# Patient Record
Sex: MALE | Race: WHITE | Employment: OTHER | URBAN - METROPOLITAN AREA
[De-identification: names, ages, dates, MRNs, and addresses within clinical notes are randomized per-mention and may not be internally consistent; named-entity substitution may affect disease eponyms.]

---

## 2018-02-04 PROBLEM — D35.00 ADRENAL ADENOMA: Status: ACTIVE | Noted: 2018-02-04

## 2018-02-04 PROBLEM — E66.9 OBESITY: Status: ACTIVE | Noted: 2018-02-04

## 2018-02-04 PROBLEM — R00.0 SINUS TACHYCARDIA: Status: ACTIVE | Noted: 2018-02-04

## 2018-02-04 PROBLEM — J96.22 ACUTE ON CHRONIC RESPIRATORY FAILURE WITH HYPERCAPNIA (HCC): Status: ACTIVE | Noted: 2018-02-04

## 2018-02-04 PROBLEM — R06.02 SOB (SHORTNESS OF BREATH): Status: ACTIVE | Noted: 2018-02-04

## 2018-02-04 PROBLEM — J44.1 COPD EXACERBATION (HCC): Status: ACTIVE | Noted: 2018-02-04

## 2018-02-04 PROBLEM — R07.9 CHEST PAIN: Status: ACTIVE | Noted: 2018-02-04

## 2018-02-04 PROBLEM — N17.9 AKI (ACUTE KIDNEY INJURY) (HCC): Status: ACTIVE | Noted: 2018-02-04

## 2018-02-04 PROBLEM — I10 ESSENTIAL HYPERTENSION: Status: ACTIVE | Noted: 2018-02-04

## 2018-02-06 PROBLEM — I48.91 ATRIAL FIBRILLATION WITH RVR (HCC): Status: ACTIVE | Noted: 2018-02-06

## 2018-02-09 PROBLEM — I48.91 ATRIAL FIBRILLATION WITH RVR (HCC): Status: RESOLVED | Noted: 2018-02-06 | Resolved: 2018-02-09

## 2018-02-09 PROBLEM — R00.0 SINUS TACHYCARDIA: Status: RESOLVED | Noted: 2018-02-04 | Resolved: 2018-02-09

## 2018-02-09 PROBLEM — N17.9 AKI (ACUTE KIDNEY INJURY) (HCC): Status: RESOLVED | Noted: 2018-02-04 | Resolved: 2018-02-09

## 2018-02-14 PROBLEM — R41.82 ALTERED MENTAL STATUS: Status: ACTIVE | Noted: 2018-02-14

## 2018-02-14 PROBLEM — I48.92 ATRIAL FLUTTER WITH RAPID VENTRICULAR RESPONSE (HCC): Status: ACTIVE | Noted: 2018-02-14

## 2018-02-14 PROBLEM — J96.22 ACUTE ON CHRONIC RESPIRATORY FAILURE WITH HYPERCAPNIA (HCC): Status: RESOLVED | Noted: 2018-02-04 | Resolved: 2018-02-14

## 2018-02-14 PROBLEM — I48.91 ATRIAL FIBRILLATION WITH RVR (HCC): Status: ACTIVE | Noted: 2018-02-14

## 2018-02-14 PROBLEM — N17.9 AKI (ACUTE KIDNEY INJURY) (HCC): Status: ACTIVE | Noted: 2018-02-14

## 2018-02-14 PROBLEM — D72.829 LEUKOCYTOSIS: Status: ACTIVE | Noted: 2018-02-14

## 2018-02-14 PROBLEM — F41.9 ANXIETY: Status: ACTIVE | Noted: 2018-02-14

## 2018-02-14 PROBLEM — I95.9 HYPOTENSION: Status: ACTIVE | Noted: 2018-02-14

## 2018-03-19 ENCOUNTER — OFFICE VISIT (OUTPATIENT)
Dept: CARDIOLOGY CLINIC | Age: 58
End: 2018-03-19

## 2018-03-19 VITALS
RESPIRATION RATE: 16 BRPM | HEART RATE: 124 BPM | HEIGHT: 71 IN | SYSTOLIC BLOOD PRESSURE: 146 MMHG | DIASTOLIC BLOOD PRESSURE: 90 MMHG | WEIGHT: 251 LBS | BODY MASS INDEX: 35.14 KG/M2

## 2018-03-19 DIAGNOSIS — I10 ESSENTIAL HYPERTENSION: ICD-10-CM

## 2018-03-19 DIAGNOSIS — G47.33 OSA (OBSTRUCTIVE SLEEP APNEA): ICD-10-CM

## 2018-03-19 DIAGNOSIS — I48.92 ATRIAL FLUTTER WITH RAPID VENTRICULAR RESPONSE (HCC): Primary | ICD-10-CM

## 2018-03-19 DIAGNOSIS — I95.0 IDIOPATHIC HYPOTENSION: ICD-10-CM

## 2018-03-19 DIAGNOSIS — J44.9 COPD, VERY SEVERE (HCC): ICD-10-CM

## 2018-03-19 PROCEDURE — 99214 OFFICE O/P EST MOD 30 MIN: CPT | Performed by: INTERNAL MEDICINE

## 2018-03-19 PROCEDURE — 93000 ELECTROCARDIOGRAM COMPLETE: CPT | Performed by: INTERNAL MEDICINE

## 2018-03-20 ENCOUNTER — OFFICE VISIT (OUTPATIENT)
Dept: PULMONOLOGY | Age: 58
End: 2018-03-20

## 2018-03-20 VITALS
SYSTOLIC BLOOD PRESSURE: 128 MMHG | WEIGHT: 251 LBS | OXYGEN SATURATION: 91 % | TEMPERATURE: 97.9 F | BODY MASS INDEX: 35.14 KG/M2 | HEIGHT: 71 IN | RESPIRATION RATE: 17 BRPM | HEART RATE: 133 BPM | DIASTOLIC BLOOD PRESSURE: 81 MMHG

## 2018-03-20 DIAGNOSIS — J44.1 COPD EXACERBATION (HCC): ICD-10-CM

## 2018-03-20 PROCEDURE — 99213 OFFICE O/P EST LOW 20 MIN: CPT | Performed by: INTERNAL MEDICINE

## 2018-03-20 PROCEDURE — 99214 OFFICE O/P EST MOD 30 MIN: CPT | Performed by: INTERNAL MEDICINE

## 2018-03-20 NOTE — PROGRESS NOTES
Follow up visit today. Orders for meds continue; stiloto and eliquis samples given per doctor's orders. To have sleep study with titration done as soon as can be scheduled. Follow up appt after sleep study.
that his Stilito toe is helping him a lot and he is using nebs q.4 hours      PHYSICAL EXAMINATION:     VITAL SIGNS:  /81 (Site: Right Arm, Cuff Size: Large Adult)   Pulse 133   Temp 97.9 °F (36.6 °C) (Oral)   Resp 17   Ht 5' 11\" (1.803 m)   Wt 251 lb (113.9 kg)   SpO2 91%   BMI 35.01 kg/m²   Wt Readings from Last 3 Encounters:   03/20/18 251 lb (113.9 kg)   03/19/18 251 lb (113.9 kg)   02/17/18 245 lb 9.6 oz (111.4 kg)     Temp Readings from Last 3 Encounters:   03/20/18 97.9 °F (36.6 °C) (Oral)   02/18/18 97.3 °F (36.3 °C) (Oral)   02/09/18 97.6 °F (36.4 °C) (Oral)     TMAX:  BP Readings from Last 3 Encounters:   03/20/18 128/81   03/19/18 (!) 146/90   02/18/18 134/73     Pulse Readings from Last 3 Encounters:   03/20/18 133   03/19/18 124   02/18/18 59           INTAKE/OUTPUTS:  No intake/output data recorded.   No intake or output data in the 24 hours ending 03/20/18 1448    General Appearance: alert and oriented to person, place and time, well-developed and   well-nourished, in no acute distress   Eyes: pupils equal, round, and reactive to light, extraocular eye movements intact, conjunctivae normal and sclera anicteric   Neck: neck supple and non tender without mass, no thyromegaly, no thyroid nodules and no cervical adenopathy   Pulmonary/Chest:Rhonchi ,decrease breath sounds   Cardiovascular: irregular rhythm, normal S1 and S2, no murmurs, rubs, clicks or gallops, distal pulses intact, no carotid bruits,   Abdomen: obese, soft, non-tender, non-distended, normal bowel sounds, no masses or organomegaly   Extremities:no edema or cyanosis  Musculoskeletal: normal range of motion, no joint swelling, deformity or tenderness   Neurologic: reflexes normal and symmetric, no cranial nerve deficit noted    LABS/IMAGING:    CBC:  Lab Results   Component Value Date    WBC 11.1 02/18/2018    HGB 12.3 (L) 02/18/2018    HCT 37.4 02/18/2018    MCV 96.2 02/18/2018     02/18/2018    LYMPHOPCT 30.9 02/18/2018

## 2018-03-22 NOTE — PROGRESS NOTES
OUTPATIENT CARDIOLOGY FOLLOW-UP    Chief Complaint: Follow-up for atrial flutter with RVR    Date of Service: 3/19/18    Interim History:  +SOB, productive cough, and chills prior to 2018 hospitalization --> +influenza A. Atrial flutter with RVR on EKG and telemetry during 2018 admission (with difficulty rate controlling) --> successful WYATT/CVN on 18 (and 2/15/18). +episodes of atypical chest pain (improved). +improvement in respiratory status s/p CVN --> now with recurrent atrial arrhythmia and \"back to being out of breath\". Currently with no acute distress at rest.    Review of Systems:  Cardiac: As per HPI  General: No fever, +chills (improved)  Pulmonary: As per HPI  GI: No nausea, vomiting  Musculoskeletal: ROUSE x 4, no focal motor deficits  Skin: Intact, no rashes  Neuro/Psych: No headache or seizures    Past Medical History:   1. Cardiac catheterization 2010 ([facility]): LMT: normal. LAD: normal, gives off one large bifurcating diagonal as it courses to but ends at the apex. LCx: nondominant, gives off one large OM1, only trivial distal disease. RCA: Dominant, large, minimal disease distally. 2. Echocardiogram 2010 ([facility]): EF 55% with questionable RV dilation. Trivial to small pericardial effusion. Small echodense space near apex (? Localized effusion). Trivial MR and TR.   3. Pericarditis 2010 (treated at [facility])  4. Hypertension  5. History of syncope with negative tilt table test 2012 ([facility])  6. COPD  7. History of back surgery (detals unknown)  8. Former tobacco abuse     Social History:  He stopped smoking in 2017, prior to that smoked 2 PPD for \" a lot of years\". He denies significant alcohol intake or illicit drug use. Family History:  One sister with a pacemaker  Mother  of ALS, he is unsure at what age  Father  of lung cancer, he is unsure at what age    Medications Prior to Admit:  Prior to Admission medications    Medication Sig Start Date End Date Taking? hyperinflation of lungs, consistent with COPD     CTA of chest 2/04/2018:  1. There is no evidence of a pulmonary embolus.           2. COPD. 3. Bilateral adrenal adenomas. The largest is on the left and measures   1.5 cm. .     Repeat CTA chest: 2/5/18  1. No acute pulmonary embolus.       2. No aneurysm formation or dissection of the thoracic aorta.       3. No acute cardiopulmonary process. ECG: atrial flutter with RVR    Telemetry findings reviewed: SR, rate 70's    Cardiac catheterization 11/2010 ([facility]): LMT: normal. LAD: normal, gives off one large bifurcating diagonal as it courses to but ends at the apex. LCx: nondominant, gives off one large OM1, only trivial distal disease. RCA: Dominant, large, minimal disease distally. Echocardiogram 11/2010 ([facility]): EF 55% with questionable RV dilation. Trivial to small pericardial effusion. Small echodense space near apex (? Localized effusion). Trivial MR and TR.     WYATT: 2/7/18 ([facility])   Cardiac rhythm: atrial flutter   Low normal left ventricular ejection fraction.   Moderately dilated left atrium.   No evidence of a left atrial appendage thrombus.   No evidence of interatrial shunting on bubble study.   Borderline dilated right ventricle with normal right ventricular function.   Mild-moderate mitral regurgitation. WYATT: 2/15/18 [facility])   Low normal left ventricular systolic function.   Mild to moderate mitral regurgitation. Impression:  1. Hospitalization in 2/2018 for chills, productive cough, and SOB --> Influenza A (2/5/18)  2. Very severe COPD with recent exacerbation  3. Atrial flutter with RVR -- duration unknown at the time of 2/2018 hospitalization, [id number] score at least 1 --> successful WYATT/CVN on 2/7/18 (and 2/15/18). +improvement in respiratory status s/p CVN --> now with recurrent atrial arrhythmia and \"back to being out of breath\". 4. HTN -- controlled during recent hospitalization, BP today 146/90, on cardizem and ARB  5.  Prior

## 2018-03-22 NOTE — PROGRESS NOTES
tablet by mouth 2 times daily 60 tablet 0    escitalopram (LEXAPRO) 10 MG tablet Take 1 tablet by mouth daily 30 tablet 0    diltiazem (CARDIZEM CD) 240 MG extended release capsule Take 1 capsule by mouth daily 30 capsule 0    predniSONE (DELTASONE) 10 MG tablet 4 pills for 3 days, then 3 pills for 3 days, 2 pills for 3 days, then 1 pills for 3 days 30 tablet 0    diclofenac (SOLARAZE) 3 % gel Apply topically 2 times daily Apply topically 2 times daily.  Tiotropium Bromide-Olodaterol (STIOLTO RESPIMAT) 2.5-2.5 MCG/ACT AERS Inhale 2.5 mcg into the lungs daily 2 puffs daily      losartan (COZAAR) 50 MG tablet Take 50 mg by mouth daily      albuterol sulfate  (90 Base) MCG/ACT inhaler Inhale 2 puffs into the lungs every 6 hours as needed for Wheezing      ipratropium-albuterol (DUONEB) 0.5-2.5 (3) MG/3ML SOLN nebulizer solution Inhale 1 vial into the lungs every 4 hours       No current facility-administered medications for this visit. No Known Allergies    ROS:   Constitutional: Negative for fever, activity change and appetite change. HENT: Negative for epistaxis. Eyes: Negative for diploplia, blurred vision. Respiratory: Negative for cough, chest tightness, shortness of breath and wheezing. Cardiovascular: pertinent positives in HPI  Gastrointestinal: Negative for abdominal pain and blood in stool. All other review of systems are negative     PHYSICAL EXAM:  Vitals:    03/23/18 0743   BP: 134/82   Pulse: 138   Resp: 20   Constitutional: Well-developed, no acute distress, well groomed  Eyes: conjunctivae normal, no xanthelasma   Ears, Nose, Throat: oral mucosa moist, no cyanosis   CV: tachycardic rate, regular rhythm, no JVD no murmurs, rubs, or gallops.  PMI is nondisplaced, Peripheral pulses normal including carotid auscultation, no noted aortic bruit, bilateral femoral and pedal pulses are normal in quality  Lungs: clear to auscultation bilaterally, mild end expiratory wheezes  Abdomen: soft, non-tender, bowel sounds present, no masses or hepatomegaly   Musculoskeletal: no digital clubbing, trace edema   Skin: warm, no rashes      I have personally reviewed the laboratory, cardiac diagnostic and radiographic testing as outlined below:    Data:    No results for input(s): WBC, HGB, HCT, PLT in the last 72 hours. No results for input(s): NA, K, CL, CO2, BUN, CREATININE, CALCIUM in the last 72 hours. Invalid input(s): GLU  No results for input(s): INR in the last 72 hours. No results for input(s): TSH in the last 72 hours. Lab Results   Component Value Date    MG 2.2 2018     Last CXR: 18:  Narrative   Patient MRN:  [id number]   : 1960   Age: 66 years   Gender: Male       Order Date:  2018 5:33 PM       EXAM: XR CHEST PORTABLE       NUMBER OF IMAGES:  1 view       INDICATION:  Patient is a 61-year-old gentleman with history of COPD,   tachycardia, chest pain         COMPARISON: Chest x-ray 2018       FINDINGS:  Cardiac silhouette appears within normal limits. Mediastinal contour is unremarkable.       No pleural effusion or pneumothorax. Lungs are clear. Mild pulmonary   hyperinflation could indicate underlying mild emphysema. Upper abdomen   is unremarkable.           Impression       1. No acute pleuroparenchymal disease. EKG: 3/23/18: AFL with likely 2:1 conduction  - likely typical   -  (see scan in Cardiology)    Last Echo:    WYATT: 18 ([place])   Cardiac rhythm: atrial flutter   Low normal left ventricular ejection fraction.   Moderately dilated left atrium.   No evidence of a left atrial appendage thrombus.   No evidence of interatrial shunting on bubble study.   Borderline dilated right ventricle with normal right ventricular function.   Mild-moderate mitral regurgitation.     WYATT: 2/15/18 [facility])   Low normal left ventricular systolic function.   Mild to moderate mitral regurgitation.     Last cardioversion: successful 18 are not limited to: bleeding, infection, blood clot, vascular injury requiring surgical repair, valvular injury, damage to the cardiac conduction system requiring pacemaker implantation, cardiac perforation and tamponade, heart attack, stroke, and death. The patient understands these risks and wishes to think about whether or not to undergo the procedure. We have discussed brandie-procedural anticoagulation management and the potential need for a trans-esophageal echocardiogram on the day of the procedure. We also discussed the possibility of trans-septal for LA flutter ablation. - plan for WYATT + AFL ablation- possible trans-septal  - given poorly controlled rate would recommend adding digoxin 0.125mg daily, however he asks that his PCP order this based on costs, and we will contact his office regarding this    2. Obesity  - encouraged weight management    3. HTN  - follows with Dr. Jadon Coats and Dr. Nicole Alcala    4. COPD  - per pulmonary    5. Sleep disordered breathing  - sleep study pending    6. Ex-smoker  - continue abstinence    Plan:  1. Atrial flutter ablation  2. Continue cardizem 240mg BID  3. Start digoxin 125mcg daily  4. Dig level in 1 week    Thank you for allowing me to participate in your patient's care. Please call me if there are any questions.       Carter Ewing MD, [facility]  Cardiac Electrophysiology  [facility]) Physicians  [facility]: [facility] [place]  7:51 AM  3/23/2018

## 2018-03-23 ENCOUNTER — OFFICE VISIT (OUTPATIENT)
Dept: NON INVASIVE DIAGNOSTICS | Age: 58
End: 2018-03-23

## 2018-03-23 ENCOUNTER — TELEPHONE (OUTPATIENT)
Dept: NON INVASIVE DIAGNOSTICS | Age: 58
End: 2018-03-23

## 2018-03-23 VITALS
HEIGHT: 71 IN | RESPIRATION RATE: 20 BRPM | HEART RATE: 138 BPM | WEIGHT: 254.2 LBS | DIASTOLIC BLOOD PRESSURE: 82 MMHG | SYSTOLIC BLOOD PRESSURE: 134 MMHG | BODY MASS INDEX: 35.59 KG/M2

## 2018-03-23 DIAGNOSIS — Z79.899 HIGH RISK MEDICATION USE: Primary | ICD-10-CM

## 2018-03-23 DIAGNOSIS — I48.92 ATRIAL FLUTTER WITH RAPID VENTRICULAR RESPONSE (HCC): Primary | ICD-10-CM

## 2018-03-23 DIAGNOSIS — I48.92 ATRIAL FLUTTER WITH RAPID VENTRICULAR RESPONSE (HCC): ICD-10-CM

## 2018-03-23 DIAGNOSIS — I10 ESSENTIAL HYPERTENSION: ICD-10-CM

## 2018-03-23 PROCEDURE — 99205 OFFICE O/P NEW HI 60 MIN: CPT | Performed by: INTERNAL MEDICINE

## 2018-03-23 PROCEDURE — 93000 ELECTROCARDIOGRAM COMPLETE: CPT | Performed by: INTERNAL MEDICINE

## 2018-03-23 NOTE — Clinical Note
FYI- he needs digoxin 0.125mg daily, he asked if you could prescribe it for costs reasons and then get a digoxin level in 1 week.  Thanks  Vickey Gavin

## 2018-03-23 NOTE — TELEPHONE ENCOUNTER
I called and spoke to Dr. Bianca Mendez nurse Raúl Garza and gave her the order for Digoxin 125mcg daily and she will call into [address] [place] [address]. I faxed over an order for a Dig level to be done next week and a copy of Dr. Shelia Garcia from today for Dr. Yudith Marrero to review.

## 2018-03-29 ENCOUNTER — TELEPHONE (OUTPATIENT)
Dept: NON INVASIVE DIAGNOSTICS | Age: 58
End: 2018-03-29

## 2018-03-29 RX ORDER — DILTIAZEM HYDROCHLORIDE 240 MG/1
240 CAPSULE, COATED, EXTENDED RELEASE ORAL 2 TIMES DAILY
Qty: 60 CAPSULE | Refills: 5
Start: 2018-03-29 | End: 2018-09-07 | Stop reason: SDUPTHER

## 2018-03-31 ENCOUNTER — HOSPITAL ENCOUNTER (OUTPATIENT)
Age: 58
Setting detail: SPECIMEN
Discharge: HOME | End: 2018-03-31

## 2018-03-31 LAB
ALBUMIN SERPL-MCNC: 4.1 G/DL (ref 3.5–5.2)
ALP BLD-CCNC: 61 U/L (ref 40–129)
ALT SERPL-CCNC: 17 U/L (ref 0–40)
ANION GAP SERPL CALCULATED.3IONS-SCNC: 10 MMOL/L (ref 7–16)
AST SERPL-CCNC: 15 U/L (ref 0–39)
BASOPHILS ABSOLUTE: 0.06 E9/L (ref 0–0.2)
BASOPHILS RELATIVE PERCENT: 0.8 % (ref 0–2)
BILIRUB SERPL-MCNC: 0.2 MG/DL (ref 0–1.2)
BUN BLDV-MCNC: 22 MG/DL (ref 6–20)
CALCIUM SERPL-MCNC: 9.8 MG/DL (ref 8.6–10.2)
CHLORIDE BLD-SCNC: 101 MMOL/L (ref 98–107)
CO2: 32 MMOL/L (ref 22–29)
CREAT SERPL-MCNC: 1 MG/DL (ref 0.7–1.2)
EOSINOPHILS ABSOLUTE: 0.19 E9/L (ref 0.05–0.5)
EOSINOPHILS RELATIVE PERCENT: 2.4 % (ref 0–6)
GFR AFRICAN AMERICAN: >60
GFR NON-AFRICAN AMERICAN: >60 ML/MIN/1.73
GLUCOSE BLD-MCNC: 95 MG/DL (ref 74–109)
HCT VFR BLD CALC: 38.4 % (ref 37–54)
HEMOGLOBIN: 12.7 G/DL (ref 12.5–16.5)
IMMATURE GRANULOCYTES #: 0.08 E9/L
IMMATURE GRANULOCYTES %: 1 % (ref 0–5)
LYMPHOCYTES ABSOLUTE: 2.63 E9/L (ref 1.5–4)
LYMPHOCYTES RELATIVE PERCENT: 33 % (ref 20–42)
MCH RBC QN AUTO: 31.9 PG (ref 26–35)
MCHC RBC AUTO-ENTMCNC: 33.1 % (ref 32–34.5)
MCV RBC AUTO: 96.5 FL (ref 80–99.9)
MONOCYTES ABSOLUTE: 0.93 E9/L (ref 0.1–0.95)
MONOCYTES RELATIVE PERCENT: 11.7 % (ref 2–12)
NEUTROPHILS ABSOLUTE: 4.09 E9/L (ref 1.8–7.3)
NEUTROPHILS RELATIVE PERCENT: 51.1 % (ref 43–80)
PDW BLD-RTO: 13.8 FL (ref 11.5–15)
PLATELET # BLD: 223 E9/L (ref 130–450)
PMV BLD AUTO: 11.4 FL (ref 7–12)
POTASSIUM SERPL-SCNC: 4.6 MMOL/L (ref 3.5–5)
RBC # BLD: 3.98 E12/L (ref 3.8–5.8)
SODIUM BLD-SCNC: 143 MMOL/L (ref 132–146)
TOTAL PROTEIN: 6.7 G/DL (ref 6.4–8.3)
WBC # BLD: 8 E9/L (ref 4.5–11.5)

## 2018-03-31 PROCEDURE — 80162 ASSAY OF DIGOXIN TOTAL: CPT

## 2018-03-31 PROCEDURE — 36415 COLL VENOUS BLD VENIPUNCTURE: CPT

## 2018-03-31 PROCEDURE — 80053 COMPREHEN METABOLIC PANEL: CPT

## 2018-03-31 PROCEDURE — 85025 COMPLETE CBC W/AUTO DIFF WBC: CPT

## 2018-04-01 LAB — DIGOXIN LEVEL: 0.4 NG/ML (ref 0.8–2)

## 2018-04-03 ENCOUNTER — ANESTHESIA EVENT (OUTPATIENT)
Dept: NON INVASIVE DIAGNOSTICS | Age: 58
End: 2018-04-03

## 2018-04-03 ENCOUNTER — HOSPITAL ENCOUNTER (OUTPATIENT)
Dept: NON INVASIVE DIAGNOSTICS | Age: 58
Discharge: HOME | End: 2018-04-03

## 2018-04-03 ENCOUNTER — ANESTHESIA (OUTPATIENT)
Dept: NON INVASIVE DIAGNOSTICS | Age: 58
End: 2018-04-03

## 2018-04-03 VITALS — SYSTOLIC BLOOD PRESSURE: 95 MMHG | DIASTOLIC BLOOD PRESSURE: 85 MMHG | OXYGEN SATURATION: 98 %

## 2018-04-03 VITALS
DIASTOLIC BLOOD PRESSURE: 114 MMHG | BODY MASS INDEX: 35 KG/M2 | OXYGEN SATURATION: 96 % | HEIGHT: 71 IN | WEIGHT: 250 LBS | SYSTOLIC BLOOD PRESSURE: 167 MMHG | HEART RATE: 86 BPM | TEMPERATURE: 97.6 F | RESPIRATION RATE: 16 BRPM

## 2018-04-03 DIAGNOSIS — Z98.890 STATUS POST CATHETER ABLATION OF ATRIAL FLUTTER: ICD-10-CM

## 2018-04-03 LAB
ABO/RH: NORMAL
ANTIBODY SCREEN: NORMAL

## 2018-04-03 PROCEDURE — 6360000002 HC RX W HCPCS

## 2018-04-03 PROCEDURE — 93623 PRGRMD STIMJ&PACG IV RX NFS: CPT | Performed by: INTERNAL MEDICINE

## 2018-04-03 PROCEDURE — 76937 US GUIDE VASCULAR ACCESS: CPT | Performed by: INTERNAL MEDICINE

## 2018-04-03 PROCEDURE — 93325 DOPPLER ECHO COLOR FLOW MAPG: CPT

## 2018-04-03 PROCEDURE — 3700000001 HC ADD 15 MINUTES (ANESTHESIA)

## 2018-04-03 PROCEDURE — 93653 COMPRE EP EVAL TX SVT: CPT | Performed by: INTERNAL MEDICINE

## 2018-04-03 PROCEDURE — 93312 ECHO TRANSESOPHAGEAL: CPT

## 2018-04-03 PROCEDURE — 2720000010 HC SURG SUPPLY STERILE

## 2018-04-03 PROCEDURE — 3700000000 HC ANESTHESIA ATTENDED CARE

## 2018-04-03 PROCEDURE — 93613 INTRACARDIAC EPHYS 3D MAPG: CPT | Performed by: INTERNAL MEDICINE

## 2018-04-03 PROCEDURE — 86901 BLOOD TYPING SEROLOGIC RH(D): CPT

## 2018-04-03 PROCEDURE — C1730 CATH, EP, 19 OR FEW ELECT: HCPCS

## 2018-04-03 PROCEDURE — C1732 CATH, EP, DIAG/ABL, 3D/VECT: HCPCS

## 2018-04-03 PROCEDURE — 94664 DEMO&/EVAL PT USE INHALER: CPT

## 2018-04-03 PROCEDURE — C1894 INTRO/SHEATH, NON-LASER: HCPCS

## 2018-04-03 PROCEDURE — 93621 COMP EP EVL L PAC&REC C SINS: CPT | Performed by: INTERNAL MEDICINE

## 2018-04-03 PROCEDURE — C1713 ANCHOR/SCREW BN/BN,TIS/BN: HCPCS

## 2018-04-03 PROCEDURE — 6370000000 HC RX 637 (ALT 250 FOR IP): Performed by: INTERNAL MEDICINE

## 2018-04-03 PROCEDURE — 2500000003 HC RX 250 WO HCPCS

## 2018-04-03 PROCEDURE — 2580000003 HC RX 258: Performed by: NURSE ANESTHETIST, CERTIFIED REGISTERED

## 2018-04-03 PROCEDURE — 2709999900 HC NON-CHARGEABLE SUPPLY

## 2018-04-03 PROCEDURE — 6360000002 HC RX W HCPCS: Performed by: NURSE ANESTHETIST, CERTIFIED REGISTERED

## 2018-04-03 PROCEDURE — 36415 COLL VENOUS BLD VENIPUNCTURE: CPT

## 2018-04-03 PROCEDURE — 94640 AIRWAY INHALATION TREATMENT: CPT

## 2018-04-03 PROCEDURE — 86900 BLOOD TYPING SEROLOGIC ABO: CPT

## 2018-04-03 PROCEDURE — 86850 RBC ANTIBODY SCREEN: CPT

## 2018-04-03 PROCEDURE — 93320 DOPPLER ECHO COMPLETE: CPT

## 2018-04-03 RX ORDER — SODIUM CHLORIDE 0.9 % (FLUSH) 0.9 %
10 SYRINGE (ML) INJECTION EVERY 12 HOURS SCHEDULED
Status: CANCELLED | OUTPATIENT
Start: 2018-04-03

## 2018-04-03 RX ORDER — PROPOFOL 10 MG/ML
INJECTION, EMULSION INTRAVENOUS PRN
Status: DISCONTINUED | OUTPATIENT
Start: 2018-04-03 | End: 2018-04-03 | Stop reason: SDUPTHER

## 2018-04-03 RX ORDER — ACETAMINOPHEN 325 MG/1
650 TABLET ORAL EVERY 4 HOURS PRN
Status: DISCONTINUED | OUTPATIENT
Start: 2018-04-03 | End: 2018-04-04 | Stop reason: HOSPADM

## 2018-04-03 RX ORDER — DIGOXIN 125 MCG
125 TABLET ORAL DAILY
COMMUNITY
End: 2018-04-03 | Stop reason: HOSPADM

## 2018-04-03 RX ORDER — SODIUM CHLORIDE 9 MG/ML
INJECTION, SOLUTION INTRAVENOUS CONTINUOUS PRN
Status: DISCONTINUED | OUTPATIENT
Start: 2018-04-03 | End: 2018-04-03 | Stop reason: SDUPTHER

## 2018-04-03 RX ORDER — HYDROCODONE BITARTRATE AND ACETAMINOPHEN 5; 325 MG/1; MG/1
2 TABLET ORAL EVERY 4 HOURS PRN
Status: DISCONTINUED | OUTPATIENT
Start: 2018-04-03 | End: 2018-04-04 | Stop reason: HOSPADM

## 2018-04-03 RX ORDER — SODIUM CHLORIDE 0.9 % (FLUSH) 0.9 %
10 SYRINGE (ML) INJECTION PRN
Status: CANCELLED | OUTPATIENT
Start: 2018-04-03

## 2018-04-03 RX ORDER — PROPOFOL 10 MG/ML
INJECTION, EMULSION INTRAVENOUS CONTINUOUS PRN
Status: DISCONTINUED | OUTPATIENT
Start: 2018-04-03 | End: 2018-04-03 | Stop reason: SDUPTHER

## 2018-04-03 RX ORDER — FENTANYL CITRATE 50 UG/ML
INJECTION, SOLUTION INTRAMUSCULAR; INTRAVENOUS PRN
Status: DISCONTINUED | OUTPATIENT
Start: 2018-04-03 | End: 2018-04-03 | Stop reason: SDUPTHER

## 2018-04-03 RX ORDER — IPRATROPIUM BROMIDE AND ALBUTEROL SULFATE 2.5; .5 MG/3ML; MG/3ML
1 SOLUTION RESPIRATORY (INHALATION) ONCE
Status: COMPLETED | OUTPATIENT
Start: 2018-04-03 | End: 2018-04-03

## 2018-04-03 RX ORDER — MIDAZOLAM HYDROCHLORIDE 1 MG/ML
INJECTION INTRAMUSCULAR; INTRAVENOUS PRN
Status: DISCONTINUED | OUTPATIENT
Start: 2018-04-03 | End: 2018-04-03 | Stop reason: SDUPTHER

## 2018-04-03 RX ORDER — HYDROCODONE BITARTRATE AND ACETAMINOPHEN 5; 325 MG/1; MG/1
1 TABLET ORAL EVERY 4 HOURS PRN
Status: DISCONTINUED | OUTPATIENT
Start: 2018-04-03 | End: 2018-04-04 | Stop reason: HOSPADM

## 2018-04-03 RX ADMIN — SODIUM CHLORIDE: 9 INJECTION, SOLUTION INTRAVENOUS at 07:35

## 2018-04-03 RX ADMIN — FENTANYL CITRATE 50 MCG: 50 INJECTION, SOLUTION INTRAMUSCULAR; INTRAVENOUS at 08:55

## 2018-04-03 RX ADMIN — FENTANYL CITRATE 50 MCG: 50 INJECTION, SOLUTION INTRAMUSCULAR; INTRAVENOUS at 08:10

## 2018-04-03 RX ADMIN — IPRATROPIUM BROMIDE AND ALBUTEROL SULFATE 1 AMPULE: 2.5; .5 SOLUTION RESPIRATORY (INHALATION) at 16:45

## 2018-04-03 RX ADMIN — FENTANYL CITRATE 50 MCG: 50 INJECTION, SOLUTION INTRAMUSCULAR; INTRAVENOUS at 07:35

## 2018-04-03 RX ADMIN — MIDAZOLAM 1 MG: 1 INJECTION INTRAMUSCULAR; INTRAVENOUS at 08:55

## 2018-04-03 RX ADMIN — FENTANYL CITRATE 50 MCG: 50 INJECTION, SOLUTION INTRAMUSCULAR; INTRAVENOUS at 08:15

## 2018-04-03 RX ADMIN — ACETAMINOPHEN 650 MG: 325 TABLET, FILM COATED ORAL at 15:41

## 2018-04-03 RX ADMIN — HYDROCODONE BITARTRATE AND ACETAMINOPHEN 2 TABLET: 5; 325 TABLET ORAL at 12:26

## 2018-04-03 RX ADMIN — MIDAZOLAM 1 MG: 1 INJECTION INTRAMUSCULAR; INTRAVENOUS at 07:35

## 2018-04-03 RX ADMIN — PROPOFOL 150 MG: 10 INJECTION, EMULSION INTRAVENOUS at 07:45

## 2018-04-03 RX ADMIN — PROPOFOL 50 MCG/KG/MIN: 10 INJECTION, EMULSION INTRAVENOUS at 07:51

## 2018-04-03 RX ADMIN — MIDAZOLAM 1 MG: 1 INJECTION INTRAMUSCULAR; INTRAVENOUS at 07:45

## 2018-04-03 ASSESSMENT — PULMONARY FUNCTION TESTS
PIF_VALUE: 13
PIF_VALUE: 0
PIF_VALUE: 13
PIF_VALUE: 0
PIF_VALUE: 13
PIF_VALUE: 0
PIF_VALUE: 0
PIF_VALUE: 13
PIF_VALUE: 0
PIF_VALUE: 13
PIF_VALUE: 13
PIF_VALUE: 14
PIF_VALUE: 0
PIF_VALUE: 13
PIF_VALUE: 0
PIF_VALUE: 13
PIF_VALUE: 0
PIF_VALUE: 13
PIF_VALUE: 0
PIF_VALUE: 13
PIF_VALUE: 1
PIF_VALUE: 13
PIF_VALUE: 0
PIF_VALUE: 13
PIF_VALUE: 0
PIF_VALUE: 13
PIF_VALUE: 0
PIF_VALUE: 13
PIF_VALUE: 21
PIF_VALUE: 0
PIF_VALUE: 13
PIF_VALUE: 14
PIF_VALUE: 13
PIF_VALUE: 3
PIF_VALUE: 13
PIF_VALUE: 0
PIF_VALUE: 13
PIF_VALUE: 0
PIF_VALUE: 13
PIF_VALUE: 0
PIF_VALUE: 13

## 2018-04-03 ASSESSMENT — ENCOUNTER SYMPTOMS: SHORTNESS OF BREATH: 1

## 2018-04-03 ASSESSMENT — PAIN SCALES - GENERAL
PAINLEVEL_OUTOF10: 7
PAINLEVEL_OUTOF10: 7

## 2018-04-08 ENCOUNTER — HOSPITAL ENCOUNTER (OUTPATIENT)
Age: 58
Setting detail: SPECIMEN
Discharge: HOME | End: 2018-04-08

## 2018-04-08 LAB
BILIRUBIN URINE: NEGATIVE
BLOOD, URINE: NEGATIVE
CLARITY: CLEAR
COLOR: YELLOW
GLUCOSE URINE: NEGATIVE MG/DL
KETONES, URINE: NEGATIVE MG/DL
LEUKOCYTE ESTERASE, URINE: NEGATIVE
NITRITE, URINE: NEGATIVE
PH UA: 8.5 (ref 5–9)
PROTEIN UA: NORMAL MG/DL
SPECIFIC GRAVITY UA: 1.02 (ref 1–1.03)
UROBILINOGEN, URINE: 0.2 E.U./DL

## 2018-04-08 PROCEDURE — 82570 ASSAY OF URINE CREATININE: CPT

## 2018-04-08 PROCEDURE — 36415 COLL VENOUS BLD VENIPUNCTURE: CPT

## 2018-04-08 PROCEDURE — 80061 LIPID PANEL: CPT

## 2018-04-08 PROCEDURE — 82044 UR ALBUMIN SEMIQUANTITATIVE: CPT

## 2018-04-08 PROCEDURE — 81003 URINALYSIS AUTO W/O SCOPE: CPT

## 2018-04-08 PROCEDURE — G0103 PSA SCREENING: HCPCS

## 2018-04-09 LAB
CHOLESTEROL, TOTAL: 205 MG/DL (ref 0–199)
CREATININE URINE: 189 MG/DL (ref 40–278)
HDLC SERPL-MCNC: 80 MG/DL
LDL CHOLESTEROL CALCULATED: 109 MG/DL (ref 0–99)
MICROALBUMIN UR-MCNC: <12 MG/L
MICROALBUMIN/CREAT UR-RTO: ABNORMAL (ref 0–30)
PROSTATE SPECIFIC ANTIGEN: 0.26 NG/ML (ref 0–4)
TRIGL SERPL-MCNC: 80 MG/DL (ref 0–149)
VLDLC SERPL CALC-MCNC: 16 MG/DL

## 2018-04-17 ENCOUNTER — OFFICE VISIT (OUTPATIENT)
Dept: NON INVASIVE DIAGNOSTICS | Age: 58
End: 2018-04-17

## 2018-04-17 VITALS
RESPIRATION RATE: 16 BRPM | DIASTOLIC BLOOD PRESSURE: 78 MMHG | SYSTOLIC BLOOD PRESSURE: 130 MMHG | HEART RATE: 73 BPM | HEIGHT: 71 IN | WEIGHT: 254.4 LBS | BODY MASS INDEX: 35.61 KG/M2

## 2018-04-17 DIAGNOSIS — I48.92 ATRIAL FLUTTER WITH RAPID VENTRICULAR RESPONSE (HCC): Primary | ICD-10-CM

## 2018-04-17 PROCEDURE — 99213 OFFICE O/P EST LOW 20 MIN: CPT | Performed by: NURSE PRACTITIONER

## 2018-04-17 PROCEDURE — 93000 ELECTROCARDIOGRAM COMPLETE: CPT | Performed by: INTERNAL MEDICINE

## 2018-04-19 PROBLEM — N17.9 AKI (ACUTE KIDNEY INJURY) (HCC): Status: RESOLVED | Noted: 2018-02-14 | Resolved: 2018-04-19

## 2018-04-19 PROBLEM — R41.82 ALTERED MENTAL STATUS: Status: RESOLVED | Noted: 2018-02-14 | Resolved: 2018-04-19

## 2018-04-19 PROBLEM — R06.02 SOB (SHORTNESS OF BREATH): Status: RESOLVED | Noted: 2018-02-04 | Resolved: 2018-04-19

## 2018-04-19 PROBLEM — D72.829 LEUKOCYTOSIS: Status: RESOLVED | Noted: 2018-02-14 | Resolved: 2018-04-19

## 2018-04-19 PROBLEM — J44.1 COPD EXACERBATION (HCC): Status: RESOLVED | Noted: 2018-02-04 | Resolved: 2018-04-19

## 2018-04-19 PROBLEM — J44.9 COPD (CHRONIC OBSTRUCTIVE PULMONARY DISEASE) (HCC): Status: ACTIVE | Noted: 2018-02-04

## 2018-04-19 PROBLEM — I48.92 ATRIAL FLUTTER WITH RAPID VENTRICULAR RESPONSE (HCC): Status: RESOLVED | Noted: 2018-02-14 | Resolved: 2018-04-19

## 2018-04-19 PROBLEM — I95.9 HYPOTENSION: Status: RESOLVED | Noted: 2018-02-14 | Resolved: 2018-04-19

## 2018-04-20 ENCOUNTER — HOSPITAL ENCOUNTER (OUTPATIENT)
Dept: SLEEP CENTER | Age: 58
Discharge: HOME | End: 2018-04-20

## 2018-04-20 VITALS
SYSTOLIC BLOOD PRESSURE: 163 MMHG | HEIGHT: 71 IN | BODY MASS INDEX: 35.14 KG/M2 | WEIGHT: 251 LBS | HEART RATE: 82 BPM | DIASTOLIC BLOOD PRESSURE: 94 MMHG | OXYGEN SATURATION: 87 %

## 2018-04-20 PROCEDURE — 95811 POLYSOM 6/>YRS CPAP 4/> PARM: CPT

## 2018-04-20 PROCEDURE — 95811 POLYSOM 6/>YRS CPAP 4/> PARM: CPT | Performed by: INTERNAL MEDICINE

## 2018-04-20 ASSESSMENT — SLEEP AND FATIGUE QUESTIONNAIRES
HOW LIKELY ARE YOU TO NOD OFF OR FALL ASLEEP WHILE SITTING AND READING: 0
HOW LIKELY ARE YOU TO NOD OFF OR FALL ASLEEP WHILE SITTING AND TALKING TO SOMEONE: 0
HOW LIKELY ARE YOU TO NOD OFF OR FALL ASLEEP WHILE SITTING INACTIVE IN A PUBLIC PLACE: 0
HOW LIKELY ARE YOU TO NOD OFF OR FALL ASLEEP WHILE SITTING QUIETLY AFTER LUNCH WITHOUT ALCOHOL: 0
HOW LIKELY ARE YOU TO NOD OFF OR FALL ASLEEP WHEN YOU ARE A PASSENGER IN A CAR FOR AN HOUR WITHOUT A BREAK: 3
ESS TOTAL SCORE: 5
HOW LIKELY ARE YOU TO NOD OFF OR FALL ASLEEP WHILE LYING DOWN TO REST IN THE AFTERNOON WHEN CIRCUMSTANCES PERMIT: 0
HOW LIKELY ARE YOU TO NOD OFF OR FALL ASLEEP WHILE WATCHING TV: 2
HOW LIKELY ARE YOU TO NOD OFF OR FALL ASLEEP IN A CAR, WHILE STOPPED FOR A FEW MINUTES IN TRAFFIC: 0

## 2018-05-17 ENCOUNTER — TELEPHONE (OUTPATIENT)
Dept: PULMONOLOGY | Age: 58
End: 2018-05-17

## 2018-05-17 DIAGNOSIS — G47.33 OSA (OBSTRUCTIVE SLEEP APNEA): Primary | ICD-10-CM

## 2018-06-22 ENCOUNTER — TELEPHONE (OUTPATIENT)
Dept: PULMONOLOGY | Age: 58
End: 2018-06-22

## 2018-07-27 ENCOUNTER — TELEPHONE (OUTPATIENT)
Dept: NON INVASIVE DIAGNOSTICS | Age: 58
End: 2018-07-27

## 2018-07-30 ENCOUNTER — OFFICE VISIT (OUTPATIENT)
Dept: PULMONOLOGY | Age: 58
End: 2018-07-30

## 2018-07-30 VITALS
SYSTOLIC BLOOD PRESSURE: 137 MMHG | RESPIRATION RATE: 18 BRPM | TEMPERATURE: 98.1 F | HEIGHT: 71 IN | HEART RATE: 71 BPM | DIASTOLIC BLOOD PRESSURE: 81 MMHG | OXYGEN SATURATION: 91 % | WEIGHT: 271 LBS | BODY MASS INDEX: 37.94 KG/M2

## 2018-07-30 DIAGNOSIS — J44.9 CHRONIC OBSTRUCTIVE PULMONARY DISEASE, UNSPECIFIED COPD TYPE (HCC): ICD-10-CM

## 2018-07-30 PROCEDURE — 99214 OFFICE O/P EST MOD 30 MIN: CPT | Performed by: INTERNAL MEDICINE

## 2018-07-30 PROCEDURE — 99213 OFFICE O/P EST LOW 20 MIN: CPT | Performed by: INTERNAL MEDICINE

## 2018-07-30 RX ORDER — HYDROCHLOROTHIAZIDE 25 MG/1
TABLET ORAL
Refills: 5 | COMMUNITY
Start: 2018-05-14 | End: 2018-07-30

## 2018-07-30 RX ORDER — FUROSEMIDE 40 MG/1
40 TABLET ORAL DAILY
Qty: 30 TABLET | Refills: 3 | Status: SHIPPED | OUTPATIENT
Start: 2018-07-30 | End: 2018-12-31 | Stop reason: SDUPTHER

## 2018-07-30 NOTE — PROGRESS NOTES
[facility]  Division of Pulmonary, [facility]           Pulmonary Clinic consult       Patient: Oli Fleming  MRN: [id number]  : 1960        CC :SOB ,follow up after hospital admission   H/o A flutter     HPI :  Oli Fleming is a 66y.o. year old smoking at age 15 and increased gradually to 2 pack daily, he quit a few months ago came into the hospital with shortness of breath, associated with cough, productive of yellow sputum along with chest tightness with no fever or chills or numbness or chest pain     The patient is known to have COPD and he is O2 dependent throat he used to liters at home     The patient also developed A. fib and he was started on Cardizem drip, he was advised to have cardioversion, and pulmonary was asked to see the patient     And also was tested positive for influenza A      Patient had CTA which shows no lung lesion questionable hilar adenopathy  Patient  already in Eliquis 5 mg twice daily. He was given TamiFlu Tamiflu 75 b.i.d.     CAT scan did not show any evidence of lung nodule or masses      Patient came for follow-up visit today he has been feeling well, he feel that his breathing has become much easier, he also has less cough and shortness of breath, he can walk more and he is not requiring any oxygen    During his last admission he had cardioversion by cardiology for a flutter, and he was discharged home in Cardizem 240 mg daily in addition to his anticoagulation    He is also done with tapering dose of steroids  Today visit: 2018  Patient has been doing fair since I saw him last time, unfortunately is not compliant with oral intake of fluid and he is drinking at least 4 beers daily    He did not have his BiPAP machine as he could not afford it, so he is using one friend CPAP machine which seems to be auto CPAP, and he seems comfortable with it but sometimes he does not feel that it's giving him enough pressure  Also take his Eliquis once daily due to insurance issue  Is that tyler is helping his breathing  Denies any fever or chills but increased swelling in both legs    PHYSICAL EXAMINATION:     VITAL SIGNS:  /81   Pulse 71   Temp 98.1 °F (36.7 °C) (Oral)   Resp 18   Ht 5' 11\" (1.803 m)   Wt 271 lb (122.9 kg)   SpO2 91%   BMI 37.80 kg/m²   Wt Readings from Last 3 Encounters:   07/30/18 271 lb (122.9 kg)   04/20/18 251 lb (113.9 kg)   04/17/18 254 lb 6.4 oz (115.4 kg)     Temp Readings from Last 3 Encounters:   07/30/18 98.1 °F (36.7 °C) (Oral)   04/03/18 97.6 °F (36.4 °C)   03/20/18 97.9 °F (36.6 °C) (Oral)     TMAX:  BP Readings from Last 3 Encounters:   07/30/18 137/81   04/20/18 (!) 163/94   04/17/18 130/78     Pulse Readings from Last 3 Encounters:   07/30/18 71   04/20/18 82   04/17/18 73           INTAKE/OUTPUTS:  No intake/output data recorded.   No intake or output data in the 24 hours ending 07/30/18 1010    General Appearance: alert and oriented to person, place and time, well-developed and   well-nourished, in no acute distress   Eyes: pupils equal, round, and reactive to light, extraocular eye movements intact, conjunctivae normal and sclera anicteric   Neck: neck supple and non tender without mass, no thyromegaly, no thyroid nodules and no cervical adenopathy   Pulmonary/Chest:Rhonchi ,decrease breath sounds   Cardiovascular: irregular rhythm, normal S1 and S2, no murmurs, rubs, clicks or gallops, distal pulses intact, no carotid bruits,   Abdomen: obese, soft, non-tender, non-distended, normal bowel sounds, no masses or organomegaly   Extremities:no edema or cyanosis  Musculoskeletal: normal range of motion, no joint swelling, deformity or tenderness   Neurologic: reflexes normal and symmetric, no cranial nerve deficit noted    LABS/IMAGING:    CBC:  Lab Results   Component Value Date    WBC 8.0 03/31/2018    HGB 12.7 03/31/2018    HCT 38.4 03/31/2018    MCV 96.5 03/31/2018     03/31/2018    LYMPHOPCT 33.0 03/31/2018    RBC 3.98 03/31/2018    MCH 31.9 03/31/2018    MCHC 33.1 03/31/2018    RDW 13.8 03/31/2018    NEUTOPHILPCT 51.1 03/31/2018    MONOPCT 11.7 03/31/2018    BASOPCT 0.8 03/31/2018    NEUTROABS 4.09 03/31/2018    LYMPHSABS 2.63 03/31/2018    MONOSABS 0.93 03/31/2018    EOSABS 0.19 03/31/2018    BASOSABS 0.06 03/31/2018       No results for input(s): WBC, HGB, HCT, MCV, PLT in the last 720 hours. BMP:   No results for input(s): NA, K, CL, CO2, PHOS, BUN, CREATININE in the last 72 hours. Invalid input(s): CA    MG:   Lab Results   Component Value Date    MG 2.2 02/14/2018     Ca/Phos:   Lab Results   Component Value Date    CALCIUM 9.8 03/31/2018     Amylase: No results found for: AMYLASE  Lipase:   Lab Results   Component Value Date    LIPASE 28 05/19/2011     LIVER PROFILE: No results for input(s): AST, ALT, LIPASE, BILIDIR, BILITOT, ALKPHOS in the last 72 hours. Invalid input(s): AMYLASE,  ALB    PT/INR:   No results for input(s): PROTIME, INR in the last 72 hours. APTT: No results for input(s): APTT in the last 72 hours.     Cardiac Enzymes:  Lab Results   Component Value Date    CKTOTAL 51 11/13/2010    CKMB 0.5 11/13/2010    TROPONINI <0.01 02/15/2018       Hgb A1C: No results found for: LABA1C  No results found for: EAG  FAMILIA: No results found for: FAMILIA  ESR: No results found for: SEDRATE  CRP: No results found for: CRP  D Dimer: No results found for: DDIMER    Thyroid Studies:  Lab Results   Component Value Date    TSH 0.942 02/14/2018    [id number] 4.8 02/14/2018           MICROBIOLOGY:  Pending       CXR:  Reviewed     CT Chest:reviewed     PROBLEM LIST:  Patient Active Problem List   Diagnosis    COPD (chronic obstructive pulmonary disease) ([address]    Essential hypertension    Adrenal adenoma    Class 2 obesity due to excess calories with serious comorbidity and body mass index (BMI) of 35.0 to 35.9 in adult    days  [facility]

## 2018-07-30 NOTE — PROGRESS NOTES
Follow up visit today to see Dr. Itzel Valenzuela. Problems with legs swelling bilaterally below knees. Lab work ordered to be done after taking new orders for Lasix x 3 days to help with leg swelling. Pt given samples of Eliquis and Stiolto per Dr. Ksenia Allen's orders. RN to follow up with Oxygen  provider Dario about portable concentrator. Pt has financial/no insurance issues that are causing him stress and limits his compliance with plan of care. Using CPAP (auto titrate settings) and will look into NIV for this patient as well.

## 2018-08-01 ENCOUNTER — TELEPHONE (OUTPATIENT)
Dept: PULMONOLOGY | Age: 58
End: 2018-08-01

## 2018-08-01 NOTE — TELEPHONE ENCOUNTER
Email sent to Saintclair Glance requesting SW contact patient to offer assistance. Medication cost, DME issues and no insurance concerns voiced at last office visit.

## 2018-08-03 ENCOUNTER — TELEPHONE (OUTPATIENT)
Dept: PULMONOLOGY | Age: 58
End: 2018-08-03

## 2018-08-03 ENCOUNTER — OFFICE VISIT (OUTPATIENT)
Dept: NON INVASIVE DIAGNOSTICS | Age: 58
End: 2018-08-03

## 2018-08-03 VITALS
HEART RATE: 79 BPM | WEIGHT: 268 LBS | HEIGHT: 71 IN | DIASTOLIC BLOOD PRESSURE: 82 MMHG | SYSTOLIC BLOOD PRESSURE: 124 MMHG | BODY MASS INDEX: 37.52 KG/M2 | RESPIRATION RATE: 20 BRPM

## 2018-08-03 DIAGNOSIS — I48.19 PERSISTENT ATRIAL FIBRILLATION (HCC): ICD-10-CM

## 2018-08-03 DIAGNOSIS — I48.3 TYPICAL ATRIAL FLUTTER (HCC): Primary | ICD-10-CM

## 2018-08-03 DIAGNOSIS — Z98.890 STATUS POST CATHETER ABLATION OF ATRIAL FLUTTER: ICD-10-CM

## 2018-08-03 DIAGNOSIS — I10 ESSENTIAL HYPERTENSION: ICD-10-CM

## 2018-08-03 DIAGNOSIS — F10.10 ETOH ABUSE: ICD-10-CM

## 2018-08-03 PROCEDURE — 99213 OFFICE O/P EST LOW 20 MIN: CPT | Performed by: INTERNAL MEDICINE

## 2018-08-03 PROCEDURE — 93000 ELECTROCARDIOGRAM COMPLETE: CPT | Performed by: INTERNAL MEDICINE

## 2018-08-03 RX ORDER — ATORVASTATIN CALCIUM 20 MG/1
20 TABLET, FILM COATED ORAL EVERY EVENING
COMMUNITY

## 2018-08-03 NOTE — PROGRESS NOTES
include but are NOT limited to: bleeding, infection, vascular injury requiring emergent surgical repair, AV block requiring emergent pacemaker implantation, pulmonary embolism, stroke, valve damage, pericardial effusion, tamponade, need for emergent cardiac surgery and even death. Alternatives discussed included ongoing medical therapy. They have elected to proceed with catheter ablation. Medications:  1. Isoproterenol 4 mcg IV. 2. Sedation- per anesthesia protocol.     Estimated Blood Loss: Approximately <34 mL.     Complications: None immediately apparent      Flouroscopy Time: 4.5 minutes.      Specimens: N/A     Description of the Procedure: The risks, benefits, and alternatives to the procedure were discussed with the patient, and informed consent was obtained. The patient was brought to the electrophysiology lab and was prepped and draped in the usual fashion. The patient was under the care of an anesthesiologist/CRNA team for sedation and hemodynamic monitoring, please see the anesthesia records for details. A WYATT was performed prior to the procedure to evaluate for left atrial appendage thrombus and none was noted. Please see the WYATT report for details. The right groin was anesthetized using 1% lidocaine, and the right femoral vein was accessed using an 18-gauge Cook needle. We utilized vascular ultrasound to document venous patency and to allow for direct visualization of vascular needle entry with permanent recording using the [employer] system. Using the modified Seldinger technique, One 8-Beninese, two 7-Beninese and one 6-Fr sheath were introduced over a J-tip guidewire. Under fluoroscopic guidance, a 7-Beninese deflectable duo-decapolar Live Wire St. Chris catheter was draped along the lateral RA, a 6-Beninese Bard dynamic decapolar deflectable catheter was advanced to the coronary sinus, and a 6-Beninese [facility] CRD-2 catheter was placed in the His bundle position.  Baseline EP testing was performed (see conduction and differential pacing as well as lack of inducibility persisted for >30min post ablation. Repeated burst pacing to 220ms on isuprel did not induce AF/AFL. Post ablation AH was 131ms and HV 47ms. Sinus cycle length was 750ms. Once again reconfirmed bidirectional block after >30min.       Catheters were removed under flouroscopic guidance. Sheath were flushed and removed and manual compression was held for hemostasis. She was hemodynamically stable and under the care of anesthesia and was brought back to the recovery area for post-procedural monitoring.     Assessment:   1. Typical, counter-clockwise atrial flutter. 2. Successful RF ablation of above. 3. Normal AV node-His-Purkinje system function. 4. No inducible AF at this time     Assessment: This is a 66 y.o. male with a history of COPD, HTN, obesity, ORESTES, viral pericarditis and anxiety. The patient was recently hospitalized 2/2018 for influenza A. He was in atrial flutter with RVR on the EKG and telemetry during 2/2018 admission with reported difficulty controlling the HR. The patient had a succesful WYATT/CV on 2/7/18 and again 2/15/18. He underwent typical CTI flutter ablation on 4/2018 wihtout issue or induction of AF. The patient presents now in 8/2018 with AF/atypical AFL - prescribed Eliquis 5mg bid but due to the high cost and not having insurance he was only taking his Eliquis daily until recently. Offered the patient to change his [address] [place] [address] Road to coumadin - he refused. Discussed rhythm control options of cardioversion with AAD therapy. Also strongly urged aggressive lifestyle modifications. His family notes that he abuses ETOH regularly. 1. Typical atrial flutter  - CHADSVASC= 1 (HTN); receiving Eliquis 5 mg BID; denies any bleeding issues   - receiving Cardizem   - thus far has had 2 cardioversions and the last one was 2/15/18  - s/p A. Flutter 4/3/2018; see above  -  8/3/2018- now in AF/atypical AFL (NOT clearly recurrent typical AFL)     2. Atrial fibrillation  - found to be in AF/atypical today (8/3/2018)- 1st known episode (no inducible AF at time of last ablation)  - known marked LAE on prior TEEs  - prescribed Eliquis 5mg bid but due to the high cost and not having insurance he was only taking his Eliquis daily - which he started doing for the past week. Offered the patient to change his [address] to coumadin - he refused. - I strongly emphasized the need for consistent [address] for adequate CVA prevention  - we had an extensive discussion regarding all 2ndary causes of AFib which include but are not limited to the following and then need for lifestyle modifications and stringent control of contributing medical conditions which include  - We strongly recommended risk factor modification with goals that include:   - Weight Loss: aggressive weight loss and regular moderate cardiopulmonary exercise to maintain BMI <27 with a loss of at least 10% of their current weight which is safely and adequately maintained  - Exercise: 30min 3-4x/week of at least moderate cardiopulmonary exercise up to 250 min/wk  - Blood pressure: target of <130/80mmHg  - Dyslipidemia: add a statin if LDL >100mg/dL  - Obstructive sleep apnea: evaluate for and or treat with CPAP if AHI >30/hour  - Abstinence from alcohol and tobacco products  - we discussed the potential improvement in ALL atrial fibrillation therapies if diet/exercise and weight loss are achieved per above  - discussed with the patient about cardioversion with the addition of AAD therapy  - will check limited echo for LVH (all recent echo are WYATT), plan for flecainide or propafenone if LVEF normal , LVH <1.5cm given cost issues and inability to be hospitalized based on costs, wishes to avoid amio due to lung disease    3. Obesity  Body mass index is 37.38 kg/m². - encouraged weight management     4. HTN  - elevated   - recommend BP log   - continue current meds for now  - recommended lifestyle changes     5.

## 2018-08-10 ENCOUNTER — TELEPHONE (OUTPATIENT)
Dept: PULMONOLOGY | Age: 58
End: 2018-08-10

## 2018-08-10 DIAGNOSIS — Z98.890 STATUS POST CATHETER ABLATION OF ATRIAL FLUTTER: ICD-10-CM

## 2018-08-10 DIAGNOSIS — I48.19 PERSISTENT ATRIAL FIBRILLATION (HCC): Primary | ICD-10-CM

## 2018-08-10 DIAGNOSIS — Z51.81 ANTICOAGULATION MANAGEMENT ENCOUNTER: ICD-10-CM

## 2018-08-10 DIAGNOSIS — I48.3 TYPICAL ATRIAL FLUTTER (HCC): ICD-10-CM

## 2018-08-10 DIAGNOSIS — Z79.01 ANTICOAGULATION MANAGEMENT ENCOUNTER: ICD-10-CM

## 2018-08-10 DIAGNOSIS — J44.9 CHRONIC OBSTRUCTIVE PULMONARY DISEASE, UNSPECIFIED COPD TYPE (HCC): ICD-10-CM

## 2018-08-10 NOTE — TELEPHONE ENCOUNTER
Received call from pt, he spoke with [facility], his payment through [employer] will be $33 per month and they will assist with portable O2 as well, sent email to [facility] to fax Rx.

## 2018-08-10 NOTE — TELEPHONE ENCOUNTER
Pt returned call. Currently resides at home with his wife. Pt is unemployed, does not receive SSD. Pt's wife is employed full time, making $11 an hour. Pt reported he was on Medicaid and due to wife's income was removed for income guidelines. Pt unsure if his wife has looked into [employer] or not. Pt receiving O2 through [email or web address], applied for assistance but stated he has not heard from them, however they acknowledged on the bill he qualifies for hardship, pt unsure of what this means and has not reached out to the company. LSW instructed him to reach out. Pt's Eliquis and Stiolto qualify for PAP, LSW will give PAP information and explained process to pt. Pt to contact this worker after speaking with [employer].

## 2018-08-13 ENCOUNTER — HOSPITAL ENCOUNTER (OUTPATIENT)
Dept: CARDIOLOGY | Age: 58
Discharge: HOME | End: 2018-08-13

## 2018-08-13 DIAGNOSIS — I48.19 PERSISTENT ATRIAL FIBRILLATION (HCC): ICD-10-CM

## 2018-08-13 PROCEDURE — 93308 TTE F-UP OR LMTD: CPT

## 2018-08-13 NOTE — PROGRESS NOTES
Recommend initaiting flecainide 100mg PO twice daily. Return for EKG in 1-2 weeks, if still in AF then will plan for cardioversion.   Thanks    Kerry Henriquez MD, [facility]  Cardiac Electrophysiology  [facility]) Physicians  The Heart and Vascular [place]: [place] Electrophysiology  5:57 PM  8/13/2018

## 2018-08-14 ENCOUNTER — TELEPHONE (OUTPATIENT)
Dept: NON INVASIVE DIAGNOSTICS | Age: 58
End: 2018-08-14

## 2018-08-14 RX ORDER — FLECAINIDE ACETATE 100 MG/1
100 TABLET ORAL 2 TIMES DAILY
COMMUNITY
End: 2018-08-14 | Stop reason: SDUPTHER

## 2018-08-14 RX ORDER — FLECAINIDE ACETATE 100 MG/1
100 TABLET ORAL 2 TIMES DAILY
Qty: 60 TABLET | OUTPATIENT
Start: 2018-08-14 | End: 2019-01-15 | Stop reason: SDUPTHER

## 2018-08-14 NOTE — TELEPHONE ENCOUNTER
----- Message from Ramone Hickman MD sent at 8/13/2018  5:57 PM EDT -----  Recommend initaiting flecainide 100mg PO twice daily. Return for EKG in 1-2 weeks, if still in AF then will plan for cardioversion.   Thanks    Corey Donahue MD, [facility]  Cardiac Electrophysiology  [facility]) Physicians  The Heart and Vascular [place]: [place] Electrophysiology  5:57 PM  8/13/2018

## 2018-08-14 NOTE — TELEPHONE ENCOUNTER
Gee Marie called me back and I informed him of echo results as stated by Dr. Mert Zavala. He will start the Flecainide tomorrow and I scheduled an EKG for 8/23/18. Lewis verbalized understanding. I phoned in Flecainide to his pharmacy.

## 2018-08-21 ENCOUNTER — TELEPHONE (OUTPATIENT)
Dept: PULMONOLOGY | Age: 58
End: 2018-08-21

## 2018-08-23 ENCOUNTER — NURSE ONLY (OUTPATIENT)
Dept: NON INVASIVE DIAGNOSTICS | Age: 58
End: 2018-08-23

## 2018-08-23 ENCOUNTER — TELEPHONE (OUTPATIENT)
Dept: NON INVASIVE DIAGNOSTICS | Age: 58
End: 2018-08-23

## 2018-08-23 DIAGNOSIS — I48.3 TYPICAL ATRIAL FLUTTER (HCC): Primary | ICD-10-CM

## 2018-08-23 DIAGNOSIS — I48.19 PERSISTENT ATRIAL FIBRILLATION (HCC): ICD-10-CM

## 2018-08-23 DIAGNOSIS — R06.02 SOB (SHORTNESS OF BREATH): Primary | ICD-10-CM

## 2018-08-23 PROCEDURE — 93000 ELECTROCARDIOGRAM COMPLETE: CPT | Performed by: INTERNAL MEDICINE

## 2018-08-23 NOTE — TELEPHONE ENCOUNTER
I spoke to [place] and gave him Dr. Rama Young. He wishes to have labs done at [facility] in [facility]. Orders faxed to [phone]. I phoned in a new script for lasix dose to his pharmacy.

## 2018-08-23 NOTE — TELEPHONE ENCOUNTER
----- Message from Leta Ernst MD sent at 8/23/2018  1:16 PM EDT -----  Please ask him to increase lasix to twice a day. I would also want him to get a BMP and BNP in the next 24hrs, and repeat BMP in 2 weeks. I am happy to see him in next 2-4 weeks. He is back in sinus rhythm.     thanks  ----- Message -----  From: Maricruz Marshall MA  Sent: 8/23/2018   9:29 AM  To: Leta Ernst MD    Please advise

## 2018-08-27 DIAGNOSIS — I48.19 PERSISTENT ATRIAL FIBRILLATION (HCC): ICD-10-CM

## 2018-08-27 DIAGNOSIS — R06.02 SOB (SHORTNESS OF BREATH): ICD-10-CM

## 2018-08-29 ENCOUNTER — TELEPHONE (OUTPATIENT)
Dept: NON INVASIVE DIAGNOSTICS | Age: 58
End: 2018-08-29

## 2018-08-29 NOTE — PROGRESS NOTES
Can we please call him and see how his SOB is doing. If improved, ok to cut his lasix back to once daily. If ongoing SOB, would ask for CXR to evaluate pulmonary edema.   Thanks    Cecily Goldstein MD, [facility]  Cardiac Electrophysiology  [facility]) Physicians  The Heart and Vascular [place]: [place] Electrophysiology  10:27 PM  8/28/2018

## 2018-08-30 NOTE — TELEPHONE ENCOUNTER
Spoke with patient let him know to continue the Lasix BID and made him a 1 month f/u to see AA October 10, 2018. Patient verbalized understanding.

## 2018-09-07 RX ORDER — DILTIAZEM HYDROCHLORIDE 240 MG/1
240 CAPSULE, COATED, EXTENDED RELEASE ORAL 2 TIMES DAILY
Qty: 60 CAPSULE | Refills: 11 | Status: SHIPPED | OUTPATIENT
Start: 2018-09-07

## 2018-09-11 ENCOUNTER — TELEPHONE (OUTPATIENT)
Dept: NON INVASIVE DIAGNOSTICS | Age: 58
End: 2018-09-11

## 2018-09-11 NOTE — TELEPHONE ENCOUNTER
----- Message from Rosa Harris MD sent at 9/11/2018  2:40 PM EDT -----  Please let him know that his labs look good. We will proceed with our plan and follow up as previously discussed.   thanks  ----- Message -----  From: Brad Sandra  Sent: 9/10/2018  11:01 AM  To: Rosa Harris MD     labs for you to review

## 2018-09-13 ENCOUNTER — TELEPHONE (OUTPATIENT)
Dept: PHARMACY | Age: 58
End: 2018-09-13

## 2018-09-13 NOTE — TELEPHONE ENCOUNTER
Called Geo Vora and left a message with my name and that I work with the PAP program at [address] [place] [address]. Explained that I was trying to get him some assistance with a couple of medications. Left contact information and asked him to call me back.

## 2018-09-26 ENCOUNTER — TELEPHONE (OUTPATIENT)
Dept: PULMONOLOGY | Age: 58
End: 2018-09-26

## 2018-09-26 NOTE — TELEPHONE ENCOUNTER
Ovidio for Pt's dtr Claudio Greenwood requesting refill of samples for pt. Sahara Patiño that Dr. Mila Mera has authorized samples of Stiloto inhaler today; she will  in office for pt. No Eliquis samples available for pt today. Dtr to check back with office later on Eliquis samples.

## 2018-10-01 ENCOUNTER — TELEPHONE (OUTPATIENT)
Dept: PULMONOLOGY | Age: 58
End: 2018-10-01

## 2018-10-01 NOTE — TELEPHONE ENCOUNTER
Pt calling into office with request for more samples of Eliquis that doctor has pt on. Pt unable to afford med and is starting to come to an end of the samples given last visit. Advised pt to contact PCP and cardiology offices to see if either have additional samples they can give pt since our supply is depleted. Pt also waiting for Prescription assistance program to assist him; he states I have left messages for Amanuel Hall at [facility] PAP re: assistance program stating \"I've filled out all the paperwork\". Janeth Blew to check with other physicians as discussed and that I would reach out to PAP Program for follow up.

## 2018-10-02 ENCOUNTER — TELEPHONE (OUTPATIENT)
Dept: PULMONOLOGY | Age: 58
End: 2018-10-02

## 2018-11-27 ENCOUNTER — OFFICE VISIT (OUTPATIENT)
Dept: NON INVASIVE DIAGNOSTICS | Age: 58
End: 2018-11-27

## 2018-11-27 VITALS
WEIGHT: 272 LBS | DIASTOLIC BLOOD PRESSURE: 70 MMHG | SYSTOLIC BLOOD PRESSURE: 130 MMHG | HEART RATE: 79 BPM | HEIGHT: 71 IN | RESPIRATION RATE: 18 BRPM | BODY MASS INDEX: 38.08 KG/M2

## 2018-11-27 DIAGNOSIS — I48.19 PERSISTENT ATRIAL FIBRILLATION (HCC): ICD-10-CM

## 2018-11-27 DIAGNOSIS — R06.02 SOB (SHORTNESS OF BREATH): ICD-10-CM

## 2018-11-27 DIAGNOSIS — Z98.890 STATUS POST CATHETER ABLATION OF ATRIAL FLUTTER: ICD-10-CM

## 2018-11-27 DIAGNOSIS — I10 ESSENTIAL HYPERTENSION: ICD-10-CM

## 2018-11-27 DIAGNOSIS — I48.3 TYPICAL ATRIAL FLUTTER (HCC): Primary | ICD-10-CM

## 2018-11-27 PROCEDURE — 93000 ELECTROCARDIOGRAM COMPLETE: CPT | Performed by: INTERNAL MEDICINE

## 2018-11-27 PROCEDURE — 99212 OFFICE O/P EST SF 10 MIN: CPT | Performed by: INTERNAL MEDICINE

## 2018-11-27 NOTE — PROGRESS NOTES
Flouroscopy Time: 4.5 minutes.      Specimens: N/A     Description of the Procedure: The risks, benefits, and alternatives to the procedure were discussed with the patient, and informed consent was obtained. The patient was brought to the electrophysiology lab and was prepped and draped in the usual fashion. The patient was under the care of an anesthesiologist/CRNA team for sedation and hemodynamic monitoring, please see the anesthesia records for details. A WYATT was performed prior to the procedure to evaluate for left atrial appendage thrombus and none was noted. Please see the WYATT report for details. The right groin was anesthetized using 1% lidocaine, and the right femoral vein was accessed using an 18-gauge Cook needle. We utilized vascular ultrasound to document venous patency and to allow for direct visualization of vascular needle entry with permanent recording using the [employer] system. Using the modified Seldinger technique, One 8-Cypriot, two 7-Cypriot and one 6-Fr sheath were introduced over a J-tip guidewire. Under fluoroscopic guidance, a 7-Cypriot deflectable duo-decapolar Live Wire St. Chris catheter was draped along the lateral RA, a 6-Cypriot [employer] dynamic decapolar deflectable catheter was advanced to the coronary sinus, and a 6-Cypriot XL Hybrids CRD-2 catheter was placed in the His bundle position. Baseline EP testing was performed (see below) which identified a typcial CCW CTI dependent RA flutter. Then a 8mm Blazer Mi-Fi (large-sweep) ablation catheter was advanced to the cavotricuspid isthmus. A 3D electro-anatomic map of the right atrium and cavo-tricuspid isthmus was performed using the [employer] mapping system. Using entrainment and activation mapping, the rhythm was confirmed to be typical, counter-clockwise atrial flutter. Ablation was undertaken in this region, which resulted in termination of tachycardia.  Following completion of the line, various pacing and recording maneuvers were performed in order to confirm bidirectional block. The patient was on therapeutic apixiban at the time of the procedure.     Baseline Electrophysiology Data:       Baseline ECG demonstrated atrial flutter of cycle length of 220 msec with a pattern of activation which would be consistent with typical CCW CTI dependent RA flutter and HV 40ms. Pacing at 210ms from CS 9/10 showed PPI-TCL of 17ms, pacing from RA 1/2 at 210ms showed a PPI-TCL of 0ms, thus this was c/w CTI dependent RA flutter.     Radiofrequency Ablation Data: Using the [employer] 3-D electroanatomic mapping system, a total of 9 RF lesions were created throughout the study. Settings were a temperature of 60 degrees, power output of 60-70 ochoa, and duration of  msec. The patient was observed for 30 minutes post ablation and bidirectional CTI block was re-confirmed.      Final Electrophysiology Data:  Bidirectional trans-isthmus conduction time (TICT) of 140-150ms with pattern of activation of block including differential site pacing from RA 5/6 noted 120ms. AVWBCL from RA 5/6 near SA node was 380ms. AVNERP was 600/300 with no jump and no echo were noted. VAWBCL was 500ms from His bundle catheter which was advanced to the basal RV. We could not induce any AF or AFL pacing multisite at 220ms. Isuprel was started and AVWBCL was 340ms on isu 4. AERP was 500/210ms which was >AVNERP. Trans-isthmus conduction and differential pacing as well as lack of inducibility persisted for >30min post ablation. Repeated burst pacing to 220ms on isuprel did not induce AF/AFL. Post ablation AH was 131ms and HV 47ms. Sinus cycle length was 750ms. Once again reconfirmed bidirectional block after >30min.       Catheters were removed under flouroscopic guidance. Sheath were flushed and removed and manual compression was held for hemostasis.  She was hemodynamically stable and under the care of anesthesia and was brought back to the recovery area for post-procedural

## 2018-12-07 DIAGNOSIS — I48.19 PERSISTENT ATRIAL FIBRILLATION (HCC): ICD-10-CM

## 2018-12-07 DIAGNOSIS — R06.02 SOB (SHORTNESS OF BREATH): ICD-10-CM

## 2018-12-10 ENCOUNTER — TELEPHONE (OUTPATIENT)
Dept: NON INVASIVE DIAGNOSTICS | Age: 58
End: 2018-12-10

## 2019-01-02 RX ORDER — FUROSEMIDE 40 MG/1
TABLET ORAL
Qty: 60 TABLET | Refills: 3 | Status: ON HOLD
Start: 2019-01-02 | End: 2021-08-30 | Stop reason: HOSPADM

## 2019-01-15 RX ORDER — FLECAINIDE ACETATE 100 MG/1
TABLET ORAL
Qty: 60 TABLET | Refills: 5 | Status: SHIPPED | OUTPATIENT
Start: 2019-01-15

## 2019-03-12 ENCOUNTER — TELEPHONE (OUTPATIENT)
Dept: PULMONOLOGY | Age: 59
End: 2019-03-12

## 2019-03-12 DIAGNOSIS — J44.9 CHRONIC OBSTRUCTIVE PULMONARY DISEASE, UNSPECIFIED COPD TYPE (HCC): Primary | ICD-10-CM

## 2019-03-28 ENCOUNTER — TELEPHONE (OUTPATIENT)
Dept: NON INVASIVE DIAGNOSTICS | Age: 59
End: 2019-03-28

## 2019-03-29 DIAGNOSIS — Z79.899 HIGH RISK MEDICATION USE: Primary | ICD-10-CM

## 2019-04-09 ENCOUNTER — HOSPITAL ENCOUNTER (OUTPATIENT)
Age: 59
Discharge: HOME | End: 2019-04-11

## 2019-04-09 ENCOUNTER — OFFICE VISIT (OUTPATIENT)
Dept: PULMONOLOGY | Age: 59
End: 2019-04-09

## 2019-04-09 ENCOUNTER — NURSE ONLY (OUTPATIENT)
Dept: NON INVASIVE DIAGNOSTICS | Age: 59
End: 2019-04-09

## 2019-04-09 VITALS
SYSTOLIC BLOOD PRESSURE: 157 MMHG | RESPIRATION RATE: 18 BRPM | HEIGHT: 71 IN | DIASTOLIC BLOOD PRESSURE: 85 MMHG | HEART RATE: 87 BPM | WEIGHT: 271 LBS | BODY MASS INDEX: 37.94 KG/M2 | OXYGEN SATURATION: 92 % | TEMPERATURE: 98.5 F

## 2019-04-09 DIAGNOSIS — Z79.899 ENCOUNTER FOR MONITORING FLECAINIDE THERAPY: Primary | ICD-10-CM

## 2019-04-09 DIAGNOSIS — Z79.899 HIGH RISK MEDICATION USE: ICD-10-CM

## 2019-04-09 DIAGNOSIS — Z79.899 VISIT FOR MONITORING TIKOSYN THERAPY: ICD-10-CM

## 2019-04-09 DIAGNOSIS — I48.19 PERSISTENT ATRIAL FIBRILLATION (HCC): ICD-10-CM

## 2019-04-09 DIAGNOSIS — Z51.81 VISIT FOR MONITORING TIKOSYN THERAPY: ICD-10-CM

## 2019-04-09 DIAGNOSIS — J44.9 CHRONIC OBSTRUCTIVE PULMONARY DISEASE, UNSPECIFIED COPD TYPE (HCC): ICD-10-CM

## 2019-04-09 DIAGNOSIS — I48.3 TYPICAL ATRIAL FLUTTER (HCC): ICD-10-CM

## 2019-04-09 DIAGNOSIS — Z51.81 ENCOUNTER FOR MONITORING FLECAINIDE THERAPY: Primary | ICD-10-CM

## 2019-04-09 LAB
ANION GAP SERPL CALCULATED.3IONS-SCNC: 15 MMOL/L (ref 7–16)
BUN BLDV-MCNC: 11 MG/DL (ref 6–20)
CALCIUM SERPL-MCNC: 9.5 MG/DL (ref 8.6–10.2)
CHLORIDE BLD-SCNC: 100 MMOL/L (ref 98–107)
CO2: 28 MMOL/L (ref 22–29)
CREAT SERPL-MCNC: 1.2 MG/DL (ref 0.7–1.2)
GFR AFRICAN AMERICAN: >60
GFR NON-AFRICAN AMERICAN: >60 ML/MIN/1.73
GLUCOSE BLD-MCNC: 79 MG/DL (ref 74–99)
POTASSIUM SERPL-SCNC: 4.2 MMOL/L (ref 3.5–5)
SODIUM BLD-SCNC: 143 MMOL/L (ref 132–146)

## 2019-04-09 PROCEDURE — 36415 COLL VENOUS BLD VENIPUNCTURE: CPT | Performed by: INTERNAL MEDICINE

## 2019-04-09 PROCEDURE — 99214 OFFICE O/P EST MOD 30 MIN: CPT | Performed by: INTERNAL MEDICINE

## 2019-04-09 PROCEDURE — 80048 BASIC METABOLIC PNL TOTAL CA: CPT

## 2019-04-09 PROCEDURE — 93000 ELECTROCARDIOGRAM COMPLETE: CPT | Performed by: INTERNAL MEDICINE

## 2019-04-09 PROCEDURE — 99213 OFFICE O/P EST LOW 20 MIN: CPT | Performed by: INTERNAL MEDICINE

## 2019-04-09 NOTE — PROGRESS NOTES
Ekg performed and labs drawn today for flecainide monitoring. Medications reviewed. Patient offers no complaints. Dr. Connie Mackenzie to review.

## 2019-04-09 NOTE — PROGRESS NOTES
Follow up visit in office today; shortness of breath. Fatigue and activity intolerance continues to be an issue for him. Pt reports he has applied for disability and is working on getting assistance. Plan to continue inhalers; Stiloto samples given per Dr. Geoff Allen's orders. Plan for follow up in office in 4 mos; appt given.

## 2019-04-09 NOTE — PROGRESS NOTES
[facility]  Division of Pulmonary, [facility]           Pulmonary Clinic consult       Patient: La Wood  MRN: [id number]  : 1960        CC :SOB ,follow up after hospital admission   H/o A flutter     HPI :  La Wood is a 66y.o. year old smoking at age 15 and increased gradually to 2 pack daily, he quit a few months ago came into the hospital with shortness of breath, associated with cough, productive of yellow sputum along with chest tightness with no fever or chills or numbness or chest pain     The patient is known to have COPD and he is O2 dependent throat he used to liters at home     The patient also developed A. fib and he was started on Cardizem drip, he was advised to have cardioversion, and pulmonary was asked to see the patient     And also was tested positive for influenza A      Patient had CTA which shows no lung lesion questionable hilar adenopathy  Patient  already in Eliquis 5 mg twice daily. He was given TamiFlu Tamiflu 75 b.i.d.     CAT scan did not show any evidence of lung nodule or masses      Patient came for follow-up visit today he has been feeling well, he feel that his breathing has become much easier, he also has less cough and shortness of breath, he can walk more and he is not requiring any oxygen    During his last admission he had cardioversion by cardiology for a flutter, and he was discharged home in Cardizem 240 mg daily in addition to his anticoagulation    He is also done with tapering dose of steroids  Today visit: 2018  Still with SOB and not able to do nuch work   No Hemoptysis   No weight loss  Worsening SOB   Still on Lasix bid     He did not have his BiPAP machine as he could not afford it, so he is using one friend CPAP machine which seems to be auto CPAP, and he seems comfortable with it but sometimes he does not feel that it's giving him 03/31/2018    LYMPHOPCT 33.0 03/31/2018    RBC 3.98 03/31/2018    MCH 31.9 03/31/2018    MCHC 33.1 03/31/2018    RDW 13.8 03/31/2018    NEUTOPHILPCT 51.1 03/31/2018    MONOPCT 11.7 03/31/2018    BASOPCT 0.8 03/31/2018    NEUTROABS 4.09 03/31/2018    LYMPHSABS 2.63 03/31/2018    MONOSABS 0.93 03/31/2018    EOSABS 0.19 03/31/2018    BASOSABS 0.06 03/31/2018       No results for input(s): WBC, HGB, HCT, MCV, PLT in the last 720 hours. BMP:   No results for input(s): NA, K, CL, CO2, PHOS, BUN, CREATININE in the last 72 hours. Invalid input(s): CA    MG:   Lab Results   Component Value Date    MG 2.2 02/14/2018     Ca/Phos:   Lab Results   Component Value Date    CALCIUM 9.8 03/31/2018     Amylase: No results found for: AMYLASE  Lipase:   Lab Results   Component Value Date    LIPASE 28 05/19/2011     LIVER PROFILE: No results for input(s): AST, ALT, LIPASE, BILIDIR, BILITOT, ALKPHOS in the last 72 hours. Invalid input(s): AMYLASE,  ALB    PT/INR:   No results for input(s): PROTIME, INR in the last 72 hours. APTT: No results for input(s): APTT in the last 72 hours.     Cardiac Enzymes:  Lab Results   Component Value Date    CKTOTAL 51 11/13/2010    CKMB 0.5 11/13/2010    TROPONINI <0.01 02/15/2018       Hgb A1C: No results found for: LABA1C  No results found for: EAG  FAMILIA: No results found for: FAMILIA  ESR: No results found for: SEDRATE  CRP: No results found for: CRP  D Dimer: No results found for: DDIMER    Thyroid Studies:  Lab Results   Component Value Date    TSH 0.942 02/14/2018    T2ZOEGL 4.8 02/14/2018           MICROBIOLOGY:  Pending       CXR:  Reviewed     CT Chest:reviewed     PROBLEM LIST:  Patient Active Problem List   Diagnosis    COPD [facility]chronic obstructive pulmonary disease) [facility] [address]    Essential hypertension    Adrenal adenoma    Class 2 obesity due to excess calories with serious comorbidity and body mass index (BMI) of 35.0 to 35.9 in adult    Anticoagulation management encounter    Typical [facility]

## 2019-04-29 ENCOUNTER — TELEPHONE (OUTPATIENT)
Dept: NON INVASIVE DIAGNOSTICS | Age: 59
End: 2019-04-29

## 2019-06-14 ENCOUNTER — TELEPHONE (OUTPATIENT)
Dept: PULMONOLOGY | Age: 59
End: 2019-06-14

## 2019-06-14 NOTE — TELEPHONE ENCOUNTER
Dtr requesting sample of Romina for her dad until script assistance can get new auth for assistance. Samples given per Dr. Beatriz Allen's orders.

## 2019-08-09 ENCOUNTER — OFFICE VISIT (OUTPATIENT)
Dept: PULMONOLOGY | Age: 59
End: 2019-08-09
Payer: COMMERCIAL

## 2019-08-09 VITALS
HEART RATE: 67 BPM | TEMPERATURE: 97.7 F | RESPIRATION RATE: 16 BRPM | BODY MASS INDEX: 35.98 KG/M2 | SYSTOLIC BLOOD PRESSURE: 138 MMHG | HEIGHT: 71 IN | WEIGHT: 257 LBS | OXYGEN SATURATION: 94 % | DIASTOLIC BLOOD PRESSURE: 75 MMHG

## 2019-08-09 DIAGNOSIS — R09.89 NODULE BEHIND THE HEART: Primary | ICD-10-CM

## 2019-08-09 DIAGNOSIS — R91.1 LUNG NODULE: ICD-10-CM

## 2019-08-09 PROCEDURE — 99214 OFFICE O/P EST MOD 30 MIN: CPT | Performed by: INTERNAL MEDICINE

## 2019-08-09 PROCEDURE — 99213 OFFICE O/P EST LOW 20 MIN: CPT | Performed by: INTERNAL MEDICINE

## 2019-08-09 NOTE — PROGRESS NOTES
[facility]  Division of Pulmonary, [facility]           Pulmonary Clinic consult       Patient: Clarita Darnell  MRN: [id number]  : 1960        CC :SOB ,follow up after hospital admission   H/o A flutter     HPI :  Clarita Darnell is a 66y.o. year old smoking at age 15 and increased gradually to 2 pack daily, he quit a few months ago came into the hospital with shortness of breath, associated with cough, productive of yellow sputum along with chest tightness with no fever or chills or numbness or chest pain     The patient is known to have COPD and he is O2 dependent throat he used to liters at home     The patient also developed A. fib and he was started on Cardizem drip, he was advised to have cardioversion, and pulmonary was asked to see the patient     And also was tested positive for influenza A      Patient had CTA which shows no lung lesion questionable hilar adenopathy  Patient  already in Eliquis 5 mg twice daily. He was given TamiFlu Tamiflu 75 b.i.d.     CAT scan did not show any evidence of lung nodule or masses      Patient came for follow-up visit today he has been feeling well, he feel that his breathing has become much easier, he also has less cough and shortness of breath, he can walk more and he is not requiring any oxygen    During his last admission he had cardioversion by cardiology for a flutter, and he was discharged home in Cardizem 240 mg daily in addition to his anticoagulation    He is also done with tapering dose of steroids  Today visit: 2018  Still with SOB and not able to do nuch work   No Hemoptysis   No weight loss  Worsening SOB   Still on Lasix bid     He did not have his BiPAP machine as he could not afford it, so he is using one friend CPAP machine which seems to be auto CPAP, and he seems comfortable with it but sometimes he does not feel that it's giving him 03/31/2018    LYMPHOPCT 33.0 03/31/2018    RBC 3.98 03/31/2018    MCH 31.9 03/31/2018    MCHC 33.1 03/31/2018    RDW 13.8 03/31/2018    NEUTOPHILPCT 51.1 03/31/2018    MONOPCT 11.7 03/31/2018    BASOPCT 0.8 03/31/2018    NEUTROABS 4.09 03/31/2018    LYMPHSABS 2.63 03/31/2018    MONOSABS 0.93 03/31/2018    EOSABS 0.19 03/31/2018    BASOSABS 0.06 03/31/2018       No results for input(s): WBC, HGB, HCT, MCV, PLT in the last 720 hours. BMP:   No results for input(s): NA, K, CL, CO2, PHOS, BUN, CREATININE in the last 72 hours. Invalid input(s): CA    MG:   Lab Results   Component Value Date    MG 2.2 02/14/2018     Ca/Phos:   Lab Results   Component Value Date    CALCIUM 9.5 04/09/2019     Amylase: No results found for: AMYLASE  Lipase:   Lab Results   Component Value Date    LIPASE 28 05/19/2011     LIVER PROFILE: No results for input(s): AST, ALT, LIPASE, BILIDIR, BILITOT, ALKPHOS in the last 72 hours. Invalid input(s): AMYLASE,  ALB    PT/INR:   No results for input(s): PROTIME, INR in the last 72 hours. APTT: No results for input(s): APTT in the last 72 hours.     Cardiac Enzymes:  Lab Results   Component Value Date    CKTOTAL 51 11/13/2010    CKMB 0.5 11/13/2010    TROPONINI <0.01 02/15/2018       Hgb A1C: No results found for: LABA1C  No results found for: EAG  FAMILIA: No results found for: FAMILIA  ESR: No results found for: SEDRATE  CRP: No results found for: CRP  D Dimer: No results found for: DDIMER    Thyroid Studies:  Lab Results   Component Value Date    TSH 0.942 02/14/2018    I8HSRDD 4.8 02/14/2018           MICROBIOLOGY:  Pending       CXR:  Reviewed     CT Chest:reviewed     PROBLEM LIST:  Patient Active Problem List   Diagnosis    COPD (chronic obstructive pulmonary disease) ([facility] [address]    Essential hypertension    Adrenal adenoma    Class 2 obesity due to excess calories with serious comorbidity and body mass index (BMI) of 35.0 to 35.9 in adult    Anticoagulation management encounter    Typical

## 2019-08-19 DIAGNOSIS — J44.9 CHRONIC OBSTRUCTIVE PULMONARY DISEASE, UNSPECIFIED COPD TYPE (HCC): Primary | ICD-10-CM

## 2019-08-29 DIAGNOSIS — I48.19 PERSISTENT ATRIAL FIBRILLATION (HCC): Primary | ICD-10-CM

## 2019-10-10 ENCOUNTER — TELEPHONE (OUTPATIENT)
Dept: PULMONOLOGY | Age: 59
End: 2019-10-10

## 2019-10-15 ENCOUNTER — TELEPHONE (OUTPATIENT)
Dept: PULMONOLOGY | Age: 59
End: 2019-10-15

## 2019-10-18 ENCOUNTER — TELEPHONE (OUTPATIENT)
Dept: PULMONOLOGY | Age: 59
End: 2019-10-18

## 2019-10-18 DIAGNOSIS — G47.30 SEVERE SLEEP APNEA: Primary | ICD-10-CM

## 2019-12-11 ENCOUNTER — TELEPHONE (OUTPATIENT)
Dept: PULMONOLOGY | Age: 59
End: 2019-12-11

## 2019-12-30 NOTE — PROGRESS NOTES
DAILY (Patient taking differently: TAKE 1 TABLET TWICE A DAY) 60 tablet 3    aspirin 81 MG tablet Take 81 mg by mouth daily      diltiazem (CARDIZEM CD) 240 MG extended release capsule Take 1 capsule by mouth 2 times daily 60 capsule 11    atorvastatin (LIPITOR) 20 MG tablet Take 20 mg by mouth daily      losartan (COZAAR) 50 MG tablet Take 50 mg by mouth daily      albuterol sulfate  (90 Base) MCG/ACT inhaler Inhale 2 puffs into the lungs every 6 hours as needed for Wheezing      ipratropium-albuterol (DUONEB) 0.5-2.5 (3) MG/3ML SOLN nebulizer solution Inhale 1 vial into the lungs every 4 hours       No current facility-administered medications for this visit. No Known Allergies    PHYSICAL EXAM:  Vitals:    20 0944   BP: 124/82   Pulse: 83   Resp: 18   Weight: 271 lb (122.9 kg)   Height: 5' 11\" (1.803 m)   Constitutional: Oriented to person, place, and time. Obese, slightly confrontational  Head: Normocephalic and atraumatic. Cardiovascular:  Normal S1/ S2, Feet appear to be well perfused. Irregular/irregular rhythm present. PMI is not displaced. Pulmonary/Chest: Effort normal and breath sounds normal. No respiratory distress. Musculoskeletal: Normal range of motion of all extremities, no muscle weakness. Neurological: Alert and oriented to person, place, and time. Gait normal.   Skin: Skin is warm and dry. No bruising, no ecchymosis and no rash noted. Extremity: No clubbing or cyanosis. trace edema to bilateral lower extremities. Psychiatric: Normal mood and affect. Thought content normal.     EK2020: Sinus rhythm, rate: 83 bpm, LAE,  msec.  -  See scanned cardiology     [address] [facility]  Procedure Report  Patient: [address] Record Number: [id number]  Date of Procedure:  4/3/2018  Operating Electrophysiologist: Nay Valadez MD, [facility]  Referring Physician: Roxana Jimenez DO and  [facility]     Procedure(s) Performed:   1. Atrial flutter (SVT) ablation (01590)   2. 3-dimensional intracardiac mapping (24674)   3. Left atrial pacing and recording (97993-16)   4. Intravenous drug infusion/programmed stimulation and pacing (87565-55-22)   5. Vascular ultrasound for venous access (89546-81)  6. Transesophageal echocardiogram (41364)    WYATT 2/15/2018: Findings      Left Ventricle   Not well visualized      Low normal left ventricular systolic function.      Left Atrium   No evidence of thrombus within left atrium or appendage.      Mitral Valve   Mild to moderate mitral regurgitation.      Aortic Valve   Trileaflet aortic valve.   No hemodynamically significant aortic stenosis is present.   Trace aortic regurgitation.      Conclusions      Summary   Low normal left ventricular systolic function.   Mild to moderate mitral regurgitation.      Signature      ----------------------------------------------------------------   Electronically signed by Carolina Gorman DO (Interpreting   physician) on 02/16/2018 06:30 PM   ----------------------------------------------------------------     Indication for Procedure: 66 y.o. male with a history of recurrent symptomatic atrial flutter. There is not an known history of concomitant atrial fibrillation. We have discussed the treatment options, risks, benefits and alternatives. They have selected to proceed with EP study with possible catheter ablation and possible trans-septal catheterization. The risks discussed include but are NOT limited to: bleeding, infection, vascular injury requiring emergent surgical repair, AV block requiring emergent pacemaker implantation, pulmonary embolism, stroke, valve damage, pericardial effusion, tamponade, need for emergent cardiac surgery and even death. Alternatives discussed included ongoing medical therapy. They have elected to proceed with catheter ablation. Medications:  1. Isoproterenol 4 mcg IV.    2. Sedation- termination of tachycardia. Following completion of the line, various pacing and recording maneuvers were performed in order to confirm bidirectional block. The patient was on therapeutic apixiban at the time of the procedure.     Baseline Electrophysiology Data:       Baseline ECG demonstrated atrial flutter of cycle length of 220 msec with a pattern of activation which would be consistent with typical CCW CTI dependent RA flutter and HV 40ms. Pacing at 210ms from CS 9/10 showed PPI-TCL of 17ms, pacing from RA 1/2 at 210ms showed a PPI-TCL of 0ms, thus this was c/w CTI dependent RA flutter.     Radiofrequency Ablation Data: Using the [employer] 3-D electroanatomic mapping system, a total of 9 RF lesions were created throughout the study. Settings were a temperature of 60 degrees, power output of 60-70 ochoa, and duration of  msec. The patient was observed for 30 minutes post ablation and bidirectional CTI block was re-confirmed.      Final Electrophysiology Data:  Bidirectional trans-isthmus conduction time (TICT) of 140-150ms with pattern of activation of block including differential site pacing from RA 5/6 noted 120ms. AVWBCL from RA 5/6 near SA node was 380ms. AVNERP was 600/300 with no jump and no echo were noted. VAWBCL was 500ms from His bundle catheter which was advanced to the basal RV. We could not induce any AF or AFL pacing multisite at 220ms. Isuprel was started and AVWBCL was 340ms on isu 4. AERP was 500/210ms which was >AVNERP. Trans-isthmus conduction and differential pacing as well as lack of inducibility persisted for >30min post ablation. Repeated burst pacing to 220ms on isuprel did not induce AF/AFL. Post ablation AH was 131ms and HV 47ms. Sinus cycle length was 750ms. Once again reconfirmed bidirectional block after >30min.       Catheters were removed under flouroscopic guidance. Sheath were flushed and removed and manual compression was held for hemostasis.  She was hemodynamically

## 2020-01-02 ENCOUNTER — OFFICE VISIT (OUTPATIENT)
Dept: NON INVASIVE DIAGNOSTICS | Age: 60
End: 2020-01-02
Payer: COMMERCIAL

## 2020-01-02 VITALS
DIASTOLIC BLOOD PRESSURE: 82 MMHG | SYSTOLIC BLOOD PRESSURE: 124 MMHG | BODY MASS INDEX: 37.94 KG/M2 | HEART RATE: 83 BPM | RESPIRATION RATE: 18 BRPM | HEIGHT: 71 IN | WEIGHT: 271 LBS

## 2020-01-02 PROCEDURE — 93000 ELECTROCARDIOGRAM COMPLETE: CPT | Performed by: INTERNAL MEDICINE

## 2020-01-02 PROCEDURE — 99214 OFFICE O/P EST MOD 30 MIN: CPT | Performed by: INTERNAL MEDICINE

## 2020-01-02 PROCEDURE — 3017F COLORECTAL CA SCREEN DOC REV: CPT | Performed by: INTERNAL MEDICINE

## 2020-01-02 PROCEDURE — G8417 CALC BMI ABV UP PARAM F/U: HCPCS | Performed by: INTERNAL MEDICINE

## 2020-01-02 PROCEDURE — 1036F TOBACCO NON-USER: CPT | Performed by: INTERNAL MEDICINE

## 2020-01-02 PROCEDURE — G8427 DOCREV CUR MEDS BY ELIG CLIN: HCPCS | Performed by: INTERNAL MEDICINE

## 2020-01-02 PROCEDURE — G8484 FLU IMMUNIZE NO ADMIN: HCPCS | Performed by: INTERNAL MEDICINE

## 2020-01-14 ENCOUNTER — HOSPITAL ENCOUNTER (OUTPATIENT)
Dept: CT IMAGING | Age: 60
Discharge: HOME | End: 2020-01-16
Payer: COMMERCIAL

## 2020-01-14 PROCEDURE — 71250 CT THORAX DX C-: CPT

## 2020-01-22 ENCOUNTER — OFFICE VISIT (OUTPATIENT)
Dept: PULMONOLOGY | Age: 60
End: 2020-01-22
Payer: COMMERCIAL

## 2020-01-22 ENCOUNTER — HOSPITAL ENCOUNTER (OUTPATIENT)
Age: 60
Discharge: HOME | End: 2020-01-22
Payer: COMMERCIAL

## 2020-01-22 VITALS
OXYGEN SATURATION: 93 % | WEIGHT: 277 LBS | BODY MASS INDEX: 38.78 KG/M2 | HEART RATE: 109 BPM | HEIGHT: 71 IN | RESPIRATION RATE: 18 BRPM | SYSTOLIC BLOOD PRESSURE: 132 MMHG | DIASTOLIC BLOOD PRESSURE: 77 MMHG

## 2020-01-22 LAB
ANION GAP SERPL CALCULATED.3IONS-SCNC: 13 MMOL/L (ref 7–16)
BUN BLDV-MCNC: 21 MG/DL (ref 6–20)
CALCIUM SERPL-MCNC: 9.8 MG/DL (ref 8.6–10.2)
CHLORIDE BLD-SCNC: 101 MMOL/L (ref 98–107)
CO2: 27 MMOL/L (ref 22–29)
CREAT SERPL-MCNC: 1.3 MG/DL (ref 0.7–1.2)
EOSINOPHILS ABSOLUTE COUNT: 250 /UL (ref 50–250)
GFR AFRICAN AMERICAN: >60
GFR NON-AFRICAN AMERICAN: 57 ML/MIN/1.73
GLUCOSE BLD-MCNC: 85 MG/DL (ref 74–99)
POTASSIUM SERPL-SCNC: 4.4 MMOL/L (ref 3.5–5)
SODIUM BLD-SCNC: 141 MMOL/L (ref 132–146)

## 2020-01-22 PROCEDURE — 1036F TOBACCO NON-USER: CPT | Performed by: INTERNAL MEDICINE

## 2020-01-22 PROCEDURE — 36415 COLL VENOUS BLD VENIPUNCTURE: CPT

## 2020-01-22 PROCEDURE — G8417 CALC BMI ABV UP PARAM F/U: HCPCS | Performed by: INTERNAL MEDICINE

## 2020-01-22 PROCEDURE — 99213 OFFICE O/P EST LOW 20 MIN: CPT | Performed by: INTERNAL MEDICINE

## 2020-01-22 PROCEDURE — 3023F SPIROM DOC REV: CPT | Performed by: INTERNAL MEDICINE

## 2020-01-22 PROCEDURE — 85048 AUTOMATED LEUKOCYTE COUNT: CPT

## 2020-01-22 PROCEDURE — G8926 SPIRO NO PERF OR DOC: HCPCS | Performed by: INTERNAL MEDICINE

## 2020-01-22 PROCEDURE — 3017F COLORECTAL CA SCREEN DOC REV: CPT | Performed by: INTERNAL MEDICINE

## 2020-01-22 PROCEDURE — 86003 ALLG SPEC IGE CRUDE XTRC EA: CPT

## 2020-01-22 PROCEDURE — 99214 OFFICE O/P EST MOD 30 MIN: CPT | Performed by: INTERNAL MEDICINE

## 2020-01-22 PROCEDURE — 82785 ASSAY OF IGE: CPT

## 2020-01-22 PROCEDURE — G8484 FLU IMMUNIZE NO ADMIN: HCPCS | Performed by: INTERNAL MEDICINE

## 2020-01-22 PROCEDURE — 80048 BASIC METABOLIC PNL TOTAL CA: CPT

## 2020-01-22 PROCEDURE — G8427 DOCREV CUR MEDS BY ELIG CLIN: HCPCS | Performed by: INTERNAL MEDICINE

## 2020-01-22 NOTE — PROGRESS NOTES
is helping his breathing  Denies any fever or chills but increased swelling in both legs    PHYSICAL EXAMINATION:     VITAL SIGNS:  /77   Pulse 109   Resp 18   Ht 5' 11\" (1.803 m)   Wt 277 lb (125.6 kg)   SpO2 93%   BMI 38.63 kg/m²   Wt Readings from Last 3 Encounters:   01/22/20 277 lb (125.6 kg)   01/02/20 271 lb (122.9 kg)   08/09/19 257 lb (116.6 kg)     Temp Readings from Last 3 Encounters:   08/09/19 97.7 °F (36.5 °C)   04/09/19 98.5 °F (36.9 °C) (Oral)   07/30/18 98.1 °F (36.7 °C) (Oral)     TMAX:  BP Readings from Last 3 Encounters:   01/22/20 132/77   01/02/20 124/82   08/09/19 138/75     Pulse Readings from Last 3 Encounters:   01/22/20 109   01/02/20 83   08/09/19 67           INTAKE/OUTPUTS:  No intake/output data recorded.   No intake or output data in the 24 hours ending 01/22/20 0928    General Appearance: alert and oriented to person, place and time, well-developed and   well-nourished, in no acute distress   Eyes: pupils equal, round, and reactive to light, extraocular eye movements intact, conjunctivae normal and sclera anicteric   Neck: neck supple and non tender without mass, no thyromegaly, no thyroid nodules and no cervical adenopathy   Pulmonary/Chest:Rhonchi ,decrease breath sounds   Cardiovascular: irregular rhythm, normal S1 and S2, no murmurs, rubs, clicks or gallops, distal pulses intact, no carotid bruits,   Abdomen: obese, soft, non-tender, non-distended, normal bowel sounds, no masses or organomegaly   Extremities:no edema or cyanosis  Musculoskeletal: normal range of motion, no joint swelling, deformity or tenderness   Neurologic: reflexes normal and symmetric, no cranial nerve deficit noted    LABS/IMAGING:    CBC:  Lab Results   Component Value Date    WBC 8.0 03/31/2018    HGB 12.7 03/31/2018    HCT 38.4 03/31/2018    MCV 96.5 03/31/2018     03/31/2018    LYMPHOPCT 33.0 03/31/2018    RBC 3.98 03/31/2018    MCH 31.9 03/31/2018    MCHC 33.1 03/31/2018    RDW 13.8 ETOH abuse               ASSESSMENT:  1.) Acute   exacerbation of COPD  2) very severe COPD  3.)obstructive sleep apnea  4. )Afib /Flutter   5.)tobacco independent(quit 5 months ago)    Data:  FVC 3.63 which is 73 of predicted    FEV1 1.52 which is 39 of predicted    FEV1/FVC is 42  No Bronchodilator response    PLAN:  Recent is very pleasant 53-year-old male who came in for follow-up and he still have SOB and BARDALES and feel weak     His PFT shows very severe COPD with FEV1 39% of predicted  Continue LABA/LAMA ,Sttilito   On Duoneb   Albuterol as needed   Lasix 80 mg daily  Continue stilito,Samples was given   On  Eliquis to take it twice daily  Continue Nebs     The Patient has very severe COPD and has minimal activity secoondary to that and despite maximize has  inhalers and his CPAP ,he still ahs SOB and he has A fib/flutter and possible HFpEF (heart failure with persevered ejection fraction )he also tried to attend pulmonary rehab ,but has social issues  He is not able to do much work secondary to his severe COPD and other medical issues        Check IgE   Check eosinophilic         MUNEER [address] [place] and [address] [facility] [address]

## 2020-01-27 LAB
Lab: NORMAL
REPORT: NORMAL
THIS TEST SENT TO: NORMAL

## 2020-05-29 ENCOUNTER — VIRTUAL VISIT (OUTPATIENT)
Dept: PULMONOLOGY | Age: 60
End: 2020-05-29
Payer: COMMERCIAL

## 2020-05-29 PROCEDURE — 99214 OFFICE O/P EST MOD 30 MIN: CPT | Performed by: INTERNAL MEDICINE

## 2020-05-29 RX ORDER — FLUTICASONE PROPIONATE 50 MCG
1 SPRAY, SUSPENSION (ML) NASAL DAILY
Qty: 1 BOTTLE | Refills: 2 | Status: ON HOLD
Start: 2020-05-29 | End: 2021-10-26

## 2020-05-29 NOTE — PROGRESS NOTES
TeleMedicine Video Visit    This visit was performed as a virtual video visit using a synchronous, two-way, audio-video telehealth technology platform. Patient identification was verified at the start of the visit, including the patient's telephone number and physical location. I discussed with the patient the nature of our telehealth visits, that:     1. Due to the nature of an audio- video modality, the only components of a physical exam that could be done are the elements supported by direct observation. 2. I would evaluate the patient and recommend diagnostics and treatments based on my assessment. 3. If it was felt that the patient should be evaluated in clinic or an emergency room setting, then they would be directed there. 4. Our sessions are not being recorded and that personal health information is protected. 5. Our team would provide follow up care in person if/when the patient needs it. Patient does agree to proceed with telemedicine consultation. Patient's location:[place] [address]   Physician  location [address] [place] [address]     other people involved in call  Wife    Time spent: Greater than 20    This visit was completed virtually using Doxy. me     Follow up visit today; breathing is improving. Using OTC melatonin to help with sleep/anxiety. Pt also has started using Flonase nasal spray and having good results. O2 @ 4 Liters NC. Continuous. Script for Flonase to be sent to pharmacy for pt. Continue O2 and Stiloto inhaler; samples to be mailed. Plan for follow up in 6 mos and appt card to be mailed.

## 2020-05-29 NOTE — PROGRESS NOTES
[place]  Division of Pulmonary, [facility] [address]           Pulmonary Clinic consult       Patient: Carol Daniel  MRN: [id number]  : 1960    This is Virtual visit Video     CC :SOB ,follow up after hospital admission   H/o A flutter     HPI :  Carol Daniel is a 66y.o. year old smoking at age 15 and increased gradually to 2 pack daily, he quit a few months ago came into the hospital with shortness of breath, associated with cough, productive of yellow sputum along with chest tightness with no fever or chills or numbness or chest pain     The patient is known to have COPD and he is O2 dependent throat he used to liters at home     The patient also developed A. fib and he was started on Cardizem drip, he was advised to have cardioversion, and pulmonary was asked to see the patient     And also was tested positive for influenza A      Patient had CTA which shows no lung lesion questionable hilar adenopathy  Patient  already in Eliquis 5 mg twice daily. He was given TamiFlu Tamiflu 75 b.i.d.     CAT scan did not show any evidence of lung nodule or masses      Patient came for follow-up visit today he has been feeling well, he feel that his breathing has become much easier, he also has less cough and shortness of breath, he can walk more and he is not requiring any oxygen    During his last admission he had cardioversion by cardiology for a flutter, and he was discharged home in Cardizem 240 mg daily in addition to his anticoagulation    He is also done with tapering dose of steroids    Today visit: 2020   Still with SOB ,but he developed panic attacks  and start Melatonin   On fluticasone nasal spray 50 mcg    On 4 L   Lost 5 pounds   Retain fluid and on lasix 80 mg  Daily at morning   No Hemoptysis   No weight loss  Worsening SOB     He used BiPAP  seems comfortable with it but sometimes he does not feel that it's giving him   Also take his Eliquis once daily due to insurance issue  Still using stiltoto and it helping his breathing  Denies any fever or chills but increased swelling in both legs    PHYSICAL EXAMINATION:     VITAL SIGNS:  There were no vitals taken for this visit. Wt Readings from Last 3 Encounters:   01/22/20 277 lb (125.6 kg)   01/02/20 271 lb (122.9 kg)   08/09/19 257 lb (116.6 kg)     Temp Readings from Last 3 Encounters:   08/09/19 97.7 °F (36.5 °C)   04/09/19 98.5 °F (36.9 °C) (Oral)   07/30/18 98.1 °F (36.7 °C) (Oral)     TMAX:  BP Readings from Last 3 Encounters:   01/22/20 132/77   01/02/20 124/82   08/09/19 138/75     Pulse Readings from Last 3 Encounters:   01/22/20 109   01/02/20 83   08/09/19 67           INTAKE/OUTPUTS:  No intake/output data recorded. No intake or output data in the 24 hours ending 05/29/20 1210    This is virtual visit      LABS/IMAGING:    CBC:  Lab Results   Component Value Date    WBC 8.0 03/31/2018    HGB 12.7 03/31/2018    HCT 38.4 03/31/2018    MCV 96.5 03/31/2018     03/31/2018    LYMPHOPCT 33.0 03/31/2018    RBC 3.98 03/31/2018    MCH 31.9 03/31/2018    MCHC 33.1 03/31/2018    RDW 13.8 03/31/2018    NEUTOPHILPCT 51.1 03/31/2018    MONOPCT 11.7 03/31/2018    BASOPCT 0.8 03/31/2018    NEUTROABS 4.09 03/31/2018    LYMPHSABS 2.63 03/31/2018    MONOSABS 0.93 03/31/2018    EOSABS 250 01/22/2020    BASOSABS 0.06 03/31/2018       No results for input(s): WBC, HGB, HCT, MCV, PLT in the last 720 hours. BMP:   No results for input(s): NA, K, CL, CO2, PHOS, BUN, CREATININE in the last 72 hours.     Invalid input(s): CA    MG:   Lab Results   Component Value Date    MG 2.2 02/14/2018     Ca/Phos:   Lab Results   Component Value Date    CALCIUM 9.8 01/22/2020     Amylase: No results found for: AMYLASE  Lipase:   Lab Results   Component Value Date    LIPASE 28 05/19/2011     LIVER PROFILE: No results for input(s): AST, ALT, LIPASE, BILIDIR, BILITOT, ALKPHOS in the last 72 hours. Invalid input(s): AMYLASE,  ALB    PT/INR:   No results for input(s): PROTIME, INR in the last 72 hours. APTT: No results for input(s): APTT in the last 72 hours. Cardiac Enzymes:  Lab Results   Component Value Date    CKTOTAL 51 11/13/2010    CKMB 0.5 11/13/2010    TROPONINI <0.01 02/15/2018       Hgb A1C: No results found for: LABA1C  No results found for: EAG  FAMILIA: No results found for: FAMILIA  ESR: No results found for: SEDRATE  CRP: No results found for: CRP  D Dimer: No results found for: DDIMER    Thyroid Studies:  Lab Results   Component Value Date    TSH 0.942 02/14/2018    C2MOKZH 4.8 02/14/2018           MICROBIOLOGY:  Pending       CXR:  Reviewed     CT Chest:reviewed     PROBLEM LIST:  Patient Active Problem List   Diagnosis    [facility]    Essential hypertension    Adrenal adenoma    Class 2 obesity due to excess calories with serious comorbidity and body mass index (BMI) of 35.0 to 35.9 in adult    Anticoagulation management encounter    Typical atrial flutter ([facility]    Anxiety    Status post catheter ablation of atrial flutter    Persistent atrial fibrillation    ETOH abuse               ASSESSMENT:  1.) Acute   exacerbation of COPD  2) very severe COPD  3.)obstructive sleep apnea  4. )Afib /Flutter   5.)tobacco independent(quit 5 months ago)    Data:  FVC 3.63 which is 73 of predicted    FEV1 1.52 which is 39 of predicted    FEV1/FVC is 42  No Bronchodilator response    PLAN:  patient  is very pleasant 53-year-old male who came in for follow-up and he still have SOB and BARDALES and feel weak     His PFT shows very severe COPD with FEV1 39% of predicted  Continue LABA/LAMA ,Sttilito   On Duoneb   Albuterol as needed   Lasix 80 mg daily  Continue stilito,  On  Eliquis to take it twice daily  Continue Nebs     The Patient has very severe COPD and has minimal activity secoondary to that and despite maximize has  inhalers and his CPAP

## 2020-06-04 RX ORDER — TIOTROPIUM BROMIDE AND OLODATEROL 3.124; 2.736 UG/1; UG/1
2 SPRAY, METERED RESPIRATORY (INHALATION) DAILY
Qty: 3 INHALER | Refills: 3 | Status: SHIPPED | OUTPATIENT
Start: 2020-06-04 | End: 2020-11-30 | Stop reason: SDUPTHER

## 2020-06-24 RX ORDER — FLUTICASONE FUROATE 100 UG/1
100 POWDER RESPIRATORY (INHALATION) DAILY
Qty: 3 EACH | Refills: 3 | Status: SHIPPED | OUTPATIENT
Start: 2020-06-24 | End: 2021-08-08

## 2020-09-03 ENCOUNTER — TELEPHONE (OUTPATIENT)
Dept: PULMONOLOGY | Age: 60
End: 2020-09-03

## 2020-09-03 NOTE — TELEPHONE ENCOUNTER
Wife reports pt had an episode that lasted a few days with shortness of breath. Used Nebulizer meds and put BiPap on more often. Pt having anxiety and wife reports she feels pt gets himself all worked up and makes it even harder to breathe. Pt O2 sat dropped into the low 80's during these flare ups. RN will discuss with Dr. Cody Fulton and will follow up with a call back this afternoon with any new orders/ suggestion.

## 2020-09-03 NOTE — TELEPHONE ENCOUNTER
Follow up call to pt/wife after discussing with Dr. Cortez Boyd recent flare up of shortness of breath and reviewed medications. Dr. Cortez Boyd feels pt is on maximum respiratory medications to manage COPD and encouraged use of the Oxygen and BiPAP as ordered. Encouraged pt to discuss anxiety issues with PCP and maybe trial some antianxiety medications to better manage the symptoms that seem to exacerbate the breathing issues. Wife to have pt follow up with PCP. To call prn.

## 2020-11-30 RX ORDER — TIOTROPIUM BROMIDE AND OLODATEROL 3.124; 2.736 UG/1; UG/1
2 SPRAY, METERED RESPIRATORY (INHALATION) DAILY
Qty: 1 INHALER | Refills: 5 | Status: SHIPPED
Start: 2020-11-30 | End: 2021-08-08

## 2020-11-30 NOTE — PROGRESS NOTES
Pt requesting refill on Stiloto inhaler and Eliquis script to be sent to [facility];refills sent.

## 2020-12-10 ENCOUNTER — TELEPHONE (OUTPATIENT)
Dept: PULMONOLOGY | Age: 60
End: 2020-12-10

## 2020-12-10 NOTE — TELEPHONE ENCOUNTER
Mailed a letter to patient informing him that his CT Chest is scheduled for 1-13-21 at 11:00 am at [address] [place] [address].  He must arrive by 10:30 am. There is no prep for this test

## 2021-01-23 ENCOUNTER — HOSPITAL ENCOUNTER (OUTPATIENT)
Dept: CT IMAGING | Age: 61
Discharge: HOME | End: 2021-01-25
Payer: COMMERCIAL

## 2021-01-23 DIAGNOSIS — R91.1 LUNG NODULE: ICD-10-CM

## 2021-01-23 PROCEDURE — 71250 CT THORAX DX C-: CPT

## 2021-02-26 ENCOUNTER — TELEPHONE (OUTPATIENT)
Dept: PULMONOLOGY | Age: 61
End: 2021-02-26

## 2021-02-26 NOTE — TELEPHONE ENCOUNTER
SHAHIDOM informing patient that we are switching his OV to a Phone appointment and to please call back when he receives this  message

## 2021-03-05 ENCOUNTER — VIRTUAL VISIT (OUTPATIENT)
Dept: PULMONOLOGY | Age: 61
End: 2021-03-05
Payer: COMMERCIAL

## 2021-03-05 DIAGNOSIS — J44.9 STAGE 3 SEVERE COPD BY GOLD CLASSIFICATION (HCC): Primary | ICD-10-CM

## 2021-03-05 PROCEDURE — 99214 OFFICE O/P EST MOD 30 MIN: CPT | Performed by: INTERNAL MEDICINE

## 2021-03-05 RX ORDER — SPIRONOLACTONE 25 MG/1
25 TABLET ORAL DAILY
Status: ON HOLD | COMMUNITY
Start: 2021-03-02 | End: 2021-08-30 | Stop reason: HOSPADM

## 2021-03-05 RX ORDER — BUDESONIDE, GLYCOPYRROLATE, AND FORMOTEROL FUMARATE 160; 9; 4.8 UG/1; UG/1; UG/1
2 AEROSOL, METERED RESPIRATORY (INHALATION) 2 TIMES DAILY
Qty: 1 INHALER | Refills: 0 | COMMUNITY
Start: 2021-03-05 | End: 2021-05-17 | Stop reason: SDUPTHER

## 2021-03-05 NOTE — PROGRESS NOTES
[place]  Division of Pulmonary, [facility]           Pulmonary Clinic consult       Patient: Kristina Fuentes  MRN: [id number]  : 1960    This is Virtual visit Video     CC :SOB ,follow up after hospital admission   H/o A flutter     HPI :  Kristina Fuentes is a 66y.o. year old smoking at age 15 and increased gradually to 2 pack daily, he quit a few months ago came into the hospital with shortness of breath, associated with cough, productive of yellow sputum along with chest tightness with no fever or chills or numbness or chest pain     The patient is known to have COPD and he is O2 dependent throat he used to liters at home     The patient also developed A. fib and he was started on Cardizem drip, he was advised to have cardioversion, and pulmonary was asked to see the patient     And also was tested positive for influenza A      Patient had CTA which shows no lung lesion questionable hilar adenopathy  Patient  already in Eliquis 5 mg twice daily. He was given TamiFlu Tamiflu 75 b.i.d.     CAT scan did not show any evidence of lung nodule or masses      Patient came for follow-up visit today he has been feeling well, he feel that his breathing has become much easier, he also has less cough and shortness of breath, he can walk more and he is not requiring any oxygen    During his last admission he had cardioversion by cardiology for a flutter, and he was discharged home in Cardizem 240 mg daily in addition to his anticoagulation    He is also done with tapering dose of steroids    Today visit: 2020   Still with SOB ,but he developed panic attacks  and start Melatonin   On fluticasone nasal spray 50 mcg    On 4 L   Gain weight   Had COVID lynn    Retain fluid and on lasix 80 mg  Daily at morning and Aldactone was needed   No Hemoptysis   No weight loss  Worsening SOB   He used BiPAP  seems comfortable with it but sometimes he does not feel that it's giving him   Also take his Eliquis once daily due to insurance issue  Still using stiltoto but we are going to change it   Denies any fever or chills but increased swelling in both legs    PHYSICAL EXAMINATION:     VITAL SIGNS:  There were no vitals taken for this visit. Wt Readings from Last 3 Encounters:   01/22/20 277 lb (125.6 kg)   01/02/20 271 lb (122.9 kg)   08/09/19 257 lb (116.6 kg)     Temp Readings from Last 3 Encounters:   08/09/19 97.7 °F (36.5 °C)   04/09/19 98.5 °F (36.9 °C) (Oral)   07/30/18 98.1 °F (36.7 °C) (Oral)     TMAX:  BP Readings from Last 3 Encounters:   01/22/20 132/77   01/02/20 124/82   08/09/19 138/75     Pulse Readings from Last 3 Encounters:   01/22/20 109   01/02/20 83   08/09/19 67           INTAKE/OUTPUTS:  No intake/output data recorded. No intake or output data in the 24 hours ending 03/05/21 1142    This is virtual visit      LABS/IMAGING:    CBC:  Lab Results   Component Value Date    WBC 8.0 03/31/2018    HGB 12.7 03/31/2018    HCT 38.4 03/31/2018    MCV 96.5 03/31/2018     03/31/2018    LYMPHOPCT 33.0 03/31/2018    RBC 3.98 03/31/2018    MCH 31.9 03/31/2018    MCHC 33.1 03/31/2018    RDW 13.8 03/31/2018    NEUTOPHILPCT 51.1 03/31/2018    MONOPCT 11.7 03/31/2018    BASOPCT 0.8 03/31/2018    NEUTROABS 4.09 03/31/2018    LYMPHSABS 2.63 03/31/2018    MONOSABS 0.93 03/31/2018    EOSABS 250 01/22/2020    BASOSABS 0.06 03/31/2018       No results for input(s): WBC, HGB, HCT, MCV, PLT in the last 720 hours. BMP:   No results for input(s): NA, K, CL, CO2, PHOS, BUN, CREATININE in the last 72 hours.     Invalid input(s): CA    MG:   Lab Results   Component Value Date    MG 2.2 02/14/2018     Ca/Phos:   Lab Results   Component Value Date    CALCIUM 9.8 01/22/2020     Amylase: No results found for: AMYLASE  Lipase:   Lab Results   Component Value Date    LIPASE 28 05/19/2011     LIVER PROFILE: No results for input(s): AST, ALT, LIPASE, BILIDIR, BILITOT, ALKPHOS in the last 72 hours. Invalid input(s): AMYLASE,  ALB    PT/INR:   No results for input(s): PROTIME, INR in the last 72 hours. APTT: No results for input(s): APTT in the last 72 hours. Cardiac Enzymes:  Lab Results   Component Value Date    CKTOTAL 51 11/13/2010    CKMB 0.5 11/13/2010    TROPONINI <0.01 02/15/2018       Hgb A1C: No results found for: LABA1C  No results found for: EAG  FAMILIA: No results found for: FAMILIA  ESR: No results found for: SEDRATE  CRP: No results found for: CRP  D Dimer: No results found for: DDIMER    Thyroid Studies:  Lab Results   Component Value Date    TSH 0.942 02/14/2018    H2DHRUP 4.8 02/14/2018           MICROBIOLOGY:  Pending       CXR:  Reviewed     CT Chest:reviewed     PROBLEM LIST:  Patient Active Problem List   Diagnosis    COPD (chronic obstructive pulmonary disease) ([address]    Essential hypertension    Adrenal adenoma    Class 2 obesity due to excess calories with serious comorbidity and body mass index (BMI) of 35.0 to 35.9 in adult    Anticoagulation management encounter    Typical atrial flutter ([address]    Anxiety    Status post catheter ablation of atrial flutter    Persistent atrial fibrillation ([address]    ETOH abuse               ASSESSMENT:  1.) Acute   exacerbation of COPD  2) very severe COPD  3.)obstructive sleep apnea  4. )Afib /Flutter   5.)tobacco independent(quit 5 months ago)    Data:  FVC 3.63 which is 73 of predicted    FEV1 1.52 which is 39 of predicted    FEV1/FVC is 42  No Bronchodilator response    PLAN:  patient  is very pleasant 61-year-old male who came in for follow-up and he still have SOB and BARDALES and feel weak   His PFT shows very severe COPD with FEV1 39% of predicted  Will stop   Sttilito ,will change to Preztri  On Duoneb   Albuterol as needed   Lasix 80 mg daily,aldactone 25 mg po daily   Continue stilito,  On  Eliquis to take it twice daily  Continue Nebs   The Patient has very severe COPD and has minimal activity secoondary to that and despite maximize has  inhalers and his CPAP ,he still ahs SOB and he has A fib/flutter and possible HFpEF (heart failure with persevered ejection fraction )he also tried to attend pulmonary rehab ,but has social issues  He is not able to do much work secondary to his severe COPD and other medical issues    IgE 52  eosinophilic 088 ,if continue to have SOB will consider IL5         [facility] [address] [facility] [address]

## 2021-03-05 NOTE — PROGRESS NOTES
Janan Dandy is a 63 y.o. male evaluated via telephone on 3/5/2021. Consent:  He and/or health care decision maker is aware that that he may receive a bill for this telephone service, depending on his insurance coverage, and has provided verbal consent to proceed: Yes      Documentation:  I communicated with the patient and/or health care decision maker about COPD. Details of this discussion including any medical advice provided: 9 month follow up via phone. Pt reports good days and bad days. Using Stiloto inhaler with good results. Will try hold Stiloto and try Breztri inhaler for one month. Samples sent home with Demond Garcia at today's visit. Appt to follow up in office in 6 mos. Pt to call after       I affirm this is a Patient Initiated Episode with a Patient who has not had a related appointment within my department in the past 7 days or scheduled within the next 24 hours. Patient identification was verified at the start of the visit: Yes    Total Time: minutes: 11-20 minutes    The visit was conducted pursuant to the emergency declaration under the [facility], [address] [place] [address] authority and the [employer] General Act. Patient identification was verified, and a caregiver was present when appropriate. The patient was located in a state where the provider was credentialed to provide care.     Note: not billable if this call serves to triage the patient into an appointment for the relevant concern      Regina Flores

## 2021-03-19 ENCOUNTER — TELEPHONE (OUTPATIENT)
Dept: PULMONOLOGY | Age: 61
End: 2021-03-19

## 2021-03-19 NOTE — TELEPHONE ENCOUNTER
Call returned to pt after VM left re: new inhaler Breztri. Pt reports he began using new inhaler in place of Stiloto and it seems to be working well. Additional Samples sent to pt per Dr. Amol Allen's orders.

## 2021-05-17 DIAGNOSIS — J44.9 STAGE 3 SEVERE COPD BY GOLD CLASSIFICATION (HCC): Primary | ICD-10-CM

## 2021-05-17 RX ORDER — BUDESONIDE, GLYCOPYRROLATE, AND FORMOTEROL FUMARATE 160; 9; 4.8 UG/1; UG/1; UG/1
2 AEROSOL, METERED RESPIRATORY (INHALATION) 2 TIMES DAILY
Qty: 3 INHALER | Refills: 0 | COMMUNITY
Start: 2021-05-17

## 2021-05-17 NOTE — PROGRESS NOTES
Pt requesting additional sample of Breztri inhaler. Working well. Samples given per Dr. Aubrie Allen's orders.

## 2021-07-06 ENCOUNTER — TELEPHONE (OUTPATIENT)
Dept: PULMONOLOGY | Age: 61
End: 2021-07-06

## 2021-07-06 DIAGNOSIS — J44.9 STAGE 3 SEVERE COPD BY GOLD CLASSIFICATION (HCC): Primary | ICD-10-CM

## 2021-07-06 NOTE — TELEPHONE ENCOUNTER
Pt calling into office requesting samples fo Breztri inhaler if available. Office advised pt that when samples are available we will have for Dtr to  be froe end of week. Pt also requesting assistance in obtaining portable Oxygen Concentrator from [facility]. RN spoke with Rain Mancuso at the [place] office of [facility] that services patient and he will send RRT to home to help pt with Oxygen bleed into the BiPAP device(pt has home oxygen but not sure it is connected to BiPAP. Orders to be faxed and Rain Mancuso will have respiratory staff reach out for home visit.

## 2021-07-15 ENCOUNTER — TELEPHONE (OUTPATIENT)
Dept: PULMONOLOGY | Age: 61
End: 2021-07-15

## 2021-07-15 NOTE — TELEPHONE ENCOUNTER
Call to Dtr Daniel Gilliland to advise Dr. Roney Arroyo has samples of Breztri inhaler for pt in office; dtr to  later this am.

## 2021-08-07 ENCOUNTER — HOSPITAL ENCOUNTER (INPATIENT)
Age: 61
LOS: 22 days | Discharge: HOME | DRG: 194 | End: 2021-08-30
Attending: EMERGENCY MEDICINE | Admitting: INTERNAL MEDICINE
Payer: COMMERCIAL

## 2021-08-07 ENCOUNTER — APPOINTMENT (OUTPATIENT)
Dept: GENERAL RADIOLOGY | Age: 61
DRG: 194 | End: 2021-08-07
Payer: COMMERCIAL

## 2021-08-07 DIAGNOSIS — E08.9 DIABETES MELLITUS DUE TO UNDERLYING CONDITION WITHOUT COMPLICATION, WITHOUT LONG-TERM CURRENT USE OF INSULIN (HCC): ICD-10-CM

## 2021-08-07 DIAGNOSIS — E87.79 VOLUME OVERLOAD STATE OF HEART: Primary | ICD-10-CM

## 2021-08-07 DIAGNOSIS — J44.1 COPD WITH ACUTE EXACERBATION (HCC): ICD-10-CM

## 2021-08-07 LAB
ALBUMIN SERPL-MCNC: 3.9 G/DL (ref 3.5–5.2)
ALP BLD-CCNC: 67 U/L (ref 40–129)
ALT SERPL-CCNC: 15 U/L (ref 0–40)
ANION GAP SERPL CALCULATED.3IONS-SCNC: 9 MMOL/L (ref 7–16)
AST SERPL-CCNC: 16 U/L (ref 0–39)
BASOPHILS ABSOLUTE: 0.05 E9/L (ref 0–0.2)
BASOPHILS RELATIVE PERCENT: 0.5 % (ref 0–2)
BILIRUB SERPL-MCNC: 0.2 MG/DL (ref 0–1.2)
BUN BLDV-MCNC: 19 MG/DL (ref 6–20)
CALCIUM SERPL-MCNC: 9.6 MG/DL (ref 8.6–10.2)
CHLORIDE BLD-SCNC: 98 MMOL/L (ref 98–107)
CO2: 33 MMOL/L (ref 22–29)
CREAT SERPL-MCNC: 1.4 MG/DL (ref 0.7–1.2)
EOSINOPHILS ABSOLUTE: 0.41 E9/L (ref 0.05–0.5)
EOSINOPHILS RELATIVE PERCENT: 4.3 % (ref 0–6)
GFR AFRICAN AMERICAN: >60
GFR NON-AFRICAN AMERICAN: 52 ML/MIN/1.73
GLUCOSE BLD-MCNC: 123 MG/DL (ref 74–99)
HCT VFR BLD CALC: 38.7 % (ref 37–54)
HEMOGLOBIN: 12.8 G/DL (ref 12.5–16.5)
IMMATURE GRANULOCYTES #: 0.08 E9/L
IMMATURE GRANULOCYTES %: 0.8 % (ref 0–5)
LYMPHOCYTES ABSOLUTE: 2.27 E9/L (ref 1.5–4)
LYMPHOCYTES RELATIVE PERCENT: 23.7 % (ref 20–42)
MCH RBC QN AUTO: 31.6 PG (ref 26–35)
MCHC RBC AUTO-ENTMCNC: 33.1 % (ref 32–34.5)
MCV RBC AUTO: 95.6 FL (ref 80–99.9)
MONOCYTES ABSOLUTE: 0.67 E9/L (ref 0.1–0.95)
MONOCYTES RELATIVE PERCENT: 7 % (ref 2–12)
NEUTROPHILS ABSOLUTE: 6.09 E9/L (ref 1.8–7.3)
NEUTROPHILS RELATIVE PERCENT: 63.7 % (ref 43–80)
PDW BLD-RTO: 12.3 FL (ref 11.5–15)
PLATELET # BLD: 229 E9/L (ref 130–450)
PMV BLD AUTO: 11.5 FL (ref 7–12)
POTASSIUM SERPL-SCNC: 4.2 MMOL/L (ref 3.5–5)
PRO-BNP: 647 PG/ML (ref 0–125)
RBC # BLD: 4.05 E12/L (ref 3.8–5.8)
SODIUM BLD-SCNC: 140 MMOL/L (ref 132–146)
TOTAL PROTEIN: 6.6 G/DL (ref 6.4–8.3)
TROPONIN, HIGH SENSITIVITY: 6 NG/L (ref 0–11)
WBC # BLD: 9.6 E9/L (ref 4.5–11.5)

## 2021-08-07 PROCEDURE — 71045 X-RAY EXAM CHEST 1 VIEW: CPT

## 2021-08-07 PROCEDURE — 83880 ASSAY OF NATRIURETIC PEPTIDE: CPT

## 2021-08-07 PROCEDURE — 6360000002 HC RX W HCPCS: Performed by: EMERGENCY MEDICINE

## 2021-08-07 PROCEDURE — 94664 DEMO&/EVAL PT USE INHALER: CPT

## 2021-08-07 PROCEDURE — 94640 AIRWAY INHALATION TREATMENT: CPT

## 2021-08-07 PROCEDURE — 85025 COMPLETE CBC W/AUTO DIFF WBC: CPT

## 2021-08-07 PROCEDURE — 93005 ELECTROCARDIOGRAM TRACING: CPT | Performed by: NURSE PRACTITIONER

## 2021-08-07 PROCEDURE — 84484 ASSAY OF TROPONIN QUANT: CPT

## 2021-08-07 PROCEDURE — 80053 COMPREHEN METABOLIC PANEL: CPT

## 2021-08-07 PROCEDURE — 6370000000 HC RX 637 (ALT 250 FOR IP): Performed by: EMERGENCY MEDICINE

## 2021-08-07 PROCEDURE — 96374 THER/PROPH/DIAG INJ IV PUSH: CPT

## 2021-08-07 PROCEDURE — 99284 EMERGENCY DEPT VISIT MOD MDM: CPT

## 2021-08-07 RX ORDER — METHYLPREDNISOLONE SODIUM SUCCINATE 125 MG/2ML
125 INJECTION, POWDER, LYOPHILIZED, FOR SOLUTION INTRAMUSCULAR; INTRAVENOUS ONCE
Status: COMPLETED | OUTPATIENT
Start: 2021-08-07 | End: 2021-08-07

## 2021-08-07 RX ORDER — IPRATROPIUM BROMIDE AND ALBUTEROL SULFATE 2.5; .5 MG/3ML; MG/3ML
1 SOLUTION RESPIRATORY (INHALATION)
Status: COMPLETED | OUTPATIENT
Start: 2021-08-07 | End: 2021-08-07

## 2021-08-07 RX ADMIN — METHYLPREDNISOLONE SODIUM SUCCINATE 125 MG: 125 INJECTION, POWDER, FOR SOLUTION INTRAMUSCULAR; INTRAVENOUS at 22:50

## 2021-08-07 RX ADMIN — IPRATROPIUM BROMIDE AND ALBUTEROL SULFATE 1 AMPULE: .5; 3 SOLUTION RESPIRATORY (INHALATION) at 22:10

## 2021-08-07 RX ADMIN — IPRATROPIUM BROMIDE AND ALBUTEROL SULFATE 1 AMPULE: .5; 3 SOLUTION RESPIRATORY (INHALATION) at 22:16

## 2021-08-07 RX ADMIN — IPRATROPIUM BROMIDE AND ALBUTEROL SULFATE 1 AMPULE: .5; 3 SOLUTION RESPIRATORY (INHALATION) at 22:20

## 2021-08-08 ENCOUNTER — APPOINTMENT (OUTPATIENT)
Dept: CT IMAGING | Age: 61
DRG: 194 | End: 2021-08-08
Payer: COMMERCIAL

## 2021-08-08 PROBLEM — J44.1 COPD WITH ACUTE EXACERBATION (HCC): Status: ACTIVE | Noted: 2021-08-08

## 2021-08-08 LAB
EKG ATRIAL RATE: 64 BPM
EKG P AXIS: 108 DEGREES
EKG P-R INTERVAL: 226 MS
EKG Q-T INTERVAL: 408 MS
EKG QRS DURATION: 110 MS
EKG QTC CALCULATION (BAZETT): 420 MS
EKG R AXIS: 21 DEGREES
EKG T AXIS: 57 DEGREES
EKG VENTRICULAR RATE: 64 BPM
SARS-COV-2, NAAT: NOT DETECTED

## 2021-08-08 PROCEDURE — 94660 CPAP INITIATION&MGMT: CPT

## 2021-08-08 PROCEDURE — 6360000002 HC RX W HCPCS: Performed by: PHYSICIAN ASSISTANT

## 2021-08-08 PROCEDURE — 94640 AIRWAY INHALATION TREATMENT: CPT

## 2021-08-08 PROCEDURE — 71275 CT ANGIOGRAPHY CHEST: CPT

## 2021-08-08 PROCEDURE — 6370000000 HC RX 637 (ALT 250 FOR IP): Performed by: PHYSICIAN ASSISTANT

## 2021-08-08 PROCEDURE — 2060000000 HC ICU INTERMEDIATE R&B

## 2021-08-08 PROCEDURE — 2580000003 HC RX 258: Performed by: PHYSICIAN ASSISTANT

## 2021-08-08 PROCEDURE — 87205 SMEAR GRAM STAIN: CPT

## 2021-08-08 PROCEDURE — 87449 NOS EACH ORGANISM AG IA: CPT

## 2021-08-08 PROCEDURE — 2580000003 HC RX 258: Performed by: RADIOLOGY

## 2021-08-08 PROCEDURE — 6370000000 HC RX 637 (ALT 250 FOR IP): Performed by: EMERGENCY MEDICINE

## 2021-08-08 PROCEDURE — 2580000003 HC RX 258: Performed by: EMERGENCY MEDICINE

## 2021-08-08 PROCEDURE — 87635 SARS-COV-2 COVID-19 AMP PRB: CPT

## 2021-08-08 PROCEDURE — 2500000003 HC RX 250 WO HCPCS: Performed by: EMERGENCY MEDICINE

## 2021-08-08 PROCEDURE — 6360000004 HC RX CONTRAST MEDICATION: Performed by: RADIOLOGY

## 2021-08-08 RX ORDER — DILTIAZEM HYDROCHLORIDE 240 MG/1
240 CAPSULE, COATED, EXTENDED RELEASE ORAL 2 TIMES DAILY
Status: DISCONTINUED | OUTPATIENT
Start: 2021-08-08 | End: 2021-08-30 | Stop reason: HOSPADM

## 2021-08-08 RX ORDER — FLECAINIDE ACETATE 100 MG/1
100 TABLET ORAL ONCE
Status: COMPLETED | OUTPATIENT
Start: 2021-08-08 | End: 2021-08-08

## 2021-08-08 RX ORDER — SODIUM CHLORIDE 0.9 % (FLUSH) 0.9 %
5-40 SYRINGE (ML) INJECTION EVERY 12 HOURS SCHEDULED
Status: DISCONTINUED | OUTPATIENT
Start: 2021-08-08 | End: 2021-08-30 | Stop reason: HOSPADM

## 2021-08-08 RX ORDER — ACETAMINOPHEN 650 MG/1
650 SUPPOSITORY RECTAL EVERY 6 HOURS PRN
Status: DISCONTINUED | OUTPATIENT
Start: 2021-08-08 | End: 2021-08-30 | Stop reason: HOSPADM

## 2021-08-08 RX ORDER — ACETAMINOPHEN 325 MG/1
650 TABLET ORAL EVERY 6 HOURS PRN
Status: DISCONTINUED | OUTPATIENT
Start: 2021-08-08 | End: 2021-08-30 | Stop reason: HOSPADM

## 2021-08-08 RX ORDER — SODIUM CHLORIDE 0.9 % (FLUSH) 0.9 %
10 SYRINGE (ML) INJECTION PRN
Status: COMPLETED | OUTPATIENT
Start: 2021-08-08 | End: 2021-08-08

## 2021-08-08 RX ORDER — ATORVASTATIN CALCIUM 20 MG/1
20 TABLET, FILM COATED ORAL DAILY
Status: DISCONTINUED | OUTPATIENT
Start: 2021-08-08 | End: 2021-08-30 | Stop reason: HOSPADM

## 2021-08-08 RX ORDER — FLECAINIDE ACETATE 100 MG/1
100 TABLET ORAL 2 TIMES DAILY
Status: DISCONTINUED | OUTPATIENT
Start: 2021-08-08 | End: 2021-08-30 | Stop reason: HOSPADM

## 2021-08-08 RX ORDER — METHYLPREDNISOLONE SODIUM SUCCINATE 40 MG/ML
40 INJECTION, POWDER, LYOPHILIZED, FOR SOLUTION INTRAMUSCULAR; INTRAVENOUS EVERY 8 HOURS
Status: DISCONTINUED | OUTPATIENT
Start: 2021-08-08 | End: 2021-08-09

## 2021-08-08 RX ORDER — FLUTICASONE PROPIONATE 50 MCG
1 SPRAY, SUSPENSION (ML) NASAL DAILY
Status: DISCONTINUED | OUTPATIENT
Start: 2021-08-08 | End: 2021-08-30 | Stop reason: HOSPADM

## 2021-08-08 RX ORDER — ONDANSETRON 4 MG/1
4 TABLET, ORALLY DISINTEGRATING ORAL EVERY 8 HOURS PRN
Status: DISCONTINUED | OUTPATIENT
Start: 2021-08-08 | End: 2021-08-30 | Stop reason: HOSPADM

## 2021-08-08 RX ORDER — FUROSEMIDE 20 MG/1
40 TABLET ORAL DAILY
Status: DISCONTINUED | OUTPATIENT
Start: 2021-08-08 | End: 2021-08-14

## 2021-08-08 RX ORDER — ARFORMOTEROL TARTRATE 15 UG/2ML
15 SOLUTION RESPIRATORY (INHALATION) 2 TIMES DAILY
Status: DISCONTINUED | OUTPATIENT
Start: 2021-08-08 | End: 2021-08-11

## 2021-08-08 RX ORDER — IPRATROPIUM BROMIDE AND ALBUTEROL SULFATE 2.5; .5 MG/3ML; MG/3ML
1 SOLUTION RESPIRATORY (INHALATION) ONCE
Status: COMPLETED | OUTPATIENT
Start: 2021-08-08 | End: 2021-08-08

## 2021-08-08 RX ORDER — BUDESONIDE 0.5 MG/2ML
0.5 INHALANT ORAL 2 TIMES DAILY
Status: DISCONTINUED | OUTPATIENT
Start: 2021-08-08 | End: 2021-08-11

## 2021-08-08 RX ORDER — LOSARTAN POTASSIUM 50 MG/1
50 TABLET ORAL DAILY
Status: DISCONTINUED | OUTPATIENT
Start: 2021-08-08 | End: 2021-08-30 | Stop reason: HOSPADM

## 2021-08-08 RX ORDER — ASPIRIN 81 MG/1
81 TABLET, CHEWABLE ORAL DAILY
Status: DISCONTINUED | OUTPATIENT
Start: 2021-08-09 | End: 2021-08-30 | Stop reason: HOSPADM

## 2021-08-08 RX ORDER — ALBUTEROL SULFATE 2.5 MG/3ML
2.5 SOLUTION RESPIRATORY (INHALATION) EVERY 6 HOURS PRN
Status: DISCONTINUED | OUTPATIENT
Start: 2021-08-08 | End: 2021-08-30 | Stop reason: HOSPADM

## 2021-08-08 RX ORDER — PREDNISONE 20 MG/1
40 TABLET ORAL DAILY
Status: DISCONTINUED | OUTPATIENT
Start: 2021-08-10 | End: 2021-08-09

## 2021-08-08 RX ORDER — POLYETHYLENE GLYCOL 3350 17 G/17G
17 POWDER, FOR SOLUTION ORAL DAILY PRN
Status: DISCONTINUED | OUTPATIENT
Start: 2021-08-08 | End: 2021-08-30 | Stop reason: HOSPADM

## 2021-08-08 RX ORDER — SODIUM CHLORIDE 9 MG/ML
25 INJECTION, SOLUTION INTRAVENOUS PRN
Status: DISCONTINUED | OUTPATIENT
Start: 2021-08-08 | End: 2021-08-30 | Stop reason: HOSPADM

## 2021-08-08 RX ORDER — ONDANSETRON 2 MG/ML
4 INJECTION INTRAMUSCULAR; INTRAVENOUS EVERY 6 HOURS PRN
Status: DISCONTINUED | OUTPATIENT
Start: 2021-08-08 | End: 2021-08-30 | Stop reason: HOSPADM

## 2021-08-08 RX ORDER — IPRATROPIUM BROMIDE AND ALBUTEROL SULFATE 2.5; .5 MG/3ML; MG/3ML
1 SOLUTION RESPIRATORY (INHALATION) EVERY 4 HOURS
Status: DISCONTINUED | OUTPATIENT
Start: 2021-08-08 | End: 2021-08-30 | Stop reason: HOSPADM

## 2021-08-08 RX ORDER — SODIUM CHLORIDE 0.9 % (FLUSH) 0.9 %
5-40 SYRINGE (ML) INJECTION PRN
Status: DISCONTINUED | OUTPATIENT
Start: 2021-08-08 | End: 2021-08-30 | Stop reason: HOSPADM

## 2021-08-08 RX ADMIN — METHYLPREDNISOLONE SODIUM SUCCINATE 40 MG: 40 INJECTION, POWDER, FOR SOLUTION INTRAMUSCULAR; INTRAVENOUS at 11:04

## 2021-08-08 RX ADMIN — ATORVASTATIN CALCIUM 20 MG: 20 TABLET, FILM COATED ORAL at 16:33

## 2021-08-08 RX ADMIN — ARFORMOTEROL TARTRATE 15 MCG: 15 SOLUTION RESPIRATORY (INHALATION) at 20:41

## 2021-08-08 RX ADMIN — IPRATROPIUM BROMIDE AND ALBUTEROL SULFATE 1 AMPULE: .5; 3 SOLUTION RESPIRATORY (INHALATION) at 04:54

## 2021-08-08 RX ADMIN — IPRATROPIUM BROMIDE AND ALBUTEROL SULFATE 3 ML: .5; 2.5 SOLUTION RESPIRATORY (INHALATION) at 17:34

## 2021-08-08 RX ADMIN — DILTIAZEM HYDROCHLORIDE 240 MG: 240 CAPSULE, EXTENDED RELEASE ORAL at 12:34

## 2021-08-08 RX ADMIN — IPRATROPIUM BROMIDE AND ALBUTEROL SULFATE 3 ML: .5; 2.5 SOLUTION RESPIRATORY (INHALATION) at 12:39

## 2021-08-08 RX ADMIN — Medication 10 ML: at 00:49

## 2021-08-08 RX ADMIN — APIXABAN 5 MG: 5 TABLET, FILM COATED ORAL at 03:00

## 2021-08-08 RX ADMIN — Medication 10 ML: at 20:05

## 2021-08-08 RX ADMIN — ACETAMINOPHEN 650 MG: 325 TABLET ORAL at 12:14

## 2021-08-08 RX ADMIN — APIXABAN 5 MG: 5 TABLET, FILM COATED ORAL at 20:05

## 2021-08-08 RX ADMIN — FLECAINIDE ACETATE 100 MG: 100 TABLET ORAL at 03:00

## 2021-08-08 RX ADMIN — IPRATROPIUM BROMIDE AND ALBUTEROL SULFATE 3 ML: .5; 2.5 SOLUTION RESPIRATORY (INHALATION) at 10:00

## 2021-08-08 RX ADMIN — IPRATROPIUM BROMIDE AND ALBUTEROL SULFATE 3 ML: .5; 2.5 SOLUTION RESPIRATORY (INHALATION) at 20:41

## 2021-08-08 RX ADMIN — FUROSEMIDE 40 MG: 20 TABLET ORAL at 12:34

## 2021-08-08 RX ADMIN — IPRATROPIUM BROMIDE AND ALBUTEROL SULFATE 3 ML: .5; 2.5 SOLUTION RESPIRATORY (INHALATION) at 23:56

## 2021-08-08 RX ADMIN — IOPAMIDOL 75 ML: 755 INJECTION, SOLUTION INTRAVENOUS at 00:48

## 2021-08-08 RX ADMIN — DOXYCYCLINE 100 MG: 100 INJECTION, POWDER, LYOPHILIZED, FOR SOLUTION INTRAVENOUS at 04:16

## 2021-08-08 RX ADMIN — DILTIAZEM HYDROCHLORIDE 240 MG: 240 CAPSULE, EXTENDED RELEASE ORAL at 21:17

## 2021-08-08 RX ADMIN — METHYLPREDNISOLONE SODIUM SUCCINATE 40 MG: 40 INJECTION, POWDER, FOR SOLUTION INTRAMUSCULAR; INTRAVENOUS at 18:56

## 2021-08-08 RX ADMIN — APIXABAN 5 MG: 5 TABLET, FILM COATED ORAL at 12:34

## 2021-08-08 RX ADMIN — FLECAINIDE ACETATE 100 MG: 100 TABLET ORAL at 13:18

## 2021-08-08 RX ADMIN — LOSARTAN POTASSIUM 50 MG: 50 TABLET, FILM COATED ORAL at 12:34

## 2021-08-08 RX ADMIN — FLECAINIDE ACETATE 100 MG: 100 TABLET ORAL at 21:18

## 2021-08-08 RX ADMIN — BUDESONIDE 500 MCG: 0.5 SUSPENSION RESPIRATORY (INHALATION) at 20:41

## 2021-08-08 RX ADMIN — FLUTICASONE PROPIONATE 1 SPRAY: 50 SPRAY, METERED NASAL at 19:04

## 2021-08-08 ASSESSMENT — PAIN SCALES - GENERAL
PAINLEVEL_OUTOF10: 7
PAINLEVEL_OUTOF10: 5
PAINLEVEL_OUTOF10: 5
PAINLEVEL_OUTOF10: 0

## 2021-08-08 ASSESSMENT — PAIN DESCRIPTION - LOCATION
LOCATION: HEAD
LOCATION: LEG

## 2021-08-08 ASSESSMENT — PAIN DESCRIPTION - DESCRIPTORS: DESCRIPTORS: ACHING

## 2021-08-08 ASSESSMENT — PAIN DESCRIPTION - PAIN TYPE: TYPE: CHRONIC PAIN

## 2021-08-08 NOTE — ED PROVIDER NOTES
HPI:  8/7/21, Time: 9:37 PM EDT         Elis Frazier is a 63 y.o. male presenting to the ED for shortness of breath, beginning days ago. The complaint has been persistent, moderate in severity, and worsened by nothing. Patient presenting here because of shortness of breath. Patient reports is been going on for last 11 days he is coughing up yellow sputum he reports no fever wife reports that he is been more short of breath though more so with exertion. Patient reporting no abdominal pain or vomiting he did have some lower back pain that has since resolved. He reports no incontinence. Reports no headache he is on oxygen at home. Patient reporting no diarrhea. Patient reporting no syncope. Patient reporting no calf pain. ROS:   Pertinent positives and negatives are stated within HPI, all other systems reviewed and are negative.  --------------------------------------------- PAST HISTORY ---------------------------------------------  Past Medical History:  has a past medical history of Atrial flutter ([facility] COPD (chronic obstructive pulmonary disease) ([facility] and Hypertension. Past Surgical History:  has a past surgical history that includes back surgery; Cardioversion (02/07/2018); transesophageal echocardiogram (02/07/2018); and Atrial ablation surgery (04/03/2018). Social History:  reports that he quit smoking about 4 years ago. His smoking use included cigarettes. He has a 60.00 pack-year smoking history. He has never used smokeless tobacco. He reports current alcohol use of about 3.0 - 4.0 standard drinks of alcohol per week. He reports that he does not use drugs. Family History: family history includes Lung Cancer in his father; Other in his mother; Pacemaker in his sister. The patients home medications have been reviewed. Allergies: Patient has no known allergies.     ---------------------------------------------------PHYSICAL EXAM--------------------------------------    Constitutional/General: Alert and oriented x3, mild distress  Head: Normocephalic and atraumatic  Eyes: PERRL, EOMI  Mouth: Oropharynx clear, handling secretions, no trismus  Neck: Supple, full ROM, non tender to palpation in the midline, no stridor, no crepitus, no meningeal signs  Pulmonary: Lungs wheezes bilaterally  Cardiovascular:  Regular rate. Regular rhythm. No murmurs, gallops, or rubs. 2+ distal pulses  Chest: no chest wall tenderness  Abdomen: Soft. Non tender. Non distended. +BS. No rebound, guarding, or rigidity. No pulsatile masses appreciated. Musculoskeletal: Moves all extremities x 4. Warm and well perfused, no clubbing, cyanosis, or edema. Capillary refill <3 seconds  Skin: warm and dry. No rashes. Neurologic: GCS 15, CN 2-12 grossly intact, no focal deficits, symmetric strength 5/5 in the upper and lower extremities bilaterally  Psych: Normal Affect    -------------------------------------------------- RESULTS -------------------------------------------------  I have personally reviewed all laboratory and imaging results for this patient. Results are listed below.      LABS:  Results for orders placed or performed during the hospital encounter of 08/07/21   Troponin   Result Value Ref Range    Troponin, High Sensitivity 6 0 - 11 ng/L   CBC Auto Differential   Result Value Ref Range    WBC 9.6 4.5 - 11.5 E9/L    RBC 4.05 3.80 - 5.80 E12/L    Hemoglobin 12.8 12.5 - 16.5 g/dL    Hematocrit 38.7 37.0 - 54.0 %    MCV 95.6 80.0 - 99.9 fL    MCH 31.6 26.0 - 35.0 pg    MCHC 33.1 32.0 - 34.5 %    RDW 12.3 11.5 - 15.0 fL    Platelets 760 330 - 823 E9/L    MPV 11.5 7.0 - 12.0 fL    Neutrophils % 63.7 43.0 - 80.0 %    Immature Granulocytes % 0.8 0.0 - 5.0 %    Lymphocytes % 23.7 20.0 - 42.0 %    Monocytes % 7.0 2.0 - 12.0 %    Eosinophils % 4.3 0.0 - 6.0 %    Basophils % 0.5 0.0 - 2.0 %    Neutrophils Absolute 6.09 1.80 - 7.30 E9/L    Immature Granulocytes # 0.08 E9/L    Lymphocytes Absolute 2.27 1.50 - 4.00 E9/L    Monocytes Absolute 0.67 0.10 - 0.95 E9/L    Eosinophils Absolute 0.41 0.05 - 0.50 E9/L    Basophils Absolute 0.05 0.00 - 0.20 E9/L   Comprehensive Metabolic Panel   Result Value Ref Range    Sodium 140 132 - 146 mmol/L    Potassium 4.2 3.5 - 5.0 mmol/L    Chloride 98 98 - 107 mmol/L    CO2 33 (H) 22 - 29 mmol/L    Anion Gap 9 7 - 16 mmol/L    Glucose 123 (H) 74 - 99 mg/dL    BUN 19 6 - 20 mg/dL    CREATININE 1.4 (H) 0.7 - 1.2 mg/dL    GFR Non-African American 52 >=60 mL/min/1.73    GFR African American >60     Calcium 9.6 8.6 - 10.2 mg/dL    Total Protein 6.6 6.4 - 8.3 g/dL    Albumin 3.9 3.5 - 5.2 g/dL    Total Bilirubin 0.2 0.0 - 1.2 mg/dL    Alkaline Phosphatase 67 40 - 129 U/L    ALT 15 0 - 40 U/L    AST 16 0 - 39 U/L   Brain Natriuretic Peptide   Result Value Ref Range    Pro- (H) 0 - 125 pg/mL   EKG 12 Lead   Result Value Ref Range    Ventricular Rate 64 BPM    Atrial Rate 64 BPM    P-R Interval 226 ms    QRS Duration 110 ms    Q-T Interval 408 ms    QTc Calculation (Bazett) 420 ms    P Axis 108 degrees    R Axis 21 degrees    T Axis 57 degrees       RADIOLOGY:  Interpreted by Radiologist.  CTA CHEST W CONTRAST   Final Result   No evidence of pulmonary embolism or acute pulmonary abnormality. Prominent pericardial fat accounts for the abnormality seen on radiograph. XR CHEST PORTABLE   Final Result   Cardiomegaly. Large opacity in the right cardiophrenic angle. Although this may represent   large epicardial fat pad, given the interval change since the prior   examination further evaluation and characterization with CT of the chest is   recommended. Mild hyperinflation, suggest COPD. EKG:  This EKG is signed and interpreted by me.     Rate: 64  Rhythm: Sinus  Interpretation: non-specific EKG  Comparison: stable as compared to patient's most recent EKG        ------------------------- NURSING NOTES AND VITALS REVIEWED ---------------------------   The nursing notes within the ED encounter and vital signs as below have been reviewed by myself. /81   Pulse 78   Temp 97.5 °F (36.4 °C)   Resp 18   SpO2 98%   Oxygen Saturation Interpretation: Normal    The patients available past medical records and past encounters were reviewed. ------------------------------ ED COURSE/MEDICAL DECISION MAKING----------------------  Medications   apixaban (ELIQUIS) tablet 5 mg (has no administration in time range)   flecainide (TAMBOCOR) tablet 100 mg (has no administration in time range)   doxycycline (VIBRAMYCIN) 100 mg in dextrose 5 % 100 mL IVPB (has no administration in time range)   ipratropium-albuterol (DUONEB) nebulizer solution 1 ampule (has no administration in time range)   ipratropium-albuterol (DUONEB) nebulizer solution 1 ampule (1 ampule Inhalation Given 8/7/21 2220)   methylPREDNISolone sodium (SOLU-MEDROL) injection 125 mg (125 mg Intravenous Given 8/7/21 2250)   iopamidol (ISOVUE-370) 76 % injection 75 mL (75 mLs Intravenous Given 8/8/21 0048)   sodium chloride flush 0.9 % injection 10 mL (10 mLs Intravenous Given 8/8/21 0049)             Medical Decision Making:   Patient presenting regular shortness of breath for last 11 days patient reports productive cough. Patient reporting no fever he reports he was initially was having some lower back pain currently has none. Patient reporting no syncopal episode. Patient does have history of atrial flutter history of COPD he is on Eliquis. Patient EKG is in sinus rhythm labs noted reviewed chest x-ray showed suspected mass there is infiltrate CT of the chest shows no infiltrate or mass. He does have a fat pad. Patient medicated here still has expiratory wheezes. Still dyspneic. Patient will be admitted for further evaluation treatment     Re-Evaluations:             Re-evaluation.   Patients symptoms show no change  Reevaluated still has some expiratory wheezes but improved but still appears to be dyspneic. Patient made aware lab results and CT findings. Patient will be admitted    Consultations:               Internal medicine  Critical Care: This patient's ED course included: a personal history and physicial eaxmination    This patient has been closely monitored during their ED course. Counseling: The emergency provider has spoken with the patient and discussed todays results, in addition to providing specific details for the plan of care and counseling regarding the diagnosis and prognosis. Questions are answered at this time and they are agreeable with the plan.       --------------------------------- IMPRESSION AND DISPOSITION ---------------------------------    IMPRESSION  1. COPD with [facility] exacerbation ([facility]        DISPOSITION  Disposition: Admit to monitored bed  Patient condition is stable        NOTE: This report was transcribed using voice recognition software.  Every effort was made to ensure accuracy; however, inadvertent computerized transcription errors may be present          Bria Acosta MD  08/07/21 [id number]       Bria Acosta MD  08/08/21 3120

## 2021-08-08 NOTE — PROGRESS NOTES
Assumed to the floor at 1700, he is alert and out of breath. Pulse ox 93% on 4L NC. He is independent and able to ambulate on his own. Wife is at the bedside. Sent sputum and urine off. He is resting comfortable and all vital signs are stable. He denies any pain.

## 2021-08-08 NOTE — ED NOTES
FIRST PROVIDER CONTACT ASSESSMENT NOTE                                                                                                Department of Emergency Medicine                                                      First Provider Note  21  8:29 PM EDT  NAME: Josselyn Long  : 1960  MRN: [id number]    Chief Complaint: Shortness of Breath (states has been sick for the last 11 days. Wears 02 at 4 liters at baseline and has not had to increase it. 98% now on 4 L. States productive cough)      History of Present Illness:   Josselyn Long is a 63 y.o. male who presents to the ED for increasing shortness of breath over the last 11 days. Patient with end-stage COPD, being followed by pulmonologist Dr. Ashia Rosario, patient at baseline wears 4 L nasal cannula. Does report cough bringing up thick colored sputum. Denies chest pain with this. No associated fevers. Focused Physical Exam:  VS:    ED Triage Vitals [21]   BP Temp Temp src Pulse Resp SpO2 Height Weight   -- 97.5 °F (36.4 °C) -- 74 18 94 % -- --        General: Alert and in no apparent distress. Medical History:  has a past medical history of Atrial flutter ([facility] COPD (chronic obstructive pulmonary disease) ([facility] and Hypertension. Surgical History:  has a past surgical history that includes back surgery; Cardioversion (2018); transesophageal echocardiogram (2018); and Atrial ablation surgery (2018). Social History:  reports that he quit smoking about 4 years ago. His smoking use included cigarettes. He has a 60.00 pack-year smoking history. He has never used smokeless tobacco. He reports current alcohol use of about 3.0 - 4.0 standard drinks of alcohol per week. He reports that he does not use drugs. Family History: family history includes Lung Cancer in his father; Other in his mother; Pacemaker in his sister. Allergies: Patient has no known allergies.      Initial Plan of Care:  Initiate Treatment-Testing, Proceed toTreatment Area When Bed Available for ED Attending/MLP to Continue Care    -------------------------------------------------END OF FIRST PROVIDER CONTACT ASSESSMENT NOTE--------------------------------------------------------  Electronically signed by SERA Turner CNP   DD: 8/7/21         SERA Turner CNP  08/07/21 2029

## 2021-08-08 NOTE — ED NOTES
Assumed care of patient from [facility]. Patient in stable condition and reports breathing is improved at this time. Pharmacy completing med rec to get patient's AM meds.       Pavan Rehman RN  08/08/21 [id number]

## 2021-08-08 NOTE — H&P
Pillo Matthew M.D. History and Physical      CHIEF COMPLAINT: Shortness of breath    Reason for Admission: Acute exacerbation of COPD    History Obtained From: Patient/EMR    HISTORY OF PRESENT ILLNESS:      The patient is a 63 y.o. male of [address] [place] [address] with significant past medical history of smoking history, COPD, atrial flutter, hypertension, obesity who presents with complaints of acute onset shortness of breath. CTA performed in the emergency department without PE.,  Patient is anxious for discharge. He indicates he feels better and does not want to be admitted. After talking to him for a while, he is agreeable to be monitored overnight but would like to be discharged first thing tomorrow. He does wear oxygen at home and wears a BiPAP machine as well. All labs personally reviewed   All imaging personally reviewed     Past Medical History:        Diagnosis Date    [facility]rial flutter [facility]     COPD (chronic obstructive pulmonary disease) ([facility]     Hypertension      Past Surgical History:        Procedure Laterality Date    ATRIAL ABLATION SURGERY  04/03/2018    BACK SURGERY      CARDIOVERSION  02/07/2018    Dr. Vera Torres- 80 J converted to SB    TRANSESOPHAGEAL ECHOCARDIOGRAM  02/07/2018    Dr. Vera Torres- No thrombus         Medications Prior to Admission:    Not in a hospital admission. Allergies:  Patient has no known allergies. Social History:   TOBACCO:   reports that he quit smoking about 4 years ago. His smoking use included cigarettes. He has a 60.00 pack-year smoking history. He has never used smokeless tobacco.  ETOH:   reports current alcohol use of about 3.0 - 4.0 standard drinks of alcohol per week.   MARITAL STATUS:    OCCUPATION:      Family History:       Problem Relation Age of Onset    Other Mother         ALS    Lung Cancer Father     Pacemaker Sister        REVIEW OF SYSTEMS:    General ROS: positive for  - fatigue and nebs, ICS, LABA/Sasha  Resume home medications  Continue rate and rhythm control, oral anticoagulation for a flutter history    DVT prophylaxis  PT OT  Discharge plan-once breathing better on p.o. steroid taper hopefully next 24 to 48 hours    Rima Wolfe MD  8/8/2021  11:44 AM

## 2021-08-08 NOTE — ED NOTES
Called and spoke with Lionel from nutrition to request lunch tray for patient.       Nick Scott RN  08/08/21 3201

## 2021-08-09 LAB
ANION GAP SERPL CALCULATED.3IONS-SCNC: 10 MMOL/L (ref 7–16)
BASOPHILS ABSOLUTE: 0.02 E9/L (ref 0–0.2)
BASOPHILS RELATIVE PERCENT: 0.1 % (ref 0–2)
BUN BLDV-MCNC: 26 MG/DL (ref 6–20)
CALCIUM SERPL-MCNC: 9.9 MG/DL (ref 8.6–10.2)
CHLORIDE BLD-SCNC: 102 MMOL/L (ref 98–107)
CO2: 32 MMOL/L (ref 22–29)
CREAT SERPL-MCNC: 1.1 MG/DL (ref 0.7–1.2)
EOSINOPHILS ABSOLUTE: 0 E9/L (ref 0.05–0.5)
EOSINOPHILS RELATIVE PERCENT: 0 % (ref 0–6)
GFR AFRICAN AMERICAN: >60
GFR NON-AFRICAN AMERICAN: >60 ML/MIN/1.73
GLUCOSE BLD-MCNC: 135 MG/DL (ref 74–99)
GRAM STAIN ORDERABLE: NORMAL
HCT VFR BLD CALC: 38.2 % (ref 37–54)
HEMOGLOBIN: 12.1 G/DL (ref 12.5–16.5)
IMMATURE GRANULOCYTES #: 0.23 E9/L
IMMATURE GRANULOCYTES %: 1.7 % (ref 0–5)
L. PNEUMOPHILA SEROGP 1 UR AG: NORMAL
LYMPHOCYTES ABSOLUTE: 1.33 E9/L (ref 1.5–4)
LYMPHOCYTES RELATIVE PERCENT: 9.6 % (ref 20–42)
MCH RBC QN AUTO: 31.6 PG (ref 26–35)
MCHC RBC AUTO-ENTMCNC: 31.7 % (ref 32–34.5)
MCV RBC AUTO: 99.7 FL (ref 80–99.9)
MONOCYTES ABSOLUTE: 0.78 E9/L (ref 0.1–0.95)
MONOCYTES RELATIVE PERCENT: 5.6 % (ref 2–12)
NEUTROPHILS ABSOLUTE: 11.54 E9/L (ref 1.8–7.3)
NEUTROPHILS RELATIVE PERCENT: 83 % (ref 43–80)
PDW BLD-RTO: 12.4 FL (ref 11.5–15)
PLATELET # BLD: 247 E9/L (ref 130–450)
PMV BLD AUTO: 11.7 FL (ref 7–12)
POTASSIUM REFLEX MAGNESIUM: 4.3 MMOL/L (ref 3.5–5)
RBC # BLD: 3.83 E12/L (ref 3.8–5.8)
SODIUM BLD-SCNC: 144 MMOL/L (ref 132–146)
STREP PNEUMONIAE ANTIGEN, URINE: NORMAL
WBC # BLD: 13.9 E9/L (ref 4.5–11.5)

## 2021-08-09 PROCEDURE — 94640 AIRWAY INHALATION TREATMENT: CPT

## 2021-08-09 PROCEDURE — 2580000003 HC RX 258: Performed by: PHYSICIAN ASSISTANT

## 2021-08-09 PROCEDURE — 6370000000 HC RX 637 (ALT 250 FOR IP): Performed by: PHYSICIAN ASSISTANT

## 2021-08-09 PROCEDURE — 85025 COMPLETE CBC W/AUTO DIFF WBC: CPT

## 2021-08-09 PROCEDURE — 6370000000 HC RX 637 (ALT 250 FOR IP): Performed by: NURSE PRACTITIONER

## 2021-08-09 PROCEDURE — 36415 COLL VENOUS BLD VENIPUNCTURE: CPT

## 2021-08-09 PROCEDURE — 80048 BASIC METABOLIC PNL TOTAL CA: CPT

## 2021-08-09 PROCEDURE — 94660 CPAP INITIATION&MGMT: CPT

## 2021-08-09 PROCEDURE — 6360000002 HC RX W HCPCS: Performed by: INTERNAL MEDICINE

## 2021-08-09 PROCEDURE — 2700000000 HC OXYGEN THERAPY PER DAY

## 2021-08-09 PROCEDURE — 6360000002 HC RX W HCPCS: Performed by: PHYSICIAN ASSISTANT

## 2021-08-09 PROCEDURE — 6370000000 HC RX 637 (ALT 250 FOR IP): Performed by: INTERNAL MEDICINE

## 2021-08-09 PROCEDURE — 2060000000 HC ICU INTERMEDIATE R&B

## 2021-08-09 PROCEDURE — 97161 PT EVAL LOW COMPLEX 20 MIN: CPT

## 2021-08-09 RX ORDER — METHYLPREDNISOLONE SODIUM SUCCINATE 125 MG/2ML
60 INJECTION, POWDER, LYOPHILIZED, FOR SOLUTION INTRAMUSCULAR; INTRAVENOUS EVERY 6 HOURS
Status: COMPLETED | OUTPATIENT
Start: 2021-08-09 | End: 2021-08-11

## 2021-08-09 RX ORDER — BENZONATATE 100 MG/1
100 CAPSULE ORAL 3 TIMES DAILY PRN
Status: DISCONTINUED | OUTPATIENT
Start: 2021-08-09 | End: 2021-08-10

## 2021-08-09 RX ORDER — PREDNISONE 20 MG/1
40 TABLET ORAL DAILY
Status: DISCONTINUED | OUTPATIENT
Start: 2021-08-12 | End: 2021-08-10

## 2021-08-09 RX ADMIN — BUDESONIDE 500 MCG: 0.5 SUSPENSION RESPIRATORY (INHALATION) at 07:54

## 2021-08-09 RX ADMIN — DILTIAZEM HYDROCHLORIDE 240 MG: 240 CAPSULE, EXTENDED RELEASE ORAL at 09:33

## 2021-08-09 RX ADMIN — METHYLPREDNISOLONE SODIUM SUCCINATE 60 MG: 125 INJECTION, POWDER, FOR SOLUTION INTRAMUSCULAR; INTRAVENOUS at 21:27

## 2021-08-09 RX ADMIN — ARFORMOTEROL TARTRATE 15 MCG: 15 SOLUTION RESPIRATORY (INHALATION) at 07:54

## 2021-08-09 RX ADMIN — FLUTICASONE PROPIONATE 1 SPRAY: 50 SPRAY, METERED NASAL at 09:30

## 2021-08-09 RX ADMIN — APIXABAN 5 MG: 5 TABLET, FILM COATED ORAL at 09:33

## 2021-08-09 RX ADMIN — METHYLPREDNISOLONE SODIUM SUCCINATE 60 MG: 125 INJECTION, POWDER, FOR SOLUTION INTRAMUSCULAR; INTRAVENOUS at 16:03

## 2021-08-09 RX ADMIN — ATORVASTATIN CALCIUM 20 MG: 20 TABLET, FILM COATED ORAL at 09:34

## 2021-08-09 RX ADMIN — FLECAINIDE ACETATE 100 MG: 100 TABLET ORAL at 09:34

## 2021-08-09 RX ADMIN — Medication 10 ML: at 21:12

## 2021-08-09 RX ADMIN — LOSARTAN POTASSIUM 50 MG: 50 TABLET, FILM COATED ORAL at 09:33

## 2021-08-09 RX ADMIN — IPRATROPIUM BROMIDE AND ALBUTEROL SULFATE 3 ML: .5; 2.5 SOLUTION RESPIRATORY (INHALATION) at 03:05

## 2021-08-09 RX ADMIN — IPRATROPIUM BROMIDE AND ALBUTEROL SULFATE 3 ML: .5; 2.5 SOLUTION RESPIRATORY (INHALATION) at 17:33

## 2021-08-09 RX ADMIN — BENZONATATE 100 MG: 100 CAPSULE ORAL at 16:11

## 2021-08-09 RX ADMIN — ASPIRIN 81 MG CHEWABLE TABLET 81 MG: 81 TABLET CHEWABLE at 09:34

## 2021-08-09 RX ADMIN — BUDESONIDE 500 MCG: 0.5 SUSPENSION RESPIRATORY (INHALATION) at 20:07

## 2021-08-09 RX ADMIN — ARFORMOTEROL TARTRATE 15 MCG: 15 SOLUTION RESPIRATORY (INHALATION) at 20:08

## 2021-08-09 RX ADMIN — IPRATROPIUM BROMIDE AND ALBUTEROL SULFATE 3 ML: .5; 2.5 SOLUTION RESPIRATORY (INHALATION) at 12:15

## 2021-08-09 RX ADMIN — BENZONATATE 100 MG: 100 CAPSULE ORAL at 23:33

## 2021-08-09 RX ADMIN — METHYLPREDNISOLONE SODIUM SUCCINATE 40 MG: 40 INJECTION, POWDER, FOR SOLUTION INTRAMUSCULAR; INTRAVENOUS at 09:31

## 2021-08-09 RX ADMIN — FLECAINIDE ACETATE 100 MG: 100 TABLET ORAL at 21:12

## 2021-08-09 RX ADMIN — IPRATROPIUM BROMIDE AND ALBUTEROL SULFATE 3 ML: .5; 2.5 SOLUTION RESPIRATORY (INHALATION) at 20:08

## 2021-08-09 RX ADMIN — DILTIAZEM HYDROCHLORIDE 240 MG: 240 CAPSULE, EXTENDED RELEASE ORAL at 21:12

## 2021-08-09 RX ADMIN — APIXABAN 5 MG: 5 TABLET, FILM COATED ORAL at 21:15

## 2021-08-09 RX ADMIN — Medication 10 ML: at 09:38

## 2021-08-09 RX ADMIN — FUROSEMIDE 40 MG: 20 TABLET ORAL at 09:34

## 2021-08-09 RX ADMIN — METHYLPREDNISOLONE SODIUM SUCCINATE 40 MG: 40 INJECTION, POWDER, FOR SOLUTION INTRAMUSCULAR; INTRAVENOUS at 01:56

## 2021-08-09 RX ADMIN — IPRATROPIUM BROMIDE AND ALBUTEROL SULFATE 3 ML: .5; 2.5 SOLUTION RESPIRATORY (INHALATION) at 07:54

## 2021-08-09 ASSESSMENT — PAIN SCALES - GENERAL
PAINLEVEL_OUTOF10: 0

## 2021-08-09 NOTE — PROGRESS NOTES
Date: 8/9/2021    Time: 12:26 AM    Patient Placed On BIPAP/CPAP/ Non-Invasive Ventilation? No    If no must comment. Remains on  Facial area red/color change? No           If YES are Blister/Lesion present? No   If yes must notify nursing staff  BIPAP/CPAP skin barrier?   Yes    Skin barrier type:mepilexlite       Comments:        Willow Hebert RCP

## 2021-08-09 NOTE — PROGRESS NOTES
Physical Therapy Initial Assessment     Name: Ashlie Henley  : 1960  MRN: [id number]      Date of Service: 2021    Evaluating PT:  Almita Waldron PT, DPT [id number]      Room #:  8503/8960-Z  Diagnosis:  COPD with acute exacerbation ([facility] [J44.1]  PMHx/PSHx:  COPD, A-flutter, HTN, Back surgery, Cardioversion , Atrial ablation surgery 2018  Procedure/Surgery:  NA  Precautions:  Falls, O2  Equipment Needs:  None    SUBJECTIVE:    Pt lives with his wife in a 1 story home with 4 stairs to enter and 1 rail. Pt ambulated with no AD PTA. Home O2 at 4L/min always at night, during the day PRN per patient. OBJECTIVE:   Initial Evaluation  Date:    AM-PAC 6 Clicks 52/44   Was pt agreeable to Eval/treatment? Yes    Does pt have pain? No c/o pain   Bed Mobility  Rolling: Independent  Supine to sit: Independent  Sit to supine: Independent  Scooting: Independent   Transfers Sit to stand: Independent  Stand to sit:  Independent  Stand pivot: Independent   Ambulation    40 feet with no AD Independent   Stair negotiation: ascended and descended  NT   ROM BUE:  WFL  BLE:  WFL   Strength BUE:  4+/5  BLE:  4+/5   Balance Sitting EOB:  Independent  Dynamic Standing:  Independent     Pt is A & O x 4  Sensation:  Pt denies numbness and tingling to extremities  Edema:  None noted    Vitals:  Blood Pressure at rest - Blood Pressure post session -   Heart Rate at rest 62 Heart Rate post session 75   SPO2 at rest 97% SPO2 post session 94%     Therapeutic Exercises:  NT    Patient education  Pt educated on purpose of PT assessment, importance of mobility, safety with mobility, transfers, gait, use of AD for safety if needed    Patient response to education:   Pt verbalized understanding Pt demonstrated skill Pt requires further education in this area   Yes  Yes No      ASSESSMENT:    Conditions Requiring Skilled Therapeutic Intervention: NA    []Decreased strength     []Decreased ROM  []Decreased functional mobility  []Decreased balance   []Decreased endurance   []Decreased posture  []Decreased sensation  []Decreased coordination   []Decreased vision  []Decreased safety awareness   []Increased pain       Comments:  Patient cleared by RN and agreeable to treatment. Patient found in mid Montoya's and able to get self to seated EOB independently. Patient denied dizziness with positional change. Patient independent with transfers and ambulation in the room and reported being up ad merary in the room since admitted. Patient assisted self back to seated EOB with call light and tray table in reach. Patient asked for and given a ginger ale. Patient with no acute PT needs. Pt's/ family goals   1. To go home    PHYSICAL THERAPY PLAN OF CARE:    PT POC is established based on physician order and patient diagnosis     Referring provider/PT Order:    08/08/21 0945  PT evaluation and treat Start: 08/08/21 0945, End: 08/08/21 0945, ONE TIME, Standing Count: 1 Occurrences, R      STEPH Doe       Diagnosis:  COPD with acute exacerbation ([facility] [J44.1]  Specific instructions for next treatment:  NA    Current Treatment Recommendations:     [] Strengthening to improve independence with functional mobility   [] ROM to improve independence with functional mobility   [] Balance Training to improve static/dynamic balance and to reduce fall risk  [] Endurance Training to improve activity tolerance during functional mobility   [] Transfer Training to improve safety and independence with all functional transfers   [] Gait Training to improve gait mechanics, endurance and asses need for appropriate assistive device  [] Stair Training in preparation for safe discharge home and/or into the community   [] Positioning to prevent skin breakdown and contractures  [] Safety and Education Training   [] Patient/Caregiver Education   [] HEP  [x] Other  No acute PT needs    PT orders will be discontinued as patient with no acute PT needs.  Patient able to perform functional tasks assessed with good safety and with supervision or independently. Thank you for the opportunity to assist in the care of this patient. Time in  1115  Time out  1125    Total Treatment Time  0 minutes     Evaluation Time includes thorough review of current medical information, gathering information on past medical history/social history and prior level of function, completion of standardized testing/informal observation of tasks, assessment of data and education on plan of care and goals.     CPT codes:  [x] Low Complexity PT evaluation 35939  [] Moderate Complexity PT evaluation 33556  [] High Complexity PT evaluation 15800  [] PT Re-evaluation 89409  [] Gait training 55943 - minutes  [] Manual therapy 34201 - minutes  [] Therapeutic activities 81217 - minutes  [] Therapeutic exercises 71318 - minutes  [] Neuromuscular reeducation 18529 - minutes     Bret Cueto PT, DPT  License [id number]

## 2021-08-09 NOTE — CARE COORDINATION
Met with pt at bedside to discuss discharge / transition of care plan. Pt reports from home with spouse Kasi Boyle; independent of all ADL; active ; verified PCP and pharmacy; has home C-PAP, nebulizer, and oxygen (4 liters at baseline continuous, verified by this CM) through [facility]; discharge plan is to return home with Kasi Boyle to provide transportation and she will bring pt's oxygen. Pt currently on 4 liters oxygen at 97%; IV Solu-Medrol 40 mg Q 8 hr; will check with attending for general floor transfer.

## 2021-08-09 NOTE — PROGRESS NOTES
Date: 8/8/2021    Time: 10:09 PM    Patient Placed On BIPAP/CPAP/ Non-Invasive Ventilation? Yes    If no must comment. Facial area red/color change? No           If YES are Blister/Lesion present? No   If yes must notify nursing staff  BIPAP/CPAP skin barrier?   Yes    Skin barrier type:mepilexlite       Comments:        Jazzmine Howard RCP

## 2021-08-09 NOTE — PROGRESS NOTES
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 Chart reviewed. Education provided regarding the purpose and benefits of skilled OT. Patient states he is at functional baseline with ADLs and functional mobility reporting no concerns regarding return to prior living situation. Therefore, no skilled OT indicated. OT screen only. Will discontinue OT orders. Please re-consult if indicated.  LANEY Armstrong; Supervising: LAZARA Banegas, OTR/L #[id number]

## 2021-08-09 NOTE — PROGRESS NOTES
Subjective: The patient is awake and alert. No acute events overnight. Denies chest pain, angina, SOB     Objective:    BP (!) 124/59   Pulse 67   Temp 97.2 °F (36.2 °C) (Temporal)   Resp 18   Ht 5' 11\" (1.803 m)   SpO2 95%   BMI 38.63 kg/m²     In: -   Out: 1200   In: -   Out: 1200 [Urine:1200]    General appearance: NAD, conversant  HEENT: AT/NC, MMM  Neck: FROM, supple  Lungs: Clear to auscultation  CV: RRR, no MRGs  Vasc: Radial pulses 2+  Abdomen: Soft, non-tender; no masses or HSM  Extremities: No peripheral edema or digital cyanosis  Skin: no rash, lesions or ulcers  Psych: Alert and oriented to person, place and time  Neuro: Alert and interactive     Recent Labs     08/07/21 2117 08/09/21  0817   WBC 9.6 13.9*   HGB 12.8 12.1*   HCT 38.7 38.2    247       Recent Labs     08/07/21 2117 08/09/21  0817    144   K 4.2 4.3   CL 98 102   CO2 33* 32*   BUN 19 26*   CREATININE 1.4* 1.1   CALCIUM 9.6 9.9       Assessment:    Principal Problem:    COPD with acute exacerbation (HCC)  Resolved Problems:    * No resolved hospital problems.  *      Plan:  Admit to telemetry for evaluation of acute exacerbation of COPD  IV steroids-increase to 40 every 8, pulmonology evaluation is significant wheeze still  Continue BiPAP as at home at nighttime  Duo nebs, ICS, LABA/Sasha  Resume home medications  Continue rate and rhythm control, oral anticoagulation for a flutter history     DVT prophylaxis  PT OT  Discharge plan-once breathing better on p.o. steroid taper hopefully next 24 to 48 hours    DVT Prophylaxis   PT/OT  Discharge planning           Milly Trujillo MD  12:22 PM  8/9/2021

## 2021-08-10 PROCEDURE — 6370000000 HC RX 637 (ALT 250 FOR IP): Performed by: PHYSICIAN ASSISTANT

## 2021-08-10 PROCEDURE — 94640 AIRWAY INHALATION TREATMENT: CPT

## 2021-08-10 PROCEDURE — 2700000000 HC OXYGEN THERAPY PER DAY

## 2021-08-10 PROCEDURE — 2580000003 HC RX 258: Performed by: PHYSICIAN ASSISTANT

## 2021-08-10 PROCEDURE — 94660 CPAP INITIATION&MGMT: CPT

## 2021-08-10 PROCEDURE — 2580000003 HC RX 258: Performed by: INTERNAL MEDICINE

## 2021-08-10 PROCEDURE — 2060000000 HC ICU INTERMEDIATE R&B

## 2021-08-10 PROCEDURE — 6370000000 HC RX 637 (ALT 250 FOR IP): Performed by: NURSE PRACTITIONER

## 2021-08-10 PROCEDURE — 6360000002 HC RX W HCPCS: Performed by: PHYSICIAN ASSISTANT

## 2021-08-10 PROCEDURE — 6360000002 HC RX W HCPCS: Performed by: INTERNAL MEDICINE

## 2021-08-10 RX ORDER — ACETYLCYSTEINE 200 MG/ML
600 SOLUTION ORAL; RESPIRATORY (INHALATION) 2 TIMES DAILY
Status: DISCONTINUED | OUTPATIENT
Start: 2021-08-10 | End: 2021-08-10

## 2021-08-10 RX ORDER — LEVOFLOXACIN 5 MG/ML
750 INJECTION, SOLUTION INTRAVENOUS EVERY 24 HOURS
Status: DISCONTINUED | OUTPATIENT
Start: 2021-08-10 | End: 2021-08-19

## 2021-08-10 RX ORDER — SODIUM CHLORIDE FOR INHALATION 3 %
4 VIAL, NEBULIZER (ML) INHALATION 2 TIMES DAILY
Status: DISCONTINUED | OUTPATIENT
Start: 2021-08-10 | End: 2021-08-30 | Stop reason: HOSPADM

## 2021-08-10 RX ORDER — METHYLPREDNISOLONE SODIUM SUCCINATE 40 MG/ML
40 INJECTION, POWDER, LYOPHILIZED, FOR SOLUTION INTRAMUSCULAR; INTRAVENOUS DAILY
Status: DISCONTINUED | OUTPATIENT
Start: 2021-08-12 | End: 2021-08-11

## 2021-08-10 RX ORDER — GUAIFENESIN/DEXTROMETHORPHAN 100-10MG/5
5 SYRUP ORAL EVERY 4 HOURS PRN
Status: DISCONTINUED | OUTPATIENT
Start: 2021-08-10 | End: 2021-08-30 | Stop reason: HOSPADM

## 2021-08-10 RX ADMIN — BUDESONIDE 500 MCG: 0.5 SUSPENSION RESPIRATORY (INHALATION) at 21:02

## 2021-08-10 RX ADMIN — LOSARTAN POTASSIUM 50 MG: 50 TABLET, FILM COATED ORAL at 09:35

## 2021-08-10 RX ADMIN — FLECAINIDE ACETATE 100 MG: 100 TABLET ORAL at 21:25

## 2021-08-10 RX ADMIN — SODIUM CHLORIDE SOLN NEBU 3% 4 ML: 3 NEBU SOLN at 21:02

## 2021-08-10 RX ADMIN — ARFORMOTEROL TARTRATE 15 MCG: 15 SOLUTION RESPIRATORY (INHALATION) at 21:02

## 2021-08-10 RX ADMIN — GUAIFENESIN SYRUP AND DEXTROMETHORPHAN 5 ML: 100; 10 SYRUP ORAL at 17:03

## 2021-08-10 RX ADMIN — METHYLPREDNISOLONE SODIUM SUCCINATE 60 MG: 125 INJECTION, POWDER, FOR SOLUTION INTRAMUSCULAR; INTRAVENOUS at 21:25

## 2021-08-10 RX ADMIN — METHYLPREDNISOLONE SODIUM SUCCINATE 60 MG: 125 INJECTION, POWDER, FOR SOLUTION INTRAMUSCULAR; INTRAVENOUS at 17:00

## 2021-08-10 RX ADMIN — IPRATROPIUM BROMIDE AND ALBUTEROL SULFATE 3 ML: .5; 2.5 SOLUTION RESPIRATORY (INHALATION) at 08:30

## 2021-08-10 RX ADMIN — IPRATROPIUM BROMIDE AND ALBUTEROL SULFATE 3 ML: .5; 2.5 SOLUTION RESPIRATORY (INHALATION) at 21:03

## 2021-08-10 RX ADMIN — ATORVASTATIN CALCIUM 20 MG: 20 TABLET, FILM COATED ORAL at 09:35

## 2021-08-10 RX ADMIN — IPRATROPIUM BROMIDE AND ALBUTEROL SULFATE 3 ML: .5; 2.5 SOLUTION RESPIRATORY (INHALATION) at 05:27

## 2021-08-10 RX ADMIN — ARFORMOTEROL TARTRATE 15 MCG: 15 SOLUTION RESPIRATORY (INHALATION) at 08:31

## 2021-08-10 RX ADMIN — BUDESONIDE 500 MCG: 0.5 SUSPENSION RESPIRATORY (INHALATION) at 08:31

## 2021-08-10 RX ADMIN — APIXABAN 5 MG: 5 TABLET, FILM COATED ORAL at 09:35

## 2021-08-10 RX ADMIN — Medication 10 ML: at 09:00

## 2021-08-10 RX ADMIN — METHYLPREDNISOLONE SODIUM SUCCINATE 60 MG: 125 INJECTION, POWDER, FOR SOLUTION INTRAMUSCULAR; INTRAVENOUS at 03:48

## 2021-08-10 RX ADMIN — APIXABAN 5 MG: 5 TABLET, FILM COATED ORAL at 21:26

## 2021-08-10 RX ADMIN — FLECAINIDE ACETATE 100 MG: 100 TABLET ORAL at 09:36

## 2021-08-10 RX ADMIN — GUAIFENESIN SYRUP AND DEXTROMETHORPHAN 5 ML: 100; 10 SYRUP ORAL at 21:24

## 2021-08-10 RX ADMIN — METHYLPREDNISOLONE SODIUM SUCCINATE 60 MG: 125 INJECTION, POWDER, FOR SOLUTION INTRAMUSCULAR; INTRAVENOUS at 09:34

## 2021-08-10 RX ADMIN — DILTIAZEM HYDROCHLORIDE 240 MG: 240 CAPSULE, EXTENDED RELEASE ORAL at 21:26

## 2021-08-10 RX ADMIN — LEVOFLOXACIN 750 MG: 5 INJECTION, SOLUTION INTRAVENOUS at 14:22

## 2021-08-10 RX ADMIN — FUROSEMIDE 40 MG: 20 TABLET ORAL at 09:35

## 2021-08-10 RX ADMIN — ASPIRIN 81 MG CHEWABLE TABLET 81 MG: 81 TABLET CHEWABLE at 09:35

## 2021-08-10 RX ADMIN — DILTIAZEM HYDROCHLORIDE 240 MG: 240 CAPSULE, EXTENDED RELEASE ORAL at 09:36

## 2021-08-10 RX ADMIN — IPRATROPIUM BROMIDE AND ALBUTEROL SULFATE 3 ML: .5; 2.5 SOLUTION RESPIRATORY (INHALATION) at 17:10

## 2021-08-10 RX ADMIN — ACETAMINOPHEN 650 MG: 325 TABLET ORAL at 17:04

## 2021-08-10 RX ADMIN — GUAIFENESIN SYRUP AND DEXTROMETHORPHAN 5 ML: 100; 10 SYRUP ORAL at 00:51

## 2021-08-10 RX ADMIN — Medication 10 ML: at 22:50

## 2021-08-10 RX ADMIN — GUAIFENESIN SYRUP AND DEXTROMETHORPHAN 5 ML: 100; 10 SYRUP ORAL at 09:36

## 2021-08-10 RX ADMIN — IPRATROPIUM BROMIDE AND ALBUTEROL SULFATE 3 ML: .5; 2.5 SOLUTION RESPIRATORY (INHALATION) at 12:49

## 2021-08-10 RX ADMIN — FLUTICASONE PROPIONATE 1 SPRAY: 50 SPRAY, METERED NASAL at 09:37

## 2021-08-10 ASSESSMENT — PAIN SCALES - GENERAL
PAINLEVEL_OUTOF10: 0
PAINLEVEL_OUTOF10: 5
PAINLEVEL_OUTOF10: 0

## 2021-08-10 NOTE — PROGRESS NOTES
Subjective: The patient is awake and alert. Feels awful today  Indicates his starting to bring things up and just does not feel well  Objective:    BP (!) 119/56   Pulse 96   Temp 97.5 °F (36.4 °C) (Temporal)   Resp 24   Ht 5' 11\" (1.803 m)   Wt 271 lb 2.7 oz (123 kg)   SpO2 100%   BMI 37.82 kg/m²     In: 850 [P.O.:840; I.V.:10]  Out: 500   In: 850   Out: 500 [Urine:500]    General appearance: NAD, conversant  HEENT: AT/NC, MMM  Neck: FROM, supple  Lungs: Coarse breath sounds bilaterally, no wheeze  CV: RRR, no MRGs  Vasc: Radial pulses 2+  Abdomen: Soft, non-tender; no masses or HSM  Extremities: No peripheral edema or digital cyanosis  Skin: no rash, lesions or ulcers  Psych: Alert and oriented to person, place and time  Neuro: Alert and interactive     Recent Labs     08/07/21 2117 08/09/21  0817   WBC 9.6 13.9*   HGB 12.8 12.1*   HCT 38.7 38.2    247       Recent Labs     08/07/21 2117 08/09/21  0817    144   K 4.2 4.3   CL 98 102   CO2 33* 32*   BUN 19 26*   CREATININE 1.4* 1.1   CALCIUM 9.6 9.9       Assessment:    Principal Problem:    COPD with acute exacerbation (HCC)  Resolved Problems:    * No resolved hospital problems.  *      Plan:  Admit to telemetry for evaluation of acute exacerbation of COPD  IV steroids-increase to 60 every 6,   Continue BiPAP as at home at nighttime  Duo nebs, ICS, LABA/Sasha  Add Levaquin 750 IV daily  And Mucomyst inhaled solution twice daily  Resume home medications  Continue rate and rhythm control, oral anticoagulation for a flutter history     DVT prophylaxis  PT OT  Discharge plan-once breathing better on p.o. steroid taper hopefully next 24 to 48 hours    DVT Prophylaxis   PT/OT  Discharge Claribel Daniel MD  11:41 AM  8/10/2021

## 2021-08-10 NOTE — PROGRESS NOTES
Lm for the attending PCP for Dr Davey Baires, regarding pt cough that is not relieved by Tessalon. Pt is unable to rest and wants to know if he can get something for the cough.     Holly Arredondo RN  12:17 AM

## 2021-08-11 PROCEDURE — 6360000002 HC RX W HCPCS: Performed by: INTERNAL MEDICINE

## 2021-08-11 PROCEDURE — 2700000000 HC OXYGEN THERAPY PER DAY

## 2021-08-11 PROCEDURE — 6370000000 HC RX 637 (ALT 250 FOR IP): Performed by: PHYSICIAN ASSISTANT

## 2021-08-11 PROCEDURE — 94640 AIRWAY INHALATION TREATMENT: CPT

## 2021-08-11 PROCEDURE — 2580000003 HC RX 258: Performed by: PHYSICIAN ASSISTANT

## 2021-08-11 PROCEDURE — 6360000002 HC RX W HCPCS: Performed by: PHYSICIAN ASSISTANT

## 2021-08-11 PROCEDURE — 99223 1ST HOSP IP/OBS HIGH 75: CPT | Performed by: INTERNAL MEDICINE

## 2021-08-11 PROCEDURE — 2580000003 HC RX 258: Performed by: INTERNAL MEDICINE

## 2021-08-11 PROCEDURE — 94660 CPAP INITIATION&MGMT: CPT

## 2021-08-11 PROCEDURE — 6370000000 HC RX 637 (ALT 250 FOR IP): Performed by: NURSE PRACTITIONER

## 2021-08-11 PROCEDURE — 2060000000 HC ICU INTERMEDIATE R&B

## 2021-08-11 RX ORDER — METHYLPREDNISOLONE SODIUM SUCCINATE 125 MG/2ML
60 INJECTION, POWDER, LYOPHILIZED, FOR SOLUTION INTRAMUSCULAR; INTRAVENOUS EVERY 8 HOURS
Status: DISCONTINUED | OUTPATIENT
Start: 2021-08-11 | End: 2021-08-20

## 2021-08-11 RX ORDER — ARFORMOTEROL TARTRATE 15 UG/2ML
15 SOLUTION RESPIRATORY (INHALATION) 2 TIMES DAILY
Status: DISCONTINUED | OUTPATIENT
Start: 2021-08-11 | End: 2021-08-30 | Stop reason: HOSPADM

## 2021-08-11 RX ORDER — METHYLPREDNISOLONE SODIUM SUCCINATE 40 MG/ML
40 INJECTION, POWDER, LYOPHILIZED, FOR SOLUTION INTRAMUSCULAR; INTRAVENOUS EVERY 8 HOURS
Status: DISCONTINUED | OUTPATIENT
Start: 2021-08-11 | End: 2021-08-11

## 2021-08-11 RX ORDER — BUDESONIDE 0.5 MG/2ML
1 INHALANT ORAL 2 TIMES DAILY
Status: DISCONTINUED | OUTPATIENT
Start: 2021-08-11 | End: 2021-08-30 | Stop reason: HOSPADM

## 2021-08-11 RX ORDER — ACETYLCYSTEINE 200 MG/ML
600 SOLUTION ORAL; RESPIRATORY (INHALATION) 2 TIMES DAILY
Status: DISCONTINUED | OUTPATIENT
Start: 2021-08-11 | End: 2021-08-14

## 2021-08-11 RX ADMIN — GUAIFENESIN SYRUP AND DEXTROMETHORPHAN 5 ML: 100; 10 SYRUP ORAL at 04:40

## 2021-08-11 RX ADMIN — GUAIFENESIN SYRUP AND DEXTROMETHORPHAN 5 ML: 100; 10 SYRUP ORAL at 12:14

## 2021-08-11 RX ADMIN — APIXABAN 5 MG: 5 TABLET, FILM COATED ORAL at 21:29

## 2021-08-11 RX ADMIN — ATORVASTATIN CALCIUM 20 MG: 20 TABLET, FILM COATED ORAL at 08:51

## 2021-08-11 RX ADMIN — FLUTICASONE PROPIONATE 1 SPRAY: 50 SPRAY, METERED NASAL at 08:50

## 2021-08-11 RX ADMIN — FLECAINIDE ACETATE 100 MG: 100 TABLET ORAL at 21:29

## 2021-08-11 RX ADMIN — METHYLPREDNISOLONE SODIUM SUCCINATE 60 MG: 125 INJECTION, POWDER, FOR SOLUTION INTRAMUSCULAR; INTRAVENOUS at 10:25

## 2021-08-11 RX ADMIN — GUAIFENESIN SYRUP AND DEXTROMETHORPHAN 5 ML: 100; 10 SYRUP ORAL at 23:28

## 2021-08-11 RX ADMIN — APIXABAN 5 MG: 5 TABLET, FILM COATED ORAL at 08:50

## 2021-08-11 RX ADMIN — ARFORMOTEROL TARTRATE 15 MCG: 15 SOLUTION RESPIRATORY (INHALATION) at 08:19

## 2021-08-11 RX ADMIN — LOSARTAN POTASSIUM 50 MG: 50 TABLET, FILM COATED ORAL at 08:51

## 2021-08-11 RX ADMIN — METHYLPREDNISOLONE SODIUM SUCCINATE 60 MG: 125 INJECTION, POWDER, FOR SOLUTION INTRAMUSCULAR; INTRAVENOUS at 21:29

## 2021-08-11 RX ADMIN — ACETYLCYSTEINE 600 MG: 200 SOLUTION ORAL; RESPIRATORY (INHALATION) at 19:32

## 2021-08-11 RX ADMIN — BUDESONIDE 500 MCG: 0.5 SUSPENSION RESPIRATORY (INHALATION) at 08:18

## 2021-08-11 RX ADMIN — ARFORMOTEROL TARTRATE 15 MCG: 15 SOLUTION RESPIRATORY (INHALATION) at 19:32

## 2021-08-11 RX ADMIN — ASPIRIN 81 MG CHEWABLE TABLET 81 MG: 81 TABLET CHEWABLE at 08:50

## 2021-08-11 RX ADMIN — IPRATROPIUM BROMIDE AND ALBUTEROL SULFATE 3 ML: .5; 2.5 SOLUTION RESPIRATORY (INHALATION) at 08:18

## 2021-08-11 RX ADMIN — BUDESONIDE 1000 MCG: 0.5 SUSPENSION RESPIRATORY (INHALATION) at 19:32

## 2021-08-11 RX ADMIN — FUROSEMIDE 40 MG: 20 TABLET ORAL at 08:51

## 2021-08-11 RX ADMIN — Medication 10 ML: at 21:29

## 2021-08-11 RX ADMIN — DILTIAZEM HYDROCHLORIDE 240 MG: 240 CAPSULE, EXTENDED RELEASE ORAL at 21:29

## 2021-08-11 RX ADMIN — ACETYLCYSTEINE 600 MG: 200 SOLUTION ORAL; RESPIRATORY (INHALATION) at 13:44

## 2021-08-11 RX ADMIN — FLECAINIDE ACETATE 100 MG: 100 TABLET ORAL at 08:51

## 2021-08-11 RX ADMIN — Medication 10 ML: at 08:53

## 2021-08-11 RX ADMIN — LEVOFLOXACIN 750 MG: 5 INJECTION, SOLUTION INTRAVENOUS at 12:18

## 2021-08-11 RX ADMIN — METHYLPREDNISOLONE SODIUM SUCCINATE 60 MG: 125 INJECTION, POWDER, FOR SOLUTION INTRAMUSCULAR; INTRAVENOUS at 04:35

## 2021-08-11 RX ADMIN — IPRATROPIUM BROMIDE AND ALBUTEROL SULFATE 3 ML: .5; 2.5 SOLUTION RESPIRATORY (INHALATION) at 12:43

## 2021-08-11 RX ADMIN — SODIUM CHLORIDE SOLN NEBU 3% 4 ML: 3 NEBU SOLN at 08:18

## 2021-08-11 RX ADMIN — IPRATROPIUM BROMIDE AND ALBUTEROL SULFATE 3 ML: .5; 2.5 SOLUTION RESPIRATORY (INHALATION) at 19:31

## 2021-08-11 RX ADMIN — DILTIAZEM HYDROCHLORIDE 240 MG: 240 CAPSULE, EXTENDED RELEASE ORAL at 08:51

## 2021-08-11 RX ADMIN — IPRATROPIUM BROMIDE AND ALBUTEROL SULFATE 3 ML: .5; 2.5 SOLUTION RESPIRATORY (INHALATION) at 15:51

## 2021-08-11 RX ADMIN — SODIUM CHLORIDE SOLN NEBU 3% 4 ML: 3 NEBU SOLN at 19:32

## 2021-08-11 ASSESSMENT — PAIN SCALES - GENERAL
PAINLEVEL_OUTOF10: 0

## 2021-08-11 NOTE — CARE COORDINATION
Reviewed chart, pt currently on 4LO2 at 97%, has bipap at home through [facility], which is baseline. Pt on solumedrol q8. ECHO ordered on 8/11. Pulmonary consulted. Plan is home at discharge. Gurjit Tellez, MSW, LSW

## 2021-08-11 NOTE — PROGRESS NOTES
Subjective: The patient is awake and alert. Indicates he still feels horrible  Although he looks much better  Acquiring his baseline oxygen of 4 L that he wears at home  Objective:    /63   Pulse 77   Temp 97.1 °F (36.2 °C) (Temporal)   Resp 20   Ht 5' 11\" (1.803 m)   Wt 273 lb 5.9 oz (124 kg)   SpO2 98%   BMI 38.13 kg/m²     In: 240 [P.O.:240]  Out: 2075   In: 240   Out: 2075 [Urine:2075]    General appearance: NAD, conversant  HEENT: AT/NC, MMM  Neck: FROM, supple  Lungs: Coarse breath sounds bilaterally, still  CV: RRR, no MRGs  Vasc: Radial pulses 2+  Abdomen: Soft, non-tender; no masses or HSM  Extremities: No peripheral edema or digital cyanosis  Skin: no rash, lesions or ulcers  Psych: Alert and oriented to person, place and time  Neuro: Alert and interactive     Recent Labs     08/09/21  0817   WBC 13.9*   HGB 12.1*   HCT 38.2          Recent Labs     08/09/21  0817      K 4.3      CO2 32*   BUN 26*   CREATININE 1.1   CALCIUM 9.9       Assessment:    Principal Problem:    COPD with acute exacerbation (HCC)  Resolved Problems:    * No resolved hospital problems.  *      Plan:  Admit to telemetry for evaluation of acute exacerbation of COPD  IV steroids-increase to 60 every 6-x8 doses-, start Solu-Medrol 40 IV every 8 hours  Continue BiPAP as at home at nighttime  Duo nebs, ICS, LABA/Sasha  Add Levaquin 750 IV daily   And Mucomyst inhaled solution twice daily  Resume home medications  Continue rate and rhythm control, oral anticoagulation for a flutter history  Pulmonology evaluation and as patient indicates he still feels horrible-known to Dr. Ken mejia   Will consider stress test as he indicates he still feels very dyspneic on ambulation-although I think this is primarily his lungs he has got horrible COPD/emphysema    DVT prophylaxis  PT OT  Discharge plan-once breathing better on p.o. steroid taper hopefully next 24 to 48 hours            Katerine Soares MD  10:44 AM  8/11/2021

## 2021-08-11 NOTE — PROGRESS NOTES
Pt asking that he not be disturbed overnight for breathing treatments if he is sleeping. bipap set up on standby in pt room. States he needs no assistant with application of mask . Will call if needs assistance.

## 2021-08-12 LAB
LV EF: 70 %
LVEF MODALITY: NORMAL

## 2021-08-12 PROCEDURE — 6370000000 HC RX 637 (ALT 250 FOR IP): Performed by: PHYSICIAN ASSISTANT

## 2021-08-12 PROCEDURE — 93306 TTE W/DOPPLER COMPLETE: CPT

## 2021-08-12 PROCEDURE — 99233 SBSQ HOSP IP/OBS HIGH 50: CPT | Performed by: INTERNAL MEDICINE

## 2021-08-12 PROCEDURE — 2700000000 HC OXYGEN THERAPY PER DAY

## 2021-08-12 PROCEDURE — 6360000002 HC RX W HCPCS: Performed by: INTERNAL MEDICINE

## 2021-08-12 PROCEDURE — 6370000000 HC RX 637 (ALT 250 FOR IP): Performed by: NURSE PRACTITIONER

## 2021-08-12 PROCEDURE — 2580000003 HC RX 258: Performed by: INTERNAL MEDICINE

## 2021-08-12 PROCEDURE — 94660 CPAP INITIATION&MGMT: CPT

## 2021-08-12 PROCEDURE — 2580000003 HC RX 258: Performed by: PHYSICIAN ASSISTANT

## 2021-08-12 PROCEDURE — 2060000000 HC ICU INTERMEDIATE R&B

## 2021-08-12 PROCEDURE — 94640 AIRWAY INHALATION TREATMENT: CPT

## 2021-08-12 RX ADMIN — SODIUM CHLORIDE SOLN NEBU 3% 4 ML: 3 NEBU SOLN at 20:13

## 2021-08-12 RX ADMIN — METHYLPREDNISOLONE SODIUM SUCCINATE 60 MG: 125 INJECTION, POWDER, FOR SOLUTION INTRAMUSCULAR; INTRAVENOUS at 22:04

## 2021-08-12 RX ADMIN — ASPIRIN 81 MG CHEWABLE TABLET 81 MG: 81 TABLET CHEWABLE at 07:46

## 2021-08-12 RX ADMIN — ARFORMOTEROL TARTRATE 15 MCG: 15 SOLUTION RESPIRATORY (INHALATION) at 07:49

## 2021-08-12 RX ADMIN — IPRATROPIUM BROMIDE AND ALBUTEROL SULFATE 3 ML: .5; 2.5 SOLUTION RESPIRATORY (INHALATION) at 16:36

## 2021-08-12 RX ADMIN — METHYLPREDNISOLONE SODIUM SUCCINATE 60 MG: 125 INJECTION, POWDER, FOR SOLUTION INTRAMUSCULAR; INTRAVENOUS at 13:12

## 2021-08-12 RX ADMIN — Medication 10 ML: at 07:46

## 2021-08-12 RX ADMIN — ACETYLCYSTEINE 600 MG: 200 SOLUTION ORAL; RESPIRATORY (INHALATION) at 20:13

## 2021-08-12 RX ADMIN — ACETYLCYSTEINE 600 MG: 200 SOLUTION ORAL; RESPIRATORY (INHALATION) at 07:50

## 2021-08-12 RX ADMIN — GUAIFENESIN SYRUP AND DEXTROMETHORPHAN 5 ML: 100; 10 SYRUP ORAL at 22:56

## 2021-08-12 RX ADMIN — FUROSEMIDE 40 MG: 20 TABLET ORAL at 07:46

## 2021-08-12 RX ADMIN — SODIUM CHLORIDE SOLN NEBU 3% 4 ML: 3 NEBU SOLN at 08:15

## 2021-08-12 RX ADMIN — DILTIAZEM HYDROCHLORIDE 240 MG: 240 CAPSULE, EXTENDED RELEASE ORAL at 22:04

## 2021-08-12 RX ADMIN — IPRATROPIUM BROMIDE AND ALBUTEROL SULFATE 3 ML: .5; 2.5 SOLUTION RESPIRATORY (INHALATION) at 20:13

## 2021-08-12 RX ADMIN — FLECAINIDE ACETATE 100 MG: 100 TABLET ORAL at 07:46

## 2021-08-12 RX ADMIN — IPRATROPIUM BROMIDE AND ALBUTEROL SULFATE 3 ML: .5; 2.5 SOLUTION RESPIRATORY (INHALATION) at 11:01

## 2021-08-12 RX ADMIN — BUDESONIDE 1000 MCG: 0.5 SUSPENSION RESPIRATORY (INHALATION) at 07:49

## 2021-08-12 RX ADMIN — FLECAINIDE ACETATE 100 MG: 100 TABLET ORAL at 22:04

## 2021-08-12 RX ADMIN — Medication 10 ML: at 22:05

## 2021-08-12 RX ADMIN — APIXABAN 5 MG: 5 TABLET, FILM COATED ORAL at 22:04

## 2021-08-12 RX ADMIN — ATORVASTATIN CALCIUM 20 MG: 20 TABLET, FILM COATED ORAL at 07:46

## 2021-08-12 RX ADMIN — LOSARTAN POTASSIUM 50 MG: 50 TABLET, FILM COATED ORAL at 07:46

## 2021-08-12 RX ADMIN — LEVOFLOXACIN 750 MG: 5 INJECTION, SOLUTION INTRAVENOUS at 13:13

## 2021-08-12 RX ADMIN — DILTIAZEM HYDROCHLORIDE 240 MG: 240 CAPSULE, EXTENDED RELEASE ORAL at 07:46

## 2021-08-12 RX ADMIN — APIXABAN 5 MG: 5 TABLET, FILM COATED ORAL at 07:46

## 2021-08-12 RX ADMIN — FLUTICASONE PROPIONATE 1 SPRAY: 50 SPRAY, METERED NASAL at 07:49

## 2021-08-12 RX ADMIN — BUDESONIDE 1000 MCG: 0.5 SUSPENSION RESPIRATORY (INHALATION) at 20:14

## 2021-08-12 RX ADMIN — METHYLPREDNISOLONE SODIUM SUCCINATE 60 MG: 125 INJECTION, POWDER, FOR SOLUTION INTRAMUSCULAR; INTRAVENOUS at 03:35

## 2021-08-12 RX ADMIN — IPRATROPIUM BROMIDE AND ALBUTEROL SULFATE 3 ML: .5; 2.5 SOLUTION RESPIRATORY (INHALATION) at 07:50

## 2021-08-12 RX ADMIN — ARFORMOTEROL TARTRATE 15 MCG: 15 SOLUTION RESPIRATORY (INHALATION) at 20:13

## 2021-08-12 ASSESSMENT — PAIN SCALES - GENERAL
PAINLEVEL_OUTOF10: 0

## 2021-08-12 NOTE — CONSULTS
[facility]  Division of Pulmonary, [facility] [address]           Pulmonary consult       Patient: Sonya Gee  MRN: [id number]  : 1960      CC :SOB ,wheeizng   H/o A flutter     HPI :  Sonya Gee is a 66y.o. year old smoking at age 15 and increased gradually to 2 pack daily, he quit a few months ago came into the hospital with shortness of breath, associated with cough, productive of yellow sputum along with chest tightness with no fever or chills or numbness or chest pain     The patient is known to have COPD and he is O2 dependent throat he used to liters at home     The patient also developed A. fib and he was started on Cardizem drip, he was advised to have cardioversion, and pulmonary was asked to see the patient       Patient had CTA which shows no lung lesion questionable hilar adenopathy  Patient  already in Eliquis 5 mg twice daily         Patient presented to the ER with worsening SOB for the last 5 days along with BARDALES for even going bath room with no fever or chills but has wheezing and SOB and BARDALES with some orthopnea and not able to bring any sputum       PHYSICAL EXAMINATION:     VITAL SIGNS:  BP (!) 140/73   Pulse 71   Temp 97.1 °F (36.2 °C) (Temporal)   Resp 20   Ht 5' 11\" (1.803 m)   Wt 273 lb 5.9 oz (124 kg)   SpO2 94%   BMI 38.13 kg/m²   Wt Readings from Last 3 Encounters:   21 273 lb 5.9 oz (124 kg)   20 277 lb (125.6 kg)   20 271 lb (122.9 kg)     Temp Readings from Last 3 Encounters:   21 97.1 °F (36.2 °C) (Temporal)   19 97.7 °F (36.5 °C)   19 98.5 °F (36.9 °C) (Oral)     TMAX:  BP Readings from Last 3 Encounters:   21 (!) 140/73   20 132/77   20 124/82     Pulse Readings from Last 3 Encounters:   21 71   20 109   20 83           INTAKE/OUTPUTS:  I/O last 3 completed shifts:   In: 5 [P.O.:420]  Out: 2525 [Urine:2525]    Intake/Output Summary (Last 24 hours) at 8/11/2021 2305  Last data filed at 8/11/2021 2118  Gross per 24 hour   Intake 420 ml   Output 2525 ml   Net -2105 ml       This is virtual visit      LABS/IMAGING:    CBC:  Lab Results   Component Value Date    WBC 13.9 (H) 08/09/2021    HGB 12.1 (L) 08/09/2021    HCT 38.2 08/09/2021    MCV 99.7 08/09/2021     08/09/2021    LYMPHOPCT 9.6 (L) 08/09/2021    RBC 3.83 08/09/2021    MCH 31.6 08/09/2021    MCHC 31.7 (L) 08/09/2021    RDW 12.4 08/09/2021    NEUTOPHILPCT 83.0 (H) 08/09/2021    MONOPCT 5.6 08/09/2021    BASOPCT 0.1 08/09/2021    NEUTROABS 11.54 (H) 08/09/2021    LYMPHSABS 1.33 (L) 08/09/2021    MONOSABS 0.78 08/09/2021    EOSABS 0.00 (L) 08/09/2021    BASOSABS 0.02 08/09/2021       Recent Labs     08/09/21  0817 08/07/21 2117   WBC 13.9* 9.6   HGB 12.1* 12.8   HCT 38.2 38.7   MCV 99.7 95.6    229       BMP:   Recent Labs     08/09/21  0817      K 4.3      CO2 32*   BUN 26*   CREATININE 1.1       MG:   Lab Results   Component Value Date    MG 2.2 02/14/2018     Ca/Phos:   Lab Results   Component Value Date    CALCIUM 9.9 08/09/2021     Amylase: No results found for: AMYLASE  Lipase:   Lab Results   Component Value Date    LIPASE 28 05/19/2011     LIVER PROFILE: No results for input(s): AST, ALT, LIPASE, BILIDIR, BILITOT, ALKPHOS in the last 72 hours. Invalid input(s): AMYLASE,  ALB    PT/INR:   No results for input(s): PROTIME, INR in the last 72 hours. APTT: No results for input(s): APTT in the last 72 hours.     Cardiac Enzymes:  Lab Results   Component Value Date    CKTOTAL 51 11/13/2010    CKMB 0.5 11/13/2010    TROPONINI <0.01 02/15/2018       Hgb A1C: No results found for: LABA1C  No results found for: EAG  FAMILIA: No results found for: FAMILIA  ESR: No results found for: SEDRATE  CRP: No results found for: CRP  D Dimer: No results found for: DDIMER    Thyroid Studies:  Lab Results   Component Value Date    TSH 0.942 02/14/2018    M0LBHTO 4.8 02/14/2018           MICROBIOLOGY:  Pending       CXR:  Reviewed     CT Chest:reviewed     PE  Vitals:    08/11/21 2115   BP: (!) 140/73   Pulse: 71   Resp: 20   Temp: 97.1 °F (36.2 °C)   SpO2: 94%     General:  Awake, alert, oriented X 3. Well developed, well nourished, well groomed. No apparent distress. HEENT:  Normocephalic, atraumatic. Pupils equal, round, reactive to light. No scleral icterus. No conjunctival injection. Normal lips, teeth, and gums. No nasal discharge. Neck:  Supple, FROM  Heart:  RRR, no murmurs, gallops, rubs, carotid upstroke normal, no carotid bruits  Lungs:wheeizng bilateral ,rhonchi   Abdomen: Bowel sounds present, soft, nontender, no masses, no organomegaly, no peritoneal signs  Extremities:  No clubbing, cyanosis, or edema  Skin:  Warm and dry, no open lesions or rash  Neuro:  Cranial nerves 2-12 intact, no focal deficits  Vascular: Radial and pedal Pulses 2+  Breast: deferred  Rectal: deferred  Genitalia:  deferred    PROBLEM LIST:  Patient Active Problem List   Diagnosis    COPD (chronic obstructive pulmonary disease) ([address]    Essential hypertension    Adrenal adenoma    Class 2 obesity due to excess calories with serious comorbidity and body mass index (BMI) of 35.0 to 35.9 in adult    Anticoagulation management encounter    Typical atrial flutter ([address]    Anxiety    Status post catheter ablation of atrial flutter    Persistent atrial fibrillation ([address]    ETOH abuse    COPD with acute exacerbation (HCC)               ASSESSMENT:  1.) Acute   exacerbation of COPD  2) very severe COPD  3.)obstructive sleep apnea  4. )Afib /Flutter   5.)tobacco independent(quit 5 months ago)    Data:  FVC 3.63 which is 73 of predicted    FEV1 1.52 which is 39 of predicted    FEV1/FVC is 42  No Bronchodilator response    PLAN:  patient  is very pleasant 72-year-old male who came in for follow-up and he still have SOB and BARDALES and feel weak   His PFT shows very severe COPD with FEV1 39% of predicted  On Preztri  Increase IV steroids   On Lasix 40 mg ,PO may change IV   On Duoneb   Albuterol as needed   On  Eliquis to take it twice daily  Continue Nebs   BiPAP     IgE 52  eosinophilic 412 ,if continue to have SOB will consider IL5 as OP  Need 2 d echo   Agree with cardiac work up and stress test  Will reach step daughter tomorrow        Kami Arguello MD  Pulmonary/Critical care Medicine   [address] [facility] and [address] [place] [address]

## 2021-08-12 NOTE — PROGRESS NOTES
Subjective: The patient is awake and alert. Indicates he still feels horrible  Although he looks much better  Acquiring his baseline oxygen of 4 L that he wears at home  Objective:    /74   Pulse 73   Temp 97.4 °F (36.3 °C) (Temporal)   Resp 22   Ht 5' 11\" (1.803 m)   Wt 275 lb 3.2 oz (124.8 kg)   SpO2 98%   BMI 38.38 kg/m²     In: 600 [P.O.:600]  Out: 2825   In: 600   Out: 2825 [Urine:2825]    General appearance: NAD, conversant  HEENT: AT/NC, MMM  Neck: FROM, supple  Lungs: Coarse breath sounds bilaterally, still  CV: RRR, no MRGs  Vasc: Radial pulses 2+  Abdomen: Soft, non-tender; no masses or HSM  Extremities: No peripheral edema or digital cyanosis  Skin: no rash, lesions or ulcers  Psych: Alert and oriented to person, place and time  Neuro: Alert and interactive     No results for input(s): WBC, HGB, HCT, PLT in the last 72 hours. No results for input(s): NA, K, CL, CO2, BUN, CREATININE, CALCIUM in the last 72 hours. Invalid input(s): GLU    Assessment:    Principal Problem:    COPD with acute exacerbation ([facility]  Resolved Problems:    * No resolved hospital problems.  *      Plan:  Admit to telemetry for evaluation of acute exacerbation of COPD  IV steroids-increase to 60 every 6-x8 doses-,  Solu-Medrol 60 IV every 8 hours by pulmonology  Continue BiPAP as at home at nighttime  Duo nebs, ICS, LABA/Sasha  Add Levaquin 750 IV daily   And Mucomyst inhaled solution twice daily  Resume home medications  Continue rate and rhythm control, oral anticoagulation for a flutter history  Pulmonology evaluation and as patient indicates he still feels horrible-known to Dr. Pravin mejia-Case discussed  Will consider stress test as he indicates he still feels very dyspneic on ambulation--tomorrow  He has horrible lungs and does not seem to understand this is the cause of his shortness of breath    DVT prophylaxis  PT OT  Discharge plan-once breathing better on p.o. steroid taper hopefully next 24 to 48 hours            Roger Mata MD  1:22 PM  8/12/2021

## 2021-08-12 NOTE — CARE COORDINATION
Chart reviewed, pulmonology consulted, saw yesterday;  IV Solu-Medrol increased yesterday to 60 mg  Q 8 hr; continued on IV Levaquin Q 24 hr, 4/NC with sat of 98% (which is his baseline); per attendings note 8/11 hoping to taper in the next 24-48 hrs. Discharge plan is to return home, spouse Balbir Delacruz to provide transportation and bring pt's oxygen for home going transport.

## 2021-08-13 ENCOUNTER — APPOINTMENT (OUTPATIENT)
Dept: NON INVASIVE DIAGNOSTICS | Age: 61
DRG: 194 | End: 2021-08-13
Payer: COMMERCIAL

## 2021-08-13 ENCOUNTER — APPOINTMENT (OUTPATIENT)
Dept: NUCLEAR MEDICINE | Age: 61
DRG: 194 | End: 2021-08-13
Payer: COMMERCIAL

## 2021-08-13 LAB
LV EF: 66 %
LVEF MODALITY: NORMAL

## 2021-08-13 PROCEDURE — 6360000002 HC RX W HCPCS: Performed by: INTERNAL MEDICINE

## 2021-08-13 PROCEDURE — 2060000000 HC ICU INTERMEDIATE R&B

## 2021-08-13 PROCEDURE — 6370000000 HC RX 637 (ALT 250 FOR IP): Performed by: PHYSICIAN ASSISTANT

## 2021-08-13 PROCEDURE — 93018 CV STRESS TEST I&R ONLY: CPT | Performed by: INTERNAL MEDICINE

## 2021-08-13 PROCEDURE — 93017 CV STRESS TEST TRACING ONLY: CPT

## 2021-08-13 PROCEDURE — 94669 MECHANICAL CHEST WALL OSCILL: CPT

## 2021-08-13 PROCEDURE — 6370000000 HC RX 637 (ALT 250 FOR IP): Performed by: NURSE PRACTITIONER

## 2021-08-13 PROCEDURE — A9500 TC99M SESTAMIBI: HCPCS | Performed by: RADIOLOGY

## 2021-08-13 PROCEDURE — 3430000000 HC RX DIAGNOSTIC RADIOPHARMACEUTICAL: Performed by: RADIOLOGY

## 2021-08-13 PROCEDURE — 78452 HT MUSCLE IMAGE SPECT MULT: CPT | Performed by: INTERNAL MEDICINE

## 2021-08-13 PROCEDURE — 2700000000 HC OXYGEN THERAPY PER DAY

## 2021-08-13 PROCEDURE — 99233 SBSQ HOSP IP/OBS HIGH 50: CPT | Performed by: INTERNAL MEDICINE

## 2021-08-13 PROCEDURE — 2580000003 HC RX 258: Performed by: PHYSICIAN ASSISTANT

## 2021-08-13 PROCEDURE — 78452 HT MUSCLE IMAGE SPECT MULT: CPT

## 2021-08-13 PROCEDURE — 94660 CPAP INITIATION&MGMT: CPT

## 2021-08-13 PROCEDURE — 93016 CV STRESS TEST SUPVJ ONLY: CPT | Performed by: INTERNAL MEDICINE

## 2021-08-13 PROCEDURE — 94640 AIRWAY INHALATION TREATMENT: CPT

## 2021-08-13 RX ADMIN — Medication 35 MILLICURIE: at 12:24

## 2021-08-13 RX ADMIN — DILTIAZEM HYDROCHLORIDE 240 MG: 240 CAPSULE, EXTENDED RELEASE ORAL at 08:04

## 2021-08-13 RX ADMIN — IPRATROPIUM BROMIDE AND ALBUTEROL SULFATE 3 ML: .5; 2.5 SOLUTION RESPIRATORY (INHALATION) at 18:40

## 2021-08-13 RX ADMIN — IPRATROPIUM BROMIDE AND ALBUTEROL SULFATE 3 ML: .5; 2.5 SOLUTION RESPIRATORY (INHALATION) at 12:14

## 2021-08-13 RX ADMIN — GUAIFENESIN SYRUP AND DEXTROMETHORPHAN 5 ML: 100; 10 SYRUP ORAL at 22:59

## 2021-08-13 RX ADMIN — APIXABAN 5 MG: 5 TABLET, FILM COATED ORAL at 08:15

## 2021-08-13 RX ADMIN — ARFORMOTEROL TARTRATE 15 MCG: 15 SOLUTION RESPIRATORY (INHALATION) at 18:39

## 2021-08-13 RX ADMIN — FLECAINIDE ACETATE 100 MG: 100 TABLET ORAL at 08:04

## 2021-08-13 RX ADMIN — ACETYLCYSTEINE 600 MG: 200 SOLUTION ORAL; RESPIRATORY (INHALATION) at 18:42

## 2021-08-13 RX ADMIN — BUDESONIDE 1000 MCG: 0.5 SUSPENSION RESPIRATORY (INHALATION) at 18:39

## 2021-08-13 RX ADMIN — ASPIRIN 81 MG CHEWABLE TABLET 81 MG: 81 TABLET CHEWABLE at 08:04

## 2021-08-13 RX ADMIN — ATORVASTATIN CALCIUM 20 MG: 20 TABLET, FILM COATED ORAL at 08:04

## 2021-08-13 RX ADMIN — APIXABAN 5 MG: 5 TABLET, FILM COATED ORAL at 20:28

## 2021-08-13 RX ADMIN — Medication 11.3 MILLICURIE: at 08:47

## 2021-08-13 RX ADMIN — METHYLPREDNISOLONE SODIUM SUCCINATE 60 MG: 125 INJECTION, POWDER, FOR SOLUTION INTRAMUSCULAR; INTRAVENOUS at 20:28

## 2021-08-13 RX ADMIN — METHYLPREDNISOLONE SODIUM SUCCINATE 60 MG: 125 INJECTION, POWDER, FOR SOLUTION INTRAMUSCULAR; INTRAVENOUS at 13:52

## 2021-08-13 RX ADMIN — REGADENOSON 0.4 MG: 0.08 INJECTION, SOLUTION INTRAVENOUS at 10:24

## 2021-08-13 RX ADMIN — Medication 10 ML: at 20:29

## 2021-08-13 RX ADMIN — FUROSEMIDE 40 MG: 20 TABLET ORAL at 08:04

## 2021-08-13 RX ADMIN — FLECAINIDE ACETATE 100 MG: 100 TABLET ORAL at 20:28

## 2021-08-13 RX ADMIN — IPRATROPIUM BROMIDE AND ALBUTEROL SULFATE 3 ML: .5; 2.5 SOLUTION RESPIRATORY (INHALATION) at 08:25

## 2021-08-13 RX ADMIN — IPRATROPIUM BROMIDE AND ALBUTEROL SULFATE 3 ML: .5; 2.5 SOLUTION RESPIRATORY (INHALATION) at 15:10

## 2021-08-13 RX ADMIN — LEVOFLOXACIN 750 MG: 5 INJECTION, SOLUTION INTRAVENOUS at 08:05

## 2021-08-13 RX ADMIN — DILTIAZEM HYDROCHLORIDE 240 MG: 240 CAPSULE, EXTENDED RELEASE ORAL at 20:28

## 2021-08-13 RX ADMIN — GUAIFENESIN SYRUP AND DEXTROMETHORPHAN 5 ML: 100; 10 SYRUP ORAL at 08:05

## 2021-08-13 RX ADMIN — Medication 10 ML: at 08:05

## 2021-08-13 RX ADMIN — METHYLPREDNISOLONE SODIUM SUCCINATE 60 MG: 125 INJECTION, POWDER, FOR SOLUTION INTRAMUSCULAR; INTRAVENOUS at 06:23

## 2021-08-13 RX ADMIN — FLUTICASONE PROPIONATE 1 SPRAY: 50 SPRAY, METERED NASAL at 08:06

## 2021-08-13 ASSESSMENT — PAIN SCALES - GENERAL
PAINLEVEL_OUTOF10: 0
PAINLEVEL_OUTOF10: 0

## 2021-08-13 NOTE — PLAN OF CARE
Problem: Pain:  Goal: Pain level will decrease  Description: Pain level will decrease  8/13/2021 0102 by Kendall Morley RN  Outcome: Met This Shift  8/12/2021 2231 by Kendall Morley RN  Outcome: Met This Shift  8/12/2021 1731 by Jo-Ann Shine RN  Outcome: Met This Shift  Goal: Control of acute pain  Description: Control of acute pain  8/13/2021 0102 by Kendall Morley RN  Outcome: Met This Shift  8/12/2021 2231 by Kendall Morley RN  Outcome: Met This Shift  8/12/2021 1731 by Jo-Ann Shine RN  Outcome: Met This Shift  Goal: Control of chronic pain  Description: Control of chronic pain  8/13/2021 0102 by Kendall Morley RN  Outcome: Met This Shift  8/12/2021 2231 by Kendall Morley RN  Outcome: Met This Shift  8/12/2021 1731 by Jo-Ann Shine RN  Outcome: Met This Shift

## 2021-08-13 NOTE — PROCEDURES
Lexiscan MPS  No CP  No ischemic ECG changes  Nuclear images reported separately    Electronically signed by Tamara No MD on 8/13/2021 at 10:27 AM

## 2021-08-13 NOTE — PROGRESS NOTES
Subjective: The patient is awake and alert. Apparently upset with me per nursing staff  I spoke wit him - he is frustrated but ok     Objective:    /64   Pulse 62   Temp 97.2 °F (36.2 °C) (Temporal)   Resp 20   Ht 5' 11\" (1.803 m)   Wt 276 lb 3.8 oz (125.3 kg)   SpO2 99%   BMI 38.53 kg/m²     In: 690 [P.O.:240]  Out: 2025   In: 690   Out: 2025 [Urine:2025]    General appearance: NAD, conversant  HEENT: AT/NC, MMM  Neck: FROM, supple  Lungs: Coarse breath sounds bilaterally, still  CV: RRR, no MRGs  Vasc: Radial pulses 2+  Abdomen: Soft, non-tender; no masses or HSM  Extremities: No peripheral edema or digital cyanosis  Skin: no rash, lesions or ulcers  Psych: Alert and oriented to person, place and time  Neuro: Alert and interactive     No results for input(s): WBC, HGB, HCT, PLT in the last 72 hours. No results for input(s): NA, K, CL, CO2, BUN, CREATININE, CALCIUM in the last 72 hours. Invalid input(s): GLU    Assessment:    Principal Problem:    COPD with acute exacerbation ([facility] [address]  Resolved Problems:    * No resolved hospital problems.  *      Plan:  Admit to telemetry for evaluation of acute exacerbation of COPD  IV steroids-increase to 60 every 6-x8 doses-,  Solu-Medrol 60 IV every 8 hours by pulmonology  Continue BiPAP as at home at nighttime  Duo nebs, ICS, LABA/Sasha  Add Levaquin 750 IV daily   And Mucomyst inhaled solution twice daily  Resume home medications  Continue rate and rhythm control, oral anticoagulation for a flutter history  Pulmonology evaluation and as patient indicates he still feels horrible-known to Dr. Cris mejia-Case discussed  Echo normal  Await results of stress test today   He has horrible lungs and does not seem to understand this is the cause of his shortness of breath  Add chest pt     DVT prophylaxis  PT OT  Discharge plan-based on pulmonology            Brittani Lyon MD  1:23 PM  8/13/2021

## 2021-08-13 NOTE — PLAN OF CARE
Problem: Pain:  Description: Pain management should include both nonpharmacologic and pharmacologic interventions.   Goal: Pain level will decrease  Description: Pain level will decrease  Outcome: Met This Shift     Problem: Skin Integrity:  Goal: Will show no infection signs and symptoms  Description: Will show no infection signs and symptoms  Outcome: Met This Shift

## 2021-08-13 NOTE — PLAN OF CARE
Problem: Pain:  Goal: Pain level will decrease  Description: Pain level will decrease  8/12/2021 2231 by Nathen Thompson RN  Outcome: Met This Shift  8/12/2021 1731 by Franki Dorsey RN  Outcome: Met This Shift  Goal: Control of acute pain  Description: Control of acute pain  8/12/2021 2231 by Nathen Thompson RN  Outcome: Met This Shift  8/12/2021 1731 by Franki Dorsey RN  Outcome: Met This Shift  Goal: Control of chronic pain  Description: Control of chronic pain  8/12/2021 2231 by Nathen Thompson RN  Outcome: Met This Shift  8/12/2021 1731 by Franki Dorsey RN  Outcome: Met This Shift     Problem: Skin Integrity:  Goal: Will show no infection signs and symptoms  Description: Will show no infection signs and symptoms  8/12/2021 2231 by Nathen Thompson RN  Outcome: Met This Shift  8/12/2021 1731 by Franki Dorsey RN  Outcome: Met This Shift  Goal: Absence of new skin breakdown  Description: Absence of new skin breakdown  8/12/2021 2231 by Nathen Thompson RN  Outcome: Met This Shift  8/12/2021 1731 by Franki Dorsey RN  Outcome: Met This Shift

## 2021-08-14 PROCEDURE — 2580000003 HC RX 258: Performed by: INTERNAL MEDICINE

## 2021-08-14 PROCEDURE — 94644 CONT INHLJ TX 1ST HOUR: CPT

## 2021-08-14 PROCEDURE — 2060000000 HC ICU INTERMEDIATE R&B

## 2021-08-14 PROCEDURE — 6360000002 HC RX W HCPCS: Performed by: INTERNAL MEDICINE

## 2021-08-14 PROCEDURE — 6370000000 HC RX 637 (ALT 250 FOR IP): Performed by: PHYSICIAN ASSISTANT

## 2021-08-14 PROCEDURE — 94640 AIRWAY INHALATION TREATMENT: CPT

## 2021-08-14 PROCEDURE — 2580000003 HC RX 258: Performed by: PHYSICIAN ASSISTANT

## 2021-08-14 PROCEDURE — 94667 MNPJ CHEST WALL 1ST: CPT

## 2021-08-14 PROCEDURE — 2700000000 HC OXYGEN THERAPY PER DAY

## 2021-08-14 PROCEDURE — 6370000000 HC RX 637 (ALT 250 FOR IP): Performed by: NURSE PRACTITIONER

## 2021-08-14 PROCEDURE — 94660 CPAP INITIATION&MGMT: CPT

## 2021-08-14 PROCEDURE — 99233 SBSQ HOSP IP/OBS HIGH 50: CPT | Performed by: INTERNAL MEDICINE

## 2021-08-14 RX ORDER — SODIUM CHLORIDE FOR INHALATION 3 %
4 VIAL, NEBULIZER (ML) INHALATION PRN
Status: DISCONTINUED | OUTPATIENT
Start: 2021-08-14 | End: 2021-08-30 | Stop reason: HOSPADM

## 2021-08-14 RX ORDER — FUROSEMIDE 10 MG/ML
20 INJECTION INTRAMUSCULAR; INTRAVENOUS 3 TIMES DAILY
Status: DISCONTINUED | OUTPATIENT
Start: 2021-08-15 | End: 2021-08-24

## 2021-08-14 RX ORDER — GUAIFENESIN 400 MG/1
400 TABLET ORAL EVERY 6 HOURS
Status: DISCONTINUED | OUTPATIENT
Start: 2021-08-15 | End: 2021-08-30 | Stop reason: HOSPADM

## 2021-08-14 RX ADMIN — LOSARTAN POTASSIUM 50 MG: 50 TABLET, FILM COATED ORAL at 08:51

## 2021-08-14 RX ADMIN — ARFORMOTEROL TARTRATE 15 MCG: 15 SOLUTION RESPIRATORY (INHALATION) at 08:33

## 2021-08-14 RX ADMIN — FLECAINIDE ACETATE 100 MG: 100 TABLET ORAL at 21:11

## 2021-08-14 RX ADMIN — IPRATROPIUM BROMIDE AND ALBUTEROL SULFATE 3 ML: .5; 2.5 SOLUTION RESPIRATORY (INHALATION) at 15:52

## 2021-08-14 RX ADMIN — Medication 10 ML: at 21:11

## 2021-08-14 RX ADMIN — ACETAMINOPHEN 650 MG: 325 TABLET ORAL at 10:17

## 2021-08-14 RX ADMIN — METHYLPREDNISOLONE SODIUM SUCCINATE 60 MG: 125 INJECTION, POWDER, FOR SOLUTION INTRAMUSCULAR; INTRAVENOUS at 14:57

## 2021-08-14 RX ADMIN — IPRATROPIUM BROMIDE AND ALBUTEROL SULFATE 3 ML: .5; 2.5 SOLUTION RESPIRATORY (INHALATION) at 08:34

## 2021-08-14 RX ADMIN — LEVOFLOXACIN 750 MG: 5 INJECTION, SOLUTION INTRAVENOUS at 08:50

## 2021-08-14 RX ADMIN — IPRATROPIUM BROMIDE AND ALBUTEROL SULFATE 3 ML: .5; 2.5 SOLUTION RESPIRATORY (INHALATION) at 20:07

## 2021-08-14 RX ADMIN — IPRATROPIUM BROMIDE AND ALBUTEROL SULFATE 3 ML: .5; 2.5 SOLUTION RESPIRATORY (INHALATION) at 21:32

## 2021-08-14 RX ADMIN — ACETYLCYSTEINE 600 MG: 200 SOLUTION ORAL; RESPIRATORY (INHALATION) at 08:33

## 2021-08-14 RX ADMIN — IPRATROPIUM BROMIDE AND ALBUTEROL SULFATE 3 ML: .5; 2.5 SOLUTION RESPIRATORY (INHALATION) at 11:51

## 2021-08-14 RX ADMIN — SODIUM CHLORIDE SOLN NEBU 3% 4 ML: 3 NEBU SOLN at 08:34

## 2021-08-14 RX ADMIN — BUDESONIDE 1000 MCG: 0.5 SUSPENSION RESPIRATORY (INHALATION) at 08:33

## 2021-08-14 RX ADMIN — METHYLPREDNISOLONE SODIUM SUCCINATE 60 MG: 125 INJECTION, POWDER, FOR SOLUTION INTRAMUSCULAR; INTRAVENOUS at 06:02

## 2021-08-14 RX ADMIN — ACETYLCYSTEINE 600 MG: 200 SOLUTION ORAL; RESPIRATORY (INHALATION) at 21:31

## 2021-08-14 RX ADMIN — APIXABAN 5 MG: 5 TABLET, FILM COATED ORAL at 21:11

## 2021-08-14 RX ADMIN — Medication 10 ML: at 08:51

## 2021-08-14 RX ADMIN — DILTIAZEM HYDROCHLORIDE 240 MG: 240 CAPSULE, EXTENDED RELEASE ORAL at 21:11

## 2021-08-14 RX ADMIN — DILTIAZEM HYDROCHLORIDE 240 MG: 240 CAPSULE, EXTENDED RELEASE ORAL at 08:51

## 2021-08-14 RX ADMIN — Medication 10 ML: at 21:16

## 2021-08-14 RX ADMIN — METHYLPREDNISOLONE SODIUM SUCCINATE 60 MG: 125 INJECTION, POWDER, FOR SOLUTION INTRAMUSCULAR; INTRAVENOUS at 21:16

## 2021-08-14 RX ADMIN — FLECAINIDE ACETATE 100 MG: 100 TABLET ORAL at 08:50

## 2021-08-14 RX ADMIN — SODIUM CHLORIDE SOLN NEBU 3% 4 ML: 3 NEBU SOLN at 20:07

## 2021-08-14 RX ADMIN — APIXABAN 5 MG: 5 TABLET, FILM COATED ORAL at 08:51

## 2021-08-14 RX ADMIN — ATORVASTATIN CALCIUM 20 MG: 20 TABLET, FILM COATED ORAL at 08:51

## 2021-08-14 RX ADMIN — BUDESONIDE 1000 MCG: 0.5 SUSPENSION RESPIRATORY (INHALATION) at 20:07

## 2021-08-14 RX ADMIN — FUROSEMIDE 40 MG: 20 TABLET ORAL at 08:51

## 2021-08-14 RX ADMIN — FLUTICASONE PROPIONATE 1 SPRAY: 50 SPRAY, METERED NASAL at 08:59

## 2021-08-14 RX ADMIN — ASPIRIN 81 MG CHEWABLE TABLET 81 MG: 81 TABLET CHEWABLE at 08:51

## 2021-08-14 RX ADMIN — ARFORMOTEROL TARTRATE 15 MCG: 15 SOLUTION RESPIRATORY (INHALATION) at 20:07

## 2021-08-14 ASSESSMENT — PAIN DESCRIPTION - DESCRIPTORS
DESCRIPTORS: DISCOMFORT;HEADACHE
DESCRIPTORS: HEADACHE;DISCOMFORT

## 2021-08-14 ASSESSMENT — PAIN - FUNCTIONAL ASSESSMENT
PAIN_FUNCTIONAL_ASSESSMENT: ACTIVITIES ARE NOT PREVENTED
PAIN_FUNCTIONAL_ASSESSMENT: ACTIVITIES ARE NOT PREVENTED

## 2021-08-14 ASSESSMENT — PAIN SCALES - GENERAL
PAINLEVEL_OUTOF10: 0
PAINLEVEL_OUTOF10: 6
PAINLEVEL_OUTOF10: 3

## 2021-08-14 ASSESSMENT — PAIN DESCRIPTION - ONSET
ONSET: ON-GOING
ONSET: ON-GOING

## 2021-08-14 ASSESSMENT — PAIN DESCRIPTION - FREQUENCY
FREQUENCY: CONTINUOUS
FREQUENCY: CONTINUOUS

## 2021-08-14 ASSESSMENT — PAIN DESCRIPTION - ORIENTATION
ORIENTATION: MID
ORIENTATION: MID

## 2021-08-14 ASSESSMENT — PAIN DESCRIPTION - PROGRESSION
CLINICAL_PROGRESSION: GRADUALLY WORSENING
CLINICAL_PROGRESSION: GRADUALLY IMPROVING

## 2021-08-14 ASSESSMENT — PAIN DESCRIPTION - LOCATION
LOCATION: HEAD
LOCATION: HEAD

## 2021-08-14 ASSESSMENT — PAIN DESCRIPTION - PAIN TYPE
TYPE: ACUTE PAIN
TYPE: ACUTE PAIN

## 2021-08-14 NOTE — PROGRESS NOTES
Patient states his leg swelling continues to increase and he does not feel his diuretic is working. He is currently on Lasix 40mg PO daily. Message left for Dr. Suellen Miller.

## 2021-08-14 NOTE — PLAN OF CARE
Problem: Pain:  Goal: Pain level will decrease  Description: Pain level will decrease  8/13/2021 1751 by Kevin Merchant RN  Outcome: Met This Shift     Problem: Skin Integrity:  Goal: Will show no infection signs and symptoms  Description: Will show no infection signs and symptoms  8/13/2021 2115 by Niurka De La Torre  Outcome: Met This Shift  8/13/2021 1751 by Kevin Merchant RN  Outcome: Met This Shift  Goal: Absence of new skin breakdown  Description: Absence of new skin breakdown  Outcome: Met This Shift

## 2021-08-14 NOTE — PLAN OF CARE
Problem: Pain:  Goal: Pain level will decrease  Description: Pain level will decrease  Outcome: Met This Shift  Goal: Control of acute pain  Description: Control of acute pain  Outcome: Met This Shift     Problem: Skin Integrity:  Goal: Will show no infection signs and symptoms  Description: Will show no infection signs and symptoms  Outcome: Met This Shift     Problem: Airway Clearance - Ineffective:  Goal: Ability to maintain a clear airway will improve  Description: Ability to maintain a clear airway will improve  Outcome: Ongoing

## 2021-08-14 NOTE — PROGRESS NOTES
Internal Medicine Progress Note        Synopsis: Patient admitted on 8/7/2021 Reji Donato is a 66y.o. year old smoking at age 15 and increased gradually to 2 pack daily, he quit a few months ago came into the hospital with shortness of breath, associated with cough, productive of yellow sputum along with chest tightness with no fever or chills or numbness or chest pain    He had a stress test  He continues to not feel well  Subjective   complains of a lot of cough unable to expectorate and feels for breath    Exam:  BP (!) 151/71   Pulse 81   Temp 97.3 °F (36.3 °C) (Temporal)   Resp 20   Ht 5' 11\" (1.803 m)   Wt 288 lb 5.8 oz (130.8 kg)   SpO2 97%   BMI 40.22 kg/m²   General appearance: No apparent distress, appears stated age and cooperative. HEENT: Pupils equal, round, and reactive to light. Conjunctivae/corneas clear. Neck: . Trachea midline. Respiratory: Decreased bibasilar bases very distant   cardiovascular: Regular rate and rhythm with normal S1/S2 without murmurs, rubs or gallops. Abdomen: Soft, non-tender, non-distended with normal bowel sounds. Musculoskeletal: No clubbing, cyanosis 1+ edema  bilaterally. Brisk capillary refill. 2+ lower extremity pulses (dorsalis pedis).    Skin:  No rashes    Neurologic: awake, alert and following commands     Medications:  Reviewed    Infusion Medications    sodium chloride       Scheduled Medications    acetylcysteine  600 mg Inhalation BID    methylPREDNISolone  60 mg Intravenous Q8H    budesonide  1 mg Nebulization BID    And    Arformoterol Tartrate  15 mcg Nebulization BID    levofloxacin  750 mg Intravenous Q24H    sodium chloride (Inhalant)  4 mL Nebulization BID    apixaban  5 mg Oral BID    atorvastatin  20 mg Oral Daily    dilTIAZem  240 mg Oral BID    flecainide  100 mg Oral BID    fluticasone  1 spray Each Nostril Daily    furosemide  40 mg Oral Daily    ipratropium-albuterol  1 vial Inhalation Q4H    losartan  50 mg Oral Daily    sodium chloride flush  5-40 mL Intravenous 2 times per day    aspirin  81 mg Oral Daily     PRN Meds: perflutren lipid microspheres, guaiFENesin-dextromethorphan, albuterol, sodium chloride flush, sodium chloride, ondansetron **OR** ondansetron, polyethylene glycol, acetaminophen **OR** acetaminophen    I/O    Intake/Output Summary (Last 24 hours) at 8/14/2021 1201  Last data filed at 8/14/2021 1033  Gross per 24 hour   Intake 480 ml   Output 1300 ml   Net -820 ml       Labs:   No results for input(s): WBC, HGB, HCT, PLT in the last 72 hours. No results for input(s): NA, K, CL, CO2, BUN, CREATININE, CALCIUM, PHOS in the last 72 hours. Invalid input(s): MAGNES    No results for input(s): PROT, ALB, ALKPHOS, ALT, AST, BILITOT, AMYLASE, LIPASE in the last 72 hours. No results for input(s): INR in the last 72 hours. No results for input(s): Cassius Robert in the last 72 hours. Chronic labs:  Lab Results   Component Value Date    CHOL 205 (H) 04/08/2018    TRIG 80 04/08/2018    HDL 80 04/08/2018    LDLCALC 109 (H) 04/08/2018    TSH 0.942 02/14/2018    PSA 0.26 04/08/2018    INR 1.2 02/04/2018       Radiology:  XR CHEST PORTABLE    Result Date: 8/8/2021  Cardiomegaly. Large opacity in the right cardiophrenic angle. Although this may represent large epicardial fat pad, given the interval change since the prior examination further evaluation and characterization with CT of the chest is recommended. Mild hyperinflation, suggest COPD. CTA CHEST W CONTRAST    Result Date: 8/8/2021  No evidence of pulmonary embolism or acute pulmonary abnormality. Prominent pericardial fat accounts for the abnormality seen on radiograph.      NM MYOCARDIAL SPECT REST EXERCISE OR RX    Result Date: 8/13/2021  The myocardial perfusion imaging was probably normal with the large fixed inferior/lateral apical wall defect being more consistent with attenuation artifact and less likely the result of a myocardial infarction especially with the normal contractility of the inferior wall and the lateral apical wall. More importantly, there did not appear to be any evidence of stress-induced ischemia on this study Overall left ventricular systolic function was normal with no regional wall motion abnormality Low risk general pharmacologic stress test.     ASSESSMENT:    Principal Problem:    COPD with acute exacerbation (HCC)  Resolved Problems:    * No resolved hospital problems. *  Chest pains  Obesity  Hypertension  Hyperlipidemia     PLAN:   1.  Maintaining respiratory hygiene and BiPAP usage and nasal O2 along with IV antibiotics and IV steroids  2. Maintain antihyperlipidemia and anticoagulation  3. Atrial flutter under control  4. History of adrenal adenoma and previous history of EtOH abuse      Diet: ADULT DIET; Regular  Code Status: Full Code  PT/OT Eval Status:   Order  DVT Prophylaxis:   Eliquis  Recommended disposition at discharge:   Not sure yet    +++++++++++++++++++++++++++++++++++++++++++++++++  Yeni Nicole MD   [facility].  +++++++++++++++++++++++++++++++++++++++++++++++++  NOTE: This report was transcribed using voice recognition software. Every effort was made to ensure accuracy; however, inadvertent computerized transcription errors may be present.

## 2021-08-14 NOTE — PROGRESS NOTES
[facility]  Division of Pulmonary, Critical Care Medicine  Pulmonary [address] [facility]           Pulmonary Clinic consult       CC :SOB ,wheeizng   H/o A flutter     HPI :  Patient has been doing  better   Still with difficulty breathing   Tightness as well   Still with chest congestion    PHYSICAL EXAMINATION:     VITAL SIGNS:  BP (!) 144/81   Pulse 64   Temp 97.4 °F (36.3 °C) (Temporal)   Resp 18   Ht 5' 11\" (1.803 m)   Wt 276 lb 3.8 oz (125.3 kg)   SpO2 97%   BMI 38.53 kg/m²   Wt Readings from Last 3 Encounters:   08/13/21 276 lb 3.8 oz (125.3 kg)   01/22/20 277 lb (125.6 kg)   01/02/20 271 lb (122.9 kg)     Temp Readings from Last 3 Encounters:   08/13/21 97.4 °F (36.3 °C) (Temporal)   08/09/19 97.7 °F (36.5 °C)   04/09/19 98.5 °F (36.9 °C) (Oral)     TMAX:  BP Readings from Last 3 Encounters:   08/13/21 (!) 144/81   01/22/20 132/77   01/02/20 124/82     Pulse Readings from Last 3 Encounters:   08/13/21 64   01/22/20 109   01/02/20 83           INTAKE/OUTPUTS:  I/O last 3 completed shifts:  In: -   Out: 900 [Urine:900]    Intake/Output Summary (Last 24 hours) at 8/13/2021 2358  Last data filed at 8/13/2021 2301  Gross per 24 hour   Intake 120 ml   Output 1800 ml   Net -1680 ml       This is virtual visit      LABS/IMAGING:    CBC:  Lab Results   Component Value Date    WBC 13.9 (H) 08/09/2021    HGB 12.1 (L) 08/09/2021    HCT 38.2 08/09/2021    MCV 99.7 08/09/2021     08/09/2021    LYMPHOPCT 9.6 (L) 08/09/2021    RBC 3.83 08/09/2021    MCH 31.6 08/09/2021    MCHC 31.7 (L) 08/09/2021    RDW 12.4 08/09/2021    NEUTOPHILPCT 83.0 (H) 08/09/2021    MONOPCT 5.6 08/09/2021    BASOPCT 0.1 08/09/2021    NEUTROABS 11.54 (H) 08/09/2021    LYMPHSABS 1.33 (L) 08/09/2021    MONOSABS 0.78 08/09/2021    EOSABS 0.00 (L) 08/09/2021    BASOSABS 0.02 08/09/2021       Recent Labs     08/09/21  0817 08/07/21  2117   WBC 13.9* 9.6   HGB 12.1* 12.8   HCT 38.2 38.7   MCV 99.7 95.6    229       BMP:   No results for input(s): NA, K, CL, CO2, PHOS, BUN, CREATININE in the last 72 hours. Invalid input(s): CA    MG:   Lab Results   Component Value Date    MG 2.2 02/14/2018     Ca/Phos:   Lab Results   Component Value Date    CALCIUM 9.9 08/09/2021     Amylase: No results found for: AMYLASE  Lipase:   Lab Results   Component Value Date    LIPASE 28 05/19/2011     LIVER PROFILE: No results for input(s): AST, ALT, LIPASE, BILIDIR, BILITOT, ALKPHOS in the last 72 hours. Invalid input(s): AMYLASE,  ALB    PT/INR:   No results for input(s): PROTIME, INR in the last 72 hours. APTT: No results for input(s): APTT in the last 72 hours. Cardiac Enzymes:  Lab Results   Component Value Date    CKTOTAL 51 11/13/2010    CKMB 0.5 11/13/2010    TROPONINI <0.01 02/15/2018       Hgb A1C: No results found for: LABA1C  No results found for: EAG  FAMILIA: No results found for: FAMILIA  ESR: No results found for: SEDRATE  CRP: No results found for: CRP  D Dimer: No results found for: DDIMER    Thyroid Studies:  Lab Results   Component Value Date    TSH 0.942 02/14/2018    D8XQNMS 4.8 02/14/2018           MICROBIOLOGY:  Pending       CXR:  Reviewed     CT Chest:reviewed     PE  Vitals:    08/13/21 2145   BP: (!) 144/81   Pulse: 64   Resp: 18   Temp: 97.4 °F (36.3 °C)   SpO2: 97%     General:  Awake, alert, oriented X 3. Well developed, well nourished, well groomed. No apparent distress. HEENT:  Normocephalic, atraumatic. Pupils equal, round, reactive to light. No scleral icterus. No conjunctival injection. Normal lips, teeth, and gums. No nasal discharge. Neck:  Supple, FROM  Heart:  RRR, no murmurs, gallops, rubs, carotid upstroke normal, no carotid bruits  Lungs:wheeizng bilateral ,rhonchi   Abdomen:   Bowel sounds present, soft, nontender, no masses, no organomegaly, no peritoneal signs  Extremities:  No clubbing, cyanosis, or edema  Skin:  Warm and dry, no open lesions or rash  Neuro:  Cranial nerves 2-12 intact, no focal deficits  Vascular: Radial and pedal Pulses 2+  Breast: deferred  Rectal: deferred  Genitalia:  deferred    PROBLEM LIST:  Patient Active Problem List   Diagnosis    COPD [facility]chronic obstructive pulmonary disease) ([facility] [address]    Essential hypertension    Adrenal adenoma    Class 2 obesity due to excess calories with serious comorbidity and body mass index (BMI) of 35.0 to 35.9 in adult    Anticoagulation management encounter    Typical atrial flutter ([facility] [address]    Anxiety    Status post catheter ablation of atrial flutter    Persistent atrial fibrillation ([facility] [address]    ETOH abuse    COPD with acute exacerbation (HCC)               ASSESSMENT:  1.) Acute   exacerbation of COPD  2) very severe COPD  3.)obstructive sleep apnea  4. )Afib /Flutter   5.)tobacco independent(quit 5 months ago)    Data:  FVC 3.63 which is 73 of predicted    FEV1 1.52 which is 39 of predicted    FEV1/FVC is 42  No Bronchodilator response    PLAN:  He is on 4 L which base line and RR 18   Continue  IV steroids   On Lasix 40 mg ,PO may change IV   On Duoneb   Albuterol as needed   On  Eliquis to take it twice daily  Continue Nebs   BiPAP     IgE 52  eosinophilic 439 ,if continue to have SOB will consider IL5 as OP  Need 2 d echo   Agree with cardiac work up and stress test  May consider bronch if continue not able to mirna secretions out       [place]πόλλωνος MD Ryan  Pulmonary/Critical care Medicine   [facility]

## 2021-08-15 LAB
ANION GAP SERPL CALCULATED.3IONS-SCNC: 10 MMOL/L (ref 7–16)
ANION GAP SERPL CALCULATED.3IONS-SCNC: 7 MMOL/L (ref 7–16)
BUN BLDV-MCNC: 46 MG/DL (ref 6–20)
BUN BLDV-MCNC: 49 MG/DL (ref 6–20)
CALCIUM SERPL-MCNC: 9.2 MG/DL (ref 8.6–10.2)
CALCIUM SERPL-MCNC: 9.2 MG/DL (ref 8.6–10.2)
CHLORIDE BLD-SCNC: 98 MMOL/L (ref 98–107)
CHLORIDE BLD-SCNC: 99 MMOL/L (ref 98–107)
CO2: 34 MMOL/L (ref 22–29)
CO2: 34 MMOL/L (ref 22–29)
CREAT SERPL-MCNC: 1.1 MG/DL (ref 0.7–1.2)
CREAT SERPL-MCNC: 1.4 MG/DL (ref 0.7–1.2)
GFR AFRICAN AMERICAN: >60
GFR AFRICAN AMERICAN: >60
GFR NON-AFRICAN AMERICAN: 52 ML/MIN/1.73
GFR NON-AFRICAN AMERICAN: >60 ML/MIN/1.73
GLUCOSE BLD-MCNC: 199 MG/DL (ref 74–99)
GLUCOSE BLD-MCNC: 237 MG/DL (ref 74–99)
HCT VFR BLD CALC: 34.6 % (ref 37–54)
HEMOGLOBIN: 11.3 G/DL (ref 12.5–16.5)
MCH RBC QN AUTO: 31.6 PG (ref 26–35)
MCHC RBC AUTO-ENTMCNC: 32.7 % (ref 32–34.5)
MCV RBC AUTO: 96.6 FL (ref 80–99.9)
METER GLUCOSE: 189 MG/DL (ref 74–99)
PDW BLD-RTO: 12.6 FL (ref 11.5–15)
PLATELET # BLD: 230 E9/L (ref 130–450)
PMV BLD AUTO: 12.2 FL (ref 7–12)
POTASSIUM SERPL-SCNC: 4.9 MMOL/L (ref 3.5–5)
POTASSIUM SERPL-SCNC: 5.2 MMOL/L (ref 3.5–5)
RBC # BLD: 3.58 E12/L (ref 3.8–5.8)
SODIUM BLD-SCNC: 140 MMOL/L (ref 132–146)
SODIUM BLD-SCNC: 142 MMOL/L (ref 132–146)
WBC # BLD: 18.5 E9/L (ref 4.5–11.5)

## 2021-08-15 PROCEDURE — 6360000002 HC RX W HCPCS: Performed by: INTERNAL MEDICINE

## 2021-08-15 PROCEDURE — 2580000003 HC RX 258: Performed by: PHYSICIAN ASSISTANT

## 2021-08-15 PROCEDURE — 94668 MNPJ CHEST WALL SBSQ: CPT

## 2021-08-15 PROCEDURE — 6370000000 HC RX 637 (ALT 250 FOR IP): Performed by: INTERNAL MEDICINE

## 2021-08-15 PROCEDURE — 80048 BASIC METABOLIC PNL TOTAL CA: CPT

## 2021-08-15 PROCEDURE — 94660 CPAP INITIATION&MGMT: CPT

## 2021-08-15 PROCEDURE — 94640 AIRWAY INHALATION TREATMENT: CPT

## 2021-08-15 PROCEDURE — 2060000000 HC ICU INTERMEDIATE R&B

## 2021-08-15 PROCEDURE — 36415 COLL VENOUS BLD VENIPUNCTURE: CPT

## 2021-08-15 PROCEDURE — 85027 COMPLETE CBC AUTOMATED: CPT

## 2021-08-15 PROCEDURE — 6370000000 HC RX 637 (ALT 250 FOR IP): Performed by: NURSE PRACTITIONER

## 2021-08-15 PROCEDURE — 2580000003 HC RX 258: Performed by: INTERNAL MEDICINE

## 2021-08-15 PROCEDURE — 82962 GLUCOSE BLOOD TEST: CPT

## 2021-08-15 PROCEDURE — 6370000000 HC RX 637 (ALT 250 FOR IP): Performed by: PHYSICIAN ASSISTANT

## 2021-08-15 PROCEDURE — 2700000000 HC OXYGEN THERAPY PER DAY

## 2021-08-15 RX ORDER — BUSPIRONE HYDROCHLORIDE 5 MG/1
5 TABLET ORAL 3 TIMES DAILY PRN
Status: DISCONTINUED | OUTPATIENT
Start: 2021-08-15 | End: 2021-08-30 | Stop reason: HOSPADM

## 2021-08-15 RX ORDER — FUROSEMIDE 10 MG/ML
20 INJECTION INTRAMUSCULAR; INTRAVENOUS 2 TIMES DAILY
Status: DISCONTINUED | OUTPATIENT
Start: 2021-08-15 | End: 2021-08-23

## 2021-08-15 RX ORDER — DEXTROSE MONOHYDRATE 25 G/50ML
12.5 INJECTION, SOLUTION INTRAVENOUS PRN
Status: DISCONTINUED | OUTPATIENT
Start: 2021-08-15 | End: 2021-08-30 | Stop reason: HOSPADM

## 2021-08-15 RX ORDER — NICOTINE POLACRILEX 4 MG
15 LOZENGE BUCCAL PRN
Status: DISCONTINUED | OUTPATIENT
Start: 2021-08-15 | End: 2021-08-30 | Stop reason: HOSPADM

## 2021-08-15 RX ORDER — DEXTROSE MONOHYDRATE 50 MG/ML
100 INJECTION, SOLUTION INTRAVENOUS PRN
Status: DISCONTINUED | OUTPATIENT
Start: 2021-08-15 | End: 2021-08-30 | Stop reason: HOSPADM

## 2021-08-15 RX ADMIN — METHYLPREDNISOLONE SODIUM SUCCINATE 60 MG: 125 INJECTION, POWDER, FOR SOLUTION INTRAMUSCULAR; INTRAVENOUS at 15:05

## 2021-08-15 RX ADMIN — FUROSEMIDE 20 MG: 10 INJECTION, SOLUTION INTRAMUSCULAR; INTRAVENOUS at 17:45

## 2021-08-15 RX ADMIN — BUDESONIDE 1000 MCG: 0.5 SUSPENSION RESPIRATORY (INHALATION) at 07:50

## 2021-08-15 RX ADMIN — IPRATROPIUM BROMIDE AND ALBUTEROL SULFATE 3 ML: .5; 2.5 SOLUTION RESPIRATORY (INHALATION) at 12:17

## 2021-08-15 RX ADMIN — BUDESONIDE 1000 MCG: 0.5 SUSPENSION RESPIRATORY (INHALATION) at 19:48

## 2021-08-15 RX ADMIN — SODIUM CHLORIDE SOLN NEBU 3% 4 ML: 3 NEBU SOLN at 19:51

## 2021-08-15 RX ADMIN — GUAIFENESIN 400 MG: 400 TABLET ORAL at 12:28

## 2021-08-15 RX ADMIN — DILTIAZEM HYDROCHLORIDE 240 MG: 240 CAPSULE, EXTENDED RELEASE ORAL at 21:08

## 2021-08-15 RX ADMIN — Medication 10 ML: at 21:07

## 2021-08-15 RX ADMIN — ARFORMOTEROL TARTRATE 15 MCG: 15 SOLUTION RESPIRATORY (INHALATION) at 19:50

## 2021-08-15 RX ADMIN — FLUTICASONE PROPIONATE 1 SPRAY: 50 SPRAY, METERED NASAL at 09:03

## 2021-08-15 RX ADMIN — FLECAINIDE ACETATE 100 MG: 100 TABLET ORAL at 08:49

## 2021-08-15 RX ADMIN — LOSARTAN POTASSIUM 50 MG: 50 TABLET, FILM COATED ORAL at 08:48

## 2021-08-15 RX ADMIN — SODIUM CHLORIDE SOLN NEBU 3% 4 ML: 3 NEBU SOLN at 07:51

## 2021-08-15 RX ADMIN — METHYLPREDNISOLONE SODIUM SUCCINATE 60 MG: 125 INJECTION, POWDER, FOR SOLUTION INTRAMUSCULAR; INTRAVENOUS at 04:07

## 2021-08-15 RX ADMIN — IPRATROPIUM BROMIDE AND ALBUTEROL SULFATE 3 ML: .5; 2.5 SOLUTION RESPIRATORY (INHALATION) at 19:47

## 2021-08-15 RX ADMIN — DILTIAZEM HYDROCHLORIDE 240 MG: 240 CAPSULE, EXTENDED RELEASE ORAL at 08:49

## 2021-08-15 RX ADMIN — APIXABAN 5 MG: 5 TABLET, FILM COATED ORAL at 08:49

## 2021-08-15 RX ADMIN — APIXABAN 5 MG: 5 TABLET, FILM COATED ORAL at 21:08

## 2021-08-15 RX ADMIN — BUSPIRONE HYDROCHLORIDE 5 MG: 5 TABLET ORAL at 21:08

## 2021-08-15 RX ADMIN — FUROSEMIDE 20 MG: 10 INJECTION, SOLUTION INTRAMUSCULAR; INTRAVENOUS at 04:07

## 2021-08-15 RX ADMIN — FLECAINIDE ACETATE 100 MG: 100 TABLET ORAL at 21:08

## 2021-08-15 RX ADMIN — LEVOFLOXACIN 750 MG: 5 INJECTION, SOLUTION INTRAVENOUS at 08:49

## 2021-08-15 RX ADMIN — IPRATROPIUM BROMIDE AND ALBUTEROL SULFATE 3 ML: .5; 2.5 SOLUTION RESPIRATORY (INHALATION) at 07:50

## 2021-08-15 RX ADMIN — METHYLPREDNISOLONE SODIUM SUCCINATE 60 MG: 125 INJECTION, POWDER, FOR SOLUTION INTRAMUSCULAR; INTRAVENOUS at 21:07

## 2021-08-15 RX ADMIN — ASPIRIN 81 MG CHEWABLE TABLET 81 MG: 81 TABLET CHEWABLE at 08:49

## 2021-08-15 RX ADMIN — GUAIFENESIN 400 MG: 400 TABLET ORAL at 22:35

## 2021-08-15 RX ADMIN — GUAIFENESIN 400 MG: 400 TABLET ORAL at 04:07

## 2021-08-15 RX ADMIN — INSULIN LISPRO 2 UNITS: 100 INJECTION, SOLUTION INTRAVENOUS; SUBCUTANEOUS at 19:13

## 2021-08-15 RX ADMIN — ATORVASTATIN CALCIUM 20 MG: 20 TABLET, FILM COATED ORAL at 08:49

## 2021-08-15 RX ADMIN — SODIUM CHLORIDE, PRESERVATIVE FREE 10 ML: 5 INJECTION INTRAVENOUS at 15:05

## 2021-08-15 RX ADMIN — IPRATROPIUM BROMIDE AND ALBUTEROL SULFATE 3 ML: .5; 2.5 SOLUTION RESPIRATORY (INHALATION) at 16:17

## 2021-08-15 RX ADMIN — GUAIFENESIN 400 MG: 400 TABLET ORAL at 17:45

## 2021-08-15 RX ADMIN — Medication 10 ML: at 08:49

## 2021-08-15 RX ADMIN — ARFORMOTEROL TARTRATE 15 MCG: 15 SOLUTION RESPIRATORY (INHALATION) at 07:50

## 2021-08-15 RX ADMIN — FUROSEMIDE 20 MG: 10 INJECTION, SOLUTION INTRAMUSCULAR; INTRAVENOUS at 08:48

## 2021-08-15 ASSESSMENT — PAIN DESCRIPTION - PROGRESSION
CLINICAL_PROGRESSION: GRADUALLY IMPROVING
CLINICAL_PROGRESSION: GRADUALLY IMPROVING

## 2021-08-15 ASSESSMENT — PAIN SCALES - GENERAL
PAINLEVEL_OUTOF10: 0
PAINLEVEL_OUTOF10: 0

## 2021-08-15 NOTE — PROGRESS NOTES
Patient reports feeling restless and \"antsy\" and requested medicine to help him relax and sleep tonight. Message placed to Dr. Ja Damon via perfect serve.

## 2021-08-15 NOTE — PROGRESS NOTES
Internal Medicine Progress Note        Synopsis: Patient admitted on 8/7/2021 Kulwinder Merino is a 66y.o. year old smoking at age 15 and increased gradually to 2 pack daily, he quit a few months ago came into the hospital with shortness of breath, associated with cough, productive of yellow sputum along with chest tightness with no fever or chills or numbness or chest pain    He had a stress test  He continues to not feel well  Subjective   complains of a lot of cough unable to expectorate and feels for breath  August 15, 2021  Slightly better but still mostly about the same    Exam:  /63   Pulse 72   Temp 97.6 °F (36.4 °C) (Temporal)   Resp 20   Ht 5' 11\" (1.803 m)   Wt 292 lb 1.8 oz (132.5 kg)   SpO2 94%   BMI 40.74 kg/m²   General appearance: No apparent distress, appears stated age and cooperative. HEENT: Pupils equal, round, and reactive to light. Conjunctivae/corneas clear. Neck: . Trachea midline. Respiratory: Decreased bibasilar bases very distant   cardiovascular: Regular rate and rhythm with normal S1/S2 without murmurs, rubs or gallops. Abdomen: Soft, non-tender, non-distended with normal bowel sounds. Musculoskeletal: No clubbing, cyanosis 1+ edema  bilaterally. Brisk capillary refill. 2+ lower extremity pulses (dorsalis pedis).    Skin:  No rashes    Neurologic: awake, alert and following commands     Medications:  Reviewed    Infusion Medications    sodium chloride       Scheduled Medications    furosemide  20 mg Intravenous BID    [Held by provider] furosemide  20 mg Intravenous TID    guaiFENesin  400 mg Oral Q6H    methylPREDNISolone  60 mg Intravenous Q8H    budesonide  1 mg Nebulization BID    And    Arformoterol Tartrate  15 mcg Nebulization BID    levofloxacin  750 mg Intravenous Q24H    sodium chloride (Inhalant)  4 mL Nebulization BID    apixaban  5 mg Oral BID    atorvastatin  20 mg Oral Daily    dilTIAZem  240 mg Oral BID    flecainide  100 mg Oral BID  fluticasone  1 spray Each Nostril Daily    ipratropium-albuterol  1 vial Inhalation Q4H    losartan  50 mg Oral Daily    sodium chloride flush  5-40 mL Intravenous 2 times per day    aspirin  81 mg Oral Daily     PRN Meds: sodium chloride (Inhalant), perflutren lipid microspheres, guaiFENesin-dextromethorphan, albuterol, sodium chloride flush, sodium chloride, ondansetron **OR** ondansetron, polyethylene glycol, acetaminophen **OR** acetaminophen    I/O    Intake/Output Summary (Last 24 hours) at 8/15/2021 1220  Last data filed at 8/15/2021 0905  Gross per 24 hour   Intake 1080 ml   Output 4450 ml   Net -3370 ml       Labs:   No results for input(s): WBC, HGB, HCT, PLT in the last 72 hours. Recent Labs     08/15/21  0528      K 5.2*   CL 98   CO2 34*   BUN 46*   CREATININE 1.1   CALCIUM 9.2       No results for input(s): PROT, ALB, ALKPHOS, ALT, AST, BILITOT, AMYLASE, LIPASE in the last 72 hours. No results for input(s): INR in the last 72 hours. No results for input(s): [place] in the last 72 hours. Chronic labs:  Lab Results   Component Value Date    CHOL 205 (H) 04/08/2018    TRIG 80 04/08/2018    HDL 80 04/08/2018    LDLCALC 109 (H) 04/08/2018    TSH 0.942 02/14/2018    PSA 0.26 04/08/2018    INR 1.2 02/04/2018       Radiology:  XR CHEST PORTABLE    Result Date: 8/8/2021  Cardiomegaly. Large opacity in the right cardiophrenic angle. Although this may represent large epicardial fat pad, given the interval change since the prior examination further evaluation and characterization with CT of the chest is recommended. Mild hyperinflation, suggest COPD. CTA CHEST W CONTRAST    Result Date: 8/8/2021  No evidence of pulmonary embolism or acute pulmonary abnormality. Prominent pericardial fat accounts for the abnormality seen on radiograph.      NM MYOCARDIAL SPECT REST EXERCISE OR RX    Result Date: 8/13/2021  The myocardial perfusion imaging was probably normal with the large fixed inferior/lateral apical wall defect being more consistent with attenuation artifact and less likely the result of a myocardial infarction especially with the normal contractility of the inferior wall and the lateral apical wall. More importantly, there did not appear to be any evidence of stress-induced ischemia on this study Overall left ventricular systolic function was normal with no regional wall motion abnormality Low risk general pharmacologic stress test.     ASSESSMENT:    Principal Problem:    COPD with acute exacerbation (HCC)  Resolved Problems:    * No resolved hospital problems. *  Chest pains  Obesity  Hypertension  Hyperlipidemia     PLAN:   1.  Maintaining respiratory hygiene and BiPAP usage and nasal O2 along with IV antibiotics and IV steroids  2. Maintain antihyperlipidemia and anticoagulation  3. Atrial flutter under control  4. History of adrenal adenoma and previous history of EtOH abuse  August 15, 2021  Still on diuresis. Still on O2. Being considered for a bronc. Still with trouble with clearing secretions  Echo and stress are both still okay   diet: ADULT DIET; Regular  Code Status: Full Code  PT/OT Eval Status:   Order  DVT Prophylaxis:   Eliquis  Recommended disposition at discharge:   Not sure yet    +++++++++++++++++++++++++++++++++++++++++++++++++  Adin Nava MD   [facility].  +++++++++++++++++++++++++++++++++++++++++++++++++  NOTE: This report was transcribed using voice recognition software. Every effort was made to ensure accuracy; however, inadvertent computerized transcription errors may be present.

## 2021-08-16 LAB
ADENOVIRUS BY PCR: NOT DETECTED
BORDETELLA PARAPERTUSSIS BY PCR: NOT DETECTED
BORDETELLA PERTUSSIS BY PCR: NOT DETECTED
CHLAMYDOPHILIA PNEUMONIAE BY PCR: NOT DETECTED
CORONAVIRUS 229E BY PCR: NOT DETECTED
CORONAVIRUS HKU1 BY PCR: NOT DETECTED
CORONAVIRUS NL63 BY PCR: NOT DETECTED
CORONAVIRUS OC43 BY PCR: NOT DETECTED
HCT VFR BLD CALC: 34.9 % (ref 37–54)
HEMOGLOBIN: 11.2 G/DL (ref 12.5–16.5)
HUMAN METAPNEUMOVIRUS BY PCR: NOT DETECTED
HUMAN RHINOVIRUS/ENTEROVIRUS BY PCR: NOT DETECTED
INFLUENZA A BY PCR: NOT DETECTED
INFLUENZA B BY PCR: NOT DETECTED
MCH RBC QN AUTO: 31.8 PG (ref 26–35)
MCHC RBC AUTO-ENTMCNC: 32.1 % (ref 32–34.5)
MCV RBC AUTO: 99.1 FL (ref 80–99.9)
METER GLUCOSE: 161 MG/DL (ref 74–99)
METER GLUCOSE: 182 MG/DL (ref 74–99)
METER GLUCOSE: 200 MG/DL (ref 74–99)
METER GLUCOSE: 202 MG/DL (ref 74–99)
MYCOPLASMA PNEUMONIAE BY PCR: NOT DETECTED
PARAINFLUENZA VIRUS 1 BY PCR: NOT DETECTED
PARAINFLUENZA VIRUS 2 BY PCR: NOT DETECTED
PARAINFLUENZA VIRUS 3 BY PCR: NOT DETECTED
PARAINFLUENZA VIRUS 4 BY PCR: NOT DETECTED
PDW BLD-RTO: 12.7 FL (ref 11.5–15)
PLATELET # BLD: 212 E9/L (ref 130–450)
PMV BLD AUTO: 11.8 FL (ref 7–12)
RBC # BLD: 3.52 E12/L (ref 3.8–5.8)
RESPIRATORY SYNCYTIAL VIRUS BY PCR: NOT DETECTED
SARS-COV-2, PCR: NOT DETECTED
WBC # BLD: 17.6 E9/L (ref 4.5–11.5)

## 2021-08-16 PROCEDURE — 2580000003 HC RX 258: Performed by: PHYSICIAN ASSISTANT

## 2021-08-16 PROCEDURE — 6360000002 HC RX W HCPCS: Performed by: INTERNAL MEDICINE

## 2021-08-16 PROCEDURE — 2060000000 HC ICU INTERMEDIATE R&B

## 2021-08-16 PROCEDURE — 6370000000 HC RX 637 (ALT 250 FOR IP): Performed by: PHYSICIAN ASSISTANT

## 2021-08-16 PROCEDURE — 82962 GLUCOSE BLOOD TEST: CPT

## 2021-08-16 PROCEDURE — 99232 SBSQ HOSP IP/OBS MODERATE 35: CPT | Performed by: INTERNAL MEDICINE

## 2021-08-16 PROCEDURE — 6370000000 HC RX 637 (ALT 250 FOR IP): Performed by: INTERNAL MEDICINE

## 2021-08-16 PROCEDURE — 85027 COMPLETE CBC AUTOMATED: CPT

## 2021-08-16 PROCEDURE — 94640 AIRWAY INHALATION TREATMENT: CPT

## 2021-08-16 PROCEDURE — 94669 MECHANICAL CHEST WALL OSCILL: CPT

## 2021-08-16 PROCEDURE — 94660 CPAP INITIATION&MGMT: CPT

## 2021-08-16 PROCEDURE — 6370000000 HC RX 637 (ALT 250 FOR IP): Performed by: NURSE PRACTITIONER

## 2021-08-16 PROCEDURE — 0202U NFCT DS 22 TRGT SARS-COV-2: CPT

## 2021-08-16 PROCEDURE — 2580000003 HC RX 258: Performed by: INTERNAL MEDICINE

## 2021-08-16 PROCEDURE — 36415 COLL VENOUS BLD VENIPUNCTURE: CPT

## 2021-08-16 PROCEDURE — 2700000000 HC OXYGEN THERAPY PER DAY

## 2021-08-16 RX ADMIN — ARFORMOTEROL TARTRATE 15 MCG: 15 SOLUTION RESPIRATORY (INHALATION) at 19:50

## 2021-08-16 RX ADMIN — SODIUM CHLORIDE SOLN NEBU 3% 4 ML: 3 NEBU SOLN at 19:50

## 2021-08-16 RX ADMIN — METHYLPREDNISOLONE SODIUM SUCCINATE 60 MG: 125 INJECTION, POWDER, FOR SOLUTION INTRAMUSCULAR; INTRAVENOUS at 20:39

## 2021-08-16 RX ADMIN — GUAIFENESIN 400 MG: 400 TABLET ORAL at 23:05

## 2021-08-16 RX ADMIN — FUROSEMIDE 20 MG: 10 INJECTION, SOLUTION INTRAMUSCULAR; INTRAVENOUS at 08:47

## 2021-08-16 RX ADMIN — ATORVASTATIN CALCIUM 20 MG: 20 TABLET, FILM COATED ORAL at 08:47

## 2021-08-16 RX ADMIN — FUROSEMIDE 20 MG: 10 INJECTION, SOLUTION INTRAMUSCULAR; INTRAVENOUS at 16:42

## 2021-08-16 RX ADMIN — Medication 10 ML: at 20:39

## 2021-08-16 RX ADMIN — IPRATROPIUM BROMIDE AND ALBUTEROL SULFATE 3 ML: .5; 2.5 SOLUTION RESPIRATORY (INHALATION) at 15:59

## 2021-08-16 RX ADMIN — SODIUM CHLORIDE SOLN NEBU 3% 4 ML: 3 NEBU SOLN at 08:01

## 2021-08-16 RX ADMIN — LOSARTAN POTASSIUM 50 MG: 50 TABLET, FILM COATED ORAL at 08:47

## 2021-08-16 RX ADMIN — INSULIN LISPRO 4 UNITS: 100 INJECTION, SOLUTION INTRAVENOUS; SUBCUTANEOUS at 06:36

## 2021-08-16 RX ADMIN — BUSPIRONE HYDROCHLORIDE 5 MG: 5 TABLET ORAL at 23:05

## 2021-08-16 RX ADMIN — GUAIFENESIN 400 MG: 400 TABLET ORAL at 16:42

## 2021-08-16 RX ADMIN — DILTIAZEM HYDROCHLORIDE 240 MG: 240 CAPSULE, EXTENDED RELEASE ORAL at 08:47

## 2021-08-16 RX ADMIN — FLUTICASONE PROPIONATE 1 SPRAY: 50 SPRAY, METERED NASAL at 08:47

## 2021-08-16 RX ADMIN — FLECAINIDE ACETATE 100 MG: 100 TABLET ORAL at 20:39

## 2021-08-16 RX ADMIN — LEVOFLOXACIN 750 MG: 5 INJECTION, SOLUTION INTRAVENOUS at 08:46

## 2021-08-16 RX ADMIN — IPRATROPIUM BROMIDE AND ALBUTEROL SULFATE 3 ML: .5; 2.5 SOLUTION RESPIRATORY (INHALATION) at 12:10

## 2021-08-16 RX ADMIN — ARFORMOTEROL TARTRATE 15 MCG: 15 SOLUTION RESPIRATORY (INHALATION) at 07:59

## 2021-08-16 RX ADMIN — ASPIRIN 81 MG CHEWABLE TABLET 81 MG: 81 TABLET CHEWABLE at 08:47

## 2021-08-16 RX ADMIN — Medication 10 ML: at 08:47

## 2021-08-16 RX ADMIN — INSULIN LISPRO 2 UNITS: 100 INJECTION, SOLUTION INTRAVENOUS; SUBCUTANEOUS at 21:47

## 2021-08-16 RX ADMIN — GUAIFENESIN 400 MG: 400 TABLET ORAL at 05:32

## 2021-08-16 RX ADMIN — GUAIFENESIN 400 MG: 400 TABLET ORAL at 12:00

## 2021-08-16 RX ADMIN — METHYLPREDNISOLONE SODIUM SUCCINATE 60 MG: 125 INJECTION, POWDER, FOR SOLUTION INTRAMUSCULAR; INTRAVENOUS at 14:10

## 2021-08-16 RX ADMIN — METHYLPREDNISOLONE SODIUM SUCCINATE 60 MG: 125 INJECTION, POWDER, FOR SOLUTION INTRAMUSCULAR; INTRAVENOUS at 05:32

## 2021-08-16 RX ADMIN — APIXABAN 5 MG: 5 TABLET, FILM COATED ORAL at 20:39

## 2021-08-16 RX ADMIN — BUSPIRONE HYDROCHLORIDE 5 MG: 5 TABLET ORAL at 14:10

## 2021-08-16 RX ADMIN — IPRATROPIUM BROMIDE AND ALBUTEROL SULFATE 3 ML: .5; 2.5 SOLUTION RESPIRATORY (INHALATION) at 19:50

## 2021-08-16 RX ADMIN — BUDESONIDE 1000 MCG: 0.5 SUSPENSION RESPIRATORY (INHALATION) at 07:59

## 2021-08-16 RX ADMIN — FLECAINIDE ACETATE 100 MG: 100 TABLET ORAL at 08:47

## 2021-08-16 RX ADMIN — IPRATROPIUM BROMIDE AND ALBUTEROL SULFATE 3 ML: .5; 2.5 SOLUTION RESPIRATORY (INHALATION) at 07:59

## 2021-08-16 RX ADMIN — DILTIAZEM HYDROCHLORIDE 240 MG: 240 CAPSULE, EXTENDED RELEASE ORAL at 20:39

## 2021-08-16 RX ADMIN — APIXABAN 5 MG: 5 TABLET, FILM COATED ORAL at 08:47

## 2021-08-16 RX ADMIN — BUDESONIDE 1000 MCG: 0.5 SUSPENSION RESPIRATORY (INHALATION) at 19:50

## 2021-08-16 RX ADMIN — INSULIN LISPRO 2 UNITS: 100 INJECTION, SOLUTION INTRAVENOUS; SUBCUTANEOUS at 16:59

## 2021-08-16 RX ADMIN — INSULIN LISPRO 2 UNITS: 100 INJECTION, SOLUTION INTRAVENOUS; SUBCUTANEOUS at 11:24

## 2021-08-16 ASSESSMENT — PAIN SCALES - GENERAL
PAINLEVEL_OUTOF10: 0

## 2021-08-16 ASSESSMENT — PAIN DESCRIPTION - PROGRESSION: CLINICAL_PROGRESSION: GRADUALLY IMPROVING

## 2021-08-16 NOTE — PLAN OF CARE
Problem: Pain:  Goal: Pain level will decrease  Description: Pain level will decrease  Outcome: Met This Shift  Goal: Control of acute pain  Description: Control of acute pain  8/16/2021 0055 by Marnie Schulz  Outcome: Met This Shift  8/15/2021 2131 by Austin Langford RN  Outcome: Met This Shift  8/15/2021 1625 by Giancarlo Monreal RN  Outcome: Met This Shift  Goal: Control of chronic pain  Description: Control of chronic pain  Outcome: Met This Shift     Problem: Skin Integrity:  Goal: Will show no infection signs and symptoms  Description: Will show no infection signs and symptoms  8/16/2021 0055 by Marnie Schulz  Outcome: Met This Shift  8/15/2021 2131 by Austin Langford RN  Outcome: Met This Shift  8/15/2021 1625 by Giancarlo Monreal RN  Outcome: Met This Shift  Goal: Absence of new skin breakdown  Description: Absence of new skin breakdown  8/16/2021 0055 by Marnie Schulz  Outcome: Met This Shift  8/15/2021 1625 by Giancarlo Monreal RN  Outcome: Met This Shift

## 2021-08-16 NOTE — PROGRESS NOTES
Comprehensive Nutrition Assessment    Type and Reason for Visit:  Initial    Nutrition Recommendations/Plan: Continue current diet. Nutrition Assessment:  Pt adm w/ COPD w/ acute exacerbation. Hx ETOH abuse. Good P.O. intake %. Continue current diet. Malnutrition Assessment:  Malnutrition Status:  No malnutrition      Estimated Daily Nutrient Needs:  Energy (kcal):   (REE 2066 X 1.1 SF); Weight Used for Energy Requirements:  Current     Protein (g):  110-125 (1.4-1.6); Weight Used for Protein Requirements:  Ideal        Fluid (ml/day):  ; Method Used for Fluid Requirements:  1 ml/kcal      Nutrition Related Findings:  A&O X4, +11.8L, BLE +2 pitting edema, abd rounded, BS active, elevated Glu levels (noted IV steroids)      Wounds:  None       Current Nutrition Therapies:    ADULT DIET; Regular    Anthropometric Measures:  · Height: 5' 11\" (180.3 cm)  · Current Body Weight: 271 lb (122.9 kg) (8/10 admit wt d/t CBW 8/16 291# elevated likely r/t fluid shifts (+I&Os, edema))   · Admission Body Weight: 271 lb (122.9 kg) (8/10 actual)    · Usual Body Weight:  (no wt hx per EMR)     · Ideal Body Weight: 172 lbs; % Ideal Body Weight 157.6 %   · BMI: 37.8  BMI Categories: Obese Class 2 (BMI 35.0 -39.9)       Nutrition Diagnosis:   No nutrition diagnosis at this time     Nutrition Interventions:   Food and/or Nutrient Delivery:  Continue Current Diet  Nutrition Education/Counseling:  No recommendation at this time   Coordination of Nutrition Care:  Continue to monitor while inpatient    Goals:  Pt will continue to consume >75% of meals       Nutrition Monitoring and Evaluation:   Food/Nutrient Intake Outcomes:  Food and Nutrient Intake  Physical Signs/Symptoms Outcomes:  Biochemical Data, GI Status, Fluid Status or Edema, Nutrition Focused Physical Findings, Skin, Weight     Discharge Planning:     Too soon to determine     Electronically signed by Celina Owens RD, LD on 8/16/21 at 10:31 AM EDT    Contact: [phone]

## 2021-08-16 NOTE — PLAN OF CARE
Problem: Pain:  Description: Pain management should include both nonpharmacologic and pharmacologic interventions.   Goal: Control of acute pain  Description: Control of acute pain  8/15/2021 2131 by Karrie Cortez, RN  Outcome: Met This Shift     Problem: Skin Integrity:  Goal: Will show no infection signs and symptoms  Description: Will show no infection signs and symptoms  8/15/2021 2131 by Karrie Cortez, RN  Outcome: Met This Shift

## 2021-08-16 NOTE — PROGRESS NOTES
Subjective: The patient is awake and alert. Very short with me   Denies any chest pain, fever, and chills    Objective:    BP (!) 143/70   Pulse 78   Temp 97.2 °F (36.2 °C) (Temporal)   Resp 18   Ht 5' 11\" (1.803 m)   Wt 291 lb 10.7 oz (132.3 kg)   SpO2 96%   BMI 40.68 kg/m²     In: 1190 [P.O.:1190]  Out: 3630   In: 1190   Out: 3630 [Urine:3630]    General appearance: NAD, conversant  HEENT: AT/NC, MMM  Neck: FROM, supple  Lungs: Tachypneic, diminished throughout  CV: RRR, no MRGs  Vasc: Radial pulses 2+  Abdomen: Soft, non-tender; no masses or HSM  Extremities: No peripheral edema or digital cyanosis  Skin: no rash, lesions or ulcers  Psych: Alert and oriented to person, place and time  Neuro: Alert and interactive     Recent Labs     08/15/21  1414 08/16/21  0701   WBC 18.5* 17.6*   HGB 11.3* 11.2*   HCT 34.6* 34.9*    212       Recent Labs     08/15/21  0528 08/15/21  1848    140   K 5.2* 4.9   CL 98 99   CO2 34* 34*   BUN 46* 49*   CREATININE 1.1 1.4*   CALCIUM 9.2 9.2       Assessment:    Principal Problem:    COPD with acute exacerbation (HCC)  Resolved Problems:    * No resolved hospital problems.  *      Plan:  Admit to telemetry for evaluation of acute exacerbation of COPD    IV steroids-increase to 60 every 6-x8 doses-,  Solu-Medrol 60 IV every 8 hours by pulmonology  Continue BiPAP as at home at nighttime  Duo nebs, ICS, LABA/Sasha  Add Levaquin 750 IV daily   Mucomyst inhaled solution twice daily  Resume home medications  Continue rate and rhythm control, oral anticoagulation for a flutter history  Pulmonology evaluation and as patient indicates he still feels horrible-known to Dr. Flaquita mejia-Case discussed  Echo normal  Stress test - negative   He has horrible lungs and does not seem to understand this is the cause of his shortness of breath  Add chest pt     DVT prophylaxis  PT OT  Discharge plan-based on pulmonology    SERA Lopez - CNP  10:21 AM  8/16/2021 Continues to be highly frustrated  Discussed with Dr. Surekha mejia   Not sure about bronchoscopy-this may exacerbate his current condition on my discussion with pulmonology  Echocardiogram unremarkable, stress test unremarkable  Check film array/respiratory panel  Continue to follow    I personally saw, examined and provided care for the patient. Radiographs, labs and medication list were reviewed by me independently. The case was discussed in detail and plans for care were established. Review of [address] [place] [address], documentation was conducted and revisions were made as appropriate directly by me. I agree with the above documented exam, problem list, and plan of care.      Neel Graham MD  1:01 PM  8/16/2021

## 2021-08-16 NOTE — PLAN OF CARE
Problem: Pain:  Goal: Control of acute pain  Description: Control of acute pain  Outcome: Met This Shift     Problem: Skin Integrity:  Goal: Will show no infection signs and symptoms  Description: Will show no infection signs and symptoms  Outcome: Met This Shift  Goal: Absence of new skin breakdown  Description: Absence of new skin breakdown  Outcome: Met This Shift

## 2021-08-16 NOTE — PROGRESS NOTES
[facility]  Division of Pulmonary, Critical Care Medicine  Pulmonary [address] [facility]           Pulmonary Clinic consult       CC :SOB ,wheeizng   H/o A flutter     HPI :  Still with chest congestion but slightly better   Patient has been doing  better   Still with difficulty breathing   Tightness as well   Still with chest congestion    PHYSICAL EXAMINATION:     VITAL SIGNS:  BP (!) 146/64   Pulse 72   Temp 97.5 °F (36.4 °C) (Temporal)   Resp 19   Ht 5' 11\" (1.803 m)   Wt 292 lb 1.8 oz (132.5 kg)   SpO2 98%   BMI 40.74 kg/m²   Wt Readings from Last 3 Encounters:   08/15/21 292 lb 1.8 oz (132.5 kg)   01/22/20 277 lb (125.6 kg)   01/02/20 271 lb (122.9 kg)     Temp Readings from Last 3 Encounters:   08/15/21 97.5 °F (36.4 °C) (Temporal)   08/09/19 97.7 °F (36.5 °C)   04/09/19 98.5 °F (36.9 °C) (Oral)     TMAX:  BP Readings from Last 3 Encounters:   08/15/21 (!) 146/64   01/22/20 132/77   01/02/20 124/82     Pulse Readings from Last 3 Encounters:   08/15/21 72   01/22/20 109   01/02/20 83           INTAKE/OUTPUTS:  I/O last 3 completed shifts:   In: 8208 [P.O.:1730]  Out: 5580 [Urine:5580]    Intake/Output Summary (Last 24 hours) at 8/16/2021 0048  Last data filed at 8/15/2021 2215  Gross per 24 hour   Intake 1610 ml   Output 5580 ml   Net -3970 ml       This is virtual visit      LABS/IMAGING:    CBC:  Lab Results   Component Value Date    WBC 18.5 (H) 08/15/2021    HGB 11.3 (L) 08/15/2021    HCT 34.6 (L) 08/15/2021    MCV 96.6 08/15/2021     08/15/2021    LYMPHOPCT 9.6 (L) 08/09/2021    RBC 3.58 (L) 08/15/2021    MCH 31.6 08/15/2021    MCHC 32.7 08/15/2021    RDW 12.6 08/15/2021    NEUTOPHILPCT 83.0 (H) 08/09/2021    MONOPCT 5.6 08/09/2021    BASOPCT 0.1 08/09/2021    NEUTROABS 11.54 (H) 08/09/2021    LYMPHSABS 1.33 (L) 08/09/2021    MONOSABS 0.78 08/09/2021    EOSABS 0.00 (L) 08/09/2021    BASOSABS 0.02 08/09/2021       Recent Labs 08/15/21  1414 08/09/21  0817 08/07/21  2117   WBC 18.5* 13.9* 9.6   HGB 11.3* 12.1* 12.8   HCT 34.6* 38.2 38.7   MCV 96.6 99.7 95.6    247 229       BMP:   Recent Labs     08/15/21  0528 08/15/21  1848    140   K 5.2* 4.9   CL 98 99   CO2 34* 34*   BUN 46* 49*   CREATININE 1.1 1.4*       MG:   Lab Results   Component Value Date    MG 2.2 02/14/2018     Ca/Phos:   Lab Results   Component Value Date    CALCIUM 9.2 08/15/2021     Amylase: No results found for: AMYLASE  Lipase:   Lab Results   Component Value Date    LIPASE 28 05/19/2011     LIVER PROFILE: No results for input(s): AST, ALT, LIPASE, BILIDIR, BILITOT, ALKPHOS in the last 72 hours. Invalid input(s): AMYLASE,  ALB    PT/INR:   No results for input(s): PROTIME, INR in the last 72 hours. APTT: No results for input(s): APTT in the last 72 hours. Cardiac Enzymes:  Lab Results   Component Value Date    CKTOTAL 51 11/13/2010    CKMB 0.5 11/13/2010    TROPONINI <0.01 02/15/2018       Hgb A1C: No results found for: LABA1C  No results found for: EAG  FAMILIA: No results found for: FAMILIA  ESR: No results found for: SEDRATE  CRP: No results found for: CRP  D Dimer: No results found for: DDIMER    Thyroid Studies:  Lab Results   Component Value Date    TSH 0.942 02/14/2018    P3IEVRY 4.8 02/14/2018           MICROBIOLOGY:  Pending       CXR:  Reviewed     CT Chest:reviewed     PE  Vitals:    08/15/21 2100   BP: (!) 146/64   Pulse: 72   Resp: 19   Temp: 97.5 °F (36.4 °C)   SpO2: 98%     General:  Awake, alert, oriented X 3. Well developed, well nourished, well groomed. No apparent distress. HEENT:  Normocephalic, atraumatic. Pupils equal, round, reactive to light. No scleral icterus. No conjunctival injection. Normal lips, teeth, and gums. No nasal discharge. Neck:  Supple, FROM  Heart:  RRR, no murmurs, gallops, rubs, carotid upstroke normal, no carotid bruits  Lungs:wheeizng bilateral ,rhonchi   Abdomen:   Bowel sounds present, soft, nontender, no masses, no organomegaly, no peritoneal signs  Extremities:  No clubbing, cyanosis, or edema  Skin:  Warm and dry, no open lesions or rash  Neuro:  Cranial nerves 2-12 intact, no focal deficits  Vascular: Radial and pedal Pulses 2+  Breast: deferred  Rectal: deferred  Genitalia:  deferred    PROBLEM LIST:  Patient Active Problem List   Diagnosis    [facility] [address] [facility]    Essential hypertension    Adrenal adenoma    Class 2 obesity due to excess calories with serious comorbidity and body mass index (BMI) of 35.0 to 35.9 in adult    Anticoagulation management encounter    Typical atrial flutter ([address]    Anxiety    Status post catheter ablation of atrial flutter    Persistent atrial fibrillation ([facility] [address]    ETOH abuse    COPD with acute exacerbation (HCC)               ASSESSMENT:  1.) Acute   exacerbation of COPD  2) very severe COPD  3.)obstructive sleep apnea  4. )Afib /Flutter   5.)tobacco independent(quit 5 months ago)    Data:  FVC 3.63 which is 73 of predicted    FEV1 1.52 which is 39 of predicted    FEV1/FVC is 42  No Bronchodilator response    PLAN:  Patient want bronchoscopy to clear secretion   I explained to him that he is high risk and better to be done GA ,but it is better to continue aggressive pulmonary toilet  CXR   He is on 4 L which base line and RR 18   Continue  IV steroids   On Lasix 20 mg IV q 12 as seems dry    Hypertonic saline   Micinex 400 mg q 6    On Duoneb   Albuterol as needed   On  Eliquis to take it twice daily  Continue Nebs   BiPAP     IgE 52  eosinophilic 421 ,if continue to have SOB will consider IL5 as OP  2 d echo /stress as per Primary ,if needed cardiology consult    May consider bronch if continue not able to mirna secretions out       Απόλλωνος MD Ryan  Pulmonary/Critical care Medicine   [facility] and [facility]

## 2021-08-17 ENCOUNTER — APPOINTMENT (OUTPATIENT)
Dept: GENERAL RADIOLOGY | Age: 61
DRG: 194 | End: 2021-08-17
Payer: COMMERCIAL

## 2021-08-17 LAB
HCT VFR BLD CALC: 34.5 % (ref 37–54)
HEMOGLOBIN: 11.4 G/DL (ref 12.5–16.5)
MCH RBC QN AUTO: 31.7 PG (ref 26–35)
MCHC RBC AUTO-ENTMCNC: 33 % (ref 32–34.5)
MCV RBC AUTO: 95.8 FL (ref 80–99.9)
METER GLUCOSE: 142 MG/DL (ref 74–99)
METER GLUCOSE: 146 MG/DL (ref 74–99)
METER GLUCOSE: 152 MG/DL (ref 74–99)
METER GLUCOSE: 152 MG/DL (ref 74–99)
PDW BLD-RTO: 12.7 FL (ref 11.5–15)
PLATELET # BLD: 226 E9/L (ref 130–450)
PMV BLD AUTO: 12 FL (ref 7–12)
RBC # BLD: 3.6 E12/L (ref 3.8–5.8)
WBC # BLD: 19.2 E9/L (ref 4.5–11.5)

## 2021-08-17 PROCEDURE — 85027 COMPLETE CBC AUTOMATED: CPT

## 2021-08-17 PROCEDURE — 2580000003 HC RX 258: Performed by: PHYSICIAN ASSISTANT

## 2021-08-17 PROCEDURE — APPSS60 APP SPLIT SHARED TIME 46-60 MINUTES: Performed by: PHYSICIAN ASSISTANT

## 2021-08-17 PROCEDURE — 2580000003 HC RX 258: Performed by: INTERNAL MEDICINE

## 2021-08-17 PROCEDURE — 94640 AIRWAY INHALATION TREATMENT: CPT

## 2021-08-17 PROCEDURE — 99254 IP/OBS CNSLTJ NEW/EST MOD 60: CPT | Performed by: INTERNAL MEDICINE

## 2021-08-17 PROCEDURE — 6370000000 HC RX 637 (ALT 250 FOR IP): Performed by: PHYSICIAN ASSISTANT

## 2021-08-17 PROCEDURE — 6360000002 HC RX W HCPCS: Performed by: INTERNAL MEDICINE

## 2021-08-17 PROCEDURE — 6370000000 HC RX 637 (ALT 250 FOR IP): Performed by: INTERNAL MEDICINE

## 2021-08-17 PROCEDURE — 99233 SBSQ HOSP IP/OBS HIGH 50: CPT | Performed by: INTERNAL MEDICINE

## 2021-08-17 PROCEDURE — 36415 COLL VENOUS BLD VENIPUNCTURE: CPT

## 2021-08-17 PROCEDURE — 94669 MECHANICAL CHEST WALL OSCILL: CPT

## 2021-08-17 PROCEDURE — 82962 GLUCOSE BLOOD TEST: CPT

## 2021-08-17 PROCEDURE — 2700000000 HC OXYGEN THERAPY PER DAY

## 2021-08-17 PROCEDURE — 71046 X-RAY EXAM CHEST 2 VIEWS: CPT

## 2021-08-17 PROCEDURE — 2060000000 HC ICU INTERMEDIATE R&B

## 2021-08-17 PROCEDURE — 94660 CPAP INITIATION&MGMT: CPT

## 2021-08-17 RX ADMIN — ARFORMOTEROL TARTRATE 15 MCG: 15 SOLUTION RESPIRATORY (INHALATION) at 20:12

## 2021-08-17 RX ADMIN — INSULIN LISPRO 2 UNITS: 100 INJECTION, SOLUTION INTRAVENOUS; SUBCUTANEOUS at 16:47

## 2021-08-17 RX ADMIN — IPRATROPIUM BROMIDE AND ALBUTEROL SULFATE 3 ML: .5; 2.5 SOLUTION RESPIRATORY (INHALATION) at 12:00

## 2021-08-17 RX ADMIN — SODIUM CHLORIDE SOLN NEBU 3% 4 ML: 3 NEBU SOLN at 08:49

## 2021-08-17 RX ADMIN — SODIUM CHLORIDE SOLN NEBU 3% 4 ML: 3 NEBU SOLN at 16:15

## 2021-08-17 RX ADMIN — DILTIAZEM HYDROCHLORIDE 240 MG: 240 CAPSULE, EXTENDED RELEASE ORAL at 19:46

## 2021-08-17 RX ADMIN — INSULIN LISPRO 2 UNITS: 100 INJECTION, SOLUTION INTRAVENOUS; SUBCUTANEOUS at 11:50

## 2021-08-17 RX ADMIN — DILTIAZEM HYDROCHLORIDE 240 MG: 240 CAPSULE, EXTENDED RELEASE ORAL at 07:51

## 2021-08-17 RX ADMIN — ARFORMOTEROL TARTRATE 15 MCG: 15 SOLUTION RESPIRATORY (INHALATION) at 08:45

## 2021-08-17 RX ADMIN — IPRATROPIUM BROMIDE AND ALBUTEROL SULFATE 3 ML: .5; 2.5 SOLUTION RESPIRATORY (INHALATION) at 16:08

## 2021-08-17 RX ADMIN — FLECAINIDE ACETATE 100 MG: 100 TABLET ORAL at 19:47

## 2021-08-17 RX ADMIN — INSULIN LISPRO 1 UNITS: 100 INJECTION, SOLUTION INTRAVENOUS; SUBCUTANEOUS at 19:48

## 2021-08-17 RX ADMIN — FLUTICASONE PROPIONATE 1 SPRAY: 50 SPRAY, METERED NASAL at 07:58

## 2021-08-17 RX ADMIN — ATORVASTATIN CALCIUM 20 MG: 20 TABLET, FILM COATED ORAL at 07:50

## 2021-08-17 RX ADMIN — BUSPIRONE HYDROCHLORIDE 5 MG: 5 TABLET ORAL at 22:40

## 2021-08-17 RX ADMIN — GUAIFENESIN 400 MG: 400 TABLET ORAL at 04:36

## 2021-08-17 RX ADMIN — FUROSEMIDE 20 MG: 10 INJECTION, SOLUTION INTRAMUSCULAR; INTRAVENOUS at 18:17

## 2021-08-17 RX ADMIN — IPRATROPIUM BROMIDE AND ALBUTEROL SULFATE 3 ML: .5; 2.5 SOLUTION RESPIRATORY (INHALATION) at 08:48

## 2021-08-17 RX ADMIN — FLECAINIDE ACETATE 100 MG: 100 TABLET ORAL at 07:51

## 2021-08-17 RX ADMIN — IPRATROPIUM BROMIDE AND ALBUTEROL SULFATE 3 ML: .5; 2.5 SOLUTION RESPIRATORY (INHALATION) at 20:12

## 2021-08-17 RX ADMIN — ACETAMINOPHEN 650 MG: 325 TABLET ORAL at 22:40

## 2021-08-17 RX ADMIN — METHYLPREDNISOLONE SODIUM SUCCINATE 60 MG: 125 INJECTION, POWDER, FOR SOLUTION INTRAMUSCULAR; INTRAVENOUS at 04:37

## 2021-08-17 RX ADMIN — LEVOFLOXACIN 750 MG: 5 INJECTION, SOLUTION INTRAVENOUS at 07:50

## 2021-08-17 RX ADMIN — GUAIFENESIN 400 MG: 400 TABLET ORAL at 18:17

## 2021-08-17 RX ADMIN — METHYLPREDNISOLONE SODIUM SUCCINATE 60 MG: 125 INJECTION, POWDER, FOR SOLUTION INTRAMUSCULAR; INTRAVENOUS at 13:57

## 2021-08-17 RX ADMIN — GUAIFENESIN 400 MG: 400 TABLET ORAL at 12:43

## 2021-08-17 RX ADMIN — METHYLPREDNISOLONE SODIUM SUCCINATE 60 MG: 125 INJECTION, POWDER, FOR SOLUTION INTRAMUSCULAR; INTRAVENOUS at 22:40

## 2021-08-17 RX ADMIN — BUDESONIDE 1000 MCG: 0.5 SUSPENSION RESPIRATORY (INHALATION) at 08:46

## 2021-08-17 RX ADMIN — LOSARTAN POTASSIUM 50 MG: 50 TABLET, FILM COATED ORAL at 07:50

## 2021-08-17 RX ADMIN — Medication 10 ML: at 07:51

## 2021-08-17 RX ADMIN — Medication 10 ML: at 19:47

## 2021-08-17 RX ADMIN — GUAIFENESIN 400 MG: 400 TABLET ORAL at 22:40

## 2021-08-17 RX ADMIN — BUDESONIDE 1000 MCG: 0.5 SUSPENSION RESPIRATORY (INHALATION) at 20:05

## 2021-08-17 RX ADMIN — FUROSEMIDE 20 MG: 10 INJECTION, SOLUTION INTRAMUSCULAR; INTRAVENOUS at 11:53

## 2021-08-17 ASSESSMENT — PAIN SCALES - GENERAL
PAINLEVEL_OUTOF10: 0
PAINLEVEL_OUTOF10: 0
PAINLEVEL_OUTOF10: 6
PAINLEVEL_OUTOF10: 0
PAINLEVEL_OUTOF10: 0

## 2021-08-17 ASSESSMENT — PAIN DESCRIPTION - PROGRESSION
CLINICAL_PROGRESSION: GRADUALLY IMPROVING
CLINICAL_PROGRESSION: GRADUALLY IMPROVING

## 2021-08-17 NOTE — PROGRESS NOTES
Subjective: The patient is awake and alert. Still very anxious and frustrated  Continues to ask why he cannot breathe  Requesting/demanding bronchoscopy    Objective:    BP (!) 120/57   Pulse 66   Temp 97.2 °F (36.2 °C) (Temporal)   Resp 20   Ht 5' 11\" (1.803 m)   Wt 292 lb 9.6 oz (132.7 kg)   SpO2 98%   BMI 40.81 kg/m²     In: 0   Out: 3775   In: 0   Out: 3775 [Urine:3775]    General appearance: NAD, conversant  HEENT: AT/NC, MMM  Neck: FROM, supple  Lungs: Tachypneic, diminished throughout, no audible wheeze  CV: RRR, no MRGs  Vasc: Radial pulses 2+  Abdomen: Soft, non-tender; no masses or HSM  Extremities: No peripheral edema or digital cyanosis  Skin: no rash, lesions or ulcers  Psych: Alert and oriented to person, place and time  Neuro: Alert and interactive     Recent Labs     08/15/21  1414 08/16/21  0701 08/17/21  0505   WBC 18.5* 17.6* 19.2*   HGB 11.3* 11.2* 11.4*   HCT 34.6* 34.9* 34.5*    212 226       Recent Labs     08/15/21  0528 08/15/21  1848    140   K 5.2* 4.9   CL 98 99   CO2 34* 34*   BUN 46* 49*   CREATININE 1.1 1.4*   CALCIUM 9.2 9.2       Assessment:    Principal Problem:    COPD with acute exacerbation (HCC)  Resolved Problems:    * No resolved hospital problems.  *      Plan:  Admit to telemetry for evaluation of acute exacerbation of COPD  Solu-Medrol 60 IV every 8 hours by pulmonology  Tracheobronchitis?-Coarseness appears to be in the upper airways  Continue BiPAP as at home at nighttime  Duo nebs, ICS, LABA/Sasha  Levaquin 750 IV daily   Mucomyst inhaled solution twice daily  Continue rate and rhythm control, oral anticoagulation for a flutter history-we will consult cardiology secondary to ongoing shortness of breath, concern for heart failure with preserved EF  Case discussed with Dr. Anahi mejia  daily  Echo normal-EF 60 to 65%  Stress test - negative   He has horrible lungs and does not seem to understand this is the cause of his shortness of breath  Add chest pt For possible bronchoscopy under general anesthesia, may need ongoing intubation thereafter given his lung     Melvin MD Anne  11:33 AM  8/17/2021

## 2021-08-17 NOTE — PROGRESS NOTES
Messaged Dr. Millie Cordoba via [employer] to reconsult. Vinita Fong NP covering.

## 2021-08-17 NOTE — PLAN OF CARE
Problem: Skin Integrity:  Goal: Will show no infection signs and symptoms  Description: Will show no infection signs and symptoms  8/16/2021 2338 by Buel Peabody  Outcome: Met This Shift  8/16/2021 1721 by Gilbert Colorado RN  Outcome: Met This Shift  Goal: Absence of new skin breakdown  Description: Absence of new skin breakdown  8/16/2021 2338 by Buel Peabody  Outcome: Met This Shift  8/16/2021 1721 by Gilbert Colorado RN  Outcome: Met This Shift     Problem: Airway Clearance - Ineffective:  Goal: Ability to maintain a clear airway will improve  Description: Ability to maintain a clear airway will improve  Outcome: Ongoing

## 2021-08-17 NOTE — CARE COORDINATION
Chart reviewed, CXR pending, per pulmonology note, bronch risks outweigh benefits however, would consider if pending CXR revealed collapsed lobe, second opinion or transfer to tertiary center. Pt continues at baseline oxygen 4/NC with O2 sat of 96%; remains on IV lasix 20 mg BID, IV Solu-Medrol 60 mg Q 8 hr, and IV Levaquin Q 24 hr.  Discharge plan at this time remains home with spouse Nabila Tejada whom will provide home going oxygen and transportation once medically stable.

## 2021-08-17 NOTE — PROGRESS NOTES
12.1*   HCT 34.9* 34.6* 38.2   MCV 99.1 96.6 99.7    230 247       BMP:   Recent Labs     08/15/21  0528 08/15/21  1848    140   K 5.2* 4.9   CL 98 99   CO2 34* 34*   BUN 46* 49*   CREATININE 1.1 1.4*       MG:   Lab Results   Component Value Date    MG 2.2 02/14/2018     Ca/Phos:   Lab Results   Component Value Date    CALCIUM 9.2 08/15/2021     Amylase: No results found for: AMYLASE  Lipase:   Lab Results   Component Value Date    LIPASE 28 05/19/2011     LIVER PROFILE: No results for input(s): AST, ALT, LIPASE, BILIDIR, BILITOT, ALKPHOS in the last 72 hours. Invalid input(s): AMYLASE,  ALB    PT/INR:   No results for input(s): PROTIME, INR in the last 72 hours. APTT: No results for input(s): APTT in the last 72 hours. Cardiac Enzymes:  Lab Results   Component Value Date    CKTOTAL 51 11/13/2010    CKMB 0.5 11/13/2010    TROPONINI <0.01 02/15/2018       Hgb A1C: No results found for: LABA1C  No results found for: EAG  FAMILIA: No results found for: FAMILIA  ESR: No results found for: SEDRATE  CRP: No results found for: CRP  D Dimer: No results found for: DDIMER    Thyroid Studies:  Lab Results   Component Value Date    TSH 0.942 02/14/2018    L2RDVAD 4.8 02/14/2018           MICROBIOLOGY:  Pending       CXR:  Reviewed     CT Chest:reviewed     PE  Vitals:    08/16/21 1952   BP:    Pulse:    Resp: 20   Temp:    SpO2: 96%     General:  Awake, alert, oriented X 3. Well developed, well nourished, well groomed. No apparent distress. HEENT:  Normocephalic, atraumatic. Pupils equal, round, reactive to light. No scleral icterus. No conjunctival injection. Normal lips, teeth, and gums. No nasal discharge. Neck:  Supple, FROM  Heart:  RRR, no murmurs, gallops, rubs, carotid upstroke normal, no carotid bruits  Lungs:wheeizng bilateral ,rhonchi   Abdomen:   Bowel sounds present, soft, nontender, no masses, no organomegaly, no peritoneal signs  Extremities:  No clubbing, cyanosis, or edema  Skin:  Warm and dry, no open lesions or rash  Neuro:  Cranial nerves 2-12 intact, no focal deficits  Vascular: Radial and pedal Pulses 2+  Breast: deferred  Rectal: deferred  Genitalia:  deferred    PROBLEM LIST:  Patient Active Problem List   Diagnosis    [facility] obstructive [facility] disease) [facility] [address]    Essential hypertension    Adrenal adenoma    Class 2 obesity due to excess calories with serious comorbidity and body mass index (BMI) of 35.0 to 35.9 in adult    Anticoagulation management encounter    Typical atrial flutter ([facility] [address]    Anxiety    Status post catheter ablation of atrial flutter    Persistent atrial fibrillation ([facility] [address]    ETOH abuse    COPD with acute exacerbation (HCC)               ASSESSMENT:  1.) Acute   exacerbation of COPD  2) very severe COPD  3.)obstructive sleep apnea  4. )Afib /Flutter   5.)tobacco independent(quit 5 months ago)    Data:  FVC 3.63 which is 73 of predicted    FEV1 1.52 which is 39 of predicted    FEV1/FVC is 42  No Bronchodilator response    PLAN:  Discuss with patient that with his CT showing patent airway and lobes Bronch will not add anything add how risky in his case   I will get CXR   I Hold elquis at least 24 h  In case will do bronch for other reason next 1-2 days   I will need to talk to wife and daughter to explain that bronchoscopy risks out weight the benefit so far in his case unless CXR shows collapsed lobe   I will offer second opinion or transfer to tertiary center   He is on 4 L which base line and RR 18   Continue  IV steroids   On Lasix 20 mg IV q 12 as seems dry    Hypertonic saline   Micinex 400 mg q 6    On Duoneb   Albuterol as needed   On  Eliquis to take it twice daily  Continue Nebs   BiPAP     IgE 52  eosinophilic 405 ,if continue to have SOB will consider IL5 as OP  2 d echo /stress as per Primary ,if needed cardiology consult          Dorie Carrillo MD  Pulmonary/Critical care Medicine   [facility] and [facility]

## 2021-08-17 NOTE — CONSULTS
Inpatient Cardiology Consultation      Reason for Consult: CHF    Consulting Physician: Dr. Laurie Alas    Requesting Physician: Dr. Abel Berry    Date of Consultation: 8/17/2021    HISTORY OF PRESENT ILLNESS:   Patient is a 72-year-old WM who is known to Dr. Laurie Alas.  He also follows with Haseeb Erickson, Dr. Anita Rodas. Patient has a known past medical history of severe COPD + oxygen dependent, chronic hypoxic respiratory failure, paroxysmal atrial fibrillation/typical atrial flutter s/p AF ablation in April 2018 chronic Eliquis and flecainide therapy, morbid obesity, hypertension, obstructive sleep apnea compliant with CPAP, depression, history of alcohol abuse, former tobacco abuse, chronic heart failure with preserved ejection fraction, and hyperlipidemia. Patient originally presented to [facility] August 7, 2021 due to complaints of worsening shortness of breath. He notes of both shortness of breath at rest and with exertion. He additionally complained of bilateral lower extremity edema, as well as productive cough. He denies any complaints of chest discomfort at rest or on exertion, nausea, emesis, diaphoresis, dizziness, palpitations, near-syncope or syncope. He denies paroxysmal nocturnal dyspnea, orthopnea. He denies subjective fever, chills or any known recent sick contacts. He has received the COVID-19 vaccination. He is currently being followed closely by his pulmonologist, and was being considered for bronchoscopy during this admission. He has been undergoing IV diuresis since his admission thus far, and is now -16L net fluid balance since initial admit date. Family and social history as noted below. Labs and diagnostic testing as noted below. WEIGHT TREND:  08/11/21 273 lb 5.9 oz (124 kg)   01/22/20 277 lb (125.6 kg)   01/02/20 271 lb (122.9 kg)        Please note: past medical records were reviewed per electronic medical record (EMR) - see detailed reports under Past Medical/ Surgical History.    PAST MEDICAL HISTORY:    1. Severe chronic obstructive pulmonary disease. 2. Paroxysmal atrial fibrillation status-post AF ablation April 2018. On chronic Eliquis therapy, as well as flecainide. Follows with EP. 3. Typical atrial flutter with history of prior cardioversion status-post AF ablation April 2018. Follows with EP. 4. Morbid obesity. 5. Hypertension. 6. Obstructive sleep apnea, compliant with CPAP. 7. Depression. 8. History of alcohol abuse. 9. Chronic heart failure with preserved ejection fraction. 10. Hyperlipidemia, on statin therapy. 11. Chronic hypoxic respiratory failure, 2L NC. PAST SURGICAL HISTORY:    Past Surgical History:   Procedure Laterality Date    ATRIAL ABLATION SURGERY  04/03/2018    BACK SURGERY      CARDIOVERSION  02/07/2018    Dr. Charline Bailon- 200 J converted to SB    TRANSESOPHAGEAL ECHOCARDIOGRAM  02/07/2018    Dr. Charline Bailon- No thrombus       HOME MEDICATIONS:  Prior to Admission medications    Medication Sig Start Date End Date Taking?  Authorizing Provider   aspirin 81 MG chewable tablet Take 81 mg by mouth daily   Yes Historical Provider, MD   Budghanshyamon-Glycopyrrol-Formoterol (BREZTRI AEROSPHERE) 160-9-4.8 MCG/ACT AERO Inhale 2 puffs into the lungs 2 times daily 5/17/21  Yes Violeta Mckeon MD   spironolactone (ALDACTONE) 25 MG tablet Take 25 mg by mouth daily  3/2/21  Yes Historical Provider, MD   apixaban (ELIQUIS) 5 MG TABS tablet Take 1 tablet by mouth 2 times daily 11/30/20  Yes Violeta Mckeon MD   fluticasone (FLONASE) 50 MCG/ACT nasal spray 1 spray by Each Nostril route daily 5/29/20  Yes Violeta Mckeon MD   flecainide (TAMBOCOR) 100 MG tablet TAKE 1 TABLET BY MOUTH TWICE DAILY 1/15/19  Yes Almita Nicholas DO   furosemide (LASIX) 40 MG tablet TAKE 2 TABLETS BY MOUTH ONCE DAILY  Patient taking differently: Take 40 mg by mouth daily  1/2/19  Yes Violeta Mckeon MD   diltiazem (CARDIZEM CD) 240 MG extended release capsule Take 1 capsule by mouth 2 times daily 9/7/18  Yes Cuco Marc MD   atorvastatin (LIPITOR) 20 MG tablet Take 20 mg by mouth every evening    Yes Historical Provider, MD   losartan (COZAAR) 50 MG tablet Take 50 mg by mouth daily   Yes Historical Provider, MD   albuterol sulfate  (90 Base) MCG/ACT inhaler Inhale 2 puffs into the lungs every 6 hours as needed for Wheezing   Yes Historical Provider, MD   ipratropium-albuterol (DUONEB) 0.5-2.5 (3) MG/3ML SOLN nebulizer solution Inhale 1 vial into the lungs every 4 hours   Yes Historical Provider, MD       CURRENT MEDICATIONS:      Current Facility-Administered Medications:     furosemide (LASIX) injection 20 mg, 20 mg, Intravenous, BID, Ryan Danielle MD, 20 mg at 08/17/21 1153    busPIRone (BUSPAR) tablet 5 mg, 5 mg, Oral, TID PRN, Cecelia Juarez MD, 5 mg at 08/16/21 2305    glucose (GLUTOSE) 40 % oral gel 15 g, 15 g, Oral, PRN, Cecelia Juarez MD    dextrose 50 % IV solution, 12.5 g, Intravenous, PRN, Cecelia Juarez MD    glucagon (rDNA) injection 1 mg, 1 mg, Intramuscular, PRN, Cecelia Juarez MD    dextrose 5 % solution, 100 mL/hr, Intravenous, PRN, Cecelia Juarez MD    insulin lispro (HUMALOG) injection vial 0-12 Units, 0-12 Units, Subcutaneous, TID WC, Cecelia Juarez MD, 2 Units at 08/17/21 1150    insulin lispro (HUMALOG) injection vial 0-6 Units, 0-6 Units, Subcutaneous, Nightly, Cecelia Juarez MD, 2 Units at 08/16/21 2147    [Held by provider] furosemide (LASIX) injection 20 mg, 20 mg, Intravenous, TID, Ryan Danielle MD, 20 mg at 08/15/21 0848    sodium chloride (Inhalant) 3 % nebulizer solution 4 mL, 4 mL, Nebulization, PRN, Ryan Danielle MD    guaiFENesin tablet 400 mg, 400 mg, Oral, Q6H, Ryan Danielle MD, 400 mg at 08/17/21 1243    perflutren lipid microspheres (DEFINITY) injection 1.65 mg, 1.5 mL, Intravenous, ONCE PRN, Ryan Danielle MD    methylPREDNISolone sodium (SOLU-MEDROL) injection 60 mg, 60 mg, Intravenous, Q8H, Ryan Danielle MD, 60 mg at 08/17/21 8796   budesonide (PULMICORT) nebulizer suspension 1,000 mcg, 1 mg, Nebulization, BID, 1,000 mcg at 08/17/21 0846 **AND** Arformoterol Tartrate (BROVANA) nebulizer solution 15 mcg, 15 mcg, Nebulization, BID, Ryan Danielle MD, 15 mcg at 08/17/21 0845    guaiFENesin-dextromethorphan (ROBITUSSIN DM) 100-10 MG/5ML syrup 5 mL, 5 mL, Oral, Q4H PRN, STEPH Gill, 5 mL at 08/13/21 2259    levoFLOXacin (LEVAQUIN) 750 MG/150ML infusion 750 mg, 750 mg, Intravenous, Q24H, Chadd Lloyd MD, Stopped at 08/17/21 0935    sodium chloride (Inhalant) 3 % nebulizer solution 4 mL, 4 mL, Nebulization, BID, Chadd Lloyd MD, 4 mL at 08/17/21 0849    albuterol (PROVENTIL) nebulizer solution 2.5 mg, 2.5 mg, Nebulization, Q6H PRN, STEPH Ramirez    [Held by provider] apixaban (ELIQUIS) tablet 5 mg, 5 mg, Oral, BID, STEPH Doe, 5 mg at 08/16/21 2039    atorvastatin (LIPITOR) tablet 20 mg, 20 mg, Oral, Daily, STEPH Ramirez, 20 mg at 08/17/21 0750    dilTIAZem (CARDIZEM CD) extended release capsule 240 mg, 240 mg, Oral, BID, STEPH Ramirez, 240 mg at 08/17/21 0751    flecainide (TAMBOCOR) tablet 100 mg, 100 mg, Oral, BID, STEPH Doe, 100 mg at 08/17/21 0751    fluticasone (FLONASE) 50 MCG/ACT nasal spray 1 spray, 1 spray, Each Nostril, Daily, STEPH Ramirez, 1 spray at 08/17/21 0758    ipratropium-albuterol (DUONEB) nebulizer solution 3 mL, 1 vial, Inhalation, Q4H, STEPH Doe, 3 mL at 08/17/21 1200    losartan (COZAAR) tablet 50 mg, 50 mg, Oral, Daily, STEPH Doe, 50 mg at 08/17/21 0750    sodium chloride flush 0.9 % injection 5-40 mL, 5-40 mL, Intravenous, 2 times per day, STEPH Ramirez, 10 mL at 08/17/21 0751    sodium chloride flush 0.9 % injection 5-40 mL, 5-40 mL, Intravenous, PRN, STEPH Doe, 10 mL at 08/15/21 1505    0.9 % sodium chloride infusion, 25 mL, Intravenous, PRN, STEPH Ramirez    ondansetron (ZOFRAN-ODT) disintegrating tablet 4 mg, 4 mg, Oral, Q8H PRN **OR** ondansetron (ZOFRAN) injection 4 mg, 4 mg, Intravenous, Q6H PRN, STEPH White    polyethylene glycol (GLYCOLAX) packet 17 g, 17 g, Oral, Daily PRN, STEPH White    acetaminophen (TYLENOL) tablet 650 mg, 650 mg, Oral, Q6H PRN, 650 mg at 21 1017 **OR** acetaminophen (TYLENOL) suppository 650 mg, 650 mg, Rectal, Q6H PRN, STEPH White    aspirin chewable tablet 81 mg, 81 mg, Oral, Daily, [place], APRN - CNP, 81 mg at 21 0847      ALLERGIES:  Patient has no known allergies. SOCIAL HISTORY:    Social History     Socioeconomic History    Marital status:      Spouse name: Not on file    Number of children: Not on file    Years of education: Not on file    Highest education level: Not on file   Occupational History    Not on file   Tobacco Use    Smoking status: Former Smoker     Packs/day: 2.00     Years: 30.00     Pack years: 60.00     Types: Cigarettes     Quit date: 2017     Years since quittin.0    Smokeless tobacco: Never Used   Vaping Use    Vaping Use: Never used   Substance and Sexual Activity    Alcohol use: Yes     Alcohol/week: 3.0 - 4.0 standard drinks     Types: 3 - 4 Cans of beer per week    Drug use: No    Sexual activity: Not on file   Other Topics Concern    Not on file   Social History Narrative    Not on file     Social Determinants of Health     Financial Resource Strain:     Difficulty of Paying Living Expenses:    Food Insecurity:     Worried About Running Out of Food in the Last Year:     [address] in the Last Year:    Transportation Needs:     Lack of Transportation (Medical):      Lack of Transportation (Non-Medical):    Physical Activity:     Days of Exercise per Week:     Minutes of Exercise per Session:    Stress:     Feeling of Stress :    Social Connections:     Frequency of Communication with Friends and Family:     Frequency of Social Gatherings with Friends and Family:     Attends Yarsanism Services:     Active Member of Clubs or Organizations:     Attends Club or Organization Meetings:     Marital Status:    Intimate Partner Violence:     Fear of Current or Ex-Partner:     Emotionally Abused:     Physically Abused:     Sexually Abused:        FAMILY HISTORY:   Family History   Problem Relation Age of Onset    Other Mother         ALS    Lung Cancer Father     Pacemaker Sister          REVIEW OF SYSTEMS:     · Constitutional: +weight gain. Denies fevers, chills, night sweats, and generalized fatigue. · HEENT: Denies headaches, nose bleeds, rhinorrhea, sore throat. Denies blurred vision. Denies dysphagia, odynophagia. · Musculoskeletal: Denies falls, pain to BLE with ambulation. Denies muscle weakness. · Neurological: Denies dizziness/lightheadedness, numbness and tingling. Denies focal neurological deficits. · Cardiovascular: +peripheral edema. Denies chest pain, palpitations, diaphoresis. Denies near syncope, syncope, PND, orthopnea. · Respiratory: +shortness of breath at rest and on exertion. Denies hemoptysis. · Gastrointestinal: Denies abdominal pain, nausea/vomiting, diarrhea and constipation, black/bloody, and tarry stools. · Genitourinary: Denies dysuria and hematuria. · Hematologic: Denies excessive bruising or bleeding. · Endocrine: Denies excessive thirst. Denies intolerance to hot and cold. PHYSICAL EXAM:   BP (!) 120/57   Pulse 66   Temp 97.2 °F (36.2 °C) (Temporal)   Resp 20   Ht 5' 11\" (1.803 m)   Wt 292 lb 9.6 oz (132.7 kg)   SpO2 97%   BMI 40.81 kg/m²   CONST:  Well developed, morbidly obese WM who appears stated age. Awake, alert, cooperative, no apparent distress. HEENT:   Head- Normocephalic, atraumatic. Eyes- Conjunctivae pink, anicteric. Neck-  No stridor, trachea midline, no apparent jugular venous distention. CHEST: Chest symmetrical and non-tender to palpation.  No accessory muscle use or intercostal retractions. RESPIRATORY: Lung sounds - severely diminished bilaterally. CARDIOVASCULAR:     No noted carotid bruit. Heart Ausculation- Regular rate and rhythm, no apparent murmur. PV: 1-2+ bilateral lower extremity edema. Pedal pulses palpable, no clubbing or cyanosis. ABDOMEN: Soft, non-tender to light palpation. Bowel sounds present. MS: Good muscle strength and tone. No atrophy or abnormal movements. SKIN: Warm and dry. NEURO / PSYCH: Oriented to person, place and time. Speech clear and appropriate. Follows all commands. Pleasant affect. DATA:    Telemetry: NSR with HR in the 60s    Diagnostic:  All diagnostic testing and lab work thus far this admission reviewed in detail. CXR 8/7/2021:  Impression  Cardiomegaly. Large opacity in the right cardiophrenic angle.  Although this may represent  large epicardial fat pad, given the interval change since the prior  examination further evaluation and characterization with CT of the chest is  recommended. Mild hyperinflation, suggest COPD. Chest CTA 8/8/2021:  Impression  No evidence of pulmonary embolism or acute pulmonary abnormality. Prominent pericardial fat accounts for the abnormality seen on radiograph. Cardiac catheterization 11/2010 ([facility]): LMT: normal. LAD: normal, gives off one large bifurcating diagonal as it courses to but ends at the apex. LCx: nondominant, gives off one large OM1, only trivial distal disease. RCA: Dominant, large, minimal disease distally.     Echocardiogram 11/2010 ([facility]): EF 55% with questionable RV dilation. Trivial to small pericardial effusion. Small echodense space near apex (? Localized effusion).  Trivial MR and TR.      W[facility]ATT: 2/7/18 ([place])   Cardiac rhythm: atrial flutter   Low normal left ventricular ejection fraction.   Moderately dilated left atrium.   No evidence of a left atrial appendage thrombus.   No evidence of interatrial shunting on bubble study.   Borderline dilated right ventricle with normal right ventricular function.   Mild-moderate mitral regurgitation.     WYATT: 2/15/18 Chidi Alter)   Low normal left ventricular systolic function.   Mild to moderate mitral regurgitation. TTE (Dr. Dilcia Schulz, 8/12/2021)  Summary   Technically limited study. Normal left ventricular size, wall thickness, wall motion, and systolic   function. Estimated left ventricle ejection fraction 70+/-5 %. There is doppler evidence of stage II diastolic dysfunction. Mildly dilated right ventricle. Right ventricle global systolic function is normal .   Normal sized left atrium. No significant valvular abnormalities noted. Lexiscan Stress Test 8/13/2021:  FINDINGS: The overall quality of the study was fair. Left ventricular cavity size was noted to be dilated on both the  stress and the resting images but there was no transient ischemic  dilatation after stress  Rotational analog analysis demonstrated abnormal patient motion and  there was marked increase in tracer uptake in the abdominal organs  with the liver overshadowing the bottom of the heart  A severe defect was present in the inferior wall extending into the  lateral apical wall(s) that was moderate to largesized by  quantification. The resting images showed no change   Gated SPECT left ventricular ejection fraction was calculated to be  66%, with normal myocardial contractility and wall motion. Impression  The myocardial perfusion imaging was probably normal with the large  fixed inferior/lateral apical wall defect being more consistent with  attenuation artifact and less likely the result of a myocardial  infarction especially with the normal contractility of the inferior  wall and the lateral apical wall.  More importantly, there did not  appear to be any evidence of stress-induced ischemia on this study  Overall left ventricular systolic function was normal with no regional  wall motion abnormality  Low risk general pharmacologic stress test.    CXR 8/17/2021:  Impression  No acute process.         Intake/Output Summary (Last 24 hours) at 8/17/2021 1424  Last data filed at 8/17/2021 1055  Gross per 24 hour   Intake 0 ml   Output 3775 ml   Net -3775 ml       Labs:   CBC:   Recent Labs     08/16/21  0701 08/17/21  0505   WBC 17.6* 19.2*   HGB 11.2* 11.4*   HCT 34.9* 34.5*    226     BMP:   Recent Labs     08/15/21  0528 08/15/21  1848    140   K 5.2* 4.9   CO2 34* 34*   BUN 46* 49*   CREATININE 1.1 1.4*   LABGLOM >60 52   CALCIUM 9.2 9.2     FASTING LIPID PANEL:  Lab Results   Component Value Date    CHOL 205 04/08/2018    HDL 80 04/08/2018    LDLCALC 109 04/08/2018    TRIG 80 04/08/2018 08/16/21 1416     Adenovirus by PCR Not Detected Not Detected    Bordetella parapertussis by PCR Not Detected Not Detected    Bordetella pertussis by PCR Not Detected Not Detected    Chlamydophilia pneumoniae by PCR Not Detected Not Detected    Coronavirus 229E by PCR Not Detected Not Detected    Coronavirus HKU1 by PCR Not Detected Not Detected    Coronavirus NL63 by PCR Not Detected Not Detected    Coronavirus OC43 by PCR Not Detected Not Detected    SARS-CoV-2, PCR Not Detected Not Detected    Human Metapneumovirus by PCR Not Detected Not Detected    Human Rhinovirus/Enterovirus by PCR Not Detected Not Detected    Influenza A by PCR Not Detected Not Detected    Influenza B by PCR Not Detected Not Detected    Mycoplasma pneumoniae by PCR Not Detected Not Detected    Parainfluenza Virus 1 by PCR Not Detected Not Detected    Parainfluenza Virus 2 by PCR Not Detected Not Detected    Parainfluenza Virus 3 by PCR Not Detected Not Detected    Parainfluenza Virus 4 by PCR Not Detected Not Detected    Respiratory Syncytial Virus by PCR Not Detected Not Detected        ASSESSMENT:  1. Acute on chronic heart failure with preserved ejection fraction. BNP around 600.  -16L net fluid balance thus far this admission with IV diuresis.   Mildly dilated RV with normal RV systolic function + normal LV systolic function (EF 70 +/- 5%), stage II diastolic dysfunction on TTE this admission. 2. Acute COPD exacerbation. Known history of very severe COPD. Viral panel negative. Pulmonology following. 3. Acute on chronic respiratory failure, currently on 4 L nasal cannula. 4. Acute kidney injury. 5. Leukocytosis. 6. Paroxysmal atrial fibrillation/typical flutter status-post AF ablation in April 2018. Currently on Eliquis, cardizem, and flecainide therapy. Follows with [facility] EP. Maintaining normal sinus rhythm this admission. 7. Hypertension, uncontrolled at times. 8. Hyperlipidemia, on statin therapy. 9. Obstructive sleep apnea, compliant with CPAP. 10. Morbid obesity / BMI 40.8  11. History of alcohol abuse. 15. Former tobacco abuse (2 PPD for many years, quit in 8/2017)  13. History of pericarditis in 11/2010    RECOMMENDATIONS:  1. Continue diuresis  2. Continue to monitor strict intake and output, daily weights, kidney function/electrolytes. 3. Echocardiogram and stress testing reviewed. 4. Continue other cardiac medications the same at this time. 5. Rest as per primary service and other consultants. Above case and recommendations have been discussed and made in collaboration with Dr. Anaid Dc.  Further recommendations to follow as per him. Samir Hernández, [address] [place] [address], [place] [address] Cardiology    Electronically signed by Choco Martin PA-C on 8/17/2021 at 2:24 PM     _________________________________________________________________________  I independently interviewed and examined the patient. I have reviewed the above documentation completed by the ALAN. Please see my additional contributions to the HPI, physical exam, and assessment / medical decision making. HPI, ROS, PMH, PSH, [address], [place], and medications independently reviewed (agree; see above documentation)    History of Present Illness:  Currently with no chest pain or palpitations. +ongoing SOB. LE edema.  SR on monitoring. Review of Systems:   Cardiac: As per HPI  General: No fever, chills  Pulmonary: As per HPI  HEENT: No visual disturbances, difficult swallowing  GI: No nausea, vomiting  : No dysuria, hematuria  Endocrine: No thyroid disease or DM  Musculoskeletal: ROUSE x 4, no focal motor deficits  Skin: Intact, no rashes  Neuro: No headache, seizures  Psych: Currently with no depression, anxiety    Physical Exam:  /72   Pulse 68   Temp 97.4 °F (36.3 °C) (Temporal)   Resp 20   Ht 5' 11\" (1.803 m)   Wt 292 lb 9.6 oz (132.7 kg)   SpO2 97%   BMI 40.81 kg/m²   Wt Readings from Last 3 Encounters:   08/17/21 292 lb 9.6 oz (132.7 kg)   01/22/20 277 lb (125.6 kg)   01/02/20 271 lb (122.9 kg)     Appearance: Awake, alert, no acute respiratory distress  Skin: Intact, no rash  Head: Normocephalic, atraumatic  Eyes: EOMI, no conjunctival erythema  ENMT: No pharyngeal erythema, MMM, no rhinorrhea  Neck: Supple, no carotid bruits  Lungs: B/L wheezing, no rales  Cardiac: Regular rate and rhythm, no murmurs apparent  Abdomen: Soft, nontender, +bowel sounds  Extremities: Moves all extremities x 4, ++lower extremity edema  Neurologic: No focal motor deficits apparent, normal mood and affect    Laboratory Tests:  Recent Labs     08/15/21  0528 08/15/21  1848    140   K 5.2* 4.9   CL 98 99   CO2 34* 34*   BUN 46* 49*   CREATININE 1.1 1.4*   GLUCOSE 237* 199*   CALCIUM 9.2 9.2     Lab Results   Component Value Date    MG 2.2 02/14/2018     No results for input(s): ALKPHOS, ALT, AST, PROT, BILITOT, BILIDIR, LABALBU in the last 72 hours.   Recent Labs     08/15/21  1414 08/16/21  0701 08/17/21  0505   WBC 18.5* 17.6* 19.2*   RBC 3.58* 3.52* 3.60*   HGB 11.3* 11.2* 11.4*   HCT 34.6* 34.9* 34.5*   MCV 96.6 99.1 95.8   MCH 31.6 31.8 31.7   MCHC 32.7 32.1 33.0   RDW 12.6 12.7 12.7    212 226   MPV 12.2* 11.8 12.0     Lab Results   Component Value Date    CKTOTAL 51 11/13/2010    CKMB 0.5 11/13/2010    TROPONINI <0.01 02/15/2018    TROPONINI <0.01 02/14/2018    TROPONINI <0.01 02/14/2018     No results for input(s): CKTOTAL, CKMB, CKMBINDEX, TROPHS in the last 72 hours. Lab Results   Component Value Date    TSH 0.942 02/14/2018     No results found for: LABA1C  No results found for: EAG  Lab Results   Component Value Date    CHOL 205 (H) 04/08/2018    CHOL 162 01/28/2017     Lab Results   Component Value Date    TRIG 80 04/08/2018    TRIG 61 01/28/2017     Lab Results   Component Value Date    HDL 80 04/08/2018    HDL 50 01/28/2017     Lab Results   Component Value Date    LDLCALC 109 (H) 04/08/2018    LDLCALC 100 (H) 01/28/2017     Lab Results   Component Value Date    LABVLDL 16 04/08/2018    LABVLDL 12 01/28/2017     Telemetry reviewed (date: 8/17/2021): SR, rate 80's    - Recent cardiac testing reviewed  - Monitor I/O's, renal function, and electrolytes on diuretic (reproted I/O's net negative 15.8 L)  - Continue current medications otherwise  - Treatment of PARKER  - Aggressive risk factor modifications  - Telemetry reviewed (maintainig sinus rhythm)  - Care per pulmonary    Thank you for allowing me to participate in your patient's care. Please feel free to contact me if you have any questions or concerns.     Maria R Zimmerman MD  [facility]) Cardiology

## 2021-08-18 ENCOUNTER — ANESTHESIA EVENT (OUTPATIENT)
Dept: ENDOSCOPY | Age: 61
DRG: 194 | End: 2021-08-18
Payer: COMMERCIAL

## 2021-08-18 LAB
ANION GAP SERPL CALCULATED.3IONS-SCNC: 11 MMOL/L (ref 7–16)
ANISOCYTOSIS: ABNORMAL
BASOPHILS ABSOLUTE: 0 E9/L (ref 0–0.2)
BASOPHILS RELATIVE PERCENT: 0.8 % (ref 0–2)
BUN BLDV-MCNC: 47 MG/DL (ref 6–20)
CALCIUM SERPL-MCNC: 8.7 MG/DL (ref 8.6–10.2)
CHLORIDE BLD-SCNC: 95 MMOL/L (ref 98–107)
CO2: 35 MMOL/L (ref 22–29)
CREAT SERPL-MCNC: 1.1 MG/DL (ref 0.7–1.2)
EOSINOPHILS ABSOLUTE: 0 E9/L (ref 0.05–0.5)
EOSINOPHILS RELATIVE PERCENT: 0 % (ref 0–6)
GFR AFRICAN AMERICAN: >60
GFR NON-AFRICAN AMERICAN: >60 ML/MIN/1.73
GLUCOSE BLD-MCNC: 208 MG/DL (ref 74–99)
HCT VFR BLD CALC: 36.5 % (ref 37–54)
HEMOGLOBIN: 11.9 G/DL (ref 12.5–16.5)
HYPOCHROMIA: ABNORMAL
LYMPHOCYTES ABSOLUTE: 0.98 E9/L (ref 1.5–4)
LYMPHOCYTES RELATIVE PERCENT: 5.2 % (ref 20–42)
MCH RBC QN AUTO: 32 PG (ref 26–35)
MCHC RBC AUTO-ENTMCNC: 32.6 % (ref 32–34.5)
MCV RBC AUTO: 98.1 FL (ref 80–99.9)
METER GLUCOSE: 159 MG/DL (ref 74–99)
METER GLUCOSE: 209 MG/DL (ref 74–99)
METER GLUCOSE: 214 MG/DL (ref 74–99)
METER GLUCOSE: 240 MG/DL (ref 74–99)
MONOCYTES ABSOLUTE: 1.56 E9/L (ref 0.1–0.95)
MONOCYTES RELATIVE PERCENT: 7.8 % (ref 2–12)
MYELOCYTE PERCENT: 3.5 % (ref 0–0)
NEUTROPHILS ABSOLUTE: 16.97 E9/L (ref 1.8–7.3)
NEUTROPHILS RELATIVE PERCENT: 83.5 % (ref 43–80)
OVALOCYTES: ABNORMAL
PDW BLD-RTO: 12.6 FL (ref 11.5–15)
PLATELET # BLD: 206 E9/L (ref 130–450)
PMV BLD AUTO: 11.8 FL (ref 7–12)
POIKILOCYTES: ABNORMAL
POLYCHROMASIA: ABNORMAL
POTASSIUM SERPL-SCNC: 4.6 MMOL/L (ref 3.5–5)
RBC # BLD: 3.72 E12/L (ref 3.8–5.8)
SODIUM BLD-SCNC: 141 MMOL/L (ref 132–146)
STOMATOCYTES: ABNORMAL
WBC # BLD: 19.5 E9/L (ref 4.5–11.5)

## 2021-08-18 PROCEDURE — 94640 AIRWAY INHALATION TREATMENT: CPT

## 2021-08-18 PROCEDURE — 2060000000 HC ICU INTERMEDIATE R&B

## 2021-08-18 PROCEDURE — 6370000000 HC RX 637 (ALT 250 FOR IP): Performed by: PHYSICIAN ASSISTANT

## 2021-08-18 PROCEDURE — 6360000002 HC RX W HCPCS: Performed by: INTERNAL MEDICINE

## 2021-08-18 PROCEDURE — 94660 CPAP INITIATION&MGMT: CPT

## 2021-08-18 PROCEDURE — 99233 SBSQ HOSP IP/OBS HIGH 50: CPT | Performed by: INTERNAL MEDICINE

## 2021-08-18 PROCEDURE — 6370000000 HC RX 637 (ALT 250 FOR IP): Performed by: INTERNAL MEDICINE

## 2021-08-18 PROCEDURE — 6370000000 HC RX 637 (ALT 250 FOR IP): Performed by: NURSE PRACTITIONER

## 2021-08-18 PROCEDURE — 82962 GLUCOSE BLOOD TEST: CPT

## 2021-08-18 PROCEDURE — 2700000000 HC OXYGEN THERAPY PER DAY

## 2021-08-18 PROCEDURE — 2580000003 HC RX 258: Performed by: INTERNAL MEDICINE

## 2021-08-18 PROCEDURE — 94669 MECHANICAL CHEST WALL OSCILL: CPT

## 2021-08-18 PROCEDURE — 2580000003 HC RX 258: Performed by: PHYSICIAN ASSISTANT

## 2021-08-18 PROCEDURE — 36415 COLL VENOUS BLD VENIPUNCTURE: CPT

## 2021-08-18 PROCEDURE — 80048 BASIC METABOLIC PNL TOTAL CA: CPT

## 2021-08-18 PROCEDURE — 99232 SBSQ HOSP IP/OBS MODERATE 35: CPT | Performed by: INTERNAL MEDICINE

## 2021-08-18 PROCEDURE — 85025 COMPLETE CBC W/AUTO DIFF WBC: CPT

## 2021-08-18 RX ADMIN — BUDESONIDE 1000 MCG: 0.5 SUSPENSION RESPIRATORY (INHALATION) at 08:33

## 2021-08-18 RX ADMIN — METHYLPREDNISOLONE SODIUM SUCCINATE 60 MG: 125 INJECTION, POWDER, FOR SOLUTION INTRAMUSCULAR; INTRAVENOUS at 04:17

## 2021-08-18 RX ADMIN — LOSARTAN POTASSIUM 50 MG: 50 TABLET, FILM COATED ORAL at 09:40

## 2021-08-18 RX ADMIN — Medication 10 ML: at 20:08

## 2021-08-18 RX ADMIN — FLUTICASONE PROPIONATE 1 SPRAY: 50 SPRAY, METERED NASAL at 11:35

## 2021-08-18 RX ADMIN — LEVOFLOXACIN 750 MG: 5 INJECTION, SOLUTION INTRAVENOUS at 09:39

## 2021-08-18 RX ADMIN — GUAIFENESIN 400 MG: 400 TABLET ORAL at 18:02

## 2021-08-18 RX ADMIN — ASPIRIN 81 MG CHEWABLE TABLET 81 MG: 81 TABLET CHEWABLE at 09:40

## 2021-08-18 RX ADMIN — METHYLPREDNISOLONE SODIUM SUCCINATE 60 MG: 125 INJECTION, POWDER, FOR SOLUTION INTRAMUSCULAR; INTRAVENOUS at 20:07

## 2021-08-18 RX ADMIN — GUAIFENESIN SYRUP AND DEXTROMETHORPHAN 5 ML: 100; 10 SYRUP ORAL at 22:50

## 2021-08-18 RX ADMIN — SODIUM CHLORIDE SOLN NEBU 3% 4 ML: 3 NEBU SOLN at 08:35

## 2021-08-18 RX ADMIN — IPRATROPIUM BROMIDE AND ALBUTEROL SULFATE 3 ML: .5; 2.5 SOLUTION RESPIRATORY (INHALATION) at 20:20

## 2021-08-18 RX ADMIN — INSULIN LISPRO 2 UNITS: 100 INJECTION, SOLUTION INTRAVENOUS; SUBCUTANEOUS at 20:11

## 2021-08-18 RX ADMIN — FUROSEMIDE 20 MG: 10 INJECTION, SOLUTION INTRAMUSCULAR; INTRAVENOUS at 09:40

## 2021-08-18 RX ADMIN — IPRATROPIUM BROMIDE AND ALBUTEROL SULFATE 3 ML: .5; 2.5 SOLUTION RESPIRATORY (INHALATION) at 08:34

## 2021-08-18 RX ADMIN — Medication 10 ML: at 10:03

## 2021-08-18 RX ADMIN — ARFORMOTEROL TARTRATE 15 MCG: 15 SOLUTION RESPIRATORY (INHALATION) at 08:32

## 2021-08-18 RX ADMIN — ARFORMOTEROL TARTRATE 15 MCG: 15 SOLUTION RESPIRATORY (INHALATION) at 20:19

## 2021-08-18 RX ADMIN — METHYLPREDNISOLONE SODIUM SUCCINATE 60 MG: 125 INJECTION, POWDER, FOR SOLUTION INTRAMUSCULAR; INTRAVENOUS at 14:39

## 2021-08-18 RX ADMIN — ATORVASTATIN CALCIUM 20 MG: 20 TABLET, FILM COATED ORAL at 09:40

## 2021-08-18 RX ADMIN — INSULIN LISPRO 2 UNITS: 100 INJECTION, SOLUTION INTRAVENOUS; SUBCUTANEOUS at 17:06

## 2021-08-18 RX ADMIN — GUAIFENESIN 400 MG: 400 TABLET ORAL at 04:16

## 2021-08-18 RX ADMIN — SODIUM CHLORIDE SOLN NEBU 3% 4 ML: 3 NEBU SOLN at 20:20

## 2021-08-18 RX ADMIN — FLECAINIDE ACETATE 100 MG: 100 TABLET ORAL at 20:08

## 2021-08-18 RX ADMIN — GUAIFENESIN 400 MG: 400 TABLET ORAL at 22:50

## 2021-08-18 RX ADMIN — INSULIN LISPRO 4 UNITS: 100 INJECTION, SOLUTION INTRAVENOUS; SUBCUTANEOUS at 06:18

## 2021-08-18 RX ADMIN — FLECAINIDE ACETATE 100 MG: 100 TABLET ORAL at 09:40

## 2021-08-18 RX ADMIN — INSULIN LISPRO 4 UNITS: 100 INJECTION, SOLUTION INTRAVENOUS; SUBCUTANEOUS at 11:33

## 2021-08-18 RX ADMIN — BUSPIRONE HYDROCHLORIDE 5 MG: 5 TABLET ORAL at 22:50

## 2021-08-18 RX ADMIN — FUROSEMIDE 20 MG: 10 INJECTION, SOLUTION INTRAMUSCULAR; INTRAVENOUS at 18:02

## 2021-08-18 RX ADMIN — DILTIAZEM HYDROCHLORIDE 240 MG: 240 CAPSULE, EXTENDED RELEASE ORAL at 20:08

## 2021-08-18 RX ADMIN — GUAIFENESIN 400 MG: 400 TABLET ORAL at 12:00

## 2021-08-18 RX ADMIN — BUDESONIDE 1000 MCG: 0.5 SUSPENSION RESPIRATORY (INHALATION) at 20:19

## 2021-08-18 RX ADMIN — IPRATROPIUM BROMIDE AND ALBUTEROL SULFATE 3 ML: .5; 2.5 SOLUTION RESPIRATORY (INHALATION) at 13:25

## 2021-08-18 RX ADMIN — DILTIAZEM HYDROCHLORIDE 240 MG: 240 CAPSULE, EXTENDED RELEASE ORAL at 09:40

## 2021-08-18 RX ADMIN — IPRATROPIUM BROMIDE AND ALBUTEROL SULFATE 3 ML: .5; 2.5 SOLUTION RESPIRATORY (INHALATION) at 16:11

## 2021-08-18 ASSESSMENT — PAIN SCALES - GENERAL
PAINLEVEL_OUTOF10: 0

## 2021-08-18 ASSESSMENT — PAIN DESCRIPTION - PROGRESSION
CLINICAL_PROGRESSION: GRADUALLY IMPROVING

## 2021-08-18 NOTE — PROGRESS NOTES
[facility]  Division of Pulmonary, Critical Care Medicine  Pulmonary [address] [facility]           Pulmonary Clinic consult       CC :SOB ,wheeizng   H/o A flutter     HPI :  Still with SOB  Still with leg selling   Family ,wife at bed side   No chest pain   Still congested  Still very anxious and insist that he has bronch     PHYSICAL EXAMINATION:     VITAL SIGNS:  /63   Pulse 66   Temp 96.4 °F (35.8 °C) (Temporal)   Resp 20   Ht 5' 11\" (1.803 m)   Wt 292 lb 9.6 oz (132.7 kg)   SpO2 98%   BMI 40.81 kg/m²   Wt Readings from Last 3 Encounters:   08/17/21 292 lb 9.6 oz (132.7 kg)   01/22/20 277 lb (125.6 kg)   01/02/20 271 lb (122.9 kg)     Temp Readings from Last 3 Encounters:   08/17/21 96.4 °F (35.8 °C) (Temporal)   08/09/19 97.7 °F (36.5 °C)   04/09/19 98.5 °F (36.9 °C) (Oral)     TMAX:  BP Readings from Last 3 Encounters:   08/17/21 136/63   01/22/20 132/77   01/02/20 124/82     Pulse Readings from Last 3 Encounters:   08/17/21 66   01/22/20 109   01/02/20 83           INTAKE/OUTPUTS:  I/O last 3 completed shifts:   In: 780 [P.O.:780]  Out: 3325 [Urine:3325]    Intake/Output Summary (Last 24 hours) at 8/17/2021 2343  Last data filed at 8/17/2021 2244  Gross per 24 hour   Intake 780 ml   Output 3325 ml   Net -2545 ml       This is virtual visit      LABS/IMAGING:    CBC:  Lab Results   Component Value Date    WBC 19.2 (H) 08/17/2021    HGB 11.4 (L) 08/17/2021    HCT 34.5 (L) 08/17/2021    MCV 95.8 08/17/2021     08/17/2021    LYMPHOPCT 9.6 (L) 08/09/2021    RBC 3.60 (L) 08/17/2021    MCH 31.7 08/17/2021    MCHC 33.0 08/17/2021    RDW 12.7 08/17/2021    NEUTOPHILPCT 83.0 (H) 08/09/2021    MONOPCT 5.6 08/09/2021    BASOPCT 0.1 08/09/2021    NEUTROABS 11.54 (H) 08/09/2021    LYMPHSABS 1.33 (L) 08/09/2021    MONOSABS 0.78 08/09/2021    EOSABS 0.00 (L) 08/09/2021    BASOSABS 0.02 08/09/2021       Recent Labs     08/17/21  0505 08/16/21  0701 08/15/21  1414   WBC 19.2* 17.6* 18.5*   HGB 11.4* 11.2* 11.3*   HCT 34.5* 34.9* 34.6*   MCV 95.8 99.1 96.6    212 230       BMP:   Recent Labs     08/15/21  0528 08/15/21  1848    140   K 5.2* 4.9   CL 98 99   CO2 34* 34*   BUN 46* 49*   CREATININE 1.1 1.4*       MG:   Lab Results   Component Value Date    MG 2.2 02/14/2018     Ca/Phos:   Lab Results   Component Value Date    CALCIUM 9.2 08/15/2021     Amylase: No results found for: AMYLASE  Lipase:   Lab Results   Component Value Date    LIPASE 28 05/19/2011     LIVER PROFILE: No results for input(s): AST, ALT, LIPASE, BILIDIR, BILITOT, ALKPHOS in the last 72 hours. Invalid input(s): AMYLASE,  ALB    PT/INR:   No results for input(s): PROTIME, INR in the last 72 hours. APTT: No results for input(s): APTT in the last 72 hours. Cardiac Enzymes:  Lab Results   Component Value Date    CKTOTAL 51 11/13/2010    CKMB 0.5 11/13/2010    TROPONINI <0.01 02/15/2018       Hgb A1C: No results found for: LABA1C  No results found for: EAG  FAMILIA: No results found for: FAMILIA  ESR: No results found for: SEDRATE  CRP: No results found for: CRP  D Dimer: No results found for: DDIMER    Thyroid Studies:  Lab Results   Component Value Date    TSH 0.942 02/14/2018    I1NONBM 4.8 02/14/2018           MICROBIOLOGY:  Pending       CXR:  Reviewed     CT Chest:reviewed     PE  Vitals:    08/17/21 2230   BP: 136/63   Pulse: 66   Resp: 20   Temp: 96.4 °F (35.8 °C)   SpO2: 98%     General:  Awake, alert, oriented X 3. Well developed, well nourished, well groomed. No apparent distress. HEENT:  Normocephalic, atraumatic. Pupils equal, round, reactive to light. No scleral icterus. No conjunctival injection. Normal lips, teeth, and gums. No nasal discharge. Neck:  Supple, FROM  Heart:  RRR, no murmurs, gallops, rubs, carotid upstroke normal, no carotid bruits  Lungs:wheeizng bilateral ,rhonchi   Abdomen:   Bowel sounds present, soft, nontender, no masses, no organomegaly, no peritoneal signs  Extremities:  No clubbing, cyanosis, or edema  Skin:  Warm and dry, no open lesions or rash  Neuro:  Cranial nerves 2-12 intact, no focal deficits  Vascular: Radial and pedal Pulses 2+  Breast: deferred  Rectal: deferred  Genitalia:  deferred    PROBLEM LIST:  Patient Active Problem List   Diagnosis    [facility] disease) [facility]    Essential hypertension    Adrenal adenoma    Class 2 obesity due to excess calories with serious comorbidity and body mass index (BMI) of 35.0 to 35.9 in adult    Anticoagulation management encounter    Typical atrial flutter ([facility]    Anxiety    Status post catheter ablation of atrial flutter    Persistent atrial fibrillation ([facility]    ETOH abuse    COPD with acute exacerbation (HCC)               ASSESSMENT:  1.) Acute   exacerbation of COPD  2) very severe COPD  3.)obstructive sleep apnea  4. )Afib /Flutter   5.)tobacco independent(quit 5 months ago)    Data:  FVC 3.63 which is 73 of predicted    FEV1 1.52 which is 39 of predicted    FEV1/FVC is 42  No Bronchodilator response    PLAN:  Discuss with patient that with his CT showing patent airway and lobes Bronch will not add anything add how risky in his case ,there is small area which shows slight narrowing RMB ,this need to be investigated with bronch down the road    stable  CXR   Discuss with family that Bronch with his respiratory status may be high risk ,he is to think about risks and benefits    I will offer second opinion or transfer to tertiary center ,thet want to stay   He is on 4 L which base line and RR 18   Continue  IV steroids ,will weaning   On Lasix 20 mg IV q 12 as seems with sever leg edema   Hypertonic saline   Micinex 400 mg q 6    On Duoneb   Albuterol as needed   On  Eliquis to take it twice daily  Continue Nebs   BiPAP     IgE 52  eosinophilic 072 ,if continue to have SOB will consider IL5 as OP  2 d echo /stress as per Primary ,if needed cardiology consult,and will need clearance for bronch and diuresis per Chnatelle Jorge MD  Pulmonary/Critical care Medicine   [address] [facility]

## 2021-08-18 NOTE — PLAN OF CARE
Problem: Skin Integrity:  Goal: Will show no infection signs and symptoms  Description: Will show no infection signs and symptoms  8/17/2021 2321 by Drew Domingo  Outcome: Met This Shift  8/17/2021 1525 by Sruthi Armendariz RN  Outcome: Met This Shift  Goal: Absence of new skin breakdown  Description: Absence of new skin breakdown  8/17/2021 2321 by Drew Domingo  Outcome: Met This Shift  8/17/2021 1525 by Sruthi Armendariz RN  Outcome: Met This Shift     Problem: Airway Clearance - Ineffective:  Goal: Ability to maintain a clear airway will improve  Description: Ability to maintain a clear airway will improve  Outcome: Met This Shift

## 2021-08-18 NOTE — PROGRESS NOTES
Chart reviewed peripherally  Blood work ordered for today  Discussed with Dr. Luis Alberto Hillman  Await bronchoscopy

## 2021-08-18 NOTE — PROGRESS NOTES
INPATIENT CARDIOLOGY FOLLOW-UP    Name: Jamel Faulkner    Age: 63 y.o. Date of Admission: 8/7/2021  9:53 PM    Date of Service: 8/18/2021    Chief Complaint: Follow-up for acute on chronic HFpEF    Interim History:  Currently with no chest pain or palpitations. +ongoing SOB. LE edema. SR on monitoring. No new overnight cardiac complaints. Reported I/O's net negative 18.3 L thus far.     Review of Systems:   Cardiac: As per HPI  General: No fever, chills  Pulmonary: As per HPI  HEENT: No visual disturbances, difficult swallowing  GI: No nausea, vomiting  : No dysuria, hematuria  Endocrine: No thyroid disease or DM  Musculoskeletal: ROUSE x 4, no focal motor deficits  Skin: Intact, no rashes  Neuro: No headache, seizures  Psych: Currently with no depression, anxiety    Problem List:  Patient Active Problem List   Diagnosis    COPD (chronic obstructive pulmonary disease) ([address]    Essential hypertension    Adrenal adenoma    Class 2 obesity due to excess calories with serious comorbidity and body mass index (BMI) of 35.0 to 35.9 in adult    Anticoagulation management encounter    Typical atrial flutter ([address]    Anxiety    Status post catheter ablation of atrial flutter    Persistent atrial fibrillation ([address]    ETOH abuse    COPD with acute exacerbation (HCC)       Allergies:  No Known Allergies    Current Medications:  Current Facility-Administered Medications   Medication Dose Route Frequency Provider Last Rate Last Admin    furosemide (LASIX) injection 20 mg  20 mg Intravenous BID Ryan Danielle MD   20 mg at 08/17/21 1817    busPIRone (BUSPAR) tablet 5 mg  5 mg Oral TID PRN Hoda Jimenez MD   5 mg at 08/17/21 2240    glucose (GLUTOSE) 40 % oral gel 15 g  15 g Oral PRN Hoda Jimenez MD        dextrose 50 % IV solution  12.5 g Intravenous PRN Hoda Jimenez MD        glucagon (rDNA) injection 1 mg  1 mg Intramuscular PRN Hoda Jimenez MD        dextrose 5 % solution  100 mL/hr Intravenous PRN Karen Olsen MD        insulin lispro (HUMALOG) injection vial 0-12 Units  0-12 Units Subcutaneous TID WC Karen Olsen MD   4 Units at 08/18/21 0618    insulin lispro (HUMALOG) injection vial 0-6 Units  0-6 Units Subcutaneous Nightly Karen Olsen MD   1 Units at 08/17/21 1948    [Held by provider] furosemide (LASIX) injection 20 mg  20 mg Intravenous TID Ryan Danielle MD   20 mg at 08/15/21 0848    sodium chloride (Inhalant) 3 % nebulizer solution 4 mL  4 mL Nebulization PRN Ryan Orellana MD        guaiFENesin tablet 400 mg  400 mg Oral Q6H Ryan Danielle MD   400 mg at 08/18/21 0416    perflutren lipid microspheres (DEFINITY) injection 1.65 mg  1.5 mL Intravenous ONCE PRN Ryan Orellana MD        methylPREDNISolone sodium (SOLU-MEDROL) injection 60 mg  60 mg Intravenous Q8H Ryan Danielle MD   60 mg at 08/18/21 0417    budesonide (PULMICORT) nebulizer suspension 1,000 mcg  1 mg Nebulization BID Ryan Danielle MD   1,000 mcg at 08/17/21 2005    And    Arformoterol Tartrate (BROVANA) nebulizer solution 15 mcg  15 mcg Nebulization BID Ryan Danielle MD   15 mcg at 08/17/21 2012    guaiFENesin-dextromethorphan (ROBITUSSIN DM) 100-10 MG/5ML syrup 5 mL  5 mL Oral Q4H PRN STEPH Pineda   5 mL at 08/13/21 2259    levoFLOXacin (LEVAQUIN) 750 MG/150ML infusion 750 mg  750 mg Intravenous Q24H Corey Campbell MD   Stopped at 08/17/21 0935    sodium chloride (Inhalant) 3 % nebulizer solution 4 mL  4 mL Nebulization BID Corey Campbell MD   4 mL at 08/17/21 1615    albuterol (PROVENTIL) nebulizer solution 2.5 mg  2.5 mg Nebulization Q6H PRN STEPH Garcia        [Held by provider] apixaban (ELIQUIS) tablet 5 mg  5 mg Oral BID STEPH Garcia   5 mg at 08/16/21 2039    atorvastatin (LIPITOR) tablet 20 mg  20 mg Oral Daily STEPH Garcia   20 mg at 08/17/21 0750    dilTIAZem (CARDIZEM CD) extended release capsule 240 mg  240 mg Oral BID STEPH Garcia   240 mg at 08/17/21 1946    flecainide (TAMBOCOR) tablet 100 mg  100 mg Oral BID STEPH Mondragon   100 mg at 08/17/21 1947    fluticasone (FLONASE) 50 MCG/ACT nasal spray 1 spray  1 spray Each Nostril Daily STEPH Mondragon   1 spray at 08/17/21 0758    ipratropium-albuterol (DUONEB) nebulizer solution 3 mL  1 vial Inhalation Q4H STEPH Mondragon   3 mL at 08/17/21 2012    losartan (COZAAR) tablet 50 mg  50 mg Oral Daily STEPH Mondragon   50 mg at 08/17/21 0750    sodium chloride flush 0.9 % injection 5-40 mL  5-40 mL Intravenous 2 times per day STEPH Mondragon   10 mL at 08/17/21 1947    sodium chloride flush 0.9 % injection 5-40 mL  5-40 mL Intravenous PRN STEPH Mondragon   10 mL at 08/15/21 1505    0.9 % sodium chloride infusion  25 mL Intravenous PRN STEPH Mondragon        ondansetron (ZOFRAN-ODT) disintegrating tablet 4 mg  4 mg Oral Q8H PRN STEPH Mondragon        Or    ondansetron (ZOFRAN) injection 4 mg  4 mg Intravenous Q6H PRN STEPH Mondragon        polyethylene glycol (GLYCOLAX) packet 17 g  17 g Oral Daily PRN STEPH Mondragon        acetaminophen (TYLENOL) tablet 650 mg  650 mg Oral Q6H PRN STEPH Mondragon   650 mg at 08/17/21 2240    Or    acetaminophen (TYLENOL) suppository 650 mg  650 mg Rectal Q6H PRN STEPH Mondragon        aspirin chewable tablet 81 mg  81 mg Oral Daily April Leslie, APRN - CNP   81 mg at 08/16/21 0847      dextrose      sodium chloride         Physical Exam:  /63   Pulse 66   Temp 96.4 °F (35.8 °C) (Temporal)   Resp 20   Ht 5' 11\" (1.803 m)   Wt 292 lb 5.3 oz (132.6 kg)   SpO2 98%   BMI 40.77 kg/m²   Wt Readings from Last 3 Encounters:   08/18/21 292 lb 5.3 oz (132.6 kg)   01/22/20 277 lb (125.6 kg)   01/02/20 271 lb (122.9 kg)     Appearance: Awake, alert, no acute respiratory distress  Skin: Intact, no rash  Head: Normocephalic, atraumatic  Eyes: EOMI, no conjunctival erythema  ENMT: No pharyngeal erythema, MMM, no rhinorrhea  Neck: Supple, no carotid bruits  Lungs: B/L wheezing, no rales  Cardiac: Regular rate and rhythm, no murmurs apparent  Abdomen: Soft, nontender, +bowel sounds  Extremities: Moves all extremities x 4, ++lower extremity edema  Neurologic: No focal motor deficits apparent, normal mood and affect    Intake/Output:    Intake/Output Summary (Last 24 hours) at 8/18/2021 0738  Last data filed at 8/18/2021 2251  Gross per 24 hour   Intake 1260 ml   Output 3725 ml   Net -2465 ml     No intake/output data recorded. Laboratory Tests:  Recent Labs     08/15/21  1848      K 4.9   CL 99   CO2 34*   BUN 49*   CREATININE 1.4*   GLUCOSE 199*   CALCIUM 9.2     Lab Results   Component Value Date    MG 2.2 02/14/2018     No results for input(s): ALKPHOS, ALT, AST, PROT, BILITOT, BILIDIR, LABALBU in the last 72 hours. Recent Labs     08/15/21  1414 08/16/21  0701 08/17/21  0505   WBC 18.5* 17.6* 19.2*   RBC 3.58* 3.52* 3.60*   HGB 11.3* 11.2* 11.4*   HCT 34.6* 34.9* 34.5*   MCV 96.6 99.1 95.8   MCH 31.6 31.8 31.7   MCHC 32.7 32.1 33.0   RDW 12.6 12.7 12.7    212 226   MPV 12.2* 11.8 12.0     Lab Results   Component Value Date    CKTOTAL 51 11/13/2010    CKMB 0.5 11/13/2010    TROPONINI <0.01 02/15/2018    TROPONINI <0.01 02/14/2018    TROPONINI <0.01 02/14/2018     No results for input(s): CKTOTAL, CKMB, CKMBINDEX, TROPHS in the last 72 hours.   Lab Results   Component Value Date    INR 1.2 02/04/2018    INR 0.9 11/13/2010    PROTIME 13.8 (H) 02/04/2018    PROTIME 11.6 11/13/2010     Lab Results   Component Value Date    TSH 0.942 02/14/2018     No results found for: LABA1C  No results found for: EAG  Lab Results   Component Value Date    CHOL 205 (H) 04/08/2018    CHOL 162 01/28/2017     Lab Results   Component Value Date    TRIG 80 04/08/2018    TRIG 61 01/28/2017     Lab Results   Component Value Date    HDL 80 04/08/2018    HDL 50 01/28/2017     Lab Results   Component Value Date    LDLCALC 109 (H) 04/08/2018    LDLCALC 100 (H) 01/28/2017     Lab Results   Component Value Date    LABVLDL 16 04/08/2018    LABVLDL 12 01/28/2017     No results found for: CHOLHDLRATIO  No results for input(s): PROBNP in the last 72 hours. Cardiac Tests:  Telemetry reviewed (date: 8/18/2021): SR, rate 80's    CXR 8/7/2021:  Impression  Cardiomegaly. Large opacity in the right cardiophrenic angle.  Although this may represent  large epicardial fat pad, given the interval change since the prior  examination further evaluation and characterization with CT of the chest is  recommended. Mild hyperinflation, suggest COPD.     Chest CTA 8/8/2021:  Impression  No evidence of pulmonary embolism or acute pulmonary abnormality. Prominent pericardial fat accounts for the abnormality seen on radiograph.     Cardiac catheterization 11/2010 ([facility]): LMT: normal. LAD: normal, gives off one large bifurcating diagonal as it courses to but ends at the apex. LCx: nondominant, gives off one large OM1, only trivial distal disease. RCA: Dominant, large, minimal disease distally.     Echocardiogram 11/2010 ([facility]): EF 55% with questionable RV dilation. Trivial to small pericardial effusion. Small echodense space near apex (? Localized effusion).  Trivial MR and TR.      WYATT: 2/7/18 ([place])   Cardiac rhythm: atrial flutter   Low normal left ventricular ejection fraction.   Moderately dilated left atrium.   No evidence of a left atrial appendage thrombus.   No evidence of interatrial shunting on bubble study.   Borderline dilated right ventricle with normal right ventricular function.   Mild-moderate mitral regurgitation.     WYATT: 2/15/18 [employer] [employer])   Low normal left ventricular systolic function.   Mild to moderate mitral regurgitation.     TTE (Dr. Ben Magallon, 8/12/2021)  Summary   Technically limited study.   Normal left ventricular size, wall thickness, wall motion, and systolic   function.   Estimated left ventricle ejection fraction 70+/-5 %.   There is doppler evidence of stage II diastolic dysfunction.   Mildly dilated right ventricle.   Right ventricle global systolic function is normal .   Normal sized left atrium.   No significant valvular abnormalities noted.     Lexiscan Stress Test 8/13/2021:  FINDINGS: The overall quality of the study was fair. Left ventricular cavity size was noted to be dilated on both the  stress and the resting images but there was no transient ischemic  dilatation after stress  Rotational analog analysis demonstrated abnormal patient motion and  there was marked increase in tracer uptake in the abdominal organs  with the liver overshadowing the bottom of the heart  A severe defect was present in the inferior wall extending into the  lateral apical wall(s) that was moderate to largesized by  quantification. The resting images showed no change   Gated SPECT left ventricular ejection fraction was calculated to be  66%, with normal myocardial contractility and wall motion. Impression  The myocardial perfusion imaging was probably normal with the large  fixed inferior/lateral apical wall defect being more consistent with  attenuation artifact and less likely the result of a myocardial  infarction especially with the normal contractility of the inferior  wall and the lateral apical wall. More importantly, there did not  appear to be any evidence of stress-induced ischemia on this study  Overall left ventricular systolic function was normal with no regional  wall motion abnormality  Low risk general pharmacologic stress test.    ASSESSMENT / PLAN:  1. Acute on chronic heart failure with preserved ejection fraction  2. Acute COPD exacerbation. Known history of very severe COPD. Viral panel negative. Pulmonology following. 3. Acute on chronic respiratory failure, currently on 4 L nasal cannula. 4. Acute kidney injury -- most recent Cr 1.4  5. Leukocytosis.   6. Paroxysmal atrial fibrillation/typical flutter status-post AF ablation in April 2018. Currently on Eliquis, cardizem, and flecainide therapy. Follows with [facility] EP. Maintaining normal sinus rhythm this admission. 7. Hypertension, uncontrolled at times. 8. Hyperlipidemia, on statin therapy. 9. Obstructive sleep apnea, compliant with CPAP. 10. Morbid obesity / BMI 40.8  11. History of alcohol abuse. 15. Former tobacco abuse (2 PPD for many years, quit in 8/2017)  13.   History of pericarditis in 11/2010    - Recent cardiac testing reviewed  - Monitor I/O's, renal function, and electrolytes on diuretic (reproted I/O's net negative 18.3 L) --> order BMP today (recheck renal function)  - Continue current medications otherwise  - Treatment of PARKER  - Aggressive risk factor modifications  - Telemetry reviewed (maintainig sinus rhythm)  - Care per pulmonary  - Case discussed with the patient's family and Dr. Josh Brito today    Chacha Cedeno, [address] [facility]

## 2021-08-19 ENCOUNTER — ANESTHESIA (OUTPATIENT)
Dept: ENDOSCOPY | Age: 61
DRG: 194 | End: 2021-08-19
Payer: COMMERCIAL

## 2021-08-19 LAB
METER GLUCOSE: 162 MG/DL (ref 74–99)
METER GLUCOSE: 181 MG/DL (ref 74–99)
METER GLUCOSE: 208 MG/DL (ref 74–99)
METER GLUCOSE: 325 MG/DL (ref 74–99)

## 2021-08-19 PROCEDURE — 94669 MECHANICAL CHEST WALL OSCILL: CPT

## 2021-08-19 PROCEDURE — 6360000002 HC RX W HCPCS: Performed by: INTERNAL MEDICINE

## 2021-08-19 PROCEDURE — 2580000003 HC RX 258: Performed by: INTERNAL MEDICINE

## 2021-08-19 PROCEDURE — 82962 GLUCOSE BLOOD TEST: CPT

## 2021-08-19 PROCEDURE — 6370000000 HC RX 637 (ALT 250 FOR IP): Performed by: PHYSICIAN ASSISTANT

## 2021-08-19 PROCEDURE — 2700000000 HC OXYGEN THERAPY PER DAY

## 2021-08-19 PROCEDURE — 6370000000 HC RX 637 (ALT 250 FOR IP): Performed by: INTERNAL MEDICINE

## 2021-08-19 PROCEDURE — 99233 SBSQ HOSP IP/OBS HIGH 50: CPT | Performed by: INTERNAL MEDICINE

## 2021-08-19 PROCEDURE — 94660 CPAP INITIATION&MGMT: CPT

## 2021-08-19 PROCEDURE — 2580000003 HC RX 258: Performed by: PHYSICIAN ASSISTANT

## 2021-08-19 PROCEDURE — 2060000000 HC ICU INTERMEDIATE R&B

## 2021-08-19 PROCEDURE — 94640 AIRWAY INHALATION TREATMENT: CPT

## 2021-08-19 PROCEDURE — 6370000000 HC RX 637 (ALT 250 FOR IP): Performed by: NURSE PRACTITIONER

## 2021-08-19 RX ADMIN — SODIUM CHLORIDE, PRESERVATIVE FREE 10 ML: 5 INJECTION INTRAVENOUS at 13:37

## 2021-08-19 RX ADMIN — BUDESONIDE 1000 MCG: 0.5 SUSPENSION RESPIRATORY (INHALATION) at 08:06

## 2021-08-19 RX ADMIN — BUDESONIDE 1000 MCG: 0.5 SUSPENSION RESPIRATORY (INHALATION) at 19:59

## 2021-08-19 RX ADMIN — IPRATROPIUM BROMIDE AND ALBUTEROL SULFATE 3 ML: .5; 2.5 SOLUTION RESPIRATORY (INHALATION) at 19:59

## 2021-08-19 RX ADMIN — FLECAINIDE ACETATE 100 MG: 100 TABLET ORAL at 09:01

## 2021-08-19 RX ADMIN — SODIUM CHLORIDE, PRESERVATIVE FREE 10 ML: 5 INJECTION INTRAVENOUS at 06:25

## 2021-08-19 RX ADMIN — SODIUM CHLORIDE SOLN NEBU 3% 4 ML: 3 NEBU SOLN at 20:00

## 2021-08-19 RX ADMIN — ASPIRIN 81 MG CHEWABLE TABLET 81 MG: 81 TABLET CHEWABLE at 13:38

## 2021-08-19 RX ADMIN — Medication 10 ML: at 21:51

## 2021-08-19 RX ADMIN — IPRATROPIUM BROMIDE AND ALBUTEROL SULFATE 3 ML: .5; 2.5 SOLUTION RESPIRATORY (INHALATION) at 08:07

## 2021-08-19 RX ADMIN — ARFORMOTEROL TARTRATE 15 MCG: 15 SOLUTION RESPIRATORY (INHALATION) at 08:05

## 2021-08-19 RX ADMIN — METHYLPREDNISOLONE SODIUM SUCCINATE 60 MG: 125 INJECTION, POWDER, FOR SOLUTION INTRAMUSCULAR; INTRAVENOUS at 06:25

## 2021-08-19 RX ADMIN — DILTIAZEM HYDROCHLORIDE 240 MG: 240 CAPSULE, EXTENDED RELEASE ORAL at 09:01

## 2021-08-19 RX ADMIN — METHYLPREDNISOLONE SODIUM SUCCINATE 60 MG: 125 INJECTION, POWDER, FOR SOLUTION INTRAMUSCULAR; INTRAVENOUS at 21:51

## 2021-08-19 RX ADMIN — LEVOFLOXACIN 750 MG: 5 INJECTION, SOLUTION INTRAVENOUS at 09:00

## 2021-08-19 RX ADMIN — IPRATROPIUM BROMIDE AND ALBUTEROL SULFATE 3 ML: .5; 2.5 SOLUTION RESPIRATORY (INHALATION) at 16:08

## 2021-08-19 RX ADMIN — FLUTICASONE PROPIONATE 1 SPRAY: 50 SPRAY, METERED NASAL at 09:02

## 2021-08-19 RX ADMIN — INSULIN LISPRO 2 UNITS: 100 INJECTION, SOLUTION INTRAVENOUS; SUBCUTANEOUS at 21:58

## 2021-08-19 RX ADMIN — ARFORMOTEROL TARTRATE 15 MCG: 15 SOLUTION RESPIRATORY (INHALATION) at 19:59

## 2021-08-19 RX ADMIN — SODIUM CHLORIDE SOLN NEBU 3% 4 ML: 3 NEBU SOLN at 08:08

## 2021-08-19 RX ADMIN — Medication 10 ML: at 09:00

## 2021-08-19 RX ADMIN — ATORVASTATIN CALCIUM 20 MG: 20 TABLET, FILM COATED ORAL at 09:01

## 2021-08-19 RX ADMIN — METHYLPREDNISOLONE SODIUM SUCCINATE 60 MG: 125 INJECTION, POWDER, FOR SOLUTION INTRAMUSCULAR; INTRAVENOUS at 13:37

## 2021-08-19 RX ADMIN — SODIUM CHLORIDE, PRESERVATIVE FREE 10 ML: 5 INJECTION INTRAVENOUS at 16:43

## 2021-08-19 RX ADMIN — GUAIFENESIN 400 MG: 400 TABLET ORAL at 17:56

## 2021-08-19 RX ADMIN — FUROSEMIDE 20 MG: 10 INJECTION, SOLUTION INTRAMUSCULAR; INTRAVENOUS at 16:43

## 2021-08-19 RX ADMIN — LOSARTAN POTASSIUM 50 MG: 50 TABLET, FILM COATED ORAL at 09:01

## 2021-08-19 RX ADMIN — INSULIN LISPRO 8 UNITS: 100 INJECTION, SOLUTION INTRAVENOUS; SUBCUTANEOUS at 16:41

## 2021-08-19 RX ADMIN — FLECAINIDE ACETATE 100 MG: 100 TABLET ORAL at 21:51

## 2021-08-19 RX ADMIN — SODIUM CHLORIDE, PRESERVATIVE FREE 10 ML: 5 INJECTION INTRAVENOUS at 09:02

## 2021-08-19 RX ADMIN — DILTIAZEM HYDROCHLORIDE 240 MG: 240 CAPSULE, EXTENDED RELEASE ORAL at 21:51

## 2021-08-19 RX ADMIN — APIXABAN 5 MG: 5 TABLET, FILM COATED ORAL at 21:51

## 2021-08-19 ASSESSMENT — PAIN DESCRIPTION - PROGRESSION: CLINICAL_PROGRESSION: GRADUALLY IMPROVING

## 2021-08-19 ASSESSMENT — PAIN SCALES - GENERAL
PAINLEVEL_OUTOF10: 0

## 2021-08-19 NOTE — PROGRESS NOTES
INPATIENT CARDIOLOGY FOLLOW-UP    Name: Karen Sweet    Age: 63 y.o. Date of Admission: 8/7/2021  9:53 PM    Date of Service: 8/19/2021    Chief Complaint: Follow-up for acute on chronic HFpEF    Interim History:  Currently with no chest pain or palpitations. +ongoing SOB. +LE edema. SR with episodes of AF on monitoring. Reported I/O's net negative 18.3 L thus far. He is anxious re: bronchoscopy today.     Review of Systems:   Cardiac: As per HPI  General: No fever, chills  Pulmonary: As per HPI  HEENT: No visual disturbances, difficult swallowing  GI: No nausea, vomiting  : No dysuria, hematuria  Endocrine: No thyroid disease or DM  Musculoskeletal: ROUSE x 4, no focal motor deficits  Skin: Intact, no rashes  Neuro: No headache, seizures  Psych: Currently with no depression, anxiety    Problem List:  Patient Active Problem List   Diagnosis    COPD (chronic obstructive pulmonary disease) ([address]    Essential hypertension    Adrenal adenoma    Class 2 obesity due to excess calories with serious comorbidity and body mass index (BMI) of 35.0 to 35.9 in adult    Anticoagulation management encounter    Typical atrial flutter ([address]    Anxiety    Status post catheter ablation of atrial flutter    Persistent atrial fibrillation ([address]    ETOH abuse    COPD with acute exacerbation (HCC)       Allergies:  No Known Allergies    Current Medications:  Current Facility-Administered Medications   Medication Dose Route Frequency Provider Last Rate Last Admin    furosemide (LASIX) injection 20 mg  20 mg Intravenous BID Ryan Danielle MD   20 mg at 08/18/21 1802    busPIRone (BUSPAR) tablet 5 mg  5 mg Oral TID PRN Saravanan Alvarado MD   5 mg at 08/18/21 2250    glucose (GLUTOSE) 40 % oral gel 15 g  15 g Oral PRN Saravanan Alvarado MD        dextrose 50 % IV solution  12.5 g Intravenous PRN Saravanan Alvarado MD        glucagon (rDNA) injection 1 mg  1 mg Intramuscular PRN Saravanan Alvarado MD        dextrose 5 % solution  100 mL/hr Intravenous PRN Cecelia Juarez MD        insulin lispro (HUMALOG) injection vial 0-12 Units  0-12 Units Subcutaneous TID WC Cecelia Juarez MD   2 Units at 08/18/21 1706    insulin lispro (HUMALOG) injection vial 0-6 Units  0-6 Units Subcutaneous Nightly Cecelia Juarez MD   2 Units at 08/18/21 2011    [Held by provider] furosemide (LASIX) injection 20 mg  20 mg Intravenous TID Napoleon Ghotra MD   20 mg at 08/15/21 0848    sodium chloride (Inhalant) 3 % nebulizer solution 4 mL  4 mL Nebulization PRN Ryan Aponte MD        guaiFENesin tablet 400 mg  400 mg Oral Q6H Ryan Danielle MD   400 mg at 08/18/21 2250    perflutren lipid microspheres (DEFINITY) injection 1.65 mg  1.5 mL Intravenous ONCE PRN Ryan Aponte MD        methylPREDNISolone sodium (SOLU-MEDROL) injection 60 mg  60 mg Intravenous Q8H Ryan Danielle MD   60 mg at 08/19/21 0625    budesonide (PULMICORT) nebulizer suspension 1,000 mcg  1 mg Nebulization BID Napoleon Ghotra MD   1,000 mcg at 08/19/21 0806    And    Arformoterol Tartrate (BROVANA) nebulizer solution 15 mcg  15 mcg Nebulization BID Ryan Danielle MD   15 mcg at 08/19/21 0805    guaiFENesin-dextromethorphan (ROBITUSSIN DM) 100-10 MG/5ML syrup 5 mL  5 mL Oral Q4H PRN STEPH Jacob   5 mL at 08/18/21 2250    sodium chloride (Inhalant) 3 % nebulizer solution 4 mL  4 mL Nebulization BID Brittani Lyon MD   4 mL at 08/19/21 0808    albuterol (PROVENTIL) nebulizer solution 2.5 mg  2.5 mg Nebulization Q6H PRN STEPH Tierney        [Held by provider] apixaban (ELIQUIS) tablet 5 mg  5 mg Oral BID STEPH Tierney   5 mg at 08/16/21 2039    atorvastatin (LIPITOR) tablet 20 mg  20 mg Oral Daily STEPH Tierney   20 mg at 08/19/21 0901    dilTIAZem (CARDIZEM CD) extended release capsule 240 mg  240 mg Oral BID STEPH Tierney   240 mg at 08/19/21 0901    flecainide (TAMBOCOR) tablet 100 mg  100 mg Oral BID STEPH Tierney   100 mg at 08/19/21 0901    fluticasone (FLONASE) 50 MCG/ACT nasal spray 1 spray  1 spray Each Nostril Daily STEPH Childs   1 spray at 08/19/21 0902    ipratropium-albuterol (DUONEB) nebulizer solution 3 mL  1 vial Inhalation Q4H STEPH Childs   3 mL at 08/19/21 0807    losartan (COZAAR) tablet 50 mg  50 mg Oral Daily STEPH Childs   50 mg at 08/19/21 0901    sodium chloride flush 0.9 % injection 5-40 mL  5-40 mL Intravenous 2 times per day STEPH Childs   10 mL at 08/19/21 0900    sodium chloride flush 0.9 % injection 5-40 mL  5-40 mL Intravenous PRN STEPH Childs   10 mL at 08/19/21 0902    0.9 % sodium chloride infusion  25 mL Intravenous PRN STEPH Childs        ondansetron (ZOFRAN-ODT) disintegrating tablet 4 mg  4 mg Oral Q8H PRN STEPH Childs        Or    ondansetron (ZOFRAN) injection 4 mg  4 mg Intravenous Q6H PRN STEPH Childs        polyethylene glycol (GLYCOLAX) packet 17 g  17 g Oral Daily PRN STEPH Childs        acetaminophen (TYLENOL) tablet 650 mg  650 mg Oral Q6H PRN STEPH Childs   650 mg at 08/17/21 2240    Or    acetaminophen (TYLENOL) suppository 650 mg  650 mg Rectal Q6H PRN STEPH Childs        aspirin chewable tablet 81 mg  81 mg Oral Daily April Major-Roxbury, APRN - CNP   81 mg at 08/18/21 0940      dextrose      sodium chloride         Physical Exam:  /75   Pulse 91   Temp 97 °F (36.1 °C) (Temporal)   Resp 20   Ht 5' 11\" (1.803 m)   Wt 292 lb 5.3 oz (132.6 kg)   SpO2 98%   BMI 40.77 kg/m²   Wt Readings from Last 3 Encounters:   08/18/21 292 lb 5.3 oz (132.6 kg)   01/22/20 277 lb (125.6 kg)   01/02/20 271 lb (122.9 kg)     Appearance: Awake, alert, no acute respiratory distress  Skin: Intact, no rash  Head: Normocephalic, atraumatic  Eyes: EOMI, no conjunctival erythema  ENMT: No pharyngeal erythema, MMM, no rhinorrhea  Neck: Supple, no carotid bruits  Lungs: B/L wheezing, no rales  Cardiac: Regular rate and rhythm, no murmurs apparent  Abdomen: Soft, nontender, +bowel sounds  Extremities: Moves all extremities x 4, ++lower extremity edema  Neurologic: No focal motor deficits apparent, normal mood and affect    Intake/Output:    Intake/Output Summary (Last 24 hours) at 8/19/2021 1201  Last data filed at 8/19/2021 0808  Gross per 24 hour   Intake 240 ml   Output 650 ml   Net -410 ml     No intake/output data recorded. Laboratory Tests:  Recent Labs     08/18/21  1101      K 4.6   CL 95*   CO2 35*   BUN 47*   CREATININE 1.1   GLUCOSE 208*   CALCIUM 8.7     Lab Results   Component Value Date    MG 2.2 02/14/2018     No results for input(s): ALKPHOS, ALT, AST, PROT, BILITOT, BILIDIR, LABALBU in the last 72 hours. Recent Labs     08/17/21  0505 08/18/21  1101   WBC 19.2* 19.5*   RBC 3.60* 3.72*   HGB 11.4* 11.9*   HCT 34.5* 36.5*   MCV 95.8 98.1   MCH 31.7 32.0   MCHC 33.0 32.6   RDW 12.7 12.6    206   MPV 12.0 11.8     Lab Results   Component Value Date    CKTOTAL 51 11/13/2010    CKMB 0.5 11/13/2010    TROPONINI <0.01 02/15/2018    TROPONINI <0.01 02/14/2018    TROPONINI <0.01 02/14/2018     No results for input(s): CKTOTAL, CKMB, CKMBINDEX, TROPHS in the last 72 hours.   Lab Results   Component Value Date    INR 1.2 02/04/2018    INR 0.9 11/13/2010    PROTIME 13.8 (H) 02/04/2018    PROTIME 11.6 11/13/2010     Lab Results   Component Value Date    TSH 0.942 02/14/2018     No results found for: LABA1C  No results found for: EAG  Lab Results   Component Value Date    CHOL 205 (H) 04/08/2018    CHOL 162 01/28/2017     Lab Results   Component Value Date    TRIG 80 04/08/2018    TRIG 61 01/28/2017     Lab Results   Component Value Date    HDL 80 04/08/2018    HDL 50 01/28/2017     Lab Results   Component Value Date    LDLCALC 109 (H) 04/08/2018    LDLCALC 100 (H) 01/28/2017     Lab Results   Component Value Date    LABVLDL 16 04/08/2018    LABVLDL 12 01/28/2017     No results found for: CHOLHDLRATIO  No results for input(s): PROBNP in the last 72 hours. Cardiac Tests:  Telemetry reviewed (date: 8/19/2021): SR/AF rate 90's    CXR 8/7/2021:  Impression  Cardiomegaly. Large opacity in the right cardiophrenic angle.  Although this may represent  large epicardial fat pad, given the interval change since the prior  examination further evaluation and characterization with CT of the chest is  recommended. Mild hyperinflation, suggest COPD.     Chest CTA 8/8/2021:  Impression  No evidence of pulmonary embolism or acute pulmonary abnormality. Prominent pericardial fat accounts for the abnormality seen on radiograph.     Cardiac catheterization 11/2010 ([facility]): LMT: normal. LAD: normal, gives off one large bifurcating diagonal as it courses to but ends at the apex. LCx: nondominant, gives off one large OM1, only trivial distal disease. RCA: Dominant, large, minimal disease distally.     Echocardiogram 11/2010 ([facility]): EF 55% with questionable RV dilation. Trivial to small pericardial effusion. Small echodense space near apex (? Localized effusion).  Trivial MR and TR.      WYATT: 2/7/18 ([place])   Cardiac rhythm: atrial flutter   Low normal left ventricular ejection fraction.   Moderately dilated left atrium.   No evidence of a left atrial appendage thrombus.   No evidence of interatrial shunting on bubble study.   Borderline dilated right ventricle with normal right ventricular function.   Mild-moderate mitral regurgitation.     WYATT: 2/15/18 Peggi Yi)   Low normal left ventricular systolic function.   Mild to moderate mitral regurgitation.     TTE (Dr. Anahi Quintana, 8/12/2021)  Summary   Technically limited study.   Normal left ventricular size, wall thickness, wall motion, and systolic   function.   Estimated left ventricle ejection fraction 70+/-5 %.   There is doppler evidence of stage II diastolic dysfunction.   Mildly dilated right ventricle.   Right ventricle global systolic function is normal .   Normal sized left atrium.   No significant valvular abnormalities noted.     Lexiscan Stress Test 8/13/2021:  FINDINGS: The overall quality of the study was fair. Left ventricular cavity size was noted to be dilated on both the  stress and the resting images but there was no transient ischemic  dilatation after stress  Rotational analog analysis demonstrated abnormal patient motion and  there was marked increase in tracer uptake in the abdominal organs  with the liver overshadowing the bottom of the heart  A severe defect was present in the inferior wall extending into the  lateral apical wall(s) that was moderate to largesized by  quantification. The resting images showed no change   Gated SPECT left ventricular ejection fraction was calculated to be  66%, with normal myocardial contractility and wall motion. Impression  The myocardial perfusion imaging was probably normal with the large  fixed inferior/lateral apical wall defect being more consistent with  attenuation artifact and less likely the result of a myocardial  infarction especially with the normal contractility of the inferior  wall and the lateral apical wall. More importantly, there did not  appear to be any evidence of stress-induced ischemia on this study  Overall left ventricular systolic function was normal with no regional  wall motion abnormality  Low risk general pharmacologic stress test.    ASSESSMENT / PLAN:  1. Acute on chronic heart failure with preserved ejection fraction  2. Acute COPD exacerbation. Known history of very severe COPD. Viral panel negative. Pulmonology following. 3. Acute on chronic respiratory failure, currently on 4 L nasal cannula. 4. Acute kidney injury -- improved, most recent Cr 1.4 --> 1.1  5. Leukocytosis. 6. Paroxysmal atrial fibrillation/typical flutter status-post AF ablation in April 2018. Currently on Eliquis, cardizem, and flecainide therapy. Follows with [facility] RACHELLE BALLARD. 7. Hypertension, uncontrolled at times.   8. Hyperlipidemia, on statin therapy. 9. Obstructive sleep apnea, compliant with CPAP. 10. Morbid obesity / BMI 40.8  11. History of alcohol abuse. 15. Former tobacco abuse (2 PPD for many years, quit in 8/2017)  13.   History of pericarditis in 11/2010    - Recent cardiac testing reviewed  - Monitor I/O's, renal function, and electrolytes on diuretic (reproted I/O's net negative 18.3 L) --> Cr improved  - Continue current medications otherwise  - Treatment of PARKER  - Aggressive risk factor modifications  - Telemetry reviewed (sinus rhythm with episodes of AF)  - Care per pulmonary --> bronchoscopy scheduled for today  - Case discussed with the patient's family (multiple family members) today    Fabian Prado MD  [facility] ([facility]) Cardiology

## 2021-08-19 NOTE — PROGRESS NOTES
[facility]  Division of Pulmonary, Critical Care Medicine  Pulmonary [address] [facility]           Pulmonary Clinic consult       CC :SOB ,wheeizng   H/o A flutter     HPI :  Still feels congested and not able to get rid of secretions as he state  Still with SOB  Still with leg selling   Family ,wife at bed side   No chest pain   Still congested  Still very anxious and insist that he has bronch     PHYSICAL EXAMINATION:     VITAL SIGNS:  BP (!) 158/76   Pulse 106   Temp 96.4 °F (35.8 °C) (Temporal)   Resp 20   Ht 5' 11\" (1.803 m)   Wt 292 lb 5.3 oz (132.6 kg)   SpO2 98%   BMI 40.77 kg/m²   Wt Readings from Last 3 Encounters:   08/18/21 292 lb 5.3 oz (132.6 kg)   01/22/20 277 lb (125.6 kg)   01/02/20 271 lb (122.9 kg)     Temp Readings from Last 3 Encounters:   08/18/21 96.4 °F (35.8 °C) (Temporal)   08/09/19 97.7 °F (36.5 °C)   04/09/19 98.5 °F (36.9 °C) (Oral)     TMAX:  BP Readings from Last 3 Encounters:   08/18/21 (!) 158/76   01/22/20 132/77   01/02/20 124/82     Pulse Readings from Last 3 Encounters:   08/18/21 106   01/22/20 109   01/02/20 83           INTAKE/OUTPUTS:  I/O last 3 completed shifts:   In: 2160 [P.O.:2160]  Out: 1950 [Urine:1950]    Intake/Output Summary (Last 24 hours) at 8/19/2021 0000  Last data filed at 8/18/2021 1845  Gross per 24 hour   Intake 2160 ml   Output 1950 ml   Net 210 ml       This is virtual visit      LABS/IMAGING:    CBC:  Lab Results   Component Value Date    WBC 19.5 (H) 08/18/2021    HGB 11.9 (L) 08/18/2021    HCT 36.5 (L) 08/18/2021    MCV 98.1 08/18/2021     08/18/2021    LYMPHOPCT 5.2 (L) 08/18/2021    RBC 3.72 (L) 08/18/2021    MCH 32.0 08/18/2021    MCHC 32.6 08/18/2021    RDW 12.6 08/18/2021    NEUTOPHILPCT 83.5 (H) 08/18/2021    MONOPCT 7.8 08/18/2021    BASOPCT 0.8 08/18/2021    NEUTROABS 16.97 (H) 08/18/2021    LYMPHSABS 0.98 (L) 08/18/2021    MONOSABS 1.56 (H) 08/18/2021    EOSABS 0.00 (L) 08/18/2021    BASOSABS 0.00 08/18/2021       Recent Labs     08/18/21  1101 08/17/21  0505 08/16/21  0701   WBC 19.5* 19.2* 17.6*   HGB 11.9* 11.4* 11.2*   HCT 36.5* 34.5* 34.9*   MCV 98.1 95.8 99.1    226 212       BMP:   Recent Labs     08/18/21  1101      K 4.6   CL 95*   CO2 35*   BUN 47*   CREATININE 1.1       MG:   Lab Results   Component Value Date    MG 2.2 02/14/2018     Ca/Phos:   Lab Results   Component Value Date    CALCIUM 8.7 08/18/2021     Amylase: No results found for: AMYLASE  Lipase:   Lab Results   Component Value Date    LIPASE 28 05/19/2011     LIVER PROFILE: No results for input(s): AST, ALT, LIPASE, BILIDIR, BILITOT, ALKPHOS in the last 72 hours. Invalid input(s): AMYLASE,  ALB    PT/INR:   No results for input(s): PROTIME, INR in the last 72 hours. APTT: No results for input(s): APTT in the last 72 hours. Cardiac Enzymes:  Lab Results   Component Value Date    CKTOTAL 51 11/13/2010    CKMB 0.5 11/13/2010    TROPONINI <0.01 02/15/2018       Hgb A1C: No results found for: LABA1C  No results found for: EAG  FAMILIA: No results found for: FAMILIA  ESR: No results found for: SEDRATE  CRP: No results found for: CRP  D Dimer: No results found for: DDIMER    Thyroid Studies:  Lab Results   Component Value Date    TSH 0.942 02/14/2018    S4CMUSA 4.8 02/14/2018           MICROBIOLOGY:  Pending       CXR:  Reviewed     CT Chest:reviewed     PE  Vitals:    08/18/21 2250   BP: (!) 158/76   Pulse: 106   Resp: 20   Temp: 96.4 °F (35.8 °C)   SpO2: 98%     General:  Awake, alert, oriented X 3. Well developed, well nourished, well groomed. No apparent distress. HEENT:  Normocephalic, atraumatic. Pupils equal, round, reactive to light. No scleral icterus. No conjunctival injection. Normal lips, teeth, and gums. No nasal discharge. Neck:  Supple, FROM  Heart:  RRR, no murmurs, gallops, rubs, carotid upstroke normal, no carotid bruits  Lungs:wheeizng bilateral ,rhonchi   Abdomen:   Bowel sounds present, soft, nontender, no masses, no organomegaly, no peritoneal signs  Extremities:  No clubbing, cyanosis, or edema  Skin:  Warm and dry, no open lesions or rash  Neuro:  Cranial nerves 2-12 intact, no focal deficits  Vascular: Radial and pedal Pulses 2+  Breast: deferred  Rectal: deferred  Genitalia:  deferred    PROBLEM LIST:  Patient Active Problem List   Diagnosis    COPD (chronic obstructive pulmonary disease) ([address]    Essential hypertension    Adrenal adenoma    Class 2 obesity due to excess calories with serious comorbidity and body mass index (BMI) of 35.0 to 35.9 in adult    Anticoagulation management encounter    Typical atrial flutter ([address]    Anxiety    Status post catheter ablation of atrial flutter    Persistent atrial fibrillation ([address]    ETOH abuse    COPD with acute exacerbation (HCC)               ASSESSMENT:  1.) Acute   exacerbation of COPD  2) very severe COPD  3.)obstructive sleep apnea  4. )Afib /Flutter   5.)tobacco independent(quit 5 months ago)    Data:  FVC 3.63 which is 73 of predicted    FEV1 1.52 which is 39 of predicted    FEV1/FVC is 42  No Bronchodilator response    PLAN:    For bronchoscopy tomorrow Eliquis on hold as cardiology is following and they are okay with bronchoscopy  Discuss with patient that with his CT showing patent airway and lobes Bronch will not add anything add how risky in his case ,there is small area which shows slight narrowing RMB ,this need to be investigated with bronch down the road    stable  CXR   Discuss with family that Bronch with his respiratory status may be high risk ,he is to think about risks and benefits    Continue bronchodilators/ICS/aggressive pulmonary toilet/hypertonic saline    We will discuss with anesthesia as I feel [facility] and probably he will might need to be intubated for the procedure  Lor Saunders MD  Pulmonary/Critical care Medicine   [facility]

## 2021-08-19 NOTE — PROGRESS NOTES
Message sent to Dr. Elvie Collet via perfect serve regarding patient does not want to sign the bronch permit until he talks to the doctor, anesthesia and the surgical team.   Isrrael Stein RN

## 2021-08-19 NOTE — PROGRESS NOTES
Discontinue Levaquin  Probable bronchoscopy today, patient would like to proceed-made aware of risks given his current lung status  This will be done under general anesthesia rather than [facility]  Start taper of steroids if okay with dr. Itzel mejia   Leukocytosis from high-dose steroid therapy  Lasix currently on hold  Cardiology following-appreciate input, normal sinus rhythm, Eliquis on hold for bronchoscopy  Continue insulin sliding scale if needed and daily labs

## 2021-08-19 NOTE — PLAN OF CARE
Problem: Pain:  Goal: Pain level will decrease  Description: Pain level will decrease  8/19/2021 0240 by Regla Armendariz RN  Outcome: Met This Shift  8/18/2021 1440 by Erma Felder RN  Outcome: Met This Shift     Problem: Pain:  Goal: Control of acute pain  Description: Control of acute pain  8/19/2021 0240 by Regla Armendariz RN  Outcome: Met This Shift     Problem: Skin Integrity:  Goal: Will show no infection signs and symptoms  Description: Will show no infection signs and symptoms  8/19/2021 0240 by Regla Armendariz RN  Outcome: Met This Shift     Problem: Skin Integrity:  Goal: Absence of new skin breakdown  Description: Absence of new skin breakdown  8/19/2021 0240 by Regla Armendariz RN  Outcome: Met This Shift

## 2021-08-19 NOTE — ANESTHESIA PRE PROCEDURE
Department of Anesthesiology  Preprocedure Note       Name:  Shaka Victoria   Age:  63 y.o.  :  1960                                          MRN:  [id number]         Date:  2021      Surgeon: Wale Dunbar):  Sanchez Norton MD    Procedure: Procedure(s):  BRONCHOSCOPY WITH BAL    Medications prior to admission:   Prior to Admission medications    Medication Sig Start Date End Date Taking?  Authorizing Provider   aspirin 81 MG chewable tablet Take 81 mg by mouth daily   Yes Historical Provider, MD   Budeson-Glycopyrrol-Formoterol (BREZTRI AEROSPHERE) 160-9-4.8 MCG/ACT AERO Inhale 2 puffs into the lungs 2 times daily 21  Yes Sanchez Norton MD   spironolactone (ALDACTONE) 25 MG tablet Take 25 mg by mouth daily  3/2/21  Yes Historical Provider, MD   apixaban (ELIQUIS) 5 MG TABS tablet Take 1 tablet by mouth 2 times daily 20  Yes Sanchez Norton MD   fluticasone (FLONASE) 50 MCG/ACT nasal spray 1 spray by Each Nostril route daily 20  Yes Sanchez Norton MD   flecainide (TAMBOCOR) 100 MG tablet TAKE 1 TABLET BY MOUTH TWICE DAILY 1/15/19  Yes Izzy Stanford DO   furosemide (LASIX) 40 MG tablet TAKE 2 TABLETS BY MOUTH ONCE DAILY  Patient taking differently: Take 40 mg by mouth daily  19  Yes Sanchez Norton MD   diltiazem (CARDIZEM CD) 240 MG extended release capsule Take 1 capsule by mouth 2 times daily 18  Yes Jihan Mera MD   atorvastatin (LIPITOR) 20 MG tablet Take 20 mg by mouth every evening    Yes Historical Provider, MD   losartan (COZAAR) 50 MG tablet Take 50 mg by mouth daily   Yes Historical Provider, MD   albuterol sulfate  (90 Base) MCG/ACT inhaler Inhale 2 puffs into the lungs every 6 hours as needed for Wheezing   Yes Historical Provider, MD   ipratropium-albuterol (DUONEB) 0.5-2.5 (3) MG/3ML SOLN nebulizer solution Inhale 1 vial into the lungs every 4 hours   Yes Historical Provider, MD       Current medications:    Current Facility-Administered Medications Medication Dose Route Frequency Provider Last Rate Last Admin    furosemide (LASIX) injection 20 mg  20 mg Intravenous BID Ryan Cristina MD   20 mg at 08/18/21 1802    busPIRone (BUSPAR) tablet 5 mg  5 mg Oral TID PRN Wayne Delvalle MD   5 mg at 08/18/21 2250    glucose (GLUTOSE) 40 % oral gel 15 g  15 g Oral PRN Wayne Delvalle MD        dextrose 50 % IV solution  12.5 g Intravenous PRN Wayne Delvalle MD        glucagon (rDNA) injection 1 mg  1 mg Intramuscular PRN Wayne Delvalle MD        dextrose 5 % solution  100 mL/hr Intravenous PRN Wayne Delvalle MD        insulin lispro (HUMALOG) injection vial 0-12 Units  0-12 Units Subcutaneous TID WC Wayne Delvalle MD   2 Units at 08/18/21 1706    insulin lispro (HUMALOG) injection vial 0-6 Units  0-6 Units Subcutaneous Nightly Wayne Delvalle MD   2 Units at 08/18/21 2011    [Held by provider] furosemide (LASIX) injection 20 mg  20 mg Intravenous TID Ryan Danielle MD   20 mg at 08/15/21 0848    sodium chloride (Inhalant) 3 % nebulizer solution 4 mL  4 mL Nebulization PRN Ryan Cristina MD        guaiFENesin tablet 400 mg  400 mg Oral Q6H Ryan Danielle MD   400 mg at 08/18/21 2250    perflutren lipid microspheres (DEFINITY) injection 1.65 mg  1.5 mL Intravenous ONCE PRN Ryan Cristina MD        methylPREDNISolone sodium (SOLU-MEDROL) injection 60 mg  60 mg Intravenous Q8H Ryan Danielle MD   60 mg at 08/19/21 0625    budesonide (PULMICORT) nebulizer suspension 1,000 mcg  1 mg Nebulization BID Sylvain Coronado MD   1,000 mcg at 08/19/21 0806    And    Arformoterol Tartrate (BROVANA) nebulizer solution 15 mcg  15 mcg Nebulization BID Ryan Danielle MD   15 mcg at 08/19/21 0805    guaiFENesin-dextromethorphan (ROBITUSSIN DM) 100-10 MG/5ML syrup 5 mL  5 mL Oral Q4H PRN STEPH Danielle   5 mL at 08/18/21 2250    levoFLOXacin (LEVAQUIN) 750 MG/150ML infusion 750 mg  750 mg Intravenous Q24H Ofelia Golden  mL/hr at 08/19/21 0900 750 mg at 08/19/21 0900 at 21 0940       Allergies:  No Known Allergies    Problem List:    Patient Active Problem List   Diagnosis Code    COPD (chronic obstructive pulmonary disease) ([facility] J44.9    Essential hypertension I10    Adrenal adenoma D35.00    Class 2 obesity due to excess calories with serious comorbidity and body mass index (BMI) of 35.0 to 35.9 in adult [id number]    Anticoagulation management encounter Z51.81, Z79.01    Typical atrial flutter (HCC) I48.3    Anxiety F41.9    Status post catheter ablation of atrial flutter Z98.890    Persistent atrial fibrillation (HCC) I48.19    ETOH abuse F10.10    COPD with acute exacerbation ([facility] J44.1       Past Medical History:        Diagnosis Date    Atrial flutter ([facility]     COPD (chronic obstructive pulmonary disease) ([facility]     Hypertension        Past Surgical History:        Procedure Laterality Date    ATRIAL ABLATION SURGERY  2018    BACK SURGERY      CARDIOVERSION  2018    Dr. Neetu Hernandez- 80 J converted to SB    TRANSESOPHAGEAL ECHOCARDIOGRAM  2018    Dr. Neetu Hernandez- No thrombus       Social History:    Social History     Tobacco Use    Smoking status: Former Smoker     Packs/day: 2.00     Years: 30.00     Pack years: 60.00     Types: Cigarettes     Quit date: 2017     Years since quittin.0    Smokeless tobacco: Never Used   Substance Use Topics    Alcohol use:  Yes     Alcohol/week: 3.0 - 4.0 standard drinks     Types: 3 - 4 Cans of beer per week                                Counseling given: Not Answered      Vital Signs (Current):   Vitals:    21 2020 21 2250 21 0755 21 0805   BP:  (!) 158/76 126/75    Pulse:  106 91    Resp:     Temp:  96.4 °F (35.8 °C) 97 °F (36.1 °C)    TempSrc:  Temporal Temporal    SpO2: 98% 98% 97% 98%   Weight:       Height:                                                  BP Readings from Last 3 Encounters:   21 126/75   20 132/77   20 124/82       NPO Status:                                                                                 BMI:   Wt Readings from Last 3 Encounters:   08/18/21 292 lb 5.3 oz (132.6 kg)   01/22/20 277 lb (125.6 kg)   01/02/20 271 lb (122.9 kg)     Body mass index is 40.77 kg/m². CBC:   Lab Results   Component Value Date    WBC 19.5 08/18/2021    RBC 3.72 08/18/2021    HGB 11.9 08/18/2021    HCT 36.5 08/18/2021    MCV 98.1 08/18/2021    RDW 12.6 08/18/2021     08/18/2021       CMP:   Lab Results   Component Value Date     08/18/2021    K 4.6 08/18/2021    K 4.3 08/09/2021    CL 95 08/18/2021    CO2 35 08/18/2021    BUN 47 08/18/2021    CREATININE 1.1 08/18/2021    GFRAA >60 08/18/2021    LABGLOM >60 08/18/2021    GLUCOSE 208 08/18/2021    GLUCOSE 100 05/19/2011    PROT 6.6 08/07/2021    CALCIUM 8.7 08/18/2021    BILITOT 0.2 08/07/2021    ALKPHOS 67 08/07/2021    AST 16 08/07/2021    ALT 15 08/07/2021       POC Tests: No results for input(s): POCGLU, POCNA, POCK, POCCL, POCBUN, POCHEMO, POCHCT in the last 72 hours. Coags:   Lab Results   Component Value Date    PROTIME 13.8 02/04/2018    PROTIME 11.6 11/13/2010    INR 1.2 02/04/2018    APTT 29.3 11/13/2010       HCG (If Applicable): No results found for: PREGTESTUR, PREGSERUM, HCG, HCGQUANT     ABGs: No results found for: PHART, PO2ART, TBI9EPR, WOD2QWJ, BEART, B8HIWJKF     Type & Screen (If Applicable):  No results found for: LABABO, LABRH    Drug/Infectious Status (If Applicable):  No results found for: HIV, HEPCAB    COVID-19 Screening (If Applicable):   Lab Results   Component Value Date    COVID19 Not Detected 08/16/2021           Anesthesia Evaluation  Patient summary reviewed  Airway: Mallampati: IV  TM distance: >3 FB   Neck ROM: full  Mouth opening: > = 3 FB Dental:          Pulmonary:   (+) COPD:  decreased breath sounds,                            ROS comment: Former Smoker.      Cardiovascular:    (+) hypertension:, dysrhythmias (S/P Cardioversion and Ablation.): atrial flutter,       ECG reviewed  Rhythm: regular  Rate: normal  Echocardiogram reviewed         Beta Blocker:  Not on Beta Blocker      ROS comment: EKG: Sinus Rhythm 64, first degree heart block. Lorean Snare ECHO:   Technically limited study. Normal left ventricular size, wall thickness, wall motion, and systolic function. Estimated left ventricle ejection fraction 70+/-5 %. There is doppler evidence of stage II diastolic dysfunction. Mildly dilated right ventricle. Right ventricle global systolic function is normal .   Normal sized left atrium. No significant valvular abnormalities noted. Neuro/Psych:   (+) psychiatric history:            GI/Hepatic/Renal:   (+) morbid obesity (BMI: 40.77.)          Endo/Other: Negative Endo/Other ROS                    Abdominal:   (+) obese (BMI: 40.77),           Vascular: negative vascular ROS. Other Findings:           Anesthesia Plan      general     ASA 3       Induction: intravenous. BIS    Anesthetic plan and risks discussed with patient. Plan discussed with CRNA.     Attending anesthesiologist reviewed and agrees with Barbara Tijerina MD   8/19/2021

## 2021-08-19 NOTE — PROGRESS NOTES
Dr Beatris Villareal via perfect serve to notify him that the patient and family are agreeable to be transferred to [facility] and to check when patient can restart his Eliquis

## 2021-08-19 NOTE — PROGRESS NOTES
Talked to Dr Matt Bull about CT   He agreed there is some narrowing in Right main bronchus and DD ,mucus vs small tumor so will need to do bronchoscopy if patient agreed on risk

## 2021-08-20 LAB
METER GLUCOSE: 158 MG/DL (ref 74–99)
METER GLUCOSE: 191 MG/DL (ref 74–99)
METER GLUCOSE: 192 MG/DL (ref 74–99)
METER GLUCOSE: 232 MG/DL (ref 74–99)

## 2021-08-20 PROCEDURE — 6370000000 HC RX 637 (ALT 250 FOR IP): Performed by: PHYSICIAN ASSISTANT

## 2021-08-20 PROCEDURE — 6360000002 HC RX W HCPCS: Performed by: INTERNAL MEDICINE

## 2021-08-20 PROCEDURE — 6370000000 HC RX 637 (ALT 250 FOR IP): Performed by: INTERNAL MEDICINE

## 2021-08-20 PROCEDURE — 2700000000 HC OXYGEN THERAPY PER DAY

## 2021-08-20 PROCEDURE — 6370000000 HC RX 637 (ALT 250 FOR IP): Performed by: NURSE PRACTITIONER

## 2021-08-20 PROCEDURE — 99232 SBSQ HOSP IP/OBS MODERATE 35: CPT | Performed by: INTERNAL MEDICINE

## 2021-08-20 PROCEDURE — 2580000003 HC RX 258: Performed by: PHYSICIAN ASSISTANT

## 2021-08-20 PROCEDURE — 2580000003 HC RX 258: Performed by: INTERNAL MEDICINE

## 2021-08-20 PROCEDURE — 82962 GLUCOSE BLOOD TEST: CPT

## 2021-08-20 PROCEDURE — 94660 CPAP INITIATION&MGMT: CPT

## 2021-08-20 PROCEDURE — 2060000000 HC ICU INTERMEDIATE R&B

## 2021-08-20 PROCEDURE — 94669 MECHANICAL CHEST WALL OSCILL: CPT

## 2021-08-20 PROCEDURE — 94640 AIRWAY INHALATION TREATMENT: CPT

## 2021-08-20 RX ORDER — METHYLPREDNISOLONE SODIUM SUCCINATE 40 MG/ML
40 INJECTION, POWDER, LYOPHILIZED, FOR SOLUTION INTRAMUSCULAR; INTRAVENOUS EVERY 12 HOURS
Status: DISCONTINUED | OUTPATIENT
Start: 2021-08-21 | End: 2021-08-22

## 2021-08-20 RX ADMIN — METHYLPREDNISOLONE SODIUM SUCCINATE 60 MG: 125 INJECTION, POWDER, FOR SOLUTION INTRAMUSCULAR; INTRAVENOUS at 15:00

## 2021-08-20 RX ADMIN — DILTIAZEM HYDROCHLORIDE 240 MG: 240 CAPSULE, EXTENDED RELEASE ORAL at 09:40

## 2021-08-20 RX ADMIN — IPRATROPIUM BROMIDE AND ALBUTEROL SULFATE 3 ML: .5; 2.5 SOLUTION RESPIRATORY (INHALATION) at 20:22

## 2021-08-20 RX ADMIN — ATORVASTATIN CALCIUM 20 MG: 20 TABLET, FILM COATED ORAL at 09:40

## 2021-08-20 RX ADMIN — GUAIFENESIN 400 MG: 400 TABLET ORAL at 00:04

## 2021-08-20 RX ADMIN — IPRATROPIUM BROMIDE AND ALBUTEROL SULFATE 3 ML: .5; 2.5 SOLUTION RESPIRATORY (INHALATION) at 08:02

## 2021-08-20 RX ADMIN — APIXABAN 5 MG: 5 TABLET, FILM COATED ORAL at 09:40

## 2021-08-20 RX ADMIN — DILTIAZEM HYDROCHLORIDE 240 MG: 240 CAPSULE, EXTENDED RELEASE ORAL at 21:56

## 2021-08-20 RX ADMIN — ARFORMOTEROL TARTRATE 15 MCG: 15 SOLUTION RESPIRATORY (INHALATION) at 20:21

## 2021-08-20 RX ADMIN — ARFORMOTEROL TARTRATE 15 MCG: 15 SOLUTION RESPIRATORY (INHALATION) at 08:00

## 2021-08-20 RX ADMIN — GUAIFENESIN 400 MG: 400 TABLET ORAL at 23:28

## 2021-08-20 RX ADMIN — BUDESONIDE 1000 MCG: 0.5 SUSPENSION RESPIRATORY (INHALATION) at 20:21

## 2021-08-20 RX ADMIN — SODIUM CHLORIDE SOLN NEBU 3% 4 ML: 3 NEBU SOLN at 20:21

## 2021-08-20 RX ADMIN — BUSPIRONE HYDROCHLORIDE 5 MG: 5 TABLET ORAL at 00:04

## 2021-08-20 RX ADMIN — IPRATROPIUM BROMIDE AND ALBUTEROL SULFATE 3 ML: .5; 2.5 SOLUTION RESPIRATORY (INHALATION) at 11:27

## 2021-08-20 RX ADMIN — Medication 10 ML: at 21:55

## 2021-08-20 RX ADMIN — INSULIN LISPRO 4 UNITS: 100 INJECTION, SOLUTION INTRAVENOUS; SUBCUTANEOUS at 06:49

## 2021-08-20 RX ADMIN — GUAIFENESIN 400 MG: 400 TABLET ORAL at 06:46

## 2021-08-20 RX ADMIN — SODIUM CHLORIDE, PRESERVATIVE FREE 10 ML: 5 INJECTION INTRAVENOUS at 11:20

## 2021-08-20 RX ADMIN — APIXABAN 5 MG: 5 TABLET, FILM COATED ORAL at 21:56

## 2021-08-20 RX ADMIN — SODIUM CHLORIDE SOLN NEBU 3% 4 ML: 3 NEBU SOLN at 08:03

## 2021-08-20 RX ADMIN — INSULIN LISPRO 2 UNITS: 100 INJECTION, SOLUTION INTRAVENOUS; SUBCUTANEOUS at 15:53

## 2021-08-20 RX ADMIN — BUDESONIDE 1000 MCG: 0.5 SUSPENSION RESPIRATORY (INHALATION) at 08:01

## 2021-08-20 RX ADMIN — GUAIFENESIN SYRUP AND DEXTROMETHORPHAN 5 ML: 100; 10 SYRUP ORAL at 23:28

## 2021-08-20 RX ADMIN — INSULIN LISPRO 1 UNITS: 100 INJECTION, SOLUTION INTRAVENOUS; SUBCUTANEOUS at 21:55

## 2021-08-20 RX ADMIN — ASPIRIN 81 MG CHEWABLE TABLET 81 MG: 81 TABLET CHEWABLE at 09:41

## 2021-08-20 RX ADMIN — GUAIFENESIN 400 MG: 400 TABLET ORAL at 18:07

## 2021-08-20 RX ADMIN — FLUTICASONE PROPIONATE 1 SPRAY: 50 SPRAY, METERED NASAL at 09:48

## 2021-08-20 RX ADMIN — BUSPIRONE HYDROCHLORIDE 5 MG: 5 TABLET ORAL at 23:28

## 2021-08-20 RX ADMIN — INSULIN LISPRO 2 UNITS: 100 INJECTION, SOLUTION INTRAVENOUS; SUBCUTANEOUS at 11:28

## 2021-08-20 RX ADMIN — FUROSEMIDE 20 MG: 10 INJECTION, SOLUTION INTRAMUSCULAR; INTRAVENOUS at 18:08

## 2021-08-20 RX ADMIN — METHYLPREDNISOLONE SODIUM SUCCINATE 60 MG: 125 INJECTION, POWDER, FOR SOLUTION INTRAMUSCULAR; INTRAVENOUS at 06:46

## 2021-08-20 RX ADMIN — GUAIFENESIN 400 MG: 400 TABLET ORAL at 11:21

## 2021-08-20 RX ADMIN — SODIUM CHLORIDE, PRESERVATIVE FREE 10 ML: 5 INJECTION INTRAVENOUS at 18:07

## 2021-08-20 RX ADMIN — LOSARTAN POTASSIUM 50 MG: 50 TABLET, FILM COATED ORAL at 09:41

## 2021-08-20 RX ADMIN — METHYLPREDNISOLONE SODIUM SUCCINATE 60 MG: 125 INJECTION, POWDER, FOR SOLUTION INTRAMUSCULAR; INTRAVENOUS at 21:56

## 2021-08-20 RX ADMIN — FLECAINIDE ACETATE 100 MG: 100 TABLET ORAL at 09:41

## 2021-08-20 RX ADMIN — FUROSEMIDE 20 MG: 10 INJECTION, SOLUTION INTRAMUSCULAR; INTRAVENOUS at 09:40

## 2021-08-20 RX ADMIN — FLECAINIDE ACETATE 100 MG: 100 TABLET ORAL at 21:56

## 2021-08-20 RX ADMIN — Medication 10 ML: at 09:40

## 2021-08-20 RX ADMIN — SODIUM CHLORIDE, PRESERVATIVE FREE 10 ML: 5 INJECTION INTRAVENOUS at 15:00

## 2021-08-20 RX ADMIN — IPRATROPIUM BROMIDE AND ALBUTEROL SULFATE 3 ML: .5; 2.5 SOLUTION RESPIRATORY (INHALATION) at 16:12

## 2021-08-20 RX ADMIN — ACETAMINOPHEN 650 MG: 325 TABLET ORAL at 00:04

## 2021-08-20 ASSESSMENT — PAIN SCALES - GENERAL
PAINLEVEL_OUTOF10: 3
PAINLEVEL_OUTOF10: 0
PAINLEVEL_OUTOF10: 0

## 2021-08-20 NOTE — PLAN OF CARE
Problem: Pain:  Goal: Pain level will decrease  Description: Pain level will decrease  8/20/2021 0143 by Nancy Mars RN  Outcome: Met This Shift  8/19/2021 1328 by Isrrael Stein RN  Outcome: Met This Shift  Goal: Control of acute pain  Description: Control of acute pain  Outcome: Met This Shift  Goal: Control of chronic pain  Description: Control of chronic pain  Outcome: Met This Shift     Problem: Skin Integrity:  Goal: Will show no infection signs and symptoms  Description: Will show no infection signs and symptoms  8/20/2021 0143 by Nancy Mars RN  Outcome: Met This Shift  8/19/2021 1328 by Isrrael Stein RN  Outcome: Met This Shift  Goal: Absence of new skin breakdown  Description: Absence of new skin breakdown  8/20/2021 0143 by Nancy Mars RN  Outcome: Met This Shift  8/19/2021 1328 by Isrrael Stein RN  Outcome: Met This Shift     Problem: Airway Clearance - Ineffective:  Goal: Ability to maintain a clear airway will improve  Description: Ability to maintain a clear airway will improve  8/20/2021 0143 by Nancy Mars RN  Outcome: Met This Shift  8/19/2021 1328 by Isrrael Stein RN  Outcome: Met This Shift

## 2021-08-20 NOTE — PROGRESS NOTES
[facility]  Division of Pulmonary, Critical Care Medicine  Pulmonary [address] [facility]           Pulmonary Clinic consult       CC :SOB ,wheeizng   H/o A flutter     HPI :  Still feeling about the same  anaesthesia did not feel comfortable place on MV and risk of prolonged intubation   No chest pain   No fever or chills      PHYSICAL EXAMINATION:     VITAL SIGNS:  BP (!) 147/68   Pulse 98   Temp 97 °F (36.1 °C) (Temporal)   Resp 20   Ht 5' 11\" (1.803 m)   Wt 292 lb 5.3 oz (132.6 kg)   SpO2 97%   BMI 40.77 kg/m²   Wt Readings from Last 3 Encounters:   08/18/21 292 lb 5.3 oz (132.6 kg)   01/22/20 277 lb (125.6 kg)   01/02/20 271 lb (122.9 kg)     Temp Readings from Last 3 Encounters:   08/19/21 97 °F (36.1 °C) (Temporal)   08/09/19 97.7 °F (36.5 °C)   04/09/19 98.5 °F (36.9 °C) (Oral)     TMAX:  BP Readings from Last 3 Encounters:   08/19/21 (!) 147/68   01/22/20 132/77   01/02/20 124/82     Pulse Readings from Last 3 Encounters:   08/19/21 98   01/22/20 109   01/02/20 83           INTAKE/OUTPUTS:  I/O last 3 completed shifts:   In: 1140 [P.O.:1140]  Out: 2075 [Urine:2075]    Intake/Output Summary (Last 24 hours) at 8/19/2021 2350  Last data filed at 8/19/2021 1758  Gross per 24 hour   Intake 1140 ml   Output 2075 ml   Net -935 ml       This is virtual visit      LABS/IMAGING:    CBC:  Lab Results   Component Value Date    WBC 19.5 (H) 08/18/2021    HGB 11.9 (L) 08/18/2021    HCT 36.5 (L) 08/18/2021    MCV 98.1 08/18/2021     08/18/2021    LYMPHOPCT 5.2 (L) 08/18/2021    RBC 3.72 (L) 08/18/2021    MCH 32.0 08/18/2021    MCHC 32.6 08/18/2021    RDW 12.6 08/18/2021    NEUTOPHILPCT 83.5 (H) 08/18/2021    MONOPCT 7.8 08/18/2021    BASOPCT 0.8 08/18/2021    NEUTROABS 16.97 (H) 08/18/2021    LYMPHSABS 0.98 (L) 08/18/2021    MONOSABS 1.56 (H) 08/18/2021    EOSABS 0.00 (L) 08/18/2021    BASOSABS 0.00 08/18/2021       Recent Labs     08/18/21  1101 08/17/21  0505 08/16/21  0701   WBC 19.5* 19.2* 17.6*   HGB 11.9* 11.4* 11.2*   HCT 36.5* 34.5* 34.9*   MCV 98.1 95.8 99.1    226 212       BMP:   Recent Labs     08/18/21  1101      K 4.6   CL 95*   CO2 35*   BUN 47*   CREATININE 1.1       MG:   Lab Results   Component Value Date    MG 2.2 02/14/2018     Ca/Phos:   Lab Results   Component Value Date    CALCIUM 8.7 08/18/2021     Amylase: No results found for: AMYLASE  Lipase:   Lab Results   Component Value Date    LIPASE 28 05/19/2011     LIVER PROFILE: No results for input(s): AST, ALT, LIPASE, BILIDIR, BILITOT, ALKPHOS in the last 72 hours. Invalid input(s): AMYLASE,  ALB    PT/INR:   No results for input(s): PROTIME, INR in the last 72 hours. APTT: No results for input(s): APTT in the last 72 hours. Cardiac Enzymes:  Lab Results   Component Value Date    CKTOTAL 51 11/13/2010    CKMB 0.5 11/13/2010    TROPONINI <0.01 02/15/2018       Hgb A1C: No results found for: LABA1C  No results found for: EAG  FAMILIA: No results found for: FAMILIA  ESR: No results found for: SEDRATE  CRP: No results found for: CRP  D Dimer: No results found for: DDIMER    Thyroid Studies:  Lab Results   Component Value Date    TSH 0.942 02/14/2018    A2FZMUK 4.8 02/14/2018           MICROBIOLOGY:  Pending       CXR:  Reviewed     CT Chest:reviewed     PE  Vitals:    08/19/21 2145   BP: (!) 147/68   Pulse: 98   Resp: 20   Temp: 97 °F (36.1 °C)   SpO2: 97%     General:  Awake, alert, oriented X 3. Well developed, well nourished, well groomed. No apparent distress. HEENT:  Normocephalic, atraumatic. Pupils equal, round, reactive to light. No scleral icterus. No conjunctival injection. Normal lips, teeth, and gums. No nasal discharge. Neck:  Supple, FROM  Heart:  RRR, no murmurs, gallops, rubs, carotid upstroke normal, no carotid bruits  Lungs:wheeizng bilateral ,rhonchi   Abdomen:   Bowel sounds present, soft, nontender, no masses, no organomegaly, no peritoneal signs  Extremities:  No clubbing, cyanosis, or edema  Skin:  Warm and dry, no open lesions or rash  Neuro:  Cranial nerves 2-12 intact, no focal deficits  Vascular: Radial and pedal Pulses 2+  Breast: deferred  Rectal: deferred  Genitalia:  deferred    PROBLEM LIST:  Patient Active Problem List   Diagnosis    [facility]chronic obstructive [facility] disease) [facility]    Essential hypertension    Adrenal adenoma    Class 2 obesity due to excess calories with serious comorbidity and body mass index (BMI) of 35.0 to 35.9 in adult    Anticoagulation management encounter    Typical atrial flutter ([facility]    Anxiety    Status post catheter ablation of atrial flutter    Persistent atrial fibrillation ([facility]    ETOH abuse    COPD with acute exacerbation (HCC)               ASSESSMENT:  1.) Acute   exacerbation of COPD  2) very severe COPD  3.)obstructive sleep apnea  4. )Afib /Flutter   5.)tobacco independent(quit 5 months ago)    Data:  FVC 3.63 which is 73 of predicted    FEV1 1.52 which is 39 of predicted    FEV1/FVC is 42  No Bronchodilator response    PLAN:    Discuss with anaesthesia ,they believe he is very high risk for prolonged intubation     Family at this time agreed on transfer [facility] for second opinion and review CT and bronch consideration when his condtion allow    Wean steroids  discussed with dr Rakesh Mcdonald MD  Pulmonary/Critical care Medicine   [facility] and [address] [place] [address]

## 2021-08-20 NOTE — PROGRESS NOTES
Patient's heart rate elevated sustaining as high as 150's. Patient just received a duoneb treatment. Patient sitting on side of bed eating lunch. Will continue to monitor and let cardiology know if patient's heart rate does not come down.      Red Blanton RN

## 2021-08-20 NOTE — PROGRESS NOTES
[facility]  Division of Pulmonary, Critical Care Medicine  Pulmonary [address] [facility]           Pulmonary Clinic consult       CC :SOB ,wheeizng   H/o A flutter     HPI :  Still feeling about the same,slightly better   anaesthesia did not feel comfortable place on MV and risk of prolonged intubation ,will send [facility]   No chest pain   No fever or chills      PHYSICAL EXAMINATION:     VITAL SIGNS:  BP (!) 142/81   Pulse 105   Temp 96.7 °F (35.9 °C) (Temporal)   Resp 18   Ht 5' 11\" (1.803 m)   Wt (!) 300 lb 8 oz (136.3 kg)   SpO2 97%   BMI 41.91 kg/m²   Wt Readings from Last 3 Encounters:   08/20/21 (!) 300 lb 8 oz (136.3 kg)   01/22/20 277 lb (125.6 kg)   01/02/20 271 lb (122.9 kg)     Temp Readings from Last 3 Encounters:   08/20/21 96.7 °F (35.9 °C) (Temporal)   08/09/19 97.7 °F (36.5 °C)   04/09/19 98.5 °F (36.9 °C) (Oral)     TMAX:  BP Readings from Last 3 Encounters:   08/20/21 (!) 142/81   01/22/20 132/77   01/02/20 124/82     Pulse Readings from Last 3 Encounters:   08/20/21 105   01/22/20 109   01/02/20 83           INTAKE/OUTPUTS:  I/O last 3 completed shifts:   In: 4083 [P.O.:2340]  Out: 5749 [Urine:3825]    Intake/Output Summary (Last 24 hours) at 8/20/2021 1523  Last data filed at 8/20/2021 1450  Gross per 24 hour   Intake 2340 ml   Output 3825 ml   Net -1485 ml       This is virtual visit      LABS/IMAGING:    CBC:  Lab Results   Component Value Date    WBC 19.5 (H) 08/18/2021    HGB 11.9 (L) 08/18/2021    HCT 36.5 (L) 08/18/2021    MCV 98.1 08/18/2021     08/18/2021    LYMPHOPCT 5.2 (L) 08/18/2021    RBC 3.72 (L) 08/18/2021    MCH 32.0 08/18/2021    MCHC 32.6 08/18/2021    RDW 12.6 08/18/2021    NEUTOPHILPCT 83.5 (H) 08/18/2021    MONOPCT 7.8 08/18/2021    BASOPCT 0.8 08/18/2021    NEUTROABS 16.97 (H) 08/18/2021    LYMPHSABS 0.98 (L) 08/18/2021    MONOSABS 1.56 (H) 08/18/2021    EOSABS 0.00 (L) 08/18/2021    BASOSABS 0.00 08/18/2021 no organomegaly, no peritoneal signs  Extremities:  No clubbing, cyanosis, or edema  Skin:  Warm and dry, no open lesions or rash  Neuro:  Cranial nerves 2-12 intact, no focal deficits  Vascular: Radial and pedal Pulses 2+  Breast: deferred  Rectal: deferred  Genitalia:  deferred    PROBLEM LIST:  Patient Active Problem List   Diagnosis    [facility]chronic obstructive [facility] disease) [facility]    Essential hypertension    Adrenal adenoma    Class 2 obesity due to excess calories with serious comorbidity and body mass index (BMI) of 35.0 to 35.9 in adult    Anticoagulation management encounter    Typical atrial flutter ([address]    Anxiety    Status post catheter ablation of atrial flutter    Persistent atrial fibrillation ([facility]    ETOH abuse    COPD with acute exacerbation (HCC)               ASSESSMENT:  1.) Acute   exacerbation of COPD  2) very severe COPD  3.)obstructive sleep apnea  4. )Afib /Flutter   5.)tobacco independent(quit 5 months ago)    Data:  FVC 3.63 which is 73 of predicted    FEV1 1.52 which is 39 of predicted    FEV1/FVC is 42  No Bronchodilator response    PLAN:    Discuss with anaesthesia ,they believe he is very high risk for prolonged intubation     Family at this time agreed on transfer [facility] for second opinion and review CT and bronch consideration when his condtion allow,he was accepted after I talked to Pulmonary at [facility] abd IM   Wean steroids  discussed with dr Ayana Ruiz MD  Pulmonary/Critical care Medicine   [address] [facility] and [address] [place] [address]

## 2021-08-20 NOTE — CARE COORDINATION
Reviewed chart, pt unable to go for bronch, due to long term intubation, family agreeable for transfer to [facility]. Pt on IV lasix 20mg bid and IV solumdedrol 60mg q8. Will await bed at [facility]. Justin Young, MSW, LSW

## 2021-08-20 NOTE — PLAN OF CARE
Problem: Pain:  Goal: Pain level will decrease  Description: Pain level will decrease  8/20/2021 1103 by Hannah Govea RN  Outcome: Met This Shift     Problem: Airway Clearance - Ineffective:  Goal: Ability to maintain a clear airway will improve  Description: Ability to maintain a clear airway will improve  8/20/2021 1103 by Hannah Govea RN  Outcome: Met This Shift

## 2021-08-21 LAB
METER GLUCOSE: 144 MG/DL (ref 74–99)
METER GLUCOSE: 160 MG/DL (ref 74–99)
METER GLUCOSE: 252 MG/DL (ref 74–99)
METER GLUCOSE: 292 MG/DL (ref 74–99)

## 2021-08-21 PROCEDURE — 6370000000 HC RX 637 (ALT 250 FOR IP): Performed by: NURSE PRACTITIONER

## 2021-08-21 PROCEDURE — 94640 AIRWAY INHALATION TREATMENT: CPT

## 2021-08-21 PROCEDURE — 6370000000 HC RX 637 (ALT 250 FOR IP): Performed by: PHYSICIAN ASSISTANT

## 2021-08-21 PROCEDURE — 2580000003 HC RX 258: Performed by: PHYSICIAN ASSISTANT

## 2021-08-21 PROCEDURE — 6360000002 HC RX W HCPCS: Performed by: INTERNAL MEDICINE

## 2021-08-21 PROCEDURE — 2060000000 HC ICU INTERMEDIATE R&B

## 2021-08-21 PROCEDURE — 2700000000 HC OXYGEN THERAPY PER DAY

## 2021-08-21 PROCEDURE — 6370000000 HC RX 637 (ALT 250 FOR IP): Performed by: INTERNAL MEDICINE

## 2021-08-21 PROCEDURE — 82962 GLUCOSE BLOOD TEST: CPT

## 2021-08-21 PROCEDURE — 94660 CPAP INITIATION&MGMT: CPT

## 2021-08-21 PROCEDURE — 94668 MNPJ CHEST WALL SBSQ: CPT

## 2021-08-21 PROCEDURE — 99232 SBSQ HOSP IP/OBS MODERATE 35: CPT | Performed by: INTERNAL MEDICINE

## 2021-08-21 PROCEDURE — 2580000003 HC RX 258: Performed by: INTERNAL MEDICINE

## 2021-08-21 RX ADMIN — ARFORMOTEROL TARTRATE 15 MCG: 15 SOLUTION RESPIRATORY (INHALATION) at 20:18

## 2021-08-21 RX ADMIN — GUAIFENESIN 400 MG: 400 TABLET ORAL at 23:01

## 2021-08-21 RX ADMIN — IPRATROPIUM BROMIDE AND ALBUTEROL SULFATE 3 ML: .5; 2.5 SOLUTION RESPIRATORY (INHALATION) at 12:06

## 2021-08-21 RX ADMIN — ATORVASTATIN CALCIUM 20 MG: 20 TABLET, FILM COATED ORAL at 08:50

## 2021-08-21 RX ADMIN — ASPIRIN 81 MG CHEWABLE TABLET 81 MG: 81 TABLET CHEWABLE at 08:50

## 2021-08-21 RX ADMIN — Medication 10 ML: at 08:50

## 2021-08-21 RX ADMIN — FUROSEMIDE 20 MG: 10 INJECTION, SOLUTION INTRAMUSCULAR; INTRAVENOUS at 18:03

## 2021-08-21 RX ADMIN — Medication 10 ML: at 20:59

## 2021-08-21 RX ADMIN — GUAIFENESIN 400 MG: 400 TABLET ORAL at 06:10

## 2021-08-21 RX ADMIN — INSULIN LISPRO 2 UNITS: 100 INJECTION, SOLUTION INTRAVENOUS; SUBCUTANEOUS at 17:00

## 2021-08-21 RX ADMIN — APIXABAN 5 MG: 5 TABLET, FILM COATED ORAL at 08:50

## 2021-08-21 RX ADMIN — FUROSEMIDE 20 MG: 10 INJECTION, SOLUTION INTRAMUSCULAR; INTRAVENOUS at 08:50

## 2021-08-21 RX ADMIN — BUSPIRONE HYDROCHLORIDE 5 MG: 5 TABLET ORAL at 23:01

## 2021-08-21 RX ADMIN — BUDESONIDE 1000 MCG: 0.5 SUSPENSION RESPIRATORY (INHALATION) at 20:18

## 2021-08-21 RX ADMIN — FLUTICASONE PROPIONATE 1 SPRAY: 50 SPRAY, METERED NASAL at 08:52

## 2021-08-21 RX ADMIN — LOSARTAN POTASSIUM 50 MG: 50 TABLET, FILM COATED ORAL at 08:49

## 2021-08-21 RX ADMIN — METHYLPREDNISOLONE SODIUM SUCCINATE 40 MG: 40 INJECTION, POWDER, FOR SOLUTION INTRAMUSCULAR; INTRAVENOUS at 21:02

## 2021-08-21 RX ADMIN — GUAIFENESIN 400 MG: 400 TABLET ORAL at 18:03

## 2021-08-21 RX ADMIN — DILTIAZEM HYDROCHLORIDE 240 MG: 240 CAPSULE, EXTENDED RELEASE ORAL at 08:50

## 2021-08-21 RX ADMIN — GUAIFENESIN SYRUP AND DEXTROMETHORPHAN 5 ML: 100; 10 SYRUP ORAL at 23:01

## 2021-08-21 RX ADMIN — IPRATROPIUM BROMIDE AND ALBUTEROL SULFATE 3 ML: .5; 2.5 SOLUTION RESPIRATORY (INHALATION) at 20:19

## 2021-08-21 RX ADMIN — FLECAINIDE ACETATE 100 MG: 100 TABLET ORAL at 20:59

## 2021-08-21 RX ADMIN — BUDESONIDE 1000 MCG: 0.5 SUSPENSION RESPIRATORY (INHALATION) at 07:30

## 2021-08-21 RX ADMIN — FLECAINIDE ACETATE 100 MG: 100 TABLET ORAL at 08:49

## 2021-08-21 RX ADMIN — INSULIN LISPRO 6 UNITS: 100 INJECTION, SOLUTION INTRAVENOUS; SUBCUTANEOUS at 06:10

## 2021-08-21 RX ADMIN — METHYLPREDNISOLONE SODIUM SUCCINATE 40 MG: 40 INJECTION, POWDER, FOR SOLUTION INTRAMUSCULAR; INTRAVENOUS at 08:55

## 2021-08-21 RX ADMIN — DILTIAZEM HYDROCHLORIDE 240 MG: 240 CAPSULE, EXTENDED RELEASE ORAL at 20:59

## 2021-08-21 RX ADMIN — APIXABAN 5 MG: 5 TABLET, FILM COATED ORAL at 21:00

## 2021-08-21 RX ADMIN — GUAIFENESIN 400 MG: 400 TABLET ORAL at 13:03

## 2021-08-21 RX ADMIN — SODIUM CHLORIDE SOLN NEBU 3% 4 ML: 3 NEBU SOLN at 20:18

## 2021-08-21 RX ADMIN — INSULIN LISPRO 6 UNITS: 100 INJECTION, SOLUTION INTRAVENOUS; SUBCUTANEOUS at 12:11

## 2021-08-21 RX ADMIN — IPRATROPIUM BROMIDE AND ALBUTEROL SULFATE 3 ML: .5; 2.5 SOLUTION RESPIRATORY (INHALATION) at 16:29

## 2021-08-21 RX ADMIN — INSULIN LISPRO 1 UNITS: 100 INJECTION, SOLUTION INTRAVENOUS; SUBCUTANEOUS at 21:02

## 2021-08-21 RX ADMIN — ARFORMOTEROL TARTRATE 15 MCG: 15 SOLUTION RESPIRATORY (INHALATION) at 07:31

## 2021-08-21 ASSESSMENT — PAIN SCALES - GENERAL
PAINLEVEL_OUTOF10: 0

## 2021-08-21 NOTE — PROGRESS NOTES
[facility]  Division of Pulmonary, Critical Care Medicine  Pulmonary [address] [facility]           Pulmonary Clinic consult       CC :SOB ,wheeizng   H/o A flutter     HPI :  SOB slightly better   Less wheezing   No chest pain   No fever or chills      PHYSICAL EXAMINATION:     VITAL SIGNS:  /89   Pulse 113   Temp 97.2 °F (36.2 °C) (Temporal)   Resp 20   Ht 5' 11\" (1.803 m)   Wt (!) 302 lb 3.2 oz (137.1 kg)   SpO2 97%   BMI 42.15 kg/m²   Wt Readings from Last 3 Encounters:   08/21/21 (!) 302 lb 3.2 oz (137.1 kg)   01/22/20 277 lb (125.6 kg)   01/02/20 271 lb (122.9 kg)     Temp Readings from Last 3 Encounters:   08/21/21 97.2 °F (36.2 °C) (Temporal)   08/09/19 97.7 °F (36.5 °C)   04/09/19 98.5 °F (36.9 °C) (Oral)     TMAX:  BP Readings from Last 3 Encounters:   08/21/21 120/89   01/22/20 132/77   01/02/20 124/82     Pulse Readings from Last 3 Encounters:   08/21/21 113   01/22/20 109   01/02/20 83           INTAKE/OUTPUTS:  I/O last 3 completed shifts:   In: 1500 [P.O.:1500]  Out: 3200 [Urine:3200]    Intake/Output Summary (Last 24 hours) at 8/21/2021 1954  Last data filed at 8/21/2021 1702  Gross per 24 hour   Intake 780 ml   Output 5650 ml   Net -4870 ml       This is virtual visit      LABS/IMAGING:    CBC:  Lab Results   Component Value Date    WBC 19.5 (H) 08/18/2021    HGB 11.9 (L) 08/18/2021    HCT 36.5 (L) 08/18/2021    MCV 98.1 08/18/2021     08/18/2021    LYMPHOPCT 5.2 (L) 08/18/2021    RBC 3.72 (L) 08/18/2021    MCH 32.0 08/18/2021    MCHC 32.6 08/18/2021    RDW 12.6 08/18/2021    NEUTOPHILPCT 83.5 (H) 08/18/2021    MONOPCT 7.8 08/18/2021    BASOPCT 0.8 08/18/2021    NEUTROABS 16.97 (H) 08/18/2021    LYMPHSABS 0.98 (L) 08/18/2021    MONOSABS 1.56 (H) 08/18/2021    EOSABS 0.00 (L) 08/18/2021    BASOSABS 0.00 08/18/2021       Recent Labs     08/18/21  1101 08/17/21  0505 08/16/21  0701   WBC 19.5* 19.2* 17.6*   HGB 11.9* 11.4* 11.2*   HCT 36.5* 34.5* 34.9*   MCV 98.1 95.8 99.1    226 212       BMP:   No results for input(s): NA, K, CL, CO2, PHOS, BUN, CREATININE in the last 72 hours. Invalid input(s): CA    MG:   Lab Results   Component Value Date    MG 2.2 02/14/2018     Ca/Phos:   Lab Results   Component Value Date    CALCIUM 8.7 08/18/2021     Amylase: No results found for: AMYLASE  Lipase:   Lab Results   Component Value Date    LIPASE 28 05/19/2011     LIVER PROFILE: No results for input(s): AST, ALT, LIPASE, BILIDIR, BILITOT, ALKPHOS in the last 72 hours. Invalid input(s): AMYLASE,  ALB    PT/INR:   No results for input(s): PROTIME, INR in the last 72 hours. APTT: No results for input(s): APTT in the last 72 hours. Cardiac Enzymes:  Lab Results   Component Value Date    CKTOTAL 51 11/13/2010    CKMB 0.5 11/13/2010    TROPONINI <0.01 02/15/2018       Hgb A1C: No results found for: LABA1C  No results found for: EAG  FAMILIA: No results found for: FAMILIA  ESR: No results found for: SEDRATE  CRP: No results found for: CRP  D Dimer: No results found for: DDIMER    Thyroid Studies:  Lab Results   Component Value Date    TSH 0.942 02/14/2018    K1LEQTJ 4.8 02/14/2018           MICROBIOLOGY:  Pending       CXR:  Reviewed     CT Chest:reviewed     PE  Vitals:    08/21/21 1500   BP: 120/89   Pulse: 113   Resp: 20   Temp: 97.2 °F (36.2 °C)   SpO2: 97%     General:  Awake, alert, oriented X 3. Well developed, well nourished, well groomed. No apparent distress. HEENT:  Normocephalic, atraumatic. Pupils equal, round, reactive to light. No scleral icterus. No conjunctival injection. Normal lips, teeth, and gums. No nasal discharge. Neck:  Supple, FROM  Heart:  RRR, no murmurs, gallops, rubs, carotid upstroke normal, no carotid bruits  Lungs:wheeizng bilateral ,rhonchi   Abdomen:   Bowel sounds present, soft, nontender, no masses, no organomegaly, no peritoneal signs  Extremities:  No clubbing, cyanosis, or edema  Skin:  Warm and

## 2021-08-21 NOTE — PLAN OF CARE
Problem: Pain:  Goal: Pain level will decrease  Description: Pain level will decrease  8/21/2021 1254 by Franki Dorsey RN  Outcome: Met This Shift  8/20/2021 2332 by Nathen Thompson RN  Outcome: Met This Shift  Goal: Control of acute pain  Description: Control of acute pain  8/21/2021 1254 by Franki Dorsey RN  Outcome: Met This Shift  8/20/2021 2332 by Nathen Thompson RN  Outcome: Met This Shift  Goal: Control of chronic pain  Description: Control of chronic pain  8/21/2021 1254 by Franki Dorsey RN  Outcome: Met This Shift  8/20/2021 2332 by Nathen Thompson RN  Outcome: Met This Shift     Problem: Skin Integrity:  Goal: Will show no infection signs and symptoms  Description: Will show no infection signs and symptoms  8/21/2021 1254 by Franki Dorsey RN  Outcome: Met This Shift  8/20/2021 2332 by Nathen Thompson RN  Outcome: Met This Shift  Goal: Absence of new skin breakdown  Description: Absence of new skin breakdown  8/21/2021 1254 by Franki Dorsey RN  Outcome: Met This Shift  8/20/2021 2332 by Nathen Thompson RN  Outcome: Met This Shift     Problem: Airway Clearance - Ineffective:  Goal: Ability to maintain a clear airway will improve  Description: Ability to maintain a clear airway will improve  8/20/2021 2332 by Nathen Thompson RN  Outcome: Met This Shift

## 2021-08-21 NOTE — PLAN OF CARE
Problem: Pain:  Goal: Pain level will decrease  Description: Pain level will decrease  8/20/2021 2332 by Jamari Puente RN  Outcome: Met This Shift  8/20/2021 1103 by Gio Brooke RN  Outcome: Met This Shift  Goal: Control of acute pain  Description: Control of acute pain  Outcome: Met This Shift  Goal: Control of chronic pain  Description: Control of chronic pain  Outcome: Met This Shift

## 2021-08-21 NOTE — PROGRESS NOTES
Pt states he will place himself on the BiPAP when he is ready.  Pt also requesting a pulse ox to take home

## 2021-08-22 LAB
ALBUMIN SERPL-MCNC: 3.5 G/DL (ref 3.5–5.2)
ALP BLD-CCNC: 48 U/L (ref 40–129)
ALT SERPL-CCNC: 179 U/L (ref 0–40)
ANION GAP SERPL CALCULATED.3IONS-SCNC: 6 MMOL/L (ref 7–16)
ANISOCYTOSIS: ABNORMAL
AST SERPL-CCNC: 63 U/L (ref 0–39)
BASOPHILS ABSOLUTE: 0 E9/L (ref 0–0.2)
BASOPHILS RELATIVE PERCENT: 0.2 % (ref 0–2)
BILIRUB SERPL-MCNC: 0.5 MG/DL (ref 0–1.2)
BUN BLDV-MCNC: 43 MG/DL (ref 6–20)
CALCIUM SERPL-MCNC: 8.8 MG/DL (ref 8.6–10.2)
CHLORIDE BLD-SCNC: 95 MMOL/L (ref 98–107)
CO2: 37 MMOL/L (ref 22–29)
CREAT SERPL-MCNC: 0.9 MG/DL (ref 0.7–1.2)
EOSINOPHILS ABSOLUTE: 0.24 E9/L (ref 0.05–0.5)
EOSINOPHILS RELATIVE PERCENT: 0.9 % (ref 0–6)
GFR AFRICAN AMERICAN: >60
GFR NON-AFRICAN AMERICAN: >60 ML/MIN/1.73
GLUCOSE BLD-MCNC: 200 MG/DL (ref 74–99)
HCT VFR BLD CALC: 37.1 % (ref 37–54)
HEMOGLOBIN: 12.3 G/DL (ref 12.5–16.5)
LYMPHOCYTES ABSOLUTE: 1.33 E9/L (ref 1.5–4)
LYMPHOCYTES RELATIVE PERCENT: 5.2 % (ref 20–42)
MCH RBC QN AUTO: 32.1 PG (ref 26–35)
MCHC RBC AUTO-ENTMCNC: 33.2 % (ref 32–34.5)
MCV RBC AUTO: 96.9 FL (ref 80–99.9)
METAMYELOCYTES RELATIVE PERCENT: 2.6 % (ref 0–1)
METER GLUCOSE: 172 MG/DL (ref 74–99)
METER GLUCOSE: 176 MG/DL (ref 74–99)
METER GLUCOSE: 209 MG/DL (ref 74–99)
METER GLUCOSE: 233 MG/DL (ref 74–99)
MONOCYTES ABSOLUTE: 1.86 E9/L (ref 0.1–0.95)
MONOCYTES RELATIVE PERCENT: 7 % (ref 2–12)
MYELOCYTE PERCENT: 1.7 % (ref 0–0)
NEUTROPHILS ABSOLUTE: 23.06 E9/L (ref 1.8–7.3)
NEUTROPHILS RELATIVE PERCENT: 82.6 % (ref 43–80)
OVALOCYTES: ABNORMAL
PDW BLD-RTO: 12.7 FL (ref 11.5–15)
PLATELET # BLD: 188 E9/L (ref 130–450)
PMV BLD AUTO: 12 FL (ref 7–12)
POIKILOCYTES: ABNORMAL
POTASSIUM SERPL-SCNC: 5.1 MMOL/L (ref 3.5–5)
RBC # BLD: 3.83 E12/L (ref 3.8–5.8)
SODIUM BLD-SCNC: 138 MMOL/L (ref 132–146)
TOTAL PROTEIN: 5.6 G/DL (ref 6.4–8.3)
WBC # BLD: 26.5 E9/L (ref 4.5–11.5)

## 2021-08-22 PROCEDURE — 6370000000 HC RX 637 (ALT 250 FOR IP): Performed by: PHYSICIAN ASSISTANT

## 2021-08-22 PROCEDURE — 82962 GLUCOSE BLOOD TEST: CPT

## 2021-08-22 PROCEDURE — 2060000000 HC ICU INTERMEDIATE R&B

## 2021-08-22 PROCEDURE — 2580000003 HC RX 258: Performed by: INTERNAL MEDICINE

## 2021-08-22 PROCEDURE — 6360000002 HC RX W HCPCS: Performed by: INTERNAL MEDICINE

## 2021-08-22 PROCEDURE — 85025 COMPLETE CBC W/AUTO DIFF WBC: CPT

## 2021-08-22 PROCEDURE — 94640 AIRWAY INHALATION TREATMENT: CPT

## 2021-08-22 PROCEDURE — 2580000003 HC RX 258: Performed by: PHYSICIAN ASSISTANT

## 2021-08-22 PROCEDURE — 80053 COMPREHEN METABOLIC PANEL: CPT

## 2021-08-22 PROCEDURE — 6370000000 HC RX 637 (ALT 250 FOR IP): Performed by: INTERNAL MEDICINE

## 2021-08-22 PROCEDURE — 6370000000 HC RX 637 (ALT 250 FOR IP): Performed by: NURSE PRACTITIONER

## 2021-08-22 PROCEDURE — 36415 COLL VENOUS BLD VENIPUNCTURE: CPT

## 2021-08-22 PROCEDURE — 94660 CPAP INITIATION&MGMT: CPT

## 2021-08-22 PROCEDURE — 99232 SBSQ HOSP IP/OBS MODERATE 35: CPT | Performed by: INTERNAL MEDICINE

## 2021-08-22 PROCEDURE — 2700000000 HC OXYGEN THERAPY PER DAY

## 2021-08-22 RX ADMIN — INSULIN LISPRO 4 UNITS: 100 INJECTION, SOLUTION INTRAVENOUS; SUBCUTANEOUS at 06:34

## 2021-08-22 RX ADMIN — METHYLPREDNISOLONE SODIUM SUCCINATE 40 MG: 40 INJECTION, POWDER, FOR SOLUTION INTRAMUSCULAR; INTRAVENOUS at 21:17

## 2021-08-22 RX ADMIN — GUAIFENESIN SYRUP AND DEXTROMETHORPHAN 5 ML: 100; 10 SYRUP ORAL at 23:26

## 2021-08-22 RX ADMIN — DILTIAZEM HYDROCHLORIDE 240 MG: 240 CAPSULE, EXTENDED RELEASE ORAL at 09:10

## 2021-08-22 RX ADMIN — ACETAMINOPHEN 650 MG: 325 TABLET ORAL at 09:15

## 2021-08-22 RX ADMIN — METHYLPREDNISOLONE SODIUM SUCCINATE 40 MG: 40 INJECTION, POWDER, FOR SOLUTION INTRAMUSCULAR; INTRAVENOUS at 09:09

## 2021-08-22 RX ADMIN — LOSARTAN POTASSIUM 50 MG: 50 TABLET, FILM COATED ORAL at 09:09

## 2021-08-22 RX ADMIN — ASPIRIN 81 MG CHEWABLE TABLET 81 MG: 81 TABLET CHEWABLE at 09:10

## 2021-08-22 RX ADMIN — GUAIFENESIN 400 MG: 400 TABLET ORAL at 06:34

## 2021-08-22 RX ADMIN — INSULIN LISPRO 4 UNITS: 100 INJECTION, SOLUTION INTRAVENOUS; SUBCUTANEOUS at 17:01

## 2021-08-22 RX ADMIN — IPRATROPIUM BROMIDE AND ALBUTEROL SULFATE 3 ML: .5; 2.5 SOLUTION RESPIRATORY (INHALATION) at 16:31

## 2021-08-22 RX ADMIN — GUAIFENESIN 400 MG: 400 TABLET ORAL at 23:26

## 2021-08-22 RX ADMIN — IPRATROPIUM BROMIDE AND ALBUTEROL SULFATE 3 ML: .5; 2.5 SOLUTION RESPIRATORY (INHALATION) at 12:16

## 2021-08-22 RX ADMIN — FUROSEMIDE 20 MG: 10 INJECTION, SOLUTION INTRAMUSCULAR; INTRAVENOUS at 18:04

## 2021-08-22 RX ADMIN — APIXABAN 5 MG: 5 TABLET, FILM COATED ORAL at 21:17

## 2021-08-22 RX ADMIN — INSULIN LISPRO 2 UNITS: 100 INJECTION, SOLUTION INTRAVENOUS; SUBCUTANEOUS at 12:01

## 2021-08-22 RX ADMIN — DILTIAZEM HYDROCHLORIDE 240 MG: 240 CAPSULE, EXTENDED RELEASE ORAL at 21:17

## 2021-08-22 RX ADMIN — ATORVASTATIN CALCIUM 20 MG: 20 TABLET, FILM COATED ORAL at 09:09

## 2021-08-22 RX ADMIN — Medication 10 ML: at 21:18

## 2021-08-22 RX ADMIN — BUSPIRONE HYDROCHLORIDE 5 MG: 5 TABLET ORAL at 23:26

## 2021-08-22 RX ADMIN — SODIUM CHLORIDE SOLN NEBU 3% 4 ML: 3 NEBU SOLN at 20:57

## 2021-08-22 RX ADMIN — ARFORMOTEROL TARTRATE 15 MCG: 15 SOLUTION RESPIRATORY (INHALATION) at 20:57

## 2021-08-22 RX ADMIN — BUDESONIDE 1000 MCG: 0.5 SUSPENSION RESPIRATORY (INHALATION) at 09:03

## 2021-08-22 RX ADMIN — IPRATROPIUM BROMIDE AND ALBUTEROL SULFATE 3 ML: .5; 2.5 SOLUTION RESPIRATORY (INHALATION) at 20:57

## 2021-08-22 RX ADMIN — IPRATROPIUM BROMIDE AND ALBUTEROL SULFATE 3 ML: .5; 2.5 SOLUTION RESPIRATORY (INHALATION) at 09:03

## 2021-08-22 RX ADMIN — ARFORMOTEROL TARTRATE 15 MCG: 15 SOLUTION RESPIRATORY (INHALATION) at 09:04

## 2021-08-22 RX ADMIN — INSULIN LISPRO 1 UNITS: 100 INJECTION, SOLUTION INTRAVENOUS; SUBCUTANEOUS at 21:17

## 2021-08-22 RX ADMIN — SODIUM CHLORIDE SOLN NEBU 3% 4 ML: 3 NEBU SOLN at 09:03

## 2021-08-22 RX ADMIN — BUDESONIDE 1000 MCG: 0.5 SUSPENSION RESPIRATORY (INHALATION) at 20:57

## 2021-08-22 RX ADMIN — Medication 10 ML: at 09:09

## 2021-08-22 RX ADMIN — FLUTICASONE PROPIONATE 1 SPRAY: 50 SPRAY, METERED NASAL at 10:45

## 2021-08-22 RX ADMIN — GUAIFENESIN 400 MG: 400 TABLET ORAL at 12:00

## 2021-08-22 RX ADMIN — FLECAINIDE ACETATE 100 MG: 100 TABLET ORAL at 21:17

## 2021-08-22 RX ADMIN — GUAIFENESIN 400 MG: 400 TABLET ORAL at 18:04

## 2021-08-22 RX ADMIN — FLECAINIDE ACETATE 100 MG: 100 TABLET ORAL at 09:09

## 2021-08-22 RX ADMIN — APIXABAN 5 MG: 5 TABLET, FILM COATED ORAL at 09:09

## 2021-08-22 RX ADMIN — FUROSEMIDE 20 MG: 10 INJECTION, SOLUTION INTRAMUSCULAR; INTRAVENOUS at 09:09

## 2021-08-22 ASSESSMENT — PAIN DESCRIPTION - DESCRIPTORS: DESCRIPTORS: ACHING

## 2021-08-22 ASSESSMENT — PAIN DESCRIPTION - PROGRESSION: CLINICAL_PROGRESSION: GRADUALLY WORSENING

## 2021-08-22 ASSESSMENT — PAIN DESCRIPTION - ORIENTATION: ORIENTATION: RIGHT;LEFT

## 2021-08-22 ASSESSMENT — PAIN SCALES - GENERAL
PAINLEVEL_OUTOF10: 0
PAINLEVEL_OUTOF10: 4
PAINLEVEL_OUTOF10: 0
PAINLEVEL_OUTOF10: 6

## 2021-08-22 ASSESSMENT — PAIN - FUNCTIONAL ASSESSMENT: PAIN_FUNCTIONAL_ASSESSMENT: PREVENTS OR INTERFERES SOME ACTIVE ACTIVITIES AND ADLS

## 2021-08-22 ASSESSMENT — PAIN DESCRIPTION - ONSET: ONSET: GRADUAL

## 2021-08-22 ASSESSMENT — PAIN DESCRIPTION - LOCATION: LOCATION: LEG

## 2021-08-22 ASSESSMENT — PAIN DESCRIPTION - FREQUENCY: FREQUENCY: INTERMITTENT

## 2021-08-22 ASSESSMENT — PAIN DESCRIPTION - PAIN TYPE: TYPE: ACUTE PAIN

## 2021-08-22 NOTE — PROGRESS NOTES
Pt wishes to wait to do vest therapy treatment til next treatment rounds to see how he feels. Will pass along.

## 2021-08-23 ENCOUNTER — APPOINTMENT (OUTPATIENT)
Dept: GENERAL RADIOLOGY | Age: 61
DRG: 194 | End: 2021-08-23
Payer: COMMERCIAL

## 2021-08-23 LAB
METER GLUCOSE: 184 MG/DL (ref 74–99)
METER GLUCOSE: 192 MG/DL (ref 74–99)
METER GLUCOSE: 218 MG/DL (ref 74–99)
METER GLUCOSE: 223 MG/DL (ref 74–99)
PRO-BNP: 1336 PG/ML (ref 0–125)

## 2021-08-23 PROCEDURE — 94660 CPAP INITIATION&MGMT: CPT

## 2021-08-23 PROCEDURE — 6360000002 HC RX W HCPCS: Performed by: INTERNAL MEDICINE

## 2021-08-23 PROCEDURE — 99233 SBSQ HOSP IP/OBS HIGH 50: CPT | Performed by: INTERNAL MEDICINE

## 2021-08-23 PROCEDURE — 6370000000 HC RX 637 (ALT 250 FOR IP): Performed by: NURSE PRACTITIONER

## 2021-08-23 PROCEDURE — 6370000000 HC RX 637 (ALT 250 FOR IP): Performed by: INTERNAL MEDICINE

## 2021-08-23 PROCEDURE — 6370000000 HC RX 637 (ALT 250 FOR IP): Performed by: PHYSICIAN ASSISTANT

## 2021-08-23 PROCEDURE — 71046 X-RAY EXAM CHEST 2 VIEWS: CPT

## 2021-08-23 PROCEDURE — 2580000003 HC RX 258: Performed by: PHYSICIAN ASSISTANT

## 2021-08-23 PROCEDURE — 83880 ASSAY OF NATRIURETIC PEPTIDE: CPT

## 2021-08-23 PROCEDURE — 2700000000 HC OXYGEN THERAPY PER DAY

## 2021-08-23 PROCEDURE — 82962 GLUCOSE BLOOD TEST: CPT

## 2021-08-23 PROCEDURE — 2060000000 HC ICU INTERMEDIATE R&B

## 2021-08-23 PROCEDURE — 36415 COLL VENOUS BLD VENIPUNCTURE: CPT

## 2021-08-23 PROCEDURE — 2580000003 HC RX 258: Performed by: INTERNAL MEDICINE

## 2021-08-23 PROCEDURE — 94640 AIRWAY INHALATION TREATMENT: CPT

## 2021-08-23 RX ORDER — FUROSEMIDE 10 MG/ML
40 INJECTION INTRAMUSCULAR; INTRAVENOUS 2 TIMES DAILY
Status: DISCONTINUED | OUTPATIENT
Start: 2021-08-23 | End: 2021-08-24

## 2021-08-23 RX ORDER — DIGOXIN 0.25 MG/ML
250 INJECTION INTRAMUSCULAR; INTRAVENOUS ONCE
Status: COMPLETED | OUTPATIENT
Start: 2021-08-23 | End: 2021-08-23

## 2021-08-23 RX ADMIN — GUAIFENESIN 400 MG: 400 TABLET ORAL at 18:26

## 2021-08-23 RX ADMIN — FUROSEMIDE 40 MG: 10 INJECTION, SOLUTION INTRAMUSCULAR; INTRAVENOUS at 18:26

## 2021-08-23 RX ADMIN — DIGOXIN 250 MCG: 250 INJECTION, SOLUTION INTRAMUSCULAR; INTRAVENOUS; PARENTERAL at 15:36

## 2021-08-23 RX ADMIN — BUSPIRONE HYDROCHLORIDE 5 MG: 5 TABLET ORAL at 23:27

## 2021-08-23 RX ADMIN — FLECAINIDE ACETATE 100 MG: 100 TABLET ORAL at 21:05

## 2021-08-23 RX ADMIN — INSULIN LISPRO 2 UNITS: 100 INJECTION, SOLUTION INTRAVENOUS; SUBCUTANEOUS at 11:49

## 2021-08-23 RX ADMIN — APIXABAN 5 MG: 5 TABLET, FILM COATED ORAL at 09:04

## 2021-08-23 RX ADMIN — SODIUM CHLORIDE SOLN NEBU 3% 4 ML: 3 NEBU SOLN at 20:09

## 2021-08-23 RX ADMIN — DILTIAZEM HYDROCHLORIDE 240 MG: 240 CAPSULE, EXTENDED RELEASE ORAL at 09:00

## 2021-08-23 RX ADMIN — GUAIFENESIN SYRUP AND DEXTROMETHORPHAN 5 ML: 100; 10 SYRUP ORAL at 23:27

## 2021-08-23 RX ADMIN — ARFORMOTEROL TARTRATE 15 MCG: 15 SOLUTION RESPIRATORY (INHALATION) at 20:09

## 2021-08-23 RX ADMIN — LOSARTAN POTASSIUM 50 MG: 50 TABLET, FILM COATED ORAL at 10:04

## 2021-08-23 RX ADMIN — GUAIFENESIN 400 MG: 400 TABLET ORAL at 23:27

## 2021-08-23 RX ADMIN — IPRATROPIUM BROMIDE AND ALBUTEROL SULFATE 3 ML: .5; 2.5 SOLUTION RESPIRATORY (INHALATION) at 20:09

## 2021-08-23 RX ADMIN — IPRATROPIUM BROMIDE AND ALBUTEROL SULFATE 3 ML: .5; 2.5 SOLUTION RESPIRATORY (INHALATION) at 08:28

## 2021-08-23 RX ADMIN — Medication 10 ML: at 21:05

## 2021-08-23 RX ADMIN — BUDESONIDE 1000 MCG: 0.5 SUSPENSION RESPIRATORY (INHALATION) at 08:28

## 2021-08-23 RX ADMIN — INSULIN LISPRO 1 UNITS: 100 INJECTION, SOLUTION INTRAVENOUS; SUBCUTANEOUS at 21:07

## 2021-08-23 RX ADMIN — GUAIFENESIN 400 MG: 400 TABLET ORAL at 08:00

## 2021-08-23 RX ADMIN — FLECAINIDE ACETATE 100 MG: 100 TABLET ORAL at 09:05

## 2021-08-23 RX ADMIN — FUROSEMIDE 20 MG: 10 INJECTION, SOLUTION INTRAMUSCULAR; INTRAVENOUS at 09:03

## 2021-08-23 RX ADMIN — IPRATROPIUM BROMIDE AND ALBUTEROL SULFATE 3 ML: .5; 2.5 SOLUTION RESPIRATORY (INHALATION) at 12:56

## 2021-08-23 RX ADMIN — ATORVASTATIN CALCIUM 20 MG: 20 TABLET, FILM COATED ORAL at 10:04

## 2021-08-23 RX ADMIN — BUDESONIDE 1000 MCG: 0.5 SUSPENSION RESPIRATORY (INHALATION) at 20:09

## 2021-08-23 RX ADMIN — ARFORMOTEROL TARTRATE 15 MCG: 15 SOLUTION RESPIRATORY (INHALATION) at 08:28

## 2021-08-23 RX ADMIN — GUAIFENESIN 400 MG: 400 TABLET ORAL at 12:00

## 2021-08-23 RX ADMIN — APIXABAN 5 MG: 5 TABLET, FILM COATED ORAL at 21:05

## 2021-08-23 RX ADMIN — Medication 10 ML: at 09:00

## 2021-08-23 RX ADMIN — ASPIRIN 81 MG CHEWABLE TABLET 81 MG: 81 TABLET CHEWABLE at 10:04

## 2021-08-23 RX ADMIN — INSULIN LISPRO 4 UNITS: 100 INJECTION, SOLUTION INTRAVENOUS; SUBCUTANEOUS at 08:00

## 2021-08-23 RX ADMIN — DILTIAZEM HYDROCHLORIDE 240 MG: 240 CAPSULE, EXTENDED RELEASE ORAL at 21:05

## 2021-08-23 RX ADMIN — INSULIN LISPRO 4 UNITS: 100 INJECTION, SOLUTION INTRAVENOUS; SUBCUTANEOUS at 16:33

## 2021-08-23 RX ADMIN — SODIUM CHLORIDE SOLN NEBU 3% 4 ML: 3 NEBU SOLN at 08:28

## 2021-08-23 RX ADMIN — FLUTICASONE PROPIONATE 1 SPRAY: 50 SPRAY, METERED NASAL at 11:52

## 2021-08-23 RX ADMIN — PREDNISONE 50 MG: 20 TABLET ORAL at 10:05

## 2021-08-23 RX ADMIN — IPRATROPIUM BROMIDE AND ALBUTEROL SULFATE 3 ML: .5; 2.5 SOLUTION RESPIRATORY (INHALATION) at 16:09

## 2021-08-23 ASSESSMENT — PAIN SCALES - GENERAL
PAINLEVEL_OUTOF10: 0

## 2021-08-23 ASSESSMENT — PAIN DESCRIPTION - PROGRESSION: CLINICAL_PROGRESSION: GRADUALLY WORSENING

## 2021-08-23 NOTE — PROGRESS NOTES
[facility]  Division of Pulmonary, Critical Care Medicine  Pulmonary [address] [facility]           Pulmonary Clinic consult       CC :SOB ,wheeizng   H/o A flutter     HPI :  SOB slightly better ,less wheezing and congestion   Less wheezing   No chest pain   No fever or chills      PHYSICAL EXAMINATION:     VITAL SIGNS:  BP (!) 160/82   Pulse 108   Temp 96.9 °F (36.1 °C) (Temporal)   Resp 20   Ht 5' 11\" (1.803 m)   Wt 288 lb (130.6 kg)   SpO2 99%   BMI 40.17 kg/m²   Wt Readings from Last 3 Encounters:   08/22/21 288 lb (130.6 kg)   01/22/20 277 lb (125.6 kg)   01/02/20 271 lb (122.9 kg)     Temp Readings from Last 3 Encounters:   08/22/21 96.9 °F (36.1 °C) (Temporal)   08/09/19 97.7 °F (36.5 °C)   04/09/19 98.5 °F (36.9 °C) (Oral)     TMAX:  BP Readings from Last 3 Encounters:   08/22/21 (!) 160/82   01/22/20 132/77   01/02/20 124/82     Pulse Readings from Last 3 Encounters:   08/22/21 108   01/22/20 109   01/02/20 83           INTAKE/OUTPUTS:  I/O last 3 completed shifts:   In: 1320 [P.O.:1320]  Out: 5550 [Urine:5550]    Intake/Output Summary (Last 24 hours) at 8/22/2021 2247  Last data filed at 8/22/2021 2130  Gross per 24 hour   Intake 1560 ml   Output 5250 ml   Net -3690 ml       This is virtual visit      LABS/IMAGING:    CBC:  Lab Results   Component Value Date    WBC 26.5 (H) 08/22/2021    HGB 12.3 (L) 08/22/2021    HCT 37.1 08/22/2021    MCV 96.9 08/22/2021     08/22/2021    LYMPHOPCT 5.2 (L) 08/22/2021    RBC 3.83 08/22/2021    MCH 32.1 08/22/2021    MCHC 33.2 08/22/2021    RDW 12.7 08/22/2021    NEUTOPHILPCT 82.6 (H) 08/22/2021    MONOPCT 7.0 08/22/2021    BASOPCT 0.2 08/22/2021    NEUTROABS 23.06 (H) 08/22/2021    LYMPHSABS 1.33 (L) 08/22/2021    MONOSABS 1.86 (H) 08/22/2021    EOSABS 0.24 08/22/2021    BASOSABS 0.00 08/22/2021       Recent Labs     08/22/21  0949 08/18/21  1101 08/17/21  0505   WBC 26.5* 19.5* 19.2*   HGB 12.3* 11.9* 11.4*   HCT 37.1 36.5* 34.5*   MCV 96.9 98.1 95.8    206 226       BMP:   Recent Labs     08/22/21  0949      K 5.1*   CL 95*   CO2 37*   BUN 43*   CREATININE 0.9       MG:   Lab Results   Component Value Date    MG 2.2 02/14/2018     Ca/Phos:   Lab Results   Component Value Date    CALCIUM 8.8 08/22/2021     Amylase: No results found for: AMYLASE  Lipase:   Lab Results   Component Value Date    LIPASE 28 05/19/2011     LIVER PROFILE:   Recent Labs     08/22/21  0949   AST 63*   *   BILITOT 0.5   ALKPHOS 48       PT/INR:   No results for input(s): PROTIME, INR in the last 72 hours. APTT: No results for input(s): APTT in the last 72 hours. Cardiac Enzymes:  Lab Results   Component Value Date    CKTOTAL 51 11/13/2010    CKMB 0.5 11/13/2010    TROPONINI <0.01 02/15/2018       Hgb A1C: No results found for: LABA1C  No results found for: EAG  FAMILIA: No results found for: FAMILIA  ESR: No results found for: SEDRATE  CRP: No results found for: CRP  D Dimer: No results found for: DDIMER    Thyroid Studies:  Lab Results   Component Value Date    TSH 0.942 02/14/2018    K5XAXOO 4.8 02/14/2018           MICROBIOLOGY:  Pending       CXR:  Reviewed     CT Chest:reviewed     PE  Vitals:    08/22/21 2100   BP: (!) 160/82   Pulse: 108   Resp: 20   Temp: 96.9 °F (36.1 °C)   SpO2: 99%     General:  Awake, alert, oriented X 3. Well developed, well nourished, well groomed. No apparent distress. HEENT:  Normocephalic, atraumatic. Pupils equal, round, reactive to light. No scleral icterus. No conjunctival injection. Normal lips, teeth, and gums. No nasal discharge. Neck:  Supple, FROM  Heart:  RRR, no murmurs, gallops, rubs, carotid upstroke normal, no carotid bruits  Lungs:wheeizng bilateral ,rhonchi   Abdomen:   Bowel sounds present, soft, nontender, no masses, no organomegaly, no peritoneal signs  Extremities:  No clubbing, cyanosis, or edema  Skin:  Warm and dry, no open lesions or rash  Neuro:

## 2021-08-23 NOTE — PLAN OF CARE
Problem: Pain:  Goal: Pain level will decrease  Description: Pain level will decrease  8/22/2021 2136 by Awais Crawford RN  Outcome: Met This Shift  8/22/2021 1035 by José Miguel England RN  Outcome: Met This Shift  Goal: Control of acute pain  Description: Control of acute pain  8/22/2021 2136 by Awais Crawford RN  Outcome: Met This Shift  8/22/2021 1035 by José Miguel England RN  Outcome: Met This Shift  Goal: Control of chronic pain  Description: Control of chronic pain  8/22/2021 2136 by Awais Crawford RN  Outcome: Met This Shift  8/22/2021 1035 by José Miguel England RN  Outcome: Met This Shift

## 2021-08-23 NOTE — PLAN OF CARE
Problem: Pain:  Goal: Pain level will decrease  Description: Pain level will decrease  Outcome: Met This Shift  Goal: Control of acute pain  Description: Control of acute pain  Outcome: Met This Shift  Goal: Control of chronic pain  Description: Control of chronic pain  Outcome: Met This Shift     Problem: Skin Integrity:  Goal: Will show no infection signs and symptoms  Description: Will show no infection signs and symptoms  Outcome: Met This Shift  Goal: Absence of new skin breakdown  Description: Absence of new skin breakdown  Outcome: Met This Shift     Problem: Airway Clearance - Ineffective:  Goal: Ability to maintain a clear airway will improve  Description: Ability to maintain a clear airway will improve  Outcome: Met This Shift

## 2021-08-23 NOTE — PROGRESS NOTES
INPATIENT CARDIOLOGY FOLLOW-UP    Name: Lilian Schwartz    Age: 63 y.o. Date of Admission: 8/7/2021  9:53 PM    Date of Service: 8/23/2021    Chief Complaint: Follow-up for acute superimposed upon chronic diastolic heart failure, chronic obstructive lung disease with associated exacerbation, acute superimposed upon chronic hypoxic respiratory failure, paroxysmal atrial fibrillation/flutter, hypertension, morbid obesity, obstructive sleep apnea    Interim History: The patient presently is frustrated by his lack of progress and persistent dyspnea. Interim pulmonary assessment recommendations including that of his pending transfer noted. He has developed recurrent and persistent atrial fibrillation/flutter with associated suboptimal rate control and in spite of ongoing fluid mobilization reported weight gain of approximately 15 pounds during the course of hospitalization. Review of Systems: The remainder of a complete multisystem review including consitutional, central nervous, respiratory, circulatory, gastrointestinal, genitourinary, endocrinologic, hematologic, musculoskeletal and psychiatric are negative.     Problem List:  Patient Active Problem List   Diagnosis    COPD (chronic obstructive pulmonary disease) ([address]    Essential hypertension    Adrenal adenoma    Class 2 obesity due to excess calories with serious comorbidity and body mass index (BMI) of 35.0 to 35.9 in adult    Anticoagulation management encounter    Typical atrial flutter ([address]    Anxiety    Status post catheter ablation of atrial flutter    Persistent atrial fibrillation ([address]    ETOH abuse    COPD with acute exacerbation (HCC)       Allergies:  No Known Allergies    Current Medications:  Current Facility-Administered Medications   Medication Dose Route Frequency Provider Last Rate Last Admin    predniSONE (DELTASONE) tablet 50 mg  50 mg Oral Daily Ryan Danielle MD   50 mg at 08/23/21 1005    furosemide (LASIX) injection 20 mg  20 mg Intravenous BID Ryan Castro MD   20 mg at 08/23/21 0903    busPIRone (BUSPAR) tablet 5 mg  5 mg Oral TID PRN Lucero Rajan MD   5 mg at 08/22/21 2326    glucose (GLUTOSE) 40 % oral gel 15 g  15 g Oral PRN Lucero Rajan MD        dextrose 50 % IV solution  12.5 g Intravenous PRN Lucero Rajan MD        glucagon (rDNA) injection 1 mg  1 mg Intramuscular PRN Lucero Rajan MD        dextrose 5 % solution  100 mL/hr Intravenous PRN Lucero Rajan MD        insulin lispro (HUMALOG) injection vial 0-12 Units  0-12 Units Subcutaneous TID WC Lucero Rajan MD   4 Units at 08/23/21 0800    insulin lispro (HUMALOG) injection vial 0-6 Units  0-6 Units Subcutaneous Nightly Lucero Rajan MD   1 Units at 08/22/21 2117    [Held by provider] furosemide (LASIX) injection 20 mg  20 mg Intravenous TID Ryan Castro MD   20 mg at 08/15/21 0848    sodium chloride (Inhalant) 3 % nebulizer solution 4 mL  4 mL Nebulization PRN Ryan Castro MD        guaiFENesin tablet 400 mg  400 mg Oral Q6H Ryan Danielle MD   400 mg at 08/23/21 0800    perflutren lipid microspheres (DEFINITY) injection 1.65 mg  1.5 mL Intravenous ONCE PRN Ryan Castro MD        budesonide (PULMICORT) nebulizer suspension 1,000 mcg  1 mg Nebulization BID Ryan Castro MD   1,000 mcg at 08/23/21 7875    And    Arformoterol Tartrate (BROVANA) nebulizer solution 15 mcg  15 mcg Nebulization BID Nella Nielsen MD   15 mcg at 08/23/21 0828    guaiFENesin-dextromethorphan (ROBITUSSIN DM) 100-10 MG/5ML syrup 5 mL  5 mL Oral Q4H PRN STEPH Ruff   5 mL at 08/22/21 2326    sodium chloride (Inhalant) 3 % nebulizer solution 4 mL  4 mL Nebulization BID Haleigh Hutton MD   4 mL at 08/23/21 0828    albuterol (PROVENTIL) nebulizer solution 2.5 mg  2.5 mg Nebulization Q6H PRN STEPH Lin        apixaban (ELIQUIS) tablet 5 mg  5 mg Oral BID STEPH Lin   5 mg at 08/23/21 0904    atorvastatin (LIPITOR) tablet 20 mg  20 mg Oral Daily STEPH Lin   20 mg at 08/23/21 1004    dilTIAZem (CARDIZEM CD) extended release capsule 240 mg  240 mg Oral BID ClaudiaSTEPH Mccarthy   240 mg at 08/23/21 0900    flecainide (TAMBOCOR) tablet 100 mg  100 mg Oral BID ClaudiaSTEPH Mccarthy   100 mg at 08/23/21 0905    fluticasone (FLONASE) 50 MCG/ACT nasal spray 1 spray  1 spray Each Nostril Daily STEPH Lin   1 spray at 08/22/21 1045    ipratropium-albuterol (DUONEB) nebulizer solution 3 mL  1 vial Inhalation Q4H Claudia Adrianne PA   3 mL at 08/23/21 5300    losartan (COZAAR) tablet 50 mg  50 mg Oral Daily STEPH Lin   50 mg at 08/23/21 1004    sodium chloride flush 0.9 % injection 5-40 mL  5-40 mL Intravenous 2 times per day STEPH Lin   10 mL at 08/23/21 0900    sodium chloride flush 0.9 % injection 5-40 mL  5-40 mL Intravenous PRN Claudiarhonda Silver PA   10 mL at 08/20/21 1807    0.9 % sodium chloride infusion  25 mL Intravenous PRN Claudiarhonda Silver PA        ondansetron (ZOFRAN-ODT) disintegrating tablet 4 mg  4 mg Oral Q8H PRN ClaudiaSTEPH Mccarthy        Or    ondansetron (ZOFRAN) injection 4 mg  4 mg Intravenous Q6H PRN Claudiarhonda Silver PA        polyethylene glycol (GLYCOLAX) packet 17 g  17 g Oral Daily PRN STEPH Lin        acetaminophen (TYLENOL) tablet 650 mg  650 mg Oral Q6H PRN Claudiarhonda Silver PA   650 mg at 08/22/21 0915    Or    acetaminophen (TYLENOL) suppository 650 mg  650 mg Rectal Q6H PRN ClaudiaSTEPH Mccarthy        aspirin chewable tablet 81 mg  81 mg Oral Daily April [place], APRN - CNP   81 mg at 08/23/21 1004      dextrose      sodium chloride         Physical Exam:  /65   Pulse 97   Temp 96.8 °F (36 °C) (Temporal)   Resp 22   Ht 5' 11\" (1.803 m)   Wt 288 lb (130.6 kg)   SpO2 97%   BMI 40.17 kg/m²   Weight change: -14 lb 3.2 oz (-6.441 kg)  Wt Readings from Last 3 Encounters:   08/22/21 288 lb (130.6 kg)   01/22/20 277 lb (125.6 kg)   01/02/20 271 lb (122.9 kg)     The patient is awake, alert and in no discomfort or distress. No gross musculoskeletal deformity is present. No significant skin or nail changes are present. Gross examination of head, eyes, nose and throat are negative. Jugular venous pressure is normal and no carotid bruits are present. Normal respiratory effort is noted with no accessory muscle usage present. Lung fields demonstrating diminished and distant breath sounds throughout all lung fields with a significantly prolonged expiratory phase of respiration and associated bronchospasm to ascultation. Cardiac examination is notable for an irregular rate and rhythm with no palpable thrill. No gallop rhythm or cardiac murmur are identified. A benign abdominal examination is present with the exception of obesity no masses or organomegaly. Intact pulses are present throughout all extremities and moderate pretibial and pedal edema is present. No focal neurologic deficits are present. Intake/Output:    Intake/Output Summary (Last 24 hours) at 8/23/2021 1141  Last data filed at 8/23/2021 1022  Gross per 24 hour   Intake 1800 ml   Output 4850 ml   Net -3050 ml     I/O this shift: In: 540 [P.O.:540]  Out: 700 [Urine:700]    Laboratory Tests:  Lab Results   Component Value Date    CREATININE 0.9 08/22/2021    BUN 43 (H) 08/22/2021     08/22/2021    K 5.1 (H) 08/22/2021    CL 95 (L) 08/22/2021    CO2 37 (H) 08/22/2021     No results for input(s): CKTOTAL, CKMB in the last 72 hours.     Invalid input(s): TROPONONI  Lab Results   Component Value Date     (H) 02/04/2018     Lab Results   Component Value Date    WBC 26.5 08/22/2021    RBC 3.83 08/22/2021    HGB 12.3 08/22/2021    HCT 37.1 08/22/2021    MCV 96.9 08/22/2021    MCH 32.1 08/22/2021    MCHC 33.2 08/22/2021    RDW 12.7 08/22/2021     08/22/2021    MPV 12.0 08/22/2021     Recent Labs     08/22/21  0949   ALKPHOS 48   *   AST 63*   PROT 5.6*   BILITOT 0.5   LABALBU 3.5 Lab Results   Component Value Date    MG 2.2 02/14/2018     Lab Results   Component Value Date    PROTIME 13.8 02/04/2018    PROTIME 11.6 11/13/2010    INR 1.2 02/04/2018     Lab Results   Component Value Date    TSH 0.942 02/14/2018     No components found for: CHLPL  Lab Results   Component Value Date    TRIG 80 04/08/2018    TRIG 61 01/28/2017     Lab Results   Component Value Date    HDL 80 04/08/2018    HDL 50 01/28/2017     Lab Results   Component Value Date    LDLCALC 109 (H) 04/08/2018    LDLCALC 100 (H) 01/28/2017       Cardiac Tests:  Telemetry findings reviewed: atrial fibrillation/flutter with heart rates ranging 100-140 bpm, no new tachy/bradyarrhythmias overnight  Chest X-ray: pending      ASSESSMENT / PLAN: On a clinical basis, the patient remains decompensated both in the face of his chronic obstructive lung disease as well as that of potential exacerbation of diastolic heart failure in the face of his recurrent atrial arrhythmias in spite of existing fluid mobilization. Presently repeat chest x-ray and proBNP level will be obtained to further evaluate his volume status with the continuation of his existing diuretics and alteration of medical management and attempt to achieve further rate control of his atrial arrhythmias with a present rate control and anticoagulation strategy. Additional management of his obstructive lung disease will be deferred to the pulmonary service with pending transfer to the [facility] at the request of the pulmonary service. Ongoing aggressive risk factor modification of blood pressure and serum lipids remain essential to reducing risk of future atherosclerotic development.     At the time of evaluation, the patient's condition was extensively discussed with he and the family present time of his evaluation with the duration of discussion counseling exceeding 35 minutes      Note: This report was completed utilizing computer voice recognition software. Every effort has been made to ensure accuracy, however; inadvertent computerized transcription errors may be present. Ricky Barahona.  Magdy Cabrera, [address] [facility]

## 2021-08-24 LAB
ANION GAP SERPL CALCULATED.3IONS-SCNC: 3 MMOL/L (ref 7–16)
BUN BLDV-MCNC: 39 MG/DL (ref 6–20)
CALCIUM SERPL-MCNC: 8.6 MG/DL (ref 8.6–10.2)
CHLORIDE BLD-SCNC: 94 MMOL/L (ref 98–107)
CO2: 37 MMOL/L (ref 22–29)
CREAT SERPL-MCNC: 0.9 MG/DL (ref 0.7–1.2)
GFR AFRICAN AMERICAN: >60
GFR NON-AFRICAN AMERICAN: >60 ML/MIN/1.73
GLUCOSE BLD-MCNC: 203 MG/DL (ref 74–99)
METER GLUCOSE: 169 MG/DL (ref 74–99)
METER GLUCOSE: 174 MG/DL (ref 74–99)
METER GLUCOSE: 206 MG/DL (ref 74–99)
METER GLUCOSE: 271 MG/DL (ref 74–99)
POTASSIUM SERPL-SCNC: 4.9 MMOL/L (ref 3.5–5)
SODIUM BLD-SCNC: 134 MMOL/L (ref 132–146)

## 2021-08-24 PROCEDURE — 94640 AIRWAY INHALATION TREATMENT: CPT

## 2021-08-24 PROCEDURE — 82962 GLUCOSE BLOOD TEST: CPT

## 2021-08-24 PROCEDURE — 99233 SBSQ HOSP IP/OBS HIGH 50: CPT | Performed by: INTERNAL MEDICINE

## 2021-08-24 PROCEDURE — 6360000002 HC RX W HCPCS: Performed by: INTERNAL MEDICINE

## 2021-08-24 PROCEDURE — 36415 COLL VENOUS BLD VENIPUNCTURE: CPT

## 2021-08-24 PROCEDURE — 2580000003 HC RX 258: Performed by: INTERNAL MEDICINE

## 2021-08-24 PROCEDURE — 6370000000 HC RX 637 (ALT 250 FOR IP): Performed by: NURSE PRACTITIONER

## 2021-08-24 PROCEDURE — 6370000000 HC RX 637 (ALT 250 FOR IP): Performed by: INTERNAL MEDICINE

## 2021-08-24 PROCEDURE — 2700000000 HC OXYGEN THERAPY PER DAY

## 2021-08-24 PROCEDURE — 6370000000 HC RX 637 (ALT 250 FOR IP): Performed by: PHYSICIAN ASSISTANT

## 2021-08-24 PROCEDURE — 94660 CPAP INITIATION&MGMT: CPT

## 2021-08-24 PROCEDURE — 2580000003 HC RX 258: Performed by: PHYSICIAN ASSISTANT

## 2021-08-24 PROCEDURE — 2060000000 HC ICU INTERMEDIATE R&B

## 2021-08-24 PROCEDURE — 80048 BASIC METABOLIC PNL TOTAL CA: CPT

## 2021-08-24 RX ORDER — BUMETANIDE 1 MG/1
2 TABLET ORAL 2 TIMES DAILY
Status: DISCONTINUED | OUTPATIENT
Start: 2021-08-24 | End: 2021-08-25

## 2021-08-24 RX ORDER — DIAPER,BRIEF,INFANT-TODD,DISP
EACH MISCELLANEOUS 2 TIMES DAILY
Status: DISCONTINUED | OUTPATIENT
Start: 2021-08-24 | End: 2021-08-30 | Stop reason: HOSPADM

## 2021-08-24 RX ADMIN — BUMETANIDE 2 MG: 1 TABLET ORAL at 10:02

## 2021-08-24 RX ADMIN — IPRATROPIUM BROMIDE AND ALBUTEROL SULFATE 3 ML: .5; 2.5 SOLUTION RESPIRATORY (INHALATION) at 11:36

## 2021-08-24 RX ADMIN — GUAIFENESIN 400 MG: 400 TABLET ORAL at 22:24

## 2021-08-24 RX ADMIN — BUMETANIDE 2 MG: 1 TABLET ORAL at 19:59

## 2021-08-24 RX ADMIN — DILTIAZEM HYDROCHLORIDE 240 MG: 240 CAPSULE, EXTENDED RELEASE ORAL at 19:58

## 2021-08-24 RX ADMIN — SODIUM CHLORIDE SOLN NEBU 3% 4 ML: 3 NEBU SOLN at 19:35

## 2021-08-24 RX ADMIN — Medication 10 ML: at 10:07

## 2021-08-24 RX ADMIN — ATORVASTATIN CALCIUM 20 MG: 20 TABLET, FILM COATED ORAL at 10:02

## 2021-08-24 RX ADMIN — LOSARTAN POTASSIUM 50 MG: 50 TABLET, FILM COATED ORAL at 10:06

## 2021-08-24 RX ADMIN — INSULIN LISPRO 4 UNITS: 100 INJECTION, SOLUTION INTRAVENOUS; SUBCUTANEOUS at 06:38

## 2021-08-24 RX ADMIN — FLECAINIDE ACETATE 100 MG: 100 TABLET ORAL at 19:58

## 2021-08-24 RX ADMIN — DILTIAZEM HYDROCHLORIDE 240 MG: 240 CAPSULE, EXTENDED RELEASE ORAL at 10:02

## 2021-08-24 RX ADMIN — FLUTICASONE PROPIONATE 1 SPRAY: 50 SPRAY, METERED NASAL at 10:04

## 2021-08-24 RX ADMIN — APIXABAN 5 MG: 5 TABLET, FILM COATED ORAL at 10:06

## 2021-08-24 RX ADMIN — ACETAMINOPHEN 650 MG: 325 TABLET ORAL at 12:55

## 2021-08-24 RX ADMIN — GUAIFENESIN 400 MG: 400 TABLET ORAL at 06:38

## 2021-08-24 RX ADMIN — GUAIFENESIN 400 MG: 400 TABLET ORAL at 17:52

## 2021-08-24 RX ADMIN — GUAIFENESIN 400 MG: 400 TABLET ORAL at 12:55

## 2021-08-24 RX ADMIN — IPRATROPIUM BROMIDE AND ALBUTEROL SULFATE 3 ML: .5; 2.5 SOLUTION RESPIRATORY (INHALATION) at 16:24

## 2021-08-24 RX ADMIN — FLECAINIDE ACETATE 100 MG: 100 TABLET ORAL at 10:01

## 2021-08-24 RX ADMIN — GUAIFENESIN SYRUP AND DEXTROMETHORPHAN 5 ML: 100; 10 SYRUP ORAL at 22:24

## 2021-08-24 RX ADMIN — BUDESONIDE 1000 MCG: 0.5 SUSPENSION RESPIRATORY (INHALATION) at 19:25

## 2021-08-24 RX ADMIN — SODIUM CHLORIDE SOLN NEBU 3% 4 ML: 3 NEBU SOLN at 08:35

## 2021-08-24 RX ADMIN — INSULIN LISPRO 6 UNITS: 100 INJECTION, SOLUTION INTRAVENOUS; SUBCUTANEOUS at 17:51

## 2021-08-24 RX ADMIN — ASPIRIN 81 MG CHEWABLE TABLET 81 MG: 81 TABLET CHEWABLE at 10:06

## 2021-08-24 RX ADMIN — APIXABAN 5 MG: 5 TABLET, FILM COATED ORAL at 19:58

## 2021-08-24 RX ADMIN — INSULIN LISPRO 1 UNITS: 100 INJECTION, SOLUTION INTRAVENOUS; SUBCUTANEOUS at 19:58

## 2021-08-24 RX ADMIN — BUDESONIDE 1000 MCG: 0.5 SUSPENSION RESPIRATORY (INHALATION) at 08:34

## 2021-08-24 RX ADMIN — IPRATROPIUM BROMIDE AND ALBUTEROL SULFATE 3 ML: .5; 2.5 SOLUTION RESPIRATORY (INHALATION) at 19:36

## 2021-08-24 RX ADMIN — ARFORMOTEROL TARTRATE 15 MCG: 15 SOLUTION RESPIRATORY (INHALATION) at 19:36

## 2021-08-24 RX ADMIN — BUSPIRONE HYDROCHLORIDE 5 MG: 5 TABLET ORAL at 22:24

## 2021-08-24 RX ADMIN — Medication 10 ML: at 19:59

## 2021-08-24 RX ADMIN — PREDNISONE 50 MG: 20 TABLET ORAL at 10:02

## 2021-08-24 RX ADMIN — IPRATROPIUM BROMIDE AND ALBUTEROL SULFATE 3 ML: .5; 2.5 SOLUTION RESPIRATORY (INHALATION) at 08:35

## 2021-08-24 RX ADMIN — ARFORMOTEROL TARTRATE 15 MCG: 15 SOLUTION RESPIRATORY (INHALATION) at 08:35

## 2021-08-24 RX ADMIN — INSULIN LISPRO 2 UNITS: 100 INJECTION, SOLUTION INTRAVENOUS; SUBCUTANEOUS at 12:56

## 2021-08-24 ASSESSMENT — PAIN SCALES - GENERAL
PAINLEVEL_OUTOF10: 6
PAINLEVEL_OUTOF10: 0
PAINLEVEL_OUTOF10: 0

## 2021-08-24 NOTE — CARE COORDINATION
Per QFR pt now wishes to discharge home and follow up as an outpatient with his pulmonologist at [facility]. Pt currently on PO Bumex and Prednisone and at his baseline O2 of 4L. Plan at this time is home with OP follow up vs tx to [facility].

## 2021-08-24 NOTE — PROGRESS NOTES
Patient does not want to perform the Vest tonight, says he feels good and doesn't want to ruin that.

## 2021-08-24 NOTE — PROGRESS NOTES
Call received from [facility] requesting updates on patient status. Updates given and questions answered. Still awaiting bed availability; state they will keep in touch and update us when bed becomes available.

## 2021-08-24 NOTE — PROGRESS NOTES
INPATIENT CARDIOLOGY FOLLOW-UP    Name: Jia Bryant    Age: 63 y.o. Date of Admission: 8/7/2021  9:53 PM    Date of Service: 8/24/2021    Chief Complaint: Follow-up for acute superimposed upon chronic diastolic heart failure, chronic obstructive lung disease with associated exacerbation, acute superimposed upon chronic hypoxic respiratory failure, paroxysmal atrial fibrillation/flutter, hypertension, morbid obesity, obstructive sleep apnea    Interim History: The patient he is subjectively improved with significantly less dyspnea and anxiety. Objectively, radiographic evidence of hyperinflation without interstitial infiltrates is noted and relative elevation of his proBNP levels are present suggesting a persistent component of fluid retention in spite of his present diuresis (in excess of 31 L) potentially in part related to steroid administration secondary to the exacerbation of his chronic obstructive lung disease. Transient spontaneous conversion to sinus rhythm was noted with subsequent recurrence of atrial fibrillation present with acceptable rate control and no further episodes of paroxysmal atrial flutter. Review of Systems: The remainder of a complete multisystem review including consitutional, central nervous, respiratory, circulatory, gastrointestinal, genitourinary, endocrinologic, hematologic, musculoskeletal and psychiatric are negative.     Problem List:  Patient Active Problem List   Diagnosis    COPD (chronic obstructive pulmonary disease) ([address]    Essential hypertension    Adrenal adenoma    Class 2 obesity due to excess calories with serious comorbidity and body mass index (BMI) of 35.0 to 35.9 in adult    Anticoagulation management encounter    Typical atrial flutter ([address]    Anxiety    Status post catheter ablation of atrial flutter    Persistent atrial fibrillation ([address]    ETOH abuse    COPD with acute exacerbation ([address]       Allergies:  No Known Allergies    Current Medications:  Current Facility-Administered Medications   Medication Dose Route Frequency Provider Last Rate Last Admin    furosemide (LASIX) injection 40 mg  40 mg Intravenous BID Rene Dolan MD   40 mg at 08/23/21 1826    predniSONE (DELTASONE) tablet 50 mg  50 mg Oral Daily Ryan Jennings MD   50 mg at 08/23/21 1005    busPIRone (BUSPAR) tablet 5 mg  5 mg Oral TID PRN Jessica Goddard MD   5 mg at 08/23/21 2327    glucose (GLUTOSE) 40 % oral gel 15 g  15 g Oral PRN Jessica Goddard MD        dextrose 50 % IV solution  12.5 g Intravenous PRN Jessica Goddard MD        glucagon (rDNA) injection 1 mg  1 mg Intramuscular PRN Jessica Goddard MD        dextrose 5 % solution  100 mL/hr Intravenous PRN Jessica Goddard MD        insulin lispro (HUMALOG) injection vial 0-12 Units  0-12 Units Subcutaneous TID WC Jessica Goddard MD   4 Units at 08/24/21 3519    insulin lispro (HUMALOG) injection vial 0-6 Units  0-6 Units Subcutaneous Nightly Jessica Goddard MD   1 Units at 08/23/21 2107    [Held by provider] furosemide (LASIX) injection 20 mg  20 mg Intravenous TID Ryan Danielle MD   20 mg at 08/15/21 0848    sodium chloride (Inhalant) 3 % nebulizer solution 4 mL  4 mL Nebulization PRN Ryan Jennings MD        guaiFENesin tablet 400 mg  400 mg Oral Q6H Ryan Danielle MD   400 mg at 08/24/21 1242    perflutren lipid microspheres (DEFINITY) injection 1.65 mg  1.5 mL Intravenous ONCE PRN Ryan Jennings MD        budesonide (PULMICORT) nebulizer suspension 1,000 mcg  1 mg Nebulization BID Ryan Jennings MD   1,000 mcg at 08/23/21 2009    And    Arformoterol Tartrate (BROVANA) nebulizer solution 15 mcg  15 mcg Nebulization BID Ryan Jennings MD   15 mcg at 08/23/21 2009    guaiFENesin-dextromethorphan (ROBITUSSIN DM) 100-10 MG/5ML syrup 5 mL  5 mL Oral Q4H PRN Eulene Poag, PA   5 mL at 08/23/21 0848    sodium chloride (Inhalant) 3 % nebulizer solution 4 mL  4 mL Nebulization BID Patience MD Wiley 4 mL at 08/23/21 2009    albuterol (PROVENTIL) nebulizer solution 2.5 mg  2.5 mg Nebulization Q6H PRN STEPH Hernandez        apixaban (ELIQUIS) tablet 5 mg  5 mg Oral BID STEPH Hernandez   5 mg at 08/23/21 2105    atorvastatin (LIPITOR) tablet 20 mg  20 mg Oral Daily STEPH Hernandez   20 mg at 08/23/21 1004    dilTIAZem (CARDIZEM CD) extended release capsule 240 mg  240 mg Oral BID STEPH Hernandez   240 mg at 08/23/21 2105    flecainide (TAMBOCOR) tablet 100 mg  100 mg Oral BID STEPH Hernandez   100 mg at 08/23/21 2105    fluticasone (FLONASE) 50 MCG/ACT nasal spray 1 spray  1 spray Each Nostril Daily STEPH Hernandez   1 spray at 08/23/21 1152    ipratropium-albuterol (DUONEB) nebulizer solution 3 mL  1 vial Inhalation Q4H STEPH Hernandez   3 mL at 08/23/21 2009    losartan (COZAAR) tablet 50 mg  50 mg Oral Daily STEPH Hernandez   50 mg at 08/23/21 1004    sodium chloride flush 0.9 % injection 5-40 mL  5-40 mL Intravenous 2 times per day STEPH Hernandez   10 mL at 08/23/21 2105    sodium chloride flush 0.9 % injection 5-40 mL  5-40 mL Intravenous PRN STEPH Hernandez   10 mL at 08/20/21 1807    0.9 % sodium chloride infusion  25 mL Intravenous PRN STEPH Hernandez        ondansetron (ZOFRAN-ODT) disintegrating tablet 4 mg  4 mg Oral Q8H PRN STEPH Hernandez        Or    ondansetron (ZOFRAN) injection 4 mg  4 mg Intravenous Q6H PRN STEPH Hernandez        polyethylene glycol (GLYCOLAX) packet 17 g  17 g Oral Daily PRN STEPH Hernandez        acetaminophen (TYLENOL) tablet 650 mg  650 mg Oral Q6H PRN STEPH Hernandez   650 mg at 08/22/21 0915    Or    acetaminophen (TYLENOL) suppository 650 mg  650 mg Rectal Q6H PRN STEPH Hernandez        aspirin chewable tablet 81 mg  81 mg Oral Daily April Ekta-Toni, APRN - CNP   81 mg at 08/23/21 1004      dextrose      sodium chloride         Physical Exam:  /84   Pulse 87   Temp 96.7 °F (35.9 °C) (Temporal)   Resp 20   Ht 5' 11\" (1.803 m)   Wt 288 lb (130.6 kg)   SpO2 98%   BMI 40.17 kg/m²   Weight change: Wt Readings from Last 3 Encounters:   08/22/21 288 lb (130.6 kg)   01/22/20 277 lb (125.6 kg)   01/02/20 271 lb (122.9 kg)     The patient is awake, alert and in no discomfort or distress. No gross musculoskeletal deformity is present. No significant skin or nail changes are present. Gross examination of head, eyes, nose and throat are negative. Jugular venous pressure is normal and no carotid bruits are present. Normal respiratory effort is noted with no accessory muscle usage present. Lung fields demonstrate poor air wave movement with evidence of diffuse bronchospasm to ascultation. Cardiac examination is notable for an irregular rhythm with no palpable thrill. No gallop rhythm or cardiac murmur are identified. A benign abdominal examination is present with the exception of obesity and no masses or organomegaly. Intact pulses are present throughout all extremities and moderate pretibial and pedal edema is present. No focal neurologic deficits are present. Intake/Output:    Intake/Output Summary (Last 24 hours) at 8/24/2021 0725  Last data filed at 8/24/2021 0358  Gross per 24 hour   Intake 1380 ml   Output 3675 ml   Net -2295 ml     No intake/output data recorded. Laboratory Tests:  Lab Results   Component Value Date    CREATININE 0.9 08/22/2021    BUN 43 (H) 08/22/2021     08/22/2021    K 5.1 (H) 08/22/2021    CL 95 (L) 08/22/2021    CO2 37 (H) 08/22/2021     No results for input(s): CKTOTAL, CKMB in the last 72 hours.     Invalid input(s): TROPONONI  Lab Results   Component Value Date     (H) 02/04/2018     Lab Results   Component Value Date    WBC 26.5 08/22/2021    RBC 3.83 08/22/2021    HGB 12.3 08/22/2021    HCT 37.1 08/22/2021    MCV 96.9 08/22/2021    MCH 32.1 08/22/2021    MCHC 33.2 08/22/2021    RDW 12.7 08/22/2021     08/22/2021    MPV 12.0 08/22/2021 Recent Labs     08/22/21  0949   ALKPHOS 48   *   AST 63*   PROT 5.6*   BILITOT 0.5   LABALBU 3.5     Lab Results   Component Value Date    MG 2.2 02/14/2018     Lab Results   Component Value Date    PROTIME 13.8 02/04/2018    PROTIME 11.6 11/13/2010    INR 1.2 02/04/2018     Lab Results   Component Value Date    TSH 0.942 02/14/2018     No components found for: CHLPL  Lab Results   Component Value Date    TRIG 80 04/08/2018    TRIG 61 01/28/2017     Lab Results   Component Value Date    HDL 80 04/08/2018    HDL 50 01/28/2017     Lab Results   Component Value Date    LDLCALC 109 (H) 04/08/2018    LDLCALC 100 (H) 01/28/2017       Cardiac Tests:  Telemetry findings reviewed: atrial fibrillation with a mean ventricular response of approximately 80 bpm with transient spontaneous conversion to sinus rhythm additionally present, no additional tachy/bradyarrhythmias overnight  Chest X-ray: A repeat chest x-ray reviewed at the time of evaluation demonstrates no evidence of cardiomegaly or interstitial infiltrates with evidence of hyperinflation      ASSESSMENT / PLAN: On a clinical basis, the patient is subjectively improved with radiographic evidence not indicative of interstitial infiltrates or edema and spite of elevation of his proBNP level likely related to mild exacerbation of his chronic diastolic heart failure in the face of atrial arrhythmias. Further fluid mobilization is occurred with persistent lower extremity edema and no additional evidence of volume overload with plans of conversion of his diuretics to oral administration and substitution of bumetanide for that of previously administered furosemide with need of careful monitoring of his volume status as well as that of renal function and electrolytes.   In the face of exacerbation of his chronic obstructive lung disease and transient spontaneous conversion sinus rhythm, his flecainide will be maintained and at present a rate control and anticoagulation strategy maintained with needs of careful monitoring of heart rates to avoid exacerbation of systolic performance on the basis of tachycardia. Additional management of his chronic obstructive lung disease will be deferred to the pulmonary service with the patient presently expressing disinterest in transfer to the [facility] as previously recommended with interest in outpatient assessment if further indicated. Presently additional hospital assessment and management is indicated to reduce risk of readmission. On long-term basis, appropriate lifestyle modification to achieve weight reduction will benefit diastolic cardiac performance and management of his obstructive sleep apnea in addition to that of aggressive risk factor modification of blood pressure and serum lipids and attempt to reduce risk of future atherosclerotic development. Note: This report was completed utilizing computer voice recognition software. Every effort has been made to ensure accuracy, however; inadvertent computerized transcription errors may be present. Curt Cantu.  Sukhdeep Reynolds, [facility] Cardiology

## 2021-08-24 NOTE — PROGRESS NOTES
Notified Dr Jey Chowdhury of the generalized rash that the patient has. It is not itchy and the patient did not even notice the rash. Per Adam mejia, hes not attending. According to the computer he is but Abel Berry is also on care team. RN called  to notify.  did not call back. Spoke with Jey Chowdhury who said he will be attending.

## 2021-08-24 NOTE — PLAN OF CARE
Problem: Pain:  Goal: Control of acute pain  Description: Control of acute pain  8/24/2021 0407 by Sean Rosado RN  Outcome: Met This Shift     Problem: Skin Integrity:  Goal: Will show no infection signs and symptoms  Description: Will show no infection signs and symptoms  8/24/2021 0407 by Sean Rosado RN  Outcome: Met This Shift     Problem: Airway Clearance - Ineffective:  Goal: Ability to maintain a clear airway will improve  Description: Ability to maintain a clear airway will improve  8/24/2021 0407 by Sean Rosado RN  Outcome: Ongoing

## 2021-08-25 LAB
ANION GAP SERPL CALCULATED.3IONS-SCNC: 9 MMOL/L (ref 7–16)
ANISOCYTOSIS: ABNORMAL
ATYPICAL LYMPHOCYTE RELATIVE PERCENT: 2.6 % (ref 0–4)
B.E.: 14.7 MMOL/L (ref -3–3)
BASOPHILS ABSOLUTE: 0 E9/L (ref 0–0.2)
BASOPHILS RELATIVE PERCENT: 0.3 % (ref 0–2)
BUN BLDV-MCNC: 40 MG/DL (ref 6–20)
CALCIUM SERPL-MCNC: 8.6 MG/DL (ref 8.6–10.2)
CHLORIDE BLD-SCNC: 93 MMOL/L (ref 98–107)
CO2: 37 MMOL/L (ref 22–29)
COHB: 0.4 % (ref 0–1.5)
CREAT SERPL-MCNC: 1.1 MG/DL (ref 0.7–1.2)
CRITICAL: ABNORMAL
DATE ANALYZED: ABNORMAL
DATE OF COLLECTION: ABNORMAL
EOSINOPHILS ABSOLUTE: 0.17 E9/L (ref 0.05–0.5)
EOSINOPHILS RELATIVE PERCENT: 0.9 % (ref 0–6)
GFR AFRICAN AMERICAN: >60
GFR NON-AFRICAN AMERICAN: >60 ML/MIN/1.73
GLUCOSE BLD-MCNC: 239 MG/DL (ref 74–99)
HCO3: 39.9 MMOL/L (ref 22–26)
HCT VFR BLD CALC: 33.2 % (ref 37–54)
HEMOGLOBIN: 10.8 G/DL (ref 12.5–16.5)
HHB: 2.1 % (ref 0–5)
LAB: ABNORMAL
LYMPHOCYTES ABSOLUTE: 1.48 E9/L (ref 1.5–4)
LYMPHOCYTES RELATIVE PERCENT: 5.3 % (ref 20–42)
Lab: ABNORMAL
MCH RBC QN AUTO: 31.3 PG (ref 26–35)
MCHC RBC AUTO-ENTMCNC: 32.5 % (ref 32–34.5)
MCV RBC AUTO: 96.2 FL (ref 80–99.9)
METER GLUCOSE: 171 MG/DL (ref 74–99)
METER GLUCOSE: 179 MG/DL (ref 74–99)
METER GLUCOSE: 220 MG/DL (ref 74–99)
METER GLUCOSE: 237 MG/DL (ref 74–99)
METHB: 0.4 % (ref 0–1.5)
MODE: ABNORMAL
MONOCYTES ABSOLUTE: 0.37 E9/L (ref 0.1–0.95)
MONOCYTES RELATIVE PERCENT: 1.8 % (ref 2–12)
NEUTROPHILS ABSOLUTE: 16.65 E9/L (ref 1.8–7.3)
NEUTROPHILS RELATIVE PERCENT: 89.5 % (ref 43–80)
O2 CONTENT: 16.3 ML/DL
O2 SATURATION: 97.9 % (ref 92–98.5)
O2HB: 97.1 % (ref 94–97)
OPERATOR ID: 8217
OVALOCYTES: ABNORMAL
PATIENT TEMP: 37 C
PCO2: 52.2 MMHG (ref 35–45)
PDW BLD-RTO: 12.8 FL (ref 11.5–15)
PH BLOOD GAS: 7.5 (ref 7.35–7.45)
PH VENOUS: 7.39 (ref 7.35–7.45)
PLATELET # BLD: 127 E9/L (ref 130–450)
PMV BLD AUTO: 12.1 FL (ref 7–12)
PO2: 107.1 MMHG (ref 75–100)
POIKILOCYTES: ABNORMAL
POTASSIUM SERPL-SCNC: 4.3 MMOL/L (ref 3.5–5)
RBC # BLD: 3.45 E12/L (ref 3.8–5.8)
SODIUM BLD-SCNC: 139 MMOL/L (ref 132–146)
SOURCE, BLOOD GAS: ABNORMAL
THB: 11.8 G/DL (ref 11.5–16.5)
TIME ANALYZED: 1837
WBC # BLD: 18.5 E9/L (ref 4.5–11.5)

## 2021-08-25 PROCEDURE — 85025 COMPLETE CBC W/AUTO DIFF WBC: CPT

## 2021-08-25 PROCEDURE — 94640 AIRWAY INHALATION TREATMENT: CPT

## 2021-08-25 PROCEDURE — 6370000000 HC RX 637 (ALT 250 FOR IP): Performed by: INTERNAL MEDICINE

## 2021-08-25 PROCEDURE — 2500000003 HC RX 250 WO HCPCS: Performed by: NURSE PRACTITIONER

## 2021-08-25 PROCEDURE — 36415 COLL VENOUS BLD VENIPUNCTURE: CPT

## 2021-08-25 PROCEDURE — 82805 BLOOD GASES W/O2 SATURATION: CPT

## 2021-08-25 PROCEDURE — 6360000002 HC RX W HCPCS: Performed by: INTERNAL MEDICINE

## 2021-08-25 PROCEDURE — 6370000000 HC RX 637 (ALT 250 FOR IP): Performed by: PHYSICIAN ASSISTANT

## 2021-08-25 PROCEDURE — 2580000003 HC RX 258: Performed by: NURSE PRACTITIONER

## 2021-08-25 PROCEDURE — 82800 BLOOD PH: CPT

## 2021-08-25 PROCEDURE — 2060000000 HC ICU INTERMEDIATE R&B

## 2021-08-25 PROCEDURE — 2700000000 HC OXYGEN THERAPY PER DAY

## 2021-08-25 PROCEDURE — 80048 BASIC METABOLIC PNL TOTAL CA: CPT

## 2021-08-25 PROCEDURE — 82962 GLUCOSE BLOOD TEST: CPT

## 2021-08-25 PROCEDURE — 99233 SBSQ HOSP IP/OBS HIGH 50: CPT | Performed by: INTERNAL MEDICINE

## 2021-08-25 PROCEDURE — 2580000003 HC RX 258: Performed by: INTERNAL MEDICINE

## 2021-08-25 PROCEDURE — 2580000003 HC RX 258: Performed by: PHYSICIAN ASSISTANT

## 2021-08-25 PROCEDURE — 94660 CPAP INITIATION&MGMT: CPT

## 2021-08-25 PROCEDURE — 6370000000 HC RX 637 (ALT 250 FOR IP): Performed by: NURSE PRACTITIONER

## 2021-08-25 RX ADMIN — APIXABAN 5 MG: 5 TABLET, FILM COATED ORAL at 07:42

## 2021-08-25 RX ADMIN — FLUTICASONE PROPIONATE 1 SPRAY: 50 SPRAY, METERED NASAL at 07:46

## 2021-08-25 RX ADMIN — IPRATROPIUM BROMIDE AND ALBUTEROL SULFATE 3 ML: .5; 2.5 SOLUTION RESPIRATORY (INHALATION) at 08:21

## 2021-08-25 RX ADMIN — ARFORMOTEROL TARTRATE 15 MCG: 15 SOLUTION RESPIRATORY (INHALATION) at 08:03

## 2021-08-25 RX ADMIN — FLECAINIDE ACETATE 100 MG: 100 TABLET ORAL at 23:06

## 2021-08-25 RX ADMIN — INSULIN LISPRO 2 UNITS: 100 INJECTION, SOLUTION INTRAVENOUS; SUBCUTANEOUS at 06:22

## 2021-08-25 RX ADMIN — GUAIFENESIN 400 MG: 400 TABLET ORAL at 06:21

## 2021-08-25 RX ADMIN — BUMETANIDE 2 MG: 1 TABLET ORAL at 07:42

## 2021-08-25 RX ADMIN — ARFORMOTEROL TARTRATE 15 MCG: 15 SOLUTION RESPIRATORY (INHALATION) at 20:11

## 2021-08-25 RX ADMIN — INSULIN LISPRO 2 UNITS: 100 INJECTION, SOLUTION INTRAVENOUS; SUBCUTANEOUS at 16:21

## 2021-08-25 RX ADMIN — BUDESONIDE 1000 MCG: 0.5 SUSPENSION RESPIRATORY (INHALATION) at 08:04

## 2021-08-25 RX ADMIN — Medication 10 ML: at 21:31

## 2021-08-25 RX ADMIN — ASPIRIN 81 MG CHEWABLE TABLET 81 MG: 81 TABLET CHEWABLE at 07:42

## 2021-08-25 RX ADMIN — GUAIFENESIN 400 MG: 400 TABLET ORAL at 10:55

## 2021-08-25 RX ADMIN — GUAIFENESIN 400 MG: 400 TABLET ORAL at 23:13

## 2021-08-25 RX ADMIN — SODIUM CHLORIDE SOLN NEBU 3% 4 ML: 3 NEBU SOLN at 19:55

## 2021-08-25 RX ADMIN — ATORVASTATIN CALCIUM 20 MG: 20 TABLET, FILM COATED ORAL at 07:42

## 2021-08-25 RX ADMIN — PREDNISONE 50 MG: 20 TABLET ORAL at 07:41

## 2021-08-25 RX ADMIN — HYDROCORTISONE: 1 CREAM TOPICAL at 23:10

## 2021-08-25 RX ADMIN — INSULIN LISPRO 4 UNITS: 100 INJECTION, SOLUTION INTRAVENOUS; SUBCUTANEOUS at 10:54

## 2021-08-25 RX ADMIN — BUSPIRONE HYDROCHLORIDE 5 MG: 5 TABLET ORAL at 23:06

## 2021-08-25 RX ADMIN — BUDESONIDE 1000 MCG: 0.5 SUSPENSION RESPIRATORY (INHALATION) at 20:11

## 2021-08-25 RX ADMIN — Medication 10 ML: at 07:42

## 2021-08-25 RX ADMIN — IPRATROPIUM BROMIDE AND ALBUTEROL SULFATE 3 ML: .5; 2.5 SOLUTION RESPIRATORY (INHALATION) at 19:55

## 2021-08-25 RX ADMIN — SODIUM CHLORIDE SOLN NEBU 3% 4 ML: 3 NEBU SOLN at 08:22

## 2021-08-25 RX ADMIN — DILTIAZEM HYDROCHLORIDE 240 MG: 240 CAPSULE, EXTENDED RELEASE ORAL at 21:31

## 2021-08-25 RX ADMIN — INSULIN LISPRO 2 UNITS: 100 INJECTION, SOLUTION INTRAVENOUS; SUBCUTANEOUS at 21:32

## 2021-08-25 RX ADMIN — GUAIFENESIN 400 MG: 400 TABLET ORAL at 16:22

## 2021-08-25 RX ADMIN — FLECAINIDE ACETATE 100 MG: 100 TABLET ORAL at 07:42

## 2021-08-25 RX ADMIN — DILTIAZEM HYDROCHLORIDE 240 MG: 240 CAPSULE, EXTENDED RELEASE ORAL at 07:41

## 2021-08-25 RX ADMIN — APIXABAN 5 MG: 5 TABLET, FILM COATED ORAL at 21:31

## 2021-08-25 RX ADMIN — HYDROCORTISONE: 1 CREAM TOPICAL at 07:42

## 2021-08-25 RX ADMIN — LOSARTAN POTASSIUM 50 MG: 50 TABLET, FILM COATED ORAL at 07:42

## 2021-08-25 RX ADMIN — BUMETANIDE 0.5 MG/HR: 0.25 INJECTION INTRAMUSCULAR; INTRAVENOUS at 15:30

## 2021-08-25 RX ADMIN — IPRATROPIUM BROMIDE AND ALBUTEROL SULFATE 3 ML: .5; 2.5 SOLUTION RESPIRATORY (INHALATION) at 11:14

## 2021-08-25 RX ADMIN — GUAIFENESIN SYRUP AND DEXTROMETHORPHAN 5 ML: 100; 10 SYRUP ORAL at 23:08

## 2021-08-25 RX ADMIN — IPRATROPIUM BROMIDE AND ALBUTEROL SULFATE 3 ML: .5; 2.5 SOLUTION RESPIRATORY (INHALATION) at 15:54

## 2021-08-25 ASSESSMENT — PAIN SCALES - GENERAL
PAINLEVEL_OUTOF10: 0

## 2021-08-25 NOTE — PROGRESS NOTES
08/24/21 1002    dilTIAZem (CARDIZEM CD) extended release capsule 240 mg  240 mg Oral BID STEPH Thomas   240 mg at 08/24/21 1958    flecainide (TAMBOCOR) tablet 100 mg  100 mg Oral BID STEPH Thomas   100 mg at 08/24/21 1958    fluticasone (FLONASE) 50 MCG/ACT nasal spray 1 spray  1 spray Each Nostril Daily STEPH Thomas   1 spray at 08/24/21 1004    ipratropium-albuterol (DUONEB) nebulizer solution 3 mL  1 vial Inhalation Q4H STEPH Thomas   3 mL at 08/24/21 1936    losartan (COZAAR) tablet 50 mg  50 mg Oral Daily STEPH Thomas   50 mg at 08/24/21 1006    sodium chloride flush 0.9 % injection 5-40 mL  5-40 mL IntraVENous 2 times per day STEPH Thomas   10 mL at 08/24/21 1959    sodium chloride flush 0.9 % injection 5-40 mL  5-40 mL IntraVENous PRN STEPH Thomas   10 mL at 08/20/21 1807    0.9 % sodium chloride infusion  25 mL IntraVENous PRN STEPH Thomas        ondansetron (ZOFRAN-ODT) disintegrating tablet 4 mg  4 mg Oral Q8H PRN STEPH Thomas        Or    ondansetron [facility]) injection 4 mg  4 mg IntraVENous Q6H PRN STEPH Thomas        polyethylene glycol (GLYCOLAX) packet 17 g  17 g Oral Daily PRN STEPH Thomas        acetaminophen (TYLENOL) tablet 650 mg  650 mg Oral Q6H PRN STEPH Thomas   650 mg at 08/24/21 1255    Or    acetaminophen (TYLENOL) suppository 650 mg  650 mg Rectal Q6H PRN STEPH Thomas        aspirin chewable tablet 81 mg  81 mg Oral Daily April SERA Nelson - CNP   81 mg at 08/24/21 1006      dextrose      sodium chloride         Physical Exam:  BP (!) 150/85   Pulse 91   Temp 97.3 °F (36.3 °C) (Temporal)   Resp 19   Ht 5' 11\" (1.803 m)   Wt 288 lb (130.6 kg)   SpO2 99%   BMI 40.17 kg/m²   Weight change:    Wt Readings from Last 3 Encounters:   08/22/21 288 lb (130.6 kg)   01/22/20 277 lb (125.6 kg)   01/02/20 271 lb (122.9 kg)     The patient is awake, alert and in no discomfort or distress. No gross musculoskeletal deformity is present. No significant skin or nail changes are present. Gross examination of head, eyes, nose and throat are negative. Jugular venous pressure is normal and no carotid bruits are present. Normal respiratory effort is noted with no accessory muscle usage present. Lung fields demonstrate distant breath sounds throughout all lung fields with a slight improvement in bronchospasm to ascultation. Cardiac examination is notable for a regular rate and rhythm with no palpable thrill. No gallop rhythm or cardiac murmur are identified. A benign abdominal examination is present with the exception of obesity and no masses or organomegaly. Intact pulses are present throughout all extremities and mild to moderate pretibial and pedal edema is present. No focal neurologic deficits are present. Intake/Output:    Intake/Output Summary (Last 24 hours) at 8/25/2021 0738  Last data filed at 8/25/2021 6169  Gross per 24 hour   Intake 1080 ml   Output 5275 ml   Net -4195 ml     No intake/output data recorded. Laboratory Tests:  Lab Results   Component Value Date    CREATININE 1.1 08/25/2021    BUN 40 (H) 08/25/2021     08/25/2021    K 4.3 08/25/2021    CL 93 (L) 08/25/2021    CO2 37 (H) 08/25/2021     No results for input(s): CKTOTAL, CKMB in the last 72 hours.     Invalid input(s): TROPONONI  Lab Results   Component Value Date     (H) 02/04/2018     Lab Results   Component Value Date    WBC 26.5 08/22/2021    RBC 3.83 08/22/2021    HGB 12.3 08/22/2021    HCT 37.1 08/22/2021    MCV 96.9 08/22/2021    MCH 32.1 08/22/2021    MCHC 33.2 08/22/2021    RDW 12.7 08/22/2021     08/22/2021    MPV 12.0 08/22/2021     Recent Labs     08/22/21  0949   ALKPHOS 48   *   AST 63*   PROT 5.6*   BILITOT 0.5   LABALBU 3.5     Lab Results   Component Value Date    MG 2.2 02/14/2018     Lab Results   Component Value Date    PROTIME 13.8 02/04/2018    PROTIME 11.6 11/13/2010 INR 1.2 02/04/2018     Lab Results   Component Value Date    TSH 0.942 02/14/2018     No components found for: CHLPL  Lab Results   Component Value Date    TRIG 80 04/08/2018    TRIG 61 01/28/2017     Lab Results   Component Value Date    HDL 80 04/08/2018    HDL 50 01/28/2017     Lab Results   Component Value Date    LDLCALC 109 (H) 04/08/2018    LDLCALC 100 (H) 01/28/2017       Cardiac Tests:  Telemetry findings reviewed: paroxysmal atrial fibrillation/flutter with maximum rates of approximately 120 bpm alternating with sinus rhythm, no new tachy/bradyarrhythmias overnight      ASSESSMENT / PLAN: On a clinical basis, the patient continues gradual subjective improvement with persistent paroxysmal episodes of atrial arrhythmias with acceptable rate control alternating with sinus rhythm and ongoing peripheral edema consistent with that of volume overload in spite of additional fluid mobilization and tolerance of conversion to enteral diuretics with a noted slight increase of prerenal azotemia and serum creatinine levels, likely in part related to ongoing steroid administration with needs of ongoing continued monitoring of his volume status as well as that of renal function and electrolytes. With ongoing discussions, he remains frustrated with his situation and some limited insight related to the combined effects of his cardiac and pulmonary disease in regards to his decompensation. Presently, his existing medical regimen will be maintained with needs of ongoing monitoring of his volume status as well as ongoing management of his pulmonary issues deferred to the pulmonary service. As previously outlined appropriate lifestyle modification to achieve weight reduction will be beneficial in management of diastolic heart failure and his obstructive sleep apnea to further assist in management of his atrial arrhythmias.   Ongoing aggressive risk factor modification of blood pressure and serum lipids will remain essential to reducing risk of future atherosclerotic development. The duration of discussion counseling in conjunction with the present counter exceeded 35 minutes      Note: This report was completed utilizing computer voice recognition software. Every effort has been made to ensure accuracy, however; inadvertent computerized transcription errors may be present. Judith Kearney.  Mahsa Reaves, [address] [facility] Cardiology

## 2021-08-25 NOTE — PLAN OF CARE
Problem: Skin Integrity:  Goal: Will show no infection signs and symptoms  Description: Will show no infection signs and symptoms  8/25/2021 0842 by Radha Brown  Outcome: Met This Shift  8/25/2021 0449 by Bright Bowers RN  Outcome: Met This Shift  Goal: Absence of new skin breakdown  Description: Absence of new skin breakdown  8/25/2021 0842 by Radha Brown  Outcome: Met This Shift  8/25/2021 0449 by Bright Bowers RN  Outcome: Met This Shift     Problem: Airway Clearance - Ineffective:  Goal: Ability to maintain a clear airway will improve  Description: Ability to maintain a clear airway will improve  Outcome: Met This Shift

## 2021-08-25 NOTE — PROGRESS NOTES
[facility]  Division of Pulmonary, Critical Care Medicine  Pulmonary [address] [facility]           Pulmonary Clinic consult       CC :SOB ,wheeizng   H/o A flutter     HPI :  SOB slightly better ,less wheezing and congestion   Less wheezing   No chest pain   No fever or chills  Had some rash that was resolved     PHYSICAL EXAMINATION:     VITAL SIGNS:  BP (!) 150/85   Pulse 91   Temp 97.3 °F (36.3 °C) (Temporal)   Resp 19   Ht 5' 11\" (1.803 m)   Wt 288 lb (130.6 kg)   SpO2 99%   BMI 40.17 kg/m²   Wt Readings from Last 3 Encounters:   08/22/21 288 lb (130.6 kg)   01/22/20 277 lb (125.6 kg)   01/02/20 271 lb (122.9 kg)     Temp Readings from Last 3 Encounters:   08/24/21 97.3 °F (36.3 °C) (Temporal)   08/09/19 97.7 °F (36.5 °C)   04/09/19 98.5 °F (36.9 °C) (Oral)     TMAX:  BP Readings from Last 3 Encounters:   08/24/21 (!) 150/85   01/22/20 132/77   01/02/20 124/82     Pulse Readings from Last 3 Encounters:   08/24/21 91   01/22/20 109   01/02/20 83           INTAKE/OUTPUTS:  I/O last 3 completed shifts:   In: 1140 [P.O.:1140]  Out: 4175 [Urine:4175]    Intake/Output Summary (Last 24 hours) at 8/24/2021 2250  Last data filed at 8/24/2021 2225  Gross per 24 hour   Intake 1440 ml   Output 4075 ml   Net -2635 ml       This is virtual visit      LABS/IMAGING:    CBC:  Lab Results   Component Value Date    WBC 26.5 (H) 08/22/2021    HGB 12.3 (L) 08/22/2021    HCT 37.1 08/22/2021    MCV 96.9 08/22/2021     08/22/2021    LYMPHOPCT 5.2 (L) 08/22/2021    RBC 3.83 08/22/2021    MCH 32.1 08/22/2021    MCHC 33.2 08/22/2021    RDW 12.7 08/22/2021    NEUTOPHILPCT 82.6 (H) 08/22/2021    MONOPCT 7.0 08/22/2021    BASOPCT 0.2 08/22/2021    NEUTROABS 23.06 (H) 08/22/2021    LYMPHSABS 1.33 (L) 08/22/2021    MONOSABS 1.86 (H) 08/22/2021    EOSABS 0.24 08/22/2021    BASOSABS 0.00 08/22/2021       Recent Labs     08/22/21  0949 08/18/21  1101 08/17/21  0505   WBC 26.5* 19.5* 19.2*   HGB 12.3* 11.9* 11.4*   HCT 37.1 36.5* 34.5*   MCV 96.9 98.1 95.8    206 226       BMP:   Recent Labs     08/22/21  0949 08/24/21  0634    134   K 5.1* 4.9   CL 95* 94*   CO2 37* 37*   BUN 43* 39*   CREATININE 0.9 0.9       MG:   Lab Results   Component Value Date    MG 2.2 02/14/2018     Ca/Phos:   Lab Results   Component Value Date    CALCIUM 8.6 08/24/2021     Amylase: No results found for: AMYLASE  Lipase:   Lab Results   Component Value Date    LIPASE 28 05/19/2011     LIVER PROFILE:   Recent Labs     08/22/21  0949   AST 63*   *   BILITOT 0.5   ALKPHOS 48       PT/INR:   No results for input(s): PROTIME, INR in the last 72 hours. APTT: No results for input(s): APTT in the last 72 hours. Cardiac Enzymes:  Lab Results   Component Value Date    CKTOTAL 51 11/13/2010    CKMB 0.5 11/13/2010    TROPONINI <0.01 02/15/2018       Hgb A1C: No results found for: LABA1C  No results found for: EAG  FAMILIA: No results found for: FAMILIA  ESR: No results found for: SEDRATE  CRP: No results found for: CRP  D Dimer: No results found for: DDIMER    Thyroid Studies:  Lab Results   Component Value Date    TSH 0.942 02/14/2018    G3WCPGO 4.8 02/14/2018           MICROBIOLOGY:  Pending       CXR:  Reviewed     CT Chest:reviewed     PE  Vitals:    08/24/21 1937   BP:    Pulse:    Resp:    Temp:    SpO2: 99%     General:  Awake, alert, oriented X 3. Well developed, well nourished, well groomed. No apparent distress. HEENT:  Normocephalic, atraumatic. Pupils equal, round, reactive to light. No scleral icterus. No conjunctival injection. Normal lips, teeth, and gums. No nasal discharge. Neck:  Supple, FROM  Heart:  RRR, no murmurs, gallops, rubs, carotid upstroke normal, no carotid bruits  Lungs:wheeizng bilateral ,rhonchi   Abdomen:   Bowel sounds present, soft, nontender, no masses, no organomegaly, no peritoneal signs  Extremities:  No clubbing, cyanosis, or edema  Skin:  Warm and dry, no open lesions or rash  Neuro:  Cranial nerves 2-12 intact, no focal deficits  Vascular: Radial and pedal Pulses 2+  Breast: deferred  Rectal: deferred  Genitalia:  deferred    PROBLEM LIST:  Patient Active Problem List   Diagnosis    [facility] ([facility] disease) [facility]    Essential hypertension    Adrenal adenoma    Class 2 obesity due to excess calories with serious comorbidity and body mass index (BMI) of 35.0 to 35.9 in adult    Anticoagulation management encounter    Typical atrial flutter ([facility]    Anxiety    Status post catheter ablation of atrial flutter    Persistent atrial fibrillation ([facility]    ETOH abuse    COPD with acute exacerbation (HCC)               ASSESSMENT:  1.) Acute   exacerbation of COPD  2) very severe COPD  3.)obstructive sleep apnea  4. )Afib /Flutter   5.)tobacco independent(quit 5 months ago)    Data:  FVC 3.63 which is 73 of predicted    FEV1 1.52 which is 39 of predicted    FEV1/FVC is 42  No Bronchodilator response    PLAN:    Wean steroids to PO ,50 mg daily ,wean over 10 days and keep on 10 mg daily   Add darlisep on discharge   Breztri   Should be done with abx,will stop  Diuresis as per cardiology /primary  A fib/diuresis per cardiology   Wean steroids  Pending bed in [address] P[address]ito Harrison MD  [facility]

## 2021-08-25 NOTE — CONSULTS
Department of Internal Medicine  Nephrology Nurse Practitioner Consult Note      Reason for Consult:  Volume management   Requesting Physician:  Dr. Caryn Gilman:  Shortness of breath    History Obtained From:  patient, electronic medical record    HISTORY OF PRESENT ILLNESS:  Briefly, Mr. Ana Chaparro is a 63year old male with a PMH of HTN, HFpEF 70-75% with stage II DD, COPD, PARKER, atrial flutter s/p cardioversion and ablation on chronic anticoagulation on apixaban, obesity, who was admitted on August 7, 2021 after presenting to the ER with shortness of breath. He was admitted for treatment of acute exacerbation of COPD and was started on methylprednisolone and inhalers. He continued with shortness of breath despite steroids and was then started on Lasix. He states that his swelling has worsened since he has been in the hospital despite excellent urine output with Lasix, his fluid balance is -36 L. He has also gained 30 pounds since admission. We are consulted for volume management.      Past Medical History:        Diagnosis Date    Atrial flutter ([address]     COPD (chronic obstructive pulmonary disease) (HCC)     Hypertension      Past Surgical History:        Procedure Laterality Date    ATRIAL ABLATION SURGERY  04/03/2018    BACK SURGERY      CARDIOVERSION  02/07/2018    Dr. Markie Jolly- 80 J converted to SB    TRANSESOPHAGEAL ECHOCARDIOGRAM  02/07/2018    Dr. Markie Jolly- No thrombus     Current Medications:    Current Facility-Administered Medications: bumetanide (BUMEX) tablet 2 mg, 2 mg, Oral, BID  hydrocortisone 1 % cream, , Topical, BID  predniSONE (DELTASONE) tablet 50 mg, 50 mg, Oral, Daily  busPIRone (BUSPAR) tablet 5 mg, 5 mg, Oral, TID PRN  glucose (GLUTOSE) 40 % oral gel 15 g, 15 g, Oral, PRN  dextrose 50 % IV solution, 12.5 g, IntraVENous, PRN  glucagon (rDNA) injection 1 mg, 1 mg, Intramuscular, PRN  dextrose 5 % solution, 100 mL/hr, IntraVENous, PRN  insulin lispro (HUMALOG) injection vial 0-12 Units, 0-12 Units, Subcutaneous, TID WC  insulin lispro (HUMALOG) injection vial 0-6 Units, 0-6 Units, Subcutaneous, Nightly  sodium chloride (Inhalant) 3 % nebulizer solution 4 mL, 4 mL, Nebulization, PRN  guaiFENesin tablet 400 mg, 400 mg, Oral, Q6H  perflutren lipid microspheres (DEFINITY) injection 1.65 mg, 1.5 mL, IntraVENous, ONCE PRN  budesonide (PULMICORT) nebulizer suspension 1,000 mcg, 1 mg, Nebulization, BID **AND** Arformoterol Tartrate (BROVANA) nebulizer solution 15 mcg, 15 mcg, Nebulization, BID  guaiFENesin-dextromethorphan (ROBITUSSIN DM) 100-10 MG/5ML syrup 5 mL, 5 mL, Oral, Q4H PRN  sodium chloride (Inhalant) 3 % nebulizer solution 4 mL, 4 mL, Nebulization, BID  albuterol (PROVENTIL) nebulizer solution 2.5 mg, 2.5 mg, Nebulization, Q6H PRN  apixaban (ELIQUIS) tablet 5 mg, 5 mg, Oral, BID  atorvastatin (LIPITOR) tablet 20 mg, 20 mg, Oral, Daily  dilTIAZem (CARDIZEM CD) extended release capsule 240 mg, 240 mg, Oral, BID  flecainide (TAMBOCOR) tablet 100 mg, 100 mg, Oral, BID  fluticasone (FLONASE) 50 MCG/ACT nasal spray 1 spray, 1 spray, Each Nostril, Daily  ipratropium-albuterol (DUONEB) nebulizer solution 3 mL, 1 vial, Inhalation, Q4H  losartan (COZAAR) tablet 50 mg, 50 mg, Oral, Daily  sodium chloride flush 0.9 % injection 5-40 mL, 5-40 mL, IntraVENous, 2 times per day  sodium chloride flush 0.9 % injection 5-40 mL, 5-40 mL, IntraVENous, PRN  0.9 % sodium chloride infusion, 25 mL, IntraVENous, PRN  ondansetron (ZOFRAN-ODT) disintegrating tablet 4 mg, 4 mg, Oral, Q8H PRN **OR** ondansetron (ZOFRAN) injection 4 mg, 4 mg, IntraVENous, Q6H PRN  polyethylene glycol (GLYCOLAX) packet 17 g, 17 g, Oral, Daily PRN  acetaminophen (TYLENOL) tablet 650 mg, 650 mg, Oral, Q6H PRN **OR** acetaminophen (TYLENOL) suppository 650 mg, 650 mg, Rectal, Q6H PRN  aspirin chewable tablet 81 mg, 81 mg, Oral, Daily  Allergies:  Patient has no known allergies.     Social History:    TOBACCO:   reports that he quit smoking about 4 years ago. His smoking use included cigarettes. He has a 60.00 pack-year smoking history. He has never used smokeless tobacco.  ETOH:   reports current alcohol use of about 3.0 - 4.0 standard drinks of alcohol per week.     Family History:       Problem Relation Age of Onset    Other Mother         ALS    Lung Cancer Father     Pacemaker Sister      REVIEW OF SYSTEMS:    CONSTITUTIONAL:  negative for  fevers and chills  EYES:  negative for  double vision and blurred vision  HEENT:  negative for  epistaxis  RESPIRATORY:  positive for  dyspnea and wheezing  CARDIOVASCULAR:  positive for  dyspnea, edema  GASTROINTESTINAL:  negative for nausea, vomiting, diarrhea and abdominal pain  GENITOURINARY:  positive for frequency  INTEGUMENT/BREAST:  negative  HEMATOLOGIC/LYMPHATIC:  negative  ALLERGIC/IMMUNOLOGIC:  negative  ENDOCRINE:  negative  MUSCULOSKELETAL:  negative for  decreased range of motion and muscle weakness  NEUROLOGICAL:  negative for headaches and dizziness  BEHAVIOR/PSYCH:  negative for poor appetite    PHYSICAL EXAM:      Vitals:    VITALS:  BP (!) 151/70   Pulse 101   Temp 97.1 °F (36.2 °C) (Temporal)   Resp 20   Ht 5' 11\" (1.803 m)   Wt 297 lb 14.4 oz (135.1 kg)   SpO2 96%   BMI 41.55 kg/m²   24HR INTAKE/OUTPUT:    Intake/Output Summary (Last 24 hours) at 8/25/2021 1349  Last data filed at 8/25/2021 1335  Gross per 24 hour   Intake 660 ml   Output 5800 ml   Net -5140 ml       Constitutional:  Alert and oriented, NAD  HEENT:  Normocephalic, PERRL  Respiratory:  Expiratory wheezing throughout, on 4L NC  Cardiovascular/Edema:  IRRR, S1, S2  Gastrointestinal:  Soft, obese, nontender, nondistended  Neurologic:  Nonfocal, ROUSE  Skin:  Warm, dry, no lesions  Other:  ++ edema BLE up into thights, + sacral edema and abdominal wall edema     DATA:    CBC:   Lab Results   Component Value Date    WBC 18.5 08/25/2021    RBC 3.45 08/25/2021    HGB 10.8 08/25/2021    HCT 33.2 losartan  3. Anasarca, secondary to #1. We will start on Bumex drip  4. Elevated bicarbonate level, likely chronic respiratory acidosis. To obtain venous pH.   5. Atrial flutter, s/ cardioversion and ablation in the past, on diltiazem and apixaban   6. COPD exacerbation, on prednisone  7. Anemia, normocytic    Plan:    · Increase Bumex to Bumex drip at 0.5 mg/hr  · Strict I&Os  · Obtain venous pH  · Obtain magnesium and phosphorus levels   · Obtain iron studies     Thank you so much Dr. Chalino Medina for allowing us to participate in the care of Mr. Bianca Apodaca.      Electronically signed by SERA Braden CNP on 8/25/2021 at 2:32 PM

## 2021-08-26 LAB
ANION GAP SERPL CALCULATED.3IONS-SCNC: 6 MMOL/L (ref 7–16)
ANION GAP SERPL CALCULATED.3IONS-SCNC: 9 MMOL/L (ref 7–16)
BUN BLDV-MCNC: 37 MG/DL (ref 6–20)
BUN BLDV-MCNC: 41 MG/DL (ref 6–20)
CALCIUM SERPL-MCNC: 8.6 MG/DL (ref 8.6–10.2)
CALCIUM SERPL-MCNC: 9 MG/DL (ref 8.6–10.2)
CHLORIDE BLD-SCNC: 89 MMOL/L (ref 98–107)
CHLORIDE BLD-SCNC: 90 MMOL/L (ref 98–107)
CO2: 39 MMOL/L (ref 22–29)
CO2: 41 MMOL/L (ref 22–29)
CREAT SERPL-MCNC: 0.9 MG/DL (ref 0.7–1.2)
CREAT SERPL-MCNC: 1.2 MG/DL (ref 0.7–1.2)
FERRITIN: 313 NG/ML
GFR AFRICAN AMERICAN: >60
GFR AFRICAN AMERICAN: >60
GFR NON-AFRICAN AMERICAN: >60 ML/MIN/1.73
GFR NON-AFRICAN AMERICAN: >60 ML/MIN/1.73
GLUCOSE BLD-MCNC: 202 MG/DL (ref 74–99)
GLUCOSE BLD-MCNC: 281 MG/DL (ref 74–99)
IRON SATURATION: 54 % (ref 20–55)
IRON: 113 MCG/DL (ref 59–158)
MAGNESIUM: 2 MG/DL (ref 1.6–2.6)
METER GLUCOSE: 155 MG/DL (ref 74–99)
METER GLUCOSE: 161 MG/DL (ref 74–99)
METER GLUCOSE: 225 MG/DL (ref 74–99)
METER GLUCOSE: 281 MG/DL (ref 74–99)
PHOSPHORUS: 3.9 MG/DL (ref 2.5–4.5)
POTASSIUM SERPL-SCNC: 3.5 MMOL/L (ref 3.5–5)
POTASSIUM SERPL-SCNC: 3.8 MMOL/L (ref 3.5–5)
PRO-BNP: 469 PG/ML (ref 0–125)
SODIUM BLD-SCNC: 137 MMOL/L (ref 132–146)
SODIUM BLD-SCNC: 137 MMOL/L (ref 132–146)
TOTAL IRON BINDING CAPACITY: 209 MCG/DL (ref 250–450)

## 2021-08-26 PROCEDURE — 94640 AIRWAY INHALATION TREATMENT: CPT

## 2021-08-26 PROCEDURE — 6370000000 HC RX 637 (ALT 250 FOR IP): Performed by: PHYSICIAN ASSISTANT

## 2021-08-26 PROCEDURE — 6370000000 HC RX 637 (ALT 250 FOR IP): Performed by: INTERNAL MEDICINE

## 2021-08-26 PROCEDURE — 2580000003 HC RX 258: Performed by: NURSE PRACTITIONER

## 2021-08-26 PROCEDURE — 82728 ASSAY OF FERRITIN: CPT

## 2021-08-26 PROCEDURE — 6360000002 HC RX W HCPCS: Performed by: INTERNAL MEDICINE

## 2021-08-26 PROCEDURE — 2060000000 HC ICU INTERMEDIATE R&B

## 2021-08-26 PROCEDURE — 83735 ASSAY OF MAGNESIUM: CPT

## 2021-08-26 PROCEDURE — 94660 CPAP INITIATION&MGMT: CPT

## 2021-08-26 PROCEDURE — 6370000000 HC RX 637 (ALT 250 FOR IP): Performed by: NURSE PRACTITIONER

## 2021-08-26 PROCEDURE — 83880 ASSAY OF NATRIURETIC PEPTIDE: CPT

## 2021-08-26 PROCEDURE — 36415 COLL VENOUS BLD VENIPUNCTURE: CPT

## 2021-08-26 PROCEDURE — 84100 ASSAY OF PHOSPHORUS: CPT

## 2021-08-26 PROCEDURE — 99233 SBSQ HOSP IP/OBS HIGH 50: CPT | Performed by: INTERNAL MEDICINE

## 2021-08-26 PROCEDURE — 80048 BASIC METABOLIC PNL TOTAL CA: CPT

## 2021-08-26 PROCEDURE — 2500000003 HC RX 250 WO HCPCS: Performed by: NURSE PRACTITIONER

## 2021-08-26 PROCEDURE — 82962 GLUCOSE BLOOD TEST: CPT

## 2021-08-26 PROCEDURE — 2580000003 HC RX 258: Performed by: PHYSICIAN ASSISTANT

## 2021-08-26 PROCEDURE — 83550 IRON BINDING TEST: CPT

## 2021-08-26 PROCEDURE — 2700000000 HC OXYGEN THERAPY PER DAY

## 2021-08-26 PROCEDURE — 2580000003 HC RX 258: Performed by: INTERNAL MEDICINE

## 2021-08-26 PROCEDURE — 83540 ASSAY OF IRON: CPT

## 2021-08-26 RX ORDER — ACETAZOLAMIDE 250 MG/1
250 TABLET ORAL DAILY
Status: DISCONTINUED | OUTPATIENT
Start: 2021-08-26 | End: 2021-08-28

## 2021-08-26 RX ORDER — PREDNISONE 20 MG/1
40 TABLET ORAL DAILY
Status: DISCONTINUED | OUTPATIENT
Start: 2021-08-27 | End: 2021-08-30 | Stop reason: HOSPADM

## 2021-08-26 RX ORDER — POTASSIUM CHLORIDE 20 MEQ/1
40 TABLET, EXTENDED RELEASE ORAL ONCE
Status: COMPLETED | OUTPATIENT
Start: 2021-08-26 | End: 2021-08-26

## 2021-08-26 RX ADMIN — PREDNISONE 50 MG: 20 TABLET ORAL at 10:22

## 2021-08-26 RX ADMIN — ASPIRIN 81 MG CHEWABLE TABLET 81 MG: 81 TABLET CHEWABLE at 10:23

## 2021-08-26 RX ADMIN — ARFORMOTEROL TARTRATE 15 MCG: 15 SOLUTION RESPIRATORY (INHALATION) at 08:31

## 2021-08-26 RX ADMIN — ACETAZOLAMIDE 250 MG: 250 TABLET ORAL at 12:36

## 2021-08-26 RX ADMIN — BUSPIRONE HYDROCHLORIDE 5 MG: 5 TABLET ORAL at 23:15

## 2021-08-26 RX ADMIN — ACETAMINOPHEN 650 MG: 325 TABLET ORAL at 10:29

## 2021-08-26 RX ADMIN — ARFORMOTEROL TARTRATE 15 MCG: 15 SOLUTION RESPIRATORY (INHALATION) at 20:20

## 2021-08-26 RX ADMIN — INSULIN LISPRO 6 UNITS: 100 INJECTION, SOLUTION INTRAVENOUS; SUBCUTANEOUS at 17:03

## 2021-08-26 RX ADMIN — INSULIN LISPRO 1 UNITS: 100 INJECTION, SOLUTION INTRAVENOUS; SUBCUTANEOUS at 20:59

## 2021-08-26 RX ADMIN — FLECAINIDE ACETATE 100 MG: 100 TABLET ORAL at 20:58

## 2021-08-26 RX ADMIN — IPRATROPIUM BROMIDE AND ALBUTEROL SULFATE 3 ML: .5; 2.5 SOLUTION RESPIRATORY (INHALATION) at 15:46

## 2021-08-26 RX ADMIN — BUMETANIDE 0.5 MG/HR: 0.25 INJECTION INTRAMUSCULAR; INTRAVENOUS at 12:37

## 2021-08-26 RX ADMIN — HYDROCORTISONE: 1 CREAM TOPICAL at 21:03

## 2021-08-26 RX ADMIN — GUAIFENESIN 400 MG: 400 TABLET ORAL at 06:16

## 2021-08-26 RX ADMIN — INSULIN LISPRO 4 UNITS: 100 INJECTION, SOLUTION INTRAVENOUS; SUBCUTANEOUS at 12:39

## 2021-08-26 RX ADMIN — Medication 10 ML: at 20:52

## 2021-08-26 RX ADMIN — SODIUM CHLORIDE SOLN NEBU 3% 4 ML: 3 NEBU SOLN at 20:20

## 2021-08-26 RX ADMIN — IPRATROPIUM BROMIDE AND ALBUTEROL SULFATE 3 ML: .5; 2.5 SOLUTION RESPIRATORY (INHALATION) at 11:44

## 2021-08-26 RX ADMIN — IPRATROPIUM BROMIDE AND ALBUTEROL SULFATE 3 ML: .5; 2.5 SOLUTION RESPIRATORY (INHALATION) at 20:20

## 2021-08-26 RX ADMIN — GUAIFENESIN 400 MG: 400 TABLET ORAL at 17:03

## 2021-08-26 RX ADMIN — APIXABAN 5 MG: 5 TABLET, FILM COATED ORAL at 21:05

## 2021-08-26 RX ADMIN — GUAIFENESIN SYRUP AND DEXTROMETHORPHAN 5 ML: 100; 10 SYRUP ORAL at 23:15

## 2021-08-26 RX ADMIN — POTASSIUM CHLORIDE 40 MEQ: 20 TABLET, EXTENDED RELEASE ORAL at 10:21

## 2021-08-26 RX ADMIN — DILTIAZEM HYDROCHLORIDE 240 MG: 240 CAPSULE, EXTENDED RELEASE ORAL at 20:58

## 2021-08-26 RX ADMIN — LOSARTAN POTASSIUM 50 MG: 50 TABLET, FILM COATED ORAL at 10:23

## 2021-08-26 RX ADMIN — FLECAINIDE ACETATE 100 MG: 100 TABLET ORAL at 10:22

## 2021-08-26 RX ADMIN — SODIUM CHLORIDE SOLN NEBU 3% 4 ML: 3 NEBU SOLN at 08:34

## 2021-08-26 RX ADMIN — APIXABAN 5 MG: 5 TABLET, FILM COATED ORAL at 10:23

## 2021-08-26 RX ADMIN — BUDESONIDE 1000 MCG: 0.5 SUSPENSION RESPIRATORY (INHALATION) at 20:20

## 2021-08-26 RX ADMIN — INSULIN LISPRO 2 UNITS: 100 INJECTION, SOLUTION INTRAVENOUS; SUBCUTANEOUS at 06:24

## 2021-08-26 RX ADMIN — IPRATROPIUM BROMIDE AND ALBUTEROL SULFATE 3 ML: .5; 2.5 SOLUTION RESPIRATORY (INHALATION) at 08:33

## 2021-08-26 RX ADMIN — ATORVASTATIN CALCIUM 20 MG: 20 TABLET, FILM COATED ORAL at 10:22

## 2021-08-26 RX ADMIN — GUAIFENESIN 400 MG: 400 TABLET ORAL at 12:36

## 2021-08-26 RX ADMIN — GUAIFENESIN 400 MG: 400 TABLET ORAL at 23:18

## 2021-08-26 RX ADMIN — BUDESONIDE 1000 MCG: 0.5 SUSPENSION RESPIRATORY (INHALATION) at 08:32

## 2021-08-26 RX ADMIN — DILTIAZEM HYDROCHLORIDE 240 MG: 240 CAPSULE, EXTENDED RELEASE ORAL at 10:22

## 2021-08-26 ASSESSMENT — PAIN SCALES - GENERAL
PAINLEVEL_OUTOF10: 0
PAINLEVEL_OUTOF10: 6
PAINLEVEL_OUTOF10: 0

## 2021-08-26 NOTE — PROGRESS NOTES
INPATIENT CARDIOLOGY FOLLOW-UP    Name: Jamel Faulkner    Age: 63 y.o. Date of Admission: 8/7/2021  9:53 PM    Date of Service: 8/26/2021    Chief Complaint: Follow-up for acute superimposed upon chronic diastolic heart failure, chronic obstructive lung disease with associated exacerbation, acute superimposed upon chronic hypoxic respiratory failure, paroxysmal atrial fibrillation/flutter, hypertension, morbid obesity, obstructive sleep apnea    Interim History: The patient continues to undergo aggressive fluid mobilization with nephrology in follow-up and over the past 24 hours and conversion to continuous infusion loop diuretic administration with total fluid mobilization presently in excess of 41 L. He presently remains in atrial fibrillation with transient episodes of atrial flutter and acceptable rate control. Repeat assessment of renal function and electrolytes and a follow-up proBNP level are pending. Review of Systems: The remainder of a complete multisystem review including consitutional, central nervous, respiratory, circulatory, gastrointestinal, genitourinary, endocrinologic, hematologic, musculoskeletal and psychiatric are negative.     Problem List:  Patient Active Problem List   Diagnosis    [facility]) [facility]    Essential hypertension    Adrenal adenoma    Class 2 obesity due to excess calories with serious comorbidity and body mass index (BMI) of 35.0 to 35.9 in adult    Anticoagulation management encounter    Typical atrial flutter ([address]    Anxiety    Status post catheter ablation of atrial flutter    Persistent atrial fibrillation ([facility]    ETOH abuse    COPD with acute exacerbation (HCC)       Allergies:  No Known Allergies    Current Medications:  Current Facility-Administered Medications   Medication Dose Route Frequency Provider Last Rate Last Admin    bumetanide (BUMEX) 12.5 mg in sodium chloride 0.9 % 125 mL infusion  0.5 mg/hr IntraVENous Continuous SERA Khoury - CNP 5 mL/hr at 08/25/21 1530 0.5 mg/hr at 08/25/21 1530    hydrocortisone 1 % cream   Topical BID Haleigh Hutton MD   Given at 08/25/21 2310    predniSONE (DELTASONE) tablet 50 mg  50 mg Oral Daily Ryan Castro MD   50 mg at 08/25/21 0741    busPIRone (BUSPAR) tablet 5 mg  5 mg Oral TID PRN Lucero Rajan MD   5 mg at 08/25/21 2306    glucose (GLUTOSE) 40 % oral gel 15 g  15 g Oral PRN Lucero Rajan MD        dextrose 50 % IV solution  12.5 g IntraVENous PRN Lucero Rajan MD        glucagon (rDNA) injection 1 mg  1 mg IntraMUSCular PRN Lucero Rajan MD        dextrose 5 % solution  100 mL/hr IntraVENous PRN Lucero Rajan MD        insulin lispro (HUMALOG) injection vial 0-12 Units  0-12 Units Subcutaneous TID WC Lucero Rajan MD   2 Units at 08/26/21 0624    insulin lispro (HUMALOG) injection vial 0-6 Units  0-6 Units Subcutaneous Nightly Lucero Rajan MD   2 Units at 08/25/21 2132    sodium chloride (Inhalant) 3 % nebulizer solution 4 mL  4 mL Nebulization PRN Ryan Castro MD        guaiFENesin tablet 400 mg  400 mg Oral Q6H Ryan Danielle MD   400 mg at 08/26/21 7732    perflutren lipid microspheres (DEFINITY) injection 1.65 mg  1.5 mL IntraVENous ONCE PRN Ryan Castro MD        budesonide (PULMICORT) nebulizer suspension 1,000 mcg  1 mg Nebulization BID Ryan Castro MD   1,000 mcg at 08/25/21 2011    And    Arformoterol Tartrate (BROVANA) nebulizer solution 15 mcg  15 mcg Nebulization BID Nella Nielsen MD   15 mcg at 08/25/21 2011    guaiFENesin-dextromethorphan (ROBITUSSIN DM) 100-10 MG/5ML syrup 5 mL  5 mL Oral Q4H PRN STEPH Ruff   5 mL at 08/25/21 2308    sodium chloride (Inhalant) 3 % nebulizer solution 4 mL  4 mL Nebulization BID Haleigh Hutton MD   4 mL at 08/25/21 1955    albuterol (PROVENTIL) nebulizer solution 2.5 mg  2.5 mg Nebulization Q6H PRN STEPH Lin        apixaban (ELIQUIS) tablet 5 mg  5 mg Oral BID Kristofer RAMEY STEPH Herrera   5 mg at 08/25/21 2131    atorvastatin (LIPITOR) tablet 20 mg  20 mg Oral Daily STEPH Mathew   20 mg at 08/25/21 4793    dilTIAZem (CARDIZEM CD) extended release capsule 240 mg  240 mg Oral BID STEPH Mathew   240 mg at 08/25/21 2131    flecainide (TAMBOCOR) tablet 100 mg  100 mg Oral BID STEPH Mathew   100 mg at 08/25/21 2306    fluticasone (FLONASE) 50 MCG/ACT nasal spray 1 spray  1 spray Each Nostril Daily STEPH Mathew   1 spray at 08/25/21 0746    ipratropium-albuterol (DUONEB) nebulizer solution 3 mL  1 vial Inhalation Q4H STEPH Mathew   3 mL at 08/25/21 1955    losartan (COZAAR) tablet 50 mg  50 mg Oral Daily STEPH Mathew   50 mg at 08/25/21 3794    sodium chloride flush 0.9 % injection 5-40 mL  5-40 mL IntraVENous 2 times per day STEPH Mathew   10 mL at 08/25/21 2131    sodium chloride flush 0.9 % injection 5-40 mL  5-40 mL IntraVENous PRN STEPH Mathew   10 mL at 08/20/21 1807    0.9 % sodium chloride infusion  25 mL IntraVENous PRN STEPH Mathew        ondansetron (ZOFRAN-ODT) disintegrating tablet 4 mg  4 mg Oral Q8H PRN STEPH Mathew        Or    ondansetron (ZOFRAN) injection 4 mg  4 mg IntraVENous Q6H PRN STEPH Mathew        polyethylene glycol (GLYCOLAX) packet 17 g  17 g Oral Daily PRN STEPH Mathew        acetaminophen (TYLENOL) tablet 650 mg  650 mg Oral Q6H PRN STEPH Mathew   650 mg at 08/24/21 1255    Or    acetaminophen (TYLENOL) suppository 650 mg  650 mg Rectal Q6H PRN STEPH Mathew        aspirin chewable tablet 81 mg  81 mg Oral Daily April Ekta-Toni, APRN - CNP   81 mg at 08/25/21 3156      bumetanide 0.1 mg/mL infusion 0.5 mg/hr (08/25/21 1530)    dextrose      sodium chloride         Physical Exam:  BP (!) 144/70   Pulse 97   Temp 97.3 °F (36.3 °C) (Temporal)   Resp 18   Ht 5' 11\" (1.803 m)   Wt 290 lb (131.5 kg)   SpO2 98%   BMI 40.45 kg/m²   Weight change: Wt Readings from Last 3 Encounters:   08/26/21 290 lb (131.5 kg)   01/22/20 277 lb (125.6 kg)   01/02/20 271 lb (122.9 kg)     The patient is awake, alert and in no discomfort or distress. No gross musculoskeletal deformity is present. No significant skin or nail changes are present. Gross examination of head, eyes, nose and throat are negative. Jugular venous pressure is normal and no carotid bruits are present. Normal respiratory effort is noted with no accessory muscle usage present. Lung fields are clear to ascultation. Cardiac examination is notable for an irregular rhythm with no palpable thrill. No gallop rhythm or cardiac murmur are identified. A benign abdominal examination is present with the exception of obesity and no masses or organomegaly. Intact pulses are present throughout all extremities and moderate pretibial and pedal edema is present. No focal neurologic deficits are present. Intake/Output:    Intake/Output Summary (Last 24 hours) at 8/26/2021 0806  Last data filed at 8/26/2021 0726  Gross per 24 hour   Intake 840 ml   Output 53368 ml   Net -9635 ml     I/O this shift:  In: -   Out: 600 [Urine:600]    Laboratory Tests:  Lab Results   Component Value Date    CREATININE 1.1 08/25/2021    BUN 40 (H) 08/25/2021     08/25/2021    K 4.3 08/25/2021    CL 93 (L) 08/25/2021    CO2 37 (H) 08/25/2021     No results for input(s): CKTOTAL, CKMB in the last 72 hours. Invalid input(s): TROPONONI  Lab Results   Component Value Date     (H) 02/04/2018     Lab Results   Component Value Date    WBC 18.5 08/25/2021    RBC 3.45 08/25/2021    HGB 10.8 08/25/2021    HCT 33.2 08/25/2021    MCV 96.2 08/25/2021    MCH 31.3 08/25/2021    MCHC 32.5 08/25/2021    RDW 12.8 08/25/2021     08/25/2021    MPV 12.1 08/25/2021     No results for input(s): ALKPHOS, ALT, AST, PROT, BILITOT, BILIDIR, LABALBU in the last 72 hours.   Lab Results   Component Value Date    MG 2.2 02/14/2018     Lab Results   Component Value Date    PROTIME 13.8 02/04/2018    PROTIME 11.6 11/13/2010    INR 1.2 02/04/2018     Lab Results   Component Value Date    TSH 0.942 02/14/2018     No components found for: CHLPL  Lab Results   Component Value Date    TRIG 80 04/08/2018    TRIG 61 01/28/2017     Lab Results   Component Value Date    HDL 80 04/08/2018    HDL 50 01/28/2017     Lab Results   Component Value Date    LDLCALC 109 (H) 04/08/2018    LDLCALC 100 (H) 01/28/2017       Cardiac Tests:  Telemetry findings reviewed: atrial fibrillation with a mean ventricular response of approximately 70-80 bpm with transient atrial flutter and heart rates to approximately 120 bpm, no new tachy/bradyarrhythmias overnight      ASSESSMENT / PLAN: On a clinical basis, the patient continues to manifest evidence of volume overload exclusively in the form of peripheral edema with the interim assessment of the nephrology service and recommendations reviewed and further fluid management deferred to their discretion with ongoing need of monitoring of renal function electrolytes. He remains frustrated with his situation which was extensively discussed with both he in conjunction with his wife at time of evaluation inclusive of his manifestations of diastolic heart failure and recurrent atrial arrhythmias with intermittent spontaneous conversion to sinus rhythm and its effects on his fluid management. Presently, in light of his persistent decompensation as well as the risks of sedation, a rate control and anticoagulation strategy will be maintained in the management of his atrial arrhythmias with intermittent spontaneous conversion to sinus rhythm noted throughout his hospitalization.   Ongoing needs of appropriate lifestyle modification to achieve weight reduction have been discussed as well as following discussion with his wife and he needs of earlier hospitalization based on the prolonged duration of decompensation prior to his present hospitalization complicating his present management. Ongoing aggressive risk factor modification of blood pressure and serum lipids remain essential to reducing risk of future atherosclerotic development. At the time of evaluation, the extensive discussion of his medical issues and management with he and his wife in conjunction with the present encounter exceeded 35 minutes      Note: This report was completed utilizing computer voice recognition software. Every effort has been made to ensure accuracy, however; inadvertent computerized transcription errors may be present. Korey Rushing.  Nigel Birmingham, [address] [facility]

## 2021-08-26 NOTE — PROGRESS NOTES
Comprehensive Nutrition Assessment    Type and Reason for Visit:  Reassess    Nutrition Recommendations/Plan: No nutritional supplementation recommended at this time. Nutrition Assessment:  Patients po intake has been adequate, averaging % of meals served ; adm w/ COPD exacerbation ; hx of CHF and ETOH abuse ; no ONS recommended at this time    Malnutrition Assessment:  Malnutrition Status:  No malnutrition    Context:  Acute Illness     Findings of the 6 clinical characteristics of malnutrition:  Energy Intake:  No significant decrease in energy intake  Weight Loss:  No significant weight loss     Body Fat Loss:  No significant body fat loss     Muscle Mass Loss:  No significant muscle mass loss    Fluid Accumulation:  No significant fluid accumulation     Strength:  Not Performed    Estimated Daily Nutrient Needs:  Energy (kcal):  8920-6562 (REE 2069 x 1.1 SF); Weight Used for Energy Requirements:  Current     Protein (g):  100-120 (1.3-1.5g/kg IBW); Weight Used for Protein Requirements:  Ideal        Fluid (ml/day):  0831-2490; Method Used for Fluid Requirements:  1 ml/kcal      Nutrition Related Findings:  -I&Os (-41.7 L), 2-3+ edema, anasarca, active BS, rounded abd, A&O x 4, obesity      Wounds:  None       Current Nutrition Therapies:    ADULT DIET;  Regular    Anthropometric Measures:  · Height: 5' 11\" (180.3 cm)  · Current Body Weight: 271 lb (122.9 kg) (8/10, bedscale ; will not use 290# on 8/26 d/t possible fluid accumulation)   · Admission Body Weight: 271 lb (122.9 kg) (8/10 actual)    · Usual Body Weight:  (no wt hx per EMR)     · Ideal Body Weight: 172 lbs; % Ideal Body Weight 157.6 %   · BMI: 37.8  · BMI Categories: Obese Class 2 (BMI 35.0 -39.9)       Nutrition Diagnosis:   No nutrition diagnosis at this time     Nutrition Interventions:   Food and/or Nutrient Delivery:  Continue Current Diet  Nutrition Education/Counseling:  Education not indicated   Coordination of Nutrition Care: Continue to monitor while inpatient    Goals:  Pt will consume ~75% of meals served       Nutrition Monitoring and Evaluation:   Behavioral-Environmental Outcomes:  None Identified   Food/Nutrient Intake Outcomes:  Food and Nutrient Intake  Physical Signs/Symptoms Outcomes:  Biochemical Data, Chewing or Swallowing, GI Status, Fluid Status or Edema, Hemodynamic Status, Meal Time Behavior, Nutrition Focused Physical Findings, Skin, Weight     Discharge Planning:     Too soon to determine     Electronically signed by Robert Moeller RD, LD on 8/26/21 at 9:55 AM EDT    Contact: [phone]

## 2021-08-26 NOTE — PROGRESS NOTES
[facility]  Division of Pulmonary, Critical Care Medicine  Pulmonary [address] [facility]           Pulmonary Clinic consult       CC :SOB ,wheeizng   H/o A flutter     HPI :  Worse swelling  In both legs   No chest pain   No fever or chills  Had some rash that was resolved     PHYSICAL EXAMINATION:     VITAL SIGNS:  BP (!) 144/70   Pulse 97   Temp 97.3 °F (36.3 °C) (Temporal)   Resp 18   Ht 5' 11\" (1.803 m)   Wt 297 lb 14.4 oz (135.1 kg)   SpO2 98%   BMI 41.55 kg/m²   Wt Readings from Last 3 Encounters:   08/25/21 297 lb 14.4 oz (135.1 kg)   01/22/20 277 lb (125.6 kg)   01/02/20 271 lb (122.9 kg)     Temp Readings from Last 3 Encounters:   08/25/21 97.3 °F (36.3 °C) (Temporal)   08/09/19 97.7 °F (36.5 °C)   04/09/19 98.5 °F (36.9 °C) (Oral)     TMAX:  BP Readings from Last 3 Encounters:   08/25/21 (!) 144/70   01/22/20 132/77   01/02/20 124/82     Pulse Readings from Last 3 Encounters:   08/25/21 97   01/22/20 109   01/02/20 83           INTAKE/OUTPUTS:  I/O last 3 completed shifts:   In: 420 [P.O.:420]  Out: 5800 [Urine:5800]    Intake/Output Summary (Last 24 hours) at 8/25/2021 6496  Last data filed at 8/25/2021 2109  Gross per 24 hour   Intake 600 ml   Output 6975 ml   Net -6375 ml       This is virtual visit      LABS/IMAGING:    CBC:  Lab Results   Component Value Date    WBC 18.5 (H) 08/25/2021    HGB 10.8 (L) 08/25/2021    HCT 33.2 (L) 08/25/2021    MCV 96.2 08/25/2021     (L) 08/25/2021    LYMPHOPCT 5.3 (L) 08/25/2021    RBC 3.45 (L) 08/25/2021    MCH 31.3 08/25/2021    MCHC 32.5 08/25/2021    RDW 12.8 08/25/2021    NEUTOPHILPCT 89.5 (H) 08/25/2021    MONOPCT 1.8 (L) 08/25/2021    BASOPCT 0.3 08/25/2021    NEUTROABS 16.65 (H) 08/25/2021    LYMPHSABS 1.48 (L) 08/25/2021    MONOSABS 0.37 08/25/2021    EOSABS 0.17 08/25/2021    BASOSABS 0.00 08/25/2021       Recent Labs     08/25/21  1158 08/22/21  0949 08/18/21  1101   WBC 18.5* 26.5* 19.5*   HGB 10.8* 12.3* 11.9*   HCT 33.2* 37.1 36.5*   MCV 96.2 96.9 98.1   * 188 206       BMP:   Recent Labs     08/24/21  0634 08/25/21  0425    139   K 4.9 4.3   CL 94* 93*   CO2 37* 37*   BUN 39* 40*   CREATININE 0.9 1.1       MG:   Lab Results   Component Value Date    MG 2.2 02/14/2018     Ca/Phos:   Lab Results   Component Value Date    CALCIUM 8.6 08/25/2021     Amylase: No results found for: AMYLASE  Lipase:   Lab Results   Component Value Date    LIPASE 28 05/19/2011     LIVER PROFILE:   No results for input(s): AST, ALT, LIPASE, BILIDIR, BILITOT, ALKPHOS in the last 72 hours. Invalid input(s): AMYLASE,  ALB    PT/INR:   No results for input(s): PROTIME, INR in the last 72 hours. APTT: No results for input(s): APTT in the last 72 hours. Cardiac Enzymes:  Lab Results   Component Value Date    CKTOTAL 51 11/13/2010    CKMB 0.5 11/13/2010    TROPONINI <0.01 02/15/2018       Hgb A1C: No results found for: LABA1C  No results found for: EAG  AFMILIA: No results found for: FAMILIA  ESR: No results found for: SEDRATE  CRP: No results found for: CRP  D Dimer: No results found for: DDIMER    Thyroid Studies:  Lab Results   Component Value Date    TSH 0.942 02/14/2018    [id number] 4.8 02/14/2018           MICROBIOLOGY:  Pending       CXR:  Reviewed     CT Chest:reviewed     PE  Vitals:    08/25/21 2115   BP: (!) 144/70   Pulse: 97   Resp: 18   Temp: 97.3 °F (36.3 °C)   SpO2: 98%     General:  Awake, alert, oriented X 3. Well developed, well nourished, well groomed. No apparent distress. HEENT:  Normocephalic, atraumatic. Pupils equal, round, reactive to light. No scleral icterus. No conjunctival injection. Normal lips, teeth, and gums. No nasal discharge. Neck:  Supple, FROM  Heart:  RRR, no murmurs, gallops, rubs, carotid upstroke normal, no carotid bruits  Lungs:wheeizng bilateral ,rhonchi   Abdomen:   Bowel sounds present, soft, nontender, no masses, no organomegaly, no peritoneal signs  Extremities:  No clubbing, cyanosis, or edema  Skin:  Warm and dry, no open lesions or rash  Neuro:  Cranial nerves 2-12 intact, no focal deficits  Vascular: Radial and pedal Pulses 2+  Breast: deferred  Rectal: deferred  Genitalia:  deferred    PROBLEM LIST:  Patient Active Problem List   Diagnosis    [facility]) [facility] [address]    Essential hypertension    Adrenal adenoma    Class 2 obesity due to excess calories with serious comorbidity and body mass index (BMI) of 35.0 to 35.9 in adult    Anticoagulation management encounter    Typical atrial flutter ([facility] [address]    Anxiety    Status post catheter ablation of atrial flutter    Persistent atrial fibrillation ([facility] [address]    ETOH abuse    COPD with acute exacerbation (HCC)               ASSESSMENT:  1.) Acute   exacerbation of COPD  2) very severe COPD  3.)obstructive sleep apnea  4. )Afib /Flutter   5.)tobacco independent(quit 5 months ago)    Data:  FVC 3.63 which is 73 of predicted    FEV1 1.52 which is 39 of predicted    FEV1/FVC is 42  No Bronchodilator response    PLAN:  Get nephrology for follow up of edema and need for Bumex drip  Wean steroids to PO ,40 mg daily ,wean over 10 days and keep on 10 mg daily   Add darlisep on discharge   Breztri   Should be done with abx,will stop  Diuresis as per cardiology /primary  A fib/diuresis per cardiology   Pending bed in [address] [place] [address]

## 2021-08-26 NOTE — CARE COORDINATION
Met with pt at bedside to re-verify discharge plan remains home with spouse Damon Arciniega to provide transpiration and home going oxygen once medically stable. Chart reviewed, pt currently on Bumex gtt, nephrology following. Per pt request I did check with nephrology if they would like fluid rx however, at this time just low sodium diet. Pt updated at bedside.

## 2021-08-26 NOTE — PROGRESS NOTES
Department of Internal Medicine  Nephrology Progress Note      Events reviewed. SUBJECTIVE:  We are following Davie Males for volume management. Reports feeling somewhat better.     Scheduled Meds:   acetaZOLAMIDE  250 mg Oral Daily    hydrocortisone   Topical BID    predniSONE  50 mg Oral Daily    insulin lispro  0-12 Units Subcutaneous TID WC    insulin lispro  0-6 Units Subcutaneous Nightly    guaiFENesin  400 mg Oral Q6H    budesonide  1 mg Nebulization BID    And    Arformoterol Tartrate  15 mcg Nebulization BID    sodium chloride (Inhalant)  4 mL Nebulization BID    apixaban  5 mg Oral BID    atorvastatin  20 mg Oral Daily    dilTIAZem  240 mg Oral BID    flecainide  100 mg Oral BID    fluticasone  1 spray Each Nostril Daily    ipratropium-albuterol  1 vial Inhalation Q4H    [Held by provider] losartan  50 mg Oral Daily    sodium chloride flush  5-40 mL IntraVENous 2 times per day    aspirin  81 mg Oral Daily     Continuous Infusions:   bumetanide 0.1 mg/mL infusion 0.5 mg/hr (08/26/21 1237)    dextrose      sodium chloride       PRN Meds:.busPIRone, glucose, dextrose, glucagon (rDNA), dextrose, sodium chloride (Inhalant), perflutren lipid microspheres, guaiFENesin-dextromethorphan, albuterol, sodium chloride flush, sodium chloride, ondansetron **OR** ondansetron, polyethylene glycol, acetaminophen **OR** acetaminophen      Physical Exam:    VITALS:  /70   Pulse 99   Temp 96.6 °F (35.9 °C) (Temporal)   Resp 20   Ht 5' 11\" (1.803 m)   Wt 290 lb (131.5 kg)   SpO2 96%   BMI 40.45 kg/m²   24HR INTAKE/OUTPUT:      Intake/Output Summary (Last 24 hours) at 8/26/2021 1315  Last data filed at 8/26/2021 1031  Gross per 24 hour   Intake 1260 ml   Output 65459 ml   Net -9265 ml       Constitutional:  Alert and oriented, NAD  HEENT:  Normocephalic, PERRL  Respiratory:  Expiratory wheezing throughout, on 4L NC  Cardiovascular/Edema:  IRRR, S1, S2  Gastrointestinal:  Soft, obese, nontender, nondistended  Neurologic:  Nonfocal, ROUSE  Skin:  Warm, dry, no lesions  Other:  4+ pitting edema BLE up into thighs, 3+ sacral and abdominal wall edema     DATA:    CBC:   Lab Results   Component Value Date    WBC 18.5 08/25/2021    RBC 3.45 08/25/2021    HGB 10.8 08/25/2021    HCT 33.2 08/25/2021    MCV 96.2 08/25/2021    MCH 31.3 08/25/2021    MCHC 32.5 08/25/2021    RDW 12.8 08/25/2021     08/25/2021    MPV 12.1 08/25/2021     CMP:    Lab Results   Component Value Date     08/26/2021    K 3.5 08/26/2021    K 4.3 08/09/2021    CL 89 08/26/2021    CO2 39 08/26/2021    BUN 37 08/26/2021    CREATININE 0.9 08/26/2021    GFRAA >60 08/26/2021    LABGLOM >60 08/26/2021    GLUCOSE 202 08/26/2021    GLUCOSE 100 05/19/2011    PROT 5.6 08/22/2021    LABALBU 3.5 08/22/2021    LABALBU 4.6 05/19/2011    CALCIUM 8.6 08/26/2021    BILITOT 0.5 08/22/2021    ALKPHOS 48 08/22/2021    AST 63 08/22/2021     08/22/2021     Magnesium:    Lab Results   Component Value Date    MG 2.0 08/26/2021     Phosphorus:    Lab Results   Component Value Date    PHOS 3.9 08/26/2021       Radiology Review:      CTA chest with IV contrast 8/8/21   No evidence of pulmonary embolism or acute pulmonary abnormality.       Prominent pericardial fat accounts for the abnormality seen on radiograph.      CXR 8/23/21   Hyperinflation.  No evidence of acute process. BRIEF SUMMARY OF INITIAL CONSULT:    Briefly, Mr. Lee Ann Mason is a 63year old male with a PMH of HTN, HFpEF 70-75% with stage II DD, COPD, PARKER, atrial flutter s/p cardioversion and ablation on chronic anticoagulation on apixaban, obesity, who was admitted on August 7, 2021 after presenting to the ER with shortness of breath. He was admitted for treatment of acute exacerbation of COPD and was started on methylprednisolone and inhalers. He continued with shortness of breath despite steroids and was then started on Lasix.  He states that his swelling has worsened

## 2021-08-27 LAB
ANION GAP SERPL CALCULATED.3IONS-SCNC: 9 MMOL/L (ref 7–16)
ANION GAP SERPL CALCULATED.3IONS-SCNC: 9 MMOL/L (ref 7–16)
BUN BLDV-MCNC: 43 MG/DL (ref 6–20)
BUN BLDV-MCNC: 44 MG/DL (ref 6–20)
CALCIUM SERPL-MCNC: 8.6 MG/DL (ref 8.6–10.2)
CALCIUM SERPL-MCNC: 9.2 MG/DL (ref 8.6–10.2)
CHLORIDE BLD-SCNC: 92 MMOL/L (ref 98–107)
CHLORIDE BLD-SCNC: 94 MMOL/L (ref 98–107)
CO2: 33 MMOL/L (ref 22–29)
CO2: 37 MMOL/L (ref 22–29)
CREAT SERPL-MCNC: 1 MG/DL (ref 0.7–1.2)
CREAT SERPL-MCNC: 1.1 MG/DL (ref 0.7–1.2)
GFR AFRICAN AMERICAN: >60
GFR AFRICAN AMERICAN: >60
GFR NON-AFRICAN AMERICAN: >60 ML/MIN/1.73
GFR NON-AFRICAN AMERICAN: >60 ML/MIN/1.73
GLUCOSE BLD-MCNC: 173 MG/DL (ref 74–99)
GLUCOSE BLD-MCNC: 233 MG/DL (ref 74–99)
MAGNESIUM: 2.3 MG/DL (ref 1.6–2.6)
METER GLUCOSE: 127 MG/DL (ref 74–99)
METER GLUCOSE: 173 MG/DL (ref 74–99)
METER GLUCOSE: 260 MG/DL (ref 74–99)
METER GLUCOSE: 323 MG/DL (ref 74–99)
POTASSIUM SERPL-SCNC: 3.2 MMOL/L (ref 3.5–5)
POTASSIUM SERPL-SCNC: 3.9 MMOL/L (ref 3.5–5)
SODIUM BLD-SCNC: 136 MMOL/L (ref 132–146)
SODIUM BLD-SCNC: 138 MMOL/L (ref 132–146)

## 2021-08-27 PROCEDURE — 6370000000 HC RX 637 (ALT 250 FOR IP): Performed by: STUDENT IN AN ORGANIZED HEALTH CARE EDUCATION/TRAINING PROGRAM

## 2021-08-27 PROCEDURE — 2580000003 HC RX 258: Performed by: PHYSICIAN ASSISTANT

## 2021-08-27 PROCEDURE — 36415 COLL VENOUS BLD VENIPUNCTURE: CPT

## 2021-08-27 PROCEDURE — 2060000000 HC ICU INTERMEDIATE R&B

## 2021-08-27 PROCEDURE — 6360000002 HC RX W HCPCS: Performed by: INTERNAL MEDICINE

## 2021-08-27 PROCEDURE — 94660 CPAP INITIATION&MGMT: CPT

## 2021-08-27 PROCEDURE — 2580000003 HC RX 258: Performed by: NURSE PRACTITIONER

## 2021-08-27 PROCEDURE — 6370000000 HC RX 637 (ALT 250 FOR IP): Performed by: NURSE PRACTITIONER

## 2021-08-27 PROCEDURE — 6370000000 HC RX 637 (ALT 250 FOR IP): Performed by: INTERNAL MEDICINE

## 2021-08-27 PROCEDURE — 2580000003 HC RX 258: Performed by: INTERNAL MEDICINE

## 2021-08-27 PROCEDURE — 94640 AIRWAY INHALATION TREATMENT: CPT

## 2021-08-27 PROCEDURE — 99232 SBSQ HOSP IP/OBS MODERATE 35: CPT | Performed by: INTERNAL MEDICINE

## 2021-08-27 PROCEDURE — 6370000000 HC RX 637 (ALT 250 FOR IP): Performed by: PHYSICIAN ASSISTANT

## 2021-08-27 PROCEDURE — 2500000003 HC RX 250 WO HCPCS: Performed by: NURSE PRACTITIONER

## 2021-08-27 PROCEDURE — 2700000000 HC OXYGEN THERAPY PER DAY

## 2021-08-27 PROCEDURE — 83735 ASSAY OF MAGNESIUM: CPT

## 2021-08-27 PROCEDURE — 82962 GLUCOSE BLOOD TEST: CPT

## 2021-08-27 PROCEDURE — 80048 BASIC METABOLIC PNL TOTAL CA: CPT

## 2021-08-27 RX ORDER — SPIRONOLACTONE 25 MG/1
25 TABLET ORAL ONCE
Status: DISCONTINUED | OUTPATIENT
Start: 2021-08-27 | End: 2021-08-27 | Stop reason: ALTCHOICE

## 2021-08-27 RX ORDER — POTASSIUM CHLORIDE 20 MEQ/1
40 TABLET, EXTENDED RELEASE ORAL ONCE
Status: COMPLETED | OUTPATIENT
Start: 2021-08-27 | End: 2021-08-27

## 2021-08-27 RX ORDER — SPIRONOLACTONE 25 MG/1
25 TABLET ORAL DAILY
Status: DISCONTINUED | OUTPATIENT
Start: 2021-08-27 | End: 2021-08-27

## 2021-08-27 RX ORDER — BUMETANIDE 1 MG/1
2 TABLET ORAL ONCE
Status: COMPLETED | OUTPATIENT
Start: 2021-08-27 | End: 2021-08-27

## 2021-08-27 RX ORDER — ACETAZOLAMIDE 250 MG/1
250 TABLET ORAL ONCE
Status: COMPLETED | OUTPATIENT
Start: 2021-08-27 | End: 2021-08-27

## 2021-08-27 RX ADMIN — INSULIN LISPRO 8 UNITS: 100 INJECTION, SOLUTION INTRAVENOUS; SUBCUTANEOUS at 08:18

## 2021-08-27 RX ADMIN — ATORVASTATIN CALCIUM 20 MG: 20 TABLET, FILM COATED ORAL at 09:32

## 2021-08-27 RX ADMIN — SODIUM CHLORIDE SOLN NEBU 3% 4 ML: 3 NEBU SOLN at 08:43

## 2021-08-27 RX ADMIN — IPRATROPIUM BROMIDE AND ALBUTEROL SULFATE 3 ML: .5; 2.5 SOLUTION RESPIRATORY (INHALATION) at 16:30

## 2021-08-27 RX ADMIN — GUAIFENESIN 400 MG: 400 TABLET ORAL at 06:52

## 2021-08-27 RX ADMIN — GUAIFENESIN 400 MG: 400 TABLET ORAL at 16:22

## 2021-08-27 RX ADMIN — GUAIFENESIN SYRUP AND DEXTROMETHORPHAN 5 ML: 100; 10 SYRUP ORAL at 23:29

## 2021-08-27 RX ADMIN — POTASSIUM CHLORIDE 40 MEQ: 20 TABLET, EXTENDED RELEASE ORAL at 11:16

## 2021-08-27 RX ADMIN — IPRATROPIUM BROMIDE AND ALBUTEROL SULFATE 3 ML: .5; 2.5 SOLUTION RESPIRATORY (INHALATION) at 20:15

## 2021-08-27 RX ADMIN — APIXABAN 5 MG: 5 TABLET, FILM COATED ORAL at 09:31

## 2021-08-27 RX ADMIN — ARFORMOTEROL TARTRATE 15 MCG: 15 SOLUTION RESPIRATORY (INHALATION) at 08:43

## 2021-08-27 RX ADMIN — BUDESONIDE 1000 MCG: 0.5 SUSPENSION RESPIRATORY (INHALATION) at 20:15

## 2021-08-27 RX ADMIN — SPIRONOLACTONE 25 MG: 25 TABLET ORAL at 14:36

## 2021-08-27 RX ADMIN — DILTIAZEM HYDROCHLORIDE 240 MG: 240 CAPSULE, EXTENDED RELEASE ORAL at 09:32

## 2021-08-27 RX ADMIN — PREDNISONE 40 MG: 20 TABLET ORAL at 09:32

## 2021-08-27 RX ADMIN — HYDROCORTISONE: 1 CREAM TOPICAL at 22:11

## 2021-08-27 RX ADMIN — BUSPIRONE HYDROCHLORIDE 5 MG: 5 TABLET ORAL at 22:11

## 2021-08-27 RX ADMIN — GUAIFENESIN 400 MG: 400 TABLET ORAL at 11:19

## 2021-08-27 RX ADMIN — SODIUM CHLORIDE SOLN NEBU 3% 4 ML: 3 NEBU SOLN at 20:16

## 2021-08-27 RX ADMIN — FLECAINIDE ACETATE 100 MG: 100 TABLET ORAL at 22:11

## 2021-08-27 RX ADMIN — ASPIRIN 81 MG CHEWABLE TABLET 81 MG: 81 TABLET CHEWABLE at 09:32

## 2021-08-27 RX ADMIN — FLECAINIDE ACETATE 100 MG: 100 TABLET ORAL at 09:32

## 2021-08-27 RX ADMIN — Medication 10 ML: at 09:29

## 2021-08-27 RX ADMIN — BUMETANIDE 2 MG: 1 TABLET ORAL at 16:22

## 2021-08-27 RX ADMIN — FLUTICASONE PROPIONATE 1 SPRAY: 50 SPRAY, METERED NASAL at 09:33

## 2021-08-27 RX ADMIN — ACETAZOLAMIDE 250 MG: 250 TABLET ORAL at 16:22

## 2021-08-27 RX ADMIN — BUMETANIDE 0.5 MG/HR: 0.25 INJECTION INTRAMUSCULAR; INTRAVENOUS at 09:27

## 2021-08-27 RX ADMIN — IPRATROPIUM BROMIDE AND ALBUTEROL SULFATE 3 ML: .5; 2.5 SOLUTION RESPIRATORY (INHALATION) at 08:43

## 2021-08-27 RX ADMIN — GUAIFENESIN 400 MG: 400 TABLET ORAL at 22:11

## 2021-08-27 RX ADMIN — BUDESONIDE 1000 MCG: 0.5 SUSPENSION RESPIRATORY (INHALATION) at 08:43

## 2021-08-27 RX ADMIN — ARFORMOTEROL TARTRATE 15 MCG: 15 SOLUTION RESPIRATORY (INHALATION) at 20:14

## 2021-08-27 RX ADMIN — IPRATROPIUM BROMIDE AND ALBUTEROL SULFATE 3 ML: .5; 2.5 SOLUTION RESPIRATORY (INHALATION) at 13:17

## 2021-08-27 RX ADMIN — APIXABAN 5 MG: 5 TABLET, FILM COATED ORAL at 22:11

## 2021-08-27 RX ADMIN — Medication 10 ML: at 23:08

## 2021-08-27 RX ADMIN — SODIUM CHLORIDE, PRESERVATIVE FREE 10 ML: 5 INJECTION INTRAVENOUS at 11:41

## 2021-08-27 RX ADMIN — DILTIAZEM HYDROCHLORIDE 240 MG: 240 CAPSULE, EXTENDED RELEASE ORAL at 22:11

## 2021-08-27 RX ADMIN — INSULIN LISPRO 6 UNITS: 100 INJECTION, SOLUTION INTRAVENOUS; SUBCUTANEOUS at 16:22

## 2021-08-27 RX ADMIN — INSULIN LISPRO 1 UNITS: 100 INJECTION, SOLUTION INTRAVENOUS; SUBCUTANEOUS at 22:15

## 2021-08-27 RX ADMIN — ACETAZOLAMIDE 250 MG: 250 TABLET ORAL at 09:32

## 2021-08-27 ASSESSMENT — PAIN SCALES - GENERAL
PAINLEVEL_OUTOF10: 0
PAINLEVEL_OUTOF10: 0

## 2021-08-27 NOTE — PLAN OF CARE
Problem: HEMODYNAMIC STATUS  Goal: Patient has stable vital signs and fluid balance  Outcome: Met This Shift     Problem: FLUID AND ELECTROLYTE IMBALANCE  Goal: Fluid and electrolyte balance are achieved/maintained  Outcome: Not Met This Shift

## 2021-08-27 NOTE — PROGRESS NOTES
18.5* 26.5* 19.5*   HGB 10.8* 12.3* 11.9*   HCT 33.2* 37.1 36.5*   MCV 96.2 96.9 98.1   * 188 206       BMP:   Recent Labs     08/25/21  0425 08/26/21  0449 08/26/21  1513    137 137   K 4.3 3.5 3.8   CL 93* 89* 90*   CO2 37* 39* 41*   PHOS  --  3.9  --    BUN 40* 37* 41*   CREATININE 1.1 0.9 1.2       MG:   Lab Results   Component Value Date    MG 2.0 08/26/2021     Ca/Phos:   Lab Results   Component Value Date    CALCIUM 9.0 08/26/2021    PHOS 3.9 08/26/2021     Amylase: No results found for: AMYLASE  Lipase:   Lab Results   Component Value Date    LIPASE 28 05/19/2011     LIVER PROFILE:   No results for input(s): AST, ALT, LIPASE, BILIDIR, BILITOT, ALKPHOS in the last 72 hours. Invalid input(s): AMYLASE,  ALB    PT/INR:   No results for input(s): PROTIME, INR in the last 72 hours. APTT: No results for input(s): APTT in the last 72 hours. Cardiac Enzymes:  Lab Results   Component Value Date    CKTOTAL 51 11/13/2010    CKMB 0.5 11/13/2010    TROPONINI <0.01 02/15/2018       Hgb A1C: No results found for: LABA1C  No results found for: EAG  FAMILIA: No results found for: FAMILIA  ESR: No results found for: SEDRATE  CRP: No results found for: CRP  D Dimer: No results found for: DDIMER    Thyroid Studies:  Lab Results   Component Value Date    TSH 0.942 02/14/2018    [id number] 4.8 02/14/2018           MICROBIOLOGY:  Pending       CXR:  Reviewed     CT Chest:reviewed     PE  Vitals:    08/26/21 2100   BP: 131/71   Pulse: 77   Resp: 22   Temp: 96.9 °F (36.1 °C)   SpO2:      General:  Awake, alert, oriented X 3. Well developed, well nourished, well groomed. No apparent distress. HEENT:  Normocephalic, atraumatic. Pupils equal, round, reactive to light. No scleral icterus. No conjunctival injection. Normal lips, teeth, and gums. No nasal discharge. Neck:  Supple, FROM  Heart:  RRR, no murmurs, gallops, rubs, carotid upstroke normal, no carotid bruits  Lungs:wheeizng bilateral ,rhonchi   Abdomen:   Bowel sounds present, soft, nontender, no masses, no organomegaly, no peritoneal signs  Extremities:  No clubbing, cyanosis, or edema  Skin:  Warm and dry, no open lesions or rash  Neuro:  Cranial nerves 2-12 intact, no focal deficits  Vascular: Radial and pedal Pulses 2+  Breast: deferred  Rectal: deferred  Genitalia:  deferred    PROBLEM LIST:  Patient Active Problem List   Diagnosis    [facility] disease) [facility] [address]    Essential hypertension    Adrenal adenoma    Class 2 obesity due to excess calories with serious comorbidity and body mass index (BMI) of 35.0 to 35.9 in adult    Anticoagulation management encounter    Typical atrial flutter ([address]    Anxiety    Status post catheter ablation of atrial flutter    Persistent atrial fibrillation ([facility] [address]    ETOH abuse    COPD with acute exacerbation (HCC)               ASSESSMENT:  1.) Acute   exacerbation of COPD  2) very severe COPD  3.)obstructive sleep apnea  4. )Afib /Flutter   5.)tobacco independent(quit 5 months ago)    Data:  FVC 3.63 which is 73 of predicted    FEV1 1.52 which is 39 of predicted    FEV1/FVC is 42  No Bronchodilator response    PLAN:   Bumex drip  Fluid restriction  Wean steroids to PO ,40 mg daily ,wean over 10 days and keep on 10 mg daily   Add darlisep on discharge   Breztri   Should be done with abx,will stop  Diuresis as per cardiology /primary  A fib/diuresis per cardiology   Need Bronchoscopy for Right main narrowing,if not accepted in [facility] will re address with Cierra Huertas MD  Pulmonary/Critical care Medicine   [facility]

## 2021-08-27 NOTE — PROGRESS NOTES
INPATIENT CARDIOLOGY FOLLOW-UP    Name: Jenifer Campos    Age: 63 y.o. Date of Admission: 8/7/2021  9:53 PM    Date of Service: 8/27/2021    Chief Complaint: Follow-up for acute superimposed upon chronic diastolic heart failure, chronic obstructive lung disease with associated acute superimposed upon chronic hypoxic respiratory failure, paroxysmal atrial fibrillation/flutter, hypertension, morbid obesity, obstructive sleep apnea    Interim History: The patient remains essentially unchanged with ongoing fluid mobilization with continuous infusion loop diuretics and total diuresis in excess of 45 L. He remains in atrial fibrillation with acceptable rate control. Review of Systems: The remainder of a complete multisystem review including consitutional, central nervous, respiratory, circulatory, gastrointestinal, genitourinary, endocrinologic, hematologic, musculoskeletal and psychiatric are negative.     Problem List:  Patient Active Problem List   Diagnosis    [facility]chronic obstructive [facility] disease) ([facility] [address]    Essential hypertension    Adrenal adenoma    Class 2 obesity due to excess calories with serious comorbidity and body mass index (BMI) of 35.0 to 35.9 in adult    Anticoagulation management encounter    Typical atrial flutter ([facility] [address]    Anxiety    Status post catheter ablation of atrial flutter    Persistent atrial fibrillation ([facility] [address]    ETOH abuse    COPD with acute exacerbation (HCC)       Allergies:  No Known Allergies    Current Medications:  Current Facility-Administered Medications   Medication Dose Route Frequency Provider Last Rate Last Admin    acetaZOLAMIDE (DIAMOX) tablet 250 mg  250 mg Oral Daily José Vigil MD   250 mg at 08/26/21 1236    predniSONE (DELTASONE) tablet 40 mg  40 mg Oral Daily Ryan Danielle MD        bumetanide (BUMEX) 12.5 mg in sodium chloride 0.9 % 125 mL infusion  0.5 mg/hr IntraVENous Continuous Vlad Pump, APRN - CNP 5 mL/hr at 08/26/21 1237 0.5 mg/hr at 08/26/21 1237    hydrocortisone 1 % cream   Topical BID Paige Interiano MD   Given at 08/26/21 2103    busPIRone (BUSPAR) tablet 5 mg  5 mg Oral TID PRN Leonidas Cervantes MD   5 mg at 08/26/21 2315    glucose (GLUTOSE) 40 % oral gel 15 g  15 g Oral PRN Leonidas Cervantes MD        dextrose 50 % IV solution  12.5 g IntraVENous PRN Leonidas Cervantes MD        glucagon (rDNA) injection 1 mg  1 mg IntraMUSCular PRN Leonidas Cervantes MD        dextrose 5 % solution  100 mL/hr IntraVENous PRN Leonidas Cervantes MD        insulin lispro (HUMALOG) injection vial 0-12 Units  0-12 Units Subcutaneous TID WC Leonidas Cervantes MD   8 Units at 08/27/21 0818    insulin lispro (HUMALOG) injection vial 0-6 Units  0-6 Units Subcutaneous Nightly Leonidas Cervantes MD   1 Units at 08/26/21 2059    sodium chloride (Inhalant) 3 % nebulizer solution 4 mL  4 mL Nebulization PRN Ryan Cardona MD        guaiFENesin tablet 400 mg  400 mg Oral Q6H Ryan Danielle MD   400 mg at 08/27/21 3332    perflutren lipid microspheres (DEFINITY) injection 1.65 mg  1.5 mL IntraVENous ONCE PRN Ryan Cardona MD        budesonide (PULMICORT) nebulizer suspension 1,000 mcg  1 mg Nebulization BID Ryan Cardona MD   1,000 mcg at 08/27/21 0843    And    Arformoterol Tartrate (BROVANA) nebulizer solution 15 mcg  15 mcg Nebulization BID Ryan Cardona MD   15 mcg at 08/27/21 0843    guaiFENesin-dextromethorphan (ROBITUSSIN DM) 100-10 MG/5ML syrup 5 mL  5 mL Oral Q4H PRN Johnnye Oppenheim, PA   5 mL at 08/26/21 2315    sodium chloride (Inhalant) 3 % nebulizer solution 4 mL  4 mL Nebulization BID Paige Interiano MD   4 mL at 08/27/21 0843    albuterol (PROVENTIL) nebulizer solution 2.5 mg  2.5 mg Nebulization Q6H PRN STEPH Mathew        apixaban (ELIQUIS) tablet 5 mg  5 mg Oral BID STEPH Mathew   5 mg at 08/26/21 2105    atorvastatin (LIPITOR) tablet 20 mg  20 mg Oral Daily STEPH Mathew   20 mg at 08/26/21 1022    dilTIAZem (CARDIZEM CD) extended release capsule 240 mg  240 mg Oral BID STEPH Hernandez   240 mg at 08/26/21 2058    flecainide (TAMBOCOR) tablet 100 mg  100 mg Oral BID STEPH Hernandez   100 mg at 08/26/21 2058    fluticasone (FLONASE) 50 MCG/ACT nasal spray 1 spray  1 spray Each Nostril Daily STEPH Hernandez   1 spray at 08/25/21 0746    ipratropium-albuterol (DUONEB) nebulizer solution 3 mL  1 vial Inhalation Q4H STEPH Hernandez   3 mL at 08/27/21 0843    [Held by provider] losartan (COZAAR) tablet 50 mg  50 mg Oral Daily STEPH Hernandez   50 mg at 08/26/21 1023    sodium chloride flush 0.9 % injection 5-40 mL  5-40 mL IntraVENous 2 times per day STEPH Hernandez   10 mL at 08/26/21 2052    sodium chloride flush 0.9 % injection 5-40 mL  5-40 mL IntraVENous PRN STEPH Hernandez   10 mL at 08/20/21 1807    0.9 % sodium chloride infusion  25 mL IntraVENous PRN STEPH Hernandez        ondansetron (ZOFRAN-ODT) disintegrating tablet 4 mg  4 mg Oral Q8H PRN STEPH Hernandez        Or    ondansetron [facility]) injection 4 mg  4 mg IntraVENous Q6H PRN STEPH Hernandez        polyethylene glycol (GLYCOLAX) packet 17 g  17 g Oral Daily PRN STEPH Hernandez        acetaminophen (TYLENOL) tablet 650 mg  650 mg Oral Q6H PRN STEPH Hernandez   650 mg at 08/26/21 1029    Or    acetaminophen (TYLENOL) suppository 650 mg  650 mg Rectal Q6H PRN STEPH Hernandez        aspirin chewable tablet 81 mg  81 mg Oral Daily April [place], APRN - CNP   81 mg at 08/26/21 1023      bumetanide 0.1 mg/mL infusion 0.5 mg/hr (08/26/21 1237)    dextrose      sodium chloride         Physical Exam:  /61   Pulse 89   Temp 96.5 °F (35.8 °C) (Temporal)   Resp 20   Ht 5' 11\" (1.803 m)   Wt 279 lb 5 oz (126.7 kg)   SpO2 98%   BMI 38.96 kg/m²   Weight change:    Wt Readings from Last 3 Encounters:   08/27/21 279 lb 5 oz (126.7 kg)   01/22/20 277 lb (125.6 kg)   01/02/20 271 lb (122.9 kg)     The patient is awake, alert and in no discomfort or distress. No gross musculoskeletal deformity is present. No significant skin or nail changes are present. Gross examination of head, eyes, nose and throat are negative. Jugular venous pressure is normal and no carotid bruits are present. Normal respiratory effort is noted with no accessory muscle usage present. Lung fields are clear to ascultation. Cardiac examination is notable for a regular rate and rhythm with no palpable thrill. No gallop rhythm or cardiac murmur are identified. A benign abdominal examination is present with the exception of obesity no masses or organomegaly. Intact pulses are present throughout all extremities and moderate pretibial and pedal edema is present. No focal neurologic deficits are present. Intake/Output:    Intake/Output Summary (Last 24 hours) at 8/27/2021 0856  Last data filed at 8/27/2021 0453  Gross per 24 hour   Intake 720 ml   Output 5550 ml   Net -4830 ml     No intake/output data recorded. Laboratory Tests:  Lab Results   Component Value Date    CREATININE 1.1 08/27/2021    BUN 44 (H) 08/27/2021     08/27/2021    K 3.2 (L) 08/27/2021    CL 92 (L) 08/27/2021    CO2 37 (H) 08/27/2021     No results for input(s): CKTOTAL, CKMB in the last 72 hours. Invalid input(s): TROPONONI  Lab Results   Component Value Date     (H) 02/04/2018     Lab Results   Component Value Date    WBC 18.5 08/25/2021    RBC 3.45 08/25/2021    HGB 10.8 08/25/2021    HCT 33.2 08/25/2021    MCV 96.2 08/25/2021    MCH 31.3 08/25/2021    MCHC 32.5 08/25/2021    RDW 12.8 08/25/2021     08/25/2021    MPV 12.1 08/25/2021     No results for input(s): ALKPHOS, ALT, AST, PROT, BILITOT, BILIDIR, LABALBU in the last 72 hours.   Lab Results   Component Value Date    MG 2.3 08/27/2021     Lab Results   Component Value Date    PROTIME 13.8 02/04/2018    PROTIME 11.6 11/13/2010    INR 1.2 02/04/2018     Lab Results   Component Value Date    TSH

## 2021-08-28 LAB
ANION GAP SERPL CALCULATED.3IONS-SCNC: 12 MMOL/L (ref 7–16)
BUN BLDV-MCNC: 44 MG/DL (ref 6–20)
CALCIUM SERPL-MCNC: 8.8 MG/DL (ref 8.6–10.2)
CHLORIDE BLD-SCNC: 95 MMOL/L (ref 98–107)
CO2: 30 MMOL/L (ref 22–29)
CREAT SERPL-MCNC: 1.2 MG/DL (ref 0.7–1.2)
GFR AFRICAN AMERICAN: >60
GFR NON-AFRICAN AMERICAN: >60 ML/MIN/1.73
GLUCOSE BLD-MCNC: 216 MG/DL (ref 74–99)
MAGNESIUM: 2.3 MG/DL (ref 1.6–2.6)
METER GLUCOSE: 168 MG/DL (ref 74–99)
METER GLUCOSE: 208 MG/DL (ref 74–99)
METER GLUCOSE: 255 MG/DL (ref 74–99)
METER GLUCOSE: 315 MG/DL (ref 74–99)
POTASSIUM SERPL-SCNC: 3.4 MMOL/L (ref 3.5–5)
SODIUM BLD-SCNC: 137 MMOL/L (ref 132–146)

## 2021-08-28 PROCEDURE — 2580000003 HC RX 258: Performed by: PHYSICIAN ASSISTANT

## 2021-08-28 PROCEDURE — 36415 COLL VENOUS BLD VENIPUNCTURE: CPT

## 2021-08-28 PROCEDURE — 83735 ASSAY OF MAGNESIUM: CPT

## 2021-08-28 PROCEDURE — 94640 AIRWAY INHALATION TREATMENT: CPT

## 2021-08-28 PROCEDURE — 80048 BASIC METABOLIC PNL TOTAL CA: CPT

## 2021-08-28 PROCEDURE — 6370000000 HC RX 637 (ALT 250 FOR IP): Performed by: PHYSICIAN ASSISTANT

## 2021-08-28 PROCEDURE — 99233 SBSQ HOSP IP/OBS HIGH 50: CPT | Performed by: INTERNAL MEDICINE

## 2021-08-28 PROCEDURE — 2580000003 HC RX 258: Performed by: INTERNAL MEDICINE

## 2021-08-28 PROCEDURE — 82962 GLUCOSE BLOOD TEST: CPT

## 2021-08-28 PROCEDURE — 6370000000 HC RX 637 (ALT 250 FOR IP): Performed by: NURSE PRACTITIONER

## 2021-08-28 PROCEDURE — 2060000000 HC ICU INTERMEDIATE R&B

## 2021-08-28 PROCEDURE — 6370000000 HC RX 637 (ALT 250 FOR IP): Performed by: INTERNAL MEDICINE

## 2021-08-28 PROCEDURE — 6360000002 HC RX W HCPCS: Performed by: INTERNAL MEDICINE

## 2021-08-28 PROCEDURE — 2700000000 HC OXYGEN THERAPY PER DAY

## 2021-08-28 RX ORDER — METOLAZONE 5 MG/1
5 TABLET ORAL ONCE
Status: COMPLETED | OUTPATIENT
Start: 2021-08-28 | End: 2021-08-28

## 2021-08-28 RX ORDER — POTASSIUM CHLORIDE 20 MEQ/1
40 TABLET, EXTENDED RELEASE ORAL ONCE
Status: COMPLETED | OUTPATIENT
Start: 2021-08-28 | End: 2021-08-28

## 2021-08-28 RX ORDER — SPIRONOLACTONE 25 MG/1
25 TABLET ORAL ONCE
Status: COMPLETED | OUTPATIENT
Start: 2021-08-28 | End: 2021-08-28

## 2021-08-28 RX ORDER — BUMETANIDE 1 MG/1
2 TABLET ORAL 2 TIMES DAILY
Status: DISCONTINUED | OUTPATIENT
Start: 2021-08-28 | End: 2021-08-30 | Stop reason: HOSPADM

## 2021-08-28 RX ADMIN — FLECAINIDE ACETATE 100 MG: 100 TABLET ORAL at 22:36

## 2021-08-28 RX ADMIN — GUAIFENESIN 400 MG: 400 TABLET ORAL at 05:44

## 2021-08-28 RX ADMIN — SODIUM CHLORIDE SOLN NEBU 3% 4 ML: 3 NEBU SOLN at 08:30

## 2021-08-28 RX ADMIN — PREDNISONE 40 MG: 20 TABLET ORAL at 08:00

## 2021-08-28 RX ADMIN — GUAIFENESIN 400 MG: 400 TABLET ORAL at 11:14

## 2021-08-28 RX ADMIN — ATORVASTATIN CALCIUM 20 MG: 20 TABLET, FILM COATED ORAL at 08:00

## 2021-08-28 RX ADMIN — Medication 10 ML: at 19:59

## 2021-08-28 RX ADMIN — DILTIAZEM HYDROCHLORIDE 240 MG: 240 CAPSULE, EXTENDED RELEASE ORAL at 08:00

## 2021-08-28 RX ADMIN — INSULIN LISPRO 6 UNITS: 100 INJECTION, SOLUTION INTRAVENOUS; SUBCUTANEOUS at 11:14

## 2021-08-28 RX ADMIN — BUMETANIDE 2 MG: 1 TABLET ORAL at 11:14

## 2021-08-28 RX ADMIN — APIXABAN 5 MG: 5 TABLET, FILM COATED ORAL at 08:00

## 2021-08-28 RX ADMIN — GUAIFENESIN SYRUP AND DEXTROMETHORPHAN 5 ML: 100; 10 SYRUP ORAL at 22:36

## 2021-08-28 RX ADMIN — APIXABAN 5 MG: 5 TABLET, FILM COATED ORAL at 22:36

## 2021-08-28 RX ADMIN — INSULIN LISPRO 8 UNITS: 100 INJECTION, SOLUTION INTRAVENOUS; SUBCUTANEOUS at 16:50

## 2021-08-28 RX ADMIN — IPRATROPIUM BROMIDE AND ALBUTEROL SULFATE 3 ML: .5; 2.5 SOLUTION RESPIRATORY (INHALATION) at 16:27

## 2021-08-28 RX ADMIN — INSULIN LISPRO 4 UNITS: 100 INJECTION, SOLUTION INTRAVENOUS; SUBCUTANEOUS at 05:44

## 2021-08-28 RX ADMIN — INSULIN LISPRO 1 UNITS: 100 INJECTION, SOLUTION INTRAVENOUS; SUBCUTANEOUS at 22:37

## 2021-08-28 RX ADMIN — ASPIRIN 81 MG CHEWABLE TABLET 81 MG: 81 TABLET CHEWABLE at 08:00

## 2021-08-28 RX ADMIN — IPRATROPIUM BROMIDE AND ALBUTEROL SULFATE 3 ML: .5; 2.5 SOLUTION RESPIRATORY (INHALATION) at 08:30

## 2021-08-28 RX ADMIN — BUDESONIDE 1000 MCG: 0.5 SUSPENSION RESPIRATORY (INHALATION) at 20:32

## 2021-08-28 RX ADMIN — BUDESONIDE 1000 MCG: 0.5 SUSPENSION RESPIRATORY (INHALATION) at 08:23

## 2021-08-28 RX ADMIN — HYDROCORTISONE: 1 CREAM TOPICAL at 08:01

## 2021-08-28 RX ADMIN — BUSPIRONE HYDROCHLORIDE 5 MG: 5 TABLET ORAL at 22:36

## 2021-08-28 RX ADMIN — SODIUM CHLORIDE SOLN NEBU 3% 4 ML: 3 NEBU SOLN at 20:32

## 2021-08-28 RX ADMIN — METOLAZONE 5 MG: 5 TABLET ORAL at 11:44

## 2021-08-28 RX ADMIN — FLECAINIDE ACETATE 100 MG: 100 TABLET ORAL at 08:00

## 2021-08-28 RX ADMIN — GUAIFENESIN 400 MG: 400 TABLET ORAL at 22:36

## 2021-08-28 RX ADMIN — DILTIAZEM HYDROCHLORIDE 240 MG: 240 CAPSULE, EXTENDED RELEASE ORAL at 22:36

## 2021-08-28 RX ADMIN — POTASSIUM CHLORIDE 40 MEQ: 20 TABLET, EXTENDED RELEASE ORAL at 11:44

## 2021-08-28 RX ADMIN — BUMETANIDE 2 MG: 1 TABLET ORAL at 22:36

## 2021-08-28 RX ADMIN — IPRATROPIUM BROMIDE AND ALBUTEROL SULFATE 3 ML: .5; 2.5 SOLUTION RESPIRATORY (INHALATION) at 20:32

## 2021-08-28 RX ADMIN — ARFORMOTEROL TARTRATE 15 MCG: 15 SOLUTION RESPIRATORY (INHALATION) at 08:23

## 2021-08-28 RX ADMIN — FLUTICASONE PROPIONATE 1 SPRAY: 50 SPRAY, METERED NASAL at 08:04

## 2021-08-28 RX ADMIN — SPIRONOLACTONE 25 MG: 25 TABLET ORAL at 11:14

## 2021-08-28 RX ADMIN — GUAIFENESIN 400 MG: 400 TABLET ORAL at 16:50

## 2021-08-28 RX ADMIN — ACETAZOLAMIDE 250 MG: 250 TABLET ORAL at 08:01

## 2021-08-28 RX ADMIN — HYDROCORTISONE: 1 CREAM TOPICAL at 20:00

## 2021-08-28 RX ADMIN — Medication 10 ML: at 08:04

## 2021-08-28 RX ADMIN — ARFORMOTEROL TARTRATE 15 MCG: 15 SOLUTION RESPIRATORY (INHALATION) at 20:33

## 2021-08-28 RX ADMIN — IPRATROPIUM BROMIDE AND ALBUTEROL SULFATE 3 ML: .5; 2.5 SOLUTION RESPIRATORY (INHALATION) at 12:09

## 2021-08-28 ASSESSMENT — PAIN SCALES - GENERAL
PAINLEVEL_OUTOF10: 0

## 2021-08-28 NOTE — PLAN OF CARE
Problem: Pain:  Goal: Pain level will decrease  Description: Pain level will decrease  Outcome: Met This Shift  Goal: Control of acute pain  Description: Control of acute pain  Outcome: Met This Shift  Goal: Control of chronic pain  Description: Control of chronic pain  Outcome: Met This Shift     Problem: Skin Integrity:  Goal: Will show no infection signs and symptoms  Description: Will show no infection signs and symptoms  Outcome: Met This Shift  Goal: Absence of new skin breakdown  Description: Absence of new skin breakdown  Outcome: Met This Shift     Problem: Airway Clearance - Ineffective:  Goal: Ability to maintain a clear airway will improve  Description: Ability to maintain a clear airway will improve  Outcome: Met This Shift     Problem: HEMODYNAMIC STATUS  Goal: Patient has stable vital signs and fluid balance  8/27/2021 2104 by Tanner Anguiano RN  Outcome: Met This Shift  8/27/2021 1208 by Isrrael Stein RN  Outcome: Met This Shift     Problem: FLUID AND ELECTROLYTE IMBALANCE  Goal: Fluid and electrolyte balance are achieved/maintained  8/27/2021 2104 by Tanner Anguiano RN  Outcome: Met This Shift  8/27/2021 1208 by Isrrael Stein RN  Outcome: Not Met This Shift

## 2021-08-28 NOTE — PROGRESS NOTES
Topical BID Denise Eid MD   Given at 08/28/21 0801    busPIRone (BUSPAR) tablet 5 mg  5 mg Oral TID PRN Shawn Dickson MD   5 mg at 08/27/21 2211    glucose (GLUTOSE) 40 % oral gel 15 g  15 g Oral PRN Shawn Dickson MD        dextrose 50 % IV solution  12.5 g IntraVENous PRN Shawn Dickson MD        glucagon (rDNA) injection 1 mg  1 mg IntraMUSCular PRN Shawn Dickson MD        dextrose 5 % solution  100 mL/hr IntraVENous PRN Shawn Dickson MD        insulin lispro (HUMALOG) injection vial 0-12 Units  0-12 Units Subcutaneous TID WC Shawn Dickson MD   6 Units at 08/28/21 1114    insulin lispro (HUMALOG) injection vial 0-6 Units  0-6 Units Subcutaneous Nightly Shawn Dickson MD   1 Units at 08/27/21 2215    sodium chloride (Inhalant) 3 % nebulizer solution 4 mL  4 mL Nebulization PRN Ryan Beal MD        guaiFENesin tablet 400 mg  400 mg Oral Q6H Ryan Danielle MD   400 mg at 08/28/21 1114    perflutren lipid microspheres (DEFINITY) injection 1.65 mg  1.5 mL IntraVENous ONCE PRN Ryan Beal MD        budesonide (PULMICORT) nebulizer suspension 1,000 mcg  1 mg Nebulization BID Nancy Hussein MD   1,000 mcg at 08/28/21 0964    And    Arformoterol Tartrate (BROVANA) nebulizer solution 15 mcg  15 mcg Nebulization BID Ryan Beal MD   15 mcg at 08/28/21 0823    guaiFENesin-dextromethorphan (ROBITUSSIN DM) 100-10 MG/5ML syrup 5 mL  5 mL Oral Q4H PRN STEPH Damon   5 mL at 08/27/21 2329    sodium chloride (Inhalant) 3 % nebulizer solution 4 mL  4 mL Nebulization BID Denise Eid MD   4 mL at 08/28/21 0830    albuterol (PROVENTIL) nebulizer solution 2.5 mg  2.5 mg Nebulization Q6H PRN STEPH Reed        apixaban (ELIQUIS) tablet 5 mg  5 mg Oral BID STEPH Reed   5 mg at 08/28/21 0800    atorvastatin (LIPITOR) tablet 20 mg  20 mg Oral Daily STEPH Reed   20 mg at 08/28/21 0800    dilTIAZem (CARDIZEM CD) extended release capsule 240 mg  240 mg Oral BID Bhupendra Vazquez, PA   240 mg at 08/28/21 0800    flecainide (TAMBOCOR) tablet 100 mg  100 mg Oral BID STEPH Osorio   100 mg at 08/28/21 0800    fluticasone (FLONASE) 50 MCG/ACT nasal spray 1 spray  1 spray Each Nostril Daily STEPH Osorio   1 spray at 08/28/21 0804    ipratropium-albuterol (DUONEB) nebulizer solution 3 mL  1 vial Inhalation Q4H STEPH Osorio   3 mL at 08/28/21 0830    [Held by provider] losartan (COZAAR) tablet 50 mg  50 mg Oral Daily STEPH Osorio   50 mg at 08/26/21 1023    sodium chloride flush 0.9 % injection 5-40 mL  5-40 mL IntraVENous 2 times per day STEPH Osorio   10 mL at 08/28/21 0804    sodium chloride flush 0.9 % injection 5-40 mL  5-40 mL IntraVENous PRN STEPH Osorio   10 mL at 08/27/21 1141    0.9 % sodium chloride infusion  25 mL IntraVENous PRN STEPH Osorio        ondansetron (ZOFRAN-ODT) disintegrating tablet 4 mg  4 mg Oral Q8H PRN STEPH Osorio        Or    ondansetron (ZOFRAN) injection 4 mg  4 mg IntraVENous Q6H PRN STEPH Osorio        polyethylene glycol (GLYCOLAX) packet 17 g  17 g Oral Daily PRN STEPH Osorio        acetaminophen (TYLENOL) tablet 650 mg  650 mg Oral Q6H PRN STEPH Osorio   650 mg at 08/26/21 1029    Or    acetaminophen (TYLENOL) suppository 650 mg  650 mg Rectal Q6H PRN STEPH Osorio        aspirin chewable tablet 81 mg  81 mg Oral Daily April [place], APRN - CNP   81 mg at 08/28/21 0800      dextrose      sodium chloride         Physical Exam:  /71   Pulse 80   Temp 96.7 °F (35.9 °C) (Temporal)   Resp 20   Ht 5' 11\" (1.803 m)   Wt 274 lb 4 oz (124.4 kg)   SpO2 98%   BMI 38.25 kg/m²   Wt Readings from Last 3 Encounters:   08/28/21 274 lb 4 oz (124.4 kg)   01/22/20 277 lb (125.6 kg)   01/02/20 271 lb (122.9 kg)     Appearance: Awake, alert, no acute respiratory distress  Skin: Intact, no rash  Head: Normocephalic, atraumatic  Eyes: EOMI, no conjunctival erythema  ENMT: No pharyngeal erythema, MMM, no rhinorrhea  Neck: Supple, no carotid bruits  Lungs: B/L wheezing, no rales  Cardiac: Regular rate and rhythm, no murmurs apparent  Abdomen: Soft, nontender, +bowel sounds  Extremities: Moves all extremities x 4, ++lower extremity edema  Neurologic: No focal motor deficits apparent, normal mood and affect    Intake/Output:    Intake/Output Summary (Last 24 hours) at 8/28/2021 1125  Last data filed at 8/28/2021 0928  Gross per 24 hour   Intake 1260.8 ml   Output 5025 ml   Net -3764.2 ml     I/O this shift: In: 0 [P.O.:590]  Out: 450 [Urine:450]    Laboratory Tests:  Recent Labs     08/27/21  0622 08/27/21  1629 08/28/21  0506    136 137   K 3.2* 3.9 3.4*   CL 92* 94* 95*   CO2 37* 33* 30*   BUN 44* 43* 44*   CREATININE 1.1 1.0 1.2   GLUCOSE 173* 233* 216*   CALCIUM 8.6 9.2 8.8     Lab Results   Component Value Date    MG 2.3 08/28/2021     No results for input(s): ALKPHOS, ALT, AST, PROT, BILITOT, BILIDIR, LABALBU in the last 72 hours. Recent Labs     08/25/21  1158   WBC 18.5*   RBC 3.45*   HGB 10.8*   HCT 33.2*   MCV 96.2   MCH 31.3   MCHC 32.5   RDW 12.8   *   MPV 12.1*     Lab Results   Component Value Date    CKTOTAL 51 11/13/2010    CKMB 0.5 11/13/2010    TROPONINI <0.01 02/15/2018    TROPONINI <0.01 02/14/2018    TROPONINI <0.01 02/14/2018     No results for input(s): CKTOTAL, CKMB, CKMBINDEX, TROPHS in the last 72 hours.   Lab Results   Component Value Date    INR 1.2 02/04/2018    INR 0.9 11/13/2010    PROTIME 13.8 (H) 02/04/2018    PROTIME 11.6 11/13/2010     Lab Results   Component Value Date    TSH 0.942 02/14/2018     No results found for: LABA1C  No results found for: EAG  Lab Results   Component Value Date    CHOL 205 (H) 04/08/2018    CHOL 162 01/28/2017     Lab Results   Component Value Date    TRIG 80 04/08/2018    TRIG 61 01/28/2017     Lab Results   Component Value Date    HDL 80 04/08/2018    HDL 50 01/28/2017     Lab Results Component Value Date    LDLCALC 109 (H) 04/08/2018    LDLCALC 100 (H) 01/28/2017     Lab Results   Component Value Date    LABVLDL 16 04/08/2018    LABVLDL 12 01/28/2017     No results found for: [facility]  Recent Labs     08/26/21  0449   PROBNP 469*       Cardiac Tests:  Telemetry reviewed (date: 8/28/2021): SR/AF rate 70's-90's (currently SR)    CXR 8/7/2021:  Impression  Cardiomegaly. Large opacity in the right cardiophrenic angle.  Although this may represent  large epicardial fat pad, given the interval change since the prior  examination further evaluation and characterization with CT of the chest is  recommended. Mild hyperinflation, suggest COPD.     Chest CTA 8/8/2021:  Impression  No evidence of pulmonary embolism or acute pulmonary abnormality. Prominent pericardial fat accounts for the abnormality seen on radiograph.     Cardiac catheterization 11/2010 ([facility]): LMT: normal. LAD: normal, gives off one large bifurcating diagonal as it courses to but ends at the apex. LCx: nondominant, gives off one large OM1, only trivial distal disease. RCA: Dominant, large, minimal disease distally.     Echocardiogram 11/2010 ([facility]): EF 55% with questionable RV dilation. Trivial to small pericardial effusion. Small echodense space near apex (? Localized effusion).  Trivial MR and TR.      WYATT: 2/7/18 ([place])   Cardiac rhythm: atrial flutter   Low normal left ventricular ejection fraction.   Moderately dilated left atrium.   No evidence of a left atrial appendage thrombus.   No evidence of interatrial shunting on bubble study.   Borderline dilated right ventricle with normal right ventricular function.   Mild-moderate mitral regurgitation.     WYATT: 2/15/18 Kelle Cunha)   Low normal left ventricular systolic function.   Mild to moderate mitral regurgitation.     TTE (Dr. Jessica Hart, 8/12/2021)  Summary   Technically limited study.   Normal left ventricular size, wall thickness, wall motion, and systolic   function.   Estimated left ventricle ejection fraction 70+/-5 %.   There is doppler evidence of stage II diastolic dysfunction.   Mildly dilated right ventricle.   Right ventricle global systolic function is normal .   Normal sized left atrium.   No significant valvular abnormalities noted.     Lexiscan Stress Test 8/13/2021:  FINDINGS: The overall quality of the study was fair. Left ventricular cavity size was noted to be dilated on both the  stress and the resting images but there was no transient ischemic  dilatation after stress  Rotational analog analysis demonstrated abnormal patient motion and  there was marked increase in tracer uptake in the abdominal organs  with the liver overshadowing the bottom of the heart  A severe defect was present in the inferior wall extending into the  lateral apical wall(s) that was moderate to largesized by  quantification. The resting images showed no change   Gated SPECT left ventricular ejection fraction was calculated to be  66%, with normal myocardial contractility and wall motion. Impression  The myocardial perfusion imaging was probably normal with the large  fixed inferior/lateral apical wall defect being more consistent with  attenuation artifact and less likely the result of a myocardial  infarction especially with the normal contractility of the inferior  wall and the lateral apical wall. More importantly, there did not  appear to be any evidence of stress-induced ischemia on this study  Overall left ventricular systolic function was normal with no regional  wall motion abnormality  Low risk general pharmacologic stress test.    ASSESSMENT / PLAN:  1. Acute on chronic heart failure with preserved ejection fraction  2. Acute COPD exacerbation. Known history of very severe COPD. Viral panel negative. Pulmonology following. 3. Acute on chronic respiratory failure  4.  Acute kidney injury -- improved, most recent Cr 1.4 --> 1.1 --> 1.2  5. Leukocytosis. 6. Paroxysmal atrial fibrillation/typical flutter status-post AF ablation in April 2018. Currently on Eliquis, cardizem, and flecainide therapy. Follows with [facility]. 7. Hypertension -- sub-optimal control in the past, most recent /71  8. Hyperlipidemia, on statin therapy. 9. Obstructive sleep apnea, compliant with CPAP. 10. Morbid obesity / BMI 40.8  11. History of alcohol abuse. 15. Former tobacco abuse (2 PPD for many years, quit in 8/2017)  13.   History of pericarditis in 11/2010    - Recent cardiac testing reviewed  - Monitor I/O's, renal function, and electrolytes with ongoing diuresis (reproted I/O's net negative 49.2 L) --> Cr stable, nephrology note reviewed  - Continue current medications otherwise  - Treatment of PARKER  - Aggressive risk factor modifications  - Telemetry reviewed (sinus rhythm with episodes of AF -- currently SR)  - Care per pulmonary --> decision re: bronchoscopy pending  - Case discussed with the patient, his sister, and nursing staff today    Yi Carr MD  [facility]) Cardiology

## 2021-08-28 NOTE — PROGRESS NOTES
[facility]  Division of Pulmonary, Critical Care Medicine  Pulmonary [address] [facility]           Pulmonary Clinic consult       CC :SOB ,wheeizng   H/o A flutter     HPI :  improved swelling  In both legs but still with significant edema  No chest pain   No fever or chills  Had some rash that was resolved   Stated breathing is improving       PHYSICAL EXAMINATION:     VITAL SIGNS:  /84   Pulse 90   Temp 97.3 °F (36.3 °C) (Temporal)   Resp 18   Ht 5' 11\" (1.803 m)   Wt 279 lb 5 oz (126.7 kg)   SpO2 98%   BMI 38.96 kg/m²   Wt Readings from Last 3 Encounters:   08/27/21 279 lb 5 oz (126.7 kg)   01/22/20 277 lb (125.6 kg)   01/02/20 271 lb (122.9 kg)     Temp Readings from Last 3 Encounters:   08/27/21 97.3 °F (36.3 °C) (Temporal)   08/09/19 97.7 °F (36.5 °C)   04/09/19 98.5 °F (36.9 °C) (Oral)     TMAX:  BP Readings from Last 3 Encounters:   08/27/21 139/84   01/22/20 132/77   01/02/20 124/82     Pulse Readings from Last 3 Encounters:   08/27/21 90   01/22/20 109   01/02/20 83           INTAKE/OUTPUTS:  I/O last 3 completed shifts: In: 1030.8 [P.O.:1020;  I.V.:10.8]  Out: 6450 [Urine:6450]    Intake/Output Summary (Last 24 hours) at 8/27/2021 2126  Last data filed at 8/27/2021 1945  Gross per 24 hour   Intake 1030.8 ml   Output 5700 ml   Net -4669.2 ml       This is virtual visit      LABS/IMAGING:    CBC:  Lab Results   Component Value Date    WBC 18.5 (H) 08/25/2021    HGB 10.8 (L) 08/25/2021    HCT 33.2 (L) 08/25/2021    MCV 96.2 08/25/2021     (L) 08/25/2021    LYMPHOPCT 5.3 (L) 08/25/2021    RBC 3.45 (L) 08/25/2021    MCH 31.3 08/25/2021    MCHC 32.5 08/25/2021    RDW 12.8 08/25/2021    NEUTOPHILPCT 89.5 (H) 08/25/2021    MONOPCT 1.8 (L) 08/25/2021    BASOPCT 0.3 08/25/2021    NEUTROABS 16.65 (H) 08/25/2021    LYMPHSABS 1.48 (L) 08/25/2021    MONOSABS 0.37 08/25/2021    EOSABS 0.17 08/25/2021    BASOSABS 0.00 08/25/2021       Recent Labs     08/25/21  1158 08/22/21  0949 08/18/21  1101   WBC 18.5* 26.5* 19.5*   HGB 10.8* 12.3* 11.9*   HCT 33.2* 37.1 36.5*   MCV 96.2 96.9 98.1   * 188 206       BMP:   Recent Labs     08/26/21  0449 08/26/21  0449 08/26/21  1513 08/27/21  0622 08/27/21  1629      < > 137 138 136   K 3.5   < > 3.8 3.2* 3.9   CL 89*   < > 90* 92* 94*   CO2 39*   < > 41* 37* 33*   PHOS 3.9  --   --   --   --    BUN 37*   < > 41* 44* 43*   CREATININE 0.9   < > 1.2 1.1 1.0    < > = values in this interval not displayed. MG:   Lab Results   Component Value Date    MG 2.3 08/27/2021     Ca/Phos:   Lab Results   Component Value Date    CALCIUM 9.2 08/27/2021    PHOS 3.9 08/26/2021     Amylase: No results found for: AMYLASE  Lipase:   Lab Results   Component Value Date    LIPASE 28 05/19/2011     LIVER PROFILE:   No results for input(s): AST, ALT, LIPASE, BILIDIR, BILITOT, ALKPHOS in the last 72 hours. Invalid input(s): AMYLASE,  ALB    PT/INR:   No results for input(s): PROTIME, INR in the last 72 hours. APTT: No results for input(s): APTT in the last 72 hours. Cardiac Enzymes:  Lab Results   Component Value Date    CKTOTAL 51 11/13/2010    CKMB 0.5 11/13/2010    TROPONINI <0.01 02/15/2018       Hgb A1C: No results found for: LABA1C  No results found for: EAG  FAMILIA: No results found for: FAMILIA  ESR: No results found for: SEDRATE  CRP: No results found for: CRP  D Dimer: No results found for: DDIMER    Thyroid Studies:  Lab Results   Component Value Date    TSH 0.942 02/14/2018    T6EHZHD 4.8 02/14/2018           MICROBIOLOGY:  Pending       CXR:  Reviewed     CT Chest:reviewed     PE  Vitals:    08/27/21 2014   BP:    Pulse:    Resp:    Temp:    SpO2: 98%     General:  Awake, alert, oriented X 3. Well developed, well nourished, well groomed. No apparent distress. HEENT:  Normocephalic, atraumatic. Pupils equal, round, reactive to light. No scleral icterus. No conjunctival injection.   Normal lips, teeth, and gums.  No nasal discharge. Neck:  Supple, FROM  Heart:  RRR, no murmurs, gallops, rubs, carotid upstroke normal, no carotid bruits  Lungs:wheeizng bilateral ,rhonchi   Abdomen: Bowel sounds present, soft, nontender, no masses, no organomegaly, no peritoneal signs  Extremities:  No clubbing, cyanosis, or edema  Skin:  Warm and dry, no open lesions or rash  Neuro:  Cranial nerves 2-12 intact, no focal deficits  Vascular: Radial and pedal Pulses 2+  Breast: deferred  Rectal: deferred  Genitalia:  deferred    PROBLEM LIST:  Patient Active Problem List   Diagnosis    [facility]    Essential hypertension    Adrenal adenoma    Class 2 obesity due to excess calories with serious comorbidity and body mass index (BMI) of 35.0 to 35.9 in adult    Anticoagulation management encounter    Typical atrial flutter ([facility]    Anxiety    Status post catheter ablation of atrial flutter    Persistent atrial fibrillation ([facility]    ETOH abuse    [facility]ation [facility]               ASSESSMENT:  1.) Acute   exacerbation of COPD  2) very severe COPD  3.)obstructive sleep apnea  4. )Afib /Flutter   5.)tobacco independent(quit 5 months ago)    Data:  FVC 3.63 which is 73 of predicted    FEV1 1.52 which is 39 of predicted    FEV1/FVC is 42  No Bronchodilator response    PLAN:   Bumex drip,stopped by nephrology ,to have 2 mg IV and aldactone added  Fluid restriction  Wean steroids to PO ,40 mg daily ,wean over 10 days and keep on 10 mg daily   Add darlisep on discharge   Breztri   Should be done with abx,will stop  Diuresis as per cardiology /primary  A fib/diuresis per cardiology   Need Bronchoscopy for Right main narrowing,if not accepted in [facility] will re address with Anaesthesia    Dr Yevgeniy Louise will follow         Maru Morales MD  Pulmonary/Critical care Medicine   [id number] [facility]

## 2021-08-28 NOTE — PLAN OF CARE
Problem: Pain:  Goal: Control of acute pain  Description: Control of acute pain  Outcome: Met This Shift     Problem: Skin Integrity:  Goal: Absence of new skin breakdown  Description: Absence of new skin breakdown  Outcome: Met This Shift     Problem: Airway Clearance - Ineffective:  Goal: Ability to maintain a clear airway will improve  Description: Ability to maintain a clear airway will improve  Outcome: Met This Shift     Problem: HEMODYNAMIC STATUS  Goal: Patient has stable vital signs and fluid balance  Outcome: Met This Shift

## 2021-08-28 NOTE — PROGRESS NOTES
Department of Internal Medicine  Nephrology Progress Note      Events reviewed. SUBJECTIVE:  We are following Elis Karin for volume management. Reports feeling somewhat better.     Physical Exam:    VITALS:  /71   Pulse 80   Temp 96.7 °F (35.9 °C) (Temporal)   Resp 18   Ht 5' 11\" (1.803 m)   Wt 274 lb 4 oz (124.4 kg)   SpO2 100%   BMI 38.25 kg/m²   24HR INTAKE/OUTPUT:      Intake/Output Summary (Last 24 hours) at 8/28/2021 0836  Last data filed at 8/28/2021 0800  Gross per 24 hour   Intake 1030.8 ml   Output 6800 ml   Net -5769.2 ml       Constitutional:  Alert and oriented, NAD  HEENT:  Normocephalic, PERRL  Respiratory:  Expiratory wheezing throughout, on 4L NC  Cardiovascular/Edema:  IRRR, S1, S2  Gastrointestinal:  Soft, obese, nontender, nondistended  Neurologic:  Nonfocal, ROUSE  Skin:  Warm, dry, no lesions  Other:  4+ pitting edema BLE up into thighs, 3+ sacral and abdominal wall edema     Scheduled Meds:   acetaZOLAMIDE  250 mg Oral Daily    predniSONE  40 mg Oral Daily    hydrocortisone   Topical BID    insulin lispro  0-12 Units Subcutaneous TID WC    insulin lispro  0-6 Units Subcutaneous Nightly    guaiFENesin  400 mg Oral Q6H    budesonide  1 mg Nebulization BID    And    Arformoterol Tartrate  15 mcg Nebulization BID    sodium chloride (Inhalant)  4 mL Nebulization BID    apixaban  5 mg Oral BID    atorvastatin  20 mg Oral Daily    dilTIAZem  240 mg Oral BID    flecainide  100 mg Oral BID    fluticasone  1 spray Each Nostril Daily    ipratropium-albuterol  1 vial Inhalation Q4H    [Held by provider] losartan  50 mg Oral Daily    sodium chloride flush  5-40 mL IntraVENous 2 times per day    aspirin  81 mg Oral Daily     Continuous Infusions:   dextrose      sodium chloride       PRN Meds:.busPIRone, glucose, dextrose, glucagon (rDNA), dextrose, sodium chloride (Inhalant), perflutren lipid microspheres, guaiFENesin-dextromethorphan, albuterol, sodium chloride flush, sodium chloride, ondansetron **OR** ondansetron, polyethylene glycol, acetaminophen **OR** acetaminophen    DATA:    CBC:   Lab Results   Component Value Date    WBC 18.5 08/25/2021    RBC 3.45 08/25/2021    HGB 10.8 08/25/2021    HCT 33.2 08/25/2021    MCV 96.2 08/25/2021    MCH 31.3 08/25/2021    MCHC 32.5 08/25/2021    RDW 12.8 08/25/2021     08/25/2021    MPV 12.1 08/25/2021     CMP:    Lab Results   Component Value Date     08/28/2021    K 3.4 08/28/2021    K 4.3 08/09/2021    CL 95 08/28/2021    CO2 30 08/28/2021    BUN 44 08/28/2021    CREATININE 1.2 08/28/2021    GFRAA >60 08/28/2021    LABGLOM >60 08/28/2021    GLUCOSE 216 08/28/2021    GLUCOSE 100 05/19/2011    PROT 5.6 08/22/2021    LABALBU 3.5 08/22/2021    LABALBU 4.6 05/19/2011    CALCIUM 8.8 08/28/2021    BILITOT 0.5 08/22/2021    ALKPHOS 48 08/22/2021    AST 63 08/22/2021     08/22/2021     Magnesium:    Lab Results   Component Value Date    MG 2.3 08/28/2021     Phosphorus:    Lab Results   Component Value Date    PHOS 3.9 08/26/2021       Radiology Review:      CTA chest with IV contrast 8/8/21   No evidence of pulmonary embolism or acute pulmonary abnormality.       Prominent pericardial fat accounts for the abnormality seen on radiograph.      CXR 8/23/21   Hyperinflation.  No evidence of acute process. BRIEF SUMMARY OF INITIAL CONSULT:    Briefly, Mr. Manuel Bo is a 63year old male with a PMH of HTN, HFpEF 70-75% with stage II DD, COPD, PARKER, atrial flutter s/p cardioversion and ablation on chronic anticoagulation on apixaban, obesity, who was admitted on August 7, 2021 after presenting to the ER with shortness of breath. He was admitted for treatment of acute exacerbation of COPD and was started on methylprednisolone and inhalers. He continued with shortness of breath despite steroids and was then started on Lasix.  He states that his swelling has worsened since he has been in the hospital despite excellent urine output with Lasix, his fluid balance is -36 L. He has also gained 30 pounds since admission. We are consulted for volume management. IMPRESSION/RECOMMENDATIONS:      1. Decompensated HFpEF 70-75% with stage II DD, pro->>1,336>>>469, with large edema. Continues to have very large urine output, Bumex drip was discontinued yesterday. To continue Bumex 2 mg IV twice a day, add spironolactone and metolazone for today. 2. Hypokalemia, secondary to diuretics, to replace  3. Anasarca, secondary to #1, continues to have large urine output with negative fluid balance. 4. HTN, holding losartan while we achieve diuresis    5. Alkalemia (PH: 5.007)  Metabolic alkalosis (contraction alkalosis) and respiratory acidosis  --------------------------------------------------------------  6. Atrial flutter, s/ cardioversion and ablation in the past, on diltiazem and apixaban   7. COPD exacerbation, on prednisone  8.  Anemia, normocytic, likely iron deficiency TIBC 209, ferritin 313, iron 113, iron saturation 54     Plan:     · Bumex 2 mg p.o. twice daily  · Metolazone 5 mg p.o. x1  · Spironolactone 25 mg x 1  · Discontinue acetazolamide  · KCl 40 meq p.o. x1  · Continue to monitor renal function    · Continue to monitor electrolytes         Electronically signed by Tee Mackay MD on 8/28/2021 at 8:36 AM

## 2021-08-28 NOTE — PROGRESS NOTES
08/25/2021    MONOSABS 0.37 08/25/2021    EOSABS 0.17 08/25/2021    BASOSABS 0.00 08/25/2021       Recent Labs     08/25/21  1158 08/22/21  0949 08/18/21  1101   WBC 18.5* 26.5* 19.5*   HGB 10.8* 12.3* 11.9*   HCT 33.2* 37.1 36.5*   MCV 96.2 96.9 98.1   * 188 206       BMP:   Recent Labs     08/26/21  0449 08/26/21  1513 08/27/21  0622 08/27/21  1629 08/28/21  0506      < > 138 136 137   K 3.5   < > 3.2* 3.9 3.4*   CL 89*   < > 92* 94* 95*   CO2 39*   < > 37* 33* 30*   PHOS 3.9  --   --   --   --    BUN 37*   < > 44* 43* 44*   CREATININE 0.9   < > 1.1 1.0 1.2    < > = values in this interval not displayed. MG:   Lab Results   Component Value Date    MG 2.3 08/28/2021     Ca/Phos:   Lab Results   Component Value Date    CALCIUM 8.8 08/28/2021    PHOS 3.9 08/26/2021     Amylase: No results found for: AMYLASE  Lipase:   Lab Results   Component Value Date    LIPASE 28 05/19/2011     LIVER PROFILE:   No results for input(s): AST, ALT, LIPASE, BILIDIR, BILITOT, ALKPHOS in the last 72 hours. Invalid input(s): AMYLASE,  ALB    PT/INR:   No results for input(s): PROTIME, INR in the last 72 hours. APTT: No results for input(s): APTT in the last 72 hours. Cardiac Enzymes:  Lab Results   Component Value Date    CKTOTAL 51 11/13/2010    CKMB 0.5 11/13/2010    TROPONINI <0.01 02/15/2018       Hgb A1C: No results found for: LABA1C  No results found for: EAG  FAMILIA: No results found for: FAMILIA  ESR: No results found for: SEDRATE  CRP: No results found for: CRP  D Dimer: No results found for: DDIMER    Thyroid Studies:  Lab Results   Component Value Date    TSH 0.942 02/14/2018    G3TKKAV 4.8 02/14/2018           MICROBIOLOGY:  Pending       CXR:  Reviewed     CT Chest:reviewed     PE  Vitals:    08/28/21 1209   BP:    Pulse:    Resp: 20   Temp:    SpO2: 97%     General:  Awake, alert, oriented X 3. Well developed, well nourished, well groomed. No apparent distress. HEENT:   PERRL, EOMI.   Neck:  Supple, FROM  Heart:  RRR, no murmurs, gallops, rubs, carotid upstroke normal, no carotid bruits  Lungs:wheeizng bilateral , right greater than left  Abdomen: Bowel sounds present, soft, nontender, no masses, no organomegaly, no peritoneal signs  Extremities:  No clubbing, cyanosis, or edema  Skin:  Warm and dry, no open lesions or rash  Neuro:  Cranial nerves 2-12 intact, no focal deficits  Vascular: Radial and pedal Pulses 2+      PROBLEM LIST:  Patient Active Problem List   Diagnosis    COPD [facility]chronic obstructive [facility] disease) [facility] [address]    Essential hypertension    Adrenal adenoma    Class 2 obesity due to excess calories with serious comorbidity and body mass index (BMI) of 35.0 to 35.9 in adult    Anticoagulation management encounter    Typical atrial flutter ([facility] [address]    Anxiety    Status post catheter ablation of atrial flutter    Persistent atrial fibrillation ([facility] [address]    ETOH abuse    COPD with acute exacerbation (HCC)               ASSESSMENT:  1.) Acute   exacerbation of COPD  2) very severe COPD  3.)obstructive sleep apnea  4. )Afib /Flutter   5.)tobacco independent(quit 5 months ago)    Data:  FVC 3.63 which is 73 of predicted    FEV1 1.52 which is 39 of predicted    FEV1/FVC is 42  No Bronchodilator response    PLAN:  Diuretic regimen as per nephrology.   Did receive Diamox yesterday continued on Bumex today  Fluid restriction  Wean steroids to PO ,40 mg daily ,wean over 10 days and keep on 10 mg daily   Add darlisep on discharge   Continue Pulmicort, Brovana, and DuoNebs  Completed antibiotic therapy leukocytosis improving  Diuresis as per cardiology /primary  A fib/diuresis per cardiology   Need Bronchoscopy for Right main narrowing,if not accepted in [facility] will re address with Chester Zamudio 15, DO  Pulmonary/Critical care [address]

## 2021-08-29 LAB
ANION GAP SERPL CALCULATED.3IONS-SCNC: 10 MMOL/L (ref 7–16)
ANION GAP SERPL CALCULATED.3IONS-SCNC: 8 MMOL/L (ref 7–16)
BUN BLDV-MCNC: 45 MG/DL (ref 6–20)
BUN BLDV-MCNC: 46 MG/DL (ref 6–20)
CALCIUM SERPL-MCNC: 9.2 MG/DL (ref 8.6–10.2)
CALCIUM SERPL-MCNC: 9.3 MG/DL (ref 8.6–10.2)
CHLORIDE BLD-SCNC: 94 MMOL/L (ref 98–107)
CHLORIDE BLD-SCNC: 96 MMOL/L (ref 98–107)
CO2: 33 MMOL/L (ref 22–29)
CO2: 34 MMOL/L (ref 22–29)
CREAT SERPL-MCNC: 1.1 MG/DL (ref 0.7–1.2)
CREAT SERPL-MCNC: 1.1 MG/DL (ref 0.7–1.2)
GFR AFRICAN AMERICAN: >60
GFR AFRICAN AMERICAN: >60
GFR NON-AFRICAN AMERICAN: >60 ML/MIN/1.73
GFR NON-AFRICAN AMERICAN: >60 ML/MIN/1.73
GLUCOSE BLD-MCNC: 147 MG/DL (ref 74–99)
GLUCOSE BLD-MCNC: 277 MG/DL (ref 74–99)
MAGNESIUM: 2.3 MG/DL (ref 1.6–2.6)
METER GLUCOSE: 154 MG/DL (ref 74–99)
METER GLUCOSE: 196 MG/DL (ref 74–99)
METER GLUCOSE: 257 MG/DL (ref 74–99)
METER GLUCOSE: 380 MG/DL (ref 74–99)
POTASSIUM SERPL-SCNC: 2.9 MMOL/L (ref 3.5–5)
POTASSIUM SERPL-SCNC: 4.2 MMOL/L (ref 3.5–5)
SODIUM BLD-SCNC: 137 MMOL/L (ref 132–146)
SODIUM BLD-SCNC: 138 MMOL/L (ref 132–146)

## 2021-08-29 PROCEDURE — 6370000000 HC RX 637 (ALT 250 FOR IP): Performed by: PHYSICIAN ASSISTANT

## 2021-08-29 PROCEDURE — 2580000003 HC RX 258: Performed by: PHYSICIAN ASSISTANT

## 2021-08-29 PROCEDURE — 99233 SBSQ HOSP IP/OBS HIGH 50: CPT | Performed by: INTERNAL MEDICINE

## 2021-08-29 PROCEDURE — 94660 CPAP INITIATION&MGMT: CPT

## 2021-08-29 PROCEDURE — 36415 COLL VENOUS BLD VENIPUNCTURE: CPT

## 2021-08-29 PROCEDURE — 6360000002 HC RX W HCPCS: Performed by: INTERNAL MEDICINE

## 2021-08-29 PROCEDURE — 6370000000 HC RX 637 (ALT 250 FOR IP): Performed by: INTERNAL MEDICINE

## 2021-08-29 PROCEDURE — 82962 GLUCOSE BLOOD TEST: CPT

## 2021-08-29 PROCEDURE — 83735 ASSAY OF MAGNESIUM: CPT

## 2021-08-29 PROCEDURE — 2580000003 HC RX 258: Performed by: INTERNAL MEDICINE

## 2021-08-29 PROCEDURE — 80048 BASIC METABOLIC PNL TOTAL CA: CPT

## 2021-08-29 PROCEDURE — 94640 AIRWAY INHALATION TREATMENT: CPT

## 2021-08-29 PROCEDURE — 6370000000 HC RX 637 (ALT 250 FOR IP): Performed by: NURSE PRACTITIONER

## 2021-08-29 PROCEDURE — 2060000000 HC ICU INTERMEDIATE R&B

## 2021-08-29 RX ORDER — SPIRONOLACTONE 25 MG/1
25 TABLET ORAL ONCE
Status: COMPLETED | OUTPATIENT
Start: 2021-08-29 | End: 2021-08-29

## 2021-08-29 RX ADMIN — Medication 10 ML: at 08:35

## 2021-08-29 RX ADMIN — IPRATROPIUM BROMIDE AND ALBUTEROL SULFATE 3 ML: .5; 2.5 SOLUTION RESPIRATORY (INHALATION) at 08:58

## 2021-08-29 RX ADMIN — APIXABAN 5 MG: 5 TABLET, FILM COATED ORAL at 21:25

## 2021-08-29 RX ADMIN — GUAIFENESIN 400 MG: 400 TABLET ORAL at 06:51

## 2021-08-29 RX ADMIN — INSULIN LISPRO 10 UNITS: 100 INJECTION, SOLUTION INTRAVENOUS; SUBCUTANEOUS at 11:26

## 2021-08-29 RX ADMIN — ARFORMOTEROL TARTRATE 15 MCG: 15 SOLUTION RESPIRATORY (INHALATION) at 08:58

## 2021-08-29 RX ADMIN — ATORVASTATIN CALCIUM 20 MG: 20 TABLET, FILM COATED ORAL at 07:50

## 2021-08-29 RX ADMIN — BUDESONIDE 1000 MCG: 0.5 SUSPENSION RESPIRATORY (INHALATION) at 08:58

## 2021-08-29 RX ADMIN — IPRATROPIUM BROMIDE AND ALBUTEROL SULFATE 3 ML: .5; 2.5 SOLUTION RESPIRATORY (INHALATION) at 19:54

## 2021-08-29 RX ADMIN — INSULIN LISPRO 1 UNITS: 100 INJECTION, SOLUTION INTRAVENOUS; SUBCUTANEOUS at 21:27

## 2021-08-29 RX ADMIN — IPRATROPIUM BROMIDE AND ALBUTEROL SULFATE 3 ML: .5; 2.5 SOLUTION RESPIRATORY (INHALATION) at 15:55

## 2021-08-29 RX ADMIN — POTASSIUM BICARBONATE 40 MEQ: 782 TABLET, EFFERVESCENT ORAL at 09:21

## 2021-08-29 RX ADMIN — POTASSIUM BICARBONATE 40 MEQ: 782 TABLET, EFFERVESCENT ORAL at 11:27

## 2021-08-29 RX ADMIN — INSULIN LISPRO 6 UNITS: 100 INJECTION, SOLUTION INTRAVENOUS; SUBCUTANEOUS at 16:05

## 2021-08-29 RX ADMIN — BUSPIRONE HYDROCHLORIDE 5 MG: 5 TABLET ORAL at 23:20

## 2021-08-29 RX ADMIN — BUMETANIDE 2 MG: 1 TABLET ORAL at 07:50

## 2021-08-29 RX ADMIN — PREDNISONE 40 MG: 20 TABLET ORAL at 07:50

## 2021-08-29 RX ADMIN — SPIRONOLACTONE 25 MG: 25 TABLET ORAL at 11:27

## 2021-08-29 RX ADMIN — INSULIN LISPRO 2 UNITS: 100 INJECTION, SOLUTION INTRAVENOUS; SUBCUTANEOUS at 06:51

## 2021-08-29 RX ADMIN — DILTIAZEM HYDROCHLORIDE 240 MG: 240 CAPSULE, EXTENDED RELEASE ORAL at 07:50

## 2021-08-29 RX ADMIN — BUDESONIDE 1000 MCG: 0.5 SUSPENSION RESPIRATORY (INHALATION) at 20:08

## 2021-08-29 RX ADMIN — SODIUM CHLORIDE SOLN NEBU 3% 4 ML: 3 NEBU SOLN at 08:58

## 2021-08-29 RX ADMIN — SODIUM CHLORIDE SOLN NEBU 3% 4 ML: 3 NEBU SOLN at 19:54

## 2021-08-29 RX ADMIN — Medication 10 ML: at 21:27

## 2021-08-29 RX ADMIN — HYDROCORTISONE: 1 CREAM TOPICAL at 07:50

## 2021-08-29 RX ADMIN — APIXABAN 5 MG: 5 TABLET, FILM COATED ORAL at 07:50

## 2021-08-29 RX ADMIN — BUMETANIDE 2 MG: 1 TABLET ORAL at 21:25

## 2021-08-29 RX ADMIN — ASPIRIN 81 MG CHEWABLE TABLET 81 MG: 81 TABLET CHEWABLE at 07:50

## 2021-08-29 RX ADMIN — GUAIFENESIN 400 MG: 400 TABLET ORAL at 11:27

## 2021-08-29 RX ADMIN — DILTIAZEM HYDROCHLORIDE 240 MG: 240 CAPSULE, EXTENDED RELEASE ORAL at 21:25

## 2021-08-29 RX ADMIN — GUAIFENESIN SYRUP AND DEXTROMETHORPHAN 5 ML: 100; 10 SYRUP ORAL at 23:17

## 2021-08-29 RX ADMIN — FLECAINIDE ACETATE 100 MG: 100 TABLET ORAL at 21:25

## 2021-08-29 RX ADMIN — GUAIFENESIN 400 MG: 400 TABLET ORAL at 16:05

## 2021-08-29 RX ADMIN — IPRATROPIUM BROMIDE AND ALBUTEROL SULFATE 3 ML: .5; 2.5 SOLUTION RESPIRATORY (INHALATION) at 12:32

## 2021-08-29 RX ADMIN — FLECAINIDE ACETATE 100 MG: 100 TABLET ORAL at 07:50

## 2021-08-29 RX ADMIN — ARFORMOTEROL TARTRATE 15 MCG: 15 SOLUTION RESPIRATORY (INHALATION) at 20:08

## 2021-08-29 RX ADMIN — HYDROCORTISONE: 1 CREAM TOPICAL at 21:25

## 2021-08-29 RX ADMIN — FLUTICASONE PROPIONATE 1 SPRAY: 50 SPRAY, METERED NASAL at 08:34

## 2021-08-29 ASSESSMENT — PAIN SCALES - GENERAL
PAINLEVEL_OUTOF10: 0

## 2021-08-29 NOTE — PROGRESS NOTES
Bedside RN notified that patient has a bed at [facility] and to notify patient. RN explained to patient that he has a bed at [facility] and patient is refusing to go.   Dr Son Connolly notified of above

## 2021-08-29 NOTE — PROGRESS NOTES
Dr Nicole Nava via [employer] (covering for Dr Preeti Del Rio) to notify him that the patient has a bed at [facility].

## 2021-08-29 NOTE — PROGRESS NOTES
Pt states he will put his bipap on later when he is ready for bed. States he will call if assistance is needed. bipap on standby in pt room.

## 2021-08-29 NOTE — PLAN OF CARE
Problem: Pain:  Goal: Pain level will decrease  Description: Pain level will decrease  8/29/2021 1636 by Fabian Thomas RN  Outcome: Met This Shift     Problem: Skin Integrity:  Goal: Will show no infection signs and symptoms  Description: Will show no infection signs and symptoms  8/29/2021 1636 by Fabian Thomas RN  Outcome: Met This Shift     Problem: HEMODYNAMIC STATUS  Goal: Patient has stable vital signs and fluid balance  8/29/2021 1636 by Fabian Thomas RN  Outcome: Met This Shift

## 2021-08-29 NOTE — PROGRESS NOTES
Messaged Dr. Citlaly Juan and cardiology to notify about bed at [facility] and patient's refusal to go. Also asked if patient is ok to discharge from their standpoint.

## 2021-08-29 NOTE — PLAN OF CARE
Problem: Pain:  Goal: Pain level will decrease  Description: Pain level will decrease  Outcome: Met This Shift  Goal: Control of acute pain  Description: Control of acute pain  8/29/2021 0450 by Tal Caldera RN  Outcome: Met This Shift  8/28/2021 1659 by Ada Thompson RN  Outcome: Met This Shift  Goal: Control of chronic pain  Description: Control of chronic pain  Outcome: Met This Shift     Problem: Skin Integrity:  Goal: Will show no infection signs and symptoms  Description: Will show no infection signs and symptoms  Outcome: Met This Shift  Goal: Absence of new skin breakdown  Description: Absence of new skin breakdown  8/29/2021 0450 by Tal Caldera RN  Outcome: Met This Shift  8/28/2021 1659 by Ada Thompson RN  Outcome: Met This Shift     Problem: Airway Clearance - Ineffective:  Goal: Ability to maintain a clear airway will improve  Description: Ability to maintain a clear airway will improve  8/29/2021 0450 by Tal Caldera RN  Outcome: Met This Shift  8/28/2021 1659 by Ada Thompson RN  Outcome: Met This Shift     Problem: HEMODYNAMIC STATUS  Goal: Patient has stable vital signs and fluid balance  8/29/2021 0450 by Tal Caldera RN  Outcome: Met This Shift  8/28/2021 1659 by Ada Thompson RN  Outcome: Met This Shift     Problem: FLUID AND ELECTROLYTE IMBALANCE  Goal: Fluid and electrolyte balance are achieved/maintained  Outcome: Met This Shift

## 2021-08-29 NOTE — PROGRESS NOTES
Department of Internal Medicine  Nephrology Progress Note      Events reviewed. SUBJECTIVE:  We are following Jia Bryant for volume management. Reports feeling somewhat better.     Physical Exam:    VITALS:  /68   Pulse 72   Temp 96.5 °F (35.8 °C) (Temporal)   Resp 18   Ht 5' 11\" (1.803 m)   Wt 273 lb 1 oz (123.9 kg)   SpO2 99%   BMI 38.08 kg/m²   24HR INTAKE/OUTPUT:      Intake/Output Summary (Last 24 hours) at 8/29/2021 1036  Last data filed at 8/29/2021 0553  Gross per 24 hour   Intake 480 ml   Output 4275 ml   Net -3795 ml       Constitutional:  Alert and oriented, NAD  HEENT:  Normocephalic, PERRL  Respiratory:  Expiratory wheezing throughout, on 4L NC  Cardiovascular/Edema:  IRRR, S1, S2  Gastrointestinal:  Soft, obese, nontender, nondistended  Neurologic:  Nonfocal, ROUSE  Skin:  Warm, dry, no lesions  Other:  2+ pitting edema BLE up into thighs, trace  sacral and abdominal wall edema     Scheduled Meds:   potassium bicarb-citric acid  40 mEq Oral Once    bumetanide  2 mg Oral BID    predniSONE  40 mg Oral Daily    hydrocortisone   Topical BID    insulin lispro  0-12 Units Subcutaneous TID WC    insulin lispro  0-6 Units Subcutaneous Nightly    guaiFENesin  400 mg Oral Q6H    budesonide  1 mg Nebulization BID    And    Arformoterol Tartrate  15 mcg Nebulization BID    sodium chloride (Inhalant)  4 mL Nebulization BID    apixaban  5 mg Oral BID    atorvastatin  20 mg Oral Daily    dilTIAZem  240 mg Oral BID    flecainide  100 mg Oral BID    fluticasone  1 spray Each Nostril Daily    ipratropium-albuterol  1 vial Inhalation Q4H    [Held by provider] losartan  50 mg Oral Daily    sodium chloride flush  5-40 mL IntraVENous 2 times per day    aspirin  81 mg Oral Daily     Continuous Infusions:   dextrose      sodium chloride       PRN Meds:.busPIRone, glucose, dextrose, glucagon (rDNA), dextrose, sodium chloride (Inhalant), perflutren lipid microspheres, guaiFENesin-dextromethorphan, albuterol, sodium chloride flush, sodium chloride, ondansetron **OR** ondansetron, polyethylene glycol, acetaminophen **OR** acetaminophen    DATA:    CBC:   Lab Results   Component Value Date    WBC 18.5 08/25/2021    RBC 3.45 08/25/2021    HGB 10.8 08/25/2021    HCT 33.2 08/25/2021    MCV 96.2 08/25/2021    MCH 31.3 08/25/2021    MCHC 32.5 08/25/2021    RDW 12.8 08/25/2021     08/25/2021    MPV 12.1 08/25/2021     CMP:    Lab Results   Component Value Date     08/29/2021    K 2.9 08/29/2021    K 4.3 08/09/2021    CL 96 08/29/2021    CO2 34 08/29/2021    BUN 45 08/29/2021    CREATININE 1.1 08/29/2021    GFRAA >60 08/29/2021    LABGLOM >60 08/29/2021    GLUCOSE 147 08/29/2021    GLUCOSE 100 05/19/2011    PROT 5.6 08/22/2021    LABALBU 3.5 08/22/2021    LABALBU 4.6 05/19/2011    CALCIUM 9.3 08/29/2021    BILITOT 0.5 08/22/2021    ALKPHOS 48 08/22/2021    AST 63 08/22/2021     08/22/2021     Magnesium:    Lab Results   Component Value Date    MG 2.3 08/29/2021     Phosphorus:    Lab Results   Component Value Date    PHOS 3.9 08/26/2021       Radiology Review:      CTA chest with IV contrast 8/8/21   No evidence of pulmonary embolism or acute pulmonary abnormality.       Prominent pericardial fat accounts for the abnormality seen on radiograph.      CXR 8/23/21   Hyperinflation.  No evidence of acute process. BRIEF SUMMARY OF INITIAL CONSULT:    Briefly, Mr. Kamala Cervantes is a 63year old male with a PMH of HTN, HFpEF 70-75% with stage II DD, COPD, PARKER, atrial flutter s/p cardioversion and ablation on chronic anticoagulation on apixaban, obesity, who was admitted on August 7, 2021 after presenting to the ER with shortness of breath. He was admitted for treatment of acute exacerbation of COPD and was started on methylprednisolone and inhalers. He continued with shortness of breath despite steroids and was then started on Lasix.  He states that his swelling has worsened since he has been in the hospital despite excellent urine output with Lasix, his fluid balance is -36 L. He has also gained 30 pounds since admission. We are consulted for volume management. IMPRESSION/RECOMMENDATIONS:      1. Decompensated HFpEF 70-75% with stage II DD, pro->>1,336>>>469. Edema quite improved. Continues to have very large urine output    2. Hypokalemia, secondary to diuretics, to replace  3. Anasarca, secondary to #1, continues to have large urine output with negative fluid balance. 4. HTN, holding losartan while we achieve diuresis    5. Alkalemia (PH: 6.236)  Metabolic alkalosis (contraction alkalosis) and respiratory acidosis  --------------------------------------------------------------  6. Atrial flutter, s/ cardioversion and ablation in the past, on diltiazem and apixaban   7. COPD exacerbation, on prednisone  8.  Anemia, normocytic, likely iron deficiency TIBC 209, ferritin 313, iron 113, iron saturation 54     Plan:     · Continue Bumex 2 mg p.o. twice daily  · Spironolactone 25 mg x 1  · KCl 40 meq p.o. every 4 hours x 2 doses  · Continue to monitor renal function, BMP at 4 PM  · Continue to monitor electrolytes         Electronically signed by Nicholas Schwartz MD on 8/29/2021 at 10:36 AM

## 2021-08-29 NOTE — PROGRESS NOTES
INPATIENT CARDIOLOGY FOLLOW-UP    Name: Anoop Walton    Age: 63 y.o. Date of Admission: 8/7/2021  9:53 PM    Date of Service: 8/29/2021    Chief Complaint: Follow-up for acute on chronic HFpEF    Interim History:  Currently with no chest pain or palpitations. +ongoing SOB and LE edema (improved) -- maintains, \"feel better than when I came in\". SR with episodes of AF on monitoring this admission (currently SR). Reported I/O's net negative 50.5 L thus far. Decision pending re: bronchoscopy.     Review of Systems:   Cardiac: As per HPI  General: No fever, chills  Pulmonary: As per HPI  HEENT: No visual disturbances, difficult swallowing  GI: No nausea, vomiting  : No dysuria, hematuria  Endocrine: No thyroid disease or DM  Musculoskeletal: ROUSE x 4, no focal motor deficits  Skin: Intact, no rashes  Neuro: No headache, seizures  Psych: Currently with no depression, anxiety    Problem List:  Patient Active Problem List   Diagnosis    COPD [facility]chronic obstructive pulmonary disease) ([facility] [address]    Essential hypertension    Adrenal adenoma    Class 2 obesity due to excess calories with serious comorbidity and body mass index (BMI) of 35.0 to 35.9 in adult    Anticoagulation management encounter    Typical atrial flutter ([facility] [address]    Anxiety    Status post catheter ablation of atrial flutter    Persistent atrial fibrillation (HCC)    ETOH abuse    COPD with acute exacerbation (HCC)       Allergies:  No Known Allergies    Current Medications:  Current Facility-Administered Medications   Medication Dose Route Frequency Provider Last Rate Last Admin    bumetanide (BUMEX) tablet 2 mg  2 mg Oral BID José Vigil MD   2 mg at 08/29/21 0750    predniSONE (DELTASONE) tablet 40 mg  40 mg Oral Daily Ryan Danielle MD   40 mg at 08/29/21 0750    hydrocortisone 1 % cream   Topical BID Chadd Lloyd MD   Given at 08/29/21 0750    busPIRone (BUSPAR) tablet 5 mg  5 mg Oral TID PRN Mekhi Ascencio MD   5 mg at 08/28/21 5769  glucose (GLUTOSE) 40 % oral gel 15 g  15 g Oral PRN Karen Olsen MD        dextrose 50 % IV solution  12.5 g IntraVENous PRN Karen Olsen MD        glucagon (rDNA) injection 1 mg  1 mg IntraMUSCular PRN Karen Olsen MD        dextrose 5 % solution  100 mL/hr IntraVENous PRN Karen Olsen MD        insulin lispro (HUMALOG) injection vial 0-12 Units  0-12 Units Subcutaneous TID WC Karen Olsen MD   10 Units at 08/29/21 1126    insulin lispro (HUMALOG) injection vial 0-6 Units  0-6 Units Subcutaneous Nightly Karen Olsen MD   1 Units at 08/28/21 2237    sodium chloride (Inhalant) 3 % nebulizer solution 4 mL  4 mL Nebulization PRN Ryan Orellana MD        guaiFENesin tablet 400 mg  400 mg Oral Q6H Ryan Danielle MD   400 mg at 08/29/21 1127    perflutren lipid microspheres (DEFINITY) injection 1.65 mg  1.5 mL IntraVENous ONCE PRN Ryan Orellana MD        budesonide (PULMICORT) nebulizer suspension 1,000 mcg  1 mg Nebulization BID Mk Humphreys MD   1,000 mcg at 08/29/21 0858    And    Arformoterol Tartrate (BROVANA) nebulizer solution 15 mcg  15 mcg Nebulization BID Ryan Orellana MD   15 mcg at 08/29/21 0858    guaiFENesin-dextromethorphan (ROBITUSSIN DM) 100-10 MG/5ML syrup 5 mL  5 mL Oral Q4H PRN STEPH Pineda   5 mL at 08/28/21 2236    sodium chloride (Inhalant) 3 % nebulizer solution 4 mL  4 mL Nebulization BID Corey Campbell MD   4 mL at 08/29/21 0858    albuterol (PROVENTIL) nebulizer solution 2.5 mg  2.5 mg Nebulization Q6H PRN STEPH Garcia        apixaban (ELIQUIS) tablet 5 mg  5 mg Oral BID STEPH Garcia   5 mg at 08/29/21 0750    atorvastatin (LIPITOR) tablet 20 mg  20 mg Oral Daily STEPH Garcia   20 mg at 08/29/21 0750    dilTIAZem (CARDIZEM CD) extended release capsule 240 mg  240 mg Oral BID STEPH Garcia   240 mg at 08/29/21 0750    flecainide (TAMBOCOR) tablet 100 mg  100 mg Oral BID STEPH Garcia   100 mg at 08/29/21 0750    fluticasone (FLONASE) 50 MCG/ACT nasal spray 1 spray  1 spray Each Nostril Daily STEPH Ramirez   1 spray at 08/29/21 0834    ipratropium-albuterol (DUONEB) nebulizer solution 3 mL  1 vial Inhalation Q4H STEPH Ramirez   3 mL at 08/29/21 1232    [Held by provider] losartan (COZAAR) tablet 50 mg  50 mg Oral Daily STEPH Ramirez   50 mg at 08/26/21 1023    sodium chloride flush 0.9 % injection 5-40 mL  5-40 mL IntraVENous 2 times per day STEPH Ramirez   10 mL at 08/29/21 0835    sodium chloride flush 0.9 % injection 5-40 mL  5-40 mL IntraVENous PRN STEPH Ramirez   10 mL at 08/27/21 1141    0.9 % sodium chloride infusion  25 mL IntraVENous PRN STEPH Ramirez        ondansetron (ZOFRAN-ODT) disintegrating tablet 4 mg  4 mg Oral Q8H PRN STEPH Ramirez        Or    ondansetron (ZOFRAN) injection 4 mg  4 mg IntraVENous Q6H PRN STEPH Ramirez        polyethylene glycol (GLYCOLAX) packet 17 g  17 g Oral Daily PRN STEPH Ramirez        acetaminophen (TYLENOL) tablet 650 mg  650 mg Oral Q6H PRN STEPH Ramirez   650 mg at 08/26/21 1029    Or    acetaminophen (TYLENOL) suppository 650 mg  650 mg Rectal Q6H PRN STEPH Ramirez        aspirin chewable tablet 81 mg  81 mg Oral Daily April Nome-Toni, APRN - CNP   81 mg at 08/29/21 0750      dextrose      sodium chloride         Physical Exam:  /68   Pulse 72   Temp 96.5 °F (35.8 °C) (Temporal)   Resp 18   Ht 5' 11\" (1.803 m)   Wt 273 lb 1 oz (123.9 kg)   SpO2 97%   BMI 38.08 kg/m²   Wt Readings from Last 3 Encounters:   08/29/21 273 lb 1 oz (123.9 kg)   01/22/20 277 lb (125.6 kg)   01/02/20 271 lb (122.9 kg)     Appearance: Awake, alert, no acute respiratory distress  Skin: Intact, no rash  Head: Normocephalic, atraumatic  Eyes: EOMI, no conjunctival erythema  ENMT: No pharyngeal erythema, MMM, no rhinorrhea  Neck: Supple, no carotid bruits  Lungs: B/L wheezing, no rales  Cardiac: Regular rate and rhythm, no murmurs apparent  Abdomen: Soft, nontender, +bowel sounds  Extremities: Moves all extremities x 4, ++lower extremity edema  Neurologic: No focal motor deficits apparent, normal mood and affect    Intake/Output:    Intake/Output Summary (Last 24 hours) at 8/29/2021 1426  Last data filed at 8/29/2021 1249  Gross per 24 hour   Intake 1010 ml   Output 3775 ml   Net -2765 ml     I/O this shift:  In: 650 [P.O.:650]  Out: 1675 [Urine:1675]    Laboratory Tests:  Recent Labs     08/27/21  1629 08/28/21  0506 08/29/21  0640    137 138   K 3.9 3.4* 2.9*   CL 94* 95* 96*   CO2 33* 30* 34*   BUN 43* 44* 45*   CREATININE 1.0 1.2 1.1   GLUCOSE 233* 216* 147*   CALCIUM 9.2 8.8 9.3     Lab Results   Component Value Date    MG 2.3 08/29/2021     Lab Results   Component Value Date    CKTOTAL 51 11/13/2010    CKMB 0.5 11/13/2010    TROPONINI <0.01 02/15/2018    TROPONINI <0.01 02/14/2018    TROPONINI <0.01 02/14/2018     No results for input(s): CKTOTAL, CKMB, CKMBINDEX, TROPHS in the last 72 hours. Lab Results   Component Value Date    INR 1.2 02/04/2018    INR 0.9 11/13/2010    PROTIME 13.8 (H) 02/04/2018    PROTIME 11.6 11/13/2010     Lab Results   Component Value Date    TSH 0.942 02/14/2018     No results found for: LABA1C  No results found for: EAG  Lab Results   Component Value Date    CHOL 205 (H) 04/08/2018    CHOL 162 01/28/2017     Lab Results   Component Value Date    TRIG 80 04/08/2018    TRIG 61 01/28/2017     Lab Results   Component Value Date    HDL 80 04/08/2018    HDL 50 01/28/2017     Lab Results   Component Value Date    LDLCALC 109 (H) 04/08/2018    LDLCALC 100 (H) 01/28/2017     Lab Results   Component Value Date    LABVLDL 16 04/08/2018    LABVLDL 12 01/28/2017     No results found for: CHOLHDLRATIO  No results for input(s): PROBNP in the last 72 hours. Cardiac Tests:  Telemetry reviewed (date: 8/29/2021): SR/AF rate 70's-90's (currently SR)    CXR 8/7/2021:  Impression  Cardiomegaly.   Large opacity in the right cardiophrenic angle.  Although this may represent  large epicardial fat pad, given the interval change since the prior  examination further evaluation and characterization with CT of the chest is  recommended. Mild hyperinflation, suggest COPD.     Chest CTA 8/8/2021:  Impression  No evidence of pulmonary embolism or acute pulmonary abnormality. Prominent pericardial fat accounts for the abnormality seen on radiograph.     Cardiac catheterization 11/2010 ([facility]): LMT: normal. LAD: normal, gives off one large bifurcating diagonal as it courses to but ends at the apex. LCx: nondominant, gives off one large OM1, only trivial distal disease. RCA: Dominant, large, minimal disease distally.     Echocardiogram 11/2010 ([facility]): EF 55% with questionable RV dilation. Trivial to small pericardial effusion. Small echodense space near apex (? Localized effusion). Trivial MR and TR.      WYATT: 2/7/18 ([place])   Cardiac rhythm: atrial flutter   Low normal left ventricular ejection fraction.   Moderately dilated left atrium.   No evidence of a left atrial appendage thrombus.   No evidence of interatrial shunting on bubble study.   Borderline dilated right ventricle with normal right ventricular function.   Mild-moderate mitral regurgitation.     WYATT: 2/15/18 Rosalia Serna)   Low normal left ventricular systolic function.   Mild to moderate mitral regurgitation.     TTE (Dr. Donald Rogel, 8/12/2021)  Summary   Technically limited study.   Normal left ventricular size, wall thickness, wall motion, and systolic   function.   Estimated left ventricle ejection fraction 70+/-5 %.   There is doppler evidence of stage II diastolic dysfunction.   Mildly dilated right ventricle.   Right ventricle global systolic function is normal .   Normal sized left atrium.   No significant valvular abnormalities noted.     Lexiscan Stress Test 8/13/2021:  FINDINGS: The overall quality of the study was fair.   Left ventricular cavity size was noted to be dilated on both the  stress and the resting images but there was no transient ischemic  dilatation after stress  Rotational analog analysis demonstrated abnormal patient motion and  there was marked increase in tracer uptake in the abdominal organs  with the liver overshadowing the bottom of the heart  A severe defect was present in the inferior wall extending into the  lateral apical wall(s) that was moderate to largesized by  quantification. The resting images showed no change   Gated SPECT left ventricular ejection fraction was calculated to be  66%, with normal myocardial contractility and wall motion. Impression  The myocardial perfusion imaging was probably normal with the large  fixed inferior/lateral apical wall defect being more consistent with  attenuation artifact and less likely the result of a myocardial  infarction especially with the normal contractility of the inferior  wall and the lateral apical wall. More importantly, there did not  appear to be any evidence of stress-induced ischemia on this study  Overall left ventricular systolic function was normal with no regional  wall motion abnormality  Low risk general pharmacologic stress test.    ASSESSMENT / PLAN:  1. Acute on chronic heart failure with preserved ejection fraction  2. Acute COPD exacerbation. Known history of very severe COPD. Viral panel negative. Pulmonology following. 3. Acute on chronic respiratory failure  4. Acute kidney injury -- improved, most recent Cr 1.4 --> 1.1 --> 1.2 --> 1.1  5. Leukocytosis. 6. Paroxysmal atrial fibrillation/typical flutter status-post AF ablation in April 2018. Currently on Eliquis, cardizem, and flecainide therapy. Follows with [facility]. 7. Hypertension -- sub-optimal control in the past, most recent /71  8. Hyperlipidemia, on statin therapy. 9. Obstructive sleep apnea, compliant with CPAP. 10. Morbid obesity / BMI 40.8  11. History of alcohol abuse.   15. Former tobacco abuse (2 PPD for many years, quit in 8/2017)  13. History of pericarditis in 11/2010  14.  Hypokalemia -- K 2.9    - Recent cardiac testing reviewed  - Monitor I/O's, renal function, and electrolytes with ongoing diuresis (reproted I/O's net negative 50.5 L) --> Cr stable, nephrology note reviewed, replace K today  - Continue current medications otherwise  - Treatment of PARKER  - Aggressive risk factor modifications  - Telemetry reviewed (sinus rhythm with episodes of AF earlier this admission -- currently SR)  - Care per pulmonary --> decision re: bronchoscopy pending; patient declining transfer to [facility] today  - Case discussed with nursing staff today    Chacha Cedeno MD  [facility] ([facility]) Cardiology

## 2021-08-30 VITALS
HEART RATE: 78 BPM | RESPIRATION RATE: 18 BRPM | SYSTOLIC BLOOD PRESSURE: 122 MMHG | OXYGEN SATURATION: 97 % | HEIGHT: 71 IN | DIASTOLIC BLOOD PRESSURE: 56 MMHG | WEIGHT: 265 LBS | TEMPERATURE: 96.8 F | BODY MASS INDEX: 37.1 KG/M2

## 2021-08-30 LAB
ANION GAP SERPL CALCULATED.3IONS-SCNC: 5 MMOL/L (ref 7–16)
BUN BLDV-MCNC: 39 MG/DL (ref 6–20)
CALCIUM SERPL-MCNC: 9.1 MG/DL (ref 8.6–10.2)
CHLORIDE BLD-SCNC: 94 MMOL/L (ref 98–107)
CO2: 39 MMOL/L (ref 22–29)
CREAT SERPL-MCNC: 1 MG/DL (ref 0.7–1.2)
GFR AFRICAN AMERICAN: >60
GFR NON-AFRICAN AMERICAN: >60 ML/MIN/1.73
GLUCOSE BLD-MCNC: 146 MG/DL (ref 74–99)
HCT VFR BLD CALC: 29.5 % (ref 37–54)
HEMOGLOBIN: 9.9 G/DL (ref 12.5–16.5)
MAGNESIUM: 2.1 MG/DL (ref 1.6–2.6)
MCH RBC QN AUTO: 32.2 PG (ref 26–35)
MCHC RBC AUTO-ENTMCNC: 33.6 % (ref 32–34.5)
MCV RBC AUTO: 96.1 FL (ref 80–99.9)
METER GLUCOSE: 181 MG/DL (ref 74–99)
METER GLUCOSE: 196 MG/DL (ref 74–99)
METER GLUCOSE: 259 MG/DL (ref 74–99)
METER GLUCOSE: 287 MG/DL (ref 74–99)
PDW BLD-RTO: 12.7 FL (ref 11.5–15)
PLATELET # BLD: 122 E9/L (ref 130–450)
PMV BLD AUTO: 11.3 FL (ref 7–12)
POTASSIUM SERPL-SCNC: 4.1 MMOL/L (ref 3.5–5)
RBC # BLD: 3.07 E12/L (ref 3.8–5.8)
SODIUM BLD-SCNC: 138 MMOL/L (ref 132–146)
WBC # BLD: 10.7 E9/L (ref 4.5–11.5)

## 2021-08-30 PROCEDURE — 94660 CPAP INITIATION&MGMT: CPT

## 2021-08-30 PROCEDURE — 6370000000 HC RX 637 (ALT 250 FOR IP): Performed by: INTERNAL MEDICINE

## 2021-08-30 PROCEDURE — 6370000000 HC RX 637 (ALT 250 FOR IP): Performed by: PHYSICIAN ASSISTANT

## 2021-08-30 PROCEDURE — 2580000003 HC RX 258: Performed by: INTERNAL MEDICINE

## 2021-08-30 PROCEDURE — 94640 AIRWAY INHALATION TREATMENT: CPT

## 2021-08-30 PROCEDURE — 82962 GLUCOSE BLOOD TEST: CPT

## 2021-08-30 PROCEDURE — 83735 ASSAY OF MAGNESIUM: CPT

## 2021-08-30 PROCEDURE — 6370000000 HC RX 637 (ALT 250 FOR IP): Performed by: NURSE PRACTITIONER

## 2021-08-30 PROCEDURE — 85027 COMPLETE CBC AUTOMATED: CPT

## 2021-08-30 PROCEDURE — 36415 COLL VENOUS BLD VENIPUNCTURE: CPT

## 2021-08-30 PROCEDURE — 99232 SBSQ HOSP IP/OBS MODERATE 35: CPT | Performed by: INTERNAL MEDICINE

## 2021-08-30 PROCEDURE — 99239 HOSP IP/OBS DSCHRG MGMT >30: CPT | Performed by: INTERNAL MEDICINE

## 2021-08-30 PROCEDURE — 80048 BASIC METABOLIC PNL TOTAL CA: CPT

## 2021-08-30 PROCEDURE — 6360000002 HC RX W HCPCS: Performed by: INTERNAL MEDICINE

## 2021-08-30 PROCEDURE — 2580000003 HC RX 258: Performed by: PHYSICIAN ASSISTANT

## 2021-08-30 RX ORDER — ACETAZOLAMIDE 250 MG/1
250 TABLET ORAL ONCE
Status: COMPLETED | OUTPATIENT
Start: 2021-08-30 | End: 2021-08-30

## 2021-08-30 RX ORDER — POTASSIUM CHLORIDE 20 MEQ/1
20 TABLET, EXTENDED RELEASE ORAL
Status: DISCONTINUED | OUTPATIENT
Start: 2021-08-31 | End: 2021-08-30 | Stop reason: HOSPADM

## 2021-08-30 RX ORDER — BUMETANIDE 2 MG/1
2 TABLET ORAL 2 TIMES DAILY
Qty: 30 TABLET | Refills: 3 | Status: SHIPPED | OUTPATIENT
Start: 2021-08-30

## 2021-08-30 RX ORDER — GLUCOSAMINE HCL/CHONDROITIN SU 500-400 MG
CAPSULE ORAL
Qty: 100 STRIP | Refills: 1 | Status: SHIPPED | OUTPATIENT
Start: 2021-08-30 | End: 2021-09-09

## 2021-08-30 RX ORDER — POTASSIUM CHLORIDE 20 MEQ/1
20 TABLET, EXTENDED RELEASE ORAL
Qty: 60 TABLET | Refills: 3 | Status: ON HOLD | OUTPATIENT
Start: 2021-08-31 | End: 2021-10-26 | Stop reason: HOSPADM

## 2021-08-30 RX ORDER — PREDNISONE 10 MG/1
TABLET ORAL
Qty: 15 TABLET | Refills: 0 | Status: ON HOLD | OUTPATIENT
Start: 2021-08-30 | End: 2021-09-18 | Stop reason: HOSPADM

## 2021-08-30 RX ORDER — LANCETS 30 GAUGE
1 EACH MISCELLANEOUS 2 TIMES DAILY
Qty: 300 EACH | Refills: 1 | Status: SHIPPED | OUTPATIENT
Start: 2021-08-30 | End: 2021-09-09

## 2021-08-30 RX ORDER — BLOOD-GLUCOSE METER
1 KIT MISCELLANEOUS DAILY
Qty: 1 KIT | Refills: 0 | Status: SHIPPED | OUTPATIENT
Start: 2021-08-30 | End: 2021-09-09

## 2021-08-30 RX ADMIN — BUMETANIDE 2 MG: 1 TABLET ORAL at 08:54

## 2021-08-30 RX ADMIN — GUAIFENESIN 400 MG: 400 TABLET ORAL at 12:03

## 2021-08-30 RX ADMIN — APIXABAN 5 MG: 5 TABLET, FILM COATED ORAL at 08:54

## 2021-08-30 RX ADMIN — BUDESONIDE 1000 MCG: 0.5 SUSPENSION RESPIRATORY (INHALATION) at 08:22

## 2021-08-30 RX ADMIN — SODIUM CHLORIDE SOLN NEBU 3% 4 ML: 3 NEBU SOLN at 08:22

## 2021-08-30 RX ADMIN — GUAIFENESIN 400 MG: 400 TABLET ORAL at 16:48

## 2021-08-30 RX ADMIN — DILTIAZEM HYDROCHLORIDE 240 MG: 240 CAPSULE, EXTENDED RELEASE ORAL at 08:54

## 2021-08-30 RX ADMIN — IPRATROPIUM BROMIDE AND ALBUTEROL SULFATE 3 ML: .5; 2.5 SOLUTION RESPIRATORY (INHALATION) at 15:37

## 2021-08-30 RX ADMIN — FLECAINIDE ACETATE 100 MG: 100 TABLET ORAL at 08:54

## 2021-08-30 RX ADMIN — IPRATROPIUM BROMIDE AND ALBUTEROL SULFATE 3 ML: .5; 2.5 SOLUTION RESPIRATORY (INHALATION) at 12:38

## 2021-08-30 RX ADMIN — INSULIN LISPRO 6 UNITS: 100 INJECTION, SOLUTION INTRAVENOUS; SUBCUTANEOUS at 12:03

## 2021-08-30 RX ADMIN — IPRATROPIUM BROMIDE AND ALBUTEROL SULFATE 3 ML: .5; 2.5 SOLUTION RESPIRATORY (INHALATION) at 08:21

## 2021-08-30 RX ADMIN — FLUTICASONE PROPIONATE 1 SPRAY: 50 SPRAY, METERED NASAL at 08:59

## 2021-08-30 RX ADMIN — ASPIRIN 81 MG CHEWABLE TABLET 81 MG: 81 TABLET CHEWABLE at 08:54

## 2021-08-30 RX ADMIN — GUAIFENESIN 400 MG: 400 TABLET ORAL at 05:44

## 2021-08-30 RX ADMIN — PREDNISONE 40 MG: 20 TABLET ORAL at 08:54

## 2021-08-30 RX ADMIN — ACETAZOLAMIDE 250 MG: 250 TABLET ORAL at 13:03

## 2021-08-30 RX ADMIN — Medication 10 ML: at 08:54

## 2021-08-30 RX ADMIN — ARFORMOTEROL TARTRATE 15 MCG: 15 SOLUTION RESPIRATORY (INHALATION) at 08:22

## 2021-08-30 RX ADMIN — ATORVASTATIN CALCIUM 20 MG: 20 TABLET, FILM COATED ORAL at 08:54

## 2021-08-30 RX ADMIN — INSULIN LISPRO 6 UNITS: 100 INJECTION, SOLUTION INTRAVENOUS; SUBCUTANEOUS at 16:48

## 2021-08-30 RX ADMIN — INSULIN LISPRO 2 UNITS: 100 INJECTION, SOLUTION INTRAVENOUS; SUBCUTANEOUS at 05:47

## 2021-08-30 NOTE — PROGRESS NOTES
Department of Internal Medicine  Nephrology Progress Note      Events reviewed. SUBJECTIVE:  We are following Rosa Leiva for volume management. Reports no complaints.      Physical Exam:    VITALS:  BP (!) 122/56   Pulse 78   Temp 96.8 °F (36 °C) (Temporal)   Resp 18   Ht 5' 11\" (1.803 m)   Wt 265 lb (120.2 kg)   SpO2 97%   BMI 36.96 kg/m²   24HR INTAKE/OUTPUT:      Intake/Output Summary (Last 24 hours) at 8/30/2021 1049  Last data filed at 8/30/2021 0544  Gross per 24 hour   Intake 520 ml   Output 5825 ml   Net -5305 ml       Constitutional:  Alert and oriented, NAD  HEENT:  Normocephalic, PERRL  Respiratory:  Expiratory wheezing throughout, on 4L NC  Cardiovascular/Edema:  IRRR, S1, S2  Gastrointestinal:  Soft, obese, nontender, nondistended  Neurologic:  Nonfocal, ROUSE  Skin:  Warm, dry, no lesions  Other:  2+ pitting edema BLE up into thighs, trace  sacral and abdominal wall edema     Scheduled Meds:   acetaZOLAMIDE  250 mg Oral Once    bumetanide  2 mg Oral BID    predniSONE  40 mg Oral Daily    hydrocortisone   Topical BID    insulin lispro  0-12 Units SubCUTAneous TID WC    insulin lispro  0-6 Units SubCUTAneous Nightly    guaiFENesin  400 mg Oral Q6H    budesonide  1 mg Nebulization BID    And    Arformoterol Tartrate  15 mcg Nebulization BID    sodium chloride (Inhalant)  4 mL Nebulization BID    apixaban  5 mg Oral BID    atorvastatin  20 mg Oral Daily    dilTIAZem  240 mg Oral BID    flecainide  100 mg Oral BID    fluticasone  1 spray Each Nostril Daily    ipratropium-albuterol  1 vial Inhalation Q4H    [Held by provider] losartan  50 mg Oral Daily    sodium chloride flush  5-40 mL IntraVENous 2 times per day    aspirin  81 mg Oral Daily     Continuous Infusions:   dextrose      sodium chloride       PRN Meds:.busPIRone, glucose, dextrose, glucagon (rDNA), dextrose, sodium chloride (Inhalant), perflutren lipid microspheres, guaiFENesin-dextromethorphan, albuterol, sodium chloride flush, sodium chloride, ondansetron **OR** ondansetron, polyethylene glycol, acetaminophen **OR** acetaminophen    DATA:    CBC:   Lab Results   Component Value Date    WBC 10.7 08/30/2021    RBC 3.07 08/30/2021    HGB 9.9 08/30/2021    HCT 29.5 08/30/2021    MCV 96.1 08/30/2021    MCH 32.2 08/30/2021    MCHC 33.6 08/30/2021    RDW 12.7 08/30/2021     08/30/2021    MPV 11.3 08/30/2021     CMP:    Lab Results   Component Value Date     08/30/2021    K 4.1 08/30/2021    K 4.3 08/09/2021    CL 94 08/30/2021    CO2 39 08/30/2021    BUN 39 08/30/2021    CREATININE 1.0 08/30/2021    GFRAA >60 08/30/2021    LABGLOM >60 08/30/2021    GLUCOSE 146 08/30/2021    GLUCOSE 100 05/19/2011    PROT 5.6 08/22/2021    LABALBU 3.5 08/22/2021    LABALBU 4.6 05/19/2011    CALCIUM 9.1 08/30/2021    BILITOT 0.5 08/22/2021    ALKPHOS 48 08/22/2021    AST 63 08/22/2021     08/22/2021     Magnesium:    Lab Results   Component Value Date    MG 2.1 08/30/2021     Phosphorus:    Lab Results   Component Value Date    PHOS 3.9 08/26/2021       Radiology Review:      CTA chest with IV contrast 8/8/21   No evidence of pulmonary embolism or acute pulmonary abnormality.       Prominent pericardial fat accounts for the abnormality seen on radiograph.      CXR 8/23/21   Hyperinflation.  No evidence of acute process. BRIEF SUMMARY OF INITIAL CONSULT:    Briefly, Mr. Aurelia Draper is a 63year old male with a PMH of HTN, HFpEF 70-75% with stage II DD, COPD, PARKER, atrial flutter s/p cardioversion and ablation on chronic anticoagulation on apixaban, obesity, who was admitted on August 7, 2021 after presenting to the ER with shortness of breath. He was admitted for treatment of acute exacerbation of COPD and was started on methylprednisolone and inhalers. He continued with shortness of breath despite steroids and was then started on Lasix.  He states that his swelling has worsened since he has been in the hospital despite excellent urine output with Lasix, his fluid balance is -36 L. He has also gained 30 pounds since admission. We are consulted for volume management. IMPRESSION/RECOMMENDATIONS:      1. Decompensated HFpEF 70-75% with stage II DD, pro->>1,336>>>469. Edema quite improved. Continues to have  large urine output, on oral diuretics. 2. Hypokalemia, secondary to diuretics. Potassium level improved. 3. Anasarca, secondary to #1, continues to have large urine output with negative fluid balance. 4. HTN, holding losartan while we achieve diuresis    5. Alkalemia (PH: 7.876) with metabolic alkalosis (contraction alkalosis) and respiratory acidosis  --------------------------------------------------------------  6. Atrial flutter, s/ cardioversion and ablation in the past, on diltiazem and apixaban   7. COPD exacerbation, on prednisone  8.  Anemia, normocytic, likely iron deficiency TIBC 209, ferritin 313, iron 113, iron saturation 54     Plan:     · Continue Bumex 2 mg p.o. twice daily  · Acetazolamide 250 mg PO x1 dose  · KCl 20 mEq PO daily   · Continue to monitor renal function  · Continue to monitor electrolytes  · OK to discharge from renal point of view  · Follow up in our office in 2-3 weeks  · BMP weekly x2 weeks       Electronically signed by SERA Ko CNP on 8/30/2021 at 10:54 AM

## 2021-08-30 NOTE — CARE COORDINATION
Met with patient at bedside to determine discharge needs. Patient is independent, currently on 4L of O2 at baseline and states he does not need home health care services. Spoke with patient's wife over the phone and she reports she will transport home when medically ready for discharge.     Stacy Gtz, MSW, LSW [phone]

## 2021-08-30 NOTE — PROGRESS NOTES
INPATIENT CARDIOLOGY FOLLOW-UP    Name: Latonia Kim    Age: 63 y.o. Date of Admission: 8/7/2021  9:53 PM    Date of Service: 8/30/2021    Chief Complaint: Follow-up for acute on chronic HFpEF    Interim History:  Currently with no chest pain or palpitations. +ongoing SOB and LE edema (improved) -- maintains, \"feel better than when I came in\". SR with episodes of AF on monitoring this admission (currently SR). Reported I/O's net negative 52.3 L thus far. Work-up ongoing for possible bronchoscopy (patient does not want to proceed and he was deemed high risk by anesthesia).     Review of Systems:   Cardiac: As per HPI  General: No fever, chills  Pulmonary: As per HPI  HEENT: No visual disturbances, difficult swallowing  GI: No nausea, vomiting  : No dysuria, hematuria  Endocrine: No thyroid disease or DM  Musculoskeletal: ROUSE x 4, no focal motor deficits  Skin: Intact, no rashes  Neuro: No headache, seizures  Psych: Currently with no depression, anxiety    Problem List:  Patient Active Problem List   Diagnosis    COPD (chronic obstructive pulmonary disease) ([address]    Essential hypertension    Adrenal adenoma    Class 2 obesity due to excess calories with serious comorbidity and body mass index (BMI) of 35.0 to 35.9 in adult    Anticoagulation management encounter    Typical atrial flutter ([address]    Anxiety    Status post catheter ablation of atrial flutter    Persistent atrial fibrillation ([address]    ETOH abuse    COPD with acute exacerbation (HCC)       Allergies:  No Known Allergies    Current Medications:  Current Facility-Administered Medications   Medication Dose Route Frequency Provider Last Rate Last Admin    acetaZOLAMIDE (DIAMOX) tablet 250 mg  250 mg Oral Once Evelia Colón MD        [START ON 8/31/2021] potassium chloride (KLOR-CON M) extended release tablet 20 mEq  20 mEq Oral Daily with breakfast Donavon Hernandez APRN - CNP        bumetanide (BUMEX) tablet 2 mg  2 mg Oral BID Magdalena Varner MD   2 mg at 08/30/21 0854    predniSONE (DELTASONE) tablet 40 mg  40 mg Oral Daily Ryan Weldon MD   40 mg at 08/30/21 0854    hydrocortisone 1 % cream   Topical BID Chadd Lloyd MD   Given at 08/29/21 2125    busPIRone (BUSPAR) tablet 5 mg  5 mg Oral TID PRN Mekhi Ascencio MD   5 mg at 08/29/21 2320    glucose (GLUTOSE) 40 % oral gel 15 g  15 g Oral PRN Mekhi Ascencio MD        dextrose 50 % IV solution  12.5 g IntraVENous PRN Mekhi Ascencio MD        glucagon (rDNA) injection 1 mg  1 mg IntraMUSCular PRN Mekhi Ascencio MD        dextrose 5 % solution  100 mL/hr IntraVENous PRN Mekhi Ascencio MD        insulin lispro (HUMALOG) injection vial 0-12 Units  0-12 Units SubCUTAneous TID WC Mekhi Ascencio MD   2 Units at 08/30/21 0547    insulin lispro (HUMALOG) injection vial 0-6 Units  0-6 Units SubCUTAneous Nightly Mekhi Ascencio MD   1 Units at 08/29/21 2127    sodium chloride (Inhalant) 3 % nebulizer solution 4 mL  4 mL Nebulization PRN Ryan Weldon MD        guaiFENesin tablet 400 mg  400 mg Oral Q6H Ryan Danielle MD   400 mg at 08/30/21 0544    perflutren lipid microspheres (DEFINITY) injection 1.65 mg  1.5 mL IntraVENous ONCE PRN Ryan Weldon MD        budesonide (PULMICORT) nebulizer suspension 1,000 mcg  1 mg Nebulization BID Michael Tapia MD   1,000 mcg at 08/30/21 5627    And    Arformoterol Tartrate (BROVANA) nebulizer solution 15 mcg  15 mcg Nebulization BID Michael Tapia MD   15 mcg at 08/30/21 0822    guaiFENesin-dextromethorphan (ROBITUSSIN DM) 100-10 MG/5ML syrup 5 mL  5 mL Oral Q4H PRN STEPH Gill   5 mL at 08/29/21 2317    sodium chloride (Inhalant) 3 % nebulizer solution 4 mL  4 mL Nebulization BID Chadd Lloyd MD   4 mL at 08/30/21 0822    albuterol (PROVENTIL) nebulizer solution 2.5 mg  2.5 mg Nebulization Q6H PRN STEPH Ramirez        apixaban (ELIQUIS) tablet 5 mg  5 mg Oral BID STEPH Ramirez   5 mg at 08/30/21 0854    atorvastatin (LIPITOR) tablet 20 mg  20 mg Oral Daily Luther Vidales PA   20 mg at 08/30/21 0854    dilTIAZem (CARDIZEM CD) extended release capsule 240 mg  240 mg Oral BID Luther Vidales PA   240 mg at 08/30/21 0854    flecainide (TAMBOCOR) tablet 100 mg  100 mg Oral BID STEPH White   100 mg at 08/30/21 0854    fluticasone (FLONASE) 50 MCG/ACT nasal spray 1 spray  1 spray Each Nostril Daily STEPH White   1 spray at 08/30/21 0859    ipratropium-albuterol (DUONEB) nebulizer solution 3 mL  1 vial Inhalation Q4H STEPH White   3 mL at 08/30/21 1711    [Held by provider] losartan (COZAAR) tablet 50 mg  50 mg Oral Daily STEPH White   50 mg at 08/26/21 1023    sodium chloride flush 0.9 % injection 5-40 mL  5-40 mL IntraVENous 2 times per day Luther Vidales PA   10 mL at 08/30/21 0854    sodium chloride flush 0.9 % injection 5-40 mL  5-40 mL IntraVENous PRN Luther Vidales PA   10 mL at 08/27/21 1141    0.9 % sodium chloride infusion  25 mL IntraVENous PRN STEPH White        ondansetron (ZOFRAN-ODT) disintegrating tablet 4 mg  4 mg Oral Q8H PRN STEPH White        Or    ondansetron [facility]) injection 4 mg  4 mg IntraVENous Q6H PRN Luther Vidales, PA        polyethylene glycol (GLYCOLAX) packet 17 g  17 g Oral Daily PRN STEPH White        acetaminophen (TYLENOL) tablet 650 mg  650 mg Oral Q6H PRN Luther Vidales PA   650 mg at 08/26/21 1029    Or    acetaminophen (TYLENOL) suppository 650 mg  650 mg Rectal Q6H PRN STEPH White        aspirin chewable tablet 81 mg  81 mg Oral Daily April SERA Nelson CNP   81 mg at 08/30/21 0854      dextrose      sodium chloride         Physical Exam:  BP (!) 122/56   Pulse 78   Temp 96.8 °F (36 °C) (Temporal)   Resp 18   Ht 5' 11\" (1.803 m)   Wt 265 lb (120.2 kg)   SpO2 97%   BMI 36.96 kg/m²   Wt Readings from Last 3 Encounters:   08/30/21 265 lb (120.2 kg)   01/22/20 277 lb (125.6 kg)   01/02/20 271 lb (122.9 kg)     Appearance: Awake, alert, no acute respiratory distress  Skin: Intact, no rash  Head: Normocephalic, atraumatic  Eyes: EOMI, no conjunctival erythema  ENMT: No pharyngeal erythema, MMM, no rhinorrhea  Neck: Supple, no carotid bruits  Lungs: B/L wheezing, no rales  Cardiac: Regular rate and rhythm, no murmurs apparent  Abdomen: Soft, nontender, +bowel sounds  Extremities: Moves all extremities x 4, ++lower extremity edema  Neurologic: No focal motor deficits apparent, normal mood and affect    Intake/Output:    Intake/Output Summary (Last 24 hours) at 8/30/2021 1201  Last data filed at 8/30/2021 0544  Gross per 24 hour   Intake 520 ml   Output 4375 ml   Net -3855 ml     No intake/output data recorded. Laboratory Tests:  Recent Labs     08/29/21  0640 08/29/21  1522 08/30/21  0651    137 138   K 2.9* 4.2 4.1   CL 96* 94* 94*   CO2 34* 33* 39*   BUN 45* 46* 39*   CREATININE 1.1 1.1 1.0   GLUCOSE 147* 277* 146*   CALCIUM 9.3 9.2 9.1     Lab Results   Component Value Date    MG 2.1 08/30/2021     Lab Results   Component Value Date    CKTOTAL 51 11/13/2010    CKMB 0.5 11/13/2010    TROPONINI <0.01 02/15/2018    TROPONINI <0.01 02/14/2018    TROPONINI <0.01 02/14/2018     No results for input(s): CKTOTAL, CKMB, CKMBINDEX, TROPHS in the last 72 hours.   Lab Results   Component Value Date    INR 1.2 02/04/2018    INR 0.9 11/13/2010    PROTIME 13.8 (H) 02/04/2018    PROTIME 11.6 11/13/2010     Lab Results   Component Value Date    TSH 0.942 02/14/2018     No results found for: LABA1C  No results found for: EAG  Lab Results   Component Value Date    CHOL 205 (H) 04/08/2018    CHOL 162 01/28/2017     Lab Results   Component Value Date    TRIG 80 04/08/2018    TRIG 61 01/28/2017     Lab Results   Component Value Date    HDL 80 04/08/2018    HDL 50 01/28/2017     Lab Results   Component Value Date    LDLCALC 109 (H) 04/08/2018    LDLCALC 100 (H) 01/28/2017     Lab Results   Component Value Date LABVLDL 16 04/08/2018    LABVLDL 12 01/28/2017     No results found for: CHOLHDLRATIO  No results for input(s): PROBNP in the last 72 hours. Cardiac Tests:  Telemetry reviewed (date: 8/30/2021): SR/AF rate 70's-90's (currently SR)    CXR 8/7/2021:  Impression  Cardiomegaly. Large opacity in the right cardiophrenic angle.  Although this may represent  large epicardial fat pad, given the interval change since the prior  examination further evaluation and characterization with CT of the chest is  recommended. Mild hyperinflation, suggest COPD.     Chest CTA 8/8/2021:  Impression  No evidence of pulmonary embolism or acute pulmonary abnormality. Prominent pericardial fat accounts for the abnormality seen on radiograph.     Cardiac catheterization 11/2010 ([facility]): LMT: normal. LAD: normal, gives off one large bifurcating diagonal as it courses to but ends at the apex. LCx: nondominant, gives off one large OM1, only trivial distal disease. RCA: Dominant, large, minimal disease distally.     Echocardiogram 11/2010 ([facility]): EF 55% with questionable RV dilation. Trivial to small pericardial effusion. Small echodense space near apex (? Localized effusion).  Trivial MR and TR.      WYATT: 2/7/18 ([place])   Cardiac rhythm: atrial flutter   Low normal left ventricular ejection fraction.   Moderately dilated left atrium.   No evidence of a left atrial appendage thrombus.   No evidence of interatrial shunting on bubble study.   Borderline dilated right ventricle with normal right ventricular function.   Mild-moderate mitral regurgitation.     WYATT: 2/15/18 [place])   Low normal left ventricular systolic function.   Mild to moderate mitral regurgitation.     TTE (Dr. Gonsalez Heart, 8/12/2021)  Summary   Technically limited study.   Normal left ventricular size, wall thickness, wall motion, and systolic   function.   Estimated left ventricle ejection fraction 70+/-5 %.   There is doppler evidence of stage II diastolic dysfunction.  Narendra Bedoya dilated right ventricle.   Right ventricle global systolic function is normal .   Normal sized left atrium.   No significant valvular abnormalities noted.     Lexiscan Stress Test 8/13/2021:  FINDINGS: The overall quality of the study was fair. Left ventricular cavity size was noted to be dilated on both the  stress and the resting images but there was no transient ischemic  dilatation after stress  Rotational analog analysis demonstrated abnormal patient motion and  there was marked increase in tracer uptake in the abdominal organs  with the liver overshadowing the bottom of the heart  A severe defect was present in the inferior wall extending into the  lateral apical wall(s) that was moderate to largesized by  quantification. The resting images showed no change   Gated SPECT left ventricular ejection fraction was calculated to be  66%, with normal myocardial contractility and wall motion. Impression  The myocardial perfusion imaging was probably normal with the large  fixed inferior/lateral apical wall defect being more consistent with  attenuation artifact and less likely the result of a myocardial  infarction especially with the normal contractility of the inferior  wall and the lateral apical wall. More importantly, there did not  appear to be any evidence of stress-induced ischemia on this study  Overall left ventricular systolic function was normal with no regional  wall motion abnormality  Low risk general pharmacologic stress test.    ASSESSMENT / PLAN:  1. Acute on chronic heart failure with preserved ejection fraction  2. Acute COPD exacerbation. Known history of very severe COPD. Viral panel negative. Pulmonology following. 3. Acute on chronic respiratory failure  4. Acute kidney injury -- improved, most recent Cr 1.4 --> 1.1 --> 1.2 --> 1.1 --> 1.0  5. Leukocytosis. 6. Paroxysmal atrial fibrillation/typical flutter status-post AF ablation in April 2018.   Currently on Eliquis, cardizem, and flecainide therapy. Follows with [address]. 7. Hypertension -- sub-optimal control in the past, most recent /71  8. Hyperlipidemia, on statin therapy. 9. Obstructive sleep apnea, compliant with CPAP. 10. Morbid obesity / BMI 40.8  11. History of alcohol abuse. 15. Former tobacco abuse (2 PPD for many years, quit in 8/2017)  13. History of pericarditis in 11/2010  14.  Hypokalemia -- K 2.9 --> 4.1    - Recent cardiac testing reviewed  - Monitor I/O's, renal function, and electrolytes with ongoing diuresis (reproted I/O's net negative 52.3 L) --> Cr stable, nephrology note reviewed, K improved today s/p replacement  - Continue current medications otherwise  - Treatment of PARKER  - Aggressive risk factor modifications  - Telemetry reviewed (sinus rhythm with episodes of AF earlier this admission -- currently SR)  - Care per pulmonary --> patient declining transfer to [facility] today  - Case discussed with nursing staff today    Anam Silveira MD  [facility]) Cardiology

## 2021-08-31 ENCOUNTER — TELEPHONE (OUTPATIENT)
Dept: CARDIOLOGY CLINIC | Age: 61
End: 2021-08-31

## 2021-09-01 NOTE — DISCHARGE SUMMARY
Physician Discharge Summary     Patient ID:  Shaka Victoria  [id number]  63 y.o.  1960    Admit date: 8/7/2021    Discharge date and time: 8/31/2021    Admission Diagnoses:   Patient Active Problem List   Diagnosis    [facility] disease) [facility]    Essential hypertension    Adrenal adenoma    Class 2 obesity due to excess calories with serious comorbidity and body mass index (BMI) of 35.0 to 35.9 in adult    Anticoagulation management encounter    Typical atrial flutter ([facility]    Anxiety    Status post catheter ablation of atrial flutter    Persistent atrial fibrillation ([facility]    ETOH abuse    COPD with acute exacerbation ([facility]       Discharge Diagnoses: CHF ,pneumoniaCOPD exacerbation    Consults: cardiology,Pulmonary     Procedures: diuresis     Hospital Course: The patient is a 63 y.o. male of [address] [place] [address] with significant past medical history of CHF ,ORESTES and COPD  who presents with worsening SOB and Fluid overload as well as COPD exacerbation and had CT shows some narrowing in RMB ,carmen did not want to put him for GA and he was referred to [facility] but refuse admission their when accepted  Was seen by nephrology and was on Bumex drip and change PO   On  elquis   And seen by cardiology ro A fib and med's were adjusted   He was doing fine day of discharge   He want to go [facility] as OP   Advise to keep using CPAP and to follow with nephrology  Need Bronchoscopy down the road ,to be done [facility]     Discharge med's done by medicine team ,Dr Troy Sanders     08/30/21  0651   WBC 10.7   HGB 9.9*   HCT 29.5*   *       Recent Labs     08/29/21  0640 08/29/21  1522 08/30/21  0651    137 138   K 2.9* 4.2 4.1   CL 96* 94* 94*   CO2 34* 33* 39*   BUN 45* 46* 39*   CREATININE 1.1 1.1 1.0   CALCIUM 9.3 9.2 9.1       XR CHEST PORTABLE    Result Date: 8/8/2021  EXAMINATION: ONE XRAY VIEW OF THE CHEST 8/7/2021 10:14 pm COMPARISON: February 2018.   Prior CT from January 23 HISTORY: ORDERING SYSTEM PROVIDED HISTORY: sob TECHNOLOGIST PROVIDED HISTORY: Reason for exam:->sob What reading provider will be dictating this exam?->CRC FINDINGS: Cardiac silhouette is enlarged. Large opacity in the right cardiophrenic angle, not present on the prior examination. Although this may represent large epicardial fat pad given the interval change since the prior examination further evaluation with CT of the chest is recommended for characterization. There is mild hyperinflation. No pleural effusions. Pulmonary vasculature is within normal limits. Cardiomegaly. Large opacity in the right cardiophrenic angle. Although this may represent large epicardial fat pad, given the interval change since the prior examination further evaluation and characterization with CT of the chest is recommended. Mild hyperinflation, suggest COPD. CTA CHEST W CONTRAST    Result Date: 8/8/2021  EXAMINATION: CTA OF THE CHEST 8/8/2021 12:54 am TECHNIQUE: CTA of the chest was performed after the administration of intravenous contrast.  Multiplanar reformatted images are provided for review. MIP images are provided for review. Dose modulation, iterative reconstruction, and/or weight based adjustment of the mA/kV was utilized to reduce the radiation dose to as low as reasonably achievable. COMPARISON: 01/23/2021 HISTORY: ORDERING SYSTEM PROVIDED HISTORY: shortness Of breath abnormal chest x-ray TECHNOLOGIST PROVIDED HISTORY: Reason for exam:->shortness Of breath abnormal chest x-ray Decision Support Exception - unselect if not a suspected or confirmed emergency medical condition->Emergency Medical Condition (MA) What reading provider will be dictating this exam?->CRC FINDINGS: Pulmonary Arteries: Pulmonary arteries are adequately opacified for evaluation. No evidence of intraluminal filling defect to suggest pulmonary embolism. Main pulmonary artery is normal in caliber.  Mediastinum: No evidence of mediastinal lymphadenopathy. The heart and pericardium demonstrate no acute abnormality. There is no acute abnormality of the thoracic aorta. The abnormality seen on chest radiograph corresponds to prominent pericardial fat, likely exaggerated due to lordotic positioning. . Lungs/pleura: The lungs are without acute process. No focal consolidation or pulmonary edema. No evidence of pleural effusion or pneumothorax. Upper Abdomen: Limited images of the upper abdomen are without acute findings. There is a left adrenal adenoma. There is diverticulosis. Soft Tissues/Bones: No acute bone or soft tissue abnormality. No evidence of pulmonary embolism or acute pulmonary abnormality. Prominent pericardial fat accounts for the abnormality seen on radiograph. Discharge Exam:    HEENT: NCAT,  PERRLA, No JVD  Heart:  RRR, no murmurs, gallops, or rubs. Lungs:  CTA bilaterally, no wheeze, rales or rhonchi  Abd: bowel sounds present, nontender, nondistended, no masses  Extrem:  No clubbing, cyanosis, or edema    Disposition: home     Patient Condition at Discharge: good    Patient Instructions:      Medication List      START taking these medications    blood glucose test strips  Test 2 times a day & as needed for symptoms of irregular blood glucose. Dispense sufficient amount for indicated testing frequency plus additional to accommodate PRN testing needs.      bumetanide 2 MG tablet  Commonly known as: BUMEX  Take 1 tablet by mouth 2 times daily     glucose monitoring kit  1 kit by Does not apply route daily     Lancets Misc  1 each by Does not apply route 2 times daily     metFORMIN 500 MG tablet  Commonly known as: GLUCOPHAGE  Take 1 tablet by mouth 2 times daily (with meals)     potassium chloride 20 MEQ extended release tablet  Commonly known as: KLOR-CON M  Take 1 tablet by mouth daily (with breakfast)     predniSONE 10 MG tablet  Commonly known as: DELTASONE  Take 30 mg Po X 3 days and then 20 mg Po X 3 days and then 10 mg Po daily X 3 days        CONTINUE taking these medications    albuterol sulfate  (90 Base) MCG/ACT inhaler     apixaban 5 MG Tabs tablet  Commonly known as: Eliquis  Take 1 tablet by mouth 2 times daily     aspirin 81 MG chewable tablet     atorvastatin 20 MG tablet  Commonly known as: LIPITOR     Breztri Aerosphere 160-9-4.8 MCG/ACT Aero  Generic drug: Budeson-Glycopyrrol-Formoterol  Inhale 2 puffs into the lungs 2 times daily     dilTIAZem 240 MG extended release capsule  Commonly known as: CARDIZEM CD  Take 1 capsule by mouth 2 times daily     flecainide 100 MG tablet  Commonly known as: TAMBOCOR  TAKE 1 TABLET BY MOUTH TWICE DAILY     fluticasone 50 MCG/ACT nasal spray  Commonly known as: FLONASE  1 spray by Each Nostril route daily     ipratropium-albuterol 0.5-2.5 (3) MG/3ML Soln nebulizer solution  Commonly known as: DUONEB        STOP taking these medications    furosemide 40 MG tablet  Commonly known as: LASIX     losartan 50 MG tablet  Commonly known as: COZAAR     spironolactone 25 MG tablet  Commonly known as: ALDACTONE           Where to Get Your Medications      These medications were sent to [address] [phone]  [id number] [place] [address],[address] [place] [address]    Phone: [phone]   · blood glucose test strips  · bumetanide 2 MG tablet  · glucose monitoring kit  · Lancets Misc  · metFORMIN 500 MG tablet  · potassium chloride 20 MEQ extended release tablet  · predniSONE 10 MG tablet       Activity: activity as tolerated  Diet: cardiac diet    Pt has been advised to: Follow-up with Richie Tamayo DO in 1 week.   Follow-up with consultants as recommended by them    Note that over 30 minutes was spent in preparing discharge papers, discussing discharge with patient, medication review, etc.    Signed:  Charu Abel MD  8/31/2021  10:51 PM

## 2021-09-09 ENCOUNTER — HOSPITAL ENCOUNTER (INPATIENT)
Age: 61
LOS: 9 days | Discharge: HOME | DRG: 194 | End: 2021-09-18
Attending: EMERGENCY MEDICINE | Admitting: INTERNAL MEDICINE
Payer: COMMERCIAL

## 2021-09-09 ENCOUNTER — APPOINTMENT (OUTPATIENT)
Dept: GENERAL RADIOLOGY | Age: 61
DRG: 194 | End: 2021-09-09
Payer: COMMERCIAL

## 2021-09-09 DIAGNOSIS — J18.9 PNEUMONIA DUE TO INFECTIOUS ORGANISM, UNSPECIFIED LATERALITY, UNSPECIFIED PART OF LUNG: Primary | ICD-10-CM

## 2021-09-09 PROBLEM — J44.1 COPD EXACERBATION (HCC): Status: ACTIVE | Noted: 2021-09-09

## 2021-09-09 LAB
ALBUMIN SERPL-MCNC: 3.6 G/DL (ref 3.5–5.2)
ALP BLD-CCNC: 65 U/L (ref 40–129)
ALT SERPL-CCNC: 29 U/L (ref 0–40)
ANION GAP SERPL CALCULATED.3IONS-SCNC: 11 MMOL/L (ref 7–16)
AST SERPL-CCNC: 14 U/L (ref 0–39)
BASOPHILS ABSOLUTE: 0.06 E9/L (ref 0–0.2)
BASOPHILS RELATIVE PERCENT: 0.9 % (ref 0–2)
BILIRUB SERPL-MCNC: 0.3 MG/DL (ref 0–1.2)
BUN BLDV-MCNC: 25 MG/DL (ref 6–20)
CALCIUM SERPL-MCNC: 9.5 MG/DL (ref 8.6–10.2)
CHLORIDE BLD-SCNC: 93 MMOL/L (ref 98–107)
CO2: 33 MMOL/L (ref 22–29)
CREAT SERPL-MCNC: 1.4 MG/DL (ref 0.7–1.2)
EKG ATRIAL RATE: 88 BPM
EKG P AXIS: 84 DEGREES
EKG P-R INTERVAL: 184 MS
EKG Q-T INTERVAL: 374 MS
EKG QRS DURATION: 108 MS
EKG QTC CALCULATION (BAZETT): 452 MS
EKG R AXIS: 55 DEGREES
EKG T AXIS: 59 DEGREES
EKG VENTRICULAR RATE: 88 BPM
EOSINOPHILS ABSOLUTE: 0.86 E9/L (ref 0.05–0.5)
EOSINOPHILS RELATIVE PERCENT: 13.3 % (ref 0–6)
GFR AFRICAN AMERICAN: >60
GFR NON-AFRICAN AMERICAN: 52 ML/MIN/1.73
GLUCOSE BLD-MCNC: 132 MG/DL (ref 74–99)
HBA1C MFR BLD: 6.4 % (ref 4–5.6)
HCT VFR BLD CALC: 31.3 % (ref 37–54)
HEMOGLOBIN: 10.2 G/DL (ref 12.5–16.5)
LYMPHOCYTES ABSOLUTE: 1.63 E9/L (ref 1.5–4)
LYMPHOCYTES RELATIVE PERCENT: 24.8 % (ref 20–42)
MAGNESIUM: 1.6 MG/DL (ref 1.6–2.6)
MCH RBC QN AUTO: 31.2 PG (ref 26–35)
MCHC RBC AUTO-ENTMCNC: 32.6 % (ref 32–34.5)
MCV RBC AUTO: 95.7 FL (ref 80–99.9)
METAMYELOCYTES RELATIVE PERCENT: 3.5 % (ref 0–1)
METER GLUCOSE: 195 MG/DL (ref 74–99)
METER GLUCOSE: 205 MG/DL (ref 74–99)
METER GLUCOSE: 224 MG/DL (ref 74–99)
METER GLUCOSE: 286 MG/DL (ref 74–99)
MONOCYTES ABSOLUTE: 0.52 E9/L (ref 0.1–0.95)
MONOCYTES RELATIVE PERCENT: 8 % (ref 2–12)
MYELOCYTE PERCENT: 3.5 % (ref 0–0)
NEUTROPHILS ABSOLUTE: 3.45 E9/L (ref 1.8–7.3)
NEUTROPHILS RELATIVE PERCENT: 46 % (ref 43–80)
OVALOCYTES: ABNORMAL
PDW BLD-RTO: 12.8 FL (ref 11.5–15)
PLATELET # BLD: 286 E9/L (ref 130–450)
PMV BLD AUTO: 10.9 FL (ref 7–12)
POIKILOCYTES: ABNORMAL
POLYCHROMASIA: ABNORMAL
POTASSIUM SERPL-SCNC: 3.3 MMOL/L (ref 3.5–5)
PRO-BNP: 324 PG/ML (ref 0–125)
PROCALCITONIN: 0.14 NG/ML (ref 0–0.08)
RBC # BLD: 3.27 E12/L (ref 3.8–5.8)
SARS-COV-2, NAAT: NOT DETECTED
SODIUM BLD-SCNC: 137 MMOL/L (ref 132–146)
STOMATOCYTES: ABNORMAL
STREP PNEUMONIAE ANTIGEN, URINE: NORMAL
TEAR DROP CELLS: ABNORMAL
TOTAL PROTEIN: 6.8 G/DL (ref 6.4–8.3)
TROPONIN, HIGH SENSITIVITY: 20 NG/L (ref 0–11)
TROPONIN, HIGH SENSITIVITY: 23 NG/L (ref 0–11)
WBC # BLD: 6.5 E9/L (ref 4.5–11.5)

## 2021-09-09 PROCEDURE — 93010 ELECTROCARDIOGRAM REPORT: CPT | Performed by: INTERNAL MEDICINE

## 2021-09-09 PROCEDURE — 2580000003 HC RX 258: Performed by: EMERGENCY MEDICINE

## 2021-09-09 PROCEDURE — 6360000002 HC RX W HCPCS: Performed by: FAMILY MEDICINE

## 2021-09-09 PROCEDURE — 83036 HEMOGLOBIN GLYCOSYLATED A1C: CPT

## 2021-09-09 PROCEDURE — 84145 PROCALCITONIN (PCT): CPT

## 2021-09-09 PROCEDURE — 6360000002 HC RX W HCPCS: Performed by: EMERGENCY MEDICINE

## 2021-09-09 PROCEDURE — 80053 COMPREHEN METABOLIC PANEL: CPT

## 2021-09-09 PROCEDURE — 87635 SARS-COV-2 COVID-19 AMP PRB: CPT

## 2021-09-09 PROCEDURE — 2580000003 HC RX 258: Performed by: FAMILY MEDICINE

## 2021-09-09 PROCEDURE — 94660 CPAP INITIATION&MGMT: CPT

## 2021-09-09 PROCEDURE — 6370000000 HC RX 637 (ALT 250 FOR IP): Performed by: INTERNAL MEDICINE

## 2021-09-09 PROCEDURE — 85025 COMPLETE CBC W/AUTO DIFF WBC: CPT

## 2021-09-09 PROCEDURE — 93005 ELECTROCARDIOGRAM TRACING: CPT | Performed by: NURSE PRACTITIONER

## 2021-09-09 PROCEDURE — 6370000000 HC RX 637 (ALT 250 FOR IP): Performed by: EMERGENCY MEDICINE

## 2021-09-09 PROCEDURE — 83735 ASSAY OF MAGNESIUM: CPT

## 2021-09-09 PROCEDURE — 99285 EMERGENCY DEPT VISIT HI MDM: CPT

## 2021-09-09 PROCEDURE — 87040 BLOOD CULTURE FOR BACTERIA: CPT

## 2021-09-09 PROCEDURE — 94640 AIRWAY INHALATION TREATMENT: CPT

## 2021-09-09 PROCEDURE — 6370000000 HC RX 637 (ALT 250 FOR IP): Performed by: FAMILY MEDICINE

## 2021-09-09 PROCEDURE — 2060000000 HC ICU INTERMEDIATE R&B

## 2021-09-09 PROCEDURE — 83880 ASSAY OF NATRIURETIC PEPTIDE: CPT

## 2021-09-09 PROCEDURE — 2500000003 HC RX 250 WO HCPCS: Performed by: EMERGENCY MEDICINE

## 2021-09-09 PROCEDURE — 99223 1ST HOSP IP/OBS HIGH 75: CPT | Performed by: INTERNAL MEDICINE

## 2021-09-09 PROCEDURE — 71045 X-RAY EXAM CHEST 1 VIEW: CPT

## 2021-09-09 PROCEDURE — 96374 THER/PROPH/DIAG INJ IV PUSH: CPT

## 2021-09-09 PROCEDURE — 87449 NOS EACH ORGANISM AG IA: CPT

## 2021-09-09 PROCEDURE — 82962 GLUCOSE BLOOD TEST: CPT

## 2021-09-09 PROCEDURE — 84484 ASSAY OF TROPONIN QUANT: CPT

## 2021-09-09 RX ORDER — DEXTROSE MONOHYDRATE 50 MG/ML
100 INJECTION, SOLUTION INTRAVENOUS PRN
Status: DISCONTINUED | OUTPATIENT
Start: 2021-09-09 | End: 2021-09-18 | Stop reason: HOSPADM

## 2021-09-09 RX ORDER — ARFORMOTEROL TARTRATE 15 UG/2ML
15 SOLUTION RESPIRATORY (INHALATION) 2 TIMES DAILY
Status: DISCONTINUED | OUTPATIENT
Start: 2021-09-09 | End: 2021-09-18 | Stop reason: HOSPADM

## 2021-09-09 RX ORDER — SODIUM CHLORIDE 0.9 % (FLUSH) 0.9 %
5-40 SYRINGE (ML) INJECTION EVERY 12 HOURS SCHEDULED
Status: DISCONTINUED | OUTPATIENT
Start: 2021-09-09 | End: 2021-09-18 | Stop reason: HOSPADM

## 2021-09-09 RX ORDER — ALBUTEROL SULFATE 2.5 MG/3ML
2.5 SOLUTION RESPIRATORY (INHALATION) EVERY 6 HOURS PRN
Status: DISCONTINUED | OUTPATIENT
Start: 2021-09-09 | End: 2021-09-18 | Stop reason: HOSPADM

## 2021-09-09 RX ORDER — POLYETHYLENE GLYCOL 3350 17 G/17G
17 POWDER, FOR SOLUTION ORAL DAILY PRN
Status: DISCONTINUED | OUTPATIENT
Start: 2021-09-09 | End: 2021-09-18 | Stop reason: HOSPADM

## 2021-09-09 RX ORDER — FLECAINIDE ACETATE 100 MG/1
100 TABLET ORAL 2 TIMES DAILY
Status: DISCONTINUED | OUTPATIENT
Start: 2021-09-09 | End: 2021-09-18 | Stop reason: HOSPADM

## 2021-09-09 RX ORDER — BUDESONIDE 0.5 MG/2ML
0.5 INHALANT ORAL 2 TIMES DAILY
Status: DISCONTINUED | OUTPATIENT
Start: 2021-09-09 | End: 2021-09-18 | Stop reason: HOSPADM

## 2021-09-09 RX ORDER — PREDNISONE 20 MG/1
40 TABLET ORAL DAILY
Status: DISCONTINUED | OUTPATIENT
Start: 2021-09-11 | End: 2021-09-10

## 2021-09-09 RX ORDER — BUMETANIDE 1 MG/1
2 TABLET ORAL 2 TIMES DAILY
Status: DISCONTINUED | OUTPATIENT
Start: 2021-09-09 | End: 2021-09-18 | Stop reason: HOSPADM

## 2021-09-09 RX ORDER — DILTIAZEM HYDROCHLORIDE 240 MG/1
240 CAPSULE, COATED, EXTENDED RELEASE ORAL 2 TIMES DAILY
Status: DISCONTINUED | OUTPATIENT
Start: 2021-09-09 | End: 2021-09-18 | Stop reason: HOSPADM

## 2021-09-09 RX ORDER — ONDANSETRON 4 MG/1
4 TABLET, ORALLY DISINTEGRATING ORAL EVERY 8 HOURS PRN
Status: DISCONTINUED | OUTPATIENT
Start: 2021-09-09 | End: 2021-09-18 | Stop reason: HOSPADM

## 2021-09-09 RX ORDER — SODIUM CHLORIDE 9 MG/ML
25 INJECTION, SOLUTION INTRAVENOUS PRN
Status: DISCONTINUED | OUTPATIENT
Start: 2021-09-09 | End: 2021-09-18 | Stop reason: HOSPADM

## 2021-09-09 RX ORDER — ATORVASTATIN CALCIUM 20 MG/1
20 TABLET, FILM COATED ORAL EVERY EVENING
Status: DISCONTINUED | OUTPATIENT
Start: 2021-09-09 | End: 2021-09-18 | Stop reason: HOSPADM

## 2021-09-09 RX ORDER — NICOTINE POLACRILEX 4 MG
15 LOZENGE BUCCAL PRN
Status: DISCONTINUED | OUTPATIENT
Start: 2021-09-09 | End: 2021-09-18 | Stop reason: HOSPADM

## 2021-09-09 RX ORDER — METHYLPREDNISOLONE SODIUM SUCCINATE 125 MG/2ML
125 INJECTION, POWDER, LYOPHILIZED, FOR SOLUTION INTRAMUSCULAR; INTRAVENOUS ONCE
Status: COMPLETED | OUTPATIENT
Start: 2021-09-09 | End: 2021-09-09

## 2021-09-09 RX ORDER — SODIUM CHLORIDE 0.9 % (FLUSH) 0.9 %
5-40 SYRINGE (ML) INJECTION PRN
Status: DISCONTINUED | OUTPATIENT
Start: 2021-09-09 | End: 2021-09-18 | Stop reason: HOSPADM

## 2021-09-09 RX ORDER — POTASSIUM CHLORIDE 7.45 MG/ML
10 INJECTION INTRAVENOUS PRN
Status: DISCONTINUED | OUTPATIENT
Start: 2021-09-09 | End: 2021-09-18 | Stop reason: HOSPADM

## 2021-09-09 RX ORDER — ONDANSETRON 2 MG/ML
4 INJECTION INTRAMUSCULAR; INTRAVENOUS EVERY 6 HOURS PRN
Status: DISCONTINUED | OUTPATIENT
Start: 2021-09-09 | End: 2021-09-18 | Stop reason: HOSPADM

## 2021-09-09 RX ORDER — POTASSIUM CHLORIDE 20 MEQ/1
20 TABLET, EXTENDED RELEASE ORAL
Status: DISCONTINUED | OUTPATIENT
Start: 2021-09-09 | End: 2021-09-18 | Stop reason: HOSPADM

## 2021-09-09 RX ORDER — FLUTICASONE PROPIONATE 50 MCG
1 SPRAY, SUSPENSION (ML) NASAL DAILY
Status: DISCONTINUED | OUTPATIENT
Start: 2021-09-09 | End: 2021-09-18 | Stop reason: HOSPADM

## 2021-09-09 RX ORDER — IPRATROPIUM BROMIDE AND ALBUTEROL SULFATE 2.5; .5 MG/3ML; MG/3ML
1 SOLUTION RESPIRATORY (INHALATION)
Status: COMPLETED | OUTPATIENT
Start: 2021-09-09 | End: 2021-09-09

## 2021-09-09 RX ORDER — ASPIRIN 81 MG/1
81 TABLET, CHEWABLE ORAL DAILY
Status: DISCONTINUED | OUTPATIENT
Start: 2021-09-09 | End: 2021-09-18 | Stop reason: HOSPADM

## 2021-09-09 RX ORDER — ACETAMINOPHEN 650 MG/1
650 SUPPOSITORY RECTAL EVERY 6 HOURS PRN
Status: DISCONTINUED | OUTPATIENT
Start: 2021-09-09 | End: 2021-09-18 | Stop reason: HOSPADM

## 2021-09-09 RX ORDER — IPRATROPIUM BROMIDE AND ALBUTEROL SULFATE 2.5; .5 MG/3ML; MG/3ML
1 SOLUTION RESPIRATORY (INHALATION) EVERY 4 HOURS
Status: DISCONTINUED | OUTPATIENT
Start: 2021-09-09 | End: 2021-09-18 | Stop reason: HOSPADM

## 2021-09-09 RX ORDER — ACETAMINOPHEN 325 MG/1
650 TABLET ORAL EVERY 6 HOURS PRN
Status: DISCONTINUED | OUTPATIENT
Start: 2021-09-09 | End: 2021-09-18 | Stop reason: HOSPADM

## 2021-09-09 RX ORDER — DEXTROSE MONOHYDRATE 25 G/50ML
12.5 INJECTION, SOLUTION INTRAVENOUS PRN
Status: DISCONTINUED | OUTPATIENT
Start: 2021-09-09 | End: 2021-09-18 | Stop reason: HOSPADM

## 2021-09-09 RX ORDER — POTASSIUM CHLORIDE 20 MEQ/1
40 TABLET, EXTENDED RELEASE ORAL PRN
Status: DISCONTINUED | OUTPATIENT
Start: 2021-09-09 | End: 2021-09-18 | Stop reason: HOSPADM

## 2021-09-09 RX ORDER — METHYLPREDNISOLONE SODIUM SUCCINATE 40 MG/ML
40 INJECTION, POWDER, LYOPHILIZED, FOR SOLUTION INTRAMUSCULAR; INTRAVENOUS EVERY 6 HOURS
Status: DISCONTINUED | OUTPATIENT
Start: 2021-09-09 | End: 2021-09-10

## 2021-09-09 RX ADMIN — DILTIAZEM HYDROCHLORIDE 240 MG: 240 CAPSULE, EXTENDED RELEASE ORAL at 22:22

## 2021-09-09 RX ADMIN — AZITHROMYCIN MONOHYDRATE 500 MG: 500 INJECTION, POWDER, LYOPHILIZED, FOR SOLUTION INTRAVENOUS at 09:41

## 2021-09-09 RX ADMIN — DILTIAZEM HYDROCHLORIDE 240 MG: 240 CAPSULE, EXTENDED RELEASE ORAL at 09:50

## 2021-09-09 RX ADMIN — ARFORMOTEROL TARTRATE 15 MCG: 15 SOLUTION RESPIRATORY (INHALATION) at 20:35

## 2021-09-09 RX ADMIN — IPRATROPIUM BROMIDE AND ALBUTEROL SULFATE 3 ML: .5; 2.5 SOLUTION RESPIRATORY (INHALATION) at 09:20

## 2021-09-09 RX ADMIN — METHYLPREDNISOLONE SODIUM SUCCINATE 40 MG: 40 INJECTION, POWDER, FOR SOLUTION INTRAMUSCULAR; INTRAVENOUS at 17:24

## 2021-09-09 RX ADMIN — POTASSIUM CHLORIDE 20 MEQ: 20 TABLET, EXTENDED RELEASE ORAL at 09:39

## 2021-09-09 RX ADMIN — APIXABAN 5 MG: 5 TABLET, FILM COATED ORAL at 21:31

## 2021-09-09 RX ADMIN — DOXYCYCLINE 100 MG: 100 INJECTION, POWDER, LYOPHILIZED, FOR SOLUTION INTRAVENOUS at 05:19

## 2021-09-09 RX ADMIN — INSULIN LISPRO 6 UNITS: 100 INJECTION, SOLUTION INTRAVENOUS; SUBCUTANEOUS at 14:14

## 2021-09-09 RX ADMIN — BUMETANIDE 2 MG: 1 TABLET ORAL at 21:31

## 2021-09-09 RX ADMIN — IPRATROPIUM BROMIDE AND ALBUTEROL SULFATE 1 AMPULE: 2.5; .5 SOLUTION RESPIRATORY (INHALATION) at 02:43

## 2021-09-09 RX ADMIN — BUMETANIDE 2 MG: 1 TABLET ORAL at 09:39

## 2021-09-09 RX ADMIN — INSULIN LISPRO 4 UNITS: 100 INJECTION, SOLUTION INTRAVENOUS; SUBCUTANEOUS at 17:24

## 2021-09-09 RX ADMIN — BUDESONIDE 500 MCG: 0.5 SUSPENSION RESPIRATORY (INHALATION) at 09:21

## 2021-09-09 RX ADMIN — IPRATROPIUM BROMIDE AND ALBUTEROL SULFATE 1 AMPULE: 2.5; .5 SOLUTION RESPIRATORY (INHALATION) at 02:50

## 2021-09-09 RX ADMIN — CEFTRIAXONE 1000 MG: 1 INJECTION, POWDER, FOR SOLUTION INTRAMUSCULAR; INTRAVENOUS at 05:17

## 2021-09-09 RX ADMIN — METHYLPREDNISOLONE SODIUM SUCCINATE 125 MG: 125 INJECTION, POWDER, FOR SOLUTION INTRAMUSCULAR; INTRAVENOUS at 03:47

## 2021-09-09 RX ADMIN — ATORVASTATIN CALCIUM 20 MG: 20 TABLET, FILM COATED ORAL at 21:31

## 2021-09-09 RX ADMIN — FLUTICASONE PROPIONATE 1 SPRAY: 50 SPRAY, METERED NASAL at 09:50

## 2021-09-09 RX ADMIN — METHYLPREDNISOLONE SODIUM SUCCINATE 40 MG: 40 INJECTION, POWDER, FOR SOLUTION INTRAMUSCULAR; INTRAVENOUS at 09:40

## 2021-09-09 RX ADMIN — FLECAINIDE ACETATE 100 MG: 100 TABLET ORAL at 22:22

## 2021-09-09 RX ADMIN — Medication 10 ML: at 21:31

## 2021-09-09 RX ADMIN — INSULIN LISPRO 4 UNITS: 100 INJECTION, SOLUTION INTRAVENOUS; SUBCUTANEOUS at 21:43

## 2021-09-09 RX ADMIN — IPRATROPIUM BROMIDE AND ALBUTEROL SULFATE 3 ML: .5; 2.5 SOLUTION RESPIRATORY (INHALATION) at 14:08

## 2021-09-09 RX ADMIN — FLECAINIDE ACETATE 100 MG: 100 TABLET ORAL at 09:50

## 2021-09-09 RX ADMIN — BUDESONIDE 500 MCG: 0.5 SUSPENSION RESPIRATORY (INHALATION) at 20:35

## 2021-09-09 RX ADMIN — ASPIRIN 81 MG CHEWABLE TABLET 81 MG: 81 TABLET CHEWABLE at 09:40

## 2021-09-09 RX ADMIN — APIXABAN 5 MG: 5 TABLET, FILM COATED ORAL at 09:39

## 2021-09-09 RX ADMIN — Medication 10 ML: at 09:52

## 2021-09-09 RX ADMIN — IPRATROPIUM BROMIDE AND ALBUTEROL SULFATE 3 ML: .5; 2.5 SOLUTION RESPIRATORY (INHALATION) at 20:35

## 2021-09-09 RX ADMIN — IPRATROPIUM BROMIDE AND ALBUTEROL SULFATE 1 AMPULE: 2.5; .5 SOLUTION RESPIRATORY (INHALATION) at 02:45

## 2021-09-09 RX ADMIN — ARFORMOTEROL TARTRATE 15 MCG: 15 SOLUTION RESPIRATORY (INHALATION) at 09:24

## 2021-09-09 RX ADMIN — METHYLPREDNISOLONE SODIUM SUCCINATE 40 MG: 40 INJECTION, POWDER, FOR SOLUTION INTRAMUSCULAR; INTRAVENOUS at 21:32

## 2021-09-09 ASSESSMENT — PAIN SCALES - GENERAL
PAINLEVEL_OUTOF10: 0
PAINLEVEL_OUTOF10: 0

## 2021-09-09 NOTE — ED PROVIDER NOTES
Department of Emergency Medicine   ED  Provider Note  Admit Date/RoomTime: 9/9/2021  2:40 AM  ED Room: 13/13          History of Present Illness:  9/9/21, Time: 4:59 AM EDT  Chief Complaint   Patient presents with    Shortness of Breath     Increased SOB starting today. Finished Prednisone Rx today. Tripod breathing in triage. Per wife, HR has been in the 150s. Ashia Uribe is a 63 y.o. male presenting to the ED for shortness of breath. Patient's been short of breath since earlier today. Came on gradually, worse with exertion, improves with rest, he does wear chronic oxygen at home. He has not increased this. Wife states he began tripoding at home, with heart rate in the 150s. He does have history of A. fib, he is on Eliquis. He is compliant with his medications. He does complain of a productive cough. Patient denies any nausea, vomiting, back pain, fever, chills, lethargy, or any other symptoms or complaints. Review of Systems:   Pertinent positives and negatives are stated within HPI, all other systems reviewed and are negative.        --------------------------------------------- PAST HISTORY ---------------------------------------------  Past Medical History:  has a past medical history of Atrial flutter ([address] COPD (chronic obstructive pulmonary disease) ([address] and Hypertension. Past Surgical History:  has a past surgical history that includes back surgery; Cardioversion (02/07/2018); transesophageal echocardiogram (02/07/2018); and Atrial ablation surgery (04/03/2018). Social History:  reports that he quit smoking about 4 years ago. His smoking use included cigarettes. He has a 60.00 pack-year smoking history. He has never used smokeless tobacco. He reports current alcohol use of about 3.0 - 4.0 standard drinks of alcohol per week. He reports that he does not use drugs. Family History: family history includes Lung Cancer in his father;  Other in his mother; Pacemaker in his sister. . Unless otherwise noted, family history is non contributory    The patients home medications have been reviewed. Allergies: Patient has no known allergies. ---------------------------------------------------PHYSICAL EXAM--------------------------------------    Constitutional/General: Alert and oriented x3, obese  Head: Normocephalic and atraumatic  Eyes: PERRL, EOMI, sclera non icteric  Mouth: Oropharynx clear, handling secretions, no trismus, no asymmetry of the posterior oropharynx or uvular edema  Neck: Supple, full ROM, no stridor, no meningeal signs  Respiratory: Coarse diffusely, diminished at the bases  Cardiovascular: Irregular rate and rhythm. 2+ distal pulses. Equal extremity pulses. Chest: No chest wall tenderness  GI:  Abdomen Soft, Non tender, Non distended. No rebound, guarding, or rigidity. No pulsatile masses. Musculoskeletal: Moves all extremities x 4. Warm and well perfused, 2+ edema to the bilateral lower extremities  Integument: skin warm and dry. No rashes. Neurologic: GCS 15, no focal deficits, symmetric strength 5/5 in the upper and lower extremities bilaterally  Psychiatric: Normal Affect          -------------------------------------------------- RESULTS -------------------------------------------------  I have personally reviewed all laboratory and imaging results for this patient. Results are listed below.      LABS: (Lab results interpreted by me)  Results for orders placed or performed during the hospital encounter of 09/09/21   COVID-19, Rapid    Specimen: Nasopharyngeal Swab   Result Value Ref Range    SARS-CoV-2, NAAT Not Detected Not Detected   Troponin   Result Value Ref Range    Troponin, High Sensitivity 23 (H) 0 - 11 ng/L   CBC Auto Differential   Result Value Ref Range    WBC 6.5 4.5 - 11.5 E9/L    RBC 3.27 (L) 3.80 - 5.80 E12/L    Hemoglobin 10.2 (L) 12.5 - 16.5 g/dL    Hematocrit 31.3 (L) 37.0 - 54.0 %    MCV 95.7 80.0 - 99.9 fL    MCH 31.2 26.0 - 35.0 pg    MCHC 32.6 32.0 - 34.5 %    RDW 12.8 11.5 - 15.0 fL    Platelets 613 177 - 390 E9/L    MPV 10.9 7.0 - 12.0 fL    Neutrophils % 46.0 43.0 - 80.0 %    Lymphocytes % 24.8 20.0 - 42.0 %    Monocytes % 8.0 2.0 - 12.0 %    Eosinophils % 13.3 (H) 0.0 - 6.0 %    Basophils % 0.9 0.0 - 2.0 %    Neutrophils Absolute 3.45 1.80 - 7.30 E9/L    Lymphocytes Absolute 1.63 1.50 - 4.00 E9/L    Monocytes Absolute 0.52 0.10 - 0.95 E9/L    Eosinophils Absolute 0.86 (H) 0.05 - 0.50 E9/L    Basophils Absolute 0.06 0.00 - 0.20 E9/L    Metamyelocytes Relative 3.5 (H) 0.0 - 1.0 %    Myelocyte Percent 3.5 0 - 0 %    Polychromasia 1+     Poikilocytes 1+     Ovalocytes 1+     Stomatocytes 1+     Tear Drop Cells 1+    Comprehensive Metabolic Panel   Result Value Ref Range    Sodium 137 132 - 146 mmol/L    Potassium 3.3 (L) 3.5 - 5.0 mmol/L    Chloride 93 (L) 98 - 107 mmol/L    CO2 33 (H) 22 - 29 mmol/L    Anion Gap 11 7 - 16 mmol/L    Glucose 132 (H) 74 - 99 mg/dL    BUN 25 (H) 6 - 20 mg/dL    CREATININE 1.4 (H) 0.7 - 1.2 mg/dL    GFR Non-African American 52 >=60 mL/min/1.73    GFR African American >60     Calcium 9.5 8.6 - 10.2 mg/dL    Total Protein 6.8 6.4 - 8.3 g/dL    Albumin 3.6 3.5 - 5.2 g/dL    Total Bilirubin 0.3 0.0 - 1.2 mg/dL    Alkaline Phosphatase 65 40 - 129 U/L    ALT 29 0 - 40 U/L    AST 14 0 - 39 U/L   Magnesium   Result Value Ref Range    Magnesium 1.6 1.6 - 2.6 mg/dL   Brain Natriuretic Peptide   Result Value Ref Range    Pro- (H) 0 - 125 pg/mL   EKG 12 Lead   Result Value Ref Range    Ventricular Rate 88 BPM    Atrial Rate 88 BPM    P-R Interval 184 ms    QRS Duration 108 ms    Q-T Interval 374 ms    QTc Calculation (Bazett) 452 ms    P Axis 84 degrees    R Axis 55 degrees    T Axis 59 degrees   ,       RADIOLOGY:  Interpreted by Radiologist unless otherwise specified  XR CHEST PORTABLE   Final Result   Patchy infiltrates in the mid to lower lung zones bilaterally likely due to   pneumonia.   This could represent viral infection. Continued follow-up   recommended. EKG Interpretation  Interpreted by emergency department physician, Dr. Sofi Black, rate 88, no STEMI      ------------------------- NURSING NOTES AND VITALS REVIEWED ---------------------------   The nursing notes within the ED encounter and vital signs as below have been reviewed by myself  BP (!) 143/81   Pulse 91   Temp 99.5 °F (37.5 °C) (Axillary)   Resp 23   Ht 5' 11\" (1.803 m)   Wt 256 lb (116.1 kg)   SpO2 97%   BMI 35.70 kg/m²     Oxygen Saturation Interpretation: Normal    The patients available past medical records and past encounters were reviewed. ------------------------------ ED COURSE/MEDICAL DECISION MAKING----------------------  Medications   cefTRIAXone (ROCEPHIN) 1,000 mg in sterile water 10 mL IV syringe (has no administration in time range)   doxycycline (VIBRAMYCIN) 100 mg in dextrose 5 % 100 mL IVPB (has no administration in time range)   ipratropium-albuterol (DUONEB) nebulizer solution 1 ampule (1 ampule Inhalation Given 9/9/21 0250)   methylPREDNISolone sodium (SOLU-MEDROL) injection 125 mg (125 mg IntraVENous Given 9/9/21 0347)           The cardiac monitor revealed sinus with a heart rate in the 80s as interpreted by me. The cardiac monitor was ordered secondary to the patient's SOB and to monitor the patient for dysrhythmia. CPT [id number]         Medical Decision Making:    Patient presents with shortness of breath. Did receive DuoNeb x3, he declined steroids. States it makes him swell. Labs and imaging reviewed. X-ray does show concerns for pneumonia. Rapid Covid was negative. Blood cultures obtained, he was started Rocephin and doxycycline. Reevaluation, he is resting comfortably. However, he is requesting BiPAP is that he is feeling short of breath. BiPAP was provided. Discussed with the hospitalist, patient was admitted. Counseling:    The emergency provider has spoken with the patient and discussed todays results, in addition to providing specific details for the plan of care and counseling regarding the diagnosis and prognosis. Questions are answered at this time and they are agreeable with the plan.       --------------------------------- IMPRESSION AND DISPOSITION ---------------------------------    IMPRESSION  1. Pneumonia due to infectious organism, unspecified laterality, unspecified part of lung        DISPOSITION  Disposition: Admit to telemetry  Patient condition is stable        NOTE: This report was transcribed using voice recognition software.  Every effort was made to ensure accuracy; however, inadvertent computerized transcription errors may be present       Ian Jackson MD  09/09/21 [id number]

## 2021-09-09 NOTE — ED NOTES
FIRST PROVIDER CONTACT ASSESSMENT NOTE                                                                                                Department of Emergency Medicine                                                      First Provider Note  21  12:36 AM EDT  NAME: Eugene Cobb  : 1960  MRN: [id number]    Chief Complaint: Shortness of Breath (Increased SOB starting today. Finished Prednisone Rx today. Tripod breathing in triage. Per wife, HR has been in the 150s. )      History of Present Illness:   Eugene Cobb is a 63 y.o. male who presents to the ED for Shortness of Breath (Increased SOB starting today. Finished Prednisone Rx today. Tripod breathing in triage. Per wife, HR has been in the 150s. .. Patient with recent admission --. Wears home O2 does not report increasing oxygen    Focused Physical Exam:  VS:    ED Triage Vitals   BP Temp Temp Source Pulse Resp SpO2 Height Weight   21 0031 21 0031 21 0031 21 0026 21 0026 21 0026 21 0034 21 0034   (!) 143/81 99.5 °F (37.5 °C) Axillary 92 24 94 % 5' 11\" (1.803 m) 256 lb (116.1 kg)        General: Alert and in no apparent distress. Medical History:  has a past medical history of Atrial flutter ([address] COPD (chronic obstructive pulmonary disease) ([facility] and Hypertension. Surgical History:  has a past surgical history that includes back surgery; Cardioversion (2018); transesophageal echocardiogram (2018); and Atrial ablation surgery (2018). Social History:  reports that he quit smoking about 4 years ago. His smoking use included cigarettes. He has a 60.00 pack-year smoking history. He has never used smokeless tobacco. He reports current alcohol use of about 3.0 - 4.0 standard drinks of alcohol per week. He reports that he does not use drugs. Family History: family history includes Lung Cancer in his father;  Other in his mother; Pacemaker in his

## 2021-09-09 NOTE — CARE COORDINATION
CM transition of care. Pt presented to [facility] secondary to increased shortness of breath. Pt recently d/c from this facility on 8/30. Pt currently receiving nebulizer tx, IV solumedrol 40mg q 6, and IV Zithromax 500 mg q 24. Met with pt at bedside to discuss d/c planning. Pt is on oxygen 4L [place] which is baseline. Pt lives with spouse is independent with ADL and IADLs and drives. Pt has home oxygen 4L, bipap, and nebulizer ([employer]). Pt PCP is Dr Beau Bejarano and Rx filled at [facility] in [facility]. Pt plans to return to home at d/c. Discussed [facility] pt unclear if he needs this, he will discuss with his spouse. Assigned CM/SW to follow for final d/c plan.   Samantha Menendez RN CM

## 2021-09-09 NOTE — ED NOTES
Bed: 13  Expected date:   Expected time:   Means of arrival:   Comments:  triage     Adelita Vera RN  09/09/21 [id number]

## 2021-09-09 NOTE — H&P
Hospitalist History & Physical      PCP: Davy Hewitt DO    Date of Admission: 9/9/2021    Date of Service: Pt seen/examined on 9/9/2021     Chief Complaint:  had concerns including Shortness of Breath (Increased SOB starting today. Finished Prednisone Rx today. Tripod breathing in triage. Per wife, HR has been in the 150s. ). History Of Present Illness:    Mr. Saundra Muniz, a 63y.o. year old male  who  has a past medical history of Atrial flutter ([address] COPD (chronic obstructive pulmonary disease) ([address] and Hypertension. Patient presented to the emergency department with complaints of shortness of breath. Just finished a course of prednisone. Breathing became worse today. Is chronically oxygen dependent. Has history of COPD and atrial flutter. Vital signs assessed emergency department revealed patient is 97% on 4 L nasal cannula. Temperature 99.5. Laboratory studies reveal potassium 3.3, creatinine 1.4, proBNP 324, troponin 23. Chest x-ray shows patchy infiltrates in the mid to lower lung zones bilaterally likely due to pneumonia. This could represent viral infection. Patient started on ceftriaxone and doxycycline in the emergency department. Patient apparently fired [employer]. Past Medical History:   Diagnosis Date    Atrial flutter ([address]     COPD (chronic obstructive pulmonary disease) ([address]     Hypertension        Past Surgical History:   Procedure Laterality Date    ATRIAL ABLATION SURGERY  04/03/2018    BACK SURGERY      CARDIOVERSION  02/07/2018    Dr. Shayla De La Rosa- 80 J converted to SB    TRANSESOPHAGEAL ECHOCARDIOGRAM  02/07/2018    Dr. Shayla De La Rosa- No thrombus       Prior to Admission medications    Medication Sig Start Date End Date Taking?  Authorizing Provider   metFORMIN (GLUCOPHAGE) 500 MG tablet Take 1 tablet by mouth 2 times daily (with meals) 8/30/21  Yes Brendon Beal MD   bumetanide (BUMEX) 2 MG tablet Take 1 tablet by mouth 2 times daily 8/30/21  Yes Brendon Beal MD potassium chloride (KLOR-CON M) 20 MEQ extended release tablet Take 1 tablet by mouth daily (with breakfast) 8/31/21  Yes Judith Scott MD   aspirin 81 MG chewable tablet Take 81 mg by mouth daily   Yes Historical Provider, MD   Budeson-Glycopyrrol-Formoterol (BREZTRI AEROSPHERE) 160-9-4.8 MCG/ACT AERO Inhale 2 puffs into the lungs 2 times daily 5/17/21  Yes Nick Daly MD   apixaban (ELIQUIS) 5 MG TABS tablet Take 1 tablet by mouth 2 times daily 11/30/20  Yes Nick Daly MD   fluticasone (FLONASE) 50 MCG/ACT nasal spray 1 spray by Each Nostril route daily 5/29/20  Yes Nick Daly MD   flecainide (TAMBOCOR) 100 MG tablet TAKE 1 TABLET BY MOUTH TWICE DAILY 1/15/19  Yes Primo Bo DO   diltiazem (CARDIZEM CD) 240 MG extended release capsule Take 1 capsule by mouth 2 times daily 9/7/18  Yes Pardeep Street MD   atorvastatin (LIPITOR) 20 MG tablet Take 20 mg by mouth every evening    Yes Historical Provider, MD   albuterol sulfate  (90 Base) MCG/ACT inhaler Inhale 2 puffs into the lungs every 6 hours as needed for Wheezing   Yes Historical Provider, MD   ipratropium-albuterol (DUONEB) 0.5-2.5 (3) MG/3ML SOLN nebulizer solution Inhale 1 vial into the lungs every 4 hours   Yes Historical Provider, MD   predniSONE (DELTASONE) 10 MG tablet Take 30 mg Po X 3 days and then 20 mg Po X 3 days and then 10 mg Po daily X 3 days 8/30/21 9/9/21  Nick Daly MD   glucose monitoring (FREESTYLE FREEDOM) kit 1 kit by Does not apply route daily 8/30/21   Judith Scott MD   Lancets MISC 1 each by Does not apply route 2 times daily 8/30/21   Judith Scott MD   blood glucose monitor strips Test 2 times a day & as needed for symptoms of irregular blood glucose. Dispense sufficient amount for indicated testing frequency plus additional to accommodate PRN testing needs. 8/30/21   Judith Scott MD         Allergies:  Patient has no known allergies.     Social History:    TOBACCO:   reports that he quit smoking about 4 years ago. His smoking use included cigarettes. He has a 60.00 pack-year smoking history. He has never used smokeless tobacco.  ETOH:   reports current alcohol use of about 3.0 - 4.0 standard drinks of alcohol per week. Family History:    Reviewed in detail and negative for DM, CAD, Cancer, CVA. Positive as follows\"      Problem Relation Age of Onset    Other Mother         ALS    Lung Cancer Father     Pacemaker Sister        REVIEW OF SYSTEMS:   Pertinent positives as noted in the HPI. All other systems reviewed and negative. PHYSICAL EXAM:  BP (!) 143/81   Pulse 91   Temp 99.5 °F (37.5 °C) (Axillary)   Resp 23   Ht 5' 11\" (1.803 m)   Wt 256 lb (116.1 kg)   SpO2 97%   BMI 35.70 kg/m²   General appearance: No apparent distress, appears stated age and cooperative. HEENT: Normal cephalic, atraumatic without obvious deformity. Pupils equal, round, and reactive to light. Extra ocular muscles intact. Conjunctivae/corneas clear. Neck: Supple, with full range of motion. No jugular venous distention. Trachea midline. Respiratory: Diminished, very little air movement  Cardiovascular: Irregular  Abdomen: Soft, nontender, nondistended  Musculoskeletal: No clubbing, cyanosis, edema of bilateral lower extremities. Brisk capillary refill. Skin: Normal skin color. No rashes or lesions. Neurologic:  Neurovascularly intact without any focal sensory/motor deficits. Cranial nerves: II-XII intact, grossly non-focal.    Reviewed EKG and CXR personally      CBC:   Recent Labs     09/09/21  0102   WBC 6.5   RBC 3.27*   HGB 10.2*   HCT 31.3*   MCV 95.7   RDW 12.8        BMP:   Recent Labs     09/07/21  1130 09/09/21  0102    137   K 3.8 3.3*   CL 92* 93*   CO2 32* 33*   BUN 21* 25*   CREATININE 1.0 1.4*   MG  --  1.6     LFT:  Recent Labs     09/09/21 0102   PROT 6.8   ALKPHOS 65   ALT 29   AST 14   BILITOT 0.3     CE:  No results for input(s): Bibiana Wade in the last 72 hours.   PT/INR: No results for input(s): INR, APTT in the last 72 hours. BNP: No results for input(s): BNP in the last 72 hours. ESR: No results found for: SEDRATE  CRP: No results found for: CRP  D Dimer: No results found for: DDIMER   Folate and B12: No results found for: [id number], No results found for: FOLATE  Lactic Acid: No results found for: LACTA  Thyroid Studies:   Lab Results   Component Value Date    TSH 0.942 02/14/2018    B8PTODM 4.8 02/14/2018       Oupatient labs:  Lab Results   Component Value Date    CHOL 205 (H) 04/08/2018    TRIG 80 04/08/2018    HDL 80 04/08/2018    LDLCALC 109 (H) 04/08/2018    TSH 0.942 02/14/2018    PSA 0.26 04/08/2018    INR 1.2 02/04/2018       Urinalysis:    Lab Results   Component Value Date    NITRU Negative 04/08/2018    BLOODU Negative 04/08/2018    SPECGRAV 1.020 04/08/2018    GLUCOSEU Negative 04/08/2018    GLUCOSEU NEGATIVE 05/19/2011       Imaging:  Echo Complete    Result Date: 8/12/2021  Transthoracic Echocardiography Report (TTE)  Demographics   Patient Name    [facility]        Gender            Male                  [address] [facility] Record  [id number]       Room Number       4415  Number   Account #       [de-identified]      Procedure Date    08/12/2021   Corporate ID                   Ordering          Marcello Lind                                 Physician   Accession       [id number]     Referring  Number                         Physician   Date of Birth   1960     Sonographer       Mei Reyes [facility]   Age             63 year(s)     Interpreting      [address]                                 Physician         Physician Cardiology                                                   Chary Rodriguez MD                                  Any Other  Procedure Type of Study   TTE procedure:Echo Complete W/Doppler & Color Flow. Procedure Date Date: 08/12/2021 Start: 09:02 AM Study Location: Echo Lab Technical Quality: Good visualization Indications:Shortness of breath.  Patient Status: Routine Height: 71 inches Weight: 273 pounds BSA: 2.41 m^2 BMI: 38.08 kg/m^2 Allergies   - No known allergies. Findings   Left Ventricle  Normal left ventricular size, wall thickness, wall motion, and systolic  function. Estimated left ventricle ejection fraction 70+/-5 %. There is doppler evidence of stage II diastolic dysfunction. Right Ventricle  Mildly dilated right ventricle. Right ventricle global systolic function is normal .   Left Atrium  Normal sized left atrium. Interatrial septum appears intact. Right Atrium  Normal right atrium size. Mitral Valve  Normal mitral valve structure and function. Tricuspid Valve  Normal tricuspid valve structure and function. RVSP is 19 mmHg. Aortic Valve  Normal aortic valve structure and function. Pulmonic Valve  The pulmonic valve was not well visualized. Pericardial Effusion  No evidence of pericardial effusion. Aorta  Aortic root dimension within normal limits. Conclusions   Summary  Technically limited study. Normal left ventricular size, wall thickness, wall motion, and systolic  function. Estimated left ventricle ejection fraction 70+/-5 %. There is doppler evidence of stage II diastolic dysfunction. Mildly dilated right ventricle. Right ventricle global systolic function is normal .  Normal sized left atrium. No significant valvular abnormalities noted.    Signature   ----------------------------------------------------------------  Electronically signed by Tomasa Penaloza MD(Interpreting  physician) on 08/12/2021 04:06 PM  ----------------------------------------------------------------  M-Mode/2D Measurements & Calculations   LV Diastolic    LV Systolic Dimension: 3.1   AV Cusp Separation: 2.1 cmLA  Dimension: 4.3  cm                           Dimension: 3.8 cmAO Root  cm              LV Volume Diastolic: 12.0 ml Dimension: 3.6 cm  LV FS:27.9 %    LV Volume Systolic: 34.2 ml  LV PW           LV EDV/LV EDV Index: 30.6  Diastolic: 1.1  DZ/88 VW)    Result Date: 8/17/2021  EXAMINATION: TWO XRAY VIEWS OF THE CHEST 8/17/2021 9:54 am COMPARISON: 08/08/2021 HISTORY: ORDERING SYSTEM PROVIDED HISTORY: SOB TECHNOLOGIST PROVIDED HISTORY: Reason for exam:->SOB What reading provider will be dictating this exam?->CRC FINDINGS: The lungs are without acute focal process. There is no effusion or pneumothorax. The cardiomediastinal silhouette is without acute process. The osseous structures are without acute process. No acute process. XR CHEST PORTABLE    Result Date: 9/9/2021  EXAMINATION: ONE XRAY VIEW OF THE CHEST 9/9/2021 3:02 am COMPARISON: 08/23/2021 HISTORY: ORDERING SYSTEM PROVIDED HISTORY: sob TECHNOLOGIST PROVIDED HISTORY: Reason for exam:->sob What reading provider will be dictating this exam?->CRC FINDINGS: EKG leads overlie the chest.  Heart size is normal.  No pneumothorax. Multifocal patchy infiltrates in the mid to lower lung zones bilaterally are new. No effusion. Patchy infiltrates in the mid to lower lung zones bilaterally likely due to pneumonia. This could represent viral infection. Continued follow-up recommended. NM MYOCARDIAL SPECT REST EXERCISE OR RX    Result Date: 8/13/2021  Indication:  Shortness of breath Clinical History:   Patient has no previous historyof coronary artery disease. IMAGING: Myocardial perfusion imaging was performed at rest 30-35 minutes following the intravenous injection of 11.3 mCi of (Tc-Sestamibi) followed by 10 ml of Normal Saline. At peak exercise, the patient was injected intravenously with 35mCi of (Tc-Sestamibi) followed by 10 ml of Normal Saline. Gated post-stress tomographic imaging was performed 20-25 minutes after stress. FINDINGS: The overall quality of the study was fair.  Left ventricular cavity size was noted to be dilated on both the stress and the resting images but there was no transient ischemic dilatation after stress Rotational analog analysis demonstrated abnormal patient motion and there was marked increase in tracer uptake in the abdominal organs with the liver overshadowing the bottom of the heart A severedefect was present in the inferior wall extending into the lateral apical wall(s) that was moderate to largesized by quantification. The resting images showed no change Gated SPECT left ventricular ejection fraction was calculated to be 66%, with normal myocardial contractility and wall motion. The myocardial perfusion imaging was probably normal with the large fixed inferior/lateral apical wall defect being more consistent with attenuation artifact and less likely the result of a myocardial infarction especially with the normal contractility of the inferior wall and the lateral apical wall.  More importantly, there did not appear to be any evidence of stress-induced ischemia on this study Overall left ventricular systolic function was normal with no regional wall motion abnormality Low risk general pharmacologic stress test.       ASSESSMENT:  -Bilateral community-acquired pneumonia  -COPD exacerbation  -Acute renal injury  -Hypokalemia  -Elevated troponin  -History of atrial flutter      PLAN:  -Admit to medicine  -Consult pulmonology  -Continue ceftriaxone and azithromycin  -Check procalcitonin  -Methylprednisolone with transition to prednisone  -Breathing treatments  -Respiratory culture  -Monitor serum electrolytes replete as needed  -Monitor renal function avoid nephrotoxic medications  -Continue Eliquis, atorvastatin, aspirin, diltiazem, flecainide        Diet: No diet orders on file  Code Status: Prior  Surrogate decision maker confirmed with patient:   Extended Emergency Contact Information  Primary Emergency Contact: Marlee March  Address: [address] [place] [address] Phone: [phone]  Relation: Spouse  Secondary Emergency Contact: Adiel Lopez [facility] Phone: [phone]  Relation:

## 2021-09-10 LAB
ALBUMIN SERPL-MCNC: 3.5 G/DL (ref 3.5–5.2)
ALP BLD-CCNC: 57 U/L (ref 40–129)
ALT SERPL-CCNC: 23 U/L (ref 0–40)
ANION GAP SERPL CALCULATED.3IONS-SCNC: 12 MMOL/L (ref 7–16)
AST SERPL-CCNC: 10 U/L (ref 0–39)
BASOPHILS ABSOLUTE: 0 E9/L (ref 0–0.2)
BASOPHILS RELATIVE PERCENT: 0.3 % (ref 0–2)
BILIRUB SERPL-MCNC: <0.2 MG/DL (ref 0–1.2)
BUN BLDV-MCNC: 30 MG/DL (ref 6–20)
CALCIUM SERPL-MCNC: 9.1 MG/DL (ref 8.6–10.2)
CHLORIDE BLD-SCNC: 94 MMOL/L (ref 98–107)
CO2: 34 MMOL/L (ref 22–29)
CREAT SERPL-MCNC: 1.1 MG/DL (ref 0.7–1.2)
EOSINOPHILS ABSOLUTE: 0 E9/L (ref 0.05–0.5)
EOSINOPHILS RELATIVE PERCENT: 0 % (ref 0–6)
GFR AFRICAN AMERICAN: >60
GFR NON-AFRICAN AMERICAN: >60 ML/MIN/1.73
GLUCOSE BLD-MCNC: 213 MG/DL (ref 74–99)
HCT VFR BLD CALC: 28.3 % (ref 37–54)
HEMOGLOBIN: 9.1 G/DL (ref 12.5–16.5)
LYMPHOCYTES ABSOLUTE: 0.79 E9/L (ref 1.5–4)
LYMPHOCYTES RELATIVE PERCENT: 12.2 % (ref 20–42)
MCH RBC QN AUTO: 30.3 PG (ref 26–35)
MCHC RBC AUTO-ENTMCNC: 32.2 % (ref 32–34.5)
MCV RBC AUTO: 94.3 FL (ref 80–99.9)
METER GLUCOSE: 140 MG/DL (ref 74–99)
METER GLUCOSE: 153 MG/DL (ref 74–99)
METER GLUCOSE: 220 MG/DL (ref 74–99)
METER GLUCOSE: 335 MG/DL (ref 74–99)
MONOCYTES ABSOLUTE: 0.53 E9/L (ref 0.1–0.95)
MONOCYTES RELATIVE PERCENT: 7.8 % (ref 2–12)
MYELOCYTE PERCENT: 5.2 % (ref 0–0)
NEUTROPHILS ABSOLUTE: 5.28 E9/L (ref 1.8–7.3)
NEUTROPHILS RELATIVE PERCENT: 74.8 % (ref 43–80)
OVALOCYTES: ABNORMAL
PDW BLD-RTO: 12.8 FL (ref 11.5–15)
PLATELET # BLD: 342 E9/L (ref 130–450)
PMV BLD AUTO: 10.9 FL (ref 7–12)
POIKILOCYTES: ABNORMAL
POLYCHROMASIA: ABNORMAL
POTASSIUM REFLEX MAGNESIUM: 3.8 MMOL/L (ref 3.5–5)
RBC # BLD: 3 E12/L (ref 3.8–5.8)
SODIUM BLD-SCNC: 140 MMOL/L (ref 132–146)
TARGET CELLS: ABNORMAL
TOTAL PROTEIN: 6.3 G/DL (ref 6.4–8.3)
WBC # BLD: 6.6 E9/L (ref 4.5–11.5)

## 2021-09-10 PROCEDURE — 2700000000 HC OXYGEN THERAPY PER DAY

## 2021-09-10 PROCEDURE — 6360000002 HC RX W HCPCS: Performed by: FAMILY MEDICINE

## 2021-09-10 PROCEDURE — 82962 GLUCOSE BLOOD TEST: CPT

## 2021-09-10 PROCEDURE — 6370000000 HC RX 637 (ALT 250 FOR IP): Performed by: FAMILY MEDICINE

## 2021-09-10 PROCEDURE — 2580000003 HC RX 258: Performed by: FAMILY MEDICINE

## 2021-09-10 PROCEDURE — 99254 IP/OBS CNSLTJ NEW/EST MOD 60: CPT | Performed by: INTERNAL MEDICINE

## 2021-09-10 PROCEDURE — 36415 COLL VENOUS BLD VENIPUNCTURE: CPT

## 2021-09-10 PROCEDURE — APPSS45 APP SPLIT SHARED TIME 31-45 MINUTES: Performed by: NURSE PRACTITIONER

## 2021-09-10 PROCEDURE — 94660 CPAP INITIATION&MGMT: CPT

## 2021-09-10 PROCEDURE — 94640 AIRWAY INHALATION TREATMENT: CPT

## 2021-09-10 PROCEDURE — 2060000000 HC ICU INTERMEDIATE R&B

## 2021-09-10 PROCEDURE — 80053 COMPREHEN METABOLIC PANEL: CPT

## 2021-09-10 PROCEDURE — 6370000000 HC RX 637 (ALT 250 FOR IP): Performed by: INTERNAL MEDICINE

## 2021-09-10 PROCEDURE — 99233 SBSQ HOSP IP/OBS HIGH 50: CPT | Performed by: INTERNAL MEDICINE

## 2021-09-10 PROCEDURE — 85025 COMPLETE CBC W/AUTO DIFF WBC: CPT

## 2021-09-10 RX ORDER — DILTIAZEM HYDROCHLORIDE 5 MG/ML
10 INJECTION INTRAVENOUS
Status: ACTIVE | OUTPATIENT
Start: 2021-09-10 | End: 2021-09-10

## 2021-09-10 RX ORDER — METOPROLOL TARTRATE 50 MG/1
100 TABLET, FILM COATED ORAL
Status: ACTIVE | OUTPATIENT
Start: 2021-09-10 | End: 2021-09-10

## 2021-09-10 RX ORDER — METOPROLOL TARTRATE 50 MG/1
50 TABLET, FILM COATED ORAL
Status: ACTIVE | OUTPATIENT
Start: 2021-09-10 | End: 2021-09-10

## 2021-09-10 RX ORDER — METOPROLOL TARTRATE 5 MG/5ML
5 INJECTION INTRAVENOUS EVERY 5 MIN PRN
Status: DISCONTINUED | OUTPATIENT
Start: 2021-09-10 | End: 2021-09-18 | Stop reason: HOSPADM

## 2021-09-10 RX ORDER — PREDNISONE 10 MG/1
30 TABLET ORAL DAILY
Status: DISCONTINUED | OUTPATIENT
Start: 2021-09-11 | End: 2021-09-12

## 2021-09-10 RX ORDER — INSULIN GLARGINE 100 [IU]/ML
12 INJECTION, SOLUTION SUBCUTANEOUS 2 TIMES DAILY
Status: DISCONTINUED | OUTPATIENT
Start: 2021-09-10 | End: 2021-09-11

## 2021-09-10 RX ADMIN — ATORVASTATIN CALCIUM 20 MG: 20 TABLET, FILM COATED ORAL at 17:20

## 2021-09-10 RX ADMIN — CEFTRIAXONE 1000 MG: 1 INJECTION, POWDER, FOR SOLUTION INTRAMUSCULAR; INTRAVENOUS at 04:42

## 2021-09-10 RX ADMIN — DILTIAZEM HYDROCHLORIDE 240 MG: 240 CAPSULE, EXTENDED RELEASE ORAL at 21:34

## 2021-09-10 RX ADMIN — Medication 10 ML: at 09:31

## 2021-09-10 RX ADMIN — FLECAINIDE ACETATE 100 MG: 100 TABLET ORAL at 21:34

## 2021-09-10 RX ADMIN — AZITHROMYCIN MONOHYDRATE 500 MG: 500 INJECTION, POWDER, LYOPHILIZED, FOR SOLUTION INTRAVENOUS at 11:40

## 2021-09-10 RX ADMIN — METHYLPREDNISOLONE SODIUM SUCCINATE 40 MG: 40 INJECTION, POWDER, FOR SOLUTION INTRAMUSCULAR; INTRAVENOUS at 04:42

## 2021-09-10 RX ADMIN — FLUTICASONE PROPIONATE 1 SPRAY: 50 SPRAY, METERED NASAL at 09:33

## 2021-09-10 RX ADMIN — INSULIN LISPRO 4 UNITS: 100 INJECTION, SOLUTION INTRAVENOUS; SUBCUTANEOUS at 17:21

## 2021-09-10 RX ADMIN — METHYLPREDNISOLONE SODIUM SUCCINATE 40 MG: 40 INJECTION, POWDER, FOR SOLUTION INTRAMUSCULAR; INTRAVENOUS at 21:35

## 2021-09-10 RX ADMIN — FLECAINIDE ACETATE 100 MG: 100 TABLET ORAL at 09:33

## 2021-09-10 RX ADMIN — APIXABAN 5 MG: 5 TABLET, FILM COATED ORAL at 09:33

## 2021-09-10 RX ADMIN — BUMETANIDE 2 MG: 1 TABLET ORAL at 09:32

## 2021-09-10 RX ADMIN — INSULIN GLARGINE 12 UNITS: 100 INJECTION, SOLUTION SUBCUTANEOUS at 11:41

## 2021-09-10 RX ADMIN — IPRATROPIUM BROMIDE 0.5 MG: 0.5 SOLUTION RESPIRATORY (INHALATION) at 19:47

## 2021-09-10 RX ADMIN — BUDESONIDE 500 MCG: 0.5 SUSPENSION RESPIRATORY (INHALATION) at 08:27

## 2021-09-10 RX ADMIN — BUMETANIDE 2 MG: 1 TABLET ORAL at 17:20

## 2021-09-10 RX ADMIN — ARFORMOTEROL TARTRATE 15 MCG: 15 SOLUTION RESPIRATORY (INHALATION) at 19:56

## 2021-09-10 RX ADMIN — Medication 10 ML: at 21:35

## 2021-09-10 RX ADMIN — ASPIRIN 81 MG CHEWABLE TABLET 81 MG: 81 TABLET CHEWABLE at 09:32

## 2021-09-10 RX ADMIN — INSULIN LISPRO 8 UNITS: 100 INJECTION, SOLUTION INTRAVENOUS; SUBCUTANEOUS at 06:17

## 2021-09-10 RX ADMIN — METHYLPREDNISOLONE SODIUM SUCCINATE 40 MG: 40 INJECTION, POWDER, FOR SOLUTION INTRAMUSCULAR; INTRAVENOUS at 09:32

## 2021-09-10 RX ADMIN — IPRATROPIUM BROMIDE AND ALBUTEROL SULFATE 3 ML: .5; 2.5 SOLUTION RESPIRATORY (INHALATION) at 19:47

## 2021-09-10 RX ADMIN — INSULIN LISPRO 2 UNITS: 100 INJECTION, SOLUTION INTRAVENOUS; SUBCUTANEOUS at 21:41

## 2021-09-10 RX ADMIN — DILTIAZEM HYDROCHLORIDE 240 MG: 240 CAPSULE, EXTENDED RELEASE ORAL at 09:32

## 2021-09-10 RX ADMIN — IPRATROPIUM BROMIDE AND ALBUTEROL SULFATE 3 ML: .5; 2.5 SOLUTION RESPIRATORY (INHALATION) at 08:28

## 2021-09-10 RX ADMIN — POTASSIUM CHLORIDE 20 MEQ: 20 TABLET, EXTENDED RELEASE ORAL at 09:32

## 2021-09-10 RX ADMIN — APIXABAN 5 MG: 5 TABLET, FILM COATED ORAL at 21:34

## 2021-09-10 RX ADMIN — INSULIN GLARGINE 12 UNITS: 100 INJECTION, SOLUTION SUBCUTANEOUS at 21:40

## 2021-09-10 RX ADMIN — IPRATROPIUM BROMIDE 0.5 MG: 0.5 SOLUTION RESPIRATORY (INHALATION) at 08:27

## 2021-09-10 RX ADMIN — IPRATROPIUM BROMIDE AND ALBUTEROL SULFATE 3 ML: .5; 2.5 SOLUTION RESPIRATORY (INHALATION) at 16:33

## 2021-09-10 RX ADMIN — BUDESONIDE 500 MCG: 0.5 SUSPENSION RESPIRATORY (INHALATION) at 19:56

## 2021-09-10 RX ADMIN — METHYLPREDNISOLONE SODIUM SUCCINATE 40 MG: 40 INJECTION, POWDER, FOR SOLUTION INTRAMUSCULAR; INTRAVENOUS at 17:20

## 2021-09-10 RX ADMIN — IPRATROPIUM BROMIDE AND ALBUTEROL SULFATE 3 ML: .5; 2.5 SOLUTION RESPIRATORY (INHALATION) at 12:12

## 2021-09-10 RX ADMIN — ARFORMOTEROL TARTRATE 15 MCG: 15 SOLUTION RESPIRATORY (INHALATION) at 08:27

## 2021-09-10 ASSESSMENT — PAIN SCALES - GENERAL
PAINLEVEL_OUTOF10: 0
PAINLEVEL_OUTOF10: 0

## 2021-09-10 NOTE — PROGRESS NOTES
Hospitalist Progress Note      PCP: Kiran Stockton DO    Date of Admission: 9/9/2021    Chief Complaint: *SOB    Hospital Course: ** thought to be  A combination of CHF and COPD, seen by pulm,  Started on steroids, seen by cardiology, , for CTA coronary on Monday     Subjective: *SOB      Medications:  Reviewed    Infusion Medications    sodium chloride      dextrose       Scheduled Medications    insulin glargine  12 Units SubCUTAneous BID    apixaban  5 mg Oral BID    aspirin  81 mg Oral Daily    atorvastatin  20 mg Oral QPM    bumetanide  2 mg Oral BID    dilTIAZem  240 mg Oral BID    flecainide  100 mg Oral BID    fluticasone  1 spray Each Nostril Daily    ipratropium-albuterol  1 vial Inhalation Q4H    potassium chloride  20 mEq Oral Daily with breakfast    sodium chloride flush  5-40 mL IntraVENous 2 times per day    methylPREDNISolone  40 mg IntraVENous Q6H    Followed by   [place]  ON 9/11/2021] predniSONE  40 mg Oral Daily    cefTRIAXone (ROCEPHIN) IV  1,000 mg IntraVENous Q24H    azithromycin  500 mg IntraVENous Q24H    insulin lispro  0-10 Units SubCUTAneous 4x Daily AC & HS    Arformoterol Tartrate  15 mcg Nebulization BID    And    ipratropium  0.5 mg Nebulization 4x daily    And    budesonide  0.5 mg Nebulization BID     PRN Meds: metoprolol tartrate, metoprolol tartrate, metoprolol, dilTIAZem, albuterol, sodium chloride flush, sodium chloride, ondansetron **OR** ondansetron, polyethylene glycol, acetaminophen **OR** acetaminophen, potassium chloride **OR** potassium alternative oral replacement **OR** potassium chloride, glucose, dextrose, glucagon (rDNA), dextrose      Intake/Output Summary (Last 24 hours) at 9/10/2021 1443  Last data filed at 9/10/2021 1135  Gross per 24 hour   Intake 420 ml   Output 1850 ml   Net -1430 ml       Exam:    /71   Pulse 85   Temp 97 °F (36.1 °C) (Axillary)   Resp 20   Ht 5' 11\" (1.803 m)   Wt 260 lb 4.8 oz (118.1 kg)   SpO2 99% BMI 36.30 kg/m²           Gen:  WELL DEVELOPED  HEENT: NC/AT, moist mucous membranes, no oropharyngeal erythema or exudate  Neck: supple, trachea midline, no anterior cervical or SC LAD  Heart:  Normal s1/s2, RRR, no murmurs, gallops, or rubs. Lungs:  COARSE  bilaterally, *  Abd: bowel sounds present, soft, nontender, nondistended, no masses  Extrem:  No clubbing, cyanosis,  *TRACE* edema  Skin: no rashes or lesions  Psych: A & O x3  Neuro: grossly intact, moves all four extremities. Capillary Refill: Brisk,< 3 seconds   Peripheral Pulses: +2 palpable, equal bilaterally               Labs:   Recent Labs     09/09/21  0102 09/10/21  0429   WBC 6.5 6.6   HGB 10.2* 9.1*   HCT 31.3* 28.3*    342     Recent Labs     09/09/21  0102 09/10/21  0429    140   K 3.3* 3.8   CL 93* 94*   CO2 33* 34*   BUN 25* 30*   CREATININE 1.4* 1.1   CALCIUM 9.5 9.1     Recent Labs     09/09/21  0102 09/10/21  0429   AST 14 10   ALT 29 23   BILITOT 0.3 <0.2   ALKPHOS 65 57     No results for input(s): INR in the last 72 hours. No results for input(s): Juan José Jakes in the last 72 hours. Recent Labs     09/09/21  0102 09/10/21  0429   AST 14 10   ALT 29 23   BILITOT 0.3 <0.2   ALKPHOS 65 57     No results for input(s): LACTA in the last 72 hours. No results found for: Maricruz Stabs  No results found for: AMMONIA    Assessment:    Active Hospital Problems    Diagnosis Date Noted    COPD exacerbation ([facility] [J44.1] 09/09/2021   HFpEF  PAF  HTN  say   MORBID OBESITY   PRE DIABETES, AIC 6.4    Plan:  *SSI  CORONARY CT ON MONDAY   CONT steroids   lopressor   SSI   tambocor   ROCEPHIN  ZITHROMAX   LIPITOR    DVT Prophylaxis: ELIQUIS   Diet: ADULT DIET; Regular; 5 carb choices (75 gm/meal);  No Added Salt (3-4 gm)  Code Status: Full Code    PT/OT Eval Status: ORDERED    Dispo - *HOME*      Electronically signed by Giovanny Sharpe DO on 9/10/2021 at 2:43 PM [facility]

## 2021-09-10 NOTE — CONSULTS
Inpatient Cardiology Consultation      Reason for Consult:  chf    Consulting Physician: Dr. Terri Mariscal    Requesting Physician: Dr. Martin Powers    Date of Consultation: 9/10/2021    HISTORY OF PRESENT ILLNESS:     This male is known to [id number] [place] [address] cardiology and is followed by Anne Duque.  He has not been evaluated  by general cardiology as an outpatient since March 2018. He has a history of atrial fib fib/flutter and is status post cardioversion. He now follows with EP. He was last evaluated as an outpatient by EP January 2020 and was in sinus rhythm on oral anticoagulation. He is status post ablation from April 2018 with recurrent atrial flutter afterwards. He had a hospital admission from August 17, 2021 for congestive heart failure. He underwent a 2D echocardiogram and there was normal left ventricular systolic function but was a technically limited study. A Lexiscan stress test was low risk with no reversible ischemia but with a large fixed inferior and lateral apical wall defect consistent with artifact and less likely the result of a myocardial infarction. He was diuresed. He was maintaining sinus rhythm. A  bronchoscopy was to be done, he declined. Apparently he had narrowing in the right main bronchus per CT scan. He also declined to transfer to [facility]. He required a Bumex infusion and was treated for a COPD exacerbation as well. He was treated with steroids. He was discharged on Bumex 2 mg twice a day. He was discharged on August 30. He presented to the emergency room early yesterday morning with dyspnea and a heart rate in the 150s. He is a difficult historian but in general his wife tells me that she found him Wednesday evening very short of breath and pale with a pulse oximeter 88 and a heart rate of 159. She brought him to the emergency room. He has an intermittent chest pain which is sharp and does not appear to be related to activity.   He has had a cough with clear sputum that sometimes is yellow. He has no palpitations and has been wearing his sleep apnea mask. He was negative for COVID-19 and a troponin was 23-20, with a BUN and creatinine of 25 and 1.4 and potassium of 3.3, magnesium 1.6, proBNP 324 and  WBCs 6.5 with an H&H of 10.2 and 31.3    Chest x-ray showed patchy infiltrates in the mid to lower lung zones likely due to pneumonia    EKG showed sinus rhythm    He was treated with antibiotics and steroids and DuoNeb and BiPAP per his request.      Past Medical History:   1. Cardiac catheterization 11/2010 ([facility]): LMT: normal. LAD: normal, gives off one large bifurcating diagonal as it courses to but ends at the apex. LCx: nondominant, gives off one large OM1, only trivial distal disease. RCA: Dominant, large, minimal disease distally. 2. Echocardiogram 11/2010 ([facility]): EF 55% with questionable RV dilation. Trivial to small pericardial effusion. Small echodense space near apex (? Localized effusion). Trivial MR and TR.   3. Pericarditis 11/2010 ([facility] at [facility])  4. Hypertension  5. History of syncope with negative tilt table test 2/2012 ([facility])  6. COPD, oxygen dependent, chronic hypoxic respiratory failure  7. History of back surgery (detals unknown)  8. Former tobacco abuse and quit smoking 4 years ago  5. Recurrent symptomatic atrial flutter with no atrial fibrillation and status post WYATT guided cardioversion on February 7, 2018 which was successful but then the arrhythmia recurred and underwent cardioversion again on February 15, 2018     WYATT: 2/7/18 ([place])   Cardiac rhythm: atrial flutter   Low normal left ventricular ejection fraction.   Moderately dilated left atrium.   No evidence of a left atrial appendage thrombus.   No evidence of interatrial shunting on bubble study.   Borderline dilated right ventricle with normal right ventricular function.   Mild-moderate mitral regurgitation.   10.  August 3, 2018 atypical a flutter     WYATT: 2/15/18 Ermelinda Hernandez)   Low normal left ventricular systolic function.   Mild to moderate mitral regurgitation. 10.  Atrial flutter SVT ablation April 30, 2018 by Dr. Meño Oakes  11. Atypical atrial flutter August 3, 2018, on flecainide Cardizem and Eliquis  12. Severe obstructive sleep apnea, compliant with CPAP  13. depression. 14.  Alcohol abuse history  15. Congestive heart failure admission August 7, 2021, IV diuresis  16.  2D echo August 12, 2021    TTE (Dr. Purnima Vinson, 8/12/2021)  Summary   Technically limited study.   Normal left ventricular size, wall thickness, wall motion, and systolic   function.   Estimated left ventricle ejection fraction 70+/-5 %.   There is doppler evidence of stage II diastolic dysfunction.   Mildly dilated right ventricle.   Right ventricle global systolic function is normal .   Normal sized left atrium.   No significant valvular abnormalities noted.     17. Lexiscan stress test August 13, 2021    No significant valvular abnormalities noted.     Lexiscan Stress Test 8/13/2021:  FINDINGS: The overall quality of the study was fair. Left ventricular cavity size was noted to be dilated on both the  stress and the resting images but there was no transient ischemic  dilatation after stress  Rotational analog analysis demonstrated abnormal patient motion and  there was marked increase in tracer uptake in the abdominal organs  with the liver overshadowing the bottom of the heart  A severe defect was present in the inferior wall extending into the  lateral apical wall(s) that was moderate to largesized by  quantification. The resting images showed no change   Gated SPECT left ventricular ejection fraction was calculated to be  66%, with normal myocardial contractility and wall motion.   Impression  The myocardial perfusion imaging was probably normal with the large  fixed inferior/lateral apical wall defect being more consistent with  attenuation artifact and less likely the result of a myocardial  infarction especially with the normal contractility of the inferior  wall and the lateral apical wall. More importantly, there did not  appear to be any evidence of stress-induced ischemia on this study  Overall left ventricular systolic function was normal with no regional  wall motion abnormality  Low risk general pharmacologic stress test.    18.    Social History:  He stopped smoking in 2017, prior to that smoked 2 PPD for \" a lot of years\". He denies significant alcohol intake or illicit drug use.      Family History:  One sister with a pacemaker  Mother  of ALS, he is unsure at what age  Father  of lung cancer, he is unsure at what age      Past Medical History:  Medications Prior to admit:  Prior to Admission medications    Medication Sig Start Date End Date Taking?  Authorizing Provider   metFORMIN (GLUCOPHAGE) 500 MG tablet Take 1 tablet by mouth 2 times daily (with meals) 21  Yes Chapis Jones MD   bumetanide (BUMEX) 2 MG tablet Take 1 tablet by mouth 2 times daily 21  Yes Chapis Jones MD   potassium chloride (KLOR-CON M) 20 MEQ extended release tablet Take 1 tablet by mouth daily (with breakfast) 21  Yes Chapis Jones MD   aspirin 81 MG chewable tablet Take 81 mg by mouth daily   Yes Historical Provider, MD   Budeson-Glycopyrrol-Formoterol (BREZTRI AEROSPHERE) 160-9-4.8 MCG/ACT AERO Inhale 2 puffs into the lungs 2 times daily 21  Yes Pia Posey MD   apixaban (ELIQUIS) 5 MG TABS tablet Take 1 tablet by mouth 2 times daily 20  Yes Pia Posey MD   fluticasone (FLONASE) 50 MCG/ACT nasal spray 1 spray by Each Nostril route daily 20  Yes Pia Posey MD   flecainide (TAMBOCOR) 100 MG tablet TAKE 1 TABLET BY MOUTH TWICE DAILY 1/15/19  Yes Kimberli Espinal DO   diltiazem (CARDIZEM CD) 240 MG extended release capsule Take 1 capsule by mouth 2 times daily 18  Yes Cece Leahy MD   atorvastatin (LIPITOR) 20 MG tablet Take 20 mg by mouth every evening    Yes Historical Provider, MD albuterol sulfate  (90 Base) MCG/ACT inhaler Inhale 2 puffs into the lungs every 6 hours as needed for Wheezing   Yes Historical Provider, MD   ipratropium-albuterol (DUONEB) 0.5-2.5 (3) MG/3ML SOLN nebulizer solution Inhale 1 vial into the lungs every 4 hours   Yes Historical Provider, MD       Current Medications:    Current Facility-Administered Medications: insulin glargine (LANTUS) injection vial 12 Units, 12 Units, SubCUTAneous, BID  albuterol (PROVENTIL) nebulizer solution 2.5 mg, 2.5 mg, Nebulization, Q6H PRN  apixaban (ELIQUIS) tablet 5 mg, 5 mg, Oral, BID  aspirin chewable tablet 81 mg, 81 mg, Oral, Daily  atorvastatin (LIPITOR) tablet 20 mg, 20 mg, Oral, QPM  bumetanide (BUMEX) tablet 2 mg, 2 mg, Oral, BID  dilTIAZem (CARDIZEM CD) extended release capsule 240 mg, 240 mg, Oral, BID  flecainide (TAMBOCOR) tablet 100 mg, 100 mg, Oral, BID  fluticasone (FLONASE) 50 MCG/ACT nasal spray 1 spray, 1 spray, Each Nostril, Daily  ipratropium-albuterol (DUONEB) nebulizer solution 3 mL, 1 vial, Inhalation, Q4H  potassium chloride (KLOR-CON M) extended release tablet 20 mEq, 20 mEq, Oral, Daily with breakfast  sodium chloride flush 0.9 % injection 5-40 mL, 5-40 mL, IntraVENous, 2 times per day  sodium chloride flush 0.9 % injection 5-40 mL, 5-40 mL, IntraVENous, PRN  0.9 % sodium chloride infusion, 25 mL, IntraVENous, PRN  ondansetron (ZOFRAN-ODT) disintegrating tablet 4 mg, 4 mg, Oral, Q8H PRN **OR** ondansetron (ZOFRAN) injection 4 mg, 4 mg, IntraVENous, Q6H PRN  polyethylene glycol (GLYCOLAX) packet 17 g, 17 g, Oral, Daily PRN  acetaminophen (TYLENOL) tablet 650 mg, 650 mg, Oral, Q6H PRN **OR** acetaminophen (TYLENOL) suppository 650 mg, 650 mg, Rectal, Q6H PRN  methylPREDNISolone sodium (SOLU-MEDROL) injection 40 mg, 40 mg, IntraVENous, Q6H **FOLLOWED BY** [START ON 9/11/2021] predniSONE (DELTASONE) tablet 40 mg, 40 mg, Oral, Daily  cefTRIAXone (ROCEPHIN) 1,000 mg in sterile water 10 mL IV syringe, 1,000 mg, IntraVENous, Q24H  azithromycin (ZITHROMAX) 500 mg in D5W 250ml Vial Mate, 500 mg, IntraVENous, Q24H  potassium chloride (KLOR-CON M) extended release tablet 40 mEq, 40 mEq, Oral, PRN **OR** potassium bicarb-citric acid (EFFER-K) effervescent tablet 40 mEq, 40 mEq, Oral, PRN **OR** potassium chloride 10 mEq/100 mL IVPB (Peripheral Line), 10 mEq, IntraVENous, PRN  insulin lispro (HUMALOG) injection vial 0-10 Units, 0-10 Units, SubCUTAneous, 4x Daily AC & HS  glucose (GLUTOSE) 40 % oral gel 15 g, 15 g, Oral, PRN  dextrose 50 % IV solution, 12.5 g, IntraVENous, PRN  glucagon (rDNA) injection 1 mg, 1 mg, IntraMUSCular, PRN  dextrose 5 % solution, 100 mL/hr, IntraVENous, PRN  Arformoterol Tartrate (BROVANA) nebulizer solution 15 mcg, 15 mcg, Nebulization, BID **AND** ipratropium (ATROVENT) 0.02 % nebulizer solution 0.5 mg, 0.5 mg, Nebulization, 4x daily **AND** budesonide (PULMICORT) nebulizer suspension 500 mcg, 0.5 mg, Nebulization, BID    Allergies:  Patient has no known allergies. Social History:  former smoker with a 60-pack-year history  And admits to 1-2 alcoholic drinks a day and denies illicit drugs and is     Family History:   Family History   Problem Relation Age of Onset    Other Mother         ALS    Lung Cancer Father     Pacemaker Sister        REVIEW OF SYSTEMS:     · Constitutional:  positive fatigue but no, fevers, chills or night sweats  · Eyes: Denies visual changes or drainage  · ENT: Denies headaches or hearing loss. No mouth sores or sore throat. No epistaxis   · Cardiovascular: Denies chest pain, pressure or palpitations. No lower extremity swelling. · Respiratory: positive BARDALES, cough, orthopnea but no PND. No hemoptysis   · Gastrointestinal: Denies hematemesis or anorexia. No hematochezia or melena    · Genitourinary: Denies urgency, dysuria or hematuria.   · Musculoskeletal: Denies gait disturbance, weakness or joint complaints  · Integumentary: Denies rash, hives or pruritis · Neurological: Denies dizziness, headaches or seizures. No numbness or tingling  · Psychiatric: Denies anxiety or depression. · Endocrine: Denies temperature intolerance. No recent weight change. .  · Hematologic/Lymphatic: Denies abnormal bruising or bleeding. No swollen lymph nodes    PHYSICAL EXAM:   /71   Pulse 85   Temp 97 °F (36.1 °C) (Axillary)   Resp 20   Ht 5' 11\" (1.803 m)   Wt 260 lb 4.8 oz (118.1 kg)   SpO2 95%   BMI 36.30 kg/m²   CONST:  Well developed, well nourished who appears of stated age. Awake, alert and cooperative. No apparent distress. HEENT:   Head- Normocephalic, atraumatic   Eyes- Conjunctivae pink, anicteric  Throat- Oral mucosa pink and moist  Neck-  No stridor, trachea midline, no jugular venous distention. No carotid bruit. CHEST: Chest symmetrical and non-tender to palpation. No accessory muscle use or intercostal retractions  RESPIRATORY: Lung sounds - clear throughout fields   CARDIOVASCULAR:     Heart Inspection- shows no noted pulsations  Heart Palpation- no heaves or thrills; PMI is non-displaced   Heart Ausculation- Regular rate and rhythm, no murmur. No s3, s4 or rub   PV: Trace to 1+ lower extremity edema. No varicosities. Pedal pulses palpable, no clubbing or cyanosis   ABDOMEN: Soft, non-tender to light palpation. Bowel sounds present. No palpable masses no organomegaly; no abdominal bruit  MS: Good muscle strength and tone. No atrophy or abnormal movements. : Deferred  SKIN: Warm and dry no statis dermatitis or ulcers   NEURO / PSYCH: Oriented to person, place and time. Speech clear and appropriate. Follows all commands.  Pleasant affect     DATA:    ECG / Tele strips: Sinus rhythm  Diagnostic:      Intake/Output Summary (Last 24 hours) at 9/10/2021 1034  Last data filed at 9/10/2021 0931  Gross per 24 hour   Intake 420 ml   Output 1450 ml   Net -1030 ml       Labs:   CBC:   Recent Labs     09/09/21  0102 09/10/21  0429   WBC 6.5 6.6   HGB 10.2* 9.1* HCT 31.3* 28.3*    342     BMP:   Recent Labs     09/09/21  0102 09/10/21  0429    140   K 3.3* 3.8   CO2 33* 34*   BUN 25* 30*   CREATININE 1.4* 1.1   LABGLOM 52 >60   CALCIUM 9.5 9.1     Mag:   Recent Labs     09/09/21 0102   MG 1.6     Phos: No results for input(s): PHOS in the last 72 hours. TFT:   Lab Results   Component Value Date    TSH 0.942 02/14/2018    K8UWRBL 4.8 02/14/2018    T4FREE 0.95 02/05/2018      HgA1c:   Lab Results   Component Value Date    LABA1C 6.4 (H) 09/09/2021     No results found for: EAG  proBNP:   Recent Labs     09/09/21 0102   PROBNP 324*     PT/INR: No results for input(s): PROTIME, INR in the last 72 hours. APTT:No results for input(s): APTT in the last 72 hours. CARDIAC ENZYMES:  Recent Labs     09/09/21 0102 09/09/21  0838   TROPHS 23* 20*     FASTING LIPID PANEL:  Lab Results   Component Value Date    CHOL 205 04/08/2018    HDL 80 04/08/2018    LDLCALC 109 04/08/2018    TRIG 80 04/08/2018     LIVER PROFILE:  Recent Labs     09/09/21  0102 09/10/21  0429   AST 14 10   ALT 29 23   LABALBU 3.6 3.5       1. chronic heart failure with preserved ejection fraction. BNP around 324.  -.  Mildly dilated RV with normal RV systolic function + normal LV systolic function (EF 70 +/- 5%), stage II diastolic dysfunction recent 2D echo, continue oral diuresis  2. Questionable COPD exacerbation. Known history of very severe COPD. Pulmonology following. 3. Acute on chronic respiratory failure, currently on 4 L nasal cannula. 4. Acute kidney injury, resolved  5.  anemia  6. Paroxysmal atrial fibrillation/typical flutter status-post AF ablation in April 2018. Currently on Eliquis, cardizem, and flecainide therapy. Follows with [facility]. Maintaining normal sinus rhythm this admission. 7. Hypertension, controlled   8. Hyperlipidemia, on statin therapy. 9. Obstructive sleep apnea, compliant with CPAP. 10. Morbid obesity / BMI 40.8  11.  History of alcohol abuse.  12. Former tobacco abuse (2 PPD for many years, quit in 8/2017)  13. History of pericarditis in 11/2010  14. Mild coronary artery disease by cardiac catheterization with atypical chest pain and mild troponin elevation not consistent with acute coronary syndrome with a recent nonischemic stress test  15. elevated hemoglobin A1c.   16.  Influenza A in February 2018       Plan  1. Coronary CTA on Monday       Electronically signed by SERA Stein CNP on 9/10/2021 at 10:34 AM     Patient seen and examined and case discussed with cardiology nurse practitioner and agree with assessment and plan as documented above. Patient had nonrevealing stress test recently and still complaining of symptoms and subsequently recommending coronary CT angiogram for further evaluation on Monday. Continue management of acute on chronic heart failure with IV diuresis, low-salt diet, strict in and out monitoring and daily weight. Continue anticoagulation with Eliquis and diltiazem and flecainide. Flecainide will have to be discontinued if coronary CT angiogram revealed nonobstructive coronary artery disease given that it is of class Ic agent. Monitor closely on telemetry.

## 2021-09-10 NOTE — PLAN OF CARE
Problem: Falls - Risk of:  Goal: Will remain free from falls  Description: Will remain free from falls  9/10/2021 1336 by Feliciana Mcburney, RN  Outcome: Met This Shift  9/10/2021 0005 by Ben Bah RN  Outcome: Met This Shift  Goal: Absence of physical injury  Description: Absence of physical injury  9/10/2021 1336 by Feliciana Mcburney, RN  Outcome: Met This Shift  9/10/2021 0005 by Ben Bah RN  Outcome: Met This Shift

## 2021-09-10 NOTE — CARE COORDINATION
Plan is for coronary CTA Monday. Plan is home with spouse when released. For questions I can be reached at [phone].  Christopher Gómez [place]

## 2021-09-10 NOTE — CONSULTS
[facility]  Division of Pulmonary, Critical Care Medicine  Pulmonary [address] [facility]           Pulmonary consult       Patient: Shelley Bajwa  MRN: [id number]  : 1960      CC :SOB ,  H/o A flutter /a fib poorly controlled     HPI :  Shelley Bajwa is a 66y.o. year old smoking at age 15 and increased gradually to 2 pack daily, he quit a few months ago came into the hospital with shortness of breath, associated with cough, productive of yellow sputum along with chest tightness with no fever or chills or numbness or chest pain     The patient is known to have COPD and he is O2 dependent throat he used to liters at home     The patient also developed A. fib and he was started on Cardizem drip, he was advised to have cardioversion, and pulmonary was asked to see the patient      Patient presented to the ER with worsening SOB for the last 5 days along with BARDALES for even going bath room with no fever or chills but has wheezing and SOB and BARDALES with some orthopnea and not able to bring any sputum     He was admitted in August for more than 2 weeks at that time he has anasarca and was on Bumex drip ,he was referred to [facility] and he declined as he has some right main bronchus narrowing and anaesthesia did not feel comfortable to Put him to sleep             PHYSICAL EXAMINATION:     VITAL SIGNS:  /74   Pulse 83   Temp 97.8 °F (36.6 °C) (Oral)   Resp 25   Ht 5' 11\" (1.803 m)   Wt 256 lb (116.1 kg)   SpO2 98%   BMI 35.70 kg/m²   Wt Readings from Last 3 Encounters:   21 256 lb (116.1 kg)   21 265 lb (120.2 kg)   20 277 lb (125.6 kg)     Temp Readings from Last 3 Encounters:   21 97.8 °F (36.6 °C) (Oral)   21 96.8 °F (36 °C) (Temporal)   19 97.7 °F (36.5 °C)     TMAX:  BP Readings from Last 3 Encounters:   21 139/74   21 (!) 122/56   20 132/77     Pulse Readings from Last 3 Encounters: 09/09/21 83   08/30/21 78   01/22/20 109           INTAKE/OUTPUTS:  No intake/output data recorded. Intake/Output Summary (Last 24 hours) at 9/9/2021 2124  Last data filed at 9/9/2021 1848  Gross per 24 hour   Intake 240 ml   Output --   Net 240 ml       This is virtual visit      LABS/IMAGING:    CBC:  Lab Results   Component Value Date    WBC 6.5 09/09/2021    HGB 10.2 (L) 09/09/2021    HCT 31.3 (L) 09/09/2021    MCV 95.7 09/09/2021     09/09/2021    LYMPHOPCT 24.8 09/09/2021    RBC 3.27 (L) 09/09/2021    MCH 31.2 09/09/2021    MCHC 32.6 09/09/2021    RDW 12.8 09/09/2021    NEUTOPHILPCT 46.0 09/09/2021    MONOPCT 8.0 09/09/2021    BASOPCT 0.9 09/09/2021    NEUTROABS 3.45 09/09/2021    LYMPHSABS 1.63 09/09/2021    MONOSABS 0.52 09/09/2021    EOSABS 0.86 (H) 09/09/2021    BASOSABS 0.06 09/09/2021       Recent Labs     09/09/21  0102 08/30/21  0651 08/25/21  1158   WBC 6.5 10.7 18.5*   HGB 10.2* 9.9* 10.8*   HCT 31.3* 29.5* 33.2*   MCV 95.7 96.1 96.2    122* 127*       BMP:   Recent Labs     09/07/21  1130 09/09/21  0102    137   K 3.8 3.3*   CL 92* 93*   CO2 32* 33*   BUN 21* 25*   CREATININE 1.0 1.4*       MG:   Lab Results   Component Value Date    MG 1.6 09/09/2021     Ca/Phos:   Lab Results   Component Value Date    CALCIUM 9.5 09/09/2021    PHOS 3.9 08/26/2021     Amylase: No results found for: AMYLASE  Lipase:   Lab Results   Component Value Date    LIPASE 28 05/19/2011     LIVER PROFILE:   Recent Labs     09/09/21  0102   AST 14   ALT 29   BILITOT 0.3   ALKPHOS 65       PT/INR:   No results for input(s): PROTIME, INR in the last 72 hours. APTT: No results for input(s): APTT in the last 72 hours.     Cardiac Enzymes:  Lab Results   Component Value Date    CKTOTAL 51 11/13/2010    CKMB 0.5 11/13/2010    TROPONINI <0.01 02/15/2018       Hgb A1C:   Lab Results   Component Value Date    LABA1C 6.4 (H) 09/09/2021     No results found for: EAG  FAMILIA: No results found for: FAMILIA  ESR: No results found for: SEDRATE  CRP: No results found for: CRP  D Dimer: No results found for: DDIMER    Thyroid Studies:  Lab Results   Component Value Date    TSH 0.942 02/14/2018    P1IMFCR 4.8 02/14/2018           MICROBIOLOGY:  Pending       CXR:  Reviewed     CT Chest:reviewed     PE  Vitals:    09/09/21 2104   BP: 139/74   Pulse: 83   Resp: 25   Temp: 97.8 °F (36.6 °C)   SpO2: 98%     General:  Awake, alert, oriented X 3. Well developed, well nourished, well groomed. No apparent distress. HEENT:  Normocephalic, atraumatic. Pupils equal, round, reactive to light. No scleral icterus. No conjunctival injection. Normal lips, teeth, and gums. No nasal discharge. Neck:  Supple, FROM  Heart:  RRR, no murmurs, gallops, rubs, carotid upstroke normal, no carotid bruits  Lungs:wheeizng bilateral ,rhonchi   Abdomen: Bowel sounds present, soft, nontender, no masses, no organomegaly, no peritoneal signs  Extremities:  No clubbing, cyanosis, or edema  Skin:  Warm and dry, no open lesions or rash  Neuro:  Cranial nerves 2-12 intact, no focal deficits  Vascular: Radial and pedal Pulses 2+  Breast: deferred  Rectal: deferred  Genitalia:  deferred    PROBLEM LIST:  Patient Active Problem List   Diagnosis    COPD (chronic obstructive pulmonary disease) ([address]    Essential hypertension    Adrenal adenoma    Class 2 obesity due to excess calories with serious comorbidity and body mass index (BMI) of 35.0 to 35.9 in adult    Anticoagulation management encounter    Typical atrial flutter ([address]    Anxiety    Status post catheter ablation of atrial flutter    Persistent atrial fibrillation ([address]    ETOH abuse    COPD with acute exacerbation ([address]    COPD exacerbation (HCC)               ASSESSMENT:  1- A fib with possible acute  HFpEF  2) very severe COPD,doubt exacerbation   3.)obstructive sleep apnea  4. )Afib /Flutter   5.)tobacco independent(quit 5 months ago)      PLAN:  Continue diuresis with Bumex PO  Continue Elquis  Continue BD  Rate controlled and EP consult   Wean steroids to PO 30 mg and wean over week ,no need for IV steroids   On Duoneb   Albuterol as needed   On  Eliquis to take it twice daily  Continue Nebs   BiPAP /CPAP at night    IgE 52  Prcal and CRP         BRADLEY DOMINGUEZ MD  [address] [facility]

## 2021-09-10 NOTE — PROGRESS NOTES
Plan for CTA coronary Monday, medications for CTA moved to Monday, Patient placed NPO at midnight 9/13/21 and no caffeine added to diet restrictions per CTA/Angio guidelines for procedure.      Beni Carter RN, BSN

## 2021-09-10 NOTE — PLAN OF CARE
Problem: Falls - Risk of:  Goal: Will remain free from falls  Description: Will remain free from falls  9/10/2021 0005 by Fernando Mariano RN  Outcome: Met This Shift  9/9/2021 1852 by Lei Sow RN  Outcome: Met This Shift  Goal: Absence of physical injury  Description: Absence of physical injury  9/10/2021 0005 by Fernando Mariano RN  Outcome: Met This Shift  9/9/2021 1852 by Lei Sow RN  Outcome: Met This Shift

## 2021-09-11 LAB
METER GLUCOSE: 184 MG/DL (ref 74–99)
METER GLUCOSE: 199 MG/DL (ref 74–99)
METER GLUCOSE: 204 MG/DL (ref 74–99)
METER GLUCOSE: 267 MG/DL (ref 74–99)

## 2021-09-11 PROCEDURE — 2060000000 HC ICU INTERMEDIATE R&B

## 2021-09-11 PROCEDURE — 2700000000 HC OXYGEN THERAPY PER DAY

## 2021-09-11 PROCEDURE — 6370000000 HC RX 637 (ALT 250 FOR IP): Performed by: INTERNAL MEDICINE

## 2021-09-11 PROCEDURE — 6370000000 HC RX 637 (ALT 250 FOR IP): Performed by: FAMILY MEDICINE

## 2021-09-11 PROCEDURE — 94660 CPAP INITIATION&MGMT: CPT

## 2021-09-11 PROCEDURE — 6360000002 HC RX W HCPCS: Performed by: FAMILY MEDICINE

## 2021-09-11 PROCEDURE — 99233 SBSQ HOSP IP/OBS HIGH 50: CPT | Performed by: INTERNAL MEDICINE

## 2021-09-11 PROCEDURE — 94640 AIRWAY INHALATION TREATMENT: CPT

## 2021-09-11 PROCEDURE — 82962 GLUCOSE BLOOD TEST: CPT

## 2021-09-11 PROCEDURE — 2580000003 HC RX 258: Performed by: FAMILY MEDICINE

## 2021-09-11 PROCEDURE — 99232 SBSQ HOSP IP/OBS MODERATE 35: CPT | Performed by: INTERNAL MEDICINE

## 2021-09-11 RX ORDER — GUAIFENESIN 400 MG/1
400 TABLET ORAL 3 TIMES DAILY
Status: DISCONTINUED | OUTPATIENT
Start: 2021-09-11 | End: 2021-09-18 | Stop reason: HOSPADM

## 2021-09-11 RX ORDER — INSULIN GLARGINE 100 [IU]/ML
15 INJECTION, SOLUTION SUBCUTANEOUS 2 TIMES DAILY
Status: DISCONTINUED | OUTPATIENT
Start: 2021-09-11 | End: 2021-09-18 | Stop reason: HOSPADM

## 2021-09-11 RX ORDER — BUSPIRONE HYDROCHLORIDE 10 MG/1
10 TABLET ORAL 3 TIMES DAILY
Status: DISCONTINUED | OUTPATIENT
Start: 2021-09-11 | End: 2021-09-18 | Stop reason: HOSPADM

## 2021-09-11 RX ADMIN — IPRATROPIUM BROMIDE AND ALBUTEROL SULFATE 3 ML: .5; 2.5 SOLUTION RESPIRATORY (INHALATION) at 08:11

## 2021-09-11 RX ADMIN — DILTIAZEM HYDROCHLORIDE 240 MG: 240 CAPSULE, EXTENDED RELEASE ORAL at 21:49

## 2021-09-11 RX ADMIN — IPRATROPIUM BROMIDE AND ALBUTEROL SULFATE 3 ML: .5; 2.5 SOLUTION RESPIRATORY (INHALATION) at 20:21

## 2021-09-11 RX ADMIN — INSULIN LISPRO 4 UNITS: 100 INJECTION, SOLUTION INTRAVENOUS; SUBCUTANEOUS at 06:48

## 2021-09-11 RX ADMIN — GUAIFENESIN 400 MG: 400 TABLET ORAL at 13:31

## 2021-09-11 RX ADMIN — AZITHROMYCIN MONOHYDRATE 500 MG: 500 INJECTION, POWDER, LYOPHILIZED, FOR SOLUTION INTRAVENOUS at 09:50

## 2021-09-11 RX ADMIN — FLECAINIDE ACETATE 100 MG: 100 TABLET ORAL at 21:49

## 2021-09-11 RX ADMIN — IPRATROPIUM BROMIDE AND ALBUTEROL SULFATE 3 ML: .5; 2.5 SOLUTION RESPIRATORY (INHALATION) at 12:05

## 2021-09-11 RX ADMIN — BUMETANIDE 2 MG: 1 TABLET ORAL at 17:57

## 2021-09-11 RX ADMIN — BUDESONIDE 500 MCG: 0.5 SUSPENSION RESPIRATORY (INHALATION) at 08:10

## 2021-09-11 RX ADMIN — Medication 10 ML: at 09:50

## 2021-09-11 RX ADMIN — IPRATROPIUM BROMIDE AND ALBUTEROL SULFATE 3 ML: .5; 2.5 SOLUTION RESPIRATORY (INHALATION) at 15:49

## 2021-09-11 RX ADMIN — POTASSIUM CHLORIDE 20 MEQ: 20 TABLET, EXTENDED RELEASE ORAL at 09:45

## 2021-09-11 RX ADMIN — Medication 10 ML: at 21:57

## 2021-09-11 RX ADMIN — ARFORMOTEROL TARTRATE 15 MCG: 15 SOLUTION RESPIRATORY (INHALATION) at 20:21

## 2021-09-11 RX ADMIN — INSULIN LISPRO 2 UNITS: 100 INJECTION, SOLUTION INTRAVENOUS; SUBCUTANEOUS at 21:49

## 2021-09-11 RX ADMIN — GUAIFENESIN 400 MG: 400 TABLET ORAL at 21:49

## 2021-09-11 RX ADMIN — ASPIRIN 81 MG CHEWABLE TABLET 81 MG: 81 TABLET CHEWABLE at 09:45

## 2021-09-11 RX ADMIN — DILTIAZEM HYDROCHLORIDE 240 MG: 240 CAPSULE, EXTENDED RELEASE ORAL at 09:46

## 2021-09-11 RX ADMIN — BUDESONIDE 500 MCG: 0.5 SUSPENSION RESPIRATORY (INHALATION) at 20:21

## 2021-09-11 RX ADMIN — INSULIN LISPRO 2 UNITS: 100 INJECTION, SOLUTION INTRAVENOUS; SUBCUTANEOUS at 17:57

## 2021-09-11 RX ADMIN — BUMETANIDE 2 MG: 1 TABLET ORAL at 09:46

## 2021-09-11 RX ADMIN — APIXABAN 5 MG: 5 TABLET, FILM COATED ORAL at 21:49

## 2021-09-11 RX ADMIN — BUSPIRONE HYDROCHLORIDE 10 MG: 10 TABLET ORAL at 13:31

## 2021-09-11 RX ADMIN — APIXABAN 5 MG: 5 TABLET, FILM COATED ORAL at 09:45

## 2021-09-11 RX ADMIN — ARFORMOTEROL TARTRATE 15 MCG: 15 SOLUTION RESPIRATORY (INHALATION) at 08:10

## 2021-09-11 RX ADMIN — CEFTRIAXONE 1000 MG: 1 INJECTION, POWDER, FOR SOLUTION INTRAMUSCULAR; INTRAVENOUS at 05:13

## 2021-09-11 RX ADMIN — BUSPIRONE HYDROCHLORIDE 10 MG: 10 TABLET ORAL at 21:49

## 2021-09-11 RX ADMIN — PREDNISONE 30 MG: 10 TABLET ORAL at 09:45

## 2021-09-11 RX ADMIN — INSULIN GLARGINE 12 UNITS: 100 INJECTION, SOLUTION SUBCUTANEOUS at 09:37

## 2021-09-11 RX ADMIN — INSULIN GLARGINE 15 UNITS: 100 INJECTION, SOLUTION SUBCUTANEOUS at 21:49

## 2021-09-11 RX ADMIN — FLECAINIDE ACETATE 100 MG: 100 TABLET ORAL at 09:46

## 2021-09-11 RX ADMIN — INSULIN LISPRO 6 UNITS: 100 INJECTION, SOLUTION INTRAVENOUS; SUBCUTANEOUS at 13:31

## 2021-09-11 RX ADMIN — ATORVASTATIN CALCIUM 20 MG: 20 TABLET, FILM COATED ORAL at 17:57

## 2021-09-11 ASSESSMENT — PAIN SCALES - GENERAL
PAINLEVEL_OUTOF10: 0

## 2021-09-11 NOTE — PROGRESS NOTES
Date: 9/11/2021    Time: 1:56 AM    Patient Placed On BIPAP/CPAP/ Non-Invasive Ventilation? Patient remains on bipap after self applying    If no must comment. Facial area red/color change? No           If YES are Blister/Lesion present? No   If yes must notify nursing staff  BIPAP/CPAP skin barrier?   No    Skin barrier type:patient asleep       Comments:        Alyson Ramos RCP

## 2021-09-11 NOTE — PROGRESS NOTES
Hospitalist Progress Note      PCP: Jolanta Yeboah DO    Date of Admission: 9/9/2021    Chief Complaint: *SOB     Hospital Course: ** thought to be  A combination of CHF and COPD, seen by pulm,  Started on steroids, seen by cardiology, , for CTA coronary on Monday **      Subjective: having anxiety       Medications:  Reviewed    Infusion Medications    sodium chloride      dextrose       Scheduled Medications    insulin glargine  15 Units SubCUTAneous BID    guaiFENesin  400 mg Oral TID    busPIRone  10 mg Oral TID    predniSONE  30 mg Oral Daily    apixaban  5 mg Oral BID    aspirin  81 mg Oral Daily    atorvastatin  20 mg Oral QPM    bumetanide  2 mg Oral BID    dilTIAZem  240 mg Oral BID    flecainide  100 mg Oral BID    fluticasone  1 spray Each Nostril Daily    ipratropium-albuterol  1 vial Inhalation Q4H    potassium chloride  20 mEq Oral Daily with breakfast    sodium chloride flush  5-40 mL IntraVENous 2 times per day    cefTRIAXone (ROCEPHIN) IV  1,000 mg IntraVENous Q24H    insulin lispro  0-10 Units SubCUTAneous 4x Daily AC & HS    Arformoterol Tartrate  15 mcg Nebulization BID    And    ipratropium  0.5 mg Nebulization 4x daily    And    budesonide  0.5 mg Nebulization BID     PRN Meds: metoprolol, albuterol, sodium chloride flush, sodium chloride, ondansetron **OR** ondansetron, polyethylene glycol, acetaminophen **OR** acetaminophen, potassium chloride **OR** potassium alternative oral replacement **OR** potassium chloride, glucose, dextrose, glucagon (rDNA), dextrose      Intake/Output Summary (Last 24 hours) at 9/11/2021 1356  Last data filed at 9/11/2021 0653  Gross per 24 hour   Intake 240 ml   Output 1550 ml   Net -1310 ml       Exam:    BP (!) 140/80   Pulse 77   Temp 98.3 °F (36.8 °C) (Oral)   Resp 16   Ht 5' 11\" (1.803 m)   Wt 261 lb 4.8 oz (118.5 kg)   SpO2 97%   BMI 36.44 kg/m²       Gen:  WELL DEVELOPED  HEENT: NC/AT, moist mucous membranes,  Neck: supple, trachea midline, no anterior cervical or SC LAD  Heart:  Normal s1/s2, RRR, no murmurs, gallops, or rubs. Lungs:  COARSE  bilaterally, *  Abd: bowel sounds present, soft, nontender, nondistended, no masses  Extrem:  No clubbing, cyanosis,  *TRACE* edema  Skin: no rashes or lesions  Psych: A & O x3  Neuro: grossly intact, moves all four extremities. Capillary Refill: Brisk,< 3 seconds   Peripheral Pulses: +2 palpable, equal bilaterally              Labs:   Recent Labs     09/09/21  0102 09/10/21  0429   WBC 6.5 6.6   HGB 10.2* 9.1*   HCT 31.3* 28.3*    342     Recent Labs     09/09/21  0102 09/10/21  0429    140   K 3.3* 3.8   CL 93* 94*   CO2 33* 34*   BUN 25* 30*   CREATININE 1.4* 1.1   CALCIUM 9.5 9.1     Recent Labs     09/09/21  0102 09/10/21  0429   AST 14 10   ALT 29 23   BILITOT 0.3 <0.2   ALKPHOS 65 57     No results for input(s): INR in the last 72 hours. No results for input(s): Beverley Shanks in the last 72 hours. Recent Labs     09/09/21  0102 09/10/21  0429   AST 14 10   ALT 29 23   BILITOT 0.3 <0.2   ALKPHOS 65 57     No results for input(s): LACTA in the last 72 hours. No results found for: Agudelo Landau  No results found for: AMMONIA    Assessment:    Active Hospital Problems    Diagnosis Date Noted    [facility] ([facility] [J44.1] 09/09/2021   HFpEF  PAF  HTN  say   MORBID OBESITY   PRE DIABETES, AIC 6.4   anxiety  Plan:  *SSI  CORONARY CT ON MONDAY   CONT steroids   lopressor   SSI   tambocor   ROCEPHIN  ZITHROMAX   LIPITOR   buspar      DVT Prophylaxis: ELIQUIS   Diet: ADULT DIET; Regular; 5 carb choices (75 gm/meal);  No Added Salt (3-4 gm)  Code Status: Full Code     PT/OT Eval Status: ORDERED     Dispo - *HOME*         Electronically signed by Madai Beasley DO on 9/11/2021 at 1:56 PM [place]

## 2021-09-11 NOTE — PROGRESS NOTES
INPATIENT CARDIOLOGY FOLLOW-UP    Name: Danyelle Rae    Age: 63 y.o. Date of Admission: 9/9/2021  2:40 AM    Date of Service: 9/11/2021    Chief Complaint: Follow-up for CHF      Interim History:  No new overnight cardiac complaints. Today, is feeling slightly better still has significant dyspnea. Currently with no complaints of CP, palpitations, dizziness, or lightheadedness. SR on telemetry.     Review of Systems:   Cardiac: As per HPI  General: No fever, chills  Pulmonary: As per HPI  HEENT: No visual disturbances, difficult swallowing  GI: No nausea, vomiting  Endocrine: No thyroid disease or DM  Musculoskeletal: ROUSE x 4, no focal motor deficits  Skin: Intact, no rashes  Neuro/Psych: No headache or seizures    Problem List:  Patient Active Problem List   Diagnosis    [facility]    Essential hypertension    Adrenal adenoma    Class 2 obesity due to excess calories with serious comorbidity and body mass index (BMI) of 35.0 to 35.9 in adult    Anticoagulation management encounter    Typical [facility] fl[facility] ([facility]    Anxiety    Status post catheter ablation of atrial flutter    Persistent atrial fibrillation (HCC)    ETOH abuse    COPD with [facility]ation [facility]    COPD exacerbation (HCC)       Allergies:  No Known Allergies    Current Medications:  Current Facility-Administered Medications   Medication Dose Route Frequency Provider Last Rate Last Admin    insulin glargine (LANTUS) injection vial 15 Units  15 Units SubCUTAneous BID Raul Bynum DO        guaiFENesin tablet 400 mg  400 mg Oral TID Bernardine Conch, DO   400 mg at 09/11/21 1331    busPIRone (BUSPAR) tablet 10 mg  10 mg Oral TID Bernardine Conch, DO   10 mg at 09/11/21 1331    metoprolol (LOPRESSOR) injection 5 mg  5 mg IntraVENous Q5 Min PRN SERA Ashby - CNP        predniSONE (DELTASONE) tablet 30 mg  30 mg Oral Daily Ryan Yarbrough MD   30 mg at 09/11/21 0922  albuterol (PROVENTIL) nebulizer solution 2.5 mg  2.5 mg Nebulization Q6H PRN Glorious Small, DO        apixaban Thais Au) tablet 5 mg  5 mg Oral BID Glorious Small, DO   5 mg at 09/11/21 0945    aspirin chewable tablet 81 mg  81 mg Oral Daily Glorious Small, DO   81 mg at 09/11/21 0945    atorvastatin (LIPITOR) tablet 20 mg  20 mg Oral QPM Glorious Small, DO   20 mg at 09/10/21 1720    bumetanide (BUMEX) tablet 2 mg  2 mg Oral BID Glorious Small, DO   2 mg at 09/11/21 0946    dilTIAZem (CARDIZEM CD) extended release capsule 240 mg  240 mg Oral BID Glorious Small, DO   240 mg at 09/11/21 0946    flecainide (TAMBOCOR) tablet 100 mg  100 mg Oral BID Glorious Small, DO   100 mg at 09/11/21 0946    fluticasone (FLONASE) 50 MCG/ACT nasal spray 1 spray  1 spray Each Nostril Daily Glorious Small, DO   1 spray at 09/10/21 0933    ipratropium-albuterol (DUONEB) nebulizer solution 3 mL  1 vial Inhalation Q4H Glorious Small, DO   3 mL at 09/11/21 1549    potassium chloride (KLOR-CON M) extended release tablet 20 mEq  20 mEq Oral Daily with breakfast Glorious Small, DO   20 mEq at 09/11/21 0945    sodium chloride flush 0.9 % injection 5-40 mL  5-40 mL IntraVENous 2 times per day Glorious Small, DO   10 mL at 09/11/21 0950    sodium chloride flush 0.9 % injection 5-40 mL  5-40 mL IntraVENous PRN Glorious Small, DO        0.9 % sodium chloride infusion  25 mL IntraVENous PRN Glorious Small, DO        ondansetron (ZOFRAN-ODT) disintegrating tablet 4 mg  4 mg Oral Q8H PRN Glorious Small, DO        Or    ondansetron [facility]) injection 4 mg  4 mg IntraVENous Q6H PRN Glorious Small, DO        polyethylene glycol (GLYCOLAX) packet 17 g  17 g Oral Daily PRN Glorious Small, DO        acetaminophen (TYLENOL) tablet 650 mg  650 mg Oral Q6H PRN Glorious Small, DO        Or    acetaminophen (TYLENOL) suppository 650 mg  650 mg Rectal Q6H PRN Glorious Small, DO        cefTRIAXone (ROCEPHIN) 1,000 mg in sterile water 10 mL IV syringe  1,000 mg IntraVENous Q24H Kolleen Cookey, DO   1,000 mg at 09/11/21 0513    potassium chloride (KLOR-CON M) extended release tablet 40 mEq  40 mEq Oral PRN Kolleen Cookey, DO        Or    potassium bicarb-citric acid (EFFER-K) effervescent tablet 40 mEq  40 mEq Oral PRN Kolleen Cookey, DO        Or    potassium chloride 10 mEq/100 mL IVPB (Peripheral Line)  10 mEq IntraVENous PRN Kolpabloen Cookey, DO        insulin lispro (HUMALOG) injection vial 0-10 Units  0-10 Units SubCUTAneous 4x Daily AC & HS Raul Banuelos, DO   6 Units at 09/11/21 1331    glucose (GLUTOSE) 40 % oral gel 15 g  15 g Oral PRN Raul Banuelos, DO        dextrose 50 % IV solution  12.5 g IntraVENous PRN Yuliana Judge DO        glucagon (rDNA) injection 1 mg  1 mg IntraMUSCular PRN Raul Banuelos, DO        dextrose 5 % solution  100 mL/hr IntraVENous PRN Raul Banuelos, DO        Arformoterol Tartrate [facility]) nebulizer solution 15 mcg  15 mcg Nebulization BID Kolleen Cookey, DO   15 mcg at 09/11/21 0810    And    ipratropium (ATROVENT) 0.02 % nebulizer solution 0.5 mg  0.5 mg Nebulization 4x daily Kolleen Cookey, DO   0.5 mg at 09/10/21 1947    And    budesonide (PULMICORT) nebulizer suspension 500 mcg  0.5 mg Nebulization BID Kolleen Cookey, DO   500 mcg at 09/11/21 0810      sodium chloride      dextrose         Physical Exam:  BP (!) 146/84   Pulse 61   Temp 98 °F (36.7 °C) (Oral)   Resp 15   Ht 5' 11\" (1.803 m)   Wt 261 lb 4.8 oz (118.5 kg)   SpO2 98%   BMI 36.44 kg/m²   Wt Readings from Last 3 Encounters:   09/11/21 261 lb 4.8 oz (118.5 kg)   08/30/21 265 lb (120.2 kg)   01/22/20 277 lb (125.6 kg)     Appearance: Awake, alert, no acute respiratory distress  Skin: Intact, no rash  Head: Normocephalic, atraumatic  Eyes: EOMI, no conjunctival erythema  ENMT: No pharyngeal erythema, MMM, no rhinorrhea  Neck: Supple, no elevated JVP, no carotid bruits  Lungs: Bilateral air entry is decreased with scattered (H) 01/28/2017     Lab Results   Component Value Date    LABVLDL 16 04/08/2018    LABVLDL 12 01/28/2017     No results found for: CHOLHDLRATIO    Cardiac Tests:  ECG:       Telemetry findings reviewed: Sinus bradycardia with heart rate in the 50s. Labs and vitals were reviewed: Blood pressure 146/84 heart rate 61 sats 98%. Labs-no labs for today. BUNs/creatinine 30/1.1, H&H 9.1/28.3    TTE (Dr. Clifton Adamson, 8/12/2021)  Summary   Technically limited study.   Normal left ventricular size, wall thickness, wall motion, and systolic   function.   Estimated left ventricle ejection fraction 70+/-5 %.   There is doppler evidence of stage II diastolic dysfunction.   Mildly dilated right ventricle.   Right ventricle global systolic function is normal .   Normal sized left atrium.   No significant valvular abnormalities noted.     17. Lexiscan stress test August 13, 2021    No significant valvular abnormalities noted.     Lexiscan Stress Test 8/13/2021:  FINDINGS: The overall quality of the study was fair. Left ventricular cavity size was noted to be dilated on both the  stress and the resting images but there was no transient ischemic  dilatation after stress  Rotational analog analysis demonstrated abnormal patient motion and  there was marked increase in tracer uptake in the abdominal organs  with the liver overshadowing the bottom of the heart  A severe defect was present in the inferior wall extending into the  lateral apical wall(s) that was moderate to largesized by  quantification. The resting images showed no change   Gated SPECT left ventricular ejection fraction was calculated to be  66%, with normal myocardial contractility and wall motion.   Impression  The myocardial perfusion imaging was probably normal with the large  fixed inferior/lateral apical wall defect being more consistent with  attenuation artifact and less likely the result of a myocardial  infarction especially with the normal contractility of the Cardiology

## 2021-09-11 NOTE — PLAN OF CARE
Problem: Falls - Risk of:  Goal: Will remain free from falls  Description: Will remain free from falls  9/11/2021 0147 by Juan Pablo Lara RN  Outcome: Met This Shift  9/10/2021 1336 by Bella Arevalo RN  Outcome: Met This Shift  Goal: Absence of physical injury  Description: Absence of physical injury  9/11/2021 0147 by Juan Pablo Lara RN  Outcome: Met This Shift  9/10/2021 1336 by Bella Arevalo RN  Outcome: Met This Shift

## 2021-09-11 NOTE — PROGRESS NOTES
10.2* 9.9*   HCT 28.3* 31.3* 29.5*   MCV 94.3 95.7 96.1    286 122*       BMP:   Recent Labs     09/09/21  0102 09/10/21  0429    140   K 3.3* 3.8   CL 93* 94*   CO2 33* 34*   BUN 25* 30*   CREATININE 1.4* 1.1       MG:   Lab Results   Component Value Date    MG 1.6 09/09/2021     Ca/Phos:   Lab Results   Component Value Date    CALCIUM 9.1 09/10/2021    PHOS 3.9 08/26/2021     Amylase: No results found for: AMYLASE  Lipase:   Lab Results   Component Value Date    LIPASE 28 05/19/2011     LIVER PROFILE:   Recent Labs     09/09/21  0102 09/10/21  0429   AST 14 10   ALT 29 23   BILITOT 0.3 <0.2   ALKPHOS 65 57       PT/INR:   No results for input(s): PROTIME, INR in the last 72 hours. APTT: No results for input(s): APTT in the last 72 hours. Cardiac Enzymes:  Lab Results   Component Value Date    CKTOTAL 51 11/13/2010    CKMB 0.5 11/13/2010    TROPONINI <0.01 02/15/2018       Hgb A1C:   Lab Results   Component Value Date    LABA1C 6.4 (H) 09/09/2021     No results found for: EAG  FAMILIA: No results found for: FAMILIA  ESR: No results found for: SEDRATE  CRP: No results found for: CRP  D Dimer: No results found for: DDIMER    Thyroid Studies:  Lab Results   Component Value Date    TSH 0.942 02/14/2018    N2FNCNO 4.8 02/14/2018           MICROBIOLOGY:  Pending       CXR:  Reviewed     CT Chest:reviewed     PE  Vitals:    09/10/21 2010   BP: (!) 150/86   Pulse: 80   Resp: 20   Temp: 97.9 °F (36.6 °C)   SpO2: 99%     General:  Awake, alert, oriented X 3. Well developed, well nourished, well groomed. No apparent distress. HEENT:  Normocephalic, atraumatic. Pupils equal, round, reactive to light. No scleral icterus. No conjunctival injection. Normal lips, teeth, and gums. No nasal discharge. Neck:  Supple, FROM  Heart:  RRR, no murmurs, gallops, rubs, carotid upstroke normal, no carotid bruits  Lungs:wheeizng bilateral ,rhonchi   Abdomen:   Bowel sounds present, soft, nontender, no masses, no organomegaly, no peritoneal signs  Extremities:  No clubbing, cyanosis, or edema  Skin:  Warm and dry, no open lesions or rash  Neuro:  Cranial nerves 2-12 intact, no focal deficits  Vascular: Radial and pedal Pulses 2+  Breast: deferred  Rectal: deferred  Genitalia:  deferred    PROBLEM LIST:  Patient Active Problem List   Diagnosis    [facility]ronic obstructive [facility] disease) [facility]    Essential hypertension    Adrenal adenoma    Class 2 obesity due to excess calories with serious comorbidity and body mass index (BMI) of 35.0 to 35.9 in adult    Anticoagulation management encounter    Typical atrial flutter ([address]    Anxiety    Status post catheter ablation of atrial flutter    Persistent atrial fibrillation ([address]    ETOH abuse    COPD with acute exacerbation ([facility]    COPD exacerbation (HCC)               ASSESSMENT:  1- A fib with possible acute  HFpEF  2) very severe COPD,doubt exacerbation   3.)obstructive sleep apnea  4. )Afib /Flutter   5.)tobacco independent(quit 5 months ago)      PLAN:  Seen by cardiology for CTA coronary   A fib ,defer to cardiology,EP if needed  Continue diuresis with Bumex PO  Continue Elquis  Continue BD  Wean steroids to PO 30 mg and wean over week ,no need for IV steroids   On Duoneb   Albuterol as needed   On  Eliquis to take it twice daily  Continue Nebs   BiPAP /CPAP at night    IgE 52  Prcal minimal elevation        BRADLEY DOMINGUEZ MD  Pulmonary/Critical care Medicine   [facility]

## 2021-09-11 NOTE — DISCHARGE INSTR - DIET
 Good nutrition is important when healing from an illness, injury, or surgery. Follow any nutrition recommendations given to you during your hospital stay.  If you were given an oral nutrition supplement while in the hospital, continue to take this supplement at home. You can take it with meals, in-between meals, and/or before bedtime. These supplements can be purchased at most local grocery stores, pharmacies, and chain super-stores.  If you have any questions about your diet or nutrition, call the hospital and ask for the dietitian. Foods with carbohydrates make your blood glucose level go up. Learning how to count carbohydrates can help you control your blood glucose levels. First, identify the foods you eat that contain carbohydrates. Then, using the Foods with Carbohydrates chart, determine about how much carbohydrates are in your meals and snacks. Make sure you are eating foods with fiber, protein, and healthy fat along with your carbohydrate foods. Foods with Carbohydrates  The following table shows carbohydrate foods that have about 15 grams of carbohydrate each. Using measuring cups, spoons, or a food scale when you first begin learning about carbohydrate counting can help you learn about the portion sizes you typically eat.       The following foods have 15 grams carbohydrate each:    Grains    1 slice bread (1 ounce)  1 small tortilla (6-inch size)  ¼ large bagel (1 ounce)  1/3 cup pasta or rice (cooked)  ½ hamburger or hot dog bun (¾ ounce)  ½ cup cooked cereal  ½ to ¾ cup ready-to-eat cereal  2 taco shells (5-inch size)  Fruit    1 small fresh fruit (¾ to 1 cup)  ½ medium banana  17 small grapes (3 ounces)  1 cup melon or berries  ½ cup canned or frozen fruit  2 tablespoons dried fruit (blueberries, cherries, cranberries, raisins)  ½ cup unsweetened fruit juice  Starchy Vegetables    ½ cup cooked beans, peas, corn, potatoes/sweet potatoes  ¼ large baked potato (3 ounces)  1 cup acorn or butternut squash  Snack Foods    3 to 6 crackers  8 potato chips or 13 tortilla chips (¾ ounce to 1 ounce)  3 cups popped popcorn  Dairy    3/4 cup (6 ounces) nonfat plain yogurt, or yogurt with sugar-free sweetener  1 cup milk  1 cup plain rice, soy, coconut or flavored almond milk  Sweets and Desserts    ½ cup ice cream or frozen yogurt  1 tablespoon jam, jelly, pancake syrup, table sugar, or honey  2 tablespoons light pancake syrup  1 inch square of frosted cake or 2 inch square of unfrosted cake  2 small cookies (2/3 ounce each) or ¼ large cookie  Sometimes youll have to estimate carbohydrate amounts if you dont know the exact recipe. One cup of mixed foods like soups can have 1 to 2 carbohydrate servings, while some casseroles might have 2 or more servings of carbohydrate. Foods that have less than 20 calories in each serving can be counted as free foods. Count 1 cup raw vegetables, or ½ cup cooked non-starchy vegetables as free foods. If you eat 3 or more servings at one meal, then count them as 1 carbohydrate serving. Foods without Carbohydrates  Not all foods contain carbohydrates. Meat, some dairy, fats, non-starchy vegetables, and many beverages dont contain carbohydrate. So when you count carbohydrates, you can generally exclude chicken, pork, beef, fish, seafood, eggs, tofu, cheese, butter, sour cream, avocado, nuts, seeds, olives, mayonnaise, water, black coffee, unsweetened tea, and zero-calorie drinks. Vegetables with no or low carbohydrate include green beans, cauliflower, tomatoes, and onions. How much carbohydrate should I eat at each meal?  Carbohydrate counting can help you plan your meals and manage your weight. Following are some starting points for carbohydrate intake at each meal. Work with your registered dietitian nutritionist to find the best range that works for your blood glucose and weight.          To Lose Weight    To Maintain Weight    Women    2 - 3 carb servings    3 - 4 carb servings    Men    3 - 4 carb servings    4 - 5 carb servings      Checking your blood glucose after meals will help you know if you need to adjust the timing, type, or number of carbohydrate servings in your meal plan. Achieve and keep a healthy body weight by balancing your food intake and physical activity. Tips  How should I plan my meals? Plan for half the food on your plate to include non-starchy vegetables, like salad greens, broccoli, or carrots. Try to eat 3 to 5 servings of non-starchy vegetables every day. Have a protein food at each meal. Protein foods include chicken, fish, meat, eggs, or beans (note that beans contain carbohydrate). These two food groups (non-starchy vegetables and proteins) are low in carbohydrate. If you fill up your plate with these foods, you will eat less carbohydrate but still fill up your stomach. Try to limit your carbohydrate portion to ¼ of the plate. What fats are healthiest to eat? Diabetes increases risk for heart disease. To help protect your heart, eat more healthy fats, such as olive oil, nuts, and avocado. Eat less saturated fats like butter, cream, and high-fat meats, like russell and sausage. Avoid trans fats, which are in all foods that list partially hydrogenated oil as an ingredient. What should I drink? Choose drinks that are not sweetened with sugar. The healthiest choices are water, carbonated or seltzer ackerman, and tea and coffee without added sugars. Sweet drinks will make your blood glucose go up very quickly. One serving of soda or energy drink is ½ cup. It is best to drink these beverages only if your blood glucose is low. Artificially sweetened, or diet drinks, typically do not increase your blood glucose if they have zero calories in them. Read labels of beverages, as some diet drinks do have carbohydrate and will raise your blood glucose.     Label Reading Tips  Read Nutrition Facts labels to find out how many grams of carbohydrate are in a food you want to eat. Dont forget: sometimes serving sizes on the label arent the same as how much food you are going to eat, so you may need to calculate how much carbohydrate is in the food you are serving yourself.       Carbohydrate Counting for People with Diabetes Sample 1-Day Menu   Breakfast  ¾ cup yogurt, low fat, low sugar (1 carbohydrate serving)  ½ cup cereal, ready-to-eat, unsweetened (1 carbohydrate serving)  1 cup strawberries (1 carbohydrate serving)  ¼ cup almonds (½ carbohydrate serving)  Lunch  1, 5 ounce can chunk light tuna  2 ounces cheese, low fat cheddar  6 whole wheat crackers (1 carbohydrate serving)  1 small apple (1½ carbohydrate servings)  ½ cup carrots (½ carbohydrate serving)  ½ cup snap peas  1 cup 1% milk (1 carbohydrate serving)  Evening Meal  Stir pierce made with: 3 ounces chicken  1 cup brown rice (3 carbohydrate servings)  ½ cup broccoli (½ carbohydrate serving)  ½ cup green beans  ¼ cup onions  1 tablespoon olive oil  2 tablespoons teriyaki sauce (½ carbohydrate serving)  Evening Snack  1 extra small banana (1 carbohydrate serving)  1 tablespoon peanut butter  Daily Sum  Nutrient Unit Value  Macronutrients  Energy kcal 1723  Energy kJ 7221  Protein g 121  Total lipid (fat) g 58  Carbohydrate, by difference g 195  Fiber, total dietary g 30  Sugars, total g 80  Minerals  Calcium, Ca mg 1757  Iron, Fe mg 22  Sodium, Na mg 1679  Vitamins  Vitamin C, total ascorbic acid mg 227  Vitamin A, IU IU 61386  Vitamin D   Lipids  Fatty acids, total saturated g 12  Fatty acids, total monounsaturated g 31  Fatty acids, total polyunsaturated g 12  Cholesterol mg 169  Carbohydrate Counting for People with Diabetes Vegan Sample 1-Day Menu   Breakfast  1 cup cooked oatmeal (2 carbohydrate servings)  ½ cup blueberries (1 carbohydrate serving)  2 tablespoons flaxseeds  1 cup soymilk fortified with calcium and vitamin D  1 cup coffee  Lunch  2 slices whole wheat bread (2 carbohydrate servings)  ½ cup baked tofu  ¼ cup lettuce  2 slices tomato  2 slices avocado  ½ cup baby carrots (½ carbohydrate serving)  1 orange (1 carbohydrate serving)  1 cup soymilk fortified with calcium and vitamin D  Evening Meal  Burrito made with: 1 6-inch corn tortilla (1 carbohydrate serving)  1 cup refried vegetarian beans (2 carbohydrate servings)  ¼ cup chopped tomatoes  ¼ cup lettuce  ¼ cup salsa  1/3 cup brown rice (1 carbohydrate serving)  1 tablespoon olive oil for rice  ½ cup zucchini  Evening Snack  6 small whole grain crackers (1 carbohydrate serving)  2 apricots (½ carbohydrate serving)  ¼ cup unsalted peanuts (½ carbohydrate serving)  Daily Sum  Nutrient Unit Value  Macronutrients  Energy kcal 1679  Energy kJ 7019  Protein g 74  Total lipid (fat) g 68  Carbohydrate, by difference g 212  Fiber, total dietary g 47  Sugars, total g 43  Minerals  Calcium, Ca mg 1219  Iron, Fe mg 17  Sodium, Na mg 2285  Vitamins  Vitamin C, total ascorbic acid mg 115  Vitamin A, IU IU 20471  Vitamin D   Lipids  Fatty acids, total saturated g 10  Fatty acids, total monounsaturated g 30  Fatty acids, total polyunsaturated g 22  Cholesterol mg 0  Carbohydrate Counting for People with Diabetes Vegetarian (Lacto-Ovo) Sample 1-Day Menu   Breakfast  1 cup cooked oatmeal (2 carbohydrate servings)  ½ cup blueberries (1 carbohydrate serving)  2 tablespoons flaxseeds  1 egg  1 cup 1% milk (1 carbohydrate serving)  1 cup coffee  Lunch  2 slices whole wheat bread (2 carbohydrate servings)  2 ounces low-fat cheese  ¼ cup lettuce  2 slices tomato  2 slices avocado  ½ cup baby carrots (½ carbohydrate serving)  1 orange (1 carbohydrate serving)  1 cup unsweetened tea  Evening Meal  Burrito made with: 1 6-inch corn tortilla (1 carbohydrate serving)  ½ cup refried vegetarian beans (1 carbohydrate serving)  ¼ cup tomatoes  ¼ cup lettuce  ¼ cup salsa  1/3 cup brown rice (1 carbohydrate serving)  1 tablespoon olive oil for rice  ½

## 2021-09-11 NOTE — PROGRESS NOTES
Nutrition Education    Provided educational handouts and sample meal plans on prediabetes diet. Information in discharge instructions. Recommend outpatient nutrition counseling for further education. · Written educational materials provided. · Contact name and number provided. · Refer to Patient Education activity for more details.     Electronically signed by Pete Fontana RD, LD on 9/11/21 at 11:07 AM EDT    Contact: [phone]

## 2021-09-11 NOTE — PROGRESS NOTES
[facility]  Division of Pulmonary, Critical Care Medicine  Pulmonary [address] [facility]           Pulmonary consult         CC :SOB ,  H/o A flutter /a fib poorly controlled     HPI :  SOB continue to improved  Denies any fever or chills  HR seems better control   No cough  Less swelling legs   On 4 L O2     PHYSICAL EXAMINATION:     VITAL SIGNS:  BP (!) 146/84   Pulse 61   Temp 98 °F (36.7 °C) (Oral)   Resp 15   Ht 5' 11\" (1.803 m)   Wt 261 lb 4.8 oz (118.5 kg)   SpO2 98%   BMI 36.44 kg/m²   Wt Readings from Last 3 Encounters:   09/11/21 261 lb 4.8 oz (118.5 kg)   08/30/21 265 lb (120.2 kg)   01/22/20 277 lb (125.6 kg)     Temp Readings from Last 3 Encounters:   09/11/21 98 °F (36.7 °C) (Oral)   08/30/21 96.8 °F (36 °C) (Temporal)   08/09/19 97.7 °F (36.5 °C)     TMAX:  BP Readings from Last 3 Encounters:   09/11/21 (!) 146/84   08/30/21 (!) 122/56   01/22/20 132/77     Pulse Readings from Last 3 Encounters:   09/11/21 61   08/30/21 78   01/22/20 109           INTAKE/OUTPUTS:  I/O last 3 completed shifts:   In: 720 [P.O.:720]  Out: 2400 [Urine:2400]    Intake/Output Summary (Last 24 hours) at 9/11/2021 1911  Last data filed at 9/11/2021 1814  Gross per 24 hour   Intake 960 ml   Output 2400 ml   Net -1440 ml       This is virtual visit      LABS/IMAGING:    CBC:  Lab Results   Component Value Date    WBC 6.6 09/10/2021    HGB 9.1 (L) 09/10/2021    HCT 28.3 (L) 09/10/2021    MCV 94.3 09/10/2021     09/10/2021    LYMPHOPCT 12.2 (L) 09/10/2021    RBC 3.00 (L) 09/10/2021    MCH 30.3 09/10/2021    MCHC 32.2 09/10/2021    RDW 12.8 09/10/2021    NEUTOPHILPCT 74.8 09/10/2021    MONOPCT 7.8 09/10/2021    BASOPCT 0.3 09/10/2021    NEUTROABS 5.28 09/10/2021    LYMPHSABS 0.79 (L) 09/10/2021    MONOSABS 0.53 09/10/2021    EOSABS 0.00 (L) 09/10/2021    BASOSABS 0.00 09/10/2021       Recent Labs     09/10/21  0429 09/09/21  0102 08/30/21  0651   WBC 6.6 6.5 10. 7   HGB 9.1* 10.2* 9.9*   HCT 28.3* 31.3* 29.5*   MCV 94.3 95.7 96.1    286 122*       BMP:   Recent Labs     09/09/21  0102 09/10/21  0429    140   K 3.3* 3.8   CL 93* 94*   CO2 33* 34*   BUN 25* 30*   CREATININE 1.4* 1.1       MG:   Lab Results   Component Value Date    MG 1.6 09/09/2021     Ca/Phos:   Lab Results   Component Value Date    CALCIUM 9.1 09/10/2021    PHOS 3.9 08/26/2021     Amylase: No results found for: AMYLASE  Lipase:   Lab Results   Component Value Date    LIPASE 28 05/19/2011     LIVER PROFILE:   Recent Labs     09/09/21  0102 09/10/21  0429   AST 14 10   ALT 29 23   BILITOT 0.3 <0.2   ALKPHOS 65 57       PT/INR:   No results for input(s): PROTIME, INR in the last 72 hours. APTT: No results for input(s): APTT in the last 72 hours. Cardiac Enzymes:  Lab Results   Component Value Date    CKTOTAL 51 11/13/2010    CKMB 0.5 11/13/2010    TROPONINI <0.01 02/15/2018       Hgb A1C:   Lab Results   Component Value Date    LABA1C 6.4 (H) 09/09/2021     No results found for: EAG  FAMILIA: No results found for: FAMILIA  ESR: No results found for: SEDRATE  CRP: No results found for: CRP  D Dimer: No results found for: DDIMER    Thyroid Studies:  Lab Results   Component Value Date    TSH 0.942 02/14/2018    [id number] 4.8 02/14/2018           MICROBIOLOGY:  Pending       CXR:  Reviewed     CT Chest:reviewed     PE  Vitals:    09/11/21 1435   BP: (!) 146/84   Pulse: 61   Resp: 15   Temp: 98 °F (36.7 °C)   SpO2: 98%     General:  Awake, alert, oriented X 3. Well developed, well nourished, well groomed. No apparent distress. HEENT:  Normocephalic, atraumatic. Pupils equal, round, reactive to light. No scleral icterus. No conjunctival injection. Normal lips, teeth, and gums. No nasal discharge. Neck:  Supple, FROM  Heart:  RRR, no murmurs, gallops, rubs, carotid upstroke normal, no carotid bruits  Lungs:wheeizng bilateral ,rhonchi   Abdomen:   Bowel sounds present, soft, nontender, no masses, no organomegaly, no peritoneal signs  Extremities:  No clubbing, cyanosis, or edema  Skin:  Warm and dry, no open lesions or rash  Neuro:  Cranial nerves 2-12 intact, no focal deficits  Vascular: Radial and pedal Pulses 2+  Breast: deferred  Rectal: deferred  Genitalia:  deferred    PROBLEM LIST:  Patient Active Problem List   Diagnosis    [facility] disease) [facility]    Essential hypertension    Adrenal adenoma    Class 2 obesity due to excess calories with serious comorbidity and body mass index (BMI) of 35.0 to 35.9 in adult    Anticoagulation management encounter    Typical atrial flutter ([facility]    Anxiety    Status post catheter ablation of atrial flutter    Persistent atrial fibrillation ([facility] [address]    ETOH abuse    COPD with acute exacerbation ([facility]    COPD exacerbation (HCC)               ASSESSMENT:  1- A fib with possible acute  HFpEF  2) very severe COPD,doubt exacerbation   3.)obstructive sleep apnea  4. )Afib /Flutter   5.)tobacco independent(quit 5 months ago)      PLAN:  Seen by cardiology for CTA coronary ,will follow their recommendation  A fib ,defer to cardiology,EP if needed,Dr Kirill Graves seeing today   Continue diuresis with Bumex PO  Continue Elquis  Continue BD  Prednisone PO 30 mg and wean over week ,no need for IV steroids   On Duoneb   Albuterol as needed   On  Eliquis to take it twice daily  Continue Nebs   BiPAP /CPAP at night    IgE 52  Prcal minimal elevation  He think his O2 concentrator ,portable not giving need O2 ,will check with DME on Monday when staff available       Gomez Godinez MD  Pulmonary/Critical care Medicine   [facility] and [address] [place] [address]

## 2021-09-12 LAB
METER GLUCOSE: 131 MG/DL (ref 74–99)
METER GLUCOSE: 181 MG/DL (ref 74–99)
METER GLUCOSE: 215 MG/DL (ref 74–99)
METER GLUCOSE: 333 MG/DL (ref 74–99)
PRO-BNP: 267 PG/ML (ref 0–125)

## 2021-09-12 PROCEDURE — 82962 GLUCOSE BLOOD TEST: CPT

## 2021-09-12 PROCEDURE — 2060000000 HC ICU INTERMEDIATE R&B

## 2021-09-12 PROCEDURE — 6370000000 HC RX 637 (ALT 250 FOR IP): Performed by: FAMILY MEDICINE

## 2021-09-12 PROCEDURE — 6370000000 HC RX 637 (ALT 250 FOR IP): Performed by: INTERNAL MEDICINE

## 2021-09-12 PROCEDURE — 94660 CPAP INITIATION&MGMT: CPT

## 2021-09-12 PROCEDURE — 6360000002 HC RX W HCPCS: Performed by: FAMILY MEDICINE

## 2021-09-12 PROCEDURE — 99233 SBSQ HOSP IP/OBS HIGH 50: CPT | Performed by: INTERNAL MEDICINE

## 2021-09-12 PROCEDURE — 6360000002 HC RX W HCPCS: Performed by: INTERNAL MEDICINE

## 2021-09-12 PROCEDURE — 2700000000 HC OXYGEN THERAPY PER DAY

## 2021-09-12 PROCEDURE — 83880 ASSAY OF NATRIURETIC PEPTIDE: CPT

## 2021-09-12 PROCEDURE — 2580000003 HC RX 258: Performed by: FAMILY MEDICINE

## 2021-09-12 PROCEDURE — 94640 AIRWAY INHALATION TREATMENT: CPT

## 2021-09-12 PROCEDURE — 36415 COLL VENOUS BLD VENIPUNCTURE: CPT

## 2021-09-12 PROCEDURE — 99232 SBSQ HOSP IP/OBS MODERATE 35: CPT | Performed by: INTERNAL MEDICINE

## 2021-09-12 RX ORDER — METHYLPREDNISOLONE SODIUM SUCCINATE 40 MG/ML
40 INJECTION, POWDER, LYOPHILIZED, FOR SOLUTION INTRAMUSCULAR; INTRAVENOUS EVERY 8 HOURS
Status: DISCONTINUED | OUTPATIENT
Start: 2021-09-12 | End: 2021-09-14

## 2021-09-12 RX ADMIN — Medication 10 ML: at 21:47

## 2021-09-12 RX ADMIN — INSULIN LISPRO 4 UNITS: 100 INJECTION, SOLUTION INTRAVENOUS; SUBCUTANEOUS at 12:15

## 2021-09-12 RX ADMIN — BUSPIRONE HYDROCHLORIDE 10 MG: 10 TABLET ORAL at 21:46

## 2021-09-12 RX ADMIN — POTASSIUM CHLORIDE 20 MEQ: 20 TABLET, EXTENDED RELEASE ORAL at 08:35

## 2021-09-12 RX ADMIN — GUAIFENESIN 400 MG: 400 TABLET ORAL at 21:46

## 2021-09-12 RX ADMIN — PREDNISONE 30 MG: 10 TABLET ORAL at 08:36

## 2021-09-12 RX ADMIN — DILTIAZEM HYDROCHLORIDE 240 MG: 240 CAPSULE, EXTENDED RELEASE ORAL at 21:46

## 2021-09-12 RX ADMIN — METHYLPREDNISOLONE SODIUM SUCCINATE 40 MG: 40 INJECTION, POWDER, FOR SOLUTION INTRAMUSCULAR; INTRAVENOUS at 23:02

## 2021-09-12 RX ADMIN — FLECAINIDE ACETATE 100 MG: 100 TABLET ORAL at 21:46

## 2021-09-12 RX ADMIN — BUDESONIDE 500 MCG: 0.5 SUSPENSION RESPIRATORY (INHALATION) at 19:30

## 2021-09-12 RX ADMIN — IPRATROPIUM BROMIDE AND ALBUTEROL SULFATE 3 ML: .5; 2.5 SOLUTION RESPIRATORY (INHALATION) at 16:12

## 2021-09-12 RX ADMIN — DILTIAZEM HYDROCHLORIDE 240 MG: 240 CAPSULE, EXTENDED RELEASE ORAL at 08:35

## 2021-09-12 RX ADMIN — APIXABAN 5 MG: 5 TABLET, FILM COATED ORAL at 08:36

## 2021-09-12 RX ADMIN — BUMETANIDE 2 MG: 1 TABLET ORAL at 08:36

## 2021-09-12 RX ADMIN — FLUTICASONE PROPIONATE 1 SPRAY: 50 SPRAY, METERED NASAL at 08:39

## 2021-09-12 RX ADMIN — ASPIRIN 81 MG CHEWABLE TABLET 81 MG: 81 TABLET CHEWABLE at 08:36

## 2021-09-12 RX ADMIN — INSULIN LISPRO 8 UNITS: 100 INJECTION, SOLUTION INTRAVENOUS; SUBCUTANEOUS at 21:46

## 2021-09-12 RX ADMIN — INSULIN GLARGINE 15 UNITS: 100 INJECTION, SOLUTION SUBCUTANEOUS at 21:47

## 2021-09-12 RX ADMIN — ATORVASTATIN CALCIUM 20 MG: 20 TABLET, FILM COATED ORAL at 18:16

## 2021-09-12 RX ADMIN — BUSPIRONE HYDROCHLORIDE 10 MG: 10 TABLET ORAL at 14:01

## 2021-09-12 RX ADMIN — GUAIFENESIN 400 MG: 400 TABLET ORAL at 08:35

## 2021-09-12 RX ADMIN — INSULIN LISPRO 2 UNITS: 100 INJECTION, SOLUTION INTRAVENOUS; SUBCUTANEOUS at 06:14

## 2021-09-12 RX ADMIN — GUAIFENESIN 400 MG: 400 TABLET ORAL at 14:01

## 2021-09-12 RX ADMIN — BUSPIRONE HYDROCHLORIDE 10 MG: 10 TABLET ORAL at 08:36

## 2021-09-12 RX ADMIN — Medication 10 ML: at 08:38

## 2021-09-12 RX ADMIN — IPRATROPIUM BROMIDE AND ALBUTEROL SULFATE 3 ML: .5; 2.5 SOLUTION RESPIRATORY (INHALATION) at 07:57

## 2021-09-12 RX ADMIN — ARFORMOTEROL TARTRATE 15 MCG: 15 SOLUTION RESPIRATORY (INHALATION) at 07:57

## 2021-09-12 RX ADMIN — BUMETANIDE 2 MG: 1 TABLET ORAL at 18:16

## 2021-09-12 RX ADMIN — ARFORMOTEROL TARTRATE 15 MCG: 15 SOLUTION RESPIRATORY (INHALATION) at 19:30

## 2021-09-12 RX ADMIN — IPRATROPIUM BROMIDE AND ALBUTEROL SULFATE 3 ML: .5; 2.5 SOLUTION RESPIRATORY (INHALATION) at 19:37

## 2021-09-12 RX ADMIN — INSULIN GLARGINE 15 UNITS: 100 INJECTION, SOLUTION SUBCUTANEOUS at 08:36

## 2021-09-12 RX ADMIN — FLECAINIDE ACETATE 100 MG: 100 TABLET ORAL at 08:35

## 2021-09-12 RX ADMIN — BUDESONIDE 500 MCG: 0.5 SUSPENSION RESPIRATORY (INHALATION) at 07:57

## 2021-09-12 RX ADMIN — CEFTRIAXONE 1000 MG: 1 INJECTION, POWDER, FOR SOLUTION INTRAMUSCULAR; INTRAVENOUS at 05:21

## 2021-09-12 RX ADMIN — IPRATROPIUM BROMIDE AND ALBUTEROL SULFATE 3 ML: .5; 2.5 SOLUTION RESPIRATORY (INHALATION) at 11:59

## 2021-09-12 RX ADMIN — METHYLPREDNISOLONE SODIUM SUCCINATE 40 MG: 40 INJECTION, POWDER, FOR SOLUTION INTRAMUSCULAR; INTRAVENOUS at 17:06

## 2021-09-12 RX ADMIN — APIXABAN 5 MG: 5 TABLET, FILM COATED ORAL at 21:46

## 2021-09-12 ASSESSMENT — PAIN SCALES - GENERAL
PAINLEVEL_OUTOF10: 0
PAINLEVEL_OUTOF10: 0

## 2021-09-12 NOTE — PROGRESS NOTES
[facility]  Division of Pulmonary, Critical Care Medicine  Pulmonary [address] [facility]           Pulmonary consult         CC :SOB ,  H/o A flutter /a fib poorly controlled     HPI :  Have chest tightness and worsen SOB   Denies any fever or chills  HR seems better control   No cough  Less swelling legs   On 4 L O2     PHYSICAL EXAMINATION:     VITAL SIGNS:  /73   Pulse 76   Temp 97.6 °F (36.4 °C) (Oral)   Resp 19   Ht 5' 11\" (1.803 m)   Wt 261 lb 2 oz (118.4 kg)   SpO2 97%   BMI 36.42 kg/m²   Wt Readings from Last 3 Encounters:   09/12/21 261 lb 2 oz (118.4 kg)   08/30/21 265 lb (120.2 kg)   01/22/20 277 lb (125.6 kg)     Temp Readings from Last 3 Encounters:   09/12/21 97.6 °F (36.4 °C) (Oral)   08/30/21 96.8 °F (36 °C) (Temporal)   08/09/19 97.7 °F (36.5 °C)     TMAX:  BP Readings from Last 3 Encounters:   09/12/21 136/73   08/30/21 (!) 122/56   01/22/20 132/77     Pulse Readings from Last 3 Encounters:   09/12/21 76   08/30/21 78   01/22/20 109           INTAKE/OUTPUTS:  I/O last 3 completed shifts:   In: 2140 [P.O.:2140]  Out: 2652 [Urine:3525]    Intake/Output Summary (Last 24 hours) at 9/12/2021 1725  Last data filed at 9/12/2021 1218  Gross per 24 hour   Intake 2140 ml   Output 3525 ml   Net -1385 ml       This is virtual visit      LABS/IMAGING:    CBC:  Lab Results   Component Value Date    WBC 6.6 09/10/2021    HGB 9.1 (L) 09/10/2021    HCT 28.3 (L) 09/10/2021    MCV 94.3 09/10/2021     09/10/2021    LYMPHOPCT 12.2 (L) 09/10/2021    RBC 3.00 (L) 09/10/2021    MCH 30.3 09/10/2021    MCHC 32.2 09/10/2021    RDW 12.8 09/10/2021    NEUTOPHILPCT 74.8 09/10/2021    MONOPCT 7.8 09/10/2021    BASOPCT 0.3 09/10/2021    NEUTROABS 5.28 09/10/2021    LYMPHSABS 0.79 (L) 09/10/2021    MONOSABS 0.53 09/10/2021    EOSABS 0.00 (L) 09/10/2021    BASOSABS 0.00 09/10/2021       Recent Labs     09/10/21  0429 09/09/21  0102 08/30/21  0651   WBC 6.6 6.5 10.7   HGB 9.1* 10.2* 9.9*   HCT 28.3* 31.3* 29.5*   MCV 94.3 95.7 96.1    286 122*       BMP:   Recent Labs     09/10/21  0429      K 3.8   CL 94*   CO2 34*   BUN 30*   CREATININE 1.1       MG:   Lab Results   Component Value Date    MG 1.6 09/09/2021     Ca/Phos:   Lab Results   Component Value Date    CALCIUM 9.1 09/10/2021    PHOS 3.9 08/26/2021     Amylase: No results found for: AMYLASE  Lipase:   Lab Results   Component Value Date    LIPASE 28 05/19/2011     LIVER PROFILE:   Recent Labs     09/10/21  0429   AST 10   ALT 23   BILITOT <0.2   ALKPHOS 57       PT/INR:   No results for input(s): PROTIME, INR in the last 72 hours. APTT: No results for input(s): APTT in the last 72 hours. Cardiac Enzymes:  Lab Results   Component Value Date    CKTOTAL 51 11/13/2010    CKMB 0.5 11/13/2010    TROPONINI <0.01 02/15/2018       Hgb A1C:   Lab Results   Component Value Date    LABA1C 6.4 (H) 09/09/2021     No results found for: EAG  FAMILIA: No results found for: FAMILIA  ESR: No results found for: SEDRATE  CRP: No results found for: CRP  D Dimer: No results found for: DDIMER    Thyroid Studies:  Lab Results   Component Value Date    TSH 0.942 02/14/2018    [id number] 4.8 02/14/2018           MICROBIOLOGY:  Pending       CXR:  Reviewed     CT Chest:reviewed     PE  Vitals:    09/12/21 1612   BP:    Pulse:    Resp: 19   Temp:    SpO2: 97%     General:  Awake, alert, oriented X 3. Well developed, well nourished, well groomed. No apparent distress. HEENT:  Normocephalic, atraumatic. Pupils equal, round, reactive to light. No scleral icterus. No conjunctival injection. Normal lips, teeth, and gums. No nasal discharge. Neck:  Supple, FROM  Heart:  RRR, no murmurs, gallops, rubs, carotid upstroke normal, no carotid bruits  Lungs:wheeizng bilateral ,rhonchi   Abdomen:   Bowel sounds present, soft, nontender, no masses, no organomegaly, no peritoneal signs  Extremities:  No clubbing, cyanosis, or edema  Skin: Warm and dry, no open lesions or rash  Neuro:  Cranial nerves 2-12 intact, no focal deficits  Vascular: Radial and pedal Pulses 2+  Breast: deferred  Rectal: deferred  Genitalia:  deferred    PROBLEM LIST:  Patient Active Problem List   Diagnosis    [facility]chronic obstructive pulmonary disease) ([facility] [address]    Essential hypertension    Adrenal adenoma    Class 2 obesity due to excess calories with serious comorbidity and body mass index (BMI) of 35.0 to 35.9 in adult    Anticoagulation management encounter    Typical atrial flutter ([facility] [address]    Anxiety    Status post catheter ablation of atrial flutter    Persistent atrial fibrillation ([facility] [facility]    ETOH abuse    COPD with acute exacerbation ([facility] [address] [facility]    COPD exacerbation (HCC)               ASSESSMENT:  1- A fib with possible acute  HFpEF  2) very severe COPD,doubt exacerbation   3.)obstructive sleep apnea  4. )Afib /Flutter   5.)tobacco independent(quit 5 months ago)      PLAN:  Seen by cardiology for CTA coronary ,will follow their recommendation  A fib ,defer to cardiology,EP if needed,Dr Zev Castillo seeing today   Continue diuresis with Bumex PO  Continue Elquis  Continue BD  Go back to IV steroids as seems have chest tightness and not breathing well today   On Duoneb   Albuterol as needed   On  Eliquis to take it twice daily  Continue Nebs   BiPAP /CPAP at night    IgE 52  He think his O2 concentrator ,portable not giving need O2 ,will check with DME on Monday when staff available       Lita Greenwood MD  Pulmonary/Critical care Medicine   [facility] and [facility]

## 2021-09-12 NOTE — PROGRESS NOTES
trachea midline, no anterior cervical or SC LAD  Heart:  Normal s1/s2, RRR, no murmurs, gallops, or rubs. Lungs:  COARSE  bilaterally, *  Abd: bowel sounds present, soft, nontender, nondistended, no masses  Extrem:  No clubbing, cyanosis,  *TRACE* edema  Skin: no rashes or lesions  Psych: A & O x3  Neuro: grossly intact, moves all four extremities. Capillary Refill: Brisk,< 3 seconds   Peripheral Pulses: +2 palpable, equal bilaterally              Labs:   Recent Labs     09/10/21  0429   WBC 6.6   HGB 9.1*   HCT 28.3*        Recent Labs     09/10/21  0429      K 3.8   CL 94*   CO2 34*   BUN 30*   CREATININE 1.1   CALCIUM 9.1     Recent Labs     09/10/21  0429   AST 10   ALT 23   BILITOT <0.2   ALKPHOS 57     No results for input(s): INR in the last 72 hours. No results for input(s): Millie Ill in the last 72 hours. Recent Labs     09/10/21  0429   AST 10   ALT 23   BILITOT <0.2   ALKPHOS 57     No results for input(s): LACTA in the last 72 hours. No results found for: Michael Martini  No results found for: AMMONIA    Assessment:    Active Hospital Problems    Diagnosis Date Noted    [facility] [J44.1] 09/09/2021   HFpEF  PAF  HTN  say   MORBID OBESITY   PRE DIABETES, AIC 6.4     Plan:  *SSI  CORONARY CT ON MONDAY   CONT steroids   lopressor   SSI   tambocor   ROCEPHIN  ZITHROMAX   LIPITOR     DVT Prophylaxis: ELIQUIS   Diet: ADULT DIET; Regular; 5 carb choices (75 gm/meal);  No Added Salt (3-4 gm)  Code Status: Full Code     PT/OT Eval Status: ORDERED     Dispo - *HOME*        Electronically signed by Nicolasa Fink DO on 9/12/2021 at 2:15 PM [facility]

## 2021-09-12 NOTE — PROGRESS NOTES
INPATIENT CARDIOLOGY FOLLOW-UP    Name: Jacqueline Russ    Age: 63 y.o. Date of Admission: 9/9/2021  2:40 AM    Date of Service: 9/12/2021    Chief Complaint: Follow-up for CHF      Interim History:  No new overnight cardiac complaints. He told me that his breathing is worse today and was started him back on IV Solu-Medrol and does have difficulty lying down. Also has wheezing. He told me that he has end-stage COPD. Currently with no complaints of CP, palpitations, dizziness, or lightheadedness. SR on telemetry.     Review of Systems:   Cardiac: As per HPI  General: No fever, chills  Pulmonary: As per HPI  HEENT: No visual disturbances, difficult swallowing  GI: No nausea, vomiting  Endocrine: No thyroid disease or DM  Musculoskeletal: ROUSE x 4, no focal motor deficits  Skin: Intact, no rashes  Neuro/Psych: No headache or seizures    Problem List:  Patient Active Problem List   Diagnosis    [facility] [facility]    Essential hypertension    Adrenal adenoma    Class 2 obesity due to excess calories with serious comorbidity and body mass index (BMI) of 35.0 to 35.9 in adult    Anticoagulation management encounter    Typical atrial flutter ([facility] [facility]    Anxiety    Status post catheter ablation of atrial flutter    Persistent atrial fibrillation (HCC)    ETOH abuse    COPD with acute exacerbation ([facility] [address] [facility]    COPD exacerbation (HCC)       Allergies:  No Known Allergies    Current Medications:  Current Facility-Administered Medications   Medication Dose Route Frequency Provider Last Rate Last Admin    methylPREDNISolone sodium (SOLU-MEDROL) injection 40 mg  40 mg IntraVENous Q8H Ryan Danielle MD        insulin glargine (LANTUS) injection vial 15 Units  15 Units SubCUTAneous BID Iveth Padron, DO   15 Units at 09/12/21 0836    guaiFENesin tablet 400 mg  400 mg Oral TID Iveth Padron, DO   400 mg at 09/12/21 1401    busPIRone (BUSPAR) tablet 10 mg  10 mg Oral TID Stefano Donahue, DO   10 mg at 09/12/21 1401    metoprolol (LOPRESSOR) injection 5 mg  5 mg IntraVENous Q5 Min PRN SERA Ashby - CNP        albuterol (PROVENTIL) nebulizer solution 2.5 mg  2.5 mg Nebulization Q6H PRN Lang Stephensa, DO        apixaban Delonte Grounds) tablet 5 mg  5 mg Oral BID Lang Stephensa, DO   5 mg at 09/12/21 0836    aspirin chewable tablet 81 mg  81 mg Oral Daily Lang Valery, DO   81 mg at 09/12/21 0836    atorvastatin (LIPITOR) tablet 20 mg  20 mg Oral QPM Lang Rasmussen, DO   20 mg at 09/11/21 1757    bumetanide (BUMEX) tablet 2 mg  2 mg Oral BID Lang Valery, DO   2 mg at 09/12/21 0836    dilTIAZem (CARDIZEM CD) extended release capsule 240 mg  240 mg Oral BID Lang Valery, DO   240 mg at 09/12/21 0835    flecainide (TAMBOCOR) tablet 100 mg  100 mg Oral BID Lang Hoa, DO   100 mg at 09/12/21 0835    fluticasone (FLONASE) 50 MCG/ACT nasal spray 1 spray  1 spray Each Nostril Daily Lang Rasmussen, DO   1 spray at 09/12/21 0839    ipratropium-albuterol (DUONEB) nebulizer solution 3 mL  1 vial Inhalation Q4H Lang Stephensa, DO   3 mL at 09/12/21 1159    potassium chloride (KLOR-CON M) extended release tablet 20 mEq  20 mEq Oral Daily with breakfast Lang Rasmussen, DO   20 mEq at 09/12/21 0835    sodium chloride flush 0.9 % injection 5-40 mL  5-40 mL IntraVENous 2 times per day Lang Rasmussen, DO   10 mL at 09/12/21 0838    sodium chloride flush 0.9 % injection 5-40 mL  5-40 mL IntraVENous PRN Lang Rasmussen, DO        0.9 % sodium chloride infusion  25 mL IntraVENous PRN Lang Rasmussen, DO        ondansetron (ZOFRAN-ODT) disintegrating tablet 4 mg  4 mg Oral Q8H PRN Lang Rasmussen, DO        Or    ondansetron [facility]) injection 4 mg  4 mg IntraVENous Q6H PRN Lang Rasmussen DO        polyethylene glycol (GLYCOLAX) packet 17 g  17 g Oral Daily PRN Lang Rasmussen DO        acetaminophen (TYLENOL) tablet 650 mg  650 mg Oral Q6H PRN Lang Rasmussen DO        Or    acetaminophen (TYLENOL) suppository 650 mg  650 mg Rectal Q6H PRN Amanda San, DO        cefTRIAXone (ROCEPHIN) 1,000 mg in sterile water 10 mL IV syringe  1,000 mg IntraVENous Q24H Amanda San, DO   1,000 mg at 09/12/21 0521    potassium chloride (KLOR-CON M) extended release tablet 40 mEq  40 mEq Oral PRN Amanda San, DO        Or    potassium bicarb-citric acid (EFFER-K) effervescent tablet 40 mEq  40 mEq Oral PRN Amanda San, DO        Or    potassium chloride 10 mEq/100 mL IVPB (Peripheral Line)  10 mEq IntraVENous PRN Amanda San, DO        insulin lispro (HUMALOG) injection vial 0-10 Units  0-10 Units SubCUTAneous 4x Daily AC & HS Raul Migue Estephania, DO   4 Units at 09/12/21 1215    glucose (GLUTOSE) 40 % oral gel 15 g  15 g Oral PRN Raul Migue Estephania, DO        dextrose 50 % IV solution  12.5 g IntraVENous PRN Angelita Beyer, DO        glucagon (rDNA) injection 1 mg  1 mg IntraMUSCular PRN Raul Migue Estephania, DO        dextrose 5 % solution  100 mL/hr IntraVENous PRN Raul Migue Estephania, DO        Arformoterol Tartrate [facility]) nebulizer solution 15 mcg  15 mcg Nebulization BID Amanda San, DO   15 mcg at 09/12/21 0757    And    ipratropium (ATROVENT) 0.02 % nebulizer solution 0.5 mg  0.5 mg Nebulization 4x daily Amanda San, DO   0.5 mg at 09/10/21 1947    And    budesonide (PULMICORT) nebulizer suspension 500 mcg  0.5 mg Nebulization BID Amanda San, DO   500 mcg at 09/12/21 0757      sodium chloride      dextrose         Physical Exam:  /73   Pulse 76   Temp 97.6 °F (36.4 °C) (Oral)   Resp 20   Ht 5' 11\" (1.803 m)   Wt 261 lb 2 oz (118.4 kg)   SpO2 98%   BMI 36.42 kg/m²   Wt Readings from Last 3 Encounters:   09/12/21 261 lb 2 oz (118.4 kg)   08/30/21 265 lb (120.2 kg)   01/22/20 277 lb (125.6 kg)     Appearance: Awake, alert, no acute respiratory distress  Skin: Intact, no rash  Head: Normocephalic, atraumatic  Eyes: EOMI, no conjunctival erythema  ENMT: No pharyngeal erythema, MMM, no rhinorrhea  Neck: Supple, no elevated JVP, no carotid bruits  Lungs: Bilateral air entry is decreased with scattered wheezing.   Cardiac: Regular rate and rhythm, +S1S2, no murmurs apparent  Abdomen: Soft, nontender, +bowel sounds  Extremities: Moves all extremities x 4, trace to 1+ lower extremity edema  Neurologic: No focal motor deficits apparent, normal mood and affect  Peripheral Pulses: Intact posterior tibial pulses bilaterally    Intake/Output:    Intake/Output Summary (Last 24 hours) at 9/12/2021 1456  Last data filed at 9/12/2021 1218  Gross per 24 hour   Intake 2140 ml   Output 3525 ml   Net -1385 ml     I/O this shift:  In: -   Out: 0362 [Urine:1425]    Laboratory Tests:  Recent Labs     09/10/21  0429      K 3.8   CL 94*   CO2 34*   BUN 30*   CREATININE 1.1   GLUCOSE 213*   CALCIUM 9.1     Lab Results   Component Value Date    MG 1.6 09/09/2021     Recent Labs     09/10/21  0429   ALKPHOS 57   ALT 23   AST 10   PROT 6.3*   BILITOT <0.2   LABALBU 3.5     Recent Labs     09/10/21  0429   WBC 6.6   RBC 3.00*   HGB 9.1*   HCT 28.3*   MCV 94.3   MCH 30.3   MCHC 32.2   RDW 12.8      MPV 10.9     Lab Results   Component Value Date    CKTOTAL 51 11/13/2010    CKMB 0.5 11/13/2010    TROPONINI <0.01 02/15/2018    TROPONINI <0.01 02/14/2018    TROPONINI <0.01 02/14/2018     Lab Results   Component Value Date    INR 1.2 02/04/2018    INR 0.9 11/13/2010    PROTIME 13.8 (H) 02/04/2018    PROTIME 11.6 11/13/2010     Lab Results   Component Value Date    TSH 0.942 02/14/2018     Lab Results   Component Value Date    LABA1C 6.4 (H) 09/09/2021     No results found for: EAG  Lab Results   Component Value Date    CHOL 205 (H) 04/08/2018    CHOL 162 01/28/2017     Lab Results   Component Value Date    TRIG 80 04/08/2018    TRIG 61 01/28/2017     Lab Results   Component Value Date    HDL 80 04/08/2018    HDL 50 01/28/2017     Lab Results   Component Value Date    LDLCALC 109 (H) 04/08/2018 LDLCALC 100 (H) 01/28/2017     Lab Results   Component Value Date    LABVLDL 16 04/08/2018    LABVLDL 12 01/28/2017     No results found for: CHOLHDLRATIO    Cardiac Tests:  ECG:       Telemetry findings reviewed: Sinus bradycardia with heart rate in the 50s. Labs and vitals were reviewed: Blood pressure 136/73 heart rate 76 sats 98% on 4 L of O2 by nasal cannula. Labs-no labs for today. BUNs/creatinine 30/1.1, H&H 9.1/28.3 no labs for today except for proBNP of 267    TTE (Dr. Carroll Mensah, 8/12/2021)  Summary   Technically limited study.   Normal left ventricular size, wall thickness, wall motion, and systolic   function.   Estimated left ventricle ejection fraction 70+/-5 %.   There is doppler evidence of stage II diastolic dysfunction.   Mildly dilated right ventricle.   Right ventricle global systolic function is normal .   Normal sized left atrium.   No significant valvular abnormalities noted.     17. Lexiscan stress test August 13, 2021    No significant valvular abnormalities noted.     Lexiscan Stress Test 8/13/2021:  FINDINGS: The overall quality of the study was fair. Left ventricular cavity size was noted to be dilated on both the  stress and the resting images but there was no transient ischemic  dilatation after stress  Rotational analog analysis demonstrated abnormal patient motion and  there was marked increase in tracer uptake in the abdominal organs  with the liver overshadowing the bottom of the heart  A severe defect was present in the inferior wall extending into the  lateral apical wall(s) that was moderate to largesized by  quantification. The resting images showed no change   Gated SPECT left ventricular ejection fraction was calculated to be  66%, with normal myocardial contractility and wall motion.   Impression  The myocardial perfusion imaging was probably normal with the large  fixed inferior/lateral apical wall defect being more consistent with  attenuation artifact and less likely the result of a myocardial  infarction especially with the normal contractility of the inferior  wall and the lateral apical wall. More importantly, there did not  appear to be any evidence of stress-induced ischemia on this study  Overall left ventricular systolic function was normal with no regional  wall motion abnormality  Low risk general pharmacologic stress test.      Cardiac catheterization:       ASSESSMENT:  1.  chronic heart failure with preserved ejection fraction.  BNP around 324.  -.  Mildly dilated RV with normal RV systolic function + normal LV systolic function (EF 70 +/- 5%), stage II diastolic dysfunction recent 2D echo, continue oral diuresis he appears reasonably euvolemic. 2.  COPD exacerbation with chronic respiratory failure.  Known history of very severe COPD.   Pulmonology following. Patient is back on Solu-Medrol  3. Acute on chronic respiratory failure, currently on 4 L nasal cannula. 4. Acute kidney injury, resolved  5.  anemia  6. Paroxysmal atrial fibrillation/typical flutter status-post AF ablation in April 2018.  Currently on Eliquis, cardizem, and flecainide therapy.  Follows with [facility].  Maintaining normal sinus rhythm this admission. 7. Hypertension, controlled   8. Hyperlipidemia, on statin therapy. 9. Obstructive sleep apnea, compliant with CPAP. 10. Morbid obesity / BMI 40.8  11. History of alcohol abuse. 15. Former tobacco abuse (2 PPD for many years, quit in 8/2017)  13.  History of pericarditis in 11/2010  14. Mild coronary artery disease by cardiac catheterization with atypical chest pain and mild troponin elevation not consistent with acute coronary syndrome with a recent nonischemic stress test  15. elevated hemoglobin A1c.   16.  Influenza A in February 2018    Plan:   · Continue Bumex 2 mg twice daily. Check BMP in a.m. · proBNP is only 270. · Management of COPD as per pulmonary service.   · Continue Cardizem  mg p.o. twice daily and flecainide 100 mg p.o. twice daily for rate control. Continue anticoagulant therapy with Eliquis  · Plan for coronary CTA on Monday to rule out obstructive CAD. He is otherwise stable from a cardiology standpoint. Tucker Tillman MD., General Call.   [facility]) Cardiology

## 2021-09-13 ENCOUNTER — APPOINTMENT (OUTPATIENT)
Dept: CT IMAGING | Age: 61
DRG: 194 | End: 2021-09-13
Payer: COMMERCIAL

## 2021-09-13 ENCOUNTER — TELEPHONE (OUTPATIENT)
Dept: PULMONOLOGY | Age: 61
End: 2021-09-13

## 2021-09-13 LAB
ANION GAP SERPL CALCULATED.3IONS-SCNC: 5 MMOL/L (ref 7–16)
BUN BLDV-MCNC: 33 MG/DL (ref 6–20)
CALCIUM SERPL-MCNC: 9.2 MG/DL (ref 8.6–10.2)
CHLORIDE BLD-SCNC: 93 MMOL/L (ref 98–107)
CO2: 37 MMOL/L (ref 22–29)
CREAT SERPL-MCNC: 1 MG/DL (ref 0.7–1.2)
GFR AFRICAN AMERICAN: >60
GFR NON-AFRICAN AMERICAN: >60 ML/MIN/1.73
GLUCOSE BLD-MCNC: 199 MG/DL (ref 74–99)
METER GLUCOSE: 148 MG/DL (ref 74–99)
METER GLUCOSE: 170 MG/DL (ref 74–99)
METER GLUCOSE: 196 MG/DL (ref 74–99)
METER GLUCOSE: 274 MG/DL (ref 74–99)
POTASSIUM SERPL-SCNC: 3.9 MMOL/L (ref 3.5–5)
SODIUM BLD-SCNC: 135 MMOL/L (ref 132–146)

## 2021-09-13 PROCEDURE — 99233 SBSQ HOSP IP/OBS HIGH 50: CPT | Performed by: INTERNAL MEDICINE

## 2021-09-13 PROCEDURE — 75574 CT ANGIO HRT W/3D IMAGE: CPT

## 2021-09-13 PROCEDURE — 36415 COLL VENOUS BLD VENIPUNCTURE: CPT

## 2021-09-13 PROCEDURE — 94660 CPAP INITIATION&MGMT: CPT

## 2021-09-13 PROCEDURE — 6370000000 HC RX 637 (ALT 250 FOR IP): Performed by: INTERNAL MEDICINE

## 2021-09-13 PROCEDURE — 2700000000 HC OXYGEN THERAPY PER DAY

## 2021-09-13 PROCEDURE — 75574 CT ANGIO HRT W/3D IMAGE: CPT | Performed by: INTERNAL MEDICINE

## 2021-09-13 PROCEDURE — 2580000003 HC RX 258: Performed by: INTERNAL MEDICINE

## 2021-09-13 PROCEDURE — 2060000000 HC ICU INTERMEDIATE R&B

## 2021-09-13 PROCEDURE — 2580000003 HC RX 258: Performed by: FAMILY MEDICINE

## 2021-09-13 PROCEDURE — 6370000000 HC RX 637 (ALT 250 FOR IP): Performed by: FAMILY MEDICINE

## 2021-09-13 PROCEDURE — 6360000004 HC RX CONTRAST MEDICATION: Performed by: RADIOLOGY

## 2021-09-13 PROCEDURE — 99232 SBSQ HOSP IP/OBS MODERATE 35: CPT | Performed by: INTERNAL MEDICINE

## 2021-09-13 PROCEDURE — 6360000002 HC RX W HCPCS: Performed by: FAMILY MEDICINE

## 2021-09-13 PROCEDURE — 94640 AIRWAY INHALATION TREATMENT: CPT

## 2021-09-13 PROCEDURE — 6360000002 HC RX W HCPCS: Performed by: INTERNAL MEDICINE

## 2021-09-13 PROCEDURE — 82962 GLUCOSE BLOOD TEST: CPT

## 2021-09-13 PROCEDURE — 80048 BASIC METABOLIC PNL TOTAL CA: CPT

## 2021-09-13 RX ORDER — METOPROLOL TARTRATE 50 MG/1
50 TABLET, FILM COATED ORAL ONCE
Status: COMPLETED | OUTPATIENT
Start: 2021-09-13 | End: 2021-09-13

## 2021-09-13 RX ORDER — 0.9 % SODIUM CHLORIDE 0.9 %
500 INTRAVENOUS SOLUTION INTRAVENOUS ONCE
Status: COMPLETED | OUTPATIENT
Start: 2021-09-13 | End: 2021-09-13

## 2021-09-13 RX ADMIN — DILTIAZEM HYDROCHLORIDE 240 MG: 240 CAPSULE, EXTENDED RELEASE ORAL at 21:06

## 2021-09-13 RX ADMIN — INSULIN LISPRO 6 UNITS: 100 INJECTION, SOLUTION INTRAVENOUS; SUBCUTANEOUS at 21:11

## 2021-09-13 RX ADMIN — INSULIN GLARGINE 15 UNITS: 100 INJECTION, SOLUTION SUBCUTANEOUS at 11:52

## 2021-09-13 RX ADMIN — DILTIAZEM HYDROCHLORIDE 240 MG: 240 CAPSULE, EXTENDED RELEASE ORAL at 08:25

## 2021-09-13 RX ADMIN — BUDESONIDE 500 MCG: 0.5 SUSPENSION RESPIRATORY (INHALATION) at 08:04

## 2021-09-13 RX ADMIN — Medication 10 ML: at 10:43

## 2021-09-13 RX ADMIN — IOPAMIDOL 200 ML: 755 INJECTION, SOLUTION INTRAVENOUS at 10:43

## 2021-09-13 RX ADMIN — METHYLPREDNISOLONE SODIUM SUCCINATE 40 MG: 40 INJECTION, POWDER, FOR SOLUTION INTRAMUSCULAR; INTRAVENOUS at 11:50

## 2021-09-13 RX ADMIN — BUMETANIDE 2 MG: 1 TABLET ORAL at 17:36

## 2021-09-13 RX ADMIN — Medication 10 ML: at 08:26

## 2021-09-13 RX ADMIN — ATORVASTATIN CALCIUM 20 MG: 20 TABLET, FILM COATED ORAL at 17:36

## 2021-09-13 RX ADMIN — ASPIRIN 81 MG CHEWABLE TABLET 81 MG: 81 TABLET CHEWABLE at 11:51

## 2021-09-13 RX ADMIN — BUDESONIDE 500 MCG: 0.5 SUSPENSION RESPIRATORY (INHALATION) at 20:51

## 2021-09-13 RX ADMIN — POTASSIUM CHLORIDE 20 MEQ: 20 TABLET, EXTENDED RELEASE ORAL at 11:51

## 2021-09-13 RX ADMIN — IPRATROPIUM BROMIDE AND ALBUTEROL SULFATE 3 ML: .5; 2.5 SOLUTION RESPIRATORY (INHALATION) at 16:23

## 2021-09-13 RX ADMIN — APIXABAN 5 MG: 5 TABLET, FILM COATED ORAL at 21:06

## 2021-09-13 RX ADMIN — IPRATROPIUM BROMIDE AND ALBUTEROL SULFATE 3 ML: .5; 2.5 SOLUTION RESPIRATORY (INHALATION) at 20:52

## 2021-09-13 RX ADMIN — INSULIN GLARGINE 15 UNITS: 100 INJECTION, SOLUTION SUBCUTANEOUS at 21:10

## 2021-09-13 RX ADMIN — IPRATROPIUM BROMIDE AND ALBUTEROL SULFATE 3 ML: .5; 2.5 SOLUTION RESPIRATORY (INHALATION) at 12:08

## 2021-09-13 RX ADMIN — Medication 10 ML: at 21:06

## 2021-09-13 RX ADMIN — FLECAINIDE ACETATE 100 MG: 100 TABLET ORAL at 21:06

## 2021-09-13 RX ADMIN — BUSPIRONE HYDROCHLORIDE 10 MG: 10 TABLET ORAL at 11:51

## 2021-09-13 RX ADMIN — CEFTRIAXONE 1000 MG: 1 INJECTION, POWDER, FOR SOLUTION INTRAMUSCULAR; INTRAVENOUS at 04:57

## 2021-09-13 RX ADMIN — METHYLPREDNISOLONE SODIUM SUCCINATE 40 MG: 40 INJECTION, POWDER, FOR SOLUTION INTRAMUSCULAR; INTRAVENOUS at 23:25

## 2021-09-13 RX ADMIN — ARFORMOTEROL TARTRATE 15 MCG: 15 SOLUTION RESPIRATORY (INHALATION) at 08:04

## 2021-09-13 RX ADMIN — SODIUM CHLORIDE 500 ML: 9 INJECTION, SOLUTION INTRAVENOUS at 08:25

## 2021-09-13 RX ADMIN — FLECAINIDE ACETATE 100 MG: 100 TABLET ORAL at 11:51

## 2021-09-13 RX ADMIN — IPRATROPIUM BROMIDE AND ALBUTEROL SULFATE 3 ML: .5; 2.5 SOLUTION RESPIRATORY (INHALATION) at 08:04

## 2021-09-13 RX ADMIN — GUAIFENESIN 400 MG: 400 TABLET ORAL at 21:06

## 2021-09-13 RX ADMIN — GUAIFENESIN 400 MG: 400 TABLET ORAL at 15:03

## 2021-09-13 RX ADMIN — METHYLPREDNISOLONE SODIUM SUCCINATE 40 MG: 40 INJECTION, POWDER, FOR SOLUTION INTRAMUSCULAR; INTRAVENOUS at 16:06

## 2021-09-13 RX ADMIN — ARFORMOTEROL TARTRATE 15 MCG: 15 SOLUTION RESPIRATORY (INHALATION) at 20:51

## 2021-09-13 RX ADMIN — BUSPIRONE HYDROCHLORIDE 10 MG: 10 TABLET ORAL at 15:02

## 2021-09-13 RX ADMIN — BUMETANIDE 2 MG: 1 TABLET ORAL at 11:51

## 2021-09-13 RX ADMIN — APIXABAN 5 MG: 5 TABLET, FILM COATED ORAL at 11:52

## 2021-09-13 RX ADMIN — INSULIN LISPRO 2 UNITS: 100 INJECTION, SOLUTION INTRAVENOUS; SUBCUTANEOUS at 13:22

## 2021-09-13 RX ADMIN — Medication 10 ML: at 16:06

## 2021-09-13 RX ADMIN — FLUTICASONE PROPIONATE 1 SPRAY: 50 SPRAY, METERED NASAL at 11:54

## 2021-09-13 RX ADMIN — METOPROLOL TARTRATE 50 MG: 50 TABLET, FILM COATED ORAL at 08:30

## 2021-09-13 RX ADMIN — BUSPIRONE HYDROCHLORIDE 10 MG: 10 TABLET ORAL at 21:06

## 2021-09-13 RX ADMIN — GUAIFENESIN 400 MG: 400 TABLET ORAL at 11:51

## 2021-09-13 ASSESSMENT — PAIN SCALES - GENERAL
PAINLEVEL_OUTOF10: 0
PAINLEVEL_OUTOF10: 0

## 2021-09-13 NOTE — CARE COORDINATION
9/13/2021 - COPD exacerbation. Cardiology and pulmonology following. IV solumedrol q8h, IV rocephin q24h. Pt for CTA of coronary arteries today. Wearing 4L NC today. Plan is to discharge home when medically stable. SW/CM will follow.

## 2021-09-13 NOTE — TELEPHONE ENCOUNTER
Wife calling to request RN work on getting different Oxygen set up at home. RN to work with [facility] and see what options we have with [employer]. RN will call her back this afternoon.

## 2021-09-13 NOTE — PROGRESS NOTES
[facility]  Division of Pulmonary, Critical Care Medicine  Pulmonary [address] [facility]           Pulmonary consult         CC :SOB ,  H/o A flutter /a fib poorly controlled     HPI :  Doing better with steroids IV   States he is feeling better with no fever or chills  Denies any fever or chills  HR seems better control   No cough  Less swelling legs   On 4 L O2     PHYSICAL EXAMINATION:     VITAL SIGNS:  /62   Pulse 54   Temp 98 °F (36.7 °C) (Oral)   Resp 16   Ht 5' 11\" (1.803 m)   Wt 258 lb 6 oz (117.2 kg)   SpO2 97%   BMI 36.04 kg/m²   Wt Readings from Last 3 Encounters:   09/13/21 258 lb 6 oz (117.2 kg)   08/30/21 265 lb (120.2 kg)   01/22/20 277 lb (125.6 kg)     Temp Readings from Last 3 Encounters:   09/13/21 98 °F (36.7 °C) (Oral)   08/30/21 96.8 °F (36 °C) (Temporal)   08/09/19 97.7 °F (36.5 °C)     TMAX:  BP Readings from Last 3 Encounters:   09/13/21 127/62   08/30/21 (!) 122/56   01/22/20 132/77     Pulse Readings from Last 3 Encounters:   09/13/21 54   08/30/21 78   01/22/20 109           INTAKE/OUTPUTS:  I/O last 3 completed shifts:   In: 2080 [P.O.:2080]  Out: 4175 [Urine:4175]    Intake/Output Summary (Last 24 hours) at 9/13/2021 1433  Last data filed at 9/13/2021 1121  Gross per 24 hour   Intake 2360 ml   Output 3350 ml   Net -990 ml       This is virtual visit      LABS/IMAGING:    CBC:  Lab Results   Component Value Date    WBC 6.6 09/10/2021    HGB 9.1 (L) 09/10/2021    HCT 28.3 (L) 09/10/2021    MCV 94.3 09/10/2021     09/10/2021    LYMPHOPCT 12.2 (L) 09/10/2021    RBC 3.00 (L) 09/10/2021    MCH 30.3 09/10/2021    MCHC 32.2 09/10/2021    RDW 12.8 09/10/2021    NEUTOPHILPCT 74.8 09/10/2021    MONOPCT 7.8 09/10/2021    BASOPCT 0.3 09/10/2021    NEUTROABS 5.28 09/10/2021    LYMPHSABS 0.79 (L) 09/10/2021    MONOSABS 0.53 09/10/2021    EOSABS 0.00 (L) 09/10/2021    BASOSABS 0.00 09/10/2021       Recent Labs 09/10/21  0429 09/09/21  0102 08/30/21  0651   WBC 6.6 6.5 10.7   HGB 9.1* 10.2* 9.9*   HCT 28.3* 31.3* 29.5*   MCV 94.3 95.7 96.1    286 122*       BMP:   Recent Labs     09/13/21  0458      K 3.9   CL 93*   CO2 37*   BUN 33*   CREATININE 1.0       MG:   Lab Results   Component Value Date    MG 1.6 09/09/2021     Ca/Phos:   Lab Results   Component Value Date    CALCIUM 9.2 09/13/2021    PHOS 3.9 08/26/2021     Amylase: No results found for: AMYLASE  Lipase:   Lab Results   Component Value Date    LIPASE 28 05/19/2011     LIVER PROFILE:   No results for input(s): AST, ALT, LIPASE, BILIDIR, BILITOT, ALKPHOS in the last 72 hours. Invalid input(s): AMYLASE,  ALB    PT/INR:   No results for input(s): PROTIME, INR in the last 72 hours. APTT: No results for input(s): APTT in the last 72 hours. Cardiac Enzymes:  Lab Results   Component Value Date    CKTOTAL 51 11/13/2010    CKMB 0.5 11/13/2010    TROPONINI <0.01 02/15/2018       Hgb A1C:   Lab Results   Component Value Date    LABA1C 6.4 (H) 09/09/2021     No results found for: EAG  FAMILIA: No results found for: FAMILIA  ESR: No results found for: SEDRATE  CRP: No results found for: CRP  D Dimer: No results found for: DDIMER    Thyroid Studies:  Lab Results   Component Value Date    TSH 0.942 02/14/2018    A0SVFFY 4.8 02/14/2018           MICROBIOLOGY:  Pending       CXR:  Reviewed     CT Chest:reviewed     PE  Vitals:    09/13/21 1121   BP: 127/62   Pulse: 54   Resp: 16   Temp:    SpO2:      General:  Awake, alert, oriented X 3. Well developed, well nourished, well groomed. No apparent distress. HEENT:  Normocephalic, atraumatic. Pupils equal, round, reactive to light. No scleral icterus. No conjunctival injection. Normal lips, teeth, and gums. No nasal discharge. Neck:  Supple, FROM  Heart:  RRR, no murmurs, gallops, rubs, carotid upstroke normal, no carotid bruits  Lungs:wheeizng bilateral ,rhonchi   Abdomen:   Bowel sounds present, soft, nontender, no masses, no organomegaly, no peritoneal signs  Extremities:  No clubbing, cyanosis, or edema  Skin:  Warm and dry, no open lesions or rash  Neuro:  Cranial nerves 2-12 intact, no focal deficits  Vascular: Radial and pedal Pulses 2+  Breast: deferred  Rectal: deferred  Genitalia:  deferred    PROBLEM LIST:  Patient Active Problem List   Diagnosis    [facility] [address]    Essential hypertension    Adrenal adenoma    Class 2 obesity due to excess calories with serious comorbidity and body mass index (BMI) of 35.0 to 35.9 in adult    Anticoagulation management encounter    Typical atrial flutter ([address]    Anxiety    Status post catheter ablation of atrial flutter    Persistent atrial fibrillation ([address]    ETOH abuse    COPD with acute exacerbation ([address]    COPD exacerbation (HCC)               ASSESSMENT:  1- A fib with possible acute  HFpEF  2) very severe COPD,doubt exacerbation   3.)obstructive sleep apnea  4. )Afib /Flutter   5.)tobacco independent(quit 5 months ago)      PLAN:  CTA coronary seems stable   A fib ,defer to cardiology,  Continue diuresis with Bumex PO  Continue Elquis  Continue BD  Continue IV steroids as seems have chest tightness and not breathing well today   On Duoneb   Albuterol as needed   On  Eliquis to take it twice daily  Continue Nebs   BiPAP /CPAP at night    IgE 52  Work on his O2 concentrator ,portable not giving need O2 staff following with SOTO Nugent MD  Pulmonary/Critical care Medicine   [facility] and [address] [place] [id number]

## 2021-09-13 NOTE — PROGRESS NOTES
1038: patient in CT room for CTA procedure. 20 G IV in right AC flushes well. Vitals recorded on flow sheet. 1052: 0.8 mg of Nitro administered SL.  1121: Procedure ended. Patient placed in CT waiting. Transportation called for patient to go back to room.

## 2021-09-13 NOTE — PROGRESS NOTES
Inpatient Cardiology Progress note     PATIENT IS BEING FOLLOWED FOR: Congestive heart failure    Marley Salgado is a 63 y.o. male who is followed by Dr. Malik Kim.  He has a history of paroxysmal atrial fibrillation/flutter and is status post cardioversion and ablation for A. fib April 2018 with recurrent atrial flutter, on Tambocor. History of congestive heart failure with preserved EF. He was admitted on September 9 with dyspnea and tachycardia according to the patient with a heart rate in the 150s. He stated the heart rate was fast when his pulse ox was low and he had chest pain. He was in heart failure and diuresed poorly. Due to a recent negative stress test and history of chest pain he underwent coronary CTA this morning. SUBJECTIVE: denies CP or palpitations. SOB improved from admission  OBJECTIVE: No apparent distress     ROS:  Consist: Denies fevers, chills or night sweats  Heart: Denies chest pain, palpitations, lightheadedness, dizziness or syncope  Lungs: Denies SOB, cough, wheezing, orthopnea or PND  GI: Denies abdominal pain, vomiting or diarrhea    PHYSICAL EXAM:   /62   Pulse 54   Temp 98 °F (36.7 °C) (Oral)   Resp 16   Ht 5' 11\" (1.803 m)   Wt 258 lb 6 oz (117.2 kg)   SpO2 97%   BMI 36.04 kg/m²    B/P Range last 24 hours: Systolic (49UJH), BPR:228 , Min:113 , GWB:969    Diastolic (23KSH), IWV:15, Min:56, Max:87    CONST: Well developed, well nourished morbidly obese male who appears of stated age. Awake, alert and cooperative. No apparent distress  HEENT:   Head- Normocephalic, atraumatic   Eyes- Conjunctivae pink, anicteric  Throat- Oral mucosa pink and moist  Neck-  No stridor, trachea midline, no jugular venous distention. No carotid bruit  CHEST: Chest symmetrical and non-tender to palpation.  No accessory muscle use or intercostal retractions  RESPIRATORY:  Lung sounds - inspiratory and expiratory wheezes  CARDIOVASCULAR:     Heart Inspection- shows no noted pulsations  Heart Palpation- no heaves or thrills; PMI is non-displaced   Heart Ausculation- Regular rate and rhythm, no murmur. No s3, s4 or rub   PV: 1+ lower extremity edema. No varicosities. Pedal pulses palpable, no clubbing or cyanosis   ABDOMEN: Soft, non-tender to light palpation. Bowel sounds present. No palpable masses no organomegaly; no abdominal bruit  MS: Good muscle strength and tone. No atrophy or abnormal movements. : Deferred  SKIN: Warm and dry no statis dermatitis or ulcers   NEURO / PSYCH: Oriented to person, place and time. Speech clear and appropriate. Follows all commands.  Pleasant affect       Intake/Output Summary (Last 24 hours) at 9/13/2021 1322  Last data filed at 9/13/2021 0930  Gross per 24 hour   Intake 2460 ml   Output 2750 ml   Net -290 ml       Weight:   Wt Readings from Last 3 Encounters:   09/13/21 258 lb 6 oz (117.2 kg)   08/30/21 265 lb (120.2 kg)   01/22/20 277 lb (125.6 kg)     Current Inpatient Medications:   metoprolol tartrate  50 mg Oral Once    methylPREDNISolone  40 mg IntraVENous Q8H    insulin glargine  15 Units SubCUTAneous BID    guaiFENesin  400 mg Oral TID    busPIRone  10 mg Oral TID    apixaban  5 mg Oral BID    aspirin  81 mg Oral Daily    atorvastatin  20 mg Oral QPM    bumetanide  2 mg Oral BID    dilTIAZem  240 mg Oral BID    flecainide  100 mg Oral BID    fluticasone  1 spray Each Nostril Daily    ipratropium-albuterol  1 vial Inhalation Q4H    potassium chloride  20 mEq Oral Daily with breakfast    sodium chloride flush  5-40 mL IntraVENous 2 times per day    cefTRIAXone (ROCEPHIN) IV  1,000 mg IntraVENous Q24H    insulin lispro  0-10 Units SubCUTAneous 4x Daily AC & HS    Arformoterol Tartrate  15 mcg Nebulization BID    And    ipratropium  0.5 mg Nebulization 4x daily    And    budesonide  0.5 mg Nebulization BID       IV Infusions (if any):   sodium chloride      dextrose         DIAGNOSTIC/ LABORATORY DATA:  Labs:   CBC: No results for input(s): WBC, HGB, HCT, PLT in the last 72 hours. BMP:   Recent Labs     09/13/21  0458      K 3.9   CO2 37*   BUN 33*   CREATININE 1.0   LABGLOM >60   CALCIUM 9.2     Mag: No results for input(s): MG in the last 72 hours. Phos: No results for input(s): PHOS in the last 72 hours. TFT:   Lab Results   Component Value Date    TSH 0.942 02/14/2018    H5BUDAI 4.8 02/14/2018    T4FREE 0.95 02/05/2018      HgA1c:   Lab Results   Component Value Date    LABA1C 6.4 (H) 09/09/2021     No results found for: EAG    BNP: No results for input(s): BNP in the last 72 hours. PT/INR: No results for input(s): PROTIME, INR in the last 72 hours. APTT:No results for input(s): APTT in the last 72 hours. CARDIAC ENZYMES:No results for input(s): CKTOTAL, CKMB, CKMBINDEX, TROPHS in the last 72 hours. FASTING LIPID PANEL:  Lab Results   Component Value Date    CHOL 205 04/08/2018    HDL 80 04/08/2018    LDLCALC 109 04/08/2018    TRIG 80 04/08/2018     LIVER PROFILE:No results for input(s): AST, ALT, LABALBU in the last 72 hours. CXR 9/9/21:   Patchy infiltrates in the mid to lower lung zones bilaterally likely due to pneumonia.  This could represent viral infection.  Continued follow-up recommended. Telemetry: not on monitor    12 lead EKG 9/9/21: SR. Non specific ST T changes    TTE (Dr. Clifton Adamson, 8/12/2021)  Summary   Technically limited study.   Normal left ventricular size, wall thickness, wall motion, and systolic   function.   Estimated left ventricle ejection fraction 70+/-5 %.   There is doppler evidence of stage II diastolic dysfunction.   Mildly dilated right ventricle.   Right ventricle global systolic function is normal .   Normal sized left atrium.   No significant valvular abnormalities noted.       Lexiscan Stress Test 8/13/2021:  FINDINGS: The overall quality of the study was fair.   Left ventricular cavity size was noted to be dilated on both the  stress and the resting images but there was no with atypical chest pain and mild troponin elevation not consistent with acute coronary syndrome with a recent nonischemic stress test and reportedly no significant obstructive CAD on CTA of the coronaries done today 9/13/21     14. Eevated hemoglobin A1c consistent with the diagnosis of prediabetes / DM         PLAN:  1. Continue current cardiac medications  2. No further cardiac w/u  3. Rest as per the primary service and other consultants  4. Cardiology will sign off.  Please call if needed      Electronically signed by Georgie Acuña MD on 9/13/2021 at 1:22 PM

## 2021-09-13 NOTE — TELEPHONE ENCOUNTER
Call to wife to advise RN spoke to Meghna Hutchison at [facility] re: orders for home Oxygen to be switched to the Liquid Oxygen delivery system in home to help with keeping oxygen saturation up for pt. Current set up with home concentrator does not deliver >97% oxygen on room air conversion which can be an issues for Bill. Pulmonologist orders to be sent over in am after confirming home oxygen liter flow with Dr. Ashia Rosario. Advised wife Rn will follow up tomorrow.

## 2021-09-13 NOTE — PROGRESS NOTES
Hospitalist Progress Note      PCP: Jett Charles DO    Date of Admission: 9/9/2021    Chief Complaint: *SOB     Hospital Course: SOB     Hospital Course: ** thought to be  A combination of CHF and COPD, seen by pulm,  Started on steroids, seen by cardiology, , for CTA coronary  Today. Was SOB on prednisone, restarted on solu medrol. Upset bc wife not notified of what is going on with him.  , will call today per patient request       Subjective: * angry about prednisone      Medications:  Reviewed    Infusion Medications    sodium chloride      dextrose       Scheduled Medications    metoprolol tartrate  50 mg Oral Once    methylPREDNISolone  40 mg IntraVENous Q8H    insulin glargine  15 Units SubCUTAneous BID    guaiFENesin  400 mg Oral TID    busPIRone  10 mg Oral TID    apixaban  5 mg Oral BID    aspirin  81 mg Oral Daily    atorvastatin  20 mg Oral QPM    bumetanide  2 mg Oral BID    dilTIAZem  240 mg Oral BID    flecainide  100 mg Oral BID    fluticasone  1 spray Each Nostril Daily    ipratropium-albuterol  1 vial Inhalation Q4H    potassium chloride  20 mEq Oral Daily with breakfast    sodium chloride flush  5-40 mL IntraVENous 2 times per day    cefTRIAXone (ROCEPHIN) IV  1,000 mg IntraVENous Q24H    insulin lispro  0-10 Units SubCUTAneous 4x Daily AC & HS    Arformoterol Tartrate  15 mcg Nebulization BID    And    ipratropium  0.5 mg Nebulization 4x daily    And    budesonide  0.5 mg Nebulization BID     PRN Meds: metoprolol, albuterol, sodium chloride flush, sodium chloride, ondansetron **OR** ondansetron, polyethylene glycol, acetaminophen **OR** acetaminophen, potassium chloride **OR** potassium alternative oral replacement **OR** potassium chloride, glucose, dextrose, glucagon (rDNA), dextrose      Intake/Output Summary (Last 24 hours) at 9/13/2021 1054  Last data filed at 9/13/2021 0930  Gross per 24 hour   Intake 2460 ml   Output 3450 ml   Net -990 ml       Exam:    BP (!) 149/87   Pulse 54   Temp 98 °F (36.7 °C) (Oral)   Resp 20   Ht 5' 11\" (1.803 m)   Wt 258 lb 6 oz (117.2 kg)   SpO2 97%   BMI 36.04 kg/m²       Gen:  WELL DEVELOPED  HEENT: NC/AT, moist mucous membranes,  Neck: supple, trachea midline,   Heart:  Normal s1/s2, RRR, no murmurs, gallops, or rubs. Lungs:   CTA bilaterally, *  Abd: bowel sounds present, soft, nontender, nondistended, no masses  Extrem:  No clubbing, cyanosis,  *TRACE* edema  Skin: no rashes or lesions  Psych: A & O x3  Neuro: grossly intact, moves all four extremities.    Capillary Refill: Brisk,< 3 seconds   Peripheral Pulses: +2 palpable, equal bilaterally                Labs:   No results for input(s): WBC, HGB, HCT, PLT in the last 72 hours. Recent Labs     09/13/21  0458      K 3.9   CL 93*   CO2 37*   BUN 33*   CREATININE 1.0   CALCIUM 9.2     No results for input(s): AST, ALT, BILIDIR, BILITOT, ALKPHOS in the last 72 hours. No results for input(s): INR in the last 72 hours. No results for input(s): Shruthi Kras in the last 72 hours. No results for input(s): AST, ALT, ALB, BILIDIR, BILITOT, ALKPHOS in the last 72 hours. No results for input(s): LACTA in the last 72 hours. No results found for: Solis Ayala  No results found for: AMMONIA    Assessment:    Active Hospital Problems    Diagnosis Date Noted    [facility] [J44.1] 09/09/2021   HFpEF  PAF  HTN  say   MORBID OBESITY   PRE DIABETES, AIC 6.4     Plan:  *SSI  CORONARY CT ON MONDAY   CONT steroids   lopressor   SSI   tambocor   ROCEPHIN  ZITHROMAX   LIPITOR  cardizem  bumex    DVT Prophylaxis: ELIQUIS   Diet: ADULT DIET; Regular; 5 carb choices (75 gm/meal);  No Added Salt (3-4 gm)  Code Status: Full Code     PT/OT Eval Status: ORDERED     Dispo - *HOME*         Electronically signed by Adam Carter DO on 9/13/2021 at 10:54 AM [facility]

## 2021-09-14 ENCOUNTER — TELEPHONE (OUTPATIENT)
Dept: PULMONOLOGY | Age: 61
End: 2021-09-14

## 2021-09-14 DIAGNOSIS — I48.19 PERSISTENT ATRIAL FIBRILLATION (HCC): ICD-10-CM

## 2021-09-14 DIAGNOSIS — J44.0 CHRONIC OBSTRUCTIVE PULMONARY DISEASE WITH ACUTE LOWER RESPIRATORY INFECTION (HCC): Primary | ICD-10-CM

## 2021-09-14 LAB
ALBUMIN SERPL-MCNC: 3.5 G/DL (ref 3.5–5.2)
ALP BLD-CCNC: 58 U/L (ref 40–129)
ALT SERPL-CCNC: 58 U/L (ref 0–40)
ANION GAP SERPL CALCULATED.3IONS-SCNC: 17 MMOL/L (ref 7–16)
AST SERPL-CCNC: 23 U/L (ref 0–39)
BILIRUB SERPL-MCNC: <0.2 MG/DL (ref 0–1.2)
BLOOD CULTURE, ROUTINE: NORMAL
BUN BLDV-MCNC: 36 MG/DL (ref 6–20)
CALCIUM SERPL-MCNC: 9.2 MG/DL (ref 8.6–10.2)
CHLORIDE BLD-SCNC: 92 MMOL/L (ref 98–107)
CO2: 30 MMOL/L (ref 22–29)
CREAT SERPL-MCNC: 1 MG/DL (ref 0.7–1.2)
CULTURE, BLOOD 2: NORMAL
GFR AFRICAN AMERICAN: >60
GFR NON-AFRICAN AMERICAN: >60 ML/MIN/1.73
GLUCOSE BLD-MCNC: 316 MG/DL (ref 74–99)
HCT VFR BLD CALC: 31.7 % (ref 37–54)
HEMOGLOBIN: 10.1 G/DL (ref 12.5–16.5)
MCH RBC QN AUTO: 30.8 PG (ref 26–35)
MCHC RBC AUTO-ENTMCNC: 31.9 % (ref 32–34.5)
MCV RBC AUTO: 96.6 FL (ref 80–99.9)
METER GLUCOSE: 179 MG/DL (ref 74–99)
METER GLUCOSE: 205 MG/DL (ref 74–99)
METER GLUCOSE: 229 MG/DL (ref 74–99)
METER GLUCOSE: 269 MG/DL (ref 74–99)
METER GLUCOSE: 301 MG/DL (ref 74–99)
PDW BLD-RTO: 13.1 FL (ref 11.5–15)
PLATELET # BLD: 448 E9/L (ref 130–450)
PMV BLD AUTO: 11.3 FL (ref 7–12)
POTASSIUM SERPL-SCNC: 4 MMOL/L (ref 3.5–5)
PROCALCITONIN: 0.03 NG/ML (ref 0–0.08)
RBC # BLD: 3.28 E12/L (ref 3.8–5.8)
SODIUM BLD-SCNC: 139 MMOL/L (ref 132–146)
TOTAL PROTEIN: 6.3 G/DL (ref 6.4–8.3)
WBC # BLD: 21 E9/L (ref 4.5–11.5)

## 2021-09-14 PROCEDURE — 94660 CPAP INITIATION&MGMT: CPT

## 2021-09-14 PROCEDURE — 84145 PROCALCITONIN (PCT): CPT

## 2021-09-14 PROCEDURE — 6370000000 HC RX 637 (ALT 250 FOR IP): Performed by: INTERNAL MEDICINE

## 2021-09-14 PROCEDURE — 2580000003 HC RX 258: Performed by: FAMILY MEDICINE

## 2021-09-14 PROCEDURE — 85027 COMPLETE CBC AUTOMATED: CPT

## 2021-09-14 PROCEDURE — 36415 COLL VENOUS BLD VENIPUNCTURE: CPT

## 2021-09-14 PROCEDURE — 6360000002 HC RX W HCPCS: Performed by: FAMILY MEDICINE

## 2021-09-14 PROCEDURE — 82962 GLUCOSE BLOOD TEST: CPT

## 2021-09-14 PROCEDURE — 6360000002 HC RX W HCPCS: Performed by: INTERNAL MEDICINE

## 2021-09-14 PROCEDURE — 94640 AIRWAY INHALATION TREATMENT: CPT

## 2021-09-14 PROCEDURE — 99232 SBSQ HOSP IP/OBS MODERATE 35: CPT | Performed by: INTERNAL MEDICINE

## 2021-09-14 PROCEDURE — 2060000000 HC ICU INTERMEDIATE R&B

## 2021-09-14 PROCEDURE — 80053 COMPREHEN METABOLIC PANEL: CPT

## 2021-09-14 PROCEDURE — 6370000000 HC RX 637 (ALT 250 FOR IP): Performed by: FAMILY MEDICINE

## 2021-09-14 PROCEDURE — 2700000000 HC OXYGEN THERAPY PER DAY

## 2021-09-14 RX ORDER — DEXAMETHASONE SODIUM PHOSPHATE 4 MG/ML
4 INJECTION, SOLUTION INTRA-ARTICULAR; INTRALESIONAL; INTRAMUSCULAR; INTRAVENOUS; SOFT TISSUE EVERY 8 HOURS
Status: DISCONTINUED | OUTPATIENT
Start: 2021-09-14 | End: 2021-09-18

## 2021-09-14 RX ADMIN — FLECAINIDE ACETATE 100 MG: 100 TABLET ORAL at 08:26

## 2021-09-14 RX ADMIN — BUDESONIDE 500 MCG: 0.5 SUSPENSION RESPIRATORY (INHALATION) at 08:35

## 2021-09-14 RX ADMIN — INSULIN GLARGINE 15 UNITS: 100 INJECTION, SOLUTION SUBCUTANEOUS at 08:26

## 2021-09-14 RX ADMIN — Medication 10 ML: at 16:16

## 2021-09-14 RX ADMIN — DILTIAZEM HYDROCHLORIDE 240 MG: 240 CAPSULE, EXTENDED RELEASE ORAL at 08:26

## 2021-09-14 RX ADMIN — FLECAINIDE ACETATE 100 MG: 100 TABLET ORAL at 21:24

## 2021-09-14 RX ADMIN — Medication 10 ML: at 08:31

## 2021-09-14 RX ADMIN — BUMETANIDE 2 MG: 1 TABLET ORAL at 17:37

## 2021-09-14 RX ADMIN — IPRATROPIUM BROMIDE AND ALBUTEROL SULFATE 3 ML: .5; 2.5 SOLUTION RESPIRATORY (INHALATION) at 20:39

## 2021-09-14 RX ADMIN — APIXABAN 5 MG: 5 TABLET, FILM COATED ORAL at 08:26

## 2021-09-14 RX ADMIN — INSULIN LISPRO 4 UNITS: 100 INJECTION, SOLUTION INTRAVENOUS; SUBCUTANEOUS at 13:25

## 2021-09-14 RX ADMIN — BUSPIRONE HYDROCHLORIDE 10 MG: 10 TABLET ORAL at 15:06

## 2021-09-14 RX ADMIN — BUSPIRONE HYDROCHLORIDE 10 MG: 10 TABLET ORAL at 08:26

## 2021-09-14 RX ADMIN — INSULIN LISPRO 6 UNITS: 100 INJECTION, SOLUTION INTRAVENOUS; SUBCUTANEOUS at 17:37

## 2021-09-14 RX ADMIN — METHYLPREDNISOLONE SODIUM SUCCINATE 40 MG: 40 INJECTION, POWDER, FOR SOLUTION INTRAMUSCULAR; INTRAVENOUS at 16:15

## 2021-09-14 RX ADMIN — INSULIN GLARGINE 15 UNITS: 100 INJECTION, SOLUTION SUBCUTANEOUS at 21:24

## 2021-09-14 RX ADMIN — BUMETANIDE 2 MG: 1 TABLET ORAL at 08:26

## 2021-09-14 RX ADMIN — POTASSIUM CHLORIDE 20 MEQ: 20 TABLET, EXTENDED RELEASE ORAL at 08:26

## 2021-09-14 RX ADMIN — ATORVASTATIN CALCIUM 20 MG: 20 TABLET, FILM COATED ORAL at 17:37

## 2021-09-14 RX ADMIN — GUAIFENESIN 400 MG: 400 TABLET ORAL at 08:26

## 2021-09-14 RX ADMIN — CEFTRIAXONE 1000 MG: 1 INJECTION, POWDER, FOR SOLUTION INTRAMUSCULAR; INTRAVENOUS at 05:57

## 2021-09-14 RX ADMIN — INSULIN LISPRO 4 UNITS: 100 INJECTION, SOLUTION INTRAVENOUS; SUBCUTANEOUS at 21:24

## 2021-09-14 RX ADMIN — GUAIFENESIN 400 MG: 400 TABLET ORAL at 15:06

## 2021-09-14 RX ADMIN — ARFORMOTEROL TARTRATE 15 MCG: 15 SOLUTION RESPIRATORY (INHALATION) at 08:35

## 2021-09-14 RX ADMIN — IPRATROPIUM BROMIDE AND ALBUTEROL SULFATE 3 ML: .5; 2.5 SOLUTION RESPIRATORY (INHALATION) at 12:22

## 2021-09-14 RX ADMIN — IPRATROPIUM BROMIDE AND ALBUTEROL SULFATE 3 ML: .5; 2.5 SOLUTION RESPIRATORY (INHALATION) at 08:35

## 2021-09-14 RX ADMIN — APIXABAN 5 MG: 5 TABLET, FILM COATED ORAL at 22:17

## 2021-09-14 RX ADMIN — BUDESONIDE 500 MCG: 0.5 SUSPENSION RESPIRATORY (INHALATION) at 20:39

## 2021-09-14 RX ADMIN — INSULIN LISPRO 2 UNITS: 100 INJECTION, SOLUTION INTRAVENOUS; SUBCUTANEOUS at 08:27

## 2021-09-14 RX ADMIN — ARFORMOTEROL TARTRATE 15 MCG: 15 SOLUTION RESPIRATORY (INHALATION) at 20:39

## 2021-09-14 RX ADMIN — DEXAMETHASONE SODIUM PHOSPHATE 4 MG: 4 INJECTION, SOLUTION INTRAMUSCULAR; INTRAVENOUS at 23:52

## 2021-09-14 RX ADMIN — IPRATROPIUM BROMIDE AND ALBUTEROL SULFATE 3 ML: .5; 2.5 SOLUTION RESPIRATORY (INHALATION) at 16:37

## 2021-09-14 RX ADMIN — DILTIAZEM HYDROCHLORIDE 240 MG: 240 CAPSULE, EXTENDED RELEASE ORAL at 21:23

## 2021-09-14 RX ADMIN — ASPIRIN 81 MG CHEWABLE TABLET 81 MG: 81 TABLET CHEWABLE at 08:26

## 2021-09-14 RX ADMIN — Medication 10 ML: at 22:18

## 2021-09-14 RX ADMIN — METHYLPREDNISOLONE SODIUM SUCCINATE 40 MG: 40 INJECTION, POWDER, FOR SOLUTION INTRAMUSCULAR; INTRAVENOUS at 08:26

## 2021-09-14 RX ADMIN — GUAIFENESIN 400 MG: 400 TABLET ORAL at 21:24

## 2021-09-14 RX ADMIN — BUSPIRONE HYDROCHLORIDE 10 MG: 10 TABLET ORAL at 21:23

## 2021-09-14 ASSESSMENT — PAIN SCALES - GENERAL
PAINLEVEL_OUTOF10: 0

## 2021-09-14 NOTE — PROGRESS NOTES
mottling      OBJECTIVE:    /72   Pulse 65   Temp 97.2 °F (36.2 °C) (Oral)   Resp 18   Ht 5' 11\" (1.803 m)   Wt 258 lb 6 oz (117.2 kg)   SpO2 95%   BMI 36.04 kg/m²     General appearance:  awake, alert, and oriented to person, place, time, and purpose; appears stated age and cooperative; no apparent distress no labored breathing 4L NC (home level O2)  HEENT:  Conjunctivae/corneas clear. Neck: Supple. No jugular venous distention. Respiratory: symmetrical; diminished bilaterally; no wheezes; no rhonchi; no rales  Cardiovascular: rhythm regular; rate controlled; no murmurs  Abdomen: Soft, nontender, nondistended  Extremities:  peripheral pulses present; no peripheral edema; no ulcers  Musculoskeletal: No clubbing, cyanosis, no bilateral lower extremity edema. Brisk capillary refill. Skin:  No rashes  on visible skin  Neurologic: awake, alert and following commands     ASSESSMENT and PLAN:  · Chronic heart failure with preserved ejection fraction- appears euvolemic. Continue diuresis with Bumex PO  · Chronic respiratory failure due to severe COPD- on solumedrol. Pulmonology following. Nebulized breathing treatments. · Leukocytosis- possibly from steroids. Check procalcitonin. · Obstructive sleep apnea- BiPAP/CPAP at night    · Paroxysmal atrial fibrillation/typical flutter- status-post AF ablation in April 2018. On cardizem, and flecainide therapy.  Maintaining normal sinus rhythm. Anticoagulated with Eliquis. · Mild coronary artery disease- by cardiac catheterization with atypical chest pain and mild troponin elevation not consistent with acute coronary syndrome with a recent nonischemic stress test and reportedly no significant obstructive CAD on CTA of the coronaries done  9/13/21. Cardiology was consulted and has signed off. · ORESTES- resolved  · Hypertension  · Hyperlipidemia- on statin   · History of tobacco use, now tobacco independent  · Hyperglycemia- due to steroids.  Hemoglobin A1c 6. 4  · Morbid Obesity- BMI 36         DISPOSITION: Continue current plan of care    Medications:  REVIEWED DAILY    Infusion Medications    sodium chloride      dextrose       Scheduled Medications    methylPREDNISolone  40 mg IntraVENous Q8H    insulin glargine  15 Units SubCUTAneous BID    guaiFENesin  400 mg Oral TID    busPIRone  10 mg Oral TID    apixaban  5 mg Oral BID    aspirin  81 mg Oral Daily    atorvastatin  20 mg Oral QPM    bumetanide  2 mg Oral BID    dilTIAZem  240 mg Oral BID    flecainide  100 mg Oral BID    fluticasone  1 spray Each Nostril Daily    ipratropium-albuterol  1 vial Inhalation Q4H    potassium chloride  20 mEq Oral Daily with breakfast    sodium chloride flush  5-40 mL IntraVENous 2 times per day    insulin lispro  0-10 Units SubCUTAneous 4x Daily AC & HS    Arformoterol Tartrate  15 mcg Nebulization BID    And    ipratropium  0.5 mg Nebulization 4x daily    And    budesonide  0.5 mg Nebulization BID     PRN Meds: metoprolol, albuterol, sodium chloride flush, sodium chloride, ondansetron **OR** ondansetron, polyethylene glycol, acetaminophen **OR** acetaminophen, potassium chloride **OR** potassium alternative oral replacement **OR** potassium chloride, glucose, dextrose, glucagon (rDNA), dextrose    Labs:     No results for input(s): WBC, HGB, HCT, PLT in the last 72 hours. Recent Labs     09/13/21  0458      K 3.9   CL 93*   CO2 37*   BUN 33*   CREATININE 1.0   CALCIUM 9.2       No results for input(s): PROT, ALB, ALKPHOS, ALT, AST, BILITOT, AMYLASE, LIPASE in the last 72 hours. No results for input(s): INR in the last 72 hours. No results for input(s): [place] Arroyo in the last 72 hours.     Chronic labs:    Lab Results   Component Value Date    CHOL 205 (H) 04/08/2018    TRIG 80 04/08/2018    HDL 80 04/08/2018    LDLCALC 109 (H) 04/08/2018    TSH 0.942 02/14/2018    PSA 0.26 04/08/2018    INR 1.2 02/04/2018    LABA1C 6.4 (H) 09/09/2021 Radiology: REVIEWED DAILY    +++++++++++++++++++++++++++++++++++++++++++++++++  Carl Greer[address]  +++++++++++++++++++++++++++++++++++++++++++++++++  NOTE: This report was transcribed using voice recognition software. Every effort was made to ensure accuracy; however, inadvertent computerized transcription errors may be present.

## 2021-09-14 NOTE — CARE COORDINATION
Spoke with patient regarding oxygen concentrator. He tells me that Dr. Cleone Denver Zoby's office staff is working on different concentrator they have already contacted his wife regarding this. Plan is home with no needs. For questions I can be reached at [phone].  Tripp Mahoney, [facility]

## 2021-09-14 NOTE — TELEPHONE ENCOUNTER
Call to [facility] to discuss needed orders to convert patient's home Oxygen delivery to liquid Oxygen set up upon discharge for pt to have. O2 saturation not maintaining with O2 conc in home. Faxed orders requesting change out to Liquid O2 set up.

## 2021-09-14 NOTE — PROGRESS NOTES
2nd call to lab this afternoon regarding the procalcitonin lab that was added on at 1300 today.  said they received it and it will be done asap.

## 2021-09-15 LAB
ALBUMIN SERPL-MCNC: 3.7 G/DL (ref 3.5–5.2)
ALP BLD-CCNC: 62 U/L (ref 40–129)
ALT SERPL-CCNC: 53 U/L (ref 0–40)
ANION GAP SERPL CALCULATED.3IONS-SCNC: 7 MMOL/L (ref 7–16)
AST SERPL-CCNC: 18 U/L (ref 0–39)
BILIRUB SERPL-MCNC: 0.2 MG/DL (ref 0–1.2)
BUN BLDV-MCNC: 38 MG/DL (ref 6–20)
CALCIUM SERPL-MCNC: 9.2 MG/DL (ref 8.6–10.2)
CHLORIDE BLD-SCNC: 94 MMOL/L (ref 98–107)
CO2: 38 MMOL/L (ref 22–29)
CREAT SERPL-MCNC: 1 MG/DL (ref 0.7–1.2)
GFR AFRICAN AMERICAN: >60
GFR NON-AFRICAN AMERICAN: >60 ML/MIN/1.73
GLUCOSE BLD-MCNC: 203 MG/DL (ref 74–99)
HCT VFR BLD CALC: 32.8 % (ref 37–54)
HEMOGLOBIN: 10.7 G/DL (ref 12.5–16.5)
MCH RBC QN AUTO: 31.3 PG (ref 26–35)
MCHC RBC AUTO-ENTMCNC: 32.6 % (ref 32–34.5)
MCV RBC AUTO: 95.9 FL (ref 80–99.9)
METER GLUCOSE: 225 MG/DL (ref 74–99)
METER GLUCOSE: 240 MG/DL (ref 74–99)
METER GLUCOSE: 259 MG/DL (ref 74–99)
METER GLUCOSE: 267 MG/DL (ref 74–99)
PDW BLD-RTO: 13.4 FL (ref 11.5–15)
PLATELET # BLD: 428 E9/L (ref 130–450)
PMV BLD AUTO: 10.8 FL (ref 7–12)
POTASSIUM SERPL-SCNC: 4 MMOL/L (ref 3.5–5)
PROCALCITONIN: 0.05 NG/ML (ref 0–0.08)
RBC # BLD: 3.42 E12/L (ref 3.8–5.8)
SODIUM BLD-SCNC: 139 MMOL/L (ref 132–146)
TOTAL PROTEIN: 6.7 G/DL (ref 6.4–8.3)
WBC # BLD: 27.4 E9/L (ref 4.5–11.5)

## 2021-09-15 PROCEDURE — 94640 AIRWAY INHALATION TREATMENT: CPT

## 2021-09-15 PROCEDURE — 6360000002 HC RX W HCPCS: Performed by: FAMILY MEDICINE

## 2021-09-15 PROCEDURE — 6370000000 HC RX 637 (ALT 250 FOR IP): Performed by: FAMILY MEDICINE

## 2021-09-15 PROCEDURE — 80053 COMPREHEN METABOLIC PANEL: CPT

## 2021-09-15 PROCEDURE — 2500000003 HC RX 250 WO HCPCS

## 2021-09-15 PROCEDURE — 84145 PROCALCITONIN (PCT): CPT

## 2021-09-15 PROCEDURE — 94660 CPAP INITIATION&MGMT: CPT

## 2021-09-15 PROCEDURE — 82962 GLUCOSE BLOOD TEST: CPT

## 2021-09-15 PROCEDURE — 2500000003 HC RX 250 WO HCPCS: Performed by: INTERNAL MEDICINE

## 2021-09-15 PROCEDURE — 2060000000 HC ICU INTERMEDIATE R&B

## 2021-09-15 PROCEDURE — 6370000000 HC RX 637 (ALT 250 FOR IP): Performed by: INTERNAL MEDICINE

## 2021-09-15 PROCEDURE — 2580000003 HC RX 258: Performed by: FAMILY MEDICINE

## 2021-09-15 PROCEDURE — 85027 COMPLETE CBC AUTOMATED: CPT

## 2021-09-15 PROCEDURE — 6360000002 HC RX W HCPCS: Performed by: INTERNAL MEDICINE

## 2021-09-15 PROCEDURE — 36415 COLL VENOUS BLD VENIPUNCTURE: CPT

## 2021-09-15 PROCEDURE — 99233 SBSQ HOSP IP/OBS HIGH 50: CPT | Performed by: INTERNAL MEDICINE

## 2021-09-15 RX ORDER — BUMETANIDE 0.25 MG/ML
1 INJECTION, SOLUTION INTRAMUSCULAR; INTRAVENOUS ONCE
Status: COMPLETED | OUTPATIENT
Start: 2021-09-15 | End: 2021-09-15

## 2021-09-15 RX ORDER — BUMETANIDE 0.25 MG/ML
INJECTION, SOLUTION INTRAMUSCULAR; INTRAVENOUS
Status: COMPLETED
Start: 2021-09-15 | End: 2021-09-15

## 2021-09-15 RX ADMIN — APIXABAN 5 MG: 5 TABLET, FILM COATED ORAL at 08:07

## 2021-09-15 RX ADMIN — GUAIFENESIN 400 MG: 400 TABLET ORAL at 14:54

## 2021-09-15 RX ADMIN — INSULIN LISPRO 4 UNITS: 100 INJECTION, SOLUTION INTRAVENOUS; SUBCUTANEOUS at 21:15

## 2021-09-15 RX ADMIN — FLUTICASONE PROPIONATE 1 SPRAY: 50 SPRAY, METERED NASAL at 08:14

## 2021-09-15 RX ADMIN — ATORVASTATIN CALCIUM 20 MG: 20 TABLET, FILM COATED ORAL at 19:01

## 2021-09-15 RX ADMIN — Medication 10 ML: at 17:06

## 2021-09-15 RX ADMIN — BUMETANIDE 1 MG: 0.25 INJECTION, SOLUTION INTRAMUSCULAR; INTRAVENOUS at 20:58

## 2021-09-15 RX ADMIN — INSULIN GLARGINE 15 UNITS: 100 INJECTION, SOLUTION SUBCUTANEOUS at 08:08

## 2021-09-15 RX ADMIN — BUDESONIDE 500 MCG: 0.5 SUSPENSION RESPIRATORY (INHALATION) at 10:45

## 2021-09-15 RX ADMIN — DILTIAZEM HYDROCHLORIDE 240 MG: 240 CAPSULE, EXTENDED RELEASE ORAL at 21:15

## 2021-09-15 RX ADMIN — IPRATROPIUM BROMIDE AND ALBUTEROL SULFATE 3 ML: .5; 2.5 SOLUTION RESPIRATORY (INHALATION) at 16:12

## 2021-09-15 RX ADMIN — ARFORMOTEROL TARTRATE 15 MCG: 15 SOLUTION RESPIRATORY (INHALATION) at 20:09

## 2021-09-15 RX ADMIN — IPRATROPIUM BROMIDE AND ALBUTEROL SULFATE 3 ML: .5; 2.5 SOLUTION RESPIRATORY (INHALATION) at 10:45

## 2021-09-15 RX ADMIN — DILTIAZEM HYDROCHLORIDE 240 MG: 240 CAPSULE, EXTENDED RELEASE ORAL at 08:07

## 2021-09-15 RX ADMIN — FLECAINIDE ACETATE 100 MG: 100 TABLET ORAL at 21:15

## 2021-09-15 RX ADMIN — GUAIFENESIN 400 MG: 400 TABLET ORAL at 08:07

## 2021-09-15 RX ADMIN — BUMETANIDE 2 MG: 1 TABLET ORAL at 08:07

## 2021-09-15 RX ADMIN — ASPIRIN 81 MG CHEWABLE TABLET 81 MG: 81 TABLET CHEWABLE at 08:07

## 2021-09-15 RX ADMIN — BUMETANIDE 1 MG: 0.25 INJECTION, SOLUTION INTRAMUSCULAR; INTRAVENOUS at 17:06

## 2021-09-15 RX ADMIN — ARFORMOTEROL TARTRATE 15 MCG: 15 SOLUTION RESPIRATORY (INHALATION) at 10:45

## 2021-09-15 RX ADMIN — Medication 10 ML: at 22:00

## 2021-09-15 RX ADMIN — GUAIFENESIN 400 MG: 400 TABLET ORAL at 21:15

## 2021-09-15 RX ADMIN — INSULIN LISPRO 4 UNITS: 100 INJECTION, SOLUTION INTRAVENOUS; SUBCUTANEOUS at 17:07

## 2021-09-15 RX ADMIN — Medication 10 ML: at 17:05

## 2021-09-15 RX ADMIN — APIXABAN 5 MG: 5 TABLET, FILM COATED ORAL at 21:15

## 2021-09-15 RX ADMIN — BUSPIRONE HYDROCHLORIDE 10 MG: 10 TABLET ORAL at 21:15

## 2021-09-15 RX ADMIN — INSULIN LISPRO 6 UNITS: 100 INJECTION, SOLUTION INTRAVENOUS; SUBCUTANEOUS at 08:11

## 2021-09-15 RX ADMIN — BUSPIRONE HYDROCHLORIDE 10 MG: 10 TABLET ORAL at 14:54

## 2021-09-15 RX ADMIN — Medication 10 ML: at 08:14

## 2021-09-15 RX ADMIN — INSULIN LISPRO 6 UNITS: 100 INJECTION, SOLUTION INTRAVENOUS; SUBCUTANEOUS at 12:27

## 2021-09-15 RX ADMIN — INSULIN GLARGINE 15 UNITS: 100 INJECTION, SOLUTION SUBCUTANEOUS at 21:16

## 2021-09-15 RX ADMIN — BUDESONIDE 500 MCG: 0.5 SUSPENSION RESPIRATORY (INHALATION) at 20:09

## 2021-09-15 RX ADMIN — BUMETANIDE 2 MG: 1 TABLET ORAL at 19:01

## 2021-09-15 RX ADMIN — DEXAMETHASONE SODIUM PHOSPHATE 4 MG: 4 INJECTION, SOLUTION INTRAMUSCULAR; INTRAVENOUS at 21:09

## 2021-09-15 RX ADMIN — POTASSIUM CHLORIDE 20 MEQ: 20 TABLET, EXTENDED RELEASE ORAL at 08:07

## 2021-09-15 RX ADMIN — IPRATROPIUM BROMIDE AND ALBUTEROL SULFATE 3 ML: .5; 2.5 SOLUTION RESPIRATORY (INHALATION) at 20:09

## 2021-09-15 RX ADMIN — Medication 10 ML: at 14:54

## 2021-09-15 RX ADMIN — BUSPIRONE HYDROCHLORIDE 10 MG: 10 TABLET ORAL at 08:07

## 2021-09-15 RX ADMIN — DEXAMETHASONE SODIUM PHOSPHATE 4 MG: 4 INJECTION, SOLUTION INTRAMUSCULAR; INTRAVENOUS at 06:35

## 2021-09-15 RX ADMIN — FLECAINIDE ACETATE 100 MG: 100 TABLET ORAL at 08:08

## 2021-09-15 ASSESSMENT — PAIN SCALES - GENERAL
PAINLEVEL_OUTOF10: 0
PAINLEVEL_OUTOF10: 0

## 2021-09-15 NOTE — PROGRESS NOTES
Hospitalist Progress Note      SYNOPSIS: Patient admitted on 2021 dyspnea and tachycardia according to the patient with a heart rate in the 150s. He stated the heart rate was fast when his pulse ox was low and he had chest pain. He was in heart failure and diuresed poorly. Due to a recent negative stress test and history of chest pain he underwent coronary CTA which was negative. SUBJECTIVE:  Stable overnight. No other overnight issues reported. Patient seen and examined  Records reviewed. Again upset today  Upset about the completion of antibiotics. Procalcitonin completely negative 0.03. States since starting decadron his RLE is swollen  Is on eliquis however will r/o DVT         Temp (24hrs), Av.8 °F (36.6 °C), Min:97.5 °F (36.4 °C), Max:98.6 °F (37 °C)    DIET: ADULT DIET; Regular; 5 carb choices (75 gm/meal); No Added Salt (3-4 gm); No Caffeine  CODE: Full Code    Intake/Output Summary (Last 24 hours) at 9/15/2021 1204  Last data filed at 9/15/2021 1025  Gross per 24 hour   Intake 600 ml   Output 2800 ml   Net -2200 ml       Review of Systems  All bolded are positive; please see HPI  General:  Fever, chills, diaphoresis, fatigue, malaise, night sweats, weight loss  Psychological:  Anxiety, disorientation, hallucinations. ENT:  Epistaxis, headaches, vertigo, visual changes. Cardiovascular:  Chest pain, irregular heartbeats, palpitations, paroxysmal nocturnal dyspnea. Respiratory:  Shortness of breath, coughing, sputum production, hemoptysis, wheezing, orthopnea.   Gastrointestinal:  Nausea, vomiting, diarrhea, heartburn, constipation, abdominal pain, hematemesis, hematochezia, melena, acholic stools  Genito-Urinary:  Dysuria, urgency, frequency, hematuria  Musculoskeletal:  Joint pain, joint stiffness, joint swelling, muscle pain  Neurology:  Headache, focal neurological deficits, weakness, numbness, paresthesia  Derm:  Rashes, ulcers, excoriations, bruising  Extremities:  Decreased ROM, peripheral edema, mottling      OBJECTIVE:    /77   Pulse 80   Temp 97.5 °F (36.4 °C) (Oral)   Resp 19   Ht 5' 11\" (1.803 m)   Wt 258 lb 6 oz (117.2 kg)   SpO2 94%   BMI 36.04 kg/m²     General appearance:  awake, alert, and oriented to person, place, time, and purpose; appears stated age and cooperative; no apparent distress no labored breathing 4L NC (home level O2)  HEENT:  Conjunctivae/corneas clear. Neck: Supple. No jugular venous distention. Respiratory: symmetrical; diminished bilaterally; no wheezes; no rhonchi; no rales  Cardiovascular: rhythm regular; rate controlled; no murmurs  Abdomen: Soft, nontender, nondistended  Extremities:  peripheral pulses present; RLE edema; no ulcers  Musculoskeletal: No clubbing, cyanosis, no bilateral lower extremity edema. Brisk capillary refill. Skin:  No rashes  on visible skin  Neurologic: awake, alert and following commands     ASSESSMENT and PLAN:  · Chronic heart failure with preserved ejection fraction- appears euvolemic. Continue diuresis with Bumex PO  · Chronic respiratory failure due to severe COPD-  Pulmonology following. Nebulized breathing treatments. Pulm changed to decadron IV, and will wean to decadron pills as he did not feel well with prednisone   · Leukocytosis- possibly from steroids. Procalcitonin negative. WBC 27.4  · RLE Edema-  Check US to r/o DVT however doubt due to being on eliquis  · Obstructive sleep apnea- BiPAP/CPAP at night    · Paroxysmal atrial fibrillation/typical flutter- status-post AF ablation in April 2018. On cardizem, and flecainide therapy.  Maintaining normal sinus rhythm. Anticoagulated with Eliquis. · Mild coronary artery disease- by cardiac catheterization with atypical chest pain and mild troponin elevation not consistent with acute coronary syndrome with a recent nonischemic stress test and reportedly no significant obstructive CAD on CTA of the coronaries done  9/13/21.  Cardiology was consulted and has signed off. · ORESTES- resolved  · Hypertension   · Hyperlipidemia- on statin   · History of tobacco use, now tobacco independent  · Hyperglycemia- due to steroids. Hemoglobin A1c 6.4  · Morbid Obesity- BMI 36         DISPOSITION: Continue current plan of care    Medications:  REVIEWED DAILY    Infusion Medications    sodium chloride      dextrose       Scheduled Medications    dexamethasone  4 mg IntraVENous Q8H    insulin glargine  15 Units SubCUTAneous BID    guaiFENesin  400 mg Oral TID    busPIRone  10 mg Oral TID    apixaban  5 mg Oral BID    aspirin  81 mg Oral Daily    atorvastatin  20 mg Oral QPM    bumetanide  2 mg Oral BID    dilTIAZem  240 mg Oral BID    flecainide  100 mg Oral BID    fluticasone  1 spray Each Nostril Daily    ipratropium-albuterol  1 vial Inhalation Q4H    potassium chloride  20 mEq Oral Daily with breakfast    sodium chloride flush  5-40 mL IntraVENous 2 times per day    insulin lispro  0-10 Units SubCUTAneous 4x Daily AC & HS    Arformoterol Tartrate  15 mcg Nebulization BID    And    ipratropium  0.5 mg Nebulization 4x daily    And    budesonide  0.5 mg Nebulization BID     PRN Meds: metoprolol, albuterol, sodium chloride flush, sodium chloride, ondansetron **OR** ondansetron, polyethylene glycol, acetaminophen **OR** acetaminophen, potassium chloride **OR** potassium alternative oral replacement **OR** potassium chloride, glucose, dextrose, glucagon (rDNA), dextrose    Labs:     Recent Labs     09/14/21  0934   WBC 21.0*   HGB 10.1*   HCT 31.7*          Recent Labs     09/13/21  0458 09/14/21  0934    139   K 3.9 4.0   CL 93* 92*   CO2 37* 30*   BUN 33* 36*   CREATININE 1.0 1.0   CALCIUM 9.2 9.2       Recent Labs     09/14/21  0934   PROT 6.3*   ALKPHOS 58   ALT 58*   AST 23   BILITOT <0.2       No results for input(s): INR in the last 72 hours. No results for input(s): Patrisha Meigs in the last 72 hours.     Chronic labs:    Lab Results Component Value Date    CHOL 205 (H) 04/08/2018    TRIG 80 04/08/2018    HDL 80 04/08/2018    LDLCALC 109 (H) 04/08/2018    TSH 0.942 02/14/2018    PSA 0.26 04/08/2018    INR 1.2 02/04/2018    LABA1C 6.4 (H) 09/09/2021       Radiology: REVIEWED DAILY    +++++++++++++++++++++++++++++++++++++++++++++++++  DO Janiya Castrejon Physician - [address], [place]  +++++++++++++++++++++++++++++++++++++++++++++++++  NOTE: This report was transcribed using voice recognition software. Every effort was made to ensure accuracy; however, inadvertent computerized transcription errors may be present.

## 2021-09-15 NOTE — CARE COORDINATION
IV decadron today. Plan is home with no needs when able to transition to oral meds. For questions I can be reached at [phone].  Neetu Garcia [facility]

## 2021-09-15 NOTE — PROGRESS NOTES
[facility]  Division of Pulmonary, Critical Care Medicine  Pulmonary [address] [facility]           Pulmonary consult         CC :SOB ,  H/o A flutter /a fib poorly controlled     HPI :  Doing better with steroids IV but does not want Prednisone   States he is feeling better with no fever or chills  Denies any fever or chills  HR seems better control   No cough  Less swelling legs   On 4 L O2     PHYSICAL EXAMINATION:     VITAL SIGNS:  BP (!) 154/79   Pulse 60   Temp 97.6 °F (36.4 °C) (Oral)   Resp 20   Ht 5' 11\" (1.803 m)   Wt 258 lb 6 oz (117.2 kg)   SpO2 98%   BMI 36.04 kg/m²   Wt Readings from Last 3 Encounters:   09/13/21 258 lb 6 oz (117.2 kg)   08/30/21 265 lb (120.2 kg)   01/22/20 277 lb (125.6 kg)     Temp Readings from Last 3 Encounters:   09/14/21 97.6 °F (36.4 °C) (Oral)   08/30/21 96.8 °F (36 °C) (Temporal)   08/09/19 97.7 °F (36.5 °C)     TMAX:  BP Readings from Last 3 Encounters:   09/14/21 (!) 154/79   08/30/21 (!) 122/56   01/22/20 132/77     Pulse Readings from Last 3 Encounters:   09/14/21 60   08/30/21 78   01/22/20 109           INTAKE/OUTPUTS:  I/O last 3 completed shifts:   In: 740 [P.O.:740]  Out: 4350 [Urine:4350]    Intake/Output Summary (Last 24 hours) at 9/14/2021 2241  Last data filed at 9/14/2021 1745  Gross per 24 hour   Intake 200 ml   Output 2600 ml   Net -2400 ml       This is virtual visit      LABS/IMAGING:    CBC:  Lab Results   Component Value Date    WBC 21.0 (H) 09/14/2021    HGB 10.1 (L) 09/14/2021    HCT 31.7 (L) 09/14/2021    MCV 96.6 09/14/2021     09/14/2021    LYMPHOPCT 12.2 (L) 09/10/2021    RBC 3.28 (L) 09/14/2021    MCH 30.8 09/14/2021    MCHC 31.9 (L) 09/14/2021    RDW 13.1 09/14/2021    NEUTOPHILPCT 74.8 09/10/2021    MONOPCT 7.8 09/10/2021    BASOPCT 0.3 09/10/2021    NEUTROABS 5.28 09/10/2021    LYMPHSABS 0.79 (L) 09/10/2021    MONOSABS 0.53 09/10/2021    EOSABS 0.00 (L) 09/10/2021    BASOSABS 0.00 09/10/2021       Recent Labs     09/14/21  0934 09/10/21  0429 09/09/21  0102   WBC 21.0* 6.6 6.5   HGB 10.1* 9.1* 10.2*   HCT 31.7* 28.3* 31.3*   MCV 96.6 94.3 95.7    342 286       BMP:   Recent Labs     09/13/21  0458 09/14/21  0934    139   K 3.9 4.0   CL 93* 92*   CO2 37* 30*   BUN 33* 36*   CREATININE 1.0 1.0       MG:   Lab Results   Component Value Date    MG 1.6 09/09/2021     Ca/Phos:   Lab Results   Component Value Date    CALCIUM 9.2 09/14/2021    PHOS 3.9 08/26/2021     Amylase: No results found for: AMYLASE  Lipase:   Lab Results   Component Value Date    LIPASE 28 05/19/2011     LIVER PROFILE:   Recent Labs     09/14/21  0934   AST 23   ALT 58*   BILITOT <0.2   ALKPHOS 58       PT/INR:   No results for input(s): PROTIME, INR in the last 72 hours. APTT: No results for input(s): APTT in the last 72 hours. Cardiac Enzymes:  Lab Results   Component Value Date    CKTOTAL 51 11/13/2010    CKMB 0.5 11/13/2010    TROPONINI <0.01 02/15/2018       Hgb A1C:   Lab Results   Component Value Date    LABA1C 6.4 (H) 09/09/2021     No results found for: EAG  FAMILIA: No results found for: FAMILIA  ESR: No results found for: SEDRATE  CRP: No results found for: CRP  D Dimer: No results found for: DDIMER    Thyroid Studies:  Lab Results   Component Value Date    TSH 0.942 02/14/2018    [id number] 4.8 02/14/2018           MICROBIOLOGY:  Pending       CXR:  Reviewed     CT Chest:reviewed     PE  Vitals:    09/14/21 2041   BP:    Pulse:    Resp:    Temp:    SpO2: 98%     General:  Awake, alert, oriented X 3. Well developed, well nourished, well groomed. No apparent distress. HEENT:  Normocephalic, atraumatic. Pupils equal, round, reactive to light. No scleral icterus. No conjunctival injection. Normal lips, teeth, and gums. No nasal discharge. Neck:  Supple, FROM  Heart:  RRR, no murmurs, gallops, rubs, carotid upstroke normal, no carotid bruits  Lungs:wheeizng bilateral ,rhonchi   Abdomen:   Bowel sounds present, soft, nontender, no masses, no organomegaly, no peritoneal signs  Extremities:  No clubbing, cyanosis, or edema  Skin:  Warm and dry, no open lesions or rash  Neuro:  Cranial nerves 2-12 intact, no focal deficits  Vascular: Radial and pedal Pulses 2+  Breast: deferred  Rectal: deferred  Genitalia:  deferred    PROBLEM LIST:  Patient Active Problem List   Diagnosis    COPD (chronic obstructive pulmonary disease) ([address]    Essential hypertension    Adrenal adenoma    Class 2 obesity due to excess calories with serious comorbidity and body mass index (BMI) of 35.0 to 35.9 in adult    Anticoagulation management encounter    Typical atrial flutter ([address]    Anxiety    Status post catheter ablation of atrial flutter    Persistent atrial fibrillation ([address]    ETOH abuse    [facility]ation [facility]    COPD exacerbation (HCC)               ASSESSMENT:  1- A fib with possible acute  HFpEF  2) very severe COPD,doubt exacerbation   3.)obstructive sleep apnea  4. )Afib /Flutter   5.)tobacco independent(quit 5 months ago)      PLAN:  CTA coronary seems stable   A fib ,defer to cardiology,  Continue diuresis with Bumex PO  Continue Elquis  Continue BD  Continue IV steroids,will change decadron IV and wean to decadron pills as he did not feel well with prednisone   On Duoneb   Albuterol as needed   On  Eliquis to take it twice daily  Continue Nebs   BiPAP /CPAP at night    IgE 52  Work on his O2 concentrator ,portable not giving need O2 staff following with SOTO De La Garza MD  Pulmonary/Critical care Medicine   [facility] and [address] [place] [address]

## 2021-09-16 ENCOUNTER — APPOINTMENT (OUTPATIENT)
Dept: ULTRASOUND IMAGING | Age: 61
DRG: 194 | End: 2021-09-16
Payer: COMMERCIAL

## 2021-09-16 ENCOUNTER — TELEPHONE (OUTPATIENT)
Dept: PULMONOLOGY | Age: 61
End: 2021-09-16

## 2021-09-16 LAB
METER GLUCOSE: 140 MG/DL (ref 74–99)
METER GLUCOSE: 165 MG/DL (ref 74–99)
METER GLUCOSE: 190 MG/DL (ref 74–99)
METER GLUCOSE: 207 MG/DL (ref 74–99)

## 2021-09-16 PROCEDURE — 94640 AIRWAY INHALATION TREATMENT: CPT

## 2021-09-16 PROCEDURE — 6360000002 HC RX W HCPCS: Performed by: INTERNAL MEDICINE

## 2021-09-16 PROCEDURE — 6370000000 HC RX 637 (ALT 250 FOR IP): Performed by: FAMILY MEDICINE

## 2021-09-16 PROCEDURE — 6360000002 HC RX W HCPCS: Performed by: FAMILY MEDICINE

## 2021-09-16 PROCEDURE — 93971 EXTREMITY STUDY: CPT | Performed by: RADIOLOGY

## 2021-09-16 PROCEDURE — 2580000003 HC RX 258: Performed by: FAMILY MEDICINE

## 2021-09-16 PROCEDURE — 94660 CPAP INITIATION&MGMT: CPT

## 2021-09-16 PROCEDURE — 93971 EXTREMITY STUDY: CPT

## 2021-09-16 PROCEDURE — 2700000000 HC OXYGEN THERAPY PER DAY

## 2021-09-16 PROCEDURE — 6370000000 HC RX 637 (ALT 250 FOR IP): Performed by: INTERNAL MEDICINE

## 2021-09-16 PROCEDURE — 82962 GLUCOSE BLOOD TEST: CPT

## 2021-09-16 PROCEDURE — 99233 SBSQ HOSP IP/OBS HIGH 50: CPT | Performed by: INTERNAL MEDICINE

## 2021-09-16 PROCEDURE — 1200000000 HC SEMI PRIVATE

## 2021-09-16 RX ADMIN — ARFORMOTEROL TARTRATE 15 MCG: 15 SOLUTION RESPIRATORY (INHALATION) at 20:10

## 2021-09-16 RX ADMIN — BUSPIRONE HYDROCHLORIDE 10 MG: 10 TABLET ORAL at 22:40

## 2021-09-16 RX ADMIN — BUSPIRONE HYDROCHLORIDE 10 MG: 10 TABLET ORAL at 14:44

## 2021-09-16 RX ADMIN — GUAIFENESIN 400 MG: 400 TABLET ORAL at 22:39

## 2021-09-16 RX ADMIN — INSULIN LISPRO 4 UNITS: 100 INJECTION, SOLUTION INTRAVENOUS; SUBCUTANEOUS at 22:49

## 2021-09-16 RX ADMIN — INSULIN GLARGINE 15 UNITS: 100 INJECTION, SOLUTION SUBCUTANEOUS at 22:48

## 2021-09-16 RX ADMIN — Medication 10 ML: at 09:07

## 2021-09-16 RX ADMIN — GUAIFENESIN 400 MG: 400 TABLET ORAL at 09:07

## 2021-09-16 RX ADMIN — INSULIN LISPRO 2 UNITS: 100 INJECTION, SOLUTION INTRAVENOUS; SUBCUTANEOUS at 06:27

## 2021-09-16 RX ADMIN — BUDESONIDE 500 MCG: 0.5 SUSPENSION RESPIRATORY (INHALATION) at 09:39

## 2021-09-16 RX ADMIN — DEXAMETHASONE SODIUM PHOSPHATE 4 MG: 4 INJECTION, SOLUTION INTRAMUSCULAR; INTRAVENOUS at 06:23

## 2021-09-16 RX ADMIN — APIXABAN 5 MG: 5 TABLET, FILM COATED ORAL at 22:50

## 2021-09-16 RX ADMIN — DILTIAZEM HYDROCHLORIDE 240 MG: 240 CAPSULE, EXTENDED RELEASE ORAL at 22:39

## 2021-09-16 RX ADMIN — FLECAINIDE ACETATE 100 MG: 100 TABLET ORAL at 22:39

## 2021-09-16 RX ADMIN — BUDESONIDE 500 MCG: 0.5 SUSPENSION RESPIRATORY (INHALATION) at 20:10

## 2021-09-16 RX ADMIN — ASPIRIN 81 MG CHEWABLE TABLET 81 MG: 81 TABLET CHEWABLE at 09:07

## 2021-09-16 RX ADMIN — ARFORMOTEROL TARTRATE 15 MCG: 15 SOLUTION RESPIRATORY (INHALATION) at 09:39

## 2021-09-16 RX ADMIN — IPRATROPIUM BROMIDE AND ALBUTEROL SULFATE 3 ML: .5; 2.5 SOLUTION RESPIRATORY (INHALATION) at 17:24

## 2021-09-16 RX ADMIN — BUMETANIDE 2 MG: 1 TABLET ORAL at 17:47

## 2021-09-16 RX ADMIN — POTASSIUM CHLORIDE 20 MEQ: 20 TABLET, EXTENDED RELEASE ORAL at 09:06

## 2021-09-16 RX ADMIN — Medication 10 ML: at 14:44

## 2021-09-16 RX ADMIN — Medication 10 ML: at 22:41

## 2021-09-16 RX ADMIN — ATORVASTATIN CALCIUM 20 MG: 20 TABLET, FILM COATED ORAL at 17:47

## 2021-09-16 RX ADMIN — IPRATROPIUM BROMIDE AND ALBUTEROL SULFATE 3 ML: .5; 2.5 SOLUTION RESPIRATORY (INHALATION) at 20:10

## 2021-09-16 RX ADMIN — DEXAMETHASONE SODIUM PHOSPHATE 4 MG: 4 INJECTION, SOLUTION INTRAMUSCULAR; INTRAVENOUS at 14:44

## 2021-09-16 RX ADMIN — APIXABAN 5 MG: 5 TABLET, FILM COATED ORAL at 09:06

## 2021-09-16 RX ADMIN — IPRATROPIUM BROMIDE AND ALBUTEROL SULFATE 3 ML: .5; 2.5 SOLUTION RESPIRATORY (INHALATION) at 13:16

## 2021-09-16 RX ADMIN — INSULIN GLARGINE 15 UNITS: 100 INJECTION, SOLUTION SUBCUTANEOUS at 12:41

## 2021-09-16 RX ADMIN — FLECAINIDE ACETATE 100 MG: 100 TABLET ORAL at 09:07

## 2021-09-16 RX ADMIN — FLUTICASONE PROPIONATE 1 SPRAY: 50 SPRAY, METERED NASAL at 13:05

## 2021-09-16 RX ADMIN — GUAIFENESIN 400 MG: 400 TABLET ORAL at 14:44

## 2021-09-16 RX ADMIN — DEXAMETHASONE SODIUM PHOSPHATE 4 MG: 4 INJECTION, SOLUTION INTRAMUSCULAR; INTRAVENOUS at 22:48

## 2021-09-16 RX ADMIN — BUMETANIDE 2 MG: 1 TABLET ORAL at 09:06

## 2021-09-16 RX ADMIN — DILTIAZEM HYDROCHLORIDE 240 MG: 240 CAPSULE, EXTENDED RELEASE ORAL at 09:07

## 2021-09-16 RX ADMIN — INSULIN LISPRO 2 UNITS: 100 INJECTION, SOLUTION INTRAVENOUS; SUBCUTANEOUS at 12:41

## 2021-09-16 RX ADMIN — IPRATROPIUM BROMIDE AND ALBUTEROL SULFATE 3 ML: .5; 2.5 SOLUTION RESPIRATORY (INHALATION) at 09:39

## 2021-09-16 RX ADMIN — BUSPIRONE HYDROCHLORIDE 10 MG: 10 TABLET ORAL at 09:07

## 2021-09-16 ASSESSMENT — PAIN SCALES - GENERAL: PAINLEVEL_OUTOF10: 0

## 2021-09-16 NOTE — PROGRESS NOTES
Date: 9/16/2021    Time: 3:30 AM    Patient Placed On BIPAP/CPAP/ Non-Invasive Ventilation? Patient remains on bipap    If no must comment. Facial area red/color change? No           If YES are Blister/Lesion present? No   If yes must notify nursing staff  BIPAP/CPAP skin barrier?   No    Skin barrier type:patient declines skin barrier       Comments:        Alyson Ramos RCP

## 2021-09-16 NOTE — PROGRESS NOTES
[facility]  Division of Pulmonary, Critical Care Medicine  Pulmonary [address] [facility]           Pulmonary consult         CC :SOB ,  H/o A flutter /a fib poorly controlled     HPI :    Patient seen and examined. Reports that he is still using his inhalers around-the-clock and that prior to the next dose being due he notices increased wheezing and cough. He remains on Brovana Atrovent and Pulmicort. He would like to discuss the process of being referred for transplant and I will meet with he and his wife to discuss this tomorrow at 10 AM.. PHYSICAL EXAMINATION:     VITAL SIGNS:  BP (!) 147/85   Pulse 73   Temp 97.6 °F (36.4 °C) (Oral)   Resp 25   Ht 5' 11\" (1.803 m)   Wt 258 lb 6 oz (117.2 kg)   SpO2 97%   BMI 36.04 kg/m²   Wt Readings from Last 3 Encounters:   09/13/21 258 lb 6 oz (117.2 kg)   08/30/21 265 lb (120.2 kg)   01/22/20 277 lb (125.6 kg)     Temp Readings from Last 3 Encounters:   09/16/21 97.6 °F (36.4 °C) (Oral)   08/30/21 96.8 °F (36 °C) (Temporal)   08/09/19 97.7 °F (36.5 °C)     TMAX:  BP Readings from Last 3 Encounters:   09/16/21 (!) 147/85   08/30/21 (!) 122/56   01/22/20 132/77     Pulse Readings from Last 3 Encounters:   09/16/21 73   08/30/21 78   01/22/20 109       INTAKE/OUTPUTS:  I/O last 3 completed shifts:   In: 920 [P.O.:920]  Out: 3000 [Urine:3000]    Intake/Output Summary (Last 24 hours) at 9/16/2021 1347  Last data filed at 9/16/2021 0533  Gross per 24 hour   Intake 420 ml   Output 2350 ml   Net -1930 ml           LABS/IMAGING:    CBC:  Lab Results   Component Value Date    WBC 27.4 (H) 09/15/2021    HGB 10.7 (L) 09/15/2021    HCT 32.8 (L) 09/15/2021    MCV 95.9 09/15/2021     09/15/2021    LYMPHOPCT 12.2 (L) 09/10/2021    RBC 3.42 (L) 09/15/2021    MCH 31.3 09/15/2021    MCHC 32.6 09/15/2021    RDW 13.4 09/15/2021    NEUTOPHILPCT 74.8 09/10/2021    MONOPCT 7.8 09/10/2021    BASOPCT 0.3 09/10/2021    NEUTROABS 5.28 09/10/2021    LYMPHSABS 0.79 (L) 09/10/2021    MONOSABS 0.53 09/10/2021    EOSABS 0.00 (L) 09/10/2021    BASOSABS 0.00 09/10/2021       Recent Labs     09/15/21  1216 09/14/21  0934 09/10/21  0429   WBC 27.4* 21.0* 6.6   HGB 10.7* 10.1* 9.1*   HCT 32.8* 31.7* 28.3*   MCV 95.9 96.6 94.3    448 342       BMP:   Recent Labs     09/14/21  0934 09/15/21  1215    139   K 4.0 4.0   CL 92* 94*   CO2 30* 38*   BUN 36* 38*   CREATININE 1.0 1.0       MG:   Lab Results   Component Value Date    MG 1.6 09/09/2021     Ca/Phos:   Lab Results   Component Value Date    CALCIUM 9.2 09/15/2021    PHOS 3.9 08/26/2021     Amylase: No results found for: AMYLASE  Lipase:   Lab Results   Component Value Date    LIPASE 28 05/19/2011     LIVER PROFILE:   Recent Labs     09/14/21  0934 09/15/21  1215   AST 23 18   ALT 58* 53*   BILITOT <0.2 0.2   ALKPHOS 58 62       PT/INR:   No results for input(s): PROTIME, INR in the last 72 hours. APTT: No results for input(s): APTT in the last 72 hours. Cardiac Enzymes:  Lab Results   Component Value Date    CKTOTAL 51 11/13/2010    CKMB 0.5 11/13/2010    TROPONINI <0.01 02/15/2018       Hgb A1C:   Lab Results   Component Value Date    LABA1C 6.4 (H) 09/09/2021     No results found for: EAG  FAMILIA: No results found for: FAMILIA  ESR: No results found for: SEDRATE  CRP: No results found for: CRP  D Dimer: No results found for: DDIMER    Thyroid Studies:  Lab Results   Component Value Date    TSH 0.942 02/14/2018    [id number] 4.8 02/14/2018           MICROBIOLOGY:  Pending       CXR:  Reviewed     CT Chest:reviewed     PE  Vitals:    09/16/21 1316   BP:    Pulse:    Resp: 25   Temp:    SpO2: 97%     General:  Awake, alert, oriented X 3. Well developed, well nourished, well groomed. No apparent distress. HEENT:  Normocephalic, atraumatic. Pupils equal, round, reactive to light. No scleral icterus. No conjunctival injection. Normal lips, teeth, and gums. No nasal discharge.   Neck: Supple, FROM  Heart:  RRR, no murmurs, gallops, rubs, carotid upstroke normal, no carotid bruits  Lungs: Scattered wheezing right mid to lower lobe clear right upper lobe and on left  Abdomen: Bowel sounds present, soft, nontender, no masses, no organomegaly, no peritoneal signs  Extremities:  No clubbing, cyanosis, 1+ edema  Skin:  Warm and dry, no open lesions or rash  Neuro:  Cranial nerves 2-12 intact, no focal deficits  Vascular: Radial and pedal Pulses 2+  Breast: deferred  Rectal: deferred  Genitalia:  deferred    PROBLEM LIST:  Patient Active Problem List   Diagnosis    [facility] disease) [facility]    Essential hypertension    Adrenal adenoma    Class 2 obesity due to excess calories with serious comorbidity and body mass index (BMI) of 35.0 to 35.9 in adult    Anticoagulation management encounter    Typical atrial flutter ([facility]    Anxiety    Status post catheter ablation of atrial flutter    Persistent atrial fibrillation ([facility]    ETOH abuse    COPD with acute exacerbation ([facility]    COPD exacerbation (HCC)               ASSESSMENT:  1- A fib with possible acute  HFpEF  2) very severe COPD,doubt exacerbation   3.)obstructive sleep apnea  4. )Afib /Flutter   5.)tobacco independent(quit 5 months ago)      PLAN:  CTA coronary seems stable   A fib ,defer to cardiology,  Continue diuresis with Bumex PO,  Continue Elquis  Continue BD  Continue IV steroids wean PO  not feel well with prednisone   On Duoneb   Albuterol as needed   On  Eliquis to take it twice daily  Continue Nebs   BiPAP /CPAP at night    Work on his O2 concentrator ,portable not giving need O2 staff following with DME   Will meet with patient and his wife tomorrow at 8 AM to discuss the process for lung transplant referral.  Nain Houston DO  Pulmonary/Critical care Medicine   [facility] and [facility]

## 2021-09-16 NOTE — PROGRESS NOTES
Comprehensive Nutrition Assessment    Type and Reason for Visit:  Initial, RD Nutrition Re-Screen/LOS    Nutrition Recommendations/Plan: Will start Ensure HP BID to supplement intake. Nutrition Assessment:  Pt admitted w/ COPD exacerbation and PNA, PMH of COPD and ETOH abuse. Pt w/ fair intakes since admission, 50-75% on average. Will start ONS to supplement intake and monitor while admitted. Malnutrition Assessment:  Malnutrition Status: At risk for malnutrition (Comment)    Context:  Acute Illness     Findings of the 6 clinical characteristics of malnutrition:  Energy Intake:  Mild decrease in energy intake (Comment) (variable intake over past week)  Weight Loss:  No significant weight loss (However possible significant wt loss masked by fluid status)     Body Fat Loss:  No significant body fat loss     Muscle Mass Loss:  No significant muscle mass loss    Fluid Accumulation:  No significant fluid accumulation     Strength:  Not Performed    Estimated Daily Nutrient Needs:  Energy (kcal):  0924-6168 (16-17 kcal/kg); Weight Used for Energy Requirements:  Current     Protein (g):  105-120 (1.3-1.5 gm/kg IBW); Weight Used for Protein Requirements:  Ideal        Fluid (ml/day):  1220-3862; Method Used for Fluid Requirements:  1 ml/kcal      Nutrition Related Findings:  Pt A/Ox4, abd distended soft, +BS, -11.9L I/O, +1 LUE/LLE and +2 RLE edema, hyperglycemia, pt on IV steroid      Wounds:  None       Current Nutrition Therapies:    ADULT DIET; Regular; 5 carb choices (75 gm/meal); No Added Salt (3-4 gm);  No Caffeine    Anthropometric Measures:  · Height: 5' 11\" (180.3 cm)  · Current Body Weight: 258 lb 6 oz (117.2 kg) (9/13 standing scale)   · Admission Body Weight: 260 lb 4.8 oz (118.1 kg) (9/10 standing scale)    · Usual Body Weight: 265 lb (120.2 kg) (8/7/21 standing scale per EMR)     · Ideal Body Weight: 172 lbs; % Ideal Body Weight 150.2 %   · BMI: 36.1  · Adjusted Body Weight: No Adjustment

## 2021-09-16 NOTE — PROGRESS NOTES
For[facility]  Division of Pulmonary, Critical Care Medicine  Pulmonary [address] [facility]           Pulmonary consult         CC :SOB ,  H/o A flutter /a fib poorly controlled     HPI :    States he is feeling better with no fever or chills  Denies any fever or chills  HR seems better control   No cough  Less swelling legs   On 4 L O2     PHYSICAL EXAMINATION:     VITAL SIGNS:  BP (!) 154/93   Pulse 82   Temp 97.9 °F (36.6 °C) (Oral)   Resp 18   Ht 5' 11\" (1.803 m)   Wt 258 lb 6 oz (117.2 kg)   SpO2 96%   BMI 36.04 kg/m²   Wt Readings from Last 3 Encounters:   09/13/21 258 lb 6 oz (117.2 kg)   08/30/21 265 lb (120.2 kg)   01/22/20 277 lb (125.6 kg)     Temp Readings from Last 3 Encounters:   09/15/21 97.9 °F (36.6 °C) (Oral)   08/30/21 96.8 °F (36 °C) (Temporal)   08/09/19 97.7 °F (36.5 °C)     TMAX:  BP Readings from Last 3 Encounters:   09/15/21 (!) 154/93   08/30/21 (!) 122/56   01/22/20 132/77     Pulse Readings from Last 3 Encounters:   09/15/21 82   08/30/21 78   01/22/20 109           INTAKE/OUTPUTS:  I/O last 3 completed shifts:   In: 980 [P.O.:980]  Out: 1800 [Urine:1800]    Intake/Output Summary (Last 24 hours) at 9/15/2021 2310  Last data filed at 9/15/2021 1444  Gross per 24 hour   Intake 980 ml   Output 1800 ml   Net -820 ml           LABS/IMAGING:    CBC:  Lab Results   Component Value Date    WBC 27.4 (H) 09/15/2021    HGB 10.7 (L) 09/15/2021    HCT 32.8 (L) 09/15/2021    MCV 95.9 09/15/2021     09/15/2021    LYMPHOPCT 12.2 (L) 09/10/2021    RBC 3.42 (L) 09/15/2021    MCH 31.3 09/15/2021    MCHC 32.6 09/15/2021    RDW 13.4 09/15/2021    NEUTOPHILPCT 74.8 09/10/2021    MONOPCT 7.8 09/10/2021    BASOPCT 0.3 09/10/2021    NEUTROABS 5.28 09/10/2021    LYMPHSABS 0.79 (L) 09/10/2021    MONOSABS 0.53 09/10/2021    EOSABS 0.00 (L) 09/10/2021    BASOSABS 0.00 09/10/2021       Recent Labs     09/15/21  1216 09/14/21  0934 09/10/21  0429 WBC 27.4* 21.0* 6.6   HGB 10.7* 10.1* 9.1*   HCT 32.8* 31.7* 28.3*   MCV 95.9 96.6 94.3    448 342       BMP:   Recent Labs     09/13/21  0458 09/14/21  0934 09/15/21  1215    139 139   K 3.9 4.0 4.0   CL 93* 92* 94*   CO2 37* 30* 38*   BUN 33* 36* 38*   CREATININE 1.0 1.0 1.0       MG:   Lab Results   Component Value Date    MG 1.6 09/09/2021     Ca/Phos:   Lab Results   Component Value Date    CALCIUM 9.2 09/15/2021    PHOS 3.9 08/26/2021     Amylase: No results found for: AMYLASE  Lipase:   Lab Results   Component Value Date    LIPASE 28 05/19/2011     LIVER PROFILE:   Recent Labs     09/14/21  0934 09/15/21  1215   AST 23 18   ALT 58* 53*   BILITOT <0.2 0.2   ALKPHOS 58 62       PT/INR:   No results for input(s): PROTIME, INR in the last 72 hours. APTT: No results for input(s): APTT in the last 72 hours. Cardiac Enzymes:  Lab Results   Component Value Date    CKTOTAL 51 11/13/2010    CKMB 0.5 11/13/2010    TROPONINI <0.01 02/15/2018       Hgb A1C:   Lab Results   Component Value Date    LABA1C 6.4 (H) 09/09/2021     No results found for: EAG  FAMILIA: No results found for: FAMILIA  ESR: No results found for: SEDRATE  CRP: No results found for: CRP  D Dimer: No results found for: DDIMER    Thyroid Studies:  Lab Results   Component Value Date    TSH 0.942 02/14/2018    [id number] 4.8 02/14/2018           MICROBIOLOGY:  Pending       CXR:  Reviewed     CT Chest:reviewed     PE  Vitals:    09/15/21 2011   BP:    Pulse:    Resp:    Temp:    SpO2: 96%     General:  Awake, alert, oriented X 3. Well developed, well nourished, well groomed. No apparent distress. HEENT:  Normocephalic, atraumatic. Pupils equal, round, reactive to light. No scleral icterus. No conjunctival injection. Normal lips, teeth, and gums. No nasal discharge. Neck:  Supple, FROM  Heart:  RRR, no murmurs, gallops, rubs, carotid upstroke normal, no carotid bruits  Lungs:wheeizng bilateral ,rhonchi   Abdomen:   Bowel sounds present, soft, nontender, no masses, no organomegaly, no peritoneal signs  Extremities:  No clubbing, cyanosis, or edema  Skin:  Warm and dry, no open lesions or rash  Neuro:  Cranial nerves 2-12 intact, no focal deficits  Vascular: Radial and pedal Pulses 2+  Breast: deferred  Rectal: deferred  Genitalia:  deferred    PROBLEM LIST:  Patient Active Problem List   Diagnosis    COPD (chronic obstructive pulmonary disease) ([address]    Essential hypertension    Adrenal adenoma    Class 2 obesity due to excess calories with serious comorbidity and body mass index (BMI) of 35.0 to 35.9 in adult    Anticoagulation management encounter    Typical atrial flutter ([address]    Anxiety    Status post catheter ablation of atrial flutter    Persistent atrial fibrillation ([address]    ETOH abuse    [facility] with [facility]ation [facility]    COPD exacerbation (HCC)               ASSESSMENT:  1- A fib with possible acute  HFpEF  2) very severe COPD,doubt exacerbation   3.)obstructive sleep apnea  4. )Afib /Flutter   5.)tobacco independent(quit 5 months ago)      PLAN:  CTA coronary seems stable   A fib ,defer to cardiology,  Continue diuresis with Bumex PO,  Extra dose Bumex   Continue Elquis  Continue BD  Continue IV steroids wean PO  not feel well with prednisone   On Duoneb   Albuterol as needed   On  Eliquis to take it twice daily  Continue Nebs   BiPAP /CPAP at night    IgE 52  Work on his O2 concentrator ,portable not giving need O2 staff following with DME     Will assess for lung transplant as OP  Dr Sherrie Allen  will follow       Eugene Nails MD  [address] [place] [address]

## 2021-09-16 NOTE — PROGRESS NOTES
Hospitalist Progress Note      SYNOPSIS: Patient admitted on 2021 dyspnea and tachycardia according to the patient with a heart rate in the 150s. He stated the heart rate was fast when his pulse ox was low and he had chest pain. He was in heart failure and diuresed poorly. Due to a recent negative stress test and history of chest pain he underwent coronary CTA which was negative. SUBJECTIVE:      Feeling somewhat better  Still has issues with breathing  He does have some questions but not really upset         Temp (24hrs), Av.7 °F (36.5 °C), Min:97.6 °F (36.4 °C), Max:97.9 °F (36.6 °C)    DIET: ADULT DIET; Regular; 5 carb choices (75 gm/meal); No Added Salt (3-4 gm); No Caffeine  CODE: Full Code    Intake/Output Summary (Last 24 hours) at 2021 0919  Last data filed at 2021 0533  Gross per 24 hour   Intake 920 ml   Output 3000 ml   Net -2080 ml       Review of Systems  All bolded are positive; please see HPI  General:  Fever, chills, diaphoresis, fatigue, malaise, night sweats, weight loss  Psychological:  Anxiety, disorientation, hallucinations. ENT:  Epistaxis, headaches, vertigo, visual changes. Cardiovascular:  Chest pain, irregular heartbeats, palpitations, paroxysmal nocturnal dyspnea. Respiratory:  Shortness of breath, coughing, sputum production, hemoptysis, wheezing, orthopnea.   Gastrointestinal:  Nausea, vomiting, diarrhea, heartburn, constipation, abdominal pain, hematemesis, hematochezia, melena, acholic stools  Genito-Urinary:  Dysuria, urgency, frequency, hematuria  Musculoskeletal:  Joint pain, joint stiffness, joint swelling, muscle pain  Neurology:  Headache, focal neurological deficits, weakness, numbness, paresthesia  Derm:  Rashes, ulcers, excoriations, bruising  Extremities:  Decreased ROM, peripheral edema, mottling      OBJECTIVE:    BP (!) 147/85   Pulse 73   Temp 97.6 °F (36.4 °C) (Oral)   Resp 14   Ht 5' 11\" (1.803 m)   Wt 258 lb 6 oz (117.2 kg)   SpO2 99%   BMI 36.04 kg/m²     General appearance:  awake, alert, and oriented to person, place, time, and purpose; appears stated age and cooperative; no apparent distress no labored breathing 4L NC (home level O2)  HEENT:  Conjunctivae/corneas clear. Neck: Supple. No jugular venous distention. Respiratory: symmetrical; diminished bilaterally; no wheezes; no rhonchi; no rales  Cardiovascular: rhythm regular; rate controlled; no murmurs  Abdomen: Soft, nontender, nondistended  Extremities:  peripheral pulses present; RLE edema; no ulcers  Musculoskeletal: No clubbing, cyanosis, no bilateral lower extremity edema. Brisk capillary refill. Skin:  No rashes  on visible skin  Neurologic: awake, alert and following commands     ASSESSMENT and PLAN:  · Chronic heart failure with preserved ejection fraction--11 L since admission, currently on p.o. diuretics  · Chronic respiratory failure due to severe COPD-  Pulmonology following. Nebulized breathing treatments. Still on IV Decadron  · Leukocytosis-procalcitonin low more than likely from steroids  · RLE Edema-likely from steroid effect, patient is on apixaban  · Obstructive sleep apnea- BiPAP/CPAP at night    · Paroxysmal atrial fibrillation/typical flutter- status-post AF ablation in April 2018. On cardizem, and flecainide therapy.  Maintaining normal sinus rhythm. Anticoagulated with Eliquis. · Mild coronary artery disease- by cardiac catheterization with atypical chest pain and mild troponin elevation not consistent with acute coronary syndrome with a recent nonischemic stress test and reportedly no significant obstructive CAD on CTA of the coronaries done  9/13/21. Cardiology was consulted and has signed off. · ORESTES- resolved  · Hypertension   · Hyperlipidemia- on statin   · History of tobacco use, now tobacco independent  · Hyperglycemia- due to steroids.  Hemoglobin A1c 6.4  · Morbid Obesity- BMI 36         DISPOSITION: Remains inpatient, remains on IV 02/04/2018    LABA1C 6.4 (H) 09/09/2021       Radiology: REVIEWED DAILY    +++++++++++++++++++++++++++++++++++++++++++++++++  Shirin Gage MD   [address]  +++++++++++++++++++++++++++++++++++++++++++++++++  NOTE: This report was transcribed using voice recognition software. Every effort was made to ensure accuracy; however, inadvertent computerized transcription errors may be present.

## 2021-09-16 NOTE — CARE COORDINATION
IV decadron continues, plan is home when able to transition to oral steriods. For questions I can be reached at [phone].  Roney Fields [facility]

## 2021-09-16 NOTE — TELEPHONE ENCOUNTER
This office received a message from the patient who is presently admitted in room 5545B. Patient called requesting the office send up a sample of his inhaler while he is in the hospital.  This office call the nursing stating of michelle christopher and notified them that we have received a call from the patient regarding an inhaler. I also obtained the number to the room so that we could call him directly. [phone]. This office attempted to call the patient but there was no answer at this time.

## 2021-09-17 LAB
METER GLUCOSE: 165 MG/DL (ref 74–99)
METER GLUCOSE: 169 MG/DL (ref 74–99)
METER GLUCOSE: 217 MG/DL (ref 74–99)
METER GLUCOSE: 231 MG/DL (ref 74–99)
METER GLUCOSE: 299 MG/DL (ref 74–99)

## 2021-09-17 PROCEDURE — 6370000000 HC RX 637 (ALT 250 FOR IP): Performed by: FAMILY MEDICINE

## 2021-09-17 PROCEDURE — 94640 AIRWAY INHALATION TREATMENT: CPT

## 2021-09-17 PROCEDURE — 99233 SBSQ HOSP IP/OBS HIGH 50: CPT | Performed by: INTERNAL MEDICINE

## 2021-09-17 PROCEDURE — 94660 CPAP INITIATION&MGMT: CPT

## 2021-09-17 PROCEDURE — 82962 GLUCOSE BLOOD TEST: CPT

## 2021-09-17 PROCEDURE — 2700000000 HC OXYGEN THERAPY PER DAY

## 2021-09-17 PROCEDURE — 6370000000 HC RX 637 (ALT 250 FOR IP): Performed by: INTERNAL MEDICINE

## 2021-09-17 PROCEDURE — 1200000000 HC SEMI PRIVATE

## 2021-09-17 PROCEDURE — 6360000002 HC RX W HCPCS: Performed by: FAMILY MEDICINE

## 2021-09-17 PROCEDURE — 2580000003 HC RX 258: Performed by: FAMILY MEDICINE

## 2021-09-17 PROCEDURE — 99356 PR PROLONGED SVC I/P OR OBS SETTING 1ST HOUR: CPT | Performed by: INTERNAL MEDICINE

## 2021-09-17 PROCEDURE — 6360000002 HC RX W HCPCS: Performed by: INTERNAL MEDICINE

## 2021-09-17 RX ADMIN — Medication 10 ML: at 23:18

## 2021-09-17 RX ADMIN — ROFLUMILAST 250 MCG: 500 TABLET ORAL at 15:56

## 2021-09-17 RX ADMIN — Medication 10 ML: at 15:51

## 2021-09-17 RX ADMIN — APIXABAN 5 MG: 5 TABLET, FILM COATED ORAL at 08:22

## 2021-09-17 RX ADMIN — GUAIFENESIN 400 MG: 400 TABLET ORAL at 15:51

## 2021-09-17 RX ADMIN — FLECAINIDE ACETATE 100 MG: 100 TABLET ORAL at 22:59

## 2021-09-17 RX ADMIN — INSULIN LISPRO 2 UNITS: 100 INJECTION, SOLUTION INTRAVENOUS; SUBCUTANEOUS at 08:43

## 2021-09-17 RX ADMIN — GUAIFENESIN 400 MG: 400 TABLET ORAL at 21:48

## 2021-09-17 RX ADMIN — DILTIAZEM HYDROCHLORIDE 240 MG: 240 CAPSULE, EXTENDED RELEASE ORAL at 08:22

## 2021-09-17 RX ADMIN — IPRATROPIUM BROMIDE AND ALBUTEROL SULFATE 3 ML: .5; 2.5 SOLUTION RESPIRATORY (INHALATION) at 17:38

## 2021-09-17 RX ADMIN — ARFORMOTEROL TARTRATE 15 MCG: 15 SOLUTION RESPIRATORY (INHALATION) at 14:23

## 2021-09-17 RX ADMIN — INSULIN LISPRO 6 UNITS: 100 INJECTION, SOLUTION INTRAVENOUS; SUBCUTANEOUS at 23:01

## 2021-09-17 RX ADMIN — DEXAMETHASONE SODIUM PHOSPHATE 4 MG: 4 INJECTION, SOLUTION INTRAMUSCULAR; INTRAVENOUS at 15:51

## 2021-09-17 RX ADMIN — BUDESONIDE 500 MCG: 0.5 SUSPENSION RESPIRATORY (INHALATION) at 14:24

## 2021-09-17 RX ADMIN — Medication 10 ML: at 08:23

## 2021-09-17 RX ADMIN — ARFORMOTEROL TARTRATE 15 MCG: 15 SOLUTION RESPIRATORY (INHALATION) at 21:05

## 2021-09-17 RX ADMIN — ROFLUMILAST 250 MCG: 500 TABLET ORAL at 13:10

## 2021-09-17 RX ADMIN — FLECAINIDE ACETATE 100 MG: 100 TABLET ORAL at 08:21

## 2021-09-17 RX ADMIN — BUSPIRONE HYDROCHLORIDE 10 MG: 10 TABLET ORAL at 08:22

## 2021-09-17 RX ADMIN — GUAIFENESIN 400 MG: 400 TABLET ORAL at 08:22

## 2021-09-17 RX ADMIN — APIXABAN 5 MG: 5 TABLET, FILM COATED ORAL at 21:48

## 2021-09-17 RX ADMIN — POTASSIUM CHLORIDE 20 MEQ: 20 TABLET, EXTENDED RELEASE ORAL at 08:22

## 2021-09-17 RX ADMIN — INSULIN GLARGINE 15 UNITS: 100 INJECTION, SOLUTION SUBCUTANEOUS at 23:00

## 2021-09-17 RX ADMIN — BUDESONIDE 500 MCG: 0.5 SUSPENSION RESPIRATORY (INHALATION) at 21:05

## 2021-09-17 RX ADMIN — FLUTICASONE PROPIONATE 1 SPRAY: 50 SPRAY, METERED NASAL at 08:31

## 2021-09-17 RX ADMIN — INSULIN GLARGINE 15 UNITS: 100 INJECTION, SOLUTION SUBCUTANEOUS at 08:44

## 2021-09-17 RX ADMIN — DEXAMETHASONE SODIUM PHOSPHATE 4 MG: 4 INJECTION, SOLUTION INTRAMUSCULAR; INTRAVENOUS at 06:28

## 2021-09-17 RX ADMIN — BUMETANIDE 2 MG: 1 TABLET ORAL at 08:22

## 2021-09-17 RX ADMIN — IPRATROPIUM BROMIDE AND ALBUTEROL SULFATE 3 ML: .5; 2.5 SOLUTION RESPIRATORY (INHALATION) at 14:22

## 2021-09-17 RX ADMIN — BUMETANIDE 2 MG: 1 TABLET ORAL at 17:13

## 2021-09-17 RX ADMIN — INSULIN LISPRO 4 UNITS: 100 INJECTION, SOLUTION INTRAVENOUS; SUBCUTANEOUS at 13:35

## 2021-09-17 RX ADMIN — DILTIAZEM HYDROCHLORIDE 240 MG: 240 CAPSULE, EXTENDED RELEASE ORAL at 23:00

## 2021-09-17 RX ADMIN — BUSPIRONE HYDROCHLORIDE 10 MG: 10 TABLET ORAL at 15:51

## 2021-09-17 RX ADMIN — ASPIRIN 81 MG CHEWABLE TABLET 81 MG: 81 TABLET CHEWABLE at 08:22

## 2021-09-17 RX ADMIN — ATORVASTATIN CALCIUM 20 MG: 20 TABLET, FILM COATED ORAL at 17:13

## 2021-09-17 RX ADMIN — IPRATROPIUM BROMIDE AND ALBUTEROL SULFATE 3 ML: .5; 2.5 SOLUTION RESPIRATORY (INHALATION) at 21:05

## 2021-09-17 RX ADMIN — INSULIN LISPRO 2 UNITS: 100 INJECTION, SOLUTION INTRAVENOUS; SUBCUTANEOUS at 17:13

## 2021-09-17 RX ADMIN — BUSPIRONE HYDROCHLORIDE 10 MG: 10 TABLET ORAL at 23:00

## 2021-09-17 RX ADMIN — DEXAMETHASONE SODIUM PHOSPHATE 4 MG: 4 INJECTION, SOLUTION INTRAMUSCULAR; INTRAVENOUS at 23:15

## 2021-09-17 ASSESSMENT — PAIN SCALES - GENERAL: PAINLEVEL_OUTOF10: 0

## 2021-09-17 NOTE — PLAN OF CARE
Problem: Falls - Risk of:  Goal: Will remain free from falls  Description: Will remain free from falls  9/17/2021 1351 by Lester Borja RN  Outcome: Met This Shift  9/17/2021 0349 by Maria Teresa Espinal RN  Outcome: Met This Shift  Goal: Absence of physical injury  Description: Absence of physical injury  9/17/2021 1351 by Lester Borja RN  Outcome: Met This Shift  9/17/2021 0349 by Maria Teresa Espinal RN  Outcome: Met This Shift

## 2021-09-17 NOTE — PROGRESS NOTES
[facility]  Division of Pulmonary, Critical Care Medicine  Pulmonary [address] [facility]           Pulmonary consult         CC :SOB ,  H/o A flutter /a fib poorly controlled     HPI :    Patient seen and examined this morning with his wife present at bedside. Continues to have a end expiratory wheeze on the right side. I spent 45 minutes this morning discussing the process for referral for lung transplant. The 4 closest transplant centers to our institution are the [facility], [facility], [place] [address]. I discussed with the patient and his wife the referral for transplant including need to be followed up in the office and pulmonary function testing prior to referral.  We discussed at length that he will need to lose a significant amount of weight as most transplant centers have a BMI requirement of 32-35 as a maximum prior to transplantation. He will also likely need to be enrolled in pulmonary rehab. I discussed with him the test required for evaluation of transplant including but not limited to cardiac catheterization, health maintenance screenings, dental clearance, colonoscopy, VQ scan. I also discussed with the patient the process for listing for transplant the lung allocation score, how organs are allocated in the [place], the risk and benefits of lung transplant, 1 year mortality rate, follow-up requirements, immune suppression, and risk of rejection.   At this time our plan is for the patient to be    PHYSICAL EXAMINATION:     VITAL SIGNS:  /84   Pulse 60   Temp 97.3 °F (36.3 °C) (Oral)   Resp 18   Ht 5' 11\" (1.803 m)   Wt 258 lb 6 oz (117.2 kg)   SpO2 98%   BMI 36.04 kg/m²   Wt Readings from Last 3 Encounters:   09/13/21 258 lb 6 oz (117.2 kg)   08/30/21 265 lb (120.2 kg)   01/22/20 277 lb (125.6 kg)     Temp Readings from Last 3 Encounters:   09/17/21 97.3 °F (36.3 °C) (Oral)   08/30/21 96.8 °F (36 °C) (Temporal)   08/09/19 97.7 °F (36.5 °C)     TMAX:  BP Readings from Last 3 Encounters:   09/17/21 139/84   08/30/21 (!) 122/56   01/22/20 132/77     Pulse Readings from Last 3 Encounters:   09/17/21 60   08/30/21 78   01/22/20 109       INTAKE/OUTPUTS:  I/O last 3 completed shifts: In: 500 [P.O.:480; I.V.:20]  Out: -     Intake/Output Summary (Last 24 hours) at 9/17/2021 1718  Last data filed at 9/17/2021 1431  Gross per 24 hour   Intake 500 ml   Output --   Net 500 ml           LABS/IMAGING:    CBC:  Lab Results   Component Value Date    WBC 27.4 (H) 09/15/2021    HGB 10.7 (L) 09/15/2021    HCT 32.8 (L) 09/15/2021    MCV 95.9 09/15/2021     09/15/2021    LYMPHOPCT 12.2 (L) 09/10/2021    RBC 3.42 (L) 09/15/2021    MCH 31.3 09/15/2021    MCHC 32.6 09/15/2021    RDW 13.4 09/15/2021    NEUTOPHILPCT 74.8 09/10/2021    MONOPCT 7.8 09/10/2021    BASOPCT 0.3 09/10/2021    NEUTROABS 5.28 09/10/2021    LYMPHSABS 0.79 (L) 09/10/2021    MONOSABS 0.53 09/10/2021    EOSABS 0.00 (L) 09/10/2021    BASOSABS 0.00 09/10/2021       Recent Labs     09/15/21  1216 09/14/21  0934 09/10/21  0429   WBC 27.4* 21.0* 6.6   HGB 10.7* 10.1* 9.1*   HCT 32.8* 31.7* 28.3*   MCV 95.9 96.6 94.3    448 342       BMP:   Recent Labs     09/15/21  1215      K 4.0   CL 94*   CO2 38*   BUN 38*   CREATININE 1.0       MG:   Lab Results   Component Value Date    MG 1.6 09/09/2021     Ca/Phos:   Lab Results   Component Value Date    CALCIUM 9.2 09/15/2021    PHOS 3.9 08/26/2021     Amylase: No results found for: AMYLASE  Lipase:   Lab Results   Component Value Date    LIPASE 28 05/19/2011     LIVER PROFILE:   Recent Labs     09/15/21  1215   AST 18   ALT 53*   BILITOT 0.2   ALKPHOS 62       PT/INR:   No results for input(s): PROTIME, INR in the last 72 hours. APTT: No results for input(s): APTT in the last 72 hours.     Cardiac Enzymes:  Lab Results   Component Value Date    CKTOTAL 51 11/13/2010    CKMB 0.5 11/13/2010 TROPONINI <0.01 02/15/2018       Hgb A1C:   Lab Results   Component Value Date    LABA1C 6.4 (H) 09/09/2021     No results found for: EAG  FAMILIA: No results found for: FAMILIA  ESR: No results found for: SEDRATE  CRP: No results found for: CRP  D Dimer: No results found for: DDIMER    Thyroid Studies:  Lab Results   Component Value Date    TSH 0.942 02/14/2018    Y3MULXA 4.8 02/14/2018           MICROBIOLOGY:  Pending       CXR:  Reviewed     CT Chest:reviewed     PE  Vitals:    09/17/21 1431   BP:    Pulse:    Resp:    Temp:    SpO2: 98%     General:  Awake, alert, oriented X 3. Well developed, well nourished, well groomed. No apparent distress. HEENT:  Normocephalic, atraumatic. Pupils equal, round, reactive to light. No scleral icterus. No conjunctival injection. Normal lips, teeth, and gums. No nasal discharge. Neck:  Supple, FROM  Heart:  RRR, no murmurs, gallops, rubs, carotid upstroke normal, no carotid bruits  Lungs: Scattered wheezing right mid to lower lobe clear right upper lobe and on left  Abdomen:   Bowel sounds present, soft, nontender, no masses, no organomegaly, no peritoneal signs  Extremities:  No clubbing, cyanosis, 1+ edema  Skin:  Warm and dry, no open lesions or rash  Neuro:  Cranial nerves 2-12 intact, no focal deficits  Vascular: Radial and pedal Pulses 2+  Breast: deferred  Rectal: deferred  Genitalia:  deferred    PROBLEM LIST:  Patient Active Problem List   Diagnosis    COPD (chronic obstructive pulmonary disease) ([address]    Essential hypertension    Adrenal adenoma    Class 2 obesity due to excess calories with serious comorbidity and body mass index (BMI) of 35.0 to 35.9 in adult    Anticoagulation management encounter    Typical atrial flutter ([address]    Anxiety    Status post catheter ablation of atrial flutter    Persistent atrial fibrillation ([address]    ETOH abuse    COPD with acute exacerbation ([address]    COPD exacerbation (HCC)               ASSESSMENT:  1- A fib with possible acute  HFpEF  2) very severe COPD,doubt exacerbation   3.)obstructive sleep apnea  4. )Afib /Flutter   5.)tobacco independent(quit 5 months ago)      PLAN:   I spent 45 minutes this morning discussing the process for referral for lung transplant. The 4 closest transplant centers to our institution are the [facility], [facility], [place] [address]. I discussed with the patient and his wife the referral for transplant including need to be followed up in the office and pulmonary function testing prior to referral.  We discussed at length that he will need to lose a significant amount of weight as most transplant centers have a BMI requirement of 32-35 as a maximum prior to transplantation. He will also likely need to be enrolled in pulmonary rehab. I discussed with him the test required for evaluation of transplant including but not limited to cardiac catheterization, health maintenance screenings, dental clearance, colonoscopy, VQ scan. I also discussed with the patient the process for listing for transplant the lung allocation score, how organs are allocated in the [place], the risk and benefits of lung transplant, 1 year mortality rate, follow-up requirements, immune suppression, and risk of rejection. At this time our plan is for the patient to be discharged tomorrow as his home oxygen will be set up. We will discharge him on a Decadron taper and on Daliresp. He is to follow-up with Dr. Jazmin iRos in the office in 2 to 3 weeks at which time he will need full pulmonary function testing. Should he and his family wish to proceed we will then make a referral to the [facility] clinic per his preference if he is deemed to not be a candidate at the [facility] clinic we will then send referrals to the [facility], [address]     Continue diuresis with Bumex PO,  Continue Elquis  Continue BD  Patient to be discharged with a plan for Decadron 2 mg twice daily for 1 week followed by 1 mg twice daily for 1 week followed by 1 mg daily for 1 week and then discontinued. Please discharged on [address] which was started today. He is to restart his home BREZTRI  Please discharge on BuSpar as this has significantly helped his anxiety.   BiPAP /CPAP at night      Roderick Nava, DO  Pulmonary/Critical care [address]

## 2021-09-17 NOTE — CARE COORDINATION
Social Work Discharge/Planning:    SW met with AOx4 patient to explain role and follow up transition of care discussion. Patient reports plan remains home with no anticipated needs. Patient reports he normally wears about 4L oxygen at home and is currently wearing 4L oxygen now. Patient reports his wife is able to transport him home day of discharge and able to provide oxygen for him to go home with. Patient continues on IV decadron, awaiting transition to oral steroids. RAFFI/MIKAL to follow.     Abbey Laboy, [facility]  [phone]

## 2021-09-17 NOTE — PROGRESS NOTES
appears stated age and cooperative; no apparent distress no labored breathing 4L NC (home level O2)  HEENT:  Conjunctivae/corneas clear. Neck: Supple. No jugular venous distention. Respiratory: symmetrical; diminished bilaterally; no wheezes; no rhonchi; no rales  Cardiovascular: rhythm regular; rate controlled; no murmurs  Abdomen: Soft, nontender, nondistended  Extremities:  peripheral pulses present; RLE edema; no ulcers  Musculoskeletal: No clubbing, cyanosis, no bilateral lower extremity edema. Brisk capillary refill. Skin:  No rashes  on visible skin  Neurologic: awake, alert and following commands     ASSESSMENT and PLAN:  · Chronic heart failure with preserved ejection fraction--11 L since admission, currently on p.o. diuretics  · Chronic respiratory failure due to severe COPD-  Pulmonology following. Nebulized breathing treatments. Still on IV Decadron  · Leukocytosis-procalcitonin low more than likely from steroids  · RLE Edema-likely from steroid effect, patient is on apixaban  · Obstructive sleep apnea- BiPAP/CPAP at night    · Paroxysmal atrial fibrillation/typical flutter- status-post AF ablation in April 2018. On cardizem, and flecainide therapy.  Maintaining normal sinus rhythm. Anticoagulated with Eliquis. · Mild coronary artery disease- by cardiac catheterization with atypical chest pain and mild troponin elevation not consistent with acute coronary syndrome with a recent nonischemic stress test and reportedly no significant obstructive CAD on CTA of the coronaries done  9/13/21. Cardiology was consulted and has signed off. · ORESTES- resolved  · Hypertension   · Hyperlipidemia- on statin   · History of tobacco use, now tobacco independent  · Hyperglycemia- due to steroids.  Hemoglobin A1c 6.4  · Morbid Obesity- BMI 36         DISPOSITION: Remains inpatient, remains on IV steroids    Medications:  REVIEWED DAILY    Infusion Medications    sodium chloride      dextrose       Scheduled Medications    [START ON 9/18/2021] Roflumilast  500 mcg Oral Daily    dexamethasone  4 mg IntraVENous Q8H    insulin glargine  15 Units SubCUTAneous BID    guaiFENesin  400 mg Oral TID    busPIRone  10 mg Oral TID    apixaban  5 mg Oral BID    aspirin  81 mg Oral Daily    atorvastatin  20 mg Oral QPM    bumetanide  2 mg Oral BID    dilTIAZem  240 mg Oral BID    flecainide  100 mg Oral BID    fluticasone  1 spray Each Nostril Daily    ipratropium-albuterol  1 vial Inhalation Q4H    potassium chloride  20 mEq Oral Daily with breakfast    sodium chloride flush  5-40 mL IntraVENous 2 times per day    insulin lispro  0-10 Units SubCUTAneous 4x Daily AC & HS    Arformoterol Tartrate  15 mcg Nebulization BID    And    ipratropium  0.5 mg Nebulization 4x daily    And    budesonide  0.5 mg Nebulization BID     PRN Meds: metoprolol, albuterol, sodium chloride flush, sodium chloride, ondansetron **OR** ondansetron, polyethylene glycol, acetaminophen **OR** acetaminophen, potassium chloride **OR** potassium alternative oral replacement **OR** potassium chloride, glucose, dextrose, glucagon (rDNA), dextrose    Labs:     Recent Labs     09/15/21  1216   WBC 27.4*   HGB 10.7*   HCT 32.8*          Recent Labs     09/15/21  1215      K 4.0   CL 94*   CO2 38*   BUN 38*   CREATININE 1.0   CALCIUM 9.2       Recent Labs     09/15/21  1215   PROT 6.7   ALKPHOS 62   ALT 53*   AST 18   BILITOT 0.2       No results for input(s): INR in the last 72 hours. No results for input(s): Maria Fernanda Gazella in the last 72 hours.     Chronic labs:    Lab Results   Component Value Date    CHOL 205 (H) 04/08/2018    TRIG 80 04/08/2018    HDL 80 04/08/2018    LDLCALC 109 (H) 04/08/2018    TSH 0.942 02/14/2018    PSA 0.26 04/08/2018    INR 1.2 02/04/2018    LABA1C 6.4 (H) 09/09/2021       Radiology: REVIEWED DAILY    +++++++++++++++++++++++++++++++++++++++++++++++++  Ashli Barrett MD   [employer] - [address] [place], [place]  +++++++++++++++++++++++++++++++++++++++++++++++++  NOTE: This report was transcribed using voice recognition software. Every effort was made to ensure accuracy; however, inadvertent computerized transcription errors may be present.

## 2021-09-18 VITALS
SYSTOLIC BLOOD PRESSURE: 145 MMHG | BODY MASS INDEX: 36.17 KG/M2 | DIASTOLIC BLOOD PRESSURE: 93 MMHG | RESPIRATION RATE: 18 BRPM | OXYGEN SATURATION: 99 % | HEART RATE: 66 BPM | WEIGHT: 258.38 LBS | HEIGHT: 71 IN | TEMPERATURE: 98.3 F

## 2021-09-18 LAB
METER GLUCOSE: 177 MG/DL (ref 74–99)
METER GLUCOSE: 192 MG/DL (ref 74–99)
METER GLUCOSE: 304 MG/DL (ref 74–99)

## 2021-09-18 PROCEDURE — 6370000000 HC RX 637 (ALT 250 FOR IP): Performed by: INTERNAL MEDICINE

## 2021-09-18 PROCEDURE — 2580000003 HC RX 258: Performed by: FAMILY MEDICINE

## 2021-09-18 PROCEDURE — 94640 AIRWAY INHALATION TREATMENT: CPT

## 2021-09-18 PROCEDURE — 82962 GLUCOSE BLOOD TEST: CPT

## 2021-09-18 PROCEDURE — 6370000000 HC RX 637 (ALT 250 FOR IP): Performed by: FAMILY MEDICINE

## 2021-09-18 PROCEDURE — 6360000002 HC RX W HCPCS: Performed by: INTERNAL MEDICINE

## 2021-09-18 PROCEDURE — 99233 SBSQ HOSP IP/OBS HIGH 50: CPT | Performed by: INTERNAL MEDICINE

## 2021-09-18 PROCEDURE — 6360000002 HC RX W HCPCS: Performed by: FAMILY MEDICINE

## 2021-09-18 PROCEDURE — 94660 CPAP INITIATION&MGMT: CPT

## 2021-09-18 PROCEDURE — 2700000000 HC OXYGEN THERAPY PER DAY

## 2021-09-18 RX ORDER — ARFORMOTEROL TARTRATE 15 UG/2ML
15 SOLUTION RESPIRATORY (INHALATION) 2 TIMES DAILY
Qty: 120 ML | Refills: 3 | Status: SHIPPED | OUTPATIENT
Start: 2021-09-18

## 2021-09-18 RX ORDER — INSULIN GLARGINE 100 [IU]/ML
15 INJECTION, SOLUTION SUBCUTANEOUS 2 TIMES DAILY
Qty: 10 ML | Refills: 3 | Status: SHIPPED | OUTPATIENT
Start: 2021-09-18

## 2021-09-18 RX ORDER — GUAIFENESIN 400 MG/1
400 TABLET ORAL 3 TIMES DAILY
Qty: 56 TABLET | Refills: 0 | Status: SHIPPED | OUTPATIENT
Start: 2021-09-18

## 2021-09-18 RX ORDER — BUDESONIDE 0.5 MG/2ML
0.5 INHALANT ORAL 2 TIMES DAILY
Qty: 60 EACH | Refills: 3 | Status: SHIPPED | OUTPATIENT
Start: 2021-09-18

## 2021-09-18 RX ORDER — DEXAMETHASONE 1 MG
2 TABLET ORAL EVERY 12 HOURS SCHEDULED
Status: DISCONTINUED | OUTPATIENT
Start: 2021-09-18 | End: 2021-09-18 | Stop reason: HOSPADM

## 2021-09-18 RX ORDER — ALBUTEROL SULFATE 2.5 MG/3ML
2.5 SOLUTION RESPIRATORY (INHALATION) EVERY 6 HOURS PRN
Qty: 120 EACH | Refills: 3 | Status: SHIPPED | OUTPATIENT
Start: 2021-09-18

## 2021-09-18 RX ORDER — BUSPIRONE HYDROCHLORIDE 10 MG/1
10 TABLET ORAL 3 TIMES DAILY PRN
Qty: 60 TABLET | Refills: 0 | Status: SHIPPED | OUTPATIENT
Start: 2021-09-18

## 2021-09-18 RX ORDER — DEXAMETHASONE 2 MG/1
2 TABLET ORAL EVERY 12 HOURS SCHEDULED
Qty: 14 TABLET | Refills: 0 | Status: SHIPPED | OUTPATIENT
Start: 2021-09-18 | End: 2021-09-25

## 2021-09-18 RX ADMIN — BUSPIRONE HYDROCHLORIDE 10 MG: 10 TABLET ORAL at 08:51

## 2021-09-18 RX ADMIN — IPRATROPIUM BROMIDE AND ALBUTEROL SULFATE 3 ML: .5; 2.5 SOLUTION RESPIRATORY (INHALATION) at 08:14

## 2021-09-18 RX ADMIN — INSULIN LISPRO 8 UNITS: 100 INJECTION, SOLUTION INTRAVENOUS; SUBCUTANEOUS at 11:52

## 2021-09-18 RX ADMIN — ARFORMOTEROL TARTRATE 15 MCG: 15 SOLUTION RESPIRATORY (INHALATION) at 08:15

## 2021-09-18 RX ADMIN — INSULIN LISPRO 2 UNITS: 100 INJECTION, SOLUTION INTRAVENOUS; SUBCUTANEOUS at 08:54

## 2021-09-18 RX ADMIN — BUMETANIDE 2 MG: 1 TABLET ORAL at 08:52

## 2021-09-18 RX ADMIN — INSULIN GLARGINE 15 UNITS: 100 INJECTION, SOLUTION SUBCUTANEOUS at 08:55

## 2021-09-18 RX ADMIN — ROFLUMILAST 500 MCG: 500 TABLET ORAL at 08:51

## 2021-09-18 RX ADMIN — GUAIFENESIN 400 MG: 400 TABLET ORAL at 14:00

## 2021-09-18 RX ADMIN — BUSPIRONE HYDROCHLORIDE 10 MG: 10 TABLET ORAL at 14:00

## 2021-09-18 RX ADMIN — FLECAINIDE ACETATE 100 MG: 100 TABLET ORAL at 08:53

## 2021-09-18 RX ADMIN — GUAIFENESIN 400 MG: 400 TABLET ORAL at 08:53

## 2021-09-18 RX ADMIN — FLUTICASONE PROPIONATE 1 SPRAY: 50 SPRAY, METERED NASAL at 08:51

## 2021-09-18 RX ADMIN — BUDESONIDE 500 MCG: 0.5 SUSPENSION RESPIRATORY (INHALATION) at 08:16

## 2021-09-18 RX ADMIN — IPRATROPIUM BROMIDE AND ALBUTEROL SULFATE 3 ML: .5; 2.5 SOLUTION RESPIRATORY (INHALATION) at 12:32

## 2021-09-18 RX ADMIN — ASPIRIN 81 MG CHEWABLE TABLET 81 MG: 81 TABLET CHEWABLE at 08:51

## 2021-09-18 RX ADMIN — DEXAMETHASONE SODIUM PHOSPHATE 4 MG: 4 INJECTION, SOLUTION INTRAMUSCULAR; INTRAVENOUS at 06:35

## 2021-09-18 RX ADMIN — DILTIAZEM HYDROCHLORIDE 240 MG: 240 CAPSULE, EXTENDED RELEASE ORAL at 08:51

## 2021-09-18 RX ADMIN — Medication 10 ML: at 08:54

## 2021-09-18 RX ADMIN — POTASSIUM CHLORIDE 20 MEQ: 20 TABLET, EXTENDED RELEASE ORAL at 08:53

## 2021-09-18 RX ADMIN — APIXABAN 5 MG: 5 TABLET, FILM COATED ORAL at 08:53

## 2021-09-18 ASSESSMENT — PAIN SCALES - GENERAL
PAINLEVEL_OUTOF10: 0
PAINLEVEL_OUTOF10: 0

## 2021-09-18 ASSESSMENT — PAIN DESCRIPTION - PROGRESSION
CLINICAL_PROGRESSION: GRADUALLY WORSENING
CLINICAL_PROGRESSION: GRADUALLY WORSENING

## 2021-09-18 NOTE — PROGRESS NOTES
Hospitalist Progress Note      Synopsis: Patient admitted on 9/9/2021   Mr. Bob Wolfe, a 63y.o. year old male who has a past medical history of Atrial flutter ([facility] COPD (chronic obstructive pulmonary disease) ([facility] and Hypertension. Patient presented to the emergency department with complaints of shortness of breath. Just finished a course of prednisone. Breathing became worse today. Is chronically oxygen dependent. Has history of COPD and atrial flutter. Vital signs assessed emergency department revealed patient is 97% on 4 L nasal cannula. Temperature 99.5. Laboratory studies reveal potassium 3.3, creatinine 1.4, proBNP 324, troponin 23. Chest x-ray shows patchy infiltrates in the mid to lower lung zones bilaterally likely due to pneumonia. This could represent viral infection. Patient started on ceftriaxone and doxycycline in the emergency department. Patient apparently fired [employer]  Patient been seen by pulmonary and cardiology. He has component of atrial fibrillation flutter that is decompensating his COPD    Subjective    States he is feeling okay. Not the best but this is the best he can be. Exam:  BP (!) 145/93   Pulse 66   Temp 98.3 °F (36.8 °C) (Oral)   Resp 18   Ht 5' 11\" (1.803 m)   Wt 258 lb 6 oz (117.2 kg)   SpO2 99%   BMI 36.04 kg/m²   General appearance: No apparent distress, appears stated age and cooperative. HEENT: Pupils equal, round, and reactive to light. Steroid facies conjunctivae/corneas clear. Neck: Short thick and trachea midline. Respiratory: Distant and decreased cardiovascular: Regular rate and rhythm with normal S1/S2 without murmurs, rubs or gallops. Abdomen: Soft, non-tender, non-distended with normal bowel sounds. Musculoskeletal: No clubbing, cyanosis 1+ edema bilaterally.  Skin:  No rashes    Neurologic: awake, alert and following commands quite pleasant    Medications:  Reviewed    Infusion Medications    sodium chloride      dextrose Scheduled Medications    Roflumilast  500 mcg Oral Daily    dexamethasone  4 mg IntraVENous Q8H    insulin glargine  15 Units SubCUTAneous BID    guaiFENesin  400 mg Oral TID    busPIRone  10 mg Oral TID    apixaban  5 mg Oral BID    aspirin  81 mg Oral Daily    atorvastatin  20 mg Oral QPM    bumetanide  2 mg Oral BID    dilTIAZem  240 mg Oral BID    flecainide  100 mg Oral BID    fluticasone  1 spray Each Nostril Daily    ipratropium-albuterol  1 vial Inhalation Q4H    potassium chloride  20 mEq Oral Daily with breakfast    sodium chloride flush  5-40 mL IntraVENous 2 times per day    insulin lispro  0-10 Units SubCUTAneous 4x Daily AC & HS    Arformoterol Tartrate  15 mcg Nebulization BID    And    ipratropium  0.5 mg Nebulization 4x daily    And    budesonide  0.5 mg Nebulization BID     PRN Meds: metoprolol, albuterol, sodium chloride flush, sodium chloride, ondansetron **OR** ondansetron, polyethylene glycol, acetaminophen **OR** acetaminophen, potassium chloride **OR** potassium alternative oral replacement **OR** potassium chloride, glucose, dextrose, glucagon (rDNA), dextrose    I/O    Intake/Output Summary (Last 24 hours) at 9/18/2021 1328  Last data filed at 9/17/2021 1431  Gross per 24 hour   Intake 250 ml   Output --   Net 250 ml       Labs:   No results for input(s): WBC, HGB, HCT, PLT in the last 72 hours. No results for input(s): NA, K, CL, CO2, BUN, CREATININE, CALCIUM, PHOS in the last 72 hours. Invalid input(s): MAGNES    No results for input(s): PROT, ALB, ALKPHOS, ALT, AST, BILITOT, AMYLASE, LIPASE in the last 72 hours. No results for input(s): INR in the last 72 hours. No results for input(s): Kimberly Olivas in the last 72 hours.     Chronic labs:  Lab Results   Component Value Date    CHOL 205 (H) 04/08/2018    TRIG 80 04/08/2018    HDL 80 04/08/2018    LDLCALC 109 (H) 04/08/2018    TSH 0.942 02/14/2018    PSA 0.26 04/08/2018    INR 1.2 02/04/2018 LABA1C 6.4 (H) 09/09/2021       Microbiology:  Pending  No results for input(s): BC in the last 72 hours. No results for input(s): ORG in the last 72 hours. No results for input(s): Price Kappa in the last 72 hours. No results for input(s): STREPNEUMAGU in the last 72 hours. No results for input(s): LP1UAG in the last 72 hours. No results for input(s): ASO in the last 72 hours. No results for input(s): CULTRESP in the last 72 hours. No results for input(s): PROCAL in the last 72 hours. Radiology:  XR CHEST (2 VW)    Result Date: 8/23/2021  Hyperinflation. No evidence of acute process. XR CHEST PORTABLE    Result Date: 9/9/2021  Patchy infiltrates in the mid to lower lung zones bilaterally likely due to pneumonia. This could represent viral infection. Continued follow-up recommended. US DUP LOWER EXTREMITY RIGHT CHARBEL    Result Date: 9/16/2021  Within the visualized vessels there is no evidence for deep venous thrombosis     CTA CORON EJECT FRAC WALL MOTION    Result Date: 9/13/2021  No incidental clinically important extracardiac CT findings. ASSESSMENT:    Active Problems:    COPD exacerbation ([address]  Resolved Problems:    * No resolved hospital problems. *    Atrial fibrillation  Hypoxemia  Nonobstructive coronary artery disease  Sleep apnea  Steroid-induced leukocytosis  Chronic heart failure with preserved ejection fraction  Hyperglycemia from steroids  Elevated BMI  PLAN:    1. Transition to oral steroids since he still on IV steroids  2. Maintaining current rate control and anticoagulation  3. Maintaining diuresis which is currently oral and is heart failure appears to be controlled  4. Maintain baseline 4 L of oxygenation        Diet: Adult Oral Nutrition Supplement; Low Calorie/High Protein Oral Supplement  ADULT DIET; Regular; No Added Salt (3-4 gm);  No Caffeine  Code Status: Full Code  PT/OT Eval Status:   Already in place  DVT Prophylaxis:   Novel oral anticoagulation  Recommended disposition at discharge:   Home    +++++++++++++++++++++++++++++++++++++++++++++++++  Mook Govea MD   [facility].  +++++++++++++++++++++++++++++++++++++++++++++++++  NOTE: This report was transcribed using voice recognition software. Every effort was made to ensure accuracy; however, inadvertent computerized transcription errors may be present.

## 2021-09-18 NOTE — DISCHARGE SUMMARY
Hospital Medicine Discharge Summary    Patient ID: Tana Ahumada      Patient's PCP: Jett Charles DO    Admit Date: 9/9/2021     Discharge Date:   9/18/2021    Admitting Physician: Raven Nixon MD     Discharge Physician: Jessica Carroll MD     Discharge Diagnoses: Active Hospital Problems    Diagnosis Date Noted    COPD exacerbation ([facility] [J44.1] 09/09/2021       The patient was seen and examined on day of discharge and this discharge summary is in conjunction with any daily progress note from day of discharge. Hospital Course:     Mr. Tana Ahumada, a 63y.o. year old male who has a past medical history of Atrial flutter ([facility] COPD (chronic obstructive pulmonary disease) ([facility] and Hypertension. Patient presented to the emergency department with complaints of shortness of breath. Just finished a course of prednisone. Breathing became worse today. Is chronically oxygen dependent. Has history of COPD and atrial flutter. Vital signs assessed emergency department revealed patient is 97% on 4 L nasal cannula. Temperature 99.5. Laboratory studies reveal potassium 3.3, creatinine 1.4, proBNP 324, troponin 23. Chest x-ray shows patchy infiltrates in the mid to lower lung zones bilaterally likely due to pneumonia. This could represent viral infection. Patient started on ceftriaxone and doxycycline in the emergency department. Patient apparently fired [employer]   Patient been seen by pulmonary and cardiology. He has component of atrial fibrillation flutter that is decompensating his COPD  He will be discharging today      Exam:     BP (!) 145/93   Pulse 66   Temp 98.3 °F (36.8 °C) (Oral)   Resp 18   Ht 5' 11\" (1.803 m)   Wt 258 lb 6 oz (117.2 kg)   SpO2 99%   BMI 36.04 kg/m²   General appearance: No apparent distress, appears stated age and cooperative. HEENT: Pupils equal, round, and reactive to light. Steroid facies conjunctivae/corneas clear. Neck: Short thick and trachea midline. Respiratory: Distant and decreased cardiovascular: Regular rate and rhythm with normal S1/S2 without murmurs, rubs or gallops. Abdomen: Soft, non-tender, non-distended with normal bowel sounds. Musculoskeletal: No clubbing, cyanosis 1+ edema bilaterally. Skin: No rashes   Neurologic: awake, alert and following commands quite pleasant       Consults:     IP CONSULT TO HOSPITALIST  IP CONSULT TO PULMONOLOGY  IP CONSULT TO PULMONOLOGY  IP CONSULT TO DIETITIAN  IP CONSULT TO CARDIOLOGY    Significant Diagnostic Studies:   XR CHEST (2 VW)    Result Date: 8/23/2021  Hyperinflation. No evidence of acute process. XR CHEST PORTABLE    Result Date: 9/9/2021  Patchy infiltrates in the mid to lower lung zones bilaterally likely due to pneumonia. This could represent viral infection. Continued follow-up recommended. US DUP LOWER EXTREMITY RIGHT CHARBEL    Result Date: 9/16/2021  Within the visualized vessels there is no evidence for deep venous thrombosis     CTA CORON EJECT FRAC WALL MOTION    Result Date: 9/13/2021  No incidental clinically important extracardiac CT findings. Disposition: Stable for home    Discharge Instructions/Follow-up: Given    Code Status:  Full Code     Activity: activity as tolerated    Diet: diabetic diet    Labs:  For convenience and continuity at follow-up the following most recent labs are provided:      CBC:    Lab Results   Component Value Date    WBC 27.4 09/15/2021    HGB 10.7 09/15/2021    HCT 32.8 09/15/2021     09/15/2021       Renal:    Lab Results   Component Value Date     09/15/2021    K 4.0 09/15/2021    K 3.8 09/10/2021    CL 94 09/15/2021    CO2 38 09/15/2021    BUN 38 09/15/2021    CREATININE 1.0 09/15/2021    CALCIUM 9.2 09/15/2021    PHOS 3.9 08/26/2021       Discharge Medications:     Current Discharge Medication List           Details   insulin glargine (LANTUS) 100 UNIT/ML injection vial Inject 15 Units into the skin 2 times daily  Qty: 10 mL, Refills: 3      dexamethasone (DECADRON) 2 MG tablet Take 1 tablet by mouth every 12 hours for 14 doses  Qty: 14 tablet, Refills: 0      busPIRone (BUSPAR) 10 MG tablet Take 1 tablet by mouth 3 times daily as needed (ANXIETY)  Qty: 60 tablet, Refills: 0      albuterol (PROVENTIL) (2.5 MG/3ML) 0.083% nebulizer solution Take 3 mLs by nebulization every 6 hours as needed for Wheezing  Qty: 120 each, Refills: 3      ipratropium (ATROVENT) 0.02 % nebulizer solution Take 2.5 mLs by nebulization 4 times daily  Qty: 2.5 mL, Refills: 3      Arformoterol Tartrate (BROVANA) 15 MCG/2ML NEBU Take 2 mLs by nebulization 2 times daily  Qty: 120 mL, Refills: 3      budesonide (PULMICORT) 0.5 MG/2ML nebulizer suspension Take 2 mLs by nebulization 2 times daily  Qty: 60 each, Refills: 3      guaiFENesin 400 MG tablet Take 1 tablet by mouth 3 times daily  Qty: 56 tablet, Refills: 0      Roflumilast (DALIRESP) 500 MCG tablet Take 1 tablet by mouth daily  Qty: 30 tablet, Refills: 1      Respiratory Therapy Supplies (FULL KIT NEBULIZER SET) MISC Use as directed with nebulized medication. Qty: 1 each, Refills: 0    Comments: Supply prescription to Pharmacy or [address] [place] [address] location of patient or coverage preference. Dispense full nebulizer kit - compressor, tubing, mouthpiece, mask, ancillary supplies - per requirements of ordered product, patient preference, or coverage allowances.               Details   metFORMIN (GLUCOPHAGE) 500 MG tablet Take 1 tablet by mouth 2 times daily (with meals)  Qty: 180 tablet, Refills: 1      bumetanide (BUMEX) 2 MG tablet Take 1 tablet by mouth 2 times daily  Qty: 30 tablet, Refills: 3      potassium chloride (KLOR-CON M) 20 MEQ extended release tablet Take 1 tablet by mouth daily (with breakfast)  Qty: 60 tablet, Refills: 3      aspirin 81 MG chewable tablet Take 81 mg by mouth daily      Budeson-Glycopyrrol-Formoterol (BREZTRI AEROSPHERE) 160-9-4.8 MCG/ACT AERO Inhale 2 puffs into the lungs 2 times daily  Qty: 3 Inhaler, Refills: 0    Associated Diagnoses: Stage 3 severe COPD by GOLD classification ([facility])      apixaban (ELIQUIS) 5 MG TABS tablet Take 1 tablet by mouth 2 times daily  Qty: 30 tablet, Refills: 5    Associated Diagnoses: Persistent atrial fibrillation ([facility])      fluticasone (FLONASE) 50 MCG/ACT nasal spray 1 spray by Each Nostril route daily  Qty: 1 Bottle, Refills: 2      flecainide (TAMBOCOR) 100 MG tablet TAKE 1 TABLET BY MOUTH TWICE DAILY  Qty: 60 tablet, Refills: 5    Comments: This prescription was filled on 1/15/2019. Any refills authorized will be placed on file. diltiazem (CARDIZEM CD) 240 MG extended release capsule Take 1 capsule by mouth 2 times daily  Qty: 60 capsule, Refills: 11      atorvastatin (LIPITOR) 20 MG tablet Take 20 mg by mouth every evening       albuterol sulfate  (90 Base) MCG/ACT inhaler Inhale 2 puffs into the lungs every 6 hours as needed for Wheezing      ipratropium-albuterol (DUONEB) 0.5-2.5 (3) MG/3ML SOLN nebulizer solution Inhale 1 vial into the lungs every 4 hours             Time Spent on discharge is more than 30 minutes in the examination, evaluation, counseling and review of medications and discharge plan.       Signed:    Susan Pretty MD   9/18/2021

## 2021-09-18 NOTE — PROGRESS NOTES
[facility]  Division of Pulmonary, Critical Care Medicine  Pulmonary [address] [facility]           Pulmonary consult         CC :SOB ,  H/o A flutter /a fib poorly controlled     HPI :    Patient seen and examined this morning. Overall reports that his breathing is stable. We discussed plan for discharge that he will be discharged today now that his home oxygen has been delivered and we will plan to see him back in the office in 2 to 3 weeks. PHYSICAL EXAMINATION:     VITAL SIGNS:  BP (!) 145/93   Pulse 66   Temp 98.3 °F (36.8 °C) (Oral)   Resp 18   Ht 5' 11\" (1.803 m)   Wt 258 lb 6 oz (117.2 kg)   SpO2 99%   BMI 36.04 kg/m²   Wt Readings from Last 3 Encounters:   09/13/21 258 lb 6 oz (117.2 kg)   08/30/21 265 lb (120.2 kg)   01/22/20 277 lb (125.6 kg)     Temp Readings from Last 3 Encounters:   09/18/21 98.3 °F (36.8 °C) (Oral)   08/30/21 96.8 °F (36 °C) (Temporal)   08/09/19 97.7 °F (36.5 °C)     TMAX:  BP Readings from Last 3 Encounters:   09/18/21 (!) 145/93   08/30/21 (!) 122/56   01/22/20 132/77     Pulse Readings from Last 3 Encounters:   09/18/21 66   08/30/21 78   01/22/20 109       INTAKE/OUTPUTS:  I/O last 3 completed shifts:   In: 500 [P.O.:480; I.V.:20]  Out: -     Intake/Output Summary (Last 24 hours) at 9/18/2021 1428  Last data filed at 9/17/2021 1431  Gross per 24 hour   Intake 250 ml   Output --   Net 250 ml           LABS/IMAGING:    CBC:  Lab Results   Component Value Date    WBC 27.4 (H) 09/15/2021    HGB 10.7 (L) 09/15/2021    HCT 32.8 (L) 09/15/2021    MCV 95.9 09/15/2021     09/15/2021    LYMPHOPCT 12.2 (L) 09/10/2021    RBC 3.42 (L) 09/15/2021    MCH 31.3 09/15/2021    MCHC 32.6 09/15/2021    RDW 13.4 09/15/2021    NEUTOPHILPCT 74.8 09/10/2021    MONOPCT 7.8 09/10/2021    BASOPCT 0.3 09/10/2021    NEUTROABS 5.28 09/10/2021    LYMPHSABS 0.79 (L) 09/10/2021    MONOSABS 0.53 09/10/2021    EOSABS 0.00 (L) 09/10/2021 BASOSABS 0.00 09/10/2021       Recent Labs     09/15/21  1216 09/14/21  0934 09/10/21  0429   WBC 27.4* 21.0* 6.6   HGB 10.7* 10.1* 9.1*   HCT 32.8* 31.7* 28.3*   MCV 95.9 96.6 94.3    448 342       BMP:   No results for input(s): NA, K, CL, CO2, PHOS, BUN, CREATININE in the last 72 hours. Invalid input(s): CA    MG:   Lab Results   Component Value Date    MG 1.6 09/09/2021     Ca/Phos:   Lab Results   Component Value Date    CALCIUM 9.2 09/15/2021    PHOS 3.9 08/26/2021     Amylase: No results found for: AMYLASE  Lipase:   Lab Results   Component Value Date    LIPASE 28 05/19/2011     LIVER PROFILE:   No results for input(s): AST, ALT, LIPASE, BILIDIR, BILITOT, ALKPHOS in the last 72 hours. Invalid input(s): AMYLASE,  ALB    PT/INR:   No results for input(s): PROTIME, INR in the last 72 hours. APTT: No results for input(s): APTT in the last 72 hours. Cardiac Enzymes:  Lab Results   Component Value Date    CKTOTAL 51 11/13/2010    CKMB 0.5 11/13/2010    TROPONINI <0.01 02/15/2018       Hgb A1C:   Lab Results   Component Value Date    LABA1C 6.4 (H) 09/09/2021     No results found for: EAG  FAMILIA: No results found for: FAMILIA  ESR: No results found for: SEDRATE  CRP: No results found for: CRP  D Dimer: No results found for: DDIMER    Thyroid Studies:  Lab Results   Component Value Date    TSH 0.942 02/14/2018    L3UJZQA 4.8 02/14/2018       PE  Vitals:    09/18/21 1232   BP:    Pulse:    Resp:    Temp:    SpO2: 99%     General:  Awake, alert, oriented X 3. Well developed, well nourished, well groomed. No apparent distress. HEENT:  Normocephalic, atraumatic. Pupils equal, round, reactive to light. No scleral icterus. No conjunctival injection. Normal lips, teeth, and gums. No nasal discharge. Neck:  Supple, FROM  Heart:  RRR, no murmurs, gallops, rubs, carotid upstroke normal, no carotid bruits  Lungs: Scattered wheezing right mid to lower lobe clear right upper lobe and on left  Abdomen:   Bowel sounds present, soft, nontender, no masses, no organomegaly, no peritoneal signs  Extremities:  No clubbing, cyanosis, 1+ edema  Skin:  Warm and dry, no open lesions or rash  Neuro:  Cranial nerves 2-12 intact, no focal deficits  Vascular: Radial and pedal Pulses 2+      PROBLEM LIST:  Patient Active Problem List   Diagnosis    COPD (chronic obstructive pulmonary disease) ([address]    Essential hypertension    Adrenal adenoma    Class 2 obesity due to excess calories with serious comorbidity and body mass index (BMI) of 35.0 to 35.9 in adult    Anticoagulation management encounter    Typical atrial flutter ([address]    Anxiety    Status post catheter ablation of atrial flutter    Persistent atrial fibrillation ([address]    ETOH abuse    COPD with acute exacerbation ([address]    COPD exacerbation (HCC)       ASSESSMENT:  1- A fib with possible acute  HFpEF  2) very severe COPD,doubt exacerbation   3.)obstructive sleep apnea  4. )Afib /Flutter   5.)tobacco independent(quit 5 months ago)      PLAN:  Continue  Bumex PO,  Continue Elquis  Continue BD  Patient to be discharged with a plan for Decadron 2 mg twice daily for 1 week followed by 1 mg twice daily for 1 week followed by 1 mg daily for 1 week and then discontinued. Please discharge on Daliresp which was started today. He is to restart his home BREZTRI  Please discharge on BuSpar as this has significantly helped his anxiety. BiPAP /CPAP at night    Okay to be discharged from a pulmonary standpoint.     Jeanette Miles, DO  Pulmonary/Critical care [address]

## 2021-09-18 NOTE — PROGRESS NOTES
Perfect serve message to Dr Misael Montiel as pt is insisting on being discharged, screaming at this nurse.

## 2021-09-18 NOTE — PLAN OF CARE
Problem: Falls - Risk of:  Goal: Will remain free from falls  Description: Will remain free from falls  9/18/2021 1410 by Karla Koenig RN  Outcome: Completed  9/18/2021 1056 by Karla Koenig RN  Outcome: Met This Shift  9/18/2021 0117 by Renee Wallace RN  Outcome: Met This Shift  9/18/2021 0117 by Renee Wallace RN  Outcome: Met This Shift  Goal: Absence of physical injury  Description: Absence of physical injury  9/18/2021 1410 by Karla Koenig RN  Outcome: Completed  9/18/2021 1056 by Karla Koenig RN  Outcome: Met This Shift  9/18/2021 0117 by Renee Wallace RN  Outcome: Met This Shift  9/18/2021 0117 by Renee Wallace RN  Outcome: Met This Shift

## 2021-09-18 NOTE — PLAN OF CARE
Problem: Falls - Risk of:  Goal: Will remain free from falls  Description: Will remain free from falls  9/18/2021 1056 by Jarvis Dominique RN  Outcome: Met This Shift  9/18/2021 0117 by Val Bolivar RN  Outcome: Met This Shift  9/18/2021 0117 by Val Bolivar RN  Outcome: Met This Shift  Goal: Absence of physical injury  Description: Absence of physical injury  9/18/2021 1056 by Jarvis Dominique RN  Outcome: Met This Shift  9/18/2021 0117 by Val Bolivar RN  Outcome: Met This Shift  9/18/2021 0117 by Val Bolivar RN  Outcome: Met This Shift

## 2021-09-21 ENCOUNTER — TELEPHONE (OUTPATIENT)
Dept: PULMONOLOGY | Age: 61
End: 2021-09-21

## 2021-09-21 NOTE — TELEPHONE ENCOUNTER
This office spoke with the patient's wife regarding the new medication orders that he received during his hospital stay. Patient states that he has been ordered nebulizing solution of budesonide and Brovana. Patient was advised that the [facility] inhaler that he takes already has those medications in them and he should not take both. Patient was told that since his insurance will not pay for the nebulizing solutions to keep taking the [facility]. The other medication that is not covered is the [address]. Patient was told that this office will seek authorization for the medication from his insurance. She verbalized understanding  Sasha HUBER was given name of the medication to obtain the PA.

## 2021-09-24 ENCOUNTER — TELEPHONE (OUTPATIENT)
Dept: PULMONOLOGY | Age: 61
End: 2021-09-24

## 2021-09-24 DIAGNOSIS — J44.0 CHRONIC OBSTRUCTIVE PULMONARY DISEASE WITH ACUTE LOWER RESPIRATORY INFECTION (HCC): ICD-10-CM

## 2021-09-24 DIAGNOSIS — J44.9 STAGE 3 SEVERE COPD BY GOLD CLASSIFICATION (HCC): Primary | ICD-10-CM

## 2021-09-24 RX ORDER — DEXAMETHASONE 1 MG
TABLET ORAL
Qty: 21 TABLET | Refills: 0 | Status: SHIPPED | OUTPATIENT
Start: 2021-09-24 | End: 2021-10-08

## 2021-09-24 NOTE — TELEPHONE ENCOUNTER
Call from Moshe Snider re: Steroid taper. Pt currently taking Decadron 2mg twice a day and has 1 days left. Clarified with Dr. Bettie Rosales about taper. Full 3 weeks to be completed. Addtional taper doses of 1mg BID x 7 days followed by 1mg daily x 7 days to complete taper. Script sent to pharmacy per orders and pt advised to complete taper per orders. Verbalized understanding. Office processing PA requested for Daliresp medication. Pt to follow up if not processed thru pharmacy.

## 2021-10-04 ENCOUNTER — TELEPHONE (OUTPATIENT)
Dept: PULMONOLOGY | Age: 61
End: 2021-10-04

## 2021-10-04 DIAGNOSIS — J44.9 STAGE 3 SEVERE COPD BY GOLD CLASSIFICATION (HCC): Primary | ICD-10-CM

## 2021-10-04 NOTE — TELEPHONE ENCOUNTER
Mailed a letter to patient informing him that his PFT is scheduled for 10-26-21 at 1:00 pm at [address] [facility],[address]. He must arrive by 12:30 pm. He is to have no caffeine for 24 hours prior to test and no resp meds for at least 4 hours prior to test. He must also bring proof of the COVID vaccines.

## 2021-10-11 ENCOUNTER — TELEPHONE (OUTPATIENT)
Dept: OTHER | Facility: CLINIC | Age: 61
End: 2021-10-11

## 2021-10-11 ENCOUNTER — APPOINTMENT (OUTPATIENT)
Dept: GENERAL RADIOLOGY | Age: 61
End: 2021-10-11
Payer: COMMERCIAL

## 2021-10-11 VITALS
DIASTOLIC BLOOD PRESSURE: 98 MMHG | HEART RATE: 99 BPM | SYSTOLIC BLOOD PRESSURE: 143 MMHG | RESPIRATION RATE: 20 BRPM | OXYGEN SATURATION: 97 %

## 2021-10-11 LAB
ALBUMIN SERPL-MCNC: 3.9 G/DL (ref 3.5–5.2)
ALP BLD-CCNC: 74 U/L (ref 40–129)
ALT SERPL-CCNC: 49 U/L (ref 0–40)
ANION GAP SERPL CALCULATED.3IONS-SCNC: 13 MMOL/L (ref 7–16)
AST SERPL-CCNC: 28 U/L (ref 0–39)
BASOPHILS ABSOLUTE: 0.03 E9/L (ref 0–0.2)
BASOPHILS RELATIVE PERCENT: 0.5 % (ref 0–2)
BILIRUB SERPL-MCNC: 0.4 MG/DL (ref 0–1.2)
BUN BLDV-MCNC: 16 MG/DL (ref 6–20)
CALCIUM SERPL-MCNC: 9.3 MG/DL (ref 8.6–10.2)
CHLORIDE BLD-SCNC: 96 MMOL/L (ref 98–107)
CO2: 30 MMOL/L (ref 22–29)
CREAT SERPL-MCNC: 0.8 MG/DL (ref 0.7–1.2)
EKG ATRIAL RATE: 340 BPM
EKG Q-T INTERVAL: 358 MS
EKG QRS DURATION: 98 MS
EKG QTC CALCULATION (BAZETT): 430 MS
EKG R AXIS: 55 DEGREES
EKG T AXIS: 59 DEGREES
EKG VENTRICULAR RATE: 87 BPM
EOSINOPHILS ABSOLUTE: 0.15 E9/L (ref 0.05–0.5)
EOSINOPHILS RELATIVE PERCENT: 2.3 % (ref 0–6)
GFR AFRICAN AMERICAN: >60
GFR NON-AFRICAN AMERICAN: >60 ML/MIN/1.73
GLUCOSE BLD-MCNC: 103 MG/DL (ref 74–99)
HCT VFR BLD CALC: 35.6 % (ref 37–54)
HEMOGLOBIN: 11.9 G/DL (ref 12.5–16.5)
IMMATURE GRANULOCYTES #: 0.25 E9/L
IMMATURE GRANULOCYTES %: 3.8 % (ref 0–5)
LYMPHOCYTES ABSOLUTE: 1.43 E9/L (ref 1.5–4)
LYMPHOCYTES RELATIVE PERCENT: 21.8 % (ref 20–42)
MAGNESIUM: 1.9 MG/DL (ref 1.6–2.6)
MCH RBC QN AUTO: 32.1 PG (ref 26–35)
MCHC RBC AUTO-ENTMCNC: 33.4 % (ref 32–34.5)
MCV RBC AUTO: 96 FL (ref 80–99.9)
MONOCYTES ABSOLUTE: 0.62 E9/L (ref 0.1–0.95)
MONOCYTES RELATIVE PERCENT: 9.5 % (ref 2–12)
NEUTROPHILS ABSOLUTE: 4.07 E9/L (ref 1.8–7.3)
NEUTROPHILS RELATIVE PERCENT: 62.1 % (ref 43–80)
PDW BLD-RTO: 15 FL (ref 11.5–15)
PLATELET # BLD: 199 E9/L (ref 130–450)
PMV BLD AUTO: 10.5 FL (ref 7–12)
POTASSIUM SERPL-SCNC: 3.3 MMOL/L (ref 3.5–5)
PRO-BNP: 438 PG/ML (ref 0–125)
RBC # BLD: 3.71 E12/L (ref 3.8–5.8)
SODIUM BLD-SCNC: 139 MMOL/L (ref 132–146)
TOTAL PROTEIN: 6.2 G/DL (ref 6.4–8.3)
TROPONIN, HIGH SENSITIVITY: 15 NG/L (ref 0–11)
TROPONIN, HIGH SENSITIVITY: 17 NG/L (ref 0–11)
WBC # BLD: 6.6 E9/L (ref 4.5–11.5)

## 2021-10-11 PROCEDURE — 80053 COMPREHEN METABOLIC PANEL: CPT

## 2021-10-11 PROCEDURE — 93005 ELECTROCARDIOGRAM TRACING: CPT | Performed by: NURSE PRACTITIONER

## 2021-10-11 PROCEDURE — 71046 X-RAY EXAM CHEST 2 VIEWS: CPT

## 2021-10-11 PROCEDURE — 84484 ASSAY OF TROPONIN QUANT: CPT

## 2021-10-11 PROCEDURE — 83880 ASSAY OF NATRIURETIC PEPTIDE: CPT

## 2021-10-11 PROCEDURE — 4500000002 HC ER NO CHARGE

## 2021-10-11 PROCEDURE — 83735 ASSAY OF MAGNESIUM: CPT

## 2021-10-11 PROCEDURE — 93010 ELECTROCARDIOGRAM REPORT: CPT | Performed by: INTERNAL MEDICINE

## 2021-10-11 PROCEDURE — 6370000000 HC RX 637 (ALT 250 FOR IP): Performed by: NURSE PRACTITIONER

## 2021-10-11 PROCEDURE — 85025 COMPLETE CBC W/AUTO DIFF WBC: CPT

## 2021-10-11 RX ORDER — IPRATROPIUM BROMIDE AND ALBUTEROL SULFATE 2.5; .5 MG/3ML; MG/3ML
1 SOLUTION RESPIRATORY (INHALATION) ONCE
Status: COMPLETED | OUTPATIENT
Start: 2021-10-11 | End: 2021-10-11

## 2021-10-11 RX ADMIN — IPRATROPIUM BROMIDE AND ALBUTEROL SULFATE 1 AMPULE: .5; 2.5 SOLUTION RESPIRATORY (INHALATION) at 17:22

## 2021-10-11 ASSESSMENT — PAIN SCALES - GENERAL: PAINLEVEL_OUTOF10: 7

## 2021-10-11 NOTE — ED NOTES
FIRST PROVIDER CONTACT ASSESSMENT NOTE      Department of Emergency Medicine   Admit Date: No admission date for patient encounter. Chief Complaint: Chest Pain (reports sharp pain in back and chest with inspiration. continuous oxygen use. on eliquis )      History of Present Illness:    Saundra Muniz is a 63 y.o. male who presents to the ED for chest pain and shortness of breath which began a few days ago. He is on continuous oxygen at 4 L nasal cannula.   He does also take Eliquis for history of atrial fibrillation.        -----------------END OF FIRST PROVIDER CONTACT ASSESSMENT NOTE--------------  Electronically signed by SERA Oliver CNP   DD: 10/11/21               SERA Angela CNP  10/11/21 1208

## 2021-10-12 ENCOUNTER — HOSPITAL ENCOUNTER (EMERGENCY)
Age: 61
Discharge: LEFT BEFORE DISCHARGE | End: 2021-10-12
Payer: COMMERCIAL

## 2021-10-12 ENCOUNTER — TELEPHONE (OUTPATIENT)
Dept: OTHER | Facility: CLINIC | Age: 61
End: 2021-10-12

## 2021-10-12 RX ORDER — IPRATROPIUM BROMIDE AND ALBUTEROL SULFATE 2.5; .5 MG/3ML; MG/3ML
1 SOLUTION RESPIRATORY (INHALATION) ONCE
Status: DISCONTINUED | OUTPATIENT
Start: 2021-10-12 | End: 2021-10-12 | Stop reason: HOSPADM

## 2021-10-12 NOTE — ED NOTES
Pt family came out to desk when this nurse was pivoting a new pt, stated \" that the pt was leaving have been waiting to get a room and hasnt had any of his medications. Pt was aware that\" respiratory was notified of need for treatment and was currently in ct with another pt\". Pt left to er dept. Rn triage aware.      Celina Schwartz, NEVILLE  29/95/23 [id number]

## 2021-10-12 NOTE — ED NOTES
I did update patient as well as his wife on laboratory and imaging results, I also apologized for the long wait and encouraged him to continue to stay. He was provided with small food and drink, currently awaiting bed assignment in the main emergency department. He currently is in our small protocol room with his oxygen connected to the wall unit so he does not run out while waiting for a bed in the main emergency department.      SERA Garcia - LV  10/12/21 0030

## 2021-10-14 ENCOUNTER — TELEPHONE (OUTPATIENT)
Dept: ADMINISTRATIVE | Age: 61
End: 2021-10-14

## 2021-10-14 NOTE — TELEPHONE ENCOUNTER
Patient needs ER 10/11 pain in chest-afib-  Needs follow up appt. Please call wife and advise.  Thanks  Fernandez gao

## 2021-10-16 ENCOUNTER — HOSPITAL ENCOUNTER (EMERGENCY)
Age: 61
Discharge: HOME | DRG: 134 | End: 2021-10-17
Attending: EMERGENCY MEDICINE
Payer: COMMERCIAL

## 2021-10-16 ENCOUNTER — APPOINTMENT (OUTPATIENT)
Dept: GENERAL RADIOLOGY | Age: 61
DRG: 134 | End: 2021-10-16
Payer: COMMERCIAL

## 2021-10-16 ENCOUNTER — APPOINTMENT (OUTPATIENT)
Dept: CT IMAGING | Age: 61
DRG: 134 | End: 2021-10-16
Payer: COMMERCIAL

## 2021-10-16 DIAGNOSIS — R07.9 CHEST PAIN, UNSPECIFIED TYPE: ICD-10-CM

## 2021-10-16 DIAGNOSIS — J18.9 PNEUMONIA OF RIGHT UPPER LOBE DUE TO INFECTIOUS ORGANISM: ICD-10-CM

## 2021-10-16 DIAGNOSIS — J44.1 COPD EXACERBATION (HCC): Primary | ICD-10-CM

## 2021-10-16 LAB
ALBUMIN SERPL-MCNC: 3.8 G/DL (ref 3.5–5.2)
ALP BLD-CCNC: 77 U/L (ref 40–129)
ALT SERPL-CCNC: 34 U/L (ref 0–40)
ANION GAP SERPL CALCULATED.3IONS-SCNC: 11 MMOL/L (ref 7–16)
AST SERPL-CCNC: 21 U/L (ref 0–39)
BASOPHILS ABSOLUTE: 0.02 E9/L (ref 0–0.2)
BASOPHILS RELATIVE PERCENT: 0.4 % (ref 0–2)
BILIRUB SERPL-MCNC: 0.3 MG/DL (ref 0–1.2)
BILIRUBIN URINE: NEGATIVE
BLOOD, URINE: NEGATIVE
BUN BLDV-MCNC: 18 MG/DL (ref 6–20)
CALCIUM SERPL-MCNC: 10.2 MG/DL (ref 8.6–10.2)
CHLORIDE BLD-SCNC: 95 MMOL/L (ref 98–107)
CLARITY: CLEAR
CO2: 34 MMOL/L (ref 22–29)
COLOR: YELLOW
CREAT SERPL-MCNC: 1 MG/DL (ref 0.7–1.2)
EOSINOPHILS ABSOLUTE: 0.11 E9/L (ref 0.05–0.5)
EOSINOPHILS RELATIVE PERCENT: 1.9 % (ref 0–6)
GFR AFRICAN AMERICAN: >60
GFR NON-AFRICAN AMERICAN: >60 ML/MIN/1.73
GLUCOSE BLD-MCNC: 197 MG/DL (ref 74–99)
GLUCOSE URINE: NEGATIVE MG/DL
HCT VFR BLD CALC: 32.2 % (ref 37–54)
HEMOGLOBIN: 10.6 G/DL (ref 12.5–16.5)
IMMATURE GRANULOCYTES #: 0.14 E9/L
IMMATURE GRANULOCYTES %: 2.5 % (ref 0–5)
KETONES, URINE: NEGATIVE MG/DL
LEUKOCYTE ESTERASE, URINE: NEGATIVE
LYMPHOCYTES ABSOLUTE: 1.44 E9/L (ref 1.5–4)
LYMPHOCYTES RELATIVE PERCENT: 25.4 % (ref 20–42)
MAGNESIUM: 1.9 MG/DL (ref 1.6–2.6)
MCH RBC QN AUTO: 31.1 PG (ref 26–35)
MCHC RBC AUTO-ENTMCNC: 32.9 % (ref 32–34.5)
MCV RBC AUTO: 94.4 FL (ref 80–99.9)
MONOCYTES ABSOLUTE: 0.65 E9/L (ref 0.1–0.95)
MONOCYTES RELATIVE PERCENT: 11.5 % (ref 2–12)
NEUTROPHILS ABSOLUTE: 3.3 E9/L (ref 1.8–7.3)
NEUTROPHILS RELATIVE PERCENT: 58.3 % (ref 43–80)
NITRITE, URINE: NEGATIVE
PDW BLD-RTO: 14.9 FL (ref 11.5–15)
PH UA: 6 (ref 5–9)
PLATELET # BLD: 212 E9/L (ref 130–450)
PMV BLD AUTO: 10.1 FL (ref 7–12)
POTASSIUM REFLEX MAGNESIUM: 3.5 MMOL/L (ref 3.5–5)
PRO-BNP: 258 PG/ML (ref 0–125)
PROTEIN UA: NEGATIVE MG/DL
RBC # BLD: 3.41 E12/L (ref 3.8–5.8)
SARS-COV-2, NAAT: NOT DETECTED
SODIUM BLD-SCNC: 140 MMOL/L (ref 132–146)
SPECIFIC GRAVITY UA: 1.02 (ref 1–1.03)
TOTAL PROTEIN: 6.6 G/DL (ref 6.4–8.3)
TROPONIN, HIGH SENSITIVITY: 18 NG/L (ref 0–11)
TROPONIN, HIGH SENSITIVITY: 18 NG/L (ref 0–11)
UROBILINOGEN, URINE: 0.2 E.U./DL
WBC # BLD: 5.7 E9/L (ref 4.5–11.5)

## 2021-10-16 PROCEDURE — 99284 EMERGENCY DEPT VISIT MOD MDM: CPT

## 2021-10-16 PROCEDURE — 96372 THER/PROPH/DIAG INJ SC/IM: CPT

## 2021-10-16 PROCEDURE — 93005 ELECTROCARDIOGRAM TRACING: CPT | Performed by: PHYSICIAN ASSISTANT

## 2021-10-16 PROCEDURE — 6370000000 HC RX 637 (ALT 250 FOR IP): Performed by: PHYSICIAN ASSISTANT

## 2021-10-16 PROCEDURE — 96368 THER/DIAG CONCURRENT INF: CPT

## 2021-10-16 PROCEDURE — 80053 COMPREHEN METABOLIC PANEL: CPT

## 2021-10-16 PROCEDURE — 71046 X-RAY EXAM CHEST 2 VIEWS: CPT

## 2021-10-16 PROCEDURE — 84484 ASSAY OF TROPONIN QUANT: CPT

## 2021-10-16 PROCEDURE — 6370000000 HC RX 637 (ALT 250 FOR IP): Performed by: EMERGENCY MEDICINE

## 2021-10-16 PROCEDURE — 71275 CT ANGIOGRAPHY CHEST: CPT

## 2021-10-16 PROCEDURE — 83735 ASSAY OF MAGNESIUM: CPT

## 2021-10-16 PROCEDURE — 2580000003 HC RX 258: Performed by: EMERGENCY MEDICINE

## 2021-10-16 PROCEDURE — 36415 COLL VENOUS BLD VENIPUNCTURE: CPT

## 2021-10-16 PROCEDURE — 96365 THER/PROPH/DIAG IV INF INIT: CPT

## 2021-10-16 PROCEDURE — 96375 TX/PRO/DX INJ NEW DRUG ADDON: CPT

## 2021-10-16 PROCEDURE — 6360000002 HC RX W HCPCS: Performed by: EMERGENCY MEDICINE

## 2021-10-16 PROCEDURE — 87635 SARS-COV-2 COVID-19 AMP PRB: CPT

## 2021-10-16 PROCEDURE — 81003 URINALYSIS AUTO W/O SCOPE: CPT

## 2021-10-16 PROCEDURE — 87040 BLOOD CULTURE FOR BACTERIA: CPT

## 2021-10-16 PROCEDURE — 85025 COMPLETE CBC W/AUTO DIFF WBC: CPT

## 2021-10-16 PROCEDURE — 83880 ASSAY OF NATRIURETIC PEPTIDE: CPT

## 2021-10-16 PROCEDURE — 6360000004 HC RX CONTRAST MEDICATION: Performed by: RADIOLOGY

## 2021-10-16 PROCEDURE — 96366 THER/PROPH/DIAG IV INF ADDON: CPT

## 2021-10-16 PROCEDURE — 96374 THER/PROPH/DIAG INJ IV PUSH: CPT

## 2021-10-16 RX ORDER — PREDNISONE 20 MG/1
40 TABLET ORAL DAILY
Qty: 10 TABLET | Refills: 0 | Status: SHIPPED | OUTPATIENT
Start: 2021-10-16 | End: 2021-10-17 | Stop reason: SINTOL

## 2021-10-16 RX ORDER — KETOROLAC TROMETHAMINE 30 MG/ML
30 INJECTION, SOLUTION INTRAMUSCULAR; INTRAVENOUS ONCE
Status: COMPLETED | OUTPATIENT
Start: 2021-10-16 | End: 2021-10-16

## 2021-10-16 RX ORDER — IPRATROPIUM BROMIDE AND ALBUTEROL SULFATE 2.5; .5 MG/3ML; MG/3ML
1 SOLUTION RESPIRATORY (INHALATION) ONCE
Status: COMPLETED | OUTPATIENT
Start: 2021-10-16 | End: 2021-10-16

## 2021-10-16 RX ORDER — FENTANYL CITRATE 50 UG/ML
50 INJECTION, SOLUTION INTRAMUSCULAR; INTRAVENOUS ONCE
Status: COMPLETED | OUTPATIENT
Start: 2021-10-16 | End: 2021-10-16

## 2021-10-16 RX ORDER — MAGNESIUM SULFATE HEPTAHYDRATE 500 MG/ML
1000 INJECTION, SOLUTION INTRAMUSCULAR; INTRAVENOUS ONCE
Status: DISCONTINUED | OUTPATIENT
Start: 2021-10-16 | End: 2021-10-16

## 2021-10-16 RX ORDER — HYDROCODONE BITARTRATE AND ACETAMINOPHEN 5; 325 MG/1; MG/1
1 TABLET ORAL EVERY 6 HOURS PRN
Qty: 12 TABLET | Refills: 0 | Status: ON HOLD | OUTPATIENT
Start: 2021-10-16 | End: 2021-10-26 | Stop reason: HOSPADM

## 2021-10-16 RX ORDER — MAGNESIUM SULFATE 1 G/100ML
1000 INJECTION INTRAVENOUS ONCE
Status: COMPLETED | OUTPATIENT
Start: 2021-10-16 | End: 2021-10-16

## 2021-10-16 RX ORDER — MAGNESIUM SULFATE 1 G/100ML
1000 INJECTION INTRAVENOUS ONCE
Status: DISCONTINUED | OUTPATIENT
Start: 2021-10-16 | End: 2021-10-16

## 2021-10-16 RX ORDER — AZITHROMYCIN 250 MG/1
TABLET, FILM COATED ORAL
Qty: 6 TABLET | Refills: 0 | Status: ON HOLD | OUTPATIENT
Start: 2021-10-16 | End: 2021-10-26 | Stop reason: HOSPADM

## 2021-10-16 RX ORDER — ONDANSETRON 2 MG/ML
4 INJECTION INTRAMUSCULAR; INTRAVENOUS ONCE
Status: COMPLETED | OUTPATIENT
Start: 2021-10-16 | End: 2021-10-16

## 2021-10-16 RX ORDER — PRIMIDONE 50 MG/1
25 TABLET ORAL NIGHTLY
COMMUNITY

## 2021-10-16 RX ORDER — CEFDINIR 300 MG/1
300 CAPSULE ORAL 2 TIMES DAILY
Qty: 14 CAPSULE | Refills: 0 | Status: ON HOLD | OUTPATIENT
Start: 2021-10-16 | End: 2021-10-26 | Stop reason: HOSPADM

## 2021-10-16 RX ORDER — METHYLPREDNISOLONE SODIUM SUCCINATE 125 MG/2ML
125 INJECTION, POWDER, LYOPHILIZED, FOR SOLUTION INTRAMUSCULAR; INTRAVENOUS ONCE
Status: COMPLETED | OUTPATIENT
Start: 2021-10-16 | End: 2021-10-16

## 2021-10-16 RX ORDER — HYDROCODONE BITARTRATE AND ACETAMINOPHEN 5; 325 MG/1; MG/1
2 TABLET ORAL ONCE
Status: COMPLETED | OUTPATIENT
Start: 2021-10-16 | End: 2021-10-16

## 2021-10-16 RX ORDER — DOXYCYCLINE HYCLATE 100 MG/1
100 CAPSULE ORAL 2 TIMES DAILY
COMMUNITY
End: 2021-10-16

## 2021-10-16 RX ORDER — CEFTRIAXONE 1 G/1
1000 INJECTION, POWDER, FOR SOLUTION INTRAMUSCULAR; INTRAVENOUS ONCE
Status: COMPLETED | OUTPATIENT
Start: 2021-10-16 | End: 2021-10-16

## 2021-10-16 RX ORDER — IPRATROPIUM BROMIDE AND ALBUTEROL SULFATE 2.5; .5 MG/3ML; MG/3ML
1 SOLUTION RESPIRATORY (INHALATION) ONCE
Status: DISCONTINUED | OUTPATIENT
Start: 2021-10-16 | End: 2021-10-17 | Stop reason: HOSPADM

## 2021-10-16 RX ADMIN — IOPAMIDOL 60 ML: 755 INJECTION, SOLUTION INTRAVENOUS at 20:09

## 2021-10-16 RX ADMIN — IPRATROPIUM BROMIDE AND ALBUTEROL SULFATE 1 AMPULE: .5; 3 SOLUTION RESPIRATORY (INHALATION) at 17:13

## 2021-10-16 RX ADMIN — KETOROLAC TROMETHAMINE 30 MG: 30 INJECTION, SOLUTION INTRAMUSCULAR at 22:06

## 2021-10-16 RX ADMIN — MAGNESIUM SULFATE HEPTAHYDRATE 1000 MG: 1 INJECTION, SOLUTION INTRAVENOUS at 22:18

## 2021-10-16 RX ADMIN — HYDROCODONE BITARTRATE AND ACETAMINOPHEN 2 TABLET: 5; 325 TABLET ORAL at 22:05

## 2021-10-16 RX ADMIN — CEFTRIAXONE 1000 MG: 1 INJECTION, POWDER, FOR SOLUTION INTRAMUSCULAR; INTRAVENOUS at 22:06

## 2021-10-16 RX ADMIN — ONDANSETRON 4 MG: 2 INJECTION INTRAMUSCULAR; INTRAVENOUS at 17:54

## 2021-10-16 RX ADMIN — METHYLPREDNISOLONE SODIUM SUCCINATE 125 MG: 125 INJECTION, POWDER, FOR SOLUTION INTRAMUSCULAR; INTRAVENOUS at 17:54

## 2021-10-16 RX ADMIN — FENTANYL CITRATE 50 MCG: 50 INJECTION INTRAMUSCULAR; INTRAVENOUS at 17:54

## 2021-10-16 RX ADMIN — AZITHROMYCIN MONOHYDRATE 500 MG: 500 INJECTION, POWDER, LYOPHILIZED, FOR SOLUTION INTRAVENOUS at 22:06

## 2021-10-16 ASSESSMENT — PAIN SCALES - GENERAL
PAINLEVEL_OUTOF10: 7
PAINLEVEL_OUTOF10: 7

## 2021-10-16 NOTE — ED PROVIDER NOTES
HPI:  10/16/21,   Time: 5:38 PM EDT       Maria Luz Austin is a 63 y.o. male presenting to the ED for sob/sharp rt sided cp, beginning 5 days ago. The complaint has been persistent, moderate in severity, and worsened by deep breath. Copd on 4l nc, worsening sob with cp. Pain sharp. Hurts to cough. No fever/chills/sweats/n/v/d/abd pain. Bib private vehicle. Here earlier this week for same. Denies hx same    Review of Systems:   Pertinent positives and negatives are stated within HPI, all other systems reviewed and are negative.          --------------------------------------------- PAST HISTORY ---------------------------------------------  Past Medical History:  has a past medical history of Atrial flutter ([facility] [address] COPD (chronic obstructive pulmonary disease) ([facility] [address] and Hypertension. Past Surgical History:  has a past surgical history that includes back surgery; Cardioversion (02/07/2018); transesophageal echocardiogram (02/07/2018); and Atrial ablation surgery (04/03/2018). Social History:  reports that he quit smoking about 4 years ago. His smoking use included cigarettes. He has a 60.00 pack-year smoking history. He has never used smokeless tobacco. He reports previous alcohol use of about 3.0 - 4.0 standard drinks of alcohol per week. He reports that he does not use drugs. Family History: family history includes Lung Cancer in his father; Other in his mother; Pacemaker in his sister. The patients home medications have been reviewed. Allergies: Patient has no known allergies.         ---------------------------------------------------PHYSICAL EXAM--------------------------------------    Constitutional/General: Alert and oriented x3, well appearing, non toxic in NAD  Head: Normocephalic and atraumatic  Eyes: PERRL, EOMI, conjunctive normal, sclera non icteric  Mouth: Oropharynx clear, handling secretions, no trismus, no asymmetry of the posterior oropharynx or uvular edema  Neck: Supple, full ROM, non tender to palpation in the midline, no stridor, no crepitus, no meningeal signs  Respiratory: mild wheezing Not in respiratory distress  Cardiovascular:  Regular rate. Regular rhythm. No murmurs, gallops, or rubs. 2+ distal pulses  Chest: No chest wall tenderness  GI:  Abdomen Soft, Non tender, Non distended. +BS. No organomegaly, no palpable masses,  No rebound, guarding, or rigidity. Musculoskeletal: Moves all extremities x 4. Warm and well perfused, no clubbing, cyanosis, or edema. Capillary refill <3 seconds  Integument: skin warm and dry. No rashes. Lymphatic: no lymphadenopathy noted  Neurologic: GCS 15, no focal deficits, symmetric strength 5/5 in the upper and lower extremities bilaterally  Psychiatric: Normal Affect    -------------------------------------------------- RESULTS -------------------------------------------------  I have personally reviewed all laboratory and imaging results for this patient. Results are listed below.      LABS:  Results for orders placed or performed during the hospital encounter of 10/16/21   COVID-19, Rapid    Specimen: Nasopharyngeal Swab   Result Value Ref Range    SARS-CoV-2, NAAT Not Detected Not Detected   CBC Auto Differential   Result Value Ref Range    WBC 5.7 4.5 - 11.5 E9/L    RBC 3.41 (L) 3.80 - 5.80 E12/L    Hemoglobin 10.6 (L) 12.5 - 16.5 g/dL    Hematocrit 32.2 (L) 37.0 - 54.0 %    MCV 94.4 80.0 - 99.9 fL    MCH 31.1 26.0 - 35.0 pg    MCHC 32.9 32.0 - 34.5 %    RDW 14.9 11.5 - 15.0 fL    Platelets 504 947 - 886 E9/L    MPV 10.1 7.0 - 12.0 fL    Neutrophils % 58.3 43.0 - 80.0 %    Immature Granulocytes % 2.5 0.0 - 5.0 %    Lymphocytes % 25.4 20.0 - 42.0 %    Monocytes % 11.5 2.0 - 12.0 %    Eosinophils % 1.9 0.0 - 6.0 %    Basophils % 0.4 0.0 - 2.0 %    Neutrophils Absolute 3.30 1.80 - 7.30 E9/L    Immature Granulocytes # 0.14 E9/L    Lymphocytes Absolute 1.44 (L) 1.50 - 4.00 E9/L    Monocytes Absolute 0.65 0.10 - 0.95 E9/L    Eosinophils Absolute 0.11 0.05 - 0.50 E9/L    Basophils Absolute 0.02 0.00 - 0.20 E9/L   Comprehensive Metabolic Panel w/ Reflex to MG   Result Value Ref Range    Sodium 140 132 - 146 mmol/L    Potassium reflex Magnesium 3.5 3.5 - 5.0 mmol/L    Chloride 95 (L) 98 - 107 mmol/L    CO2 34 (H) 22 - 29 mmol/L    Anion Gap 11 7 - 16 mmol/L    Glucose 197 (H) 74 - 99 mg/dL    BUN 18 6 - 20 mg/dL    CREATININE 1.0 0.7 - 1.2 mg/dL    GFR Non-African American >60 >=60 mL/min/1.73    GFR African American >60     Calcium 10.2 8.6 - 10.2 mg/dL    Total Protein 6.6 6.4 - 8.3 g/dL    Albumin 3.8 3.5 - 5.2 g/dL    Total Bilirubin 0.3 0.0 - 1.2 mg/dL    Alkaline Phosphatase 77 40 - 129 U/L    ALT 34 0 - 40 U/L    AST 21 0 - 39 U/L   Troponin   Result Value Ref Range    Troponin, High Sensitivity 18 (H) 0 - 11 ng/L   Brain Natriuretic Peptide   Result Value Ref Range    Pro- (H) 0 - 125 pg/mL   Urinalysis, reflex to microscopic   Result Value Ref Range    Color, UA Yellow Straw/Yellow    Clarity, UA Clear Clear    Glucose, Ur Negative Negative mg/dL    Bilirubin Urine Negative Negative    Ketones, Urine Negative Negative mg/dL    Specific Gravity, UA 1.025 1.005 - 1.030    Blood, Urine Negative Negative    pH, UA 6.0 5.0 - 9.0    Protein, UA Negative Negative mg/dL    Urobilinogen, Urine 0.2 <2.0 E.U./dL    Nitrite, Urine Negative Negative    Leukocyte Esterase, Urine Negative Negative   Magnesium   Result Value Ref Range    Magnesium 1.9 1.6 - 2.6 mg/dL   Troponin   Result Value Ref Range    Troponin, High Sensitivity 18 (H) 0 - 11 ng/L   EKG 12 Lead   Result Value Ref Range    Ventricular Rate 81 BPM    Atrial Rate 81 BPM    P-R Interval 216 ms    QRS Duration 108 ms    Q-T Interval 384 ms    QTc Calculation (Bazett) 446 ms    P Axis 112 degrees    R Axis 36 degrees    T Axis 55 degrees       RADIOLOGY:  Interpreted by Radiologist.  CTA PULMONARY W CONTRAST   Final Result   There is no central pulmonary embolism or aortic dissection. EKG:  This EKG is signed and interpreted by the EP. Time: 1419  Rate: 80  Rhythm: Sinus  Interpretation: non-specific EKG  Comparison: None      ------------------------- NURSING NOTES AND VITALS REVIEWED ---------------------------   The nursing notes within the ED encounter and vital signs as below have been reviewed by myself. BP (!) 173/90   Pulse 84   Temp 98.4 °F (36.9 °C)   Resp 18   Ht 5' 11\" (1.803 m)   Wt 261 lb (118.4 kg)   SpO2 96%   BMI 36.40 kg/m²   Oxygen Saturation Interpretation: Normal    The patients available past medical records and past encounters were reviewed.         ------------------------------ ED COURSE/MEDICAL DECISION MAKING----------------------  Medications   ipratropium-albuterol (DUONEB) nebulizer solution 1 ampule (has no administration in time range)   azithromycin (ZITHROMAX) 500 mg in D5W 250ml Vial Mate (500 mg IntraVENous New Bag 10/16/21 2206)   magnesium sulfate 1000 mg in dextrose 5% 100 mL IVPB (has no administration in time range)   ipratropium-albuterol (DUONEB) nebulizer solution 1 ampule (1 ampule Inhalation Given 10/16/21 1713)   ipratropium-albuterol (DUONEB) nebulizer solution 1 ampule (1 ampule Inhalation Given 10/16/21 1713)   fentaNYL (SUBLIMAZE) injection 50 mcg (50 mcg IntraVENous Given 10/16/21 1754)   ondansetron (ZOFRAN) injection 4 mg (4 mg IntraVENous Given 10/16/21 1754)   methylPREDNISolone sodium (SOLU-MEDROL) injection 125 mg (125 mg IntraVENous Given 10/16/21 1754)   iopamidol (ISOVUE-370) 76 % injection 60 mL (60 mLs IntraVENous Given 10/16/21 2009)   ketorolac (TORADOL) injection 30 mg (30 mg IntraVENous Given 10/16/21 2206)   HYDROcodone-acetaminophen (NORCO) 5-325 MG per tablet 2 tablet (2 tablets Oral Given 10/16/21 2205)   cefTRIAXone (ROCEPHIN) injection 1,000 mg (1,000 mg IntraMUSCular Given 10/16/21 2206)         ED COURSE:       Medical Decision Making:    o2 stable on home o2, breathing improved with tx, cta noted, no pe, jeannette palomares, on doxy, discussed inpt tx, pt doesn't wish to stay, will change to omnicef/z pack, dc analagesia for cp. Pt agreeable to poc      This patient's ED course included: a personal history and physicial examination    This patient has remained hemodynamically stable during their ED course. Re-Evaluations:             Re-evaluation. Patients symptoms are improving    Re-examination  10/16/21   9:38 PM EDT          Vital Signs:   Vitals:    10/16/21 1358 10/16/21 1409 10/16/21 1411 10/16/21 2043   BP:  98/84  (!) 173/90   Pulse: 88 79  84   Resp:  26 (!) 34 18   Temp:  98.4 °F (36.9 °C)     SpO2: 97% 94%  96%   Weight:  261 lb (118.4 kg)     Height:  5' 11\" (1.803 m)       Card/Pulm:  Rhythm: normal rate. Heart Sounds: no murmurs, gallops, or rubs. clear to auscultation, no wheezes or rales and unlabored breathing. Capillary Refill: normal.  Radial Pulse:  equal.  Skin:  Warm. Consultations:                 Critical Care:         Counseling: The emergency provider has spoken with the patient and discussed todays results, in addition to providing specific details for the plan of care and counseling regarding the diagnosis and prognosis. Questions are answered at this time and they are agreeable with the plan.       --------------------------------- IMPRESSION AND DISPOSITION ---------------------------------    IMPRESSION  1. COPD exacerbation ([address]    2. Pneumonia of right upper lobe due to infectious organism    3. Chest pain, unspecified type        DISPOSITION  Disposition: Discharge to home  Patient condition is stable    NOTE: This report was transcribed using voice recognition software.  Every effort was made to ensure accuracy; however, inadvertent computerized transcription errors may be present        Nandini Palomo MD  10/16/21 8174

## 2021-10-16 NOTE — ED TRIAGE NOTES
[facility] CONTACT ASSESSMENT NOTE                                                                                                Department of Emergency Medicine                                                      First Provider Note  10/16/21  2:08 PM EDT  NAME: Moira Hoskins  : 1960  MRN: [id number]    Chief Complaint: Shortness of Breath (Patient c/o shortness of breath getting worse since last visit )      History of Present Illness:   Moira Hoskins is a 63 y.o. male who presents to the ED for chest pain and shortness of breath x 1 week. Reports he is also \"filling up with fluid. \" Wife reports O2 dropped to 87% on BiPap while sleeping and HR up to 104. PCP rx an abx but symptoms are worsening. Focused Physical Exam:  VS:    ED Triage Vitals [10/16/21 1358]   BP Temp Temp src Pulse Resp SpO2 Height Weight   -- -- -- 88 -- 97 % -- --        General: Alert and in no apparent distress. Medical History:  has a past medical history of Atrial flutter ([address] COPD (chronic obstructive pulmonary disease) ([facility] [address] and Hypertension. Surgical History:  has a past surgical history that includes back surgery; Cardioversion (2018); transesophageal echocardiogram (2018); and Atrial ablation surgery (2018). Social History:  reports that he quit smoking about 4 years ago. His smoking use included cigarettes. He has a 60.00 pack-year smoking history. He has never used smokeless tobacco. He reports previous alcohol use of about 3.0 - 4.0 standard drinks of alcohol per week. He reports that he does not use drugs. Family History: family history includes Lung Cancer in his father; Other in his mother; Pacemaker in his sister. Allergies: Patient has no known allergies.      Initial Plan of Care:  Initiate Treatment-Testing, Proceed toTreatment Area When Bed Available for ED Attending/MLP to Continue Care    -------------------------------------------------END OF FIRST PROVIDER CONTACT ASSESSMENT NOTE--------------------------------------------------------  Electronically signed by STEPH Subramanian   DD: 10/16/21

## 2021-10-17 VITALS
TEMPERATURE: 98.4 F | SYSTOLIC BLOOD PRESSURE: 168 MMHG | HEART RATE: 76 BPM | BODY MASS INDEX: 36.54 KG/M2 | RESPIRATION RATE: 22 BRPM | WEIGHT: 261 LBS | OXYGEN SATURATION: 97 % | DIASTOLIC BLOOD PRESSURE: 87 MMHG | HEIGHT: 71 IN

## 2021-10-17 LAB
EKG ATRIAL RATE: 81 BPM
EKG P AXIS: 112 DEGREES
EKG P-R INTERVAL: 216 MS
EKG Q-T INTERVAL: 384 MS
EKG QRS DURATION: 108 MS
EKG QTC CALCULATION (BAZETT): 446 MS
EKG R AXIS: 36 DEGREES
EKG T AXIS: 55 DEGREES
EKG VENTRICULAR RATE: 81 BPM

## 2021-10-17 PROCEDURE — 93010 ELECTROCARDIOGRAM REPORT: CPT | Performed by: INTERNAL MEDICINE

## 2021-10-17 PROCEDURE — 96375 TX/PRO/DX INJ NEW DRUG ADDON: CPT

## 2021-10-17 PROCEDURE — 6360000002 HC RX W HCPCS: Performed by: EMERGENCY MEDICINE

## 2021-10-17 RX ORDER — DEXAMETHASONE SODIUM PHOSPHATE 10 MG/ML
10 INJECTION INTRAMUSCULAR; INTRAVENOUS ONCE
Status: COMPLETED | OUTPATIENT
Start: 2021-10-17 | End: 2021-10-17

## 2021-10-17 RX ADMIN — DEXAMETHASONE SODIUM PHOSPHATE 10 MG: 10 INJECTION INTRAMUSCULAR; INTRAVENOUS at 00:56

## 2021-10-17 NOTE — ED NOTES
Bed: 06  Expected date:   Expected time:   Means of arrival:   Comments:  triage     Margi Rey RN  10/16/21 2024

## 2021-10-18 ENCOUNTER — HOSPITAL ENCOUNTER (INPATIENT)
Age: 61
LOS: 7 days | Discharge: HOME | DRG: 134 | End: 2021-10-26
Attending: EMERGENCY MEDICINE | Admitting: INTERNAL MEDICINE
Payer: COMMERCIAL

## 2021-10-18 ENCOUNTER — TELEPHONE (OUTPATIENT)
Dept: OTHER | Facility: CLINIC | Age: 61
End: 2021-10-18

## 2021-10-18 DIAGNOSIS — J44.9 CHRONIC OBSTRUCTIVE PULMONARY DISEASE, UNSPECIFIED COPD TYPE (HCC): ICD-10-CM

## 2021-10-18 DIAGNOSIS — I26.93 SINGLE SUBSEGMENTAL PULMONARY EMBOLISM WITHOUT ACUTE COR PULMONALE (HCC): Primary | ICD-10-CM

## 2021-10-18 DIAGNOSIS — R06.02 SHORTNESS OF BREATH: ICD-10-CM

## 2021-10-18 LAB
ALBUMIN SERPL-MCNC: 4.1 G/DL (ref 3.5–5.2)
ALP BLD-CCNC: 76 U/L (ref 40–129)
ALT SERPL-CCNC: 32 U/L (ref 0–40)
ANION GAP SERPL CALCULATED.3IONS-SCNC: 7 MMOL/L (ref 7–16)
APTT: 29.9 SEC (ref 24.5–35.1)
AST SERPL-CCNC: 21 U/L (ref 0–39)
BASOPHILS ABSOLUTE: 0.03 E9/L (ref 0–0.2)
BASOPHILS RELATIVE PERCENT: 0.3 % (ref 0–2)
BILIRUB SERPL-MCNC: <0.2 MG/DL (ref 0–1.2)
BUN BLDV-MCNC: 31 MG/DL (ref 6–20)
CALCIUM SERPL-MCNC: 10.1 MG/DL (ref 8.6–10.2)
CHLORIDE BLD-SCNC: 98 MMOL/L (ref 98–107)
CO2: 38 MMOL/L (ref 22–29)
CREAT SERPL-MCNC: 1.1 MG/DL (ref 0.7–1.2)
EOSINOPHILS ABSOLUTE: 0.01 E9/L (ref 0.05–0.5)
EOSINOPHILS RELATIVE PERCENT: 0.1 % (ref 0–6)
GFR AFRICAN AMERICAN: >60
GFR NON-AFRICAN AMERICAN: >60 ML/MIN/1.73
GLUCOSE BLD-MCNC: 162 MG/DL (ref 74–99)
HCT VFR BLD CALC: 31.5 % (ref 37–54)
HEMOGLOBIN: 10.2 G/DL (ref 12.5–16.5)
IMMATURE GRANULOCYTES #: 0.42 E9/L
IMMATURE GRANULOCYTES %: 4.6 % (ref 0–5)
INR BLD: 1.3
LYMPHOCYTES ABSOLUTE: 1.3 E9/L (ref 1.5–4)
LYMPHOCYTES RELATIVE PERCENT: 14.3 % (ref 20–42)
MCH RBC QN AUTO: 31.4 PG (ref 26–35)
MCHC RBC AUTO-ENTMCNC: 32.4 % (ref 32–34.5)
MCV RBC AUTO: 96.9 FL (ref 80–99.9)
MONOCYTES ABSOLUTE: 1.14 E9/L (ref 0.1–0.95)
MONOCYTES RELATIVE PERCENT: 12.6 % (ref 2–12)
NEUTROPHILS ABSOLUTE: 6.17 E9/L (ref 1.8–7.3)
NEUTROPHILS RELATIVE PERCENT: 68.1 % (ref 43–80)
PDW BLD-RTO: 15.2 FL (ref 11.5–15)
PLATELET # BLD: 269 E9/L (ref 130–450)
PMV BLD AUTO: 10.7 FL (ref 7–12)
POTASSIUM SERPL-SCNC: 4.2 MMOL/L (ref 3.5–5)
PRO-BNP: 335 PG/ML (ref 0–125)
PROTHROMBIN TIME: 14.5 SEC (ref 9.3–12.4)
RBC # BLD: 3.25 E12/L (ref 3.8–5.8)
SODIUM BLD-SCNC: 143 MMOL/L (ref 132–146)
TOTAL PROTEIN: 6.9 G/DL (ref 6.4–8.3)
TROPONIN, HIGH SENSITIVITY: 16 NG/L (ref 0–11)
WBC # BLD: 9.1 E9/L (ref 4.5–11.5)

## 2021-10-18 PROCEDURE — G0378 HOSPITAL OBSERVATION PER HR: HCPCS

## 2021-10-18 PROCEDURE — 96372 THER/PROPH/DIAG INJ SC/IM: CPT

## 2021-10-18 PROCEDURE — 96375 TX/PRO/DX INJ NEW DRUG ADDON: CPT

## 2021-10-18 PROCEDURE — 2500000003 HC RX 250 WO HCPCS: Performed by: NURSE PRACTITIONER

## 2021-10-18 PROCEDURE — 80053 COMPREHEN METABOLIC PANEL: CPT

## 2021-10-18 PROCEDURE — 99283 EMERGENCY DEPT VISIT LOW MDM: CPT

## 2021-10-18 PROCEDURE — 2580000003 HC RX 258: Performed by: EMERGENCY MEDICINE

## 2021-10-18 PROCEDURE — 85730 THROMBOPLASTIN TIME PARTIAL: CPT

## 2021-10-18 PROCEDURE — 6370000000 HC RX 637 (ALT 250 FOR IP): Performed by: NURSE PRACTITIONER

## 2021-10-18 PROCEDURE — 6360000002 HC RX W HCPCS: Performed by: NURSE PRACTITIONER

## 2021-10-18 PROCEDURE — 6360000002 HC RX W HCPCS: Performed by: EMERGENCY MEDICINE

## 2021-10-18 PROCEDURE — 85025 COMPLETE CBC W/AUTO DIFF WBC: CPT

## 2021-10-18 PROCEDURE — 96374 THER/PROPH/DIAG INJ IV PUSH: CPT

## 2021-10-18 PROCEDURE — 94664 DEMO&/EVAL PT USE INHALER: CPT

## 2021-10-18 PROCEDURE — 84484 ASSAY OF TROPONIN QUANT: CPT

## 2021-10-18 PROCEDURE — 93005 ELECTROCARDIOGRAM TRACING: CPT | Performed by: NURSE PRACTITIONER

## 2021-10-18 PROCEDURE — 85610 PROTHROMBIN TIME: CPT

## 2021-10-18 PROCEDURE — 83880 ASSAY OF NATRIURETIC PEPTIDE: CPT

## 2021-10-18 PROCEDURE — 36415 COLL VENOUS BLD VENIPUNCTURE: CPT

## 2021-10-18 RX ORDER — ONDANSETRON 2 MG/ML
4 INJECTION INTRAMUSCULAR; INTRAVENOUS ONCE
Status: COMPLETED | OUTPATIENT
Start: 2021-10-18 | End: 2021-10-18

## 2021-10-18 RX ORDER — OXYCODONE HYDROCHLORIDE AND ACETAMINOPHEN 5; 325 MG/1; MG/1
1 TABLET ORAL ONCE
Status: COMPLETED | OUTPATIENT
Start: 2021-10-18 | End: 2021-10-18

## 2021-10-18 RX ORDER — OXYCODONE HYDROCHLORIDE AND ACETAMINOPHEN 5; 325 MG/1; MG/1
1 TABLET ORAL ONCE
Status: DISCONTINUED | OUTPATIENT
Start: 2021-10-18 | End: 2021-10-18

## 2021-10-18 RX ORDER — BUMETANIDE 0.25 MG/ML
1 INJECTION, SOLUTION INTRAMUSCULAR; INTRAVENOUS ONCE
Status: COMPLETED | OUTPATIENT
Start: 2021-10-18 | End: 2021-10-18

## 2021-10-18 RX ORDER — SODIUM CHLORIDE 9 MG/ML
1000 INJECTION, SOLUTION INTRAVENOUS CONTINUOUS
Status: DISCONTINUED | OUTPATIENT
Start: 2021-10-18 | End: 2021-10-19

## 2021-10-18 RX ORDER — METHYLPREDNISOLONE SODIUM SUCCINATE 125 MG/2ML
125 INJECTION, POWDER, LYOPHILIZED, FOR SOLUTION INTRAMUSCULAR; INTRAVENOUS ONCE
Status: COMPLETED | OUTPATIENT
Start: 2021-10-18 | End: 2021-10-18

## 2021-10-18 RX ORDER — IPRATROPIUM BROMIDE AND ALBUTEROL SULFATE 2.5; .5 MG/3ML; MG/3ML
1 SOLUTION RESPIRATORY (INHALATION) ONCE
Status: COMPLETED | OUTPATIENT
Start: 2021-10-18 | End: 2021-10-18

## 2021-10-18 RX ADMIN — HYDROMORPHONE HYDROCHLORIDE 1 MG: 1 INJECTION, SOLUTION INTRAMUSCULAR; INTRAVENOUS; SUBCUTANEOUS at 22:17

## 2021-10-18 RX ADMIN — SODIUM CHLORIDE 1000 ML: 9 INJECTION, SOLUTION INTRAVENOUS at 22:18

## 2021-10-18 RX ADMIN — METHYLPREDNISOLONE SODIUM SUCCINATE 125 MG: 125 INJECTION, POWDER, FOR SOLUTION INTRAMUSCULAR; INTRAVENOUS at 22:17

## 2021-10-18 RX ADMIN — ENOXAPARIN SODIUM 120 MG: 120 INJECTION SUBCUTANEOUS at 22:17

## 2021-10-18 RX ADMIN — ONDANSETRON HYDROCHLORIDE 4 MG: 2 SOLUTION INTRAMUSCULAR; INTRAVENOUS at 22:17

## 2021-10-18 RX ADMIN — OXYCODONE HYDROCHLORIDE AND ACETAMINOPHEN 1 TABLET: 5; 325 TABLET ORAL at 23:54

## 2021-10-18 RX ADMIN — BUMETANIDE 1 MG: 0.25 INJECTION, SOLUTION INTRAMUSCULAR; INTRAVENOUS at 23:54

## 2021-10-18 RX ADMIN — IPRATROPIUM BROMIDE AND ALBUTEROL SULFATE 1 AMPULE: .5; 2.5 SOLUTION RESPIRATORY (INHALATION) at 22:45

## 2021-10-18 ASSESSMENT — PAIN DESCRIPTION - ORIENTATION: ORIENTATION: MID;UPPER

## 2021-10-18 ASSESSMENT — PAIN SCALES - GENERAL
PAINLEVEL_OUTOF10: 8
PAINLEVEL_OUTOF10: 10
PAINLEVEL_OUTOF10: 8

## 2021-10-18 ASSESSMENT — PAIN DESCRIPTION - LOCATION: LOCATION: BACK

## 2021-10-18 ASSESSMENT — PAIN DESCRIPTION - DESCRIPTORS: DESCRIPTORS: SHARP

## 2021-10-19 LAB
ANION GAP SERPL CALCULATED.3IONS-SCNC: 8 MMOL/L (ref 7–16)
BASOPHILS ABSOLUTE: 0 E9/L (ref 0–0.2)
BASOPHILS RELATIVE PERCENT: 0.5 % (ref 0–2)
BUN BLDV-MCNC: 31 MG/DL (ref 6–20)
CALCIUM SERPL-MCNC: 9.4 MG/DL (ref 8.6–10.2)
CHLORIDE BLD-SCNC: 91 MMOL/L (ref 98–107)
CO2: 35 MMOL/L (ref 22–29)
CREAT SERPL-MCNC: 1.1 MG/DL (ref 0.7–1.2)
EKG ATRIAL RATE: 79 BPM
EKG P-R INTERVAL: 208 MS
EKG Q-T INTERVAL: 394 MS
EKG QRS DURATION: 120 MS
EKG QTC CALCULATION (BAZETT): 451 MS
EKG R AXIS: 40 DEGREES
EKG T AXIS: 58 DEGREES
EKG VENTRICULAR RATE: 79 BPM
EOSINOPHILS ABSOLUTE: 0 E9/L (ref 0.05–0.5)
EOSINOPHILS RELATIVE PERCENT: 0 % (ref 0–6)
GFR AFRICAN AMERICAN: >60
GFR NON-AFRICAN AMERICAN: >60 ML/MIN/1.73
GLUCOSE BLD-MCNC: 270 MG/DL (ref 74–99)
HCT VFR BLD CALC: 31.3 % (ref 37–54)
HEMOGLOBIN: 10.2 G/DL (ref 12.5–16.5)
L. PNEUMOPHILA SEROGP 1 UR AG: NORMAL
LYMPHOCYTES ABSOLUTE: 0.38 E9/L (ref 1.5–4)
LYMPHOCYTES RELATIVE PERCENT: 5.2 % (ref 20–42)
MCH RBC QN AUTO: 32.1 PG (ref 26–35)
MCHC RBC AUTO-ENTMCNC: 32.6 % (ref 32–34.5)
MCV RBC AUTO: 98.4 FL (ref 80–99.9)
METER GLUCOSE: 182 MG/DL (ref 74–99)
METER GLUCOSE: 216 MG/DL (ref 74–99)
METER GLUCOSE: 240 MG/DL (ref 74–99)
METER GLUCOSE: 288 MG/DL (ref 74–99)
MONOCYTES ABSOLUTE: 0.15 E9/L (ref 0.1–0.95)
MONOCYTES RELATIVE PERCENT: 1.7 % (ref 2–12)
MYELOCYTE PERCENT: 0.9 % (ref 0–0)
NEUTROPHILS ABSOLUTE: 6.98 E9/L (ref 1.8–7.3)
NEUTROPHILS RELATIVE PERCENT: 92.2 % (ref 43–80)
OVALOCYTES: ABNORMAL
PDW BLD-RTO: 15.3 FL (ref 11.5–15)
PLATELET # BLD: 254 E9/L (ref 130–450)
PMV BLD AUTO: 10.8 FL (ref 7–12)
POIKILOCYTES: ABNORMAL
POLYCHROMASIA: ABNORMAL
POTASSIUM REFLEX MAGNESIUM: 4.2 MMOL/L (ref 3.5–5)
PROCALCITONIN: 0.07 NG/ML (ref 0–0.08)
RBC # BLD: 3.18 E12/L (ref 3.8–5.8)
SODIUM BLD-SCNC: 134 MMOL/L (ref 132–146)
STREP PNEUMONIAE ANTIGEN, URINE: NORMAL
WBC # BLD: 7.5 E9/L (ref 4.5–11.5)

## 2021-10-19 PROCEDURE — 2500000003 HC RX 250 WO HCPCS: Performed by: FAMILY MEDICINE

## 2021-10-19 PROCEDURE — 6370000000 HC RX 637 (ALT 250 FOR IP): Performed by: FAMILY MEDICINE

## 2021-10-19 PROCEDURE — 94660 CPAP INITIATION&MGMT: CPT

## 2021-10-19 PROCEDURE — 96375 TX/PRO/DX INJ NEW DRUG ADDON: CPT

## 2021-10-19 PROCEDURE — 96365 THER/PROPH/DIAG IV INF INIT: CPT

## 2021-10-19 PROCEDURE — 6360000002 HC RX W HCPCS: Performed by: FAMILY MEDICINE

## 2021-10-19 PROCEDURE — 93010 ELECTROCARDIOGRAM REPORT: CPT | Performed by: INTERNAL MEDICINE

## 2021-10-19 PROCEDURE — 94640 AIRWAY INHALATION TREATMENT: CPT

## 2021-10-19 PROCEDURE — 96376 TX/PRO/DX INJ SAME DRUG ADON: CPT

## 2021-10-19 PROCEDURE — 85025 COMPLETE CBC W/AUTO DIFF WBC: CPT

## 2021-10-19 PROCEDURE — 87449 NOS EACH ORGANISM AG IA: CPT

## 2021-10-19 PROCEDURE — 2060000000 HC ICU INTERMEDIATE R&B

## 2021-10-19 PROCEDURE — 2700000000 HC OXYGEN THERAPY PER DAY

## 2021-10-19 PROCEDURE — 36415 COLL VENOUS BLD VENIPUNCTURE: CPT

## 2021-10-19 PROCEDURE — 80048 BASIC METABOLIC PNL TOTAL CA: CPT

## 2021-10-19 PROCEDURE — 96366 THER/PROPH/DIAG IV INF ADDON: CPT

## 2021-10-19 PROCEDURE — 96372 THER/PROPH/DIAG INJ SC/IM: CPT

## 2021-10-19 PROCEDURE — 82962 GLUCOSE BLOOD TEST: CPT

## 2021-10-19 PROCEDURE — 2580000003 HC RX 258: Performed by: FAMILY MEDICINE

## 2021-10-19 PROCEDURE — 84145 PROCALCITONIN (PCT): CPT

## 2021-10-19 RX ORDER — SODIUM CHLORIDE 9 MG/ML
25 INJECTION, SOLUTION INTRAVENOUS PRN
Status: DISCONTINUED | OUTPATIENT
Start: 2021-10-19 | End: 2021-10-26 | Stop reason: HOSPADM

## 2021-10-19 RX ORDER — FLECAINIDE ACETATE 100 MG/1
100 TABLET ORAL 2 TIMES DAILY
Status: DISCONTINUED | OUTPATIENT
Start: 2021-10-19 | End: 2021-10-26 | Stop reason: HOSPADM

## 2021-10-19 RX ORDER — INSULIN GLARGINE 100 [IU]/ML
20 INJECTION, SOLUTION SUBCUTANEOUS 2 TIMES DAILY
Status: DISCONTINUED | OUTPATIENT
Start: 2021-10-19 | End: 2021-10-26 | Stop reason: HOSPADM

## 2021-10-19 RX ORDER — POLYETHYLENE GLYCOL 3350 17 G/17G
17 POWDER, FOR SOLUTION ORAL DAILY PRN
Status: DISCONTINUED | OUTPATIENT
Start: 2021-10-19 | End: 2021-10-21

## 2021-10-19 RX ORDER — FLUTICASONE PROPIONATE 50 MCG
1 SPRAY, SUSPENSION (ML) NASAL DAILY
Status: DISCONTINUED | OUTPATIENT
Start: 2021-10-19 | End: 2021-10-26 | Stop reason: HOSPADM

## 2021-10-19 RX ORDER — SODIUM CHLORIDE 0.9 % (FLUSH) 0.9 %
5-40 SYRINGE (ML) INJECTION PRN
Status: DISCONTINUED | OUTPATIENT
Start: 2021-10-19 | End: 2021-10-26 | Stop reason: HOSPADM

## 2021-10-19 RX ORDER — ACETAMINOPHEN 650 MG/1
650 SUPPOSITORY RECTAL EVERY 6 HOURS PRN
Status: DISCONTINUED | OUTPATIENT
Start: 2021-10-19 | End: 2021-10-26 | Stop reason: HOSPADM

## 2021-10-19 RX ORDER — HYDROCODONE BITARTRATE AND ACETAMINOPHEN 5; 325 MG/1; MG/1
1 TABLET ORAL EVERY 6 HOURS PRN
Status: DISCONTINUED | OUTPATIENT
Start: 2021-10-19 | End: 2021-10-20

## 2021-10-19 RX ORDER — METHYLPREDNISOLONE SODIUM SUCCINATE 40 MG/ML
40 INJECTION, POWDER, LYOPHILIZED, FOR SOLUTION INTRAMUSCULAR; INTRAVENOUS EVERY 12 HOURS
Status: DISCONTINUED | OUTPATIENT
Start: 2021-10-19 | End: 2021-10-24

## 2021-10-19 RX ORDER — ONDANSETRON 4 MG/1
4 TABLET, ORALLY DISINTEGRATING ORAL EVERY 8 HOURS PRN
Status: DISCONTINUED | OUTPATIENT
Start: 2021-10-19 | End: 2021-10-26 | Stop reason: HOSPADM

## 2021-10-19 RX ORDER — BUSPIRONE HYDROCHLORIDE 5 MG/1
10 TABLET ORAL 3 TIMES DAILY PRN
Status: DISCONTINUED | OUTPATIENT
Start: 2021-10-19 | End: 2021-10-26 | Stop reason: HOSPADM

## 2021-10-19 RX ORDER — SODIUM CHLORIDE 0.9 % (FLUSH) 0.9 %
5-40 SYRINGE (ML) INJECTION EVERY 12 HOURS SCHEDULED
Status: DISCONTINUED | OUTPATIENT
Start: 2021-10-19 | End: 2021-10-26 | Stop reason: HOSPADM

## 2021-10-19 RX ORDER — GUAIFENESIN/DEXTROMETHORPHAN 100-10MG/5
5 SYRUP ORAL EVERY 4 HOURS PRN
Status: DISCONTINUED | OUTPATIENT
Start: 2021-10-19 | End: 2021-10-26 | Stop reason: HOSPADM

## 2021-10-19 RX ORDER — ALBUTEROL SULFATE 90 UG/1
2 AEROSOL, METERED RESPIRATORY (INHALATION) EVERY 6 HOURS PRN
Status: DISCONTINUED | OUTPATIENT
Start: 2021-10-19 | End: 2021-10-19 | Stop reason: CLARIF

## 2021-10-19 RX ORDER — DILTIAZEM HYDROCHLORIDE 120 MG/1
240 CAPSULE, COATED, EXTENDED RELEASE ORAL 2 TIMES DAILY
Status: DISCONTINUED | OUTPATIENT
Start: 2021-10-19 | End: 2021-10-26 | Stop reason: HOSPADM

## 2021-10-19 RX ORDER — PRIMIDONE 50 MG/1
25 TABLET ORAL 3 TIMES DAILY
Status: DISCONTINUED | OUTPATIENT
Start: 2021-10-19 | End: 2021-10-26 | Stop reason: HOSPADM

## 2021-10-19 RX ORDER — IPRATROPIUM BROMIDE AND ALBUTEROL SULFATE 2.5; .5 MG/3ML; MG/3ML
1 SOLUTION RESPIRATORY (INHALATION)
Status: DISCONTINUED | OUTPATIENT
Start: 2021-10-19 | End: 2021-10-26 | Stop reason: HOSPADM

## 2021-10-19 RX ORDER — BUDESONIDE 0.5 MG/2ML
0.5 INHALANT ORAL 2 TIMES DAILY
Status: DISCONTINUED | OUTPATIENT
Start: 2021-10-19 | End: 2021-10-26 | Stop reason: HOSPADM

## 2021-10-19 RX ORDER — ASPIRIN 81 MG/1
81 TABLET, CHEWABLE ORAL DAILY
Status: DISCONTINUED | OUTPATIENT
Start: 2021-10-19 | End: 2021-10-26 | Stop reason: HOSPADM

## 2021-10-19 RX ORDER — BUMETANIDE 0.25 MG/ML
0.5 INJECTION, SOLUTION INTRAMUSCULAR; INTRAVENOUS 2 TIMES DAILY
Status: DISCONTINUED | OUTPATIENT
Start: 2021-10-19 | End: 2021-10-20

## 2021-10-19 RX ORDER — ONDANSETRON 2 MG/ML
4 INJECTION INTRAMUSCULAR; INTRAVENOUS EVERY 6 HOURS PRN
Status: DISCONTINUED | OUTPATIENT
Start: 2021-10-19 | End: 2021-10-26 | Stop reason: HOSPADM

## 2021-10-19 RX ORDER — ATORVASTATIN CALCIUM 20 MG/1
20 TABLET, FILM COATED ORAL EVERY EVENING
Status: DISCONTINUED | OUTPATIENT
Start: 2021-10-19 | End: 2021-10-26 | Stop reason: HOSPADM

## 2021-10-19 RX ORDER — ALBUTEROL SULFATE 2.5 MG/3ML
2.5 SOLUTION RESPIRATORY (INHALATION) EVERY 6 HOURS PRN
Status: DISCONTINUED | OUTPATIENT
Start: 2021-10-19 | End: 2021-10-26 | Stop reason: HOSPADM

## 2021-10-19 RX ORDER — ARFORMOTEROL TARTRATE 15 UG/2ML
15 SOLUTION RESPIRATORY (INHALATION) 2 TIMES DAILY
Status: DISCONTINUED | OUTPATIENT
Start: 2021-10-19 | End: 2021-10-26 | Stop reason: HOSPADM

## 2021-10-19 RX ORDER — POTASSIUM CHLORIDE 20 MEQ/1
20 TABLET, EXTENDED RELEASE ORAL
Status: DISCONTINUED | OUTPATIENT
Start: 2021-10-19 | End: 2021-10-25

## 2021-10-19 RX ORDER — ACETAMINOPHEN 325 MG/1
650 TABLET ORAL EVERY 6 HOURS PRN
Status: DISCONTINUED | OUTPATIENT
Start: 2021-10-19 | End: 2021-10-26 | Stop reason: HOSPADM

## 2021-10-19 RX ADMIN — ROFLUMILAST 500 MCG: 500 TABLET ORAL at 10:03

## 2021-10-19 RX ADMIN — IPRATROPIUM BROMIDE AND ALBUTEROL SULFATE 1 AMPULE: .5; 2.5 SOLUTION RESPIRATORY (INHALATION) at 08:21

## 2021-10-19 RX ADMIN — SERTRALINE 25 MG: 50 TABLET, FILM COATED ORAL at 10:03

## 2021-10-19 RX ADMIN — ATORVASTATIN CALCIUM 20 MG: 20 TABLET, FILM COATED ORAL at 17:46

## 2021-10-19 RX ADMIN — ASPIRIN 81 MG CHEWABLE TABLET 81 MG: 81 TABLET CHEWABLE at 10:04

## 2021-10-19 RX ADMIN — ENOXAPARIN SODIUM 120 MG: 150 INJECTION SUBCUTANEOUS at 20:46

## 2021-10-19 RX ADMIN — PRIMIDONE 25 MG: 50 TABLET ORAL at 10:03

## 2021-10-19 RX ADMIN — INSULIN LISPRO 6 UNITS: 100 INJECTION, SOLUTION INTRAVENOUS; SUBCUTANEOUS at 16:37

## 2021-10-19 RX ADMIN — IPRATROPIUM BROMIDE AND ALBUTEROL SULFATE 1 AMPULE: .5; 2.5 SOLUTION RESPIRATORY (INHALATION) at 21:25

## 2021-10-19 RX ADMIN — BUDESONIDE 500 MCG: 0.5 SUSPENSION RESPIRATORY (INHALATION) at 08:21

## 2021-10-19 RX ADMIN — INSULIN GLARGINE 20 UNITS: 100 INJECTION, SOLUTION SUBCUTANEOUS at 20:49

## 2021-10-19 RX ADMIN — BUMETANIDE 0.5 MG: 0.25 INJECTION, SOLUTION INTRAMUSCULAR; INTRAVENOUS at 10:03

## 2021-10-19 RX ADMIN — Medication 10 ML: at 21:10

## 2021-10-19 RX ADMIN — FLECAINIDE ACETATE 100 MG: 100 TABLET ORAL at 10:04

## 2021-10-19 RX ADMIN — ARFORMOTEROL TARTRATE 15 MCG: 15 SOLUTION RESPIRATORY (INHALATION) at 08:21

## 2021-10-19 RX ADMIN — INSULIN GLARGINE 20 UNITS: 100 INJECTION, SOLUTION SUBCUTANEOUS at 10:04

## 2021-10-19 RX ADMIN — HYDROCODONE BITARTRATE AND ACETAMINOPHEN 1 TABLET: 5; 325 TABLET ORAL at 14:05

## 2021-10-19 RX ADMIN — Medication 10 ML: at 10:14

## 2021-10-19 RX ADMIN — IPRATROPIUM BROMIDE AND ALBUTEROL SULFATE 1 AMPULE: .5; 2.5 SOLUTION RESPIRATORY (INHALATION) at 16:49

## 2021-10-19 RX ADMIN — FLUTICASONE PROPIONATE 1 SPRAY: 50 SPRAY, METERED NASAL at 10:07

## 2021-10-19 RX ADMIN — DILTIAZEM HYDROCHLORIDE 240 MG: 120 CAPSULE, COATED, EXTENDED RELEASE ORAL at 10:03

## 2021-10-19 RX ADMIN — DOXYCYCLINE 100 MG: 100 INJECTION, POWDER, LYOPHILIZED, FOR SOLUTION INTRAVENOUS at 07:11

## 2021-10-19 RX ADMIN — INSULIN LISPRO 6 UNITS: 100 INJECTION, SOLUTION INTRAVENOUS; SUBCUTANEOUS at 11:32

## 2021-10-19 RX ADMIN — INSULIN LISPRO 9 UNITS: 100 INJECTION, SOLUTION INTRAVENOUS; SUBCUTANEOUS at 07:08

## 2021-10-19 RX ADMIN — POTASSIUM CHLORIDE 20 MEQ: 1500 TABLET, EXTENDED RELEASE ORAL at 10:04

## 2021-10-19 RX ADMIN — ENOXAPARIN SODIUM 120 MG: 150 INJECTION SUBCUTANEOUS at 10:05

## 2021-10-19 RX ADMIN — FLECAINIDE ACETATE 100 MG: 100 TABLET ORAL at 20:45

## 2021-10-19 RX ADMIN — PRIMIDONE 25 MG: 50 TABLET ORAL at 13:58

## 2021-10-19 RX ADMIN — BUMETANIDE 0.5 MG: 0.25 INJECTION, SOLUTION INTRAMUSCULAR; INTRAVENOUS at 20:46

## 2021-10-19 RX ADMIN — HYDROCODONE BITARTRATE AND ACETAMINOPHEN 1 TABLET: 5; 325 TABLET ORAL at 06:08

## 2021-10-19 RX ADMIN — METHYLPREDNISOLONE SODIUM SUCCINATE 40 MG: 40 INJECTION, POWDER, FOR SOLUTION INTRAMUSCULAR; INTRAVENOUS at 06:49

## 2021-10-19 RX ADMIN — DOXYCYCLINE 100 MG: 100 INJECTION, POWDER, LYOPHILIZED, FOR SOLUTION INTRAVENOUS at 17:46

## 2021-10-19 RX ADMIN — DILTIAZEM HYDROCHLORIDE 240 MG: 120 CAPSULE, COATED, EXTENDED RELEASE ORAL at 20:45

## 2021-10-19 RX ADMIN — METHYLPREDNISOLONE SODIUM SUCCINATE 40 MG: 40 INJECTION, POWDER, FOR SOLUTION INTRAMUSCULAR; INTRAVENOUS at 17:46

## 2021-10-19 RX ADMIN — INSULIN LISPRO 2 UNITS: 100 INJECTION, SOLUTION INTRAVENOUS; SUBCUTANEOUS at 20:48

## 2021-10-19 RX ADMIN — PRIMIDONE 25 MG: 50 TABLET ORAL at 20:45

## 2021-10-19 RX ADMIN — IPRATROPIUM BROMIDE AND ALBUTEROL SULFATE 1 AMPULE: .5; 2.5 SOLUTION RESPIRATORY (INHALATION) at 12:21

## 2021-10-19 RX ADMIN — WATER 1000 MG: 1 INJECTION INTRAMUSCULAR; INTRAVENOUS; SUBCUTANEOUS at 06:49

## 2021-10-19 ASSESSMENT — PAIN DESCRIPTION - PROGRESSION
CLINICAL_PROGRESSION: NOT CHANGED

## 2021-10-19 ASSESSMENT — PAIN SCALES - GENERAL
PAINLEVEL_OUTOF10: 7
PAINLEVEL_OUTOF10: 7
PAINLEVEL_OUTOF10: 3
PAINLEVEL_OUTOF10: 7

## 2021-10-19 ASSESSMENT — PAIN DESCRIPTION - ORIENTATION
ORIENTATION: MID;RIGHT
ORIENTATION: MID;UPPER

## 2021-10-19 ASSESSMENT — PAIN DESCRIPTION - DESCRIPTORS
DESCRIPTORS: SHARP
DESCRIPTORS: SHARP

## 2021-10-19 ASSESSMENT — PAIN DESCRIPTION - ONSET
ONSET: ON-GOING
ONSET: ON-GOING

## 2021-10-19 ASSESSMENT — PAIN DESCRIPTION - PAIN TYPE
TYPE: ACUTE PAIN
TYPE: ACUTE PAIN;CHRONIC PAIN

## 2021-10-19 ASSESSMENT — PAIN DESCRIPTION - LOCATION
LOCATION: BACK;CHEST
LOCATION: BACK

## 2021-10-19 NOTE — ED NOTES
FIRST PROVIDER CONTACT ASSESSMENT NOTE                                                                                                Department of Emergency Medicine                                                      First Provider Note  10/18/21  9:11 PM EDT  NAME: Jacqueline Russ  : 1960  MRN: [id number]    Chief Complaint: Back Pain (sharp pain mid back; seen here 2 days ago; 4 L NC at home) and Leg Swelling (BLE)      History of Present Illness:   Jacqueline Russ is a 63 y.o. male who presents to the ED for increasing shortness of breath, lower extremity swelling as well as continued back pain primarily to the right lung. Patient was seen here on , diagnosed with pneumonia. Patient reports symptoms are not improving. Focused Physical Exam:  VS:    ED Triage Vitals   BP Temp Temp Source Pulse Resp SpO2 Height Weight   10/18/21 2105 10/18/21 2105 10/18/21 2105 10/18/21 2043 10/18/21 2105 10/18/21 2043 10/18/21 2102 10/18/21 2102   (!) 148/91 97.7 °F (36.5 °C) Oral 85 22 97 % 5' 11\" (1.803 m) 264 lb (119.7 kg)        General: Alert and in no apparent distress. Medical History:  has a past medical history of Atrial flutter ([address] COPD (chronic obstructive pulmonary disease) ([address] and Hypertension. Surgical History:  has a past surgical history that includes back surgery; Cardioversion (2018); transesophageal echocardiogram (2018); and Atrial ablation surgery (2018). Social History:  reports that he quit smoking about 4 years ago. His smoking use included cigarettes. He has a 60.00 pack-year smoking history. He has never used smokeless tobacco. He reports previous alcohol use of about 3.0 - 4.0 standard drinks of alcohol per week. He reports that he does not use drugs. Family History: family history includes Lung Cancer in his father; Other in his mother; Pacemaker in his sister. Allergies: Patient has no known allergies.      Initial Plan of Care:  Initiate Treatment-Testing, Proceed toTreatment Area When Bed Available for ED Attending/MLP to Continue Care    -------------------------------------------------END OF FIRST PROVIDER CONTACT ASSESSMENT NOTE--------------------------------------------------------  Electronically signed by SERA Lu CNP   DD: 10/18/21     SERA Lu CNP  10/18/21 2113

## 2021-10-19 NOTE — ED PROVIDER NOTES
HPI:  10/18/21,   Time: 9:40 PM EDT       Saundra Muniz is a 63 y.o. male presenting to the ED for rt sided cp, beginning 5 days ago. The complaint has been persistent, moderate in severity, and worsened by nothing. Seen here for same, on eliquis, bib private vehicle, no better with home tx. No fever/chills/sweats/nv/d. No cough. Review of Systems:   Pertinent positives and negatives are stated within HPI, all other systems reviewed and are negative.          --------------------------------------------- PAST HISTORY ---------------------------------------------  Past Medical History:  has a past medical history of Atrial flutter ([facility] [address] COPD (chronic obstructive pulmonary disease) ([facility] [address] and Hypertension. Past Surgical History:  has a past surgical history that includes back surgery; Cardioversion (02/07/2018); transesophageal echocardiogram (02/07/2018); and Atrial ablation surgery (04/03/2018). Social History:  reports that he quit smoking about 4 years ago. His smoking use included cigarettes. He has a 60.00 pack-year smoking history. He has never used smokeless tobacco. He reports previous alcohol use of about 3.0 - 4.0 standard drinks of alcohol per week. He reports that he does not use drugs. Family History: family history includes Lung Cancer in his father; Other in his mother; Pacemaker in his sister. The patients home medications have been reviewed. Allergies: Patient has no known allergies. ---------------------------------------------------PHYSICAL EXAM--------------------------------------    Constitutional/General: Alert and oriented x3, well appearing, non toxic in NAD  Head: Normocephalic and atraumatic  Eyes: PERRL, EOMI, conjunctive normal, sclera non icteric  Mouth: Oropharynx clear, handling secretions, no trismus, no asymmetry of the posterior oropharynx or uvular edema  Neck: Supple, full ROM,   Respiratory: diffuse wheezing.  Not in respiratory distress  Cardiovascular:  Regular rate. Regular rhythm. No murmurs, gallops, or rubs. 2+ distal pulses  Chest: No chest wall tenderness  GI:  Abdomen Soft, Non tender, Non distended. Musculoskeletal: Moves all extremities x 4. Warm and well perfused, no clubbing, cyanosis, or edema. Capillary refill <3 seconds  Integument: skin warm and dry. No rashes. Lymphatic: no lymphadenopathy noted  Neurologic: GCS 15, no focal deficits,   Psychiatric: Normal Affect    -------------------------------------------------- RESULTS -------------------------------------------------  I have personally reviewed all laboratory and imaging results for this patient. Results are listed below.      LABS:  Results for orders placed or performed during the hospital encounter of 10/18/21   Troponin   Result Value Ref Range    Troponin, High Sensitivity 16 (H) 0 - 11 ng/L   CBC Auto Differential   Result Value Ref Range    WBC 9.1 4.5 - 11.5 E9/L    RBC 3.25 (L) 3.80 - 5.80 E12/L    Hemoglobin 10.2 (L) 12.5 - 16.5 g/dL    Hematocrit 31.5 (L) 37.0 - 54.0 %    MCV 96.9 80.0 - 99.9 fL    MCH 31.4 26.0 - 35.0 pg    MCHC 32.4 32.0 - 34.5 %    RDW 15.2 (H) 11.5 - 15.0 fL    Platelets 373 004 - 815 E9/L    MPV 10.7 7.0 - 12.0 fL    Neutrophils % 68.1 43.0 - 80.0 %    Immature Granulocytes % 4.6 0.0 - 5.0 %    Lymphocytes % 14.3 (L) 20.0 - 42.0 %    Monocytes % 12.6 (H) 2.0 - 12.0 %    Eosinophils % 0.1 0.0 - 6.0 %    Basophils % 0.3 0.0 - 2.0 %    Neutrophils Absolute 6.17 1.80 - 7.30 E9/L    Immature Granulocytes # 0.42 E9/L    Lymphocytes Absolute 1.30 (L) 1.50 - 4.00 E9/L    Monocytes Absolute 1.14 (H) 0.10 - 0.95 E9/L    Eosinophils Absolute 0.01 (L) 0.05 - 0.50 E9/L    Basophils Absolute 0.03 0.00 - 0.20 E9/L   Comprehensive Metabolic Panel   Result Value Ref Range    Sodium 143 132 - 146 mmol/L    Potassium 4.2 3.5 - 5.0 mmol/L    Chloride 98 98 - 107 mmol/L    CO2 38 (H) 22 - 29 mmol/L    Anion Gap 7 7 - 16 mmol/L    Glucose 162 (H) 74 - 99 mg/dL    BUN 31 (H) 6 - 20 mg/dL    CREATININE 1.1 0.7 - 1.2 mg/dL    GFR Non-African American >60 >=60 mL/min/1.73    GFR African American >60     Calcium 10.1 8.6 - 10.2 mg/dL    Total Protein 6.9 6.4 - 8.3 g/dL    Albumin 4.1 3.5 - 5.2 g/dL    Total Bilirubin <0.2 0.0 - 1.2 mg/dL    Alkaline Phosphatase 76 40 - 129 U/L    ALT 32 0 - 40 U/L    AST 21 0 - 39 U/L   Protime-INR   Result Value Ref Range    Protime 14.5 (H) 9.3 - 12.4 sec    INR 1.3    APTT   Result Value Ref Range    aPTT 29.9 24.5 - 35.1 sec   Brain Natriuretic Peptide   Result Value Ref Range    Pro- (H) 0 - 125 pg/mL       RADIOLOGY:  Interpreted by Radiologist.  No orders to display       EKG: This EKG is signed and interpreted by the EP. Time:   Rate: 85  Rhythm:nsr   Interpretation: non spec   Comparison: no change      ------------------------- NURSING NOTES AND VITALS REVIEWED ---------------------------   The nursing notes within the ED encounter and vital signs as below have been reviewed by myself. BP (!) 148/91   Pulse 85   Temp 97.7 °F (36.5 °C) (Oral)   Resp 22   Ht 5' 11\" (1.803 m)   Wt 264 lb (119.7 kg)   SpO2 97%   BMI 36.82 kg/m²   Oxygen Saturation Interpretation: Normal    The patients available past medical records and past encounters were reviewed.         ------------------------------ ED COURSE/MEDICAL DECISION MAKING----------------------  Medications   0.9 % sodium chloride infusion (1,000 mLs IntraVENous New Bag 10/18/21 5368)   oxyCODONE-acetaminophen (PERCOCET) 5-325 MG per tablet 1 tablet (has no administration in time range)   bumetanide (BUMEX) injection 1 mg (has no administration in time range)   enoxaparin (LOVENOX) injection 120 mg (120 mg SubCUTAneous Given 10/18/21 2217)   ipratropium-albuterol (DUONEB) nebulizer solution 1 ampule (1 ampule Inhalation Given 10/18/21 2245)   methylPREDNISolone sodium (SOLU-MEDROL) injection 125 mg (125 mg IntraVENous Given 10/18/21 2217)   HYDROmorphone (DILAUDID) injection 1 mg (1 mg IntraVENous Given 10/18/21 2217)   ondansetron (ZOFRAN) injection 4 mg (4 mg IntraVENous Given 10/18/21 2217)         ED COURSE:       Medical Decision Making:    Pe, stable on home o2, pain uncontrolled, admit, give im lovenox      This patient's ED course included: a personal history and physicial examination    This patient has remained hemodynamically stable during their ED course. Consultations:             sound    Critical Care:         Counseling: The emergency provider has spoken with the patient and discussed todays results, in addition to providing specific details for the plan of care and counseling regarding the diagnosis and prognosis. Questions are answered at this time and they are agreeable with the plan.       --------------------------------- IMPRESSION AND DISPOSITION ---------------------------------    IMPRESSION  1. Single subsegmental pulmonary embolism without acute cor pulmonale (HCC)        DISPOSITION  Disposition: Admit to telemetry  Patient condition is stable    NOTE: This report was transcribed using voice recognition software.  Every effort was made to ensure accuracy; however, inadvertent computerized transcription errors may be present        Marilyn Patel MD  10/18/21 8838

## 2021-10-19 NOTE — H&P
Hospital Medicine History & Physical      PCP: Rickie Frazier     Date of Admission: 10/19/2021    Date of Service:  Placed in Observation. Chief Complaint:  *Chest pain       History Of Present Illness:      63 y.o. male with past medical history of COPD atrial flutter hypertension and DM Patient present to the Ed due to right sided chest pain that radiates into his back . Patient was seen in ED on 10/16 and diagnosed with COPD exacerbation and pneumonia  Patient was offered admission but decided not to stay . He was discharged with [facility] and [address] [place] [address] . Patient reports worsening shortness of breath and weight gain . He reports a non productive cough He reports bilateral legt swelling bilateral leg swelling  He report no fever chills abdominal pain nausea diarrhea. Patient uses 4L nasal canula daily . In the ED  He was hemodynamically stable  He is saturating at 97% on 4 L nasal cannula. Ab data reveal sodium 143 creatinine 1.1 BUN 31 Glucoses 162  Trop 16 EKG revealed normal sinus rhythm 1st degree AV block  HGB 10.2  CXR revealed no acute precess. CTA revealed small  distal segmental  In right lower lobe  . Patient takes Eliquis daily . CTA also revealed RUL pneumonia     Past Medical History:          Diagnosis Date    Atrial flutter ([address]     COPD (chronic obstructive pulmonary disease) ([address]     Hypertension        Past Surgical History:          Procedure Laterality Date    ATRIAL ABLATION SURGERY  04/03/2018    BACK SURGERY      CARDIOVERSION  02/07/2018    Dr. Radha Gamboa- 80 J converted to SB    TRANSESOPHAGEAL ECHOCARDIOGRAM  02/07/2018    Dr. Radha Gamboa- No thrombus       Medications Prior to Admission:      Prior to Admission medications    Medication Sig Start Date End Date Taking?  Authorizing Provider   primidone (MYSOLINE) 50 MG tablet Take 25 mg by mouth 3 times daily    Historical Provider, MD   sertraline (ZOLOFT) 25 MG tablet Take 25 mg by mouth daily    Historical Provider, MD azithromycin (ZITHROMAX) 250 MG tablet Take 2 tablets by mouth daily for 1 day, THEN 1 tablet daily for 4 days. 10/16/21 10/21/21  Augusto Brand MD   cefdinir (OMNICEF) 300 MG capsule Take 1 capsule by mouth 2 times daily for 7 days 10/16/21 10/23/21  Augusto Brand MD   HYDROcodone-acetaminophen [facility]) 5-325 MG per tablet Take 1 tablet by mouth every 6 hours as needed for Pain for up to 3 days. Intended supply: 3 days. Take lowest dose possible to manage pain 10/16/21 10/19/21  Augusto Brand MD   insulin glargine (LANTUS) 100 UNIT/ML injection vial Inject 15 Units into the skin 2 times daily  Patient taking differently: Inject 20 Units into the skin 2 times daily  9/18/21   Mook Govea MD   busPIRone (BUSPAR) 10 MG tablet Take 1 tablet by mouth 3 times daily as needed (ANXIETY) 9/18/21   Mook Govea MD   albuterol (PROVENTIL) (2.5 MG/3ML) 0.083% nebulizer solution Take 3 mLs by nebulization every 6 hours as needed for Wheezing 9/18/21   Mook Govea MD   ipratropium (ATROVENT) 0.02 % nebulizer solution Take 2.5 mLs by nebulization 4 times daily 9/18/21   Mook Govea MD   Arformoterol Tartrate (BROVANA) 15 MCG/2ML NEBU Take 2 mLs by nebulization 2 times daily 9/18/21   Mook Govea MD   budesonide (PULMICORT) 0.5 MG/2ML nebulizer suspension Take 2 mLs by nebulization 2 times daily 9/18/21   Mook Govea MD   guaiFENesin 400 MG tablet Take 1 tablet by mouth 3 times daily  Patient taking differently: Take 600 mg by mouth 3 times daily  9/18/21   Mook Govea MD   Roflumilast (DALIRESP) 500 MCG tablet Take 1 tablet by mouth daily 9/19/21   Mook Govea MD   Respiratory Therapy Supplies (FULL KIT NEBULIZER SET) MISC Use as directed with nebulized medication.  9/18/21   Mook Govea MD   bumetanide (BUMEX) 2 MG tablet Take 1 tablet by mouth 2 times daily 8/30/21   Cuauhtemoc Patten MD   potassium chloride (KLOR-CON M) 20 MEQ extended release tablet Take 1 tablet by mouth daily (with breakfast) 8/31/21   Ashby Boxer MD Kymberly   aspirin 81 MG chewable tablet Take 81 mg by mouth daily    Historical Provider, MD   Budeson-Glycopyrrol-Formoterol (BREZTRI AEROSPHERE) 160-9-4.8 MCG/ACT AERO Inhale 2 puffs into the lungs 2 times daily 5/17/21   Ryan Jennings MD   apixaban (ELIQUIS) 5 MG TABS tablet Take 1 tablet by mouth 2 times daily 11/30/20   Pilar Welch MD   fluticasone (FLONASE) 50 MCG/ACT nasal spray 1 spray by Each Nostril route daily 5/29/20   iPlar Welch MD   flecainide (TAMBOCOR) 100 MG tablet TAKE 1 TABLET BY MOUTH TWICE DAILY 1/15/19   Bret Herrera DO   diltiazem (CARDIZEM CD) 240 MG extended release capsule Take 1 capsule by mouth 2 times daily 9/7/18   Damián Urban MD   atorvastatin (LIPITOR) 20 MG tablet Take 20 mg by mouth every evening     Historical Provider, MD   albuterol sulfate  (90 Base) MCG/ACT inhaler Inhale 2 puffs into the lungs every 6 hours as needed for Wheezing    Historical Provider, MD   ipratropium-albuterol (DUONEB) 0.5-2.5 (3) MG/3ML SOLN nebulizer solution Inhale 1 vial into the lungs every 4 hours    Historical Provider, MD       Allergies:  Patient has no known allergies. Social History:      The patient currently lives with family     TOBACCO:   reports that he quit smoking about 4 years ago. His smoking use included cigarettes. He has a 60.00 pack-year smoking history. He has never used smokeless tobacco.  ETOH:   reports previous alcohol use of about 3.0 - 4.0 standard drinks of alcohol per week. Family History:      Reviewed in detail and negative for DM, CAD, Cancer, CVA. Positive as follows:        Problem Relation Age of Onset    Other Mother         ALS    Lung Cancer Father     Pacemaker Sister        REVIEW OF SYSTEMS:   Pertinent positives as noted in the HPI. All other systems reviewed and negative.     PHYSICAL EXAM:    BP (!) 162/92 Comment: manual  Pulse 90   Temp 96.3 °F (35.7 °C) (Temporal)   Resp 20   Ht 5' 11\" (1.803 m)   Wt 266 lb 8 oz (120.9 kg)   SpO2 94%   BMI 37.17 kg/m²     General appearance:  No apparent distress, appears stated age and cooperative. HEENT:  Normal cephalic, atraumatic without obvious deformity. Pupils equal, round, and reactive to light. Extra ocular muscles intact. Conjunctivae/corneas clear. Neck: Supple, with full range of motion. No jugular venous distention. Trachea midline. Respiratory: deccreased breath sounds d=scattered wheezing throughout Cardiovascular:  Regular rate and rhythm with normal S1/S2 without murmurs, rubs or gallops. Abdomen: Soft, non-tender, non-distended with normal bowel sounds. Musculoskeletal:  No clubbing, cyanosis 1+ edema bilaterally. Full range of motion without deformity. Skin: Skin color, texture, turgor normal.  No rashes or lesions. Neurologic:  Neurovascularly intact without any focal sensory/motor deficits. Cranial nerves: II-XII intact, grossly non-focal.  Psychiatric:  Alert and oriented, thought content appropriate, normal insight    CXR:  I have reviewed the CXR   EKG:  I have reviewed the EKG     Labs:     Recent Labs     10/16/21  1421 10/18/21  2118   WBC 5.7 9.1   HGB 10.6* 10.2*   HCT 32.2* 31.5*    269     Recent Labs     10/16/21  1421 10/18/21  2118    143   K 3.5 4.2   CL 95* 98   CO2 34* 38*   BUN 18 31*   CREATININE 1.0 1.1   CALCIUM 10.2 10.1     Recent Labs     10/16/21  1421 10/18/21  2118   AST 21 21   ALT 34 32   BILITOT 0.3 <0.2   ALKPHOS 77 76     Recent Labs     10/18/21  2118   INR 1.3     No results for input(s): Gavin Marcano in the last 72 hours.      CTA   There is no central pulmonary embolism or aortic dissection. Jaquelin Section is a   small distal subsegmental pulmonary embolism in the right lower lobe.       Coronary artery calcification.       Minimal ground-glass infiltrates in the right upper lobe medially concerning   for pneumonia.       9 mm indeterminate pulmonary nodule in the right lower lobe.  Consider   surveillance according to Fleischner society guidelines.       RECOMMENDATIONS:   Fleischner Society guidelines for follow-up and management of incidentally   detected pulmonary nodules:       Single Solid Nodule:       Nodule size less than 6 mm   In a low-risk patient, no routine follow-up. In a high-risk patient, optional CT at 12 months.         CXR     No acute process.       Question 10 mm nodule in the left upper lung.           Urinalysis:      Lab Results   Component Value Date    NITRU Negative 10/16/2021    BLOODU Negative 10/16/2021    SPECGRAV 1.025 10/16/2021    GLUCOSEU Negative 10/16/2021    GLUCOSEU NEGATIVE 05/19/2011         ASSESSMENT:    Active Hospital Problems    Diagnosis Date Noted    Single subsegmental pulmonary embolism without acute cor pulmonale ([facility] [I26.93] 10/18/2021       PLAN:    Pulmonary embolus ; as seen on CTA on admission . Patient states that he takes Eliquis daily and  is compliant .question if he needs an IVC filter since he failed on anticoagulation  Admit observation vitals telemetry  Lovenox 1mg/kg Pulmonology consult       Community acquired Pneumonia :patient was offered admission onalst ED visit . Ct reveals right upper lobe PNA . Placed on Ceftriaxone  and Doxy vera Duo nebs  blood cultures Legionella and strep antigens     COPD exacerbation : IV Solu Medrol and antibiotics    Congestive Heart failure exacerbation : preserved  EF 70% 8/2021  but patient eports weight gain and no urine output frrom current diuretic  . Bumex 0.5 mg BID daily weight I/Os fluid restriction     Chronic respiratory failure with hypoxia : patient is on 4L NC daily    Type 2 Diabetes : sliding scale and fingersticks      Atrial Flutter :flecanide Cardizem,    Hyperlipidemia :C/w Lipitor    Mood disorder  Zoloft and  Buspar                   DVT Prophylaxis: Lovenox   Diet: ADULT DIET; Regular; 3 carb choices (45 gm/meal);  Low Sodium (2 gm); 1000 ml  Code Status: Full Code         Suzette Stroud MD

## 2021-10-19 NOTE — ED PROVIDER NOTES
reports previous alcohol use of about 3.0 - 4.0 standard drinks of alcohol per week. He reports that he does not use drugs. Family History: family history includes Lung Cancer in his father; Other in his mother; Pacemaker in his sister. The patients home medications have been reviewed. Allergies: Patient has no known allergies.     -------------------------------------------------- RESULTS -------------------------------------------------  All laboratory and radiology results have been personally reviewed by myself   LABS:  Results for orders placed or performed during the hospital encounter of 10/18/21   Troponin   Result Value Ref Range    Troponin, High Sensitivity 16 (H) 0 - 11 ng/L   CBC Auto Differential   Result Value Ref Range    WBC 9.1 4.5 - 11.5 E9/L    RBC 3.25 (L) 3.80 - 5.80 E12/L    Hemoglobin 10.2 (L) 12.5 - 16.5 g/dL    Hematocrit 31.5 (L) 37.0 - 54.0 %    MCV 96.9 80.0 - 99.9 fL    MCH 31.4 26.0 - 35.0 pg    MCHC 32.4 32.0 - 34.5 %    RDW 15.2 (H) 11.5 - 15.0 fL    Platelets 334 165 - 921 E9/L    MPV 10.7 7.0 - 12.0 fL    Neutrophils % 68.1 43.0 - 80.0 %    Immature Granulocytes % 4.6 0.0 - 5.0 %    Lymphocytes % 14.3 (L) 20.0 - 42.0 %    Monocytes % 12.6 (H) 2.0 - 12.0 %    Eosinophils % 0.1 0.0 - 6.0 %    Basophils % 0.3 0.0 - 2.0 %    Neutrophils Absolute 6.17 1.80 - 7.30 E9/L    Immature Granulocytes # 0.42 E9/L    Lymphocytes Absolute 1.30 (L) 1.50 - 4.00 E9/L    Monocytes Absolute 1.14 (H) 0.10 - 0.95 E9/L    Eosinophils Absolute 0.01 (L) 0.05 - 0.50 E9/L    Basophils Absolute 0.03 0.00 - 0.20 E9/L   Comprehensive Metabolic Panel   Result Value Ref Range    Sodium 143 132 - 146 mmol/L    Potassium 4.2 3.5 - 5.0 mmol/L    Chloride 98 98 - 107 mmol/L    CO2 38 (H) 22 - 29 mmol/L    Anion Gap 7 7 - 16 mmol/L    Glucose 162 (H) 74 - 99 mg/dL    BUN 31 (H) 6 - 20 mg/dL    CREATININE 1.1 0.7 - 1.2 mg/dL    GFR Non-African American >60 >=60 mL/min/1.73    GFR African American >60 Calcium 10.1 8.6 - 10.2 mg/dL    Total Protein 6.9 6.4 - 8.3 g/dL    Albumin 4.1 3.5 - 5.2 g/dL    Total Bilirubin <0.2 0.0 - 1.2 mg/dL    Alkaline Phosphatase 76 40 - 129 U/L    ALT 32 0 - 40 U/L    AST 21 0 - 39 U/L   Protime-INR   Result Value Ref Range    Protime 14.5 (H) 9.3 - 12.4 sec    INR 1.3    APTT   Result Value Ref Range    aPTT 29.9 24.5 - 35.1 sec   Brain Natriuretic Peptide   Result Value Ref Range    Pro- (H) 0 - 125 pg/mL       RADIOLOGY:  Interpreted by Radiologist.  No orders to display       ------------------------- NURSING NOTES AND VITALS REVIEWED ---------------------------   The nursing notes within the ED encounter and vital signs as below have been reviewed. BP (!) 148/91   Pulse 85   Temp 97.7 °F (36.5 °C) (Oral)   Resp 22   Ht 5' 11\" (1.803 m)   Wt 264 lb (119.7 kg)   SpO2 97%   BMI 36.82 kg/m²   Oxygen Saturation Interpretation: Normal      ---------------------------------------------------PHYSICAL EXAM--------------------------------------      Constitutional/General: Alert and oriented x3, moderately uncomfortable  Head: Normocephalic and atraumatic  Eyes: PERRL, EOMI  Mouth: Oropharynx clear, handling secretions, no trismus  Neck: Supple, full ROM,   Pulmonary: Lungs clear but very diminished and faint wheeze noted throughout all fields posteriorly. Dyspneic with any exertion, wears O2 4 L nasal cannula. Not in respiratory distress  Cardiovascular:  Regular rate and rhythm, no murmurs, gallops, or rubs. 2+ distal pulses  Abdomen: Soft, non tender, non distended,   Extremities: Moves all extremities x 4. Warm and well perfused, bilateral lower extremity edema, 2-3+. Skin: warm and dry without rash  Neurologic: GCS 15, cranial nerves II through XII grossly intact. No acute neurovascular deficit noted.   Speech clear coherent strength strong and equal bilaterally  Psych: Normal Affect      ------------------------------ ED COURSE/MEDICAL DECISION MAKING----------------------  Medications   0.9 % sodium chloride infusion (1,000 mLs IntraVENous New Bag 10/18/21 2218)   oxyCODONE-acetaminophen (PERCOCET) 5-325 MG per tablet 1 tablet (has no administration in time range)   bumetanide (BUMEX) injection 1 mg (has no administration in time range)   enoxaparin (LOVENOX) injection 120 mg (120 mg SubCUTAneous Given 10/18/21 2217)   ipratropium-albuterol (DUONEB) nebulizer solution 1 ampule (1 ampule Inhalation Given 10/18/21 2245)   methylPREDNISolone sodium (SOLU-MEDROL) injection 125 mg (125 mg IntraVENous Given 10/18/21 2217)   HYDROmorphone (DILAUDID) injection 1 mg (1 mg IntraVENous Given 10/18/21 2217)   ondansetron (ZOFRAN) injection 4 mg (4 mg IntraVENous Given 10/18/21 2217)         ED COURSE:       Medical Decision Making: Plan will be for labs, will medicate for symptom relief. Labs resulted troponin 16, CBC centrally unremarkable, chemistry panel unremarkable, PT/INR within normal limits, proBNP just slightly elevated at 335. Patient will be admitted to observation telemetry he was provided with Lovenox 1 mg/kg subcu for the pulmonary embolism that was identified on CAT scan. He was provided here with pain meds as well as DuoNeb treatment. He will also be provided with dose of IV Bumex due to increased swelling normally is on Bumex 2 mg at home. Patient expressed understanding for admission. Initially patient was on CARL last but he was actually released from their service so he will be admitted through [facility] emergency room attending did speak with covering provider and they were agreeable to admit patient to telemetry observation. Patient expressed understanding of admission. He is resting slightly more comfortable than previously, he does remain on O2 4 L nasal cannula pulse ox 97%. Counseling:    The emergency provider has spoken with the patient and discussed todays results, in addition to providing specific details for the plan of care and counseling regarding the diagnosis and prognosis. Questions are answered at this time and they are agreeable with the plan.      --------------------------------- IMPRESSION AND DISPOSITION ---------------------------------    IMPRESSION  1. Single subsegmental pulmonary embolism without acute cor pulmonale (HCC)        DISPOSITION  Disposition: Admit to telemetry  Patient condition is good      NOTE: This report was transcribed using voice recognition software.  Every effort was made to ensure accuracy; however, inadvertent computerized transcription errors may be present     Worthy SERA Adrian - LV  10/18/21 [id number]

## 2021-10-20 LAB
ANION GAP SERPL CALCULATED.3IONS-SCNC: 15 MMOL/L (ref 7–16)
ANISOCYTOSIS: ABNORMAL
BASOPHILS ABSOLUTE: 0 E9/L (ref 0–0.2)
BASOPHILS RELATIVE PERCENT: 0.4 % (ref 0–2)
BUN BLDV-MCNC: 27 MG/DL (ref 6–20)
CALCIUM SERPL-MCNC: 8.8 MG/DL (ref 8.6–10.2)
CHLORIDE BLD-SCNC: 95 MMOL/L (ref 98–107)
CO2: 30 MMOL/L (ref 22–29)
CREAT SERPL-MCNC: 0.8 MG/DL (ref 0.7–1.2)
EOSINOPHILS ABSOLUTE: 0 E9/L (ref 0.05–0.5)
EOSINOPHILS RELATIVE PERCENT: 0 % (ref 0–6)
GFR AFRICAN AMERICAN: >60
GFR NON-AFRICAN AMERICAN: >60 ML/MIN/1.73
GLUCOSE BLD-MCNC: 232 MG/DL (ref 74–99)
HCT VFR BLD CALC: 30.1 % (ref 37–54)
HEMOGLOBIN: 9.8 G/DL (ref 12.5–16.5)
LYMPHOCYTES ABSOLUTE: 0.98 E9/L (ref 1.5–4)
LYMPHOCYTES RELATIVE PERCENT: 7.9 % (ref 20–42)
MCH RBC QN AUTO: 31.7 PG (ref 26–35)
MCHC RBC AUTO-ENTMCNC: 32.6 % (ref 32–34.5)
MCV RBC AUTO: 97.4 FL (ref 80–99.9)
METER GLUCOSE: 191 MG/DL (ref 74–99)
METER GLUCOSE: 197 MG/DL (ref 74–99)
METER GLUCOSE: 218 MG/DL (ref 74–99)
METER GLUCOSE: 230 MG/DL (ref 74–99)
MONOCYTES ABSOLUTE: 0.37 E9/L (ref 0.1–0.95)
MONOCYTES RELATIVE PERCENT: 2.6 % (ref 2–12)
MYELOCYTE PERCENT: 2.6 % (ref 0–0)
NEUTROPHILS ABSOLUTE: 10.86 E9/L (ref 1.8–7.3)
NEUTROPHILS RELATIVE PERCENT: 86.8 % (ref 43–80)
OVALOCYTES: ABNORMAL
PDW BLD-RTO: 14.8 FL (ref 11.5–15)
PLATELET # BLD: 258 E9/L (ref 130–450)
PMV BLD AUTO: 10.6 FL (ref 7–12)
POIKILOCYTES: ABNORMAL
POLYCHROMASIA: ABNORMAL
POTASSIUM REFLEX MAGNESIUM: 4.6 MMOL/L (ref 3.5–5)
RBC # BLD: 3.09 E12/L (ref 3.8–5.8)
SODIUM BLD-SCNC: 140 MMOL/L (ref 132–146)
TEAR DROP CELLS: ABNORMAL
WBC # BLD: 12.2 E9/L (ref 4.5–11.5)

## 2021-10-20 PROCEDURE — 2500000003 HC RX 250 WO HCPCS: Performed by: INTERNAL MEDICINE

## 2021-10-20 PROCEDURE — 6370000000 HC RX 637 (ALT 250 FOR IP): Performed by: FAMILY MEDICINE

## 2021-10-20 PROCEDURE — 6360000002 HC RX W HCPCS: Performed by: FAMILY MEDICINE

## 2021-10-20 PROCEDURE — 82962 GLUCOSE BLOOD TEST: CPT

## 2021-10-20 PROCEDURE — 85025 COMPLETE CBC W/AUTO DIFF WBC: CPT

## 2021-10-20 PROCEDURE — 94660 CPAP INITIATION&MGMT: CPT

## 2021-10-20 PROCEDURE — 6370000000 HC RX 637 (ALT 250 FOR IP): Performed by: INTERNAL MEDICINE

## 2021-10-20 PROCEDURE — 2500000003 HC RX 250 WO HCPCS: Performed by: FAMILY MEDICINE

## 2021-10-20 PROCEDURE — 2700000000 HC OXYGEN THERAPY PER DAY

## 2021-10-20 PROCEDURE — 2060000000 HC ICU INTERMEDIATE R&B

## 2021-10-20 PROCEDURE — 99233 SBSQ HOSP IP/OBS HIGH 50: CPT | Performed by: INTERNAL MEDICINE

## 2021-10-20 PROCEDURE — 2580000003 HC RX 258: Performed by: FAMILY MEDICINE

## 2021-10-20 PROCEDURE — 99253 IP/OBS CNSLTJ NEW/EST LOW 45: CPT | Performed by: STUDENT IN AN ORGANIZED HEALTH CARE EDUCATION/TRAINING PROGRAM

## 2021-10-20 PROCEDURE — 80048 BASIC METABOLIC PNL TOTAL CA: CPT

## 2021-10-20 PROCEDURE — 36415 COLL VENOUS BLD VENIPUNCTURE: CPT

## 2021-10-20 PROCEDURE — 94640 AIRWAY INHALATION TREATMENT: CPT

## 2021-10-20 RX ORDER — BUMETANIDE 0.25 MG/ML
1 INJECTION, SOLUTION INTRAMUSCULAR; INTRAVENOUS 2 TIMES DAILY
Status: DISCONTINUED | OUTPATIENT
Start: 2021-10-20 | End: 2021-10-21

## 2021-10-20 RX ORDER — OXYCODONE HYDROCHLORIDE AND ACETAMINOPHEN 5; 325 MG/1; MG/1
1 TABLET ORAL EVERY 6 HOURS PRN
Status: DISCONTINUED | OUTPATIENT
Start: 2021-10-20 | End: 2021-10-26 | Stop reason: HOSPADM

## 2021-10-20 RX ADMIN — PRIMIDONE 25 MG: 50 TABLET ORAL at 09:19

## 2021-10-20 RX ADMIN — BUDESONIDE 500 MCG: 0.5 SUSPENSION RESPIRATORY (INHALATION) at 07:57

## 2021-10-20 RX ADMIN — FLECAINIDE ACETATE 100 MG: 100 TABLET ORAL at 20:04

## 2021-10-20 RX ADMIN — OXYCODONE AND ACETAMINOPHEN 1 TABLET: 5; 325 TABLET ORAL at 23:32

## 2021-10-20 RX ADMIN — DOXYCYCLINE 100 MG: 100 INJECTION, POWDER, LYOPHILIZED, FOR SOLUTION INTRAVENOUS at 06:17

## 2021-10-20 RX ADMIN — Medication 10 ML: at 09:20

## 2021-10-20 RX ADMIN — BUMETANIDE 1 MG: 0.25 INJECTION, SOLUTION INTRAMUSCULAR; INTRAVENOUS at 16:57

## 2021-10-20 RX ADMIN — OXYCODONE AND ACETAMINOPHEN 1 TABLET: 5; 325 TABLET ORAL at 09:19

## 2021-10-20 RX ADMIN — POTASSIUM CHLORIDE 20 MEQ: 1500 TABLET, EXTENDED RELEASE ORAL at 09:19

## 2021-10-20 RX ADMIN — ATORVASTATIN CALCIUM 20 MG: 20 TABLET, FILM COATED ORAL at 16:58

## 2021-10-20 RX ADMIN — ENOXAPARIN SODIUM 120 MG: 150 INJECTION SUBCUTANEOUS at 20:03

## 2021-10-20 RX ADMIN — ARFORMOTEROL TARTRATE 15 MCG: 15 SOLUTION RESPIRATORY (INHALATION) at 21:13

## 2021-10-20 RX ADMIN — FLECAINIDE ACETATE 100 MG: 100 TABLET ORAL at 09:19

## 2021-10-20 RX ADMIN — ROFLUMILAST 500 MCG: 500 TABLET ORAL at 09:19

## 2021-10-20 RX ADMIN — INSULIN LISPRO 3 UNITS: 100 INJECTION, SOLUTION INTRAVENOUS; SUBCUTANEOUS at 22:03

## 2021-10-20 RX ADMIN — WATER 1000 MG: 1 INJECTION INTRAMUSCULAR; INTRAVENOUS; SUBCUTANEOUS at 06:16

## 2021-10-20 RX ADMIN — FLUTICASONE PROPIONATE 1 SPRAY: 50 SPRAY, METERED NASAL at 09:32

## 2021-10-20 RX ADMIN — INSULIN GLARGINE 20 UNITS: 100 INJECTION, SOLUTION SUBCUTANEOUS at 22:02

## 2021-10-20 RX ADMIN — Medication 10 ML: at 20:11

## 2021-10-20 RX ADMIN — DILTIAZEM HYDROCHLORIDE 240 MG: 120 CAPSULE, COATED, EXTENDED RELEASE ORAL at 20:04

## 2021-10-20 RX ADMIN — METHYLPREDNISOLONE SODIUM SUCCINATE 40 MG: 40 INJECTION, POWDER, FOR SOLUTION INTRAMUSCULAR; INTRAVENOUS at 06:16

## 2021-10-20 RX ADMIN — PRIMIDONE 25 MG: 50 TABLET ORAL at 20:03

## 2021-10-20 RX ADMIN — METHYLPREDNISOLONE SODIUM SUCCINATE 40 MG: 40 INJECTION, POWDER, FOR SOLUTION INTRAMUSCULAR; INTRAVENOUS at 16:57

## 2021-10-20 RX ADMIN — DOXYCYCLINE 100 MG: 100 INJECTION, POWDER, LYOPHILIZED, FOR SOLUTION INTRAVENOUS at 17:35

## 2021-10-20 RX ADMIN — INSULIN GLARGINE 20 UNITS: 100 INJECTION, SOLUTION SUBCUTANEOUS at 09:27

## 2021-10-20 RX ADMIN — ARFORMOTEROL TARTRATE 15 MCG: 15 SOLUTION RESPIRATORY (INHALATION) at 07:57

## 2021-10-20 RX ADMIN — INSULIN LISPRO 3 UNITS: 100 INJECTION, SOLUTION INTRAVENOUS; SUBCUTANEOUS at 09:27

## 2021-10-20 RX ADMIN — IPRATROPIUM BROMIDE AND ALBUTEROL SULFATE 1 AMPULE: .5; 2.5 SOLUTION RESPIRATORY (INHALATION) at 07:57

## 2021-10-20 RX ADMIN — BUSPIRONE HYDROCHLORIDE 10 MG: 5 TABLET ORAL at 09:22

## 2021-10-20 RX ADMIN — ENOXAPARIN SODIUM 120 MG: 150 INJECTION SUBCUTANEOUS at 09:19

## 2021-10-20 RX ADMIN — ASPIRIN 81 MG CHEWABLE TABLET 81 MG: 81 TABLET CHEWABLE at 09:19

## 2021-10-20 RX ADMIN — IPRATROPIUM BROMIDE AND ALBUTEROL SULFATE 1 AMPULE: .5; 2.5 SOLUTION RESPIRATORY (INHALATION) at 21:13

## 2021-10-20 RX ADMIN — SERTRALINE 25 MG: 50 TABLET, FILM COATED ORAL at 20:04

## 2021-10-20 RX ADMIN — PRIMIDONE 25 MG: 50 TABLET ORAL at 14:58

## 2021-10-20 RX ADMIN — DILTIAZEM HYDROCHLORIDE 240 MG: 120 CAPSULE, COATED, EXTENDED RELEASE ORAL at 09:22

## 2021-10-20 RX ADMIN — INSULIN LISPRO 6 UNITS: 100 INJECTION, SOLUTION INTRAVENOUS; SUBCUTANEOUS at 11:37

## 2021-10-20 RX ADMIN — OXYCODONE AND ACETAMINOPHEN 1 TABLET: 5; 325 TABLET ORAL at 17:34

## 2021-10-20 RX ADMIN — BUMETANIDE 1 MG: 0.25 INJECTION, SOLUTION INTRAMUSCULAR; INTRAVENOUS at 09:20

## 2021-10-20 RX ADMIN — BUSPIRONE HYDROCHLORIDE 10 MG: 5 TABLET ORAL at 14:58

## 2021-10-20 RX ADMIN — BUDESONIDE 500 MCG: 0.5 SUSPENSION RESPIRATORY (INHALATION) at 21:13

## 2021-10-20 RX ADMIN — INSULIN LISPRO 3 UNITS: 100 INJECTION, SOLUTION INTRAVENOUS; SUBCUTANEOUS at 16:58

## 2021-10-20 RX ADMIN — IPRATROPIUM BROMIDE AND ALBUTEROL SULFATE 1 AMPULE: .5; 2.5 SOLUTION RESPIRATORY (INHALATION) at 12:37

## 2021-10-20 RX ADMIN — IPRATROPIUM BROMIDE AND ALBUTEROL SULFATE 1 AMPULE: .5; 2.5 SOLUTION RESPIRATORY (INHALATION) at 17:46

## 2021-10-20 ASSESSMENT — PAIN DESCRIPTION - FREQUENCY
FREQUENCY: CONTINUOUS
FREQUENCY: CONTINUOUS

## 2021-10-20 ASSESSMENT — PAIN SCALES - GENERAL
PAINLEVEL_OUTOF10: 7
PAINLEVEL_OUTOF10: 6
PAINLEVEL_OUTOF10: 7
PAINLEVEL_OUTOF10: 8

## 2021-10-20 ASSESSMENT — PAIN DESCRIPTION - PROGRESSION
CLINICAL_PROGRESSION: NOT CHANGED
CLINICAL_PROGRESSION: NOT CHANGED

## 2021-10-20 ASSESSMENT — PAIN DESCRIPTION - ONSET
ONSET: ON-GOING
ONSET: ON-GOING

## 2021-10-20 ASSESSMENT — PAIN DESCRIPTION - ORIENTATION
ORIENTATION: MID
ORIENTATION: MID

## 2021-10-20 ASSESSMENT — PAIN DESCRIPTION - LOCATION
LOCATION: BACK

## 2021-10-20 ASSESSMENT — PAIN DESCRIPTION - DESCRIPTORS
DESCRIPTORS: DISCOMFORT;ACHING;CONSTANT
DESCRIPTORS: DISCOMFORT;ACHING

## 2021-10-20 ASSESSMENT — PAIN DESCRIPTION - PAIN TYPE
TYPE: CHRONIC PAIN

## 2021-10-20 NOTE — CONSULTS
[facility]  Division of Pulmonary, Critical Care Medicine  Pulmonary [facility]           Pulmonary consult       Patient: Jose Turner  MRN: [id number]  : 1960      CC :SOB ,  H/o A flutter /a fib poorly controlled     HPI :  Jose Turner is a 66y.o. year old smoking at age 15 and increased gradually to 2 pack daily, he quit a few months ago came into the hospital with shortness of breath, associated with cough, productive of yellow sputum along with chest tightness with no fever or chills or numbness or chest pain     The patient is known to have COPD and he is O2 dependent throat he used to liters at home       Patient presented to the ER with worsening SOB and pleurtic chest pain vs muscular for the last 5 days along with BARDALES for even going bath room with no fever or chills but has wheezing and SOB and BARDALES with some orthopnea and not able to bring any sputum   Multiple admission in the past for copd exacerbation   Swelling in both legs         PHYSICAL EXAMINATION:     VITAL SIGNS:  BP (!) 158/82   Pulse 77   Temp 97 °F (36.1 °C) (Temporal)   Resp 18   Ht 5' 11\" (1.803 m)   Wt 281 lb 6.4 oz (127.6 kg)   SpO2 97%   BMI 39.25 kg/m²   Wt Readings from Last 3 Encounters:   10/20/21 281 lb 6.4 oz (127.6 kg)   10/16/21 261 lb (118.4 kg)   21 258 lb 6 oz (117.2 kg)     Temp Readings from Last 3 Encounters:   10/20/21 97 °F (36.1 °C) (Temporal)   10/16/21 98.4 °F (36.9 °C)   21 98.3 °F (36.8 °C) (Oral)     TMAX:  BP Readings from Last 3 Encounters:   10/20/21 (!) 158/82   10/17/21 (!) 168/87   10/11/21 (!) 143/98     Pulse Readings from Last 3 Encounters:   10/20/21 77   10/17/21 76   10/11/21 99           INTAKE/OUTPUTS:  I/O last 3 completed shifts:   In: 360 [P.O.:360]  Out: 600 [Urine:600]    Intake/Output Summary (Last 24 hours) at 10/20/2021 1441  Last data filed at 10/20/2021 1335  Gross per 24 hour   Intake 240 ml   Output 1600 ml   Net -1360 ml           LABS/IMAGING:    CBC:  Lab Results   Component Value Date    WBC 12.2 (H) 10/20/2021    HGB 9.8 (L) 10/20/2021    HCT 30.1 (L) 10/20/2021    MCV 97.4 10/20/2021     10/20/2021    LYMPHOPCT 7.9 (L) 10/20/2021    RBC 3.09 (L) 10/20/2021    MCH 31.7 10/20/2021    MCHC 32.6 10/20/2021    RDW 14.8 10/20/2021    NEUTOPHILPCT 86.8 (H) 10/20/2021    MONOPCT 2.6 10/20/2021    BASOPCT 0.4 10/20/2021    NEUTROABS 10.86 (H) 10/20/2021    LYMPHSABS 0.98 (L) 10/20/2021    MONOSABS 0.37 10/20/2021    EOSABS 0.00 (L) 10/20/2021    BASOSABS 0.00 10/20/2021       Recent Labs     10/20/21  0732 10/19/21  0625 10/18/21  2118   WBC 12.2* 7.5 9.1   HGB 9.8* 10.2* 10.2*   HCT 30.1* 31.3* 31.5*   MCV 97.4 98.4 96.9    254 269       BMP:   Recent Labs     10/18/21  2118 10/19/21  0625 10/20/21  0732    134 140   K 4.2 4.2 4.6   CL 98 91* 95*   CO2 38* 35* 30*   BUN 31* 31* 27*   CREATININE 1.1 1.1 0.8       MG:   Lab Results   Component Value Date    MG 1.9 10/16/2021     Ca/Phos:   Lab Results   Component Value Date    CALCIUM 8.8 10/20/2021    PHOS 3.9 08/26/2021     Amylase: No results found for: AMYLASE  Lipase:   Lab Results   Component Value Date    LIPASE 28 05/19/2011     LIVER PROFILE:   Recent Labs     10/18/21  2118   AST 21   ALT 32   BILITOT <0.2   ALKPHOS 76       PT/INR:   Recent Labs     10/18/21  2118   PROTIME 14.5*   INR 1.3     APTT:   Recent Labs     10/18/21  2118   APTT 29.9       Cardiac Enzymes:  Lab Results   Component Value Date    CKTOTAL 51 11/13/2010    CKMB 0.5 11/13/2010    TROPONINI <0.01 02/15/2018       Hgb A1C:   Lab Results   Component Value Date    LABA1C 6.4 (H) 09/09/2021     No results found for: EAG  FAMILIA: No results found for: FAMILIA  ESR: No results found for: SEDRATE  CRP: No results found for: CRP  D Dimer: No results found for: DDIMER    Thyroid Studies:  Lab Results   Component Value Date    TSH 0.942 02/14/2018    [id number] 4.8 02/14/2018           MICROBIOLOGY:  Pending       CXR:  Reviewed     CT Chest:reviewed     PE  Vitals:    10/20/21 1238   BP:    Pulse:    Resp: 18   Temp:    SpO2: 97%     General:  Awake, alert, oriented X 3. Well developed, well nourished, well groomed. No apparent distress. HEENT:  Normocephalic, atraumatic. Pupils equal, round, reactive to light. No scleral icterus. No conjunctival injection. Normal lips, teeth, and gums. No nasal discharge. Neck:  Supple, FROM  Heart:  RRR, no murmurs, gallops, rubs, carotid upstroke normal, no carotid bruits  Lungs:wheeizng bilateral ,rhonchi   Abdomen:   Bowel sounds present, soft, nontender, no masses, no organomegaly, no peritoneal signs  Extremities:  No clubbing, cyanosis, or edema  Skin:  Warm and dry, no open lesions or rash  Neuro:  Cranial nerves 2-12 intact, no focal deficits  Vascular: Radial and pedal Pulses 2+  Breast: deferred  Rectal: deferred  Genitalia:  deferred    PROBLEM LIST:  Patient Active Problem List   Diagnosis    COPD (chronic obstructive pulmonary disease) ([address]    Essential hypertension    Adrenal adenoma    Class 2 obesity due to excess calories with serious comorbidity and body mass index (BMI) of 35.0 to 35.9 in adult    Anticoagulation management encounter    Typical atrial flutter ([address]    Anxiety    Status post catheter ablation of atrial flutter    Persistent atrial fibrillation ([address]    ETOH abuse    COPD with acute exacerbation ([address]    COPD exacerbation ([address]    Single subsegmental pulmonary embolism without acute cor pulmonale (HCC)               ASSESSMENT:  1- Small PE  2- Acute COPD exacerbation  3-A fib with possible acute  HFpEF  4) very severe COPD,doubt exacerbation   5.)obstructive sleep apnea  6)Afib /Flutter   7.)tobacco independent(quit 5 months ago)      PLAN:  Continue diuresis with Bumex IV  1m mg bid   Continue Elquis,i doubt he fail ,PE small and can be monitored ,if get large PE then will consider switch Coumadin /hematology eval  Continue BD  On rocephin  Wean steroids next 1-2  Days to PO 30 mg and wean over week ,no need for IV steroids   On Duoneb   Albuterol as needed   On  Eliquis to take it twice daily  Continue Nebs   BiPAP /CPAP at night    IgE 52  Prcal and CRP         BRADLEY DOMINGUEZ MD  [facility]

## 2021-10-20 NOTE — CARE COORDINATION
Met with pt and Lodema Hopping at bedside, would like a palliative consult to discuss O/P palliative and what that entails. Order obtained from attending.

## 2021-10-20 NOTE — CARE COORDINATION
Met with pt at bedside to discuss discharge / transition of care plan. Pt reports from home with spouse Uriah Sanabria; independent of all ADL; has home oxygen, nebulizer, and BiPAP from [facility] (4 liters continuous at baseline); verified PCP and pharmacy; discharge plan is to return home with Uriah Sanabria to provide transport and home going oxygen. Pt would like this CM to return when Uriah Sanabria arrives to hospital.  Discharge plan is home with no needs at this time.

## 2021-10-20 NOTE — CONSULTS
INPATIENT CARDIOLOGY FOLLOW-UP    Name: Lorraine Case    Age: 63 y.o. Date of Admission: 10/18/2021  9:33 PM    Date of Service: 10/20/2021    Chief Complaint: Follow-up for acute on chronic HFpEF, chest pain    Interim History:  He reports, \"my back and the right side of my chest hurt from the blood clot\". Currently with no palpitations. +ongoing SOB and LE edema. SR on telemetry. +teary-eyed today.     Review of Systems:   Cardiac: As per HPI  General: No fever, chills  Pulmonary: As per HPI  HEENT: No visual disturbances, difficult swallowing  GI: No nausea, vomiting  : No dysuria, hematuria  Endocrine: No thyroid disease or DM  Musculoskeletal: ROUSE x 4, no focal motor deficits  Skin: Intact, no rashes  Neuro: No headache, seizures  Psych: Currently with no depression, anxiety    Problem List:  Patient Active Problem List   Diagnosis    COPD (chronic obstructive pulmonary disease) ([address]    Essential hypertension    Adrenal adenoma    Class 2 obesity due to excess calories with serious comorbidity and body mass index (BMI) of 35.0 to 35.9 in adult    Anticoagulation management encounter    Typical atrial flutter ([address]    Anxiety    Status post catheter ablation of atrial flutter    Persistent atrial fibrillation ([address]    ETOH abuse    COPD with acute exacerbation ([address]    COPD exacerbation [facility]    Single subsegmental pulmonary embolism without acute cor pulmonale (HCC)       Allergies:  No Known Allergies    Current Medications:  Current Facility-Administered Medications   Medication Dose Route Frequency Provider Last Rate Last Admin    oxyCODONE-acetaminophen (PERCOCET) 5-325 MG per tablet 1 tablet  1 tablet Oral Q6H PRN Deyanira Angulo MD   1 tablet at 10/20/21 1734    sertraline (ZOLOFT) tablet 25 mg  25 mg Oral Daily Deyanira Angulo MD        bumetanide (BUMEX) injection 1 mg  1 mg IntraVENous BID Deyanira Angulo MD   1 mg at 10/20/21 4067    Arformoterol Tartrate (BROVANA) nebulizer solution 15 mcg  15 mcg Nebulization BID Roldan Sierra MD   15 mcg at 10/20/21 0757    aspirin chewable tablet 81 mg  81 mg Oral Daily Roldan Sierra MD   81 mg at 10/20/21 0919    atorvastatin (LIPITOR) tablet 20 mg  20 mg Oral QPM Roldan Sierra MD   20 mg at 10/20/21 1658    budesonide (PULMICORT) nebulizer suspension 500 mcg  0.5 mg Nebulization BID Roldan Sierra MD   500 mcg at 10/20/21 0757    busPIRone (BUSPAR) tablet 10 mg  10 mg Oral TID PRN Roldan Sierra MD   10 mg at 10/20/21 1458    dilTIAZem (CARDIZEM CD) extended release capsule 240 mg  240 mg Oral BID Roldan Sierra MD   240 mg at 10/20/21 8918    flecainide (TAMBOCOR) tablet 100 mg  100 mg Oral BID Roldan Sierra MD   100 mg at 10/20/21 0919    fluticasone (FLONASE) 50 MCG/ACT nasal spray 1 spray  1 spray Each Nostril Daily Roldan Sierra MD   1 spray at 10/20/21 0932    insulin glargine (LANTUS) injection vial 20 Units  20 Units SubCUTAneous BID Roldan Sierra MD   20 Units at 10/20/21 0854    potassium chloride (KLOR-CON M) extended release tablet 20 mEq  20 mEq Oral Daily with breakfast Roldan Sierra MD   20 mEq at 10/20/21 0919    primidone (MYSOLINE) tablet 25 mg  25 mg Oral TID Roldan Sierra MD   25 mg at 10/20/21 1458    Roflumilast (DALIRESP) tablet 500 mcg  500 mcg Oral Daily Roldan Sierra MD   500 mcg at 10/20/21 0919    insulin lispro (HUMALOG) injection vial 0-18 Units  0-18 Units SubCUTAneous TID WC Roldan Sierra MD   3 Units at 10/20/21 1658    insulin lispro (HUMALOG) injection vial 0-9 Units  0-9 Units SubCUTAneous Nightly Roldan Sierra MD   2 Units at 10/19/21 2048    ipratropium-albuterol (DUONEB) nebulizer solution 1 ampule  1 ampule Inhalation Q4H WA Roldan Sierra MD   1 ampule at 10/20/21 1746    guaiFENesin-dextromethorphan (ROBITUSSIN DM) 100-10 MG/5ML syrup 5 mL  5 mL Oral Q4H PRN Roldan Sierra MD        enoxaparin (LOVENOX) injection 120 mg  1 mg/kg SubCUTAneous BID [facility] MD Sailaja   120 mg at 10/20/21 0919    methylPREDNISolone sodium (SOLU-MEDROL) injection 40 mg  40 mg IntraVENous Q12H Chin Mackay MD   40 mg at 10/20/21 1657    doxycycline (VIBRAMYCIN) 100 mg in dextrose 5 % 100 mL IVPB  100 mg IntraVENous Q12H Chin Mackay  mL/hr at 10/20/21 1735 100 mg at 10/20/21 1735    cefTRIAXone (ROCEPHIN) 1,000 mg in sterile water 10 mL IV syringe  1,000 mg IntraVENous Q24H Chin Mackay MD   1,000 mg at 10/20/21 0616    sodium chloride flush 0.9 % injection 5-40 mL  5-40 mL IntraVENous 2 times per day Chin Mackay MD   10 mL at 10/20/21 0920    sodium chloride flush 0.9 % injection 5-40 mL  5-40 mL IntraVENous PRN Chin Mackay MD        0.9 % sodium chloride infusion  25 mL IntraVENous PRN Chin Mackay MD        ondansetron (ZOFRAN-ODT) disintegrating tablet 4 mg  4 mg Oral Q8H PRN Chin Mackay MD        Or    [facility]ans[facility]ron [facility] injection 4 mg  4 mg IntraVENous Q6H PRN Chin Mackay MD        polyethylene glycol [facility]) packet 17 g  17 g Oral Daily PRN Chin Mackay MD        acetaminophen (TYLENOL) tablet 650 mg  650 mg Oral Q6H PRN Chin Mackay MD        Or   [facility] acetaminophen (TYLENOL) suppository 650 mg  650 mg Rectal Q6H PRN Chin Mackay MD        albuterol (PROVENTIL) nebulizer solution 2.5 mg  2.5 mg Nebulization Q6H PRN Chin Mackay MD          sodium chloride         Physical Exam:  BP (!) 139/90   Pulse 100   Temp 97.8 °F (36.6 °C) (Temporal)   Resp 18   Ht 5' 11\" (1.803 m)   Wt 281 lb 6.4 oz (127.6 kg)   SpO2 96%   BMI 39.25 kg/m²   Wt Readings from Last 3 Encounters:   10/20/21 281 lb 6.4 oz (127.6 kg)   10/16/21 261 lb (118.4 kg)   09/13/21 258 lb 6 oz (117.2 kg)     Appearance: Awake, alert, no acute respiratory distress  Skin: Intact, no rash  Head: Normocephalic, atraumatic  Eyes: EOMI, no conjunctival erythema  ENMT: No pharyngeal erythema, MMM, no rhinorrhea  Neck: Supple, no carotid bruits  Lungs: B/L wheezing, no rales  Cardiac: Regular rate and rhythm, no murmurs apparent  Abdomen: Soft, nontender, +bowel sounds  Extremities: Moves all extremities x 4, ++lower extremity edema  Neurologic: No focal motor deficits apparent, normal mood and affect    Intake/Output:    Intake/Output Summary (Last 24 hours) at 10/20/2021 1752  Last data filed at 10/20/2021 1743  Gross per 24 hour   Intake 240 ml   Output 2100 ml   Net -1860 ml     I/O this shift:  In: -   Out: 500 [Urine:500]    Laboratory Tests:  Recent Labs     10/18/21  2118 10/19/21  0625 10/20/21  0732    134 140   K 4.2 4.2 4.6   CL 98 91* 95*   CO2 38* 35* 30*   BUN 31* 31* 27*   CREATININE 1.1 1.1 0.8   GLUCOSE 162* 270* 232*   CALCIUM 10.1 9.4 8.8     Lab Results   Component Value Date    MG 1.9 10/16/2021     Lab Results   Component Value Date    CKTOTAL 51 11/13/2010    CKMB 0.5 11/13/2010    TROPONINI <0.01 02/15/2018    TROPONINI <0.01 02/14/2018    TROPONINI <0.01 02/14/2018     Recent Labs     10/18/21  2118   TROPHS 16*     Lab Results   Component Value Date    INR 1.3 10/18/2021    INR 1.2 02/04/2018    INR 0.9 11/13/2010    PROTIME 14.5 (H) 10/18/2021    PROTIME 13.8 (H) 02/04/2018    PROTIME 11.6 11/13/2010     Lab Results   Component Value Date    TSH 0.942 02/14/2018     Lab Results   Component Value Date    LABA1C 6.4 (H) 09/09/2021     No results found for: EAG  Lab Results   Component Value Date    CHOL 205 (H) 04/08/2018    CHOL 162 01/28/2017     Lab Results   Component Value Date    TRIG 80 04/08/2018    TRIG 61 01/28/2017     Lab Results   Component Value Date    HDL 80 04/08/2018    HDL 50 01/28/2017     Lab Results   Component Value Date    LDLCALC 109 (H) 04/08/2018    LDLCALC 100 (H) 01/28/2017     Lab Results   Component Value Date    LABVLDL 16 04/08/2018    LABVLDL 12 01/28/2017     No results found for: [facility]  Recent Labs     10/18/21  2118   PROBNP 335*       Cardiac Tests:  Telemetry reviewed (date: 10/20/2021): , rate 90's    Chest CTA 8/8/2021:  Impression  No evidence of pulmonary embolism or acute pulmonary abnormality. Prominent pericardial fat accounts for the abnormality seen on radiograph.     Cardiac catheterization 11/2010 ([facility]): LMT: normal. LAD: normal, gives off one large bifurcating diagonal as it courses to but ends at the apex. LCx: nondominant, gives off one large OM1, only trivial distal disease. RCA: Dominant, large, minimal disease distally.     Echocardiogram 11/2010 ([facility]): EF 55% with questionable RV dilation. Trivial to small pericardial effusion. Small echodense space near apex (? Localized effusion). Trivial MR and TR.      [place]ATT: 2/7/18 ([place])   Cardiac rhythm: atrial flutter   Low normal left ventricular ejection fraction.   Moderately dilated left atrium.   No evidence of a left atrial appendage thrombus.   No evidence of interatrial shunting on bubble study.   Borderline dilated right ventricle with normal right ventricular function.   Mild-moderate mitral regurgitation.     [place]ATT: 2/15/18 [facility])   Low normal left ventricular systolic function.   Mild to moderate mitral regurgitation.     TTE (Dr. Rosalinda Durham, 8/12/2021)  Summary   Technically limited study.   Normal left ventricular size, wall thickness, wall motion, and systolic   function.   Estimated left ventricle ejection fraction 70+/-5 %.   There is doppler evidence of stage II diastolic dysfunction.   Mildly dilated right ventricle.   Right ventricle global systolic function is normal .   Normal sized left atrium.   No significant valvular abnormalities noted.     Lexiscan Stress Test 8/13/2021:  FINDINGS: The overall quality of the study was fair.   Left ventricular cavity size was noted to be dilated on both the  stress and the resting images but there was no transient ischemic  dilatation after stress  Rotational analog analysis demonstrated abnormal patient motion and  there was marked increase in tracer uptake in the abdominal organs  with the liver overshadowing the bottom of the heart  A severe defect was present in the inferior wall extending into the  lateral apical wall(s) that was moderate to largesized by  quantification. The resting images showed no change   Gated SPECT left ventricular ejection fraction was calculated to be  66%, with normal myocardial contractility and wall motion. Impression  The myocardial perfusion imaging was probably normal with the large  fixed inferior/lateral apical wall defect being more consistent with  attenuation artifact and less likely the result of a myocardial  infarction especially with the normal contractility of the inferior  wall and the lateral apical wall. More importantly, there did not  appear to be any evidence of stress-induced ischemia on this study  Overall left ventricular systolic function was normal with no regional  wall motion abnormality  Low risk general pharmacologic stress test.    Coronary CTA: 9/13/21 (Dr. Marley Torres)  AGATSTON SCORE: Total coronary artery calcium score is 115.7, distributed as LM 0.0, LAD 60.7, LCx 0.0, RCA 54. 7.      Calcium score percentile is 87% based on age, gender and race.     CARDIAC VALVES: Mild mitral and aortic calcifications are noted.        FUNCTION: The calculated left ventricular ejection fraction is 73%, LVEDV is 226 mL, LVESV 61 mL. .     CORONARY ANATOMY:     The coronary arteries arise in normal position. There is right coronary artery dominance.     CORONARY CT ANGIOGRAM:     Left main: The left main coronary artery bifurcates into the LAD and LCX. No definite angiographic evidence of significant plaque noted in the left main coronary artery.     Left anterior descending: The proximal LAD has about 1 to 24% calcified plaque. The distal LAD has about 25 to 49% soft plaque. The apical portion of the LAD was not seen due to motion. The proximal to mid diagonal 1 is patent. The distal diagonal 1 is now well seen.   The proximal diagonal 2 has mild luminal irregularities. The mid to distal diagonal artery was not well seen.     Left circumflex: The left circumflex coronary artery is not well seen.        Right coronary artery: The RCA is a moderate size caliber vessel with about 1 to 24% calcified plaque. The mid to distal PDA and PLB were not well seen due to motion artifact         PERICARDIUM: No definite evidence of pericardial effusion     The aortic at the sinuses of Valsalva measures 3.6 x 3.5 x 3.9 cm     The sinotubular junction measures 3.1 x 3.2 cm  The mid ascending aorta measures 3.6 x 3.6 cm  The pulmonary artery measures 3.2 x 3.0 cm     4 pulmonary veins enter the left atrium (2 on the left and 2 on the right).     IMPRESSION:     Very difficult study due to technical difficulties, poor contrast opacification and motion.     1.  Mild nonobstructive coronary artery disease noted as mentioned above (about 25 to 49% distal LAD plaque). The apical and inferior apical portion of the LAD was not seen due to motion artifact. The left circumflex system were not well seen due to motion artifact.     2. Normal left ventricular systolic function with estimated left ventricular ejection fraction of 73%.     3. The total calcium score is 115 with calcium score percentile of 87% based on age gender and race. CXR: 10/16/21  No acute process.       Question 10 mm nodule in the left upper lung. CTA chest: 10/16/21  There is no central pulmonary embolism or aortic dissection. Massiel Dallin is a   small distal subsegmental pulmonary embolism in the right lower lobe.       Coronary artery calcification.       Minimal ground-glass infiltrates in the right upper lobe medially concerning   for pneumonia.       9 mm indeterminate pulmonary nodule in the right lower lobe.  Consider   surveillance according to [employer] guidelines. ASSESSMENT / PLAN:  1. Acute on chronic HFpEF  2. Acute on chronic respiratory failure.  Known history of very severe COPD. Pulmonology following. 3. Pulmonary embolism (\"small distal subsegmental pulmonary embolism in the right lower lobe\" on 10/16/21 CTA chest)  4. Recent acute kidney injury -- improved, most recent Cr 1.4 --> 1.1 --> 1.2 --> 1.1 --> 1.0 --> 1.1 --> 0.8  5. Paroxysmal atrial fibrillation/typical flutter status-post AF ablation in April 2018. On Eliquis PTA, cardizem, and flecainide therapy. Follows with [address] EP. 6. Hypertension -- sub-optimal control in the past, most recent 's-160's  7. Hyperlipidemia, on statin therapy. 8. DM -- Hgb A1c 6.4  9. Obstructive sleep apnea, compliant with CPAP. 10. Morbid obesity / BMI 39.2  11. History of alcohol abuse  12. Former tobacco abuse (2 PPD for many years, quit in 8/2017)  13. History of pericarditis in 11/2010  14. Mild coronary artery disease by cardiac catheterization with atypical chest pain and mild troponin elevation not consistent with acute coronary syndrome with a recent nonischemic stress test and reportedly no significant obstructive CAD on CTA of the coronaries done today 9/13/21    - Multiple recent cardiac studies reviewed today  - Monitor I/O's, renal function, and electrolytes closely with ongoing diuresis (recommend continued IV diuresis)  - Continue current medications otherwise (including anticoagulation; up-titrate anti-HTN regimen as needed)  - Treatment of PARKER  - Aggressive risk factor modifications  - Telemetry reviewed (sinus rhythm)  - Care per pulmonary    Greater than 35 minutes was spent counseling the patient, reviewing the rationale for the above recommendations and reviewing the patient's current medication list, problem list and results of all previously ordered testing.     James Russell MD  [facility]) Cardiology

## 2021-10-20 NOTE — PROGRESS NOTES
Palliative care consult placed via perfect serve.   [facility] NP taking messages    Cardiology consult placed via perfect serve to Dr Radha Gamboa.  Eloisa Tracy NP taking messages

## 2021-10-20 NOTE — CONSULTS
Palliative Care Department  [phone]  Palliative Care Initial Consult  Provider Jatin Snachez MD      PATIENT: Ernie Garcia  : 1960  MRN: [id number]  ADMISSION DATE: 10/18/2021  9:33 PM  Referring Provider: Alyssa Marrufo MD    Palliative Medicine was consulted on hospital day 1 for assistance with wants to discuss outpatient palliative     HPI:     Seymour West is a 63 y.o. y/o male with a history of of COPD atrial flutter hypertension and DM who presented to [facility] on 10/18/2021 with chest pain. Pt found to have a PE, along with COPD exacerbation and pneumonia     ASSESSMENT/PLAN:     Pertinent Hospital Diagnoses     · PE   COPD    PNA    Palliative Care Encounter for End stage COPD  -Spoke with patient about his condition, he currently wants his wife present as well to speak about outpatient palliative medicine. We have set up an timing for 11 AM tomorrow morning. Patient spouse will be in room. Thank you for the opportunity to participate in the care of Ernie Garcia. Jatin Sanchez MD  Palliative Medicine     SUBJECTIVE:     Details of Conversation: Met patient at bedside, introduced palliative medicine to him. We spoke about many different aspects of his condition. He was telling me about his pain that he is having from the blood clot in his lung. We were consulted for outpatient palliative services. He wants his wife to be present for the conversation as well, he has set up a time for 11 AM tomorrow to have this discussion. She is currently at work today and is not able to answer her phone. I spoke to him about what we offer, we can offer him low doses of morphine that assist with breathing. This will not be used for pain relief. Patient did not understand  this concept following.   We will wait for his spouse to arrive to the hospital.     Prognosis: Guarded    OBJECTIVE:     BP (!) 158/82   Pulse 77   Temp 97 °F (36.1 °C) (Temporal)   Resp 18   Ht 5' 11\" (1.803 m)   Wt 281 lb 6.4 oz (127.6 kg)   SpO2 97%   BMI 39.25 kg/m²     Physical Examination:  Gen: awake, alert, Short of breath  HEENT: normocephalic, atraumatic  Lungs: respirations easy and not labored,   Heart: regular rate and rhythm  Abdomen: obese  Extremities: moving all extremities , edema  Skin: warm  Neuro: awake, alert, oriented x 3,    Objective data reviewed: labs, images, records, medication use, vitals and chart    Time/Communication  Greater than 50% of time spent, total 50 minutes in counseling and coordination of care at the bedside regarding goals of care. Thank you for allowing Palliative Medicine to participate in the care of Ernie Garcia. Note: This report was completed using computerize voiced recognition software. Every effort has been made to ensure accuracy; however, inadvertent computerized transcription errors may be present.

## 2021-10-20 NOTE — PROGRESS NOTES
Hospitalist Progress Note      SYNOPSIS: Patient admitted on 10/18/2021 for PE    63 y.o. male with past medical history of COPD atrial flutter hypertension and DM Patient present to the Ed due to right sided chest pain that radiates into his back . Patient was seen in ED on 10/16 and diagnosed with COPD exacerbation and pneumonia  Patient was offered admission but decided not to stay . He was discharged with [facility] and [address] [place] [address] . Patient reports worsening shortness of breath and weight gain . He reports a non productive cough He reports bilateral legt swelling bilateral leg swelling  He report no fever chills abdominal pain nausea diarrhea. Patient uses 4L nasal canula daily . In the ED  He was hemodynamically stable  He is saturating at 97% on 4 L nasal cannula. Ab data reveal sodium 143 creatinine 1.1 BUN 31 Glucoses 162  Trop 16 EKG revealed normal sinus rhythm 1st degree AV block  HGB 10.2  CXR revealed no acute precess. CTA revealed small  distal segmental  In right lower lobe  . Patient takes Eliquis daily . CTA also revealed RUL pneumonia     SUBJECTIVE:    Patient seen and examined  Records reviewed. The pt states that he has back pain and Micki Manflusyed is not working. He also reports worsening edema of lower ext. Stable overnight. No other overnight issues reported. Temp (24hrs), Av.8 °F (36 °C), Min:96.6 °F (35.9 °C), Max:97 °F (36.1 °C)    DIET: ADULT DIET; Regular; Low Sodium (2 gm); 1000 ml  CODE: Full Code    Intake/Output Summary (Last 24 hours) at 10/20/2021 1056  Last data filed at 10/20/2021 0132  Gross per 24 hour   Intake 120 ml   Output 600 ml   Net -480 ml       OBJECTIVE:    BP (!) 158/82   Pulse 77   Temp 97 °F (36.1 °C) (Temporal)   Resp 18   Ht 5' 11\" (1.803 m)   Wt 281 lb 6.4 oz (127.6 kg)   SpO2 99%   BMI 39.25 kg/m²     General appearance: No apparent distress, appears stated age and cooperative. HEENT:  Conjunctivae/corneas clear. Neck: Supple.  No jugular venous distention. Respiratory: Diminished. Some wheezing. Cardiovascular: Regular rate rhythm, normal S1-S2  Abdomen: Soft, nontender, nondistended  Musculoskeletal: No clubbing, cyanosis, 2+ bilateral lower extremity edema. Brisk capillary refill. Skin:  No rashes  on visible skin  Neurologic: awake, alert and following commands     ASSESSMENT and PLAN    Pulmonary embolism  Continue therapeutic Lovenox twice daily  Pt was compliant on Eliquis. Pulmonology consulted recommendations pending. Right upper lobe pneumonia  Continue ceftriaxone and doxycycline  Legionella and strep strep negative    COPD exacerbation/chronic respiratory failure  Continue IV Solu-Medrol and duo nebs  On 4 L nasal cannula at baseline  Supplemental oxygen as needed    Chronic back pain with exacerbation  Stop Norco.  Start Percocet.     Acute on chronic congestive heart failure with preserved ejection fraction  Increased Bumex to 1 mg IV twice daily  Strict RASTA's    DM2  Continue sliding scale insulin  Continue Lantus twice daily    Atrial fibrillationcontinue flecainide and Cardizem  Hyperlipidemiacontinue Lipitor  Depression and anxietycontinue Zoloft and BuSpar      DISPOSITION:     Medications:  REVIEWED DAILY    Infusion Medications    sodium chloride       Scheduled Medications    sertraline  25 mg Oral Daily    bumetanide  1 mg IntraVENous BID    Arformoterol Tartrate  15 mcg Nebulization BID    aspirin  81 mg Oral Daily    atorvastatin  20 mg Oral QPM    budesonide  0.5 mg Nebulization BID    dilTIAZem  240 mg Oral BID    flecainide  100 mg Oral BID    fluticasone  1 spray Each Nostril Daily    insulin glargine  20 Units SubCUTAneous BID    potassium chloride  20 mEq Oral Daily with breakfast    primidone  25 mg Oral TID    Roflumilast  500 mcg Oral Daily    insulin lispro  0-18 Units SubCUTAneous TID     insulin lispro  0-9 Units SubCUTAneous Nightly    ipratropium-albuterol  1 ampule Inhalation Q4H WA    enoxaparin  1 mg/kg SubCUTAneous BID    methylPREDNISolone  40 mg IntraVENous Q12H    doxycycline (VIBRAMYCIN) IV  100 mg IntraVENous Q12H    cefTRIAXone (ROCEPHIN) IV  1,000 mg IntraVENous Q24H    sodium chloride flush  5-40 mL IntraVENous 2 times per day     PRN Meds: oxyCODONE-acetaminophen, busPIRone, guaiFENesin-dextromethorphan, sodium chloride flush, sodium chloride, ondansetron **OR** ondansetron, polyethylene glycol, acetaminophen **OR** acetaminophen, albuterol    Labs:     Recent Labs     10/18/21  2118 10/19/21  0625 10/20/21  0732   WBC 9.1 7.5 12.2*   HGB 10.2* 10.2* 9.8*   HCT 31.5* 31.3* 30.1*    254 258       Recent Labs     10/18/21  2118 10/19/21  0625 10/20/21  0732    134 140   K 4.2 4.2 4.6   CL 98 91* 95*   CO2 38* 35* 30*   BUN 31* 31* 27*   CREATININE 1.1 1.1 0.8   CALCIUM 10.1 9.4 8.8       Recent Labs     10/18/21  2118   PROT 6.9   ALKPHOS 76   ALT 32   AST 21   BILITOT <0.2       Recent Labs     10/18/21  2118   INR 1.3       No results for input(s): Bibiana Wade in the last 72 hours. Chronic labs:    Lab Results   Component Value Date    CHOL 205 (H) 04/08/2018    TRIG 80 04/08/2018    HDL 80 04/08/2018    LDLCALC 109 (H) 04/08/2018    TSH 0.942 02/14/2018    PSA 0.26 04/08/2018    INR 1.3 10/18/2021    LABA1C 6.4 (H) 09/09/2021       Radiology: REVIEWED DAILY    +++++++++++++++++++++++++++++++++++++++++++++++++  Justin Buchanan MD  [facility] - [facility], [place]  +++++++++++++++++++++++++++++++++++++++++++++++++  NOTE: This report was transcribed using voice recognition software. Every effort was made to ensure accuracy; however, inadvertent computerized transcription errors may be present.

## 2021-10-21 LAB
ANION GAP SERPL CALCULATED.3IONS-SCNC: 10 MMOL/L (ref 7–16)
ANION GAP SERPL CALCULATED.3IONS-SCNC: 12 MMOL/L (ref 7–16)
ANISOCYTOSIS: ABNORMAL
BASOPHILS ABSOLUTE: 0 E9/L (ref 0–0.2)
BASOPHILS RELATIVE PERCENT: 0.6 % (ref 0–2)
BLOOD CULTURE, ROUTINE: NORMAL
BUN BLDV-MCNC: 27 MG/DL (ref 6–20)
BUN BLDV-MCNC: 34 MG/DL (ref 6–20)
CALCIUM SERPL-MCNC: 9.1 MG/DL (ref 8.6–10.2)
CALCIUM SERPL-MCNC: 9.9 MG/DL (ref 8.6–10.2)
CHLORIDE BLD-SCNC: 93 MMOL/L (ref 98–107)
CHLORIDE BLD-SCNC: 95 MMOL/L (ref 98–107)
CO2: 32 MMOL/L (ref 22–29)
CO2: 32 MMOL/L (ref 22–29)
CREAT SERPL-MCNC: 1 MG/DL (ref 0.7–1.2)
CREAT SERPL-MCNC: 1.2 MG/DL (ref 0.7–1.2)
CULTURE, BLOOD 2: NORMAL
EOSINOPHILS ABSOLUTE: 0 E9/L (ref 0.05–0.5)
EOSINOPHILS RELATIVE PERCENT: 0.8 % (ref 0–6)
GFR AFRICAN AMERICAN: >60
GFR AFRICAN AMERICAN: >60
GFR NON-AFRICAN AMERICAN: >60 ML/MIN/1.73
GFR NON-AFRICAN AMERICAN: >60 ML/MIN/1.73
GLUCOSE BLD-MCNC: 181 MG/DL (ref 74–99)
GLUCOSE BLD-MCNC: 184 MG/DL (ref 74–99)
HCT VFR BLD CALC: 29.6 % (ref 37–54)
HEMOGLOBIN: 9.8 G/DL (ref 12.5–16.5)
LYMPHOCYTES ABSOLUTE: 1.32 E9/L (ref 1.5–4)
LYMPHOCYTES RELATIVE PERCENT: 10.5 % (ref 20–42)
MCH RBC QN AUTO: 32.3 PG (ref 26–35)
MCHC RBC AUTO-ENTMCNC: 33.1 % (ref 32–34.5)
MCV RBC AUTO: 97.7 FL (ref 80–99.9)
METER GLUCOSE: 150 MG/DL (ref 74–99)
METER GLUCOSE: 179 MG/DL (ref 74–99)
METER GLUCOSE: 200 MG/DL (ref 74–99)
METER GLUCOSE: 256 MG/DL (ref 74–99)
MONOCYTES ABSOLUTE: 0.72 E9/L (ref 0.1–0.95)
MONOCYTES RELATIVE PERCENT: 6.1 % (ref 2–12)
MYELOCYTE PERCENT: 2.6 % (ref 0–0)
NEUTROPHILS ABSOLUTE: 9.96 E9/L (ref 1.8–7.3)
NEUTROPHILS RELATIVE PERCENT: 80.7 % (ref 43–80)
NUCLEATED RED BLOOD CELLS: 0.9 /100 WBC
PDW BLD-RTO: 14.8 FL (ref 11.5–15)
PLATELET # BLD: 256 E9/L (ref 130–450)
PMV BLD AUTO: 10.7 FL (ref 7–12)
POIKILOCYTES: ABNORMAL
POLYCHROMASIA: ABNORMAL
POTASSIUM REFLEX MAGNESIUM: 4.8 MMOL/L (ref 3.5–5)
POTASSIUM SERPL-SCNC: 4.7 MMOL/L (ref 3.5–5)
RBC # BLD: 3.03 E12/L (ref 3.8–5.8)
SODIUM BLD-SCNC: 135 MMOL/L (ref 132–146)
SODIUM BLD-SCNC: 139 MMOL/L (ref 132–146)
TARGET CELLS: ABNORMAL
WBC # BLD: 12 E9/L (ref 4.5–11.5)

## 2021-10-21 PROCEDURE — 6370000000 HC RX 637 (ALT 250 FOR IP): Performed by: FAMILY MEDICINE

## 2021-10-21 PROCEDURE — 82962 GLUCOSE BLOOD TEST: CPT

## 2021-10-21 PROCEDURE — 2060000000 HC ICU INTERMEDIATE R&B

## 2021-10-21 PROCEDURE — 6360000002 HC RX W HCPCS: Performed by: FAMILY MEDICINE

## 2021-10-21 PROCEDURE — 99233 SBSQ HOSP IP/OBS HIGH 50: CPT | Performed by: NURSE PRACTITIONER

## 2021-10-21 PROCEDURE — 2500000003 HC RX 250 WO HCPCS: Performed by: FAMILY MEDICINE

## 2021-10-21 PROCEDURE — 2500000003 HC RX 250 WO HCPCS: Performed by: INTERNAL MEDICINE

## 2021-10-21 PROCEDURE — 85025 COMPLETE CBC W/AUTO DIFF WBC: CPT

## 2021-10-21 PROCEDURE — 2700000000 HC OXYGEN THERAPY PER DAY

## 2021-10-21 PROCEDURE — 36415 COLL VENOUS BLD VENIPUNCTURE: CPT

## 2021-10-21 PROCEDURE — 6370000000 HC RX 637 (ALT 250 FOR IP): Performed by: INTERNAL MEDICINE

## 2021-10-21 PROCEDURE — 2500000003 HC RX 250 WO HCPCS: Performed by: NURSE PRACTITIONER

## 2021-10-21 PROCEDURE — 99233 SBSQ HOSP IP/OBS HIGH 50: CPT | Performed by: INTERNAL MEDICINE

## 2021-10-21 PROCEDURE — 2580000003 HC RX 258: Performed by: FAMILY MEDICINE

## 2021-10-21 PROCEDURE — 94640 AIRWAY INHALATION TREATMENT: CPT

## 2021-10-21 PROCEDURE — 2580000003 HC RX 258: Performed by: NURSE PRACTITIONER

## 2021-10-21 PROCEDURE — 80048 BASIC METABOLIC PNL TOTAL CA: CPT

## 2021-10-21 PROCEDURE — 94660 CPAP INITIATION&MGMT: CPT

## 2021-10-21 PROCEDURE — 6370000000 HC RX 637 (ALT 250 FOR IP): Performed by: NURSE PRACTITIONER

## 2021-10-21 RX ORDER — BUMETANIDE 0.25 MG/ML
2 INJECTION, SOLUTION INTRAMUSCULAR; INTRAVENOUS 2 TIMES DAILY
Status: DISCONTINUED | OUTPATIENT
Start: 2021-10-21 | End: 2021-10-21

## 2021-10-21 RX ORDER — POLYETHYLENE GLYCOL 3350 17 G/17G
17 POWDER, FOR SOLUTION ORAL DAILY
Status: DISCONTINUED | OUTPATIENT
Start: 2021-10-22 | End: 2021-10-26 | Stop reason: HOSPADM

## 2021-10-21 RX ORDER — LIDOCAINE 4 G/G
1 PATCH TOPICAL DAILY
Status: DISCONTINUED | OUTPATIENT
Start: 2021-10-22 | End: 2021-10-26 | Stop reason: HOSPADM

## 2021-10-21 RX ORDER — MORPHINE SULFATE 10 MG/5ML
2.5 SOLUTION ORAL EVERY 4 HOURS PRN
Status: DISCONTINUED | OUTPATIENT
Start: 2021-10-21 | End: 2021-10-22

## 2021-10-21 RX ADMIN — ROFLUMILAST 500 MCG: 500 TABLET ORAL at 09:03

## 2021-10-21 RX ADMIN — INSULIN GLARGINE 20 UNITS: 100 INJECTION, SOLUTION SUBCUTANEOUS at 20:21

## 2021-10-21 RX ADMIN — BUMETANIDE 0.5 MG/HR: 0.25 INJECTION, SOLUTION INTRAMUSCULAR; INTRAVENOUS at 16:50

## 2021-10-21 RX ADMIN — ARFORMOTEROL TARTRATE 15 MCG: 15 SOLUTION RESPIRATORY (INHALATION) at 08:12

## 2021-10-21 RX ADMIN — INSULIN LISPRO 9 UNITS: 100 INJECTION, SOLUTION INTRAVENOUS; SUBCUTANEOUS at 11:35

## 2021-10-21 RX ADMIN — DILTIAZEM HYDROCHLORIDE 240 MG: 120 CAPSULE, COATED, EXTENDED RELEASE ORAL at 09:03

## 2021-10-21 RX ADMIN — METHYLPREDNISOLONE SODIUM SUCCINATE 40 MG: 40 INJECTION, POWDER, FOR SOLUTION INTRAMUSCULAR; INTRAVENOUS at 06:28

## 2021-10-21 RX ADMIN — INSULIN LISPRO 3 UNITS: 100 INJECTION, SOLUTION INTRAVENOUS; SUBCUTANEOUS at 06:40

## 2021-10-21 RX ADMIN — BUDESONIDE 500 MCG: 0.5 SUSPENSION RESPIRATORY (INHALATION) at 19:35

## 2021-10-21 RX ADMIN — OXYCODONE AND ACETAMINOPHEN 1 TABLET: 5; 325 TABLET ORAL at 15:40

## 2021-10-21 RX ADMIN — FLECAINIDE ACETATE 100 MG: 100 TABLET ORAL at 20:10

## 2021-10-21 RX ADMIN — Medication 10 ML: at 09:02

## 2021-10-21 RX ADMIN — OXYCODONE AND ACETAMINOPHEN 1 TABLET: 5; 325 TABLET ORAL at 21:42

## 2021-10-21 RX ADMIN — SERTRALINE 25 MG: 50 TABLET, FILM COATED ORAL at 20:09

## 2021-10-21 RX ADMIN — ENOXAPARIN SODIUM 120 MG: 150 INJECTION SUBCUTANEOUS at 09:02

## 2021-10-21 RX ADMIN — SODIUM CHLORIDE, PRESERVATIVE FREE 10 ML: 5 INJECTION INTRAVENOUS at 17:34

## 2021-10-21 RX ADMIN — METHYLPREDNISOLONE SODIUM SUCCINATE 40 MG: 40 INJECTION, POWDER, FOR SOLUTION INTRAMUSCULAR; INTRAVENOUS at 17:34

## 2021-10-21 RX ADMIN — Medication 10 ML: at 20:18

## 2021-10-21 RX ADMIN — BUSPIRONE HYDROCHLORIDE 10 MG: 5 TABLET ORAL at 14:32

## 2021-10-21 RX ADMIN — INSULIN LISPRO 3 UNITS: 100 INJECTION, SOLUTION INTRAVENOUS; SUBCUTANEOUS at 16:53

## 2021-10-21 RX ADMIN — INSULIN GLARGINE 20 UNITS: 100 INJECTION, SOLUTION SUBCUTANEOUS at 09:02

## 2021-10-21 RX ADMIN — FLECAINIDE ACETATE 100 MG: 100 TABLET ORAL at 09:03

## 2021-10-21 RX ADMIN — BUMETANIDE 1 MG: 0.25 INJECTION, SOLUTION INTRAMUSCULAR; INTRAVENOUS at 09:03

## 2021-10-21 RX ADMIN — DILTIAZEM HYDROCHLORIDE 240 MG: 120 CAPSULE, COATED, EXTENDED RELEASE ORAL at 20:09

## 2021-10-21 RX ADMIN — DOXYCYCLINE 100 MG: 100 INJECTION, POWDER, LYOPHILIZED, FOR SOLUTION INTRAVENOUS at 17:34

## 2021-10-21 RX ADMIN — WATER 1000 MG: 1 INJECTION INTRAMUSCULAR; INTRAVENOUS; SUBCUTANEOUS at 06:27

## 2021-10-21 RX ADMIN — IPRATROPIUM BROMIDE AND ALBUTEROL SULFATE 1 AMPULE: .5; 2.5 SOLUTION RESPIRATORY (INHALATION) at 12:19

## 2021-10-21 RX ADMIN — ATORVASTATIN CALCIUM 20 MG: 20 TABLET, FILM COATED ORAL at 17:34

## 2021-10-21 RX ADMIN — IPRATROPIUM BROMIDE AND ALBUTEROL SULFATE 1 AMPULE: .5; 2.5 SOLUTION RESPIRATORY (INHALATION) at 15:51

## 2021-10-21 RX ADMIN — ASPIRIN 81 MG CHEWABLE TABLET 81 MG: 81 TABLET CHEWABLE at 09:03

## 2021-10-21 RX ADMIN — ARFORMOTEROL TARTRATE 15 MCG: 15 SOLUTION RESPIRATORY (INHALATION) at 19:35

## 2021-10-21 RX ADMIN — DOXYCYCLINE 100 MG: 100 INJECTION, POWDER, LYOPHILIZED, FOR SOLUTION INTRAVENOUS at 06:29

## 2021-10-21 RX ADMIN — IPRATROPIUM BROMIDE AND ALBUTEROL SULFATE 1 AMPULE: .5; 2.5 SOLUTION RESPIRATORY (INHALATION) at 19:34

## 2021-10-21 RX ADMIN — PRIMIDONE 25 MG: 50 TABLET ORAL at 09:03

## 2021-10-21 RX ADMIN — OXYCODONE AND ACETAMINOPHEN 1 TABLET: 5; 325 TABLET ORAL at 09:03

## 2021-10-21 RX ADMIN — BUSPIRONE HYDROCHLORIDE 10 MG: 5 TABLET ORAL at 06:46

## 2021-10-21 RX ADMIN — INSULIN LISPRO 3 UNITS: 100 INJECTION, SOLUTION INTRAVENOUS; SUBCUTANEOUS at 20:43

## 2021-10-21 RX ADMIN — IPRATROPIUM BROMIDE AND ALBUTEROL SULFATE 1 AMPULE: .5; 2.5 SOLUTION RESPIRATORY (INHALATION) at 08:11

## 2021-10-21 RX ADMIN — POTASSIUM CHLORIDE 20 MEQ: 1500 TABLET, EXTENDED RELEASE ORAL at 09:03

## 2021-10-21 RX ADMIN — PRIMIDONE 25 MG: 50 TABLET ORAL at 14:25

## 2021-10-21 RX ADMIN — PRIMIDONE 25 MG: 50 TABLET ORAL at 20:10

## 2021-10-21 RX ADMIN — ENOXAPARIN SODIUM 120 MG: 150 INJECTION SUBCUTANEOUS at 20:10

## 2021-10-21 RX ADMIN — BUDESONIDE 500 MCG: 0.5 SUSPENSION RESPIRATORY (INHALATION) at 08:13

## 2021-10-21 RX ADMIN — MORPHINE SULFATE 2.6 MG: 10 SOLUTION ORAL at 17:43

## 2021-10-21 RX ADMIN — FLUTICASONE PROPIONATE 1 SPRAY: 50 SPRAY, METERED NASAL at 11:34

## 2021-10-21 ASSESSMENT — PAIN SCALES - GENERAL
PAINLEVEL_OUTOF10: 6
PAINLEVEL_OUTOF10: 7
PAINLEVEL_OUTOF10: 5
PAINLEVEL_OUTOF10: 0
PAINLEVEL_OUTOF10: 6
PAINLEVEL_OUTOF10: 7
PAINLEVEL_OUTOF10: 2

## 2021-10-21 NOTE — PROGRESS NOTES
INPATIENT CARDIOLOGY FOLLOW-UP    Name: Lay Benedict    Age: 63 y.o. Date of Admission: 10/18/2021  9:33 PM    Date of Service: 10/21/2021    Chief Complaint: Follow-up for acute on chronic HFpEF, chest pain    Interim History:  Currently with no chest pain or palpitations. Long-standing history of SOB (no respiratory distress at rest). +LE edema. SR with episodes of AF on telemetry.     Review of Systems:   Cardiac: As per HPI  General: No fever, chills  Pulmonary: As per HPI  HEENT: No visual disturbances, difficult swallowing  GI: No nausea, vomiting  : No dysuria, hematuria  Endocrine: No thyroid disease or DM  Musculoskeletal: ROUSE x 4, no focal motor deficits  Skin: Intact, no rashes  Neuro: No headache, seizures  Psych: Currently with no depression, anxiety    Problem List:  Patient Active Problem List   Diagnosis    COPD (chronic obstructive pulmonary disease) ([address]    Essential hypertension    Adrenal adenoma    Class 2 obesity due to excess calories with serious comorbidity and body mass index (BMI) of 35.0 to 35.9 in adult    Anticoagulation management encounter    Typical atrial flutter ([address]    Anxiety    Status post catheter ablation of atrial flutter    Persistent atrial fibrillation ([address]    ETOH abuse    COPD with acute exacerbation ([address]    COPD exacerbation ([address]    Single subsegmental pulmonary embolism without acute cor pulmonale (HCC)       Allergies:  No Known Allergies    Current Medications:  Current Facility-Administered Medications   Medication Dose Route Frequency Provider Last Rate Last Admin    oxyCODONE-acetaminophen (PERCOCET) 5-325 MG per tablet 1 tablet  1 tablet Oral Q6H PRN Raphael Bourgeois MD   1 tablet at 10/21/21 0903    sertraline (ZOLOFT) tablet 25 mg  25 mg Oral Daily Raphael Bourgeois MD   25 mg at 10/20/21 2004    bumetanide (BUMEX) injection 1 mg  1 mg IntraVENous BID Raphael Bourgeois MD   1 mg at 10/21/21 0903    Arformoterol Tartrate [facility]) nebulizer solution 15 mcg  15 mcg Nebulization BID Anand Salgado MD   15 mcg at 10/21/21 1640    aspirin chewable tablet 81 mg  81 mg Oral Daily Anand Salgado MD   81 mg at 10/21/21 4776    atorvastatin (LIPITOR) tablet 20 mg  20 mg Oral QPM Anand Salgado MD   20 mg at 10/20/21 1658    budesonide (PULMICORT) nebulizer suspension 500 mcg  0.5 mg Nebulization BID Anand Salgado MD   500 mcg at 10/21/21 0813    busPIRone (BUSPAR) tablet 10 mg  10 mg Oral TID PRN Anand Salgado MD   10 mg at 10/21/21 0646    dilTIAZem (CARDIZEM CD) extended release capsule 240 mg  240 mg Oral BID Anand Salgado MD   240 mg at 10/21/21 3753    flecainide (TAMBOCOR) tablet 100 mg  100 mg Oral BID Anand Salgado MD   100 mg at 10/21/21 0903    fluticasone (FLONASE) 50 MCG/ACT nasal spray 1 spray  1 spray Each Nostril Daily Anand Salgado MD   1 spray at 10/20/21 0932    insulin glargine (LANTUS) injection vial 20 Units  20 Units SubCUTAneous BID Anand Salgado MD   20 Units at 10/21/21 0902    potassium chloride (KLOR-CON M) extended release tablet 20 mEq  20 mEq Oral Daily with breakfast Anand Salgado MD   20 mEq at 10/21/21 2040    primidone (MYSOLINE) tablet 25 mg  25 mg Oral TID Anand Salgado MD   25 mg at 10/21/21 1680    Roflumilast (DALIRESP) tablet 500 mcg  500 mcg Oral Daily Anand Salgado MD   500 mcg at 10/21/21 0903    insulin lispro (HUMALOG) injection vial 0-18 Units  0-18 Units SubCUTAneous TID WC Anand Salgado MD   3 Units at 10/21/21 0640    insulin lispro (HUMALOG) injection vial 0-9 Units  0-9 Units SubCUTAneous Nightly Anand Salgado MD   3 Units at 10/20/21 2203    ipratropium-albuterol (DUONEB) nebulizer solution 1 ampule  1 ampule Inhalation Q4H WA Anand Salgado MD   1 ampule at 10/21/21 0811    guaiFENesin-dextromethorphan (ROBITUSSIN DM) 100-10 MG/5ML syrup 5 mL  5 mL Oral Q4H PRN Anand Salgado MD        enoxaparin (LOVENOX) injection 120 mg  1 mg/kg SubCUTAneous rales  Cardiac: IRRR, no murmurs apparent  Abdomen: Soft, nontender, +bowel sounds  Extremities: Moves all extremities x 4, ++lower extremity edema  Neurologic: No focal motor deficits apparent, normal mood and affect    Intake/Output:    Intake/Output Summary (Last 24 hours) at 10/21/2021 1015  Last data filed at 10/20/2021 2334  Gross per 24 hour   Intake 480 ml   Output 2600 ml   Net -2120 ml     No intake/output data recorded.     Laboratory Tests:  Recent Labs     10/19/21  0625 10/20/21  0732 10/21/21  0600    140 135   K 4.2 4.6 4.8   CL 91* 95* 93*   CO2 35* 30* 32*   BUN 31* 27* 27*   CREATININE 1.1 0.8 1.0   GLUCOSE 270* 232* 181*   CALCIUM 9.4 8.8 9.1     Lab Results   Component Value Date    MG 1.9 10/16/2021     Lab Results   Component Value Date    CKTOTAL 51 11/13/2010    CKMB 0.5 11/13/2010    TROPONINI <0.01 02/15/2018    TROPONINI <0.01 02/14/2018    TROPONINI <0.01 02/14/2018     Recent Labs     10/18/21  2118   TROPHS 16*     Lab Results   Component Value Date    INR 1.3 10/18/2021    INR 1.2 02/04/2018    INR 0.9 11/13/2010    PROTIME 14.5 (H) 10/18/2021    PROTIME 13.8 (H) 02/04/2018    PROTIME 11.6 11/13/2010     Lab Results   Component Value Date    TSH 0.942 02/14/2018     Lab Results   Component Value Date    LABA1C 6.4 (H) 09/09/2021     No results found for: EAG  Lab Results   Component Value Date    CHOL 205 (H) 04/08/2018    CHOL 162 01/28/2017     Lab Results   Component Value Date    TRIG 80 04/08/2018    TRIG 61 01/28/2017     Lab Results   Component Value Date    HDL 80 04/08/2018    HDL 50 01/28/2017     Lab Results   Component Value Date    LDLCALC 109 (H) 04/08/2018    LDLCALC 100 (H) 01/28/2017     Lab Results   Component Value Date    LABVLDL 16 04/08/2018    LABVLDL 12 01/28/2017     No results found for: [facility]  Recent Labs     10/18/21  2118   PROBNP 335*       Cardiac Tests:  Telemetry reviewed (date: 10/21/2021): SR / episodes of AF, rate 90's    Chest CTA overshadowing the bottom of the heart  A severe defect was present in the inferior wall extending into the  lateral apical wall(s) that was moderate to largesized by  quantification. The resting images showed no change   Gated SPECT left ventricular ejection fraction was calculated to be  66%, with normal myocardial contractility and wall motion. Impression  The myocardial perfusion imaging was probably normal with the large  fixed inferior/lateral apical wall defect being more consistent with  attenuation artifact and less likely the result of a myocardial  infarction especially with the normal contractility of the inferior  wall and the lateral apical wall. More importantly, there did not  appear to be any evidence of stress-induced ischemia on this study  Overall left ventricular systolic function was normal with no regional  wall motion abnormality  Low risk general pharmacologic stress test.    Coronary CTA: 9/13/21 (Dr. Terri Mariscal)  AGATSTON SCORE: Total coronary artery calcium score is 115.7, distributed as LM 0.0, LAD 60.7, LCx 0.0, RCA 54. 7.      Calcium score percentile is 87% based on age, gender and race.     CARDIAC VALVES: Mild mitral and aortic calcifications are noted.        FUNCTION: The calculated left ventricular ejection fraction is 73%, LVEDV is 226 mL, LVESV 61 mL. .     CORONARY ANATOMY:     The coronary arteries arise in normal position. There is right coronary artery dominance.     CORONARY CT ANGIOGRAM:     Left main: The left main coronary artery bifurcates into the LAD and LCX. No definite angiographic evidence of significant plaque noted in the left main coronary artery.     Left anterior descending: The proximal LAD has about 1 to 24% calcified plaque. The distal LAD has about 25 to 49% soft plaque. The apical portion of the LAD was not seen due to motion. The proximal to mid diagonal 1 is patent. The distal diagonal 1 is now well seen.   The proximal diagonal 2 has mild luminal irregularities. The mid to distal diagonal artery was not well seen.     Left circumflex: The left circumflex coronary artery is not well seen.        Right coronary artery: The RCA is a moderate size caliber vessel with about 1 to 24% calcified plaque. The mid to distal PDA and PLB were not well seen due to motion artifact         PERICARDIUM: No definite evidence of pericardial effusion     The aortic at the sinuses of Valsalva measures 3.6 x 3.5 x 3.9 cm     The sinotubular junction measures 3.1 x 3.2 cm  The mid ascending aorta measures 3.6 x 3.6 cm  The pulmonary artery measures 3.2 x 3.0 cm     4 pulmonary veins enter the left atrium (2 on the left and 2 on the right).     IMPRESSION:     Very difficult study due to technical difficulties, poor contrast opacification and motion.     1.  Mild nonobstructive coronary artery disease noted as mentioned above (about 25 to 49% distal LAD plaque). The apical and inferior apical portion of the LAD was not seen due to motion artifact. The left circumflex system were not well seen due to motion artifact.     2. Normal left ventricular systolic function with estimated left ventricular ejection fraction of 73%.     3. The total calcium score is 115 with calcium score percentile of 87% based on age gender and race. CXR: 10/16/21  No acute process.       Question 10 mm nodule in the left upper lung. CTA chest: 10/16/21  There is no central pulmonary embolism or aortic dissection. Cathye Gauss is a   small distal subsegmental pulmonary embolism in the right lower lobe.       Coronary artery calcification.       Minimal ground-glass infiltrates in the right upper lobe medially concerning   for pneumonia.       9 mm indeterminate pulmonary nodule in the right lower lobe.  Consider   surveillance according to [employer] guidelines. ASSESSMENT / PLAN:  1. Acute on chronic HFpEF  2. Acute on chronic respiratory failure. Known history of very severe COPD. Pulmonology following. 3. Pulmonary embolism (\"small distal subsegmental pulmonary embolism in the right lower lobe\" on 10/16/21 CTA chest)  4. Recent acute kidney injury -- improved, most recent Cr 1.4 --> 1.1 --> 1.2 --> 1.1 --> 1.0 --> 1.1 --> 0.8 --> 1.0  5. Paroxysmal atrial fibrillation/typical flutter status-post AF ablation in April 2018. On Eliquis PTA, cardizem, and flecainide therapy. Follows with [facility] EP. SR with episodes of AF this admission. 6. Hypertension -- sub-optimal control in the past, most recent 's-150's  7. Hyperlipidemia, on statin therapy. 8. DM -- Hgb A1c 6.4  9. Obstructive sleep apnea, compliant with CPAP. 10. Morbid obesity / BMI 39.2  11. History of alcohol abuse  12. Former tobacco abuse (2 PPD for many years, quit in 8/2017)  13. History of pericarditis in 11/2010  14. Mild coronary artery disease by cardiac catheterization with atypical chest pain and mild troponin elevation not consistent with acute coronary syndrome with a recent nonischemic stress test and reportedly no significant obstructive CAD on CTA of the coronaries done today 9/13/21    - Multiple recent cardiac studies reviewed  - Monitor I/O's, renal function, and electrolytes closely with ongoing diuresis (recommend continued IV diuresis --> will increase dose to 2 mg BID; reported I/O's net negative 2.4 L)  - Continue current medications otherwise (including anticoagulation; up-titrate anti-HTN regimen as needed)  - Treatment of PARKER  - Aggressive risk factor modifications  - Telemetry reviewed (sinus rhythm / episodes of AF)  - Care per pulmonary (note reviewed)  - Multiple questions answered today re: PE    Greater than 35 minutes was spent counseling the patient, reviewing the rationale for the above recommendations and reviewing the patient's current medication list, problem list and results of all previously ordered testing.     James Russell MD  [facility]) Cardiology

## 2021-10-21 NOTE — PROGRESS NOTES
Hospitalist Progress Note      SYNOPSIS: Patient admitted on 10/18/2021 for PE    63 y.o. male with past medical history of COPD atrial flutter hypertension and DM Patient present to the Ed due to right sided chest pain that radiates into his back . Patient was seen in ED on 10/16 and diagnosed with COPD exacerbation and pneumonia  Patient was offered admission but decided not to stay . He was discharged with [facility] and [address] [place] [address] . Patient reports worsening shortness of breath and weight gain . He reports a non productive cough He reports bilateral legt swelling bilateral leg swelling  He report no fever chills abdominal pain nausea diarrhea. Patient uses 4L nasal canula daily . In the ED  He was hemodynamically stable  He is saturating at 97% on 4 L nasal cannula. Ab data reveal sodium 143 creatinine 1.1 BUN 31 Glucoses 162  Trop 16 EKG revealed normal sinus rhythm 1st degree AV block  HGB 10.2  CXR revealed no acute precess. CTA revealed small  distal segmental  In right lower lobe  . Patient takes Eliquis daily . CTA also revealed RUL pneumonia     SUBJECTIVE:    Patient seen and examined  Records reviewed. The pt is not happy with the diet. He is in tears and wants to go home and get better. Noted some bleeding from IV site. Pt counseled and he understands Lovenox is important. Stable overnight. No other overnight issues reported. Temp (24hrs), Av.5 °F (36.4 °C), Min:97.2 °F (36.2 °C), Max:97.8 °F (36.6 °C)    DIET: ADULT DIET; Regular;  Low Sodium (2 gm); 1000 ml  CODE: Full Code    Intake/Output Summary (Last 24 hours) at 10/21/2021 1255  Last data filed at 10/20/2021 2334  Gross per 24 hour   Intake 360 ml   Output 1600 ml   Net -1240 ml       OBJECTIVE:    /81   Pulse 86   Temp 97.3 °F (36.3 °C) (Tympanic)   Resp 20   Ht 5' 11\" (1.803 m)   Wt 268 lb 7 oz (121.8 kg)   SpO2 99%   BMI 37.44 kg/m²     General appearance: No apparent distress, appears stated age and cooperative. HEENT:  Conjunctivae/corneas clear. Neck: Supple. No jugular venous distention. Respiratory: Diminished. Some wheezing. Cardiovascular: Regular rate rhythm, normal S1-S2  Abdomen: Soft, nontender, nondistended  Musculoskeletal: No clubbing, cyanosis, 2+ bilateral lower extremity edema. Brisk capillary refill. Skin:  No rashes  on visible skin  Neurologic: awake, alert and following commands     ASSESSMENT and PLAN    Pulmonary embolism  Continue therapeutic Lovenox twice daily  Appreciate Pulmonology consult. Continue Eliquis and to consider Coumadin if larger PE. Doubt Elilquis Failure. Right upper lobe pneumonia  Continue ceftriaxone and doxycycline Day#3  Legionella and strep strep negative    COPD exacerbation/Acute on chronic respiratory failure  Continue IV Solu-Medrol and duo nebs. Decreased to once a day tomorrow. On 4 L nasal cannula at baseline  Supplemental oxygen as needed    Chronic back pain with exacerbation  Stopped Norco.  Started Percocet. Pain improved today. Acute on chronic congestive heart failure with preserved ejection fraction  Cont. Bumex to 2 mg IV twice daily  Appreciate Cardiology consult. Strict RASTA's  Nephrology consulted as well due to recent ORESTES. PARKER  Cont. BiPAP at night. DM2  Continue sliding scale insulin  Continue Lantus twice daily    Atrial fibrillationcontinue flecainide and Cardizem  Hyperlipidemiacontinue Lipitor  Depression and anxietycontinue Zoloft and BuSpar  Palliative care discussion today.        DISPOSITION:     Medications:  REVIEWED DAILY    Infusion Medications    sodium chloride       Scheduled Medications    bumetanide  2 mg IntraVENous BID    [START ON 10/22/2021] polyethylene glycol  17 g Oral Daily    sertraline  25 mg Oral Daily    Arformoterol Tartrate  15 mcg Nebulization BID    aspirin  81 mg Oral Daily    atorvastatin  20 mg Oral QPM    budesonide  0.5 mg Nebulization BID    dilTIAZem  240 mg Oral BID [place]  +++++++++++++++++++++++++++++++++++++++++++++++++  NOTE: This report was transcribed using voice recognition software. Every effort was made to ensure accuracy; however, inadvertent computerized transcription errors may be present.

## 2021-10-21 NOTE — CONSULTS
Department of Internal Medicine  Nephrology Nurse Practitioner Consult Note      Reason for Consult:  Volume management  Requesting Physician:  Dr. Harleen Nayak:  Chest pain    History Obtained From:  patient, electronic medical record    HISTORY OF PRESENT ILLNESS:  Briefly, Mr. Rosalba Wright is a 63year old male with a PMH of HTN, HFpEF 70-75% with stage II DD, COPD, PARKER, atrial flutter s/p cardioversion and ablation on chronic anticoagulation on apixaban, obesity, who was admitted on October 18, 2021 after presenting to the ER with chest pain. Apparently, he was also seen in the ER two days prior to admission and was diagnosed with COPD exacerbation and pneumonia. He was sent home on omnicef and azithromycin. He came back due to worsening shortness of breath and weight gain. A CTA of the chest was obtained which revealed a small distal subsegmental pulmonary embolism in the right upper lobe. We are consulted for volume management.      Past Medical History:        Diagnosis Date    Atrial flutter ([facility]     COPD (chronic obstructive pulmonary disease) ([facility]     Hypertension      Past Surgical History:        Procedure Laterality Date    ATRIAL ABLATION SURGERY  04/03/2018    BACK SURGERY      CARDIOVERSION  02/07/2018    Dr. Holger Gu- 80 J converted to SB    TRANSESOPHAGEAL ECHOCARDIOGRAM  02/07/2018    Dr. Holger Gu- No thrombus     Current Medications:    Current Facility-Administered Medications: morphine 10 MG/5ML solution 2.6 mg, 2.6 mg, Oral, Q4H PRN  [START ON 10/22/2021] polyethylene glycol (GLYCOLAX) packet 17 g, 17 g, Oral, Daily  bumetanide (BUMEX) 12.5 mg in sodium chloride 0.9 % 125 mL infusion, 0.5 mg/hr, IntraVENous, Continuous  oxyCODONE-acetaminophen (PERCOCET) 5-325 MG per tablet 1 tablet, 1 tablet, Oral, Q6H PRN  sertraline (ZOLOFT) tablet 25 mg, 25 mg, Oral, Daily  Arformoterol Tartrate (BROVANA) nebulizer solution 15 mcg, 15 mcg, Nebulization, BID  aspirin chewable tablet 81 mg, 81 mg, Oral, Daily  atorvastatin (LIPITOR) tablet 20 mg, 20 mg, Oral, QPM  budesonide (PULMICORT) nebulizer suspension 500 mcg, 0.5 mg, Nebulization, BID  busPIRone (BUSPAR) tablet 10 mg, 10 mg, Oral, TID PRN  dilTIAZem (CARDIZEM CD) extended release capsule 240 mg, 240 mg, Oral, BID  flecainide (TAMBOCOR) tablet 100 mg, 100 mg, Oral, BID  fluticasone (FLONASE) 50 MCG/ACT nasal spray 1 spray, 1 spray, Each Nostril, Daily  insulin glargine (LANTUS) injection vial 20 Units, 20 Units, SubCUTAneous, BID  potassium chloride (KLOR-CON M) extended release tablet 20 mEq, 20 mEq, Oral, Daily with breakfast  primidone (MYSOLINE) tablet 25 mg, 25 mg, Oral, TID  Roflumilast (DALIRESP) tablet 500 mcg, 500 mcg, Oral, Daily  insulin lispro (HUMALOG) injection vial 0-18 Units, 0-18 Units, SubCUTAneous, TID WC  insulin lispro (HUMALOG) injection vial 0-9 Units, 0-9 Units, SubCUTAneous, Nightly  ipratropium-albuterol (DUONEB) nebulizer solution 1 ampule, 1 ampule, Inhalation, Q4H WA  guaiFENesin-dextromethorphan (ROBITUSSIN DM) 100-10 MG/5ML syrup 5 mL, 5 mL, Oral, Q4H PRN  enoxaparin (LOVENOX) injection 120 mg, 1 mg/kg, SubCUTAneous, BID  methylPREDNISolone sodium (SOLU-MEDROL) injection 40 mg, 40 mg, IntraVENous, Q12H  doxycycline (VIBRAMYCIN) 100 mg in dextrose 5 % 100 mL IVPB, 100 mg, IntraVENous, Q12H  cefTRIAXone (ROCEPHIN) 1,000 mg in sterile water 10 mL IV syringe, 1,000 mg, IntraVENous, Q24H  sodium chloride flush 0.9 % injection 5-40 mL, 5-40 mL, IntraVENous, 2 times per day  sodium chloride flush 0.9 % injection 5-40 mL, 5-40 mL, IntraVENous, PRN  0.9 % sodium chloride infusion, 25 mL, IntraVENous, PRN  ondansetron (ZOFRAN-ODT) disintegrating tablet 4 mg, 4 mg, Oral, Q8H PRN **OR** ondansetron (ZOFRAN) injection 4 mg, 4 mg, IntraVENous, Q6H PRN  acetaminophen (TYLENOL) tablet 650 mg, 650 mg, Oral, Q6H PRN **OR** acetaminophen (TYLENOL) suppository 650 mg, 650 mg, Rectal, Q6H PRN  albuterol (PROVENTIL) nebulizer solution 2.5 mg, 2.5 mg, Nebulization, Q6H PRN  Allergies:  Patient has no known allergies. Social History:    TOBACCO:   reports that he quit smoking about 4 years ago. His smoking use included cigarettes. He has a 60.00 pack-year smoking history. He has never used smokeless tobacco.  ETOH:   reports previous alcohol use of about 3.0 - 4.0 standard drinks of alcohol per week.     Family History:       Problem Relation Age of Onset    Other Mother         ALS    Lung Cancer Father     Pacemaker Sister      REVIEW OF SYSTEMS:    CONSTITUTIONAL:  negative for  fevers and chills  EYES:  negative for  double vision and blurred vision  HEENT:  negative for  epistaxis  RESPIRATORY:  positive for  dyspnea  CARDIOVASCULAR:  positive for  chest pain, dyspnea, edema  GASTROINTESTINAL:  negative for nausea, vomiting, diarrhea and abdominal pain  GENITOURINARY:  negative  INTEGUMENT/BREAST:  negative  HEMATOLOGIC/LYMPHATIC:  negative  ALLERGIC/IMMUNOLOGIC:  negative  ENDOCRINE:  negative  MUSCULOSKELETAL:  negative for  decreased range of motion and muscle weakness  NEUROLOGICAL:  negative for headaches and dizziness  BEHAVIOR/PSYCH:  negative for poor appetite    PHYSICAL EXAM:      Vitals:    VITALS:  /72   Pulse 94   Temp 97.3 °F (36.3 °C) (Temporal)   Resp 20   Ht 5' 11\" (1.803 m)   Wt 268 lb 7 oz (121.8 kg)   SpO2 98%   BMI 37.44 kg/m²   24HR INTAKE/OUTPUT:    Intake/Output Summary (Last 24 hours) at 10/21/2021 1552  Last data filed at 10/21/2021 1334  Gross per 24 hour   Intake 480 ml   Output 1600 ml   Net -1120 ml       Constitutional:  Alert and oriented, mild respiratory distress  HEENT:  Normocephalic, PERRL  Respiratory:  Diminished lung sounds, on 4L NC  Cardiovascular/Edema:  IRRR, S1,S2  Gastrointestinal:  Soft, obese, nontender, nondistended  Neurologic:  Nonfocal, ROUSE  Skin:  Warm, dry, no lesions  Other:  ++ edema BLE    DATA:    CBC:   Lab Results   Component Value Date    WBC 12.0 10/21/2021    RBC 3.03 10/21/2021    HGB 9.8 10/21/2021    HCT 29.6 10/21/2021    MCV 97.7 10/21/2021    MCH 32.3 10/21/2021    MCHC 33.1 10/21/2021    RDW 14.8 10/21/2021     10/21/2021    MPV 10.7 10/21/2021     CMP:    Lab Results   Component Value Date     10/21/2021    K 4.8 10/21/2021    CL 93 10/21/2021    CO2 32 10/21/2021    BUN 27 10/21/2021    CREATININE 1.0 10/21/2021    GFRAA >60 10/21/2021    LABGLOM >60 10/21/2021    GLUCOSE 181 10/21/2021    GLUCOSE 100 05/19/2011    PROT 6.9 10/18/2021    LABALBU 4.1 10/18/2021    LABALBU 4.6 05/19/2011    CALCIUM 9.1 10/21/2021    BILITOT <0.2 10/18/2021    ALKPHOS 76 10/18/2021    AST 21 10/18/2021    ALT 32 10/18/2021     Magnesium:    Lab Results   Component Value Date    MG 1.9 10/16/2021     Phosphorus:    Lab Results   Component Value Date    PHOS 3.9 08/26/2021     Radiology Review:      CXR 10/16/21   No acute process.       Question 10 mm nodule in the left upper lung. CTA of the chest with IV contrast 10/16/21   There is no central pulmonary embolism or aortic dissection. Amaurieen Aby is a   small distal subsegmental pulmonary embolism in the right lower lobe.       Coronary artery calcification.       Minimal ground-glass infiltrates in the right upper lobe medially concerning   for pneumonia.       9 mm indeterminate pulmonary nodule in the right lower lobe.  Consider   surveillance according to [employer] guidelines. IMPRESSION/RECOMMENDATIONS:      Briefly, Mr. Aguila Altamirano is a 63year old male with a PMH of HTN, HFpEF 70-75% with stage II DD, COPD, PARKER, atrial flutter s/p cardioversion and ablation on chronic anticoagulation on apixaban, obesity, who was admitted on October 18, 2021 after presenting to the ER with chest pain. Apparently, he was also seen in the ER two days prior to admission and was diagnosed with COPD exacerbation and pneumonia. He was sent home on omnicef and azithromycin.  He came back due to worsening shortness of breath and weight gain. A CTA of the chest was obtained which revealed a small distal subsegmental pulmonary embolism in the right upper lobe. We are consulted for volume management. Decompensated HFpEF 70-75% with stage II DD, pro->>335, to start on Bumex drip     Elevated bicarbonate level, likely chronic respiratory acidosis vs metabolic alkalosis. To obtain venous pH to confirm diagnosis. HTN, on losartan at home, BP currently controlled  Edematous state, secondary to #1  PARKER, wears CPAP at night  Atrial flutter, on diltiazem and apixaban  COPD exacerbation, on methylprednisolone  Right upper lobe pulmonary embolism, on apixaban  Pneumonia, on doxycycline and ceftriaxone  Normocytic anemia    Plan:    Start Bumex drip at 0.5 mg/hr  Strict I&Os  Monitor bicarbonate level   Obtain magnesium and phosphorus levels  Obtain venous pH  Repeat pro-BNP in AM    Thank you so much Dr. Karma Perez for allowing us to participate in the care of Mr. Ganga Esteban.     Electronically signed by SERA Cooper CNP on 10/21/2021 at 4:17 PM

## 2021-10-21 NOTE — PROGRESS NOTES
Palliative Care Department  [phone]  Palliative Care Progress Note  Provider SERA Flanagan CNP      PATIENT: Marley Salgado  : 1960  MRN: [id number]  ADMISSION DATE: 10/18/2021  9:33 PM  Referring Provider: Molly Mason MD    Palliative Medicine was consulted on hospital day 2 for assistance with wants to discuss outpatient palliative     HPI:     Feng Orlando is a 63 y.o. y/o male with a history of of end stage COPD atrial flutter hypertension and DM who presented to [facility]) on 10/18/2021 with chest pain. Pt found to have a PE, along with COPD exacerbation and pneumonia     ASSESSMENT/PLAN:     Pertinent Hospital Diagnoses     · PE   COPD    PNA    Palliative Care Encounter for End stage COPD  - Discussed CODE STATUS, patient wanted more time to think about this. Will follow-up with him tomorrow  - Patient is willing to start morphine p.o. for symptom management    Dyspnea  -Start morphine 2.5 mg PO every 4 hours as needed     Constipation  -Schedule GlycoLax daily    Chronic Back Pain  -Scheduled Percocet 5-325 mg every 6 hours as needed by attending    Thank you for the opportunity to participate in the care of Marley Salgado. SERA Flanagan CNP  Palliative Medicine     SUBJECTIVE:     Details of Conversation: Met with the patient and his wife at bedside and introduced palliative medicine. The patient and his wife both explained that the patient has been having many more bad days than good days and has had many admissions to the hospital.  He has been struggling to do simple things like get dressed or shower. He has some good days where he is able to leave the house, but the good days have been fewer and fewer. He was told that there was nothing more that can be done with his COPD and that he is terminal.  Will has had a hard time accepting his prognosis.   We spoke about morphine and starting this to try to help with his quality of life and provide symptom relief. Issac Zhu was hesitant to start because he states that he is not ready to die. I explained to him that it is a very low dose and is used to help with his quality of life and is not being used as an end-of-life medication. We discussed his CODE STATUS and what happens at the time of arrest and complications. He understands that he would likely not be able to be removed from the ventilator, but states that his wife can just withdrawal at that time. His wife has expressed to him that she does not want to be the one to have to make these decisions. His wife is very understanding and acceptive of the diagnosis. She has expressed to him that she believes a DNR would be in his best interest.  The patient was very tearful during our conversation and would like more time to think about his wishes. He is agreeable to try using the morphine for shortness of breath. He also stated that he has been having some constipation. He denies any nausea or loss of appetite. Prognosis: Guarded    OBJECTIVE:     /81   Pulse 86   Temp 97.3 °F (36.3 °C) (Tympanic)   Resp 20   Ht 5' 11\" (1.803 m)   Wt 268 lb 7 oz (121.8 kg)   SpO2 99%   BMI 37.44 kg/m²     Physical Examination:  Gen: awake, alert  HEENT: normocephalic, atraumatic  Lungs: respirations easy and not labored,   Heart: regular rate and rhythm  Abdomen: obese  Extremities: moving all extremities , edema  Skin: warm  Neuro: awake, alert, oriented x 3,    Objective data reviewed: labs, images, records, medication use, vitals and chart    Time/Communication  Greater than 50% of time spent, total 60 minutes in counseling and coordination of care at the bedside regarding goals of care. Thank you for allowing Palliative Medicine to participate in the care of Lay Benedict. Note: This report was completed using [employer] voiced recognition software.   Every effort has been made to ensure accuracy; however, inadvertent computerized transcription errors may be present. Pinky Angles

## 2021-10-22 LAB
ANION GAP SERPL CALCULATED.3IONS-SCNC: 11 MMOL/L (ref 7–16)
ANION GAP SERPL CALCULATED.3IONS-SCNC: 12 MMOL/L (ref 7–16)
BASOPHILS ABSOLUTE: 0 E9/L (ref 0–0.2)
BASOPHILS RELATIVE PERCENT: 0.9 % (ref 0–2)
BUN BLDV-MCNC: 37 MG/DL (ref 6–20)
BUN BLDV-MCNC: 39 MG/DL (ref 6–20)
CALCIUM SERPL-MCNC: 9.1 MG/DL (ref 8.6–10.2)
CALCIUM SERPL-MCNC: 9.6 MG/DL (ref 8.6–10.2)
CHLORIDE BLD-SCNC: 91 MMOL/L (ref 98–107)
CHLORIDE BLD-SCNC: 93 MMOL/L (ref 98–107)
CO2: 34 MMOL/L (ref 22–29)
CO2: 35 MMOL/L (ref 22–29)
CREAT SERPL-MCNC: 1.1 MG/DL (ref 0.7–1.2)
CREAT SERPL-MCNC: 1.1 MG/DL (ref 0.7–1.2)
EOSINOPHILS ABSOLUTE: 0 E9/L (ref 0.05–0.5)
EOSINOPHILS RELATIVE PERCENT: 0 % (ref 0–6)
GFR AFRICAN AMERICAN: >60
GFR AFRICAN AMERICAN: >60
GFR NON-AFRICAN AMERICAN: >60 ML/MIN/1.73
GFR NON-AFRICAN AMERICAN: >60 ML/MIN/1.73
GLUCOSE BLD-MCNC: 135 MG/DL (ref 74–99)
GLUCOSE BLD-MCNC: 260 MG/DL (ref 74–99)
HCT VFR BLD CALC: 31.8 % (ref 37–54)
HEMOGLOBIN: 10.6 G/DL (ref 12.5–16.5)
HYPOCHROMIA: ABNORMAL
LYMPHOCYTES ABSOLUTE: 0.97 E9/L (ref 1.5–4)
LYMPHOCYTES RELATIVE PERCENT: 7 % (ref 20–42)
MAGNESIUM: 2 MG/DL (ref 1.6–2.6)
MCH RBC QN AUTO: 31.2 PG (ref 26–35)
MCHC RBC AUTO-ENTMCNC: 33.3 % (ref 32–34.5)
MCV RBC AUTO: 93.5 FL (ref 80–99.9)
METAMYELOCYTES RELATIVE PERCENT: 0.9 % (ref 0–1)
METER GLUCOSE: 158 MG/DL (ref 74–99)
METER GLUCOSE: 220 MG/DL (ref 74–99)
METER GLUCOSE: 235 MG/DL (ref 74–99)
METER GLUCOSE: 261 MG/DL (ref 74–99)
MONOCYTES ABSOLUTE: 1.39 E9/L (ref 0.1–0.95)
MONOCYTES RELATIVE PERCENT: 9.6 % (ref 2–12)
MYELOCYTE PERCENT: 3.5 % (ref 0–0)
NEUTROPHILS ABSOLUTE: 11.54 E9/L (ref 1.8–7.3)
NEUTROPHILS RELATIVE PERCENT: 78.9 % (ref 43–80)
PDW BLD-RTO: 15 FL (ref 11.5–15)
PH VENOUS: 7.41 (ref 7.35–7.45)
PHOSPHORUS: 4 MG/DL (ref 2.5–4.5)
PLATELET # BLD: 291 E9/L (ref 130–450)
PMV BLD AUTO: 10.6 FL (ref 7–12)
POLYCHROMASIA: ABNORMAL
POTASSIUM REFLEX MAGNESIUM: 3.9 MMOL/L (ref 3.5–5)
POTASSIUM SERPL-SCNC: 4.2 MMOL/L (ref 3.5–5)
PRO-BNP: 1183 PG/ML (ref 0–125)
RBC # BLD: 3.4 E12/L (ref 3.8–5.8)
REASON FOR REJECTION: NORMAL
REJECTED TEST: NORMAL
SODIUM BLD-SCNC: 138 MMOL/L (ref 132–146)
SODIUM BLD-SCNC: 138 MMOL/L (ref 132–146)
WBC # BLD: 13.9 E9/L (ref 4.5–11.5)

## 2021-10-22 PROCEDURE — 82800 BLOOD PH: CPT

## 2021-10-22 PROCEDURE — 6370000000 HC RX 637 (ALT 250 FOR IP): Performed by: NURSE PRACTITIONER

## 2021-10-22 PROCEDURE — 83880 ASSAY OF NATRIURETIC PEPTIDE: CPT

## 2021-10-22 PROCEDURE — 6370000000 HC RX 637 (ALT 250 FOR IP): Performed by: FAMILY MEDICINE

## 2021-10-22 PROCEDURE — 6370000000 HC RX 637 (ALT 250 FOR IP): Performed by: INTERNAL MEDICINE

## 2021-10-22 PROCEDURE — 6360000002 HC RX W HCPCS: Performed by: FAMILY MEDICINE

## 2021-10-22 PROCEDURE — 2580000003 HC RX 258: Performed by: FAMILY MEDICINE

## 2021-10-22 PROCEDURE — 80048 BASIC METABOLIC PNL TOTAL CA: CPT

## 2021-10-22 PROCEDURE — 94640 AIRWAY INHALATION TREATMENT: CPT

## 2021-10-22 PROCEDURE — 83735 ASSAY OF MAGNESIUM: CPT

## 2021-10-22 PROCEDURE — 2060000000 HC ICU INTERMEDIATE R&B

## 2021-10-22 PROCEDURE — 85025 COMPLETE CBC W/AUTO DIFF WBC: CPT

## 2021-10-22 PROCEDURE — 82962 GLUCOSE BLOOD TEST: CPT

## 2021-10-22 PROCEDURE — 2500000003 HC RX 250 WO HCPCS: Performed by: NURSE PRACTITIONER

## 2021-10-22 PROCEDURE — 2580000003 HC RX 258: Performed by: NURSE PRACTITIONER

## 2021-10-22 PROCEDURE — 2700000000 HC OXYGEN THERAPY PER DAY

## 2021-10-22 PROCEDURE — 2500000003 HC RX 250 WO HCPCS: Performed by: FAMILY MEDICINE

## 2021-10-22 PROCEDURE — 99233 SBSQ HOSP IP/OBS HIGH 50: CPT | Performed by: INTERNAL MEDICINE

## 2021-10-22 PROCEDURE — 94660 CPAP INITIATION&MGMT: CPT

## 2021-10-22 PROCEDURE — 99232 SBSQ HOSP IP/OBS MODERATE 35: CPT | Performed by: NURSE PRACTITIONER

## 2021-10-22 PROCEDURE — 84100 ASSAY OF PHOSPHORUS: CPT

## 2021-10-22 PROCEDURE — 36415 COLL VENOUS BLD VENIPUNCTURE: CPT

## 2021-10-22 PROCEDURE — 6360000002 HC RX W HCPCS: Performed by: INTERNAL MEDICINE

## 2021-10-22 RX ORDER — DIGOXIN 0.25 MG/ML
125 INJECTION INTRAMUSCULAR; INTRAVENOUS EVERY 6 HOURS
Status: COMPLETED | OUTPATIENT
Start: 2021-10-22 | End: 2021-10-22

## 2021-10-22 RX ORDER — MORPHINE SULFATE 10 MG/5ML
2.5 SOLUTION ORAL
Status: DISCONTINUED | OUTPATIENT
Start: 2021-10-22 | End: 2021-10-26 | Stop reason: HOSPADM

## 2021-10-22 RX ORDER — METOPROLOL TARTRATE 5 MG/5ML
5 INJECTION INTRAVENOUS EVERY 6 HOURS PRN
Status: DISCONTINUED | OUTPATIENT
Start: 2021-10-22 | End: 2021-10-26 | Stop reason: HOSPADM

## 2021-10-22 RX ADMIN — MORPHINE SULFATE 2.6 MG: 10 SOLUTION ORAL at 05:26

## 2021-10-22 RX ADMIN — BUMETANIDE 0.5 MG/HR: 0.25 INJECTION, SOLUTION INTRAMUSCULAR; INTRAVENOUS at 16:52

## 2021-10-22 RX ADMIN — OXYCODONE AND ACETAMINOPHEN 1 TABLET: 5; 325 TABLET ORAL at 16:55

## 2021-10-22 RX ADMIN — IPRATROPIUM BROMIDE AND ALBUTEROL SULFATE 1 AMPULE: .5; 2.5 SOLUTION RESPIRATORY (INHALATION) at 21:05

## 2021-10-22 RX ADMIN — FLECAINIDE ACETATE 100 MG: 100 TABLET ORAL at 21:23

## 2021-10-22 RX ADMIN — ARFORMOTEROL TARTRATE 15 MCG: 15 SOLUTION RESPIRATORY (INHALATION) at 08:31

## 2021-10-22 RX ADMIN — Medication 10 ML: at 21:26

## 2021-10-22 RX ADMIN — PRIMIDONE 25 MG: 50 TABLET ORAL at 21:24

## 2021-10-22 RX ADMIN — DIGOXIN 125 MCG: 250 INJECTION, SOLUTION INTRAMUSCULAR; INTRAVENOUS; PARENTERAL at 21:23

## 2021-10-22 RX ADMIN — BUDESONIDE 500 MCG: 0.5 SUSPENSION RESPIRATORY (INHALATION) at 08:31

## 2021-10-22 RX ADMIN — POTASSIUM CHLORIDE 20 MEQ: 1500 TABLET, EXTENDED RELEASE ORAL at 08:54

## 2021-10-22 RX ADMIN — SERTRALINE 25 MG: 50 TABLET, FILM COATED ORAL at 21:23

## 2021-10-22 RX ADMIN — OXYCODONE AND ACETAMINOPHEN 1 TABLET: 5; 325 TABLET ORAL at 08:55

## 2021-10-22 RX ADMIN — FLECAINIDE ACETATE 100 MG: 100 TABLET ORAL at 08:55

## 2021-10-22 RX ADMIN — MORPHINE SULFATE 2.6 MG: 10 SOLUTION ORAL at 21:21

## 2021-10-22 RX ADMIN — METHYLPREDNISOLONE SODIUM SUCCINATE 40 MG: 40 INJECTION, POWDER, FOR SOLUTION INTRAMUSCULAR; INTRAVENOUS at 18:06

## 2021-10-22 RX ADMIN — DILTIAZEM HYDROCHLORIDE 240 MG: 120 CAPSULE, COATED, EXTENDED RELEASE ORAL at 21:23

## 2021-10-22 RX ADMIN — METHYLPREDNISOLONE SODIUM SUCCINATE 40 MG: 40 INJECTION, POWDER, FOR SOLUTION INTRAMUSCULAR; INTRAVENOUS at 05:29

## 2021-10-22 RX ADMIN — Medication 10 ML: at 10:11

## 2021-10-22 RX ADMIN — INSULIN GLARGINE 20 UNITS: 100 INJECTION, SOLUTION SUBCUTANEOUS at 21:25

## 2021-10-22 RX ADMIN — BUDESONIDE 500 MCG: 0.5 SUSPENSION RESPIRATORY (INHALATION) at 21:05

## 2021-10-22 RX ADMIN — ASPIRIN 81 MG CHEWABLE TABLET 81 MG: 81 TABLET CHEWABLE at 11:57

## 2021-10-22 RX ADMIN — DILTIAZEM HYDROCHLORIDE 240 MG: 120 CAPSULE, COATED, EXTENDED RELEASE ORAL at 08:55

## 2021-10-22 RX ADMIN — INSULIN LISPRO 3 UNITS: 100 INJECTION, SOLUTION INTRAVENOUS; SUBCUTANEOUS at 21:31

## 2021-10-22 RX ADMIN — MORPHINE SULFATE 2.6 MG: 10 SOLUTION ORAL at 12:28

## 2021-10-22 RX ADMIN — INSULIN LISPRO 9 UNITS: 100 INJECTION, SOLUTION INTRAVENOUS; SUBCUTANEOUS at 06:01

## 2021-10-22 RX ADMIN — ATORVASTATIN CALCIUM 20 MG: 20 TABLET, FILM COATED ORAL at 16:55

## 2021-10-22 RX ADMIN — ENOXAPARIN SODIUM 120 MG: 150 INJECTION SUBCUTANEOUS at 21:31

## 2021-10-22 RX ADMIN — BUSPIRONE HYDROCHLORIDE 10 MG: 5 TABLET ORAL at 19:14

## 2021-10-22 RX ADMIN — OXYCODONE AND ACETAMINOPHEN 1 TABLET: 5; 325 TABLET ORAL at 22:44

## 2021-10-22 RX ADMIN — IPRATROPIUM BROMIDE AND ALBUTEROL SULFATE 1 AMPULE: .5; 2.5 SOLUTION RESPIRATORY (INHALATION) at 08:31

## 2021-10-22 RX ADMIN — INSULIN LISPRO 3 UNITS: 100 INJECTION, SOLUTION INTRAVENOUS; SUBCUTANEOUS at 11:33

## 2021-10-22 RX ADMIN — FLUTICASONE PROPIONATE 1 SPRAY: 50 SPRAY, METERED NASAL at 12:30

## 2021-10-22 RX ADMIN — ENOXAPARIN SODIUM 120 MG: 150 INJECTION SUBCUTANEOUS at 08:54

## 2021-10-22 RX ADMIN — WATER 1000 MG: 1 INJECTION INTRAMUSCULAR; INTRAVENOUS; SUBCUTANEOUS at 05:29

## 2021-10-22 RX ADMIN — ARFORMOTEROL TARTRATE 15 MCG: 15 SOLUTION RESPIRATORY (INHALATION) at 21:05

## 2021-10-22 RX ADMIN — PRIMIDONE 25 MG: 50 TABLET ORAL at 08:55

## 2021-10-22 RX ADMIN — IPRATROPIUM BROMIDE AND ALBUTEROL SULFATE 1 AMPULE: .5; 2.5 SOLUTION RESPIRATORY (INHALATION) at 16:03

## 2021-10-22 RX ADMIN — INSULIN GLARGINE 20 UNITS: 100 INJECTION, SOLUTION SUBCUTANEOUS at 08:55

## 2021-10-22 RX ADMIN — INSULIN LISPRO 6 UNITS: 100 INJECTION, SOLUTION INTRAVENOUS; SUBCUTANEOUS at 16:51

## 2021-10-22 RX ADMIN — SODIUM CHLORIDE, PRESERVATIVE FREE 10 ML: 5 INJECTION INTRAVENOUS at 14:17

## 2021-10-22 RX ADMIN — DIGOXIN 125 MCG: 250 INJECTION, SOLUTION INTRAMUSCULAR; INTRAVENOUS; PARENTERAL at 14:17

## 2021-10-22 RX ADMIN — DOXYCYCLINE 100 MG: 100 INJECTION, POWDER, LYOPHILIZED, FOR SOLUTION INTRAVENOUS at 05:46

## 2021-10-22 RX ADMIN — DOXYCYCLINE 100 MG: 100 INJECTION, POWDER, LYOPHILIZED, FOR SOLUTION INTRAVENOUS at 18:06

## 2021-10-22 RX ADMIN — ROFLUMILAST 500 MCG: 500 TABLET ORAL at 08:54

## 2021-10-22 RX ADMIN — POLYETHYLENE GLYCOL 3350 17 G: 17 POWDER, FOR SOLUTION ORAL at 11:55

## 2021-10-22 RX ADMIN — PRIMIDONE 25 MG: 50 TABLET ORAL at 14:16

## 2021-10-22 ASSESSMENT — PAIN SCALES - GENERAL
PAINLEVEL_OUTOF10: 8
PAINLEVEL_OUTOF10: 0
PAINLEVEL_OUTOF10: 8
PAINLEVEL_OUTOF10: 6
PAINLEVEL_OUTOF10: 4
PAINLEVEL_OUTOF10: 9
PAINLEVEL_OUTOF10: 8
PAINLEVEL_OUTOF10: 7
PAINLEVEL_OUTOF10: 2
PAINLEVEL_OUTOF10: 0
PAINLEVEL_OUTOF10: 4

## 2021-10-22 ASSESSMENT — PAIN DESCRIPTION - PAIN TYPE
TYPE: CHRONIC PAIN
TYPE: CHRONIC PAIN;ACUTE PAIN
TYPE: OTHER (COMMENT)

## 2021-10-22 ASSESSMENT — PAIN DESCRIPTION - PROGRESSION: CLINICAL_PROGRESSION: NOT CHANGED

## 2021-10-22 ASSESSMENT — PAIN DESCRIPTION - ORIENTATION: ORIENTATION: MID

## 2021-10-22 ASSESSMENT — PAIN DESCRIPTION - FREQUENCY
FREQUENCY: CONTINUOUS
FREQUENCY: CONTINUOUS

## 2021-10-22 ASSESSMENT — PAIN DESCRIPTION - DESCRIPTORS
DESCRIPTORS: ACHING;CONSTANT;DISCOMFORT
DESCRIPTORS: DISCOMFORT;CRAMPING;RADIATING

## 2021-10-22 ASSESSMENT — PAIN DESCRIPTION - ONSET: ONSET: ON-GOING

## 2021-10-22 ASSESSMENT — PAIN DESCRIPTION - LOCATION
LOCATION: BACK
LOCATION: BACK

## 2021-10-22 NOTE — PROGRESS NOTES
[place]  Division of Pulmonary, [address]           Pulmonary consult       Patient: Chi Stone  MRN: [id number]  : 1960      CC :SOB ,  H/o A flutter /a fib poorly controlled     HPI :  Doing  Better  Less swelling in legs  Chest pain improved  No fever or chills  No chest pain       PHYSICAL EXAMINATION:     VITAL SIGNS:  BP (!) 129/100   Pulse 71   Temp 97.9 °F (36.6 °C) (Temporal)   Resp    Ht 5' 11\" (1.803 m)   Wt 268 lb 7 oz (121.8 kg)   SpO2 99%   BMI 37.44 kg/m²   Wt Readings from Last 3 Encounters:   10/21/21 268 lb 7 oz (121.8 kg)   10/16/21 261 lb (118.4 kg)   21 258 lb 6 oz (117.2 kg)     Temp Readings from Last 3 Encounters:   10/21/21 97.9 °F (36.6 °C) (Temporal)   10/16/21 98.4 °F (36.9 °C)   21 98.3 °F (36.8 °C) (Oral)     TMAX:  BP Readings from Last 3 Encounters:   10/21/21 (!) 129/100   10/17/21 (!) 168/87   10/11/21 (!) 143/98     Pulse Readings from Last 3 Encounters:   10/21/21 71   10/17/21 76   10/11/21 99           INTAKE/OUTPUTS:  I/O last 3 completed shifts:   In: 480 [P.O.:480]  Out: 1600 [Urine:1600]    Intake/Output Summary (Last 24 hours) at 10/21/2021 2217  Last data filed at 10/21/2021 2026  Gross per 24 hour   Intake 240 ml   Output 1850 ml   Net -1610 ml           LABS/IMAGING:    CBC:  Lab Results   Component Value Date    WBC 12.0 (H) 10/21/2021    HGB 9.8 (L) 10/21/2021    HCT 29.6 (L) 10/21/2021    MCV 97.7 10/21/2021     10/21/2021    LYMPHOPCT 10.5 (L) 10/21/2021    RBC 3.03 (L) 10/21/2021    MCH 32.3 10/21/2021    MCHC 33.1 10/21/2021    RDW 14.8 10/21/2021    NEUTOPHILPCT 80.7 (H) 10/21/2021    MONOPCT 6.1 10/21/2021    BASOPCT 0.6 10/21/2021    NEUTROABS 9.96 (H) 10/21/2021    LYMPHSABS 1.32 (L) 10/21/2021    MONOSABS 0.72 10/21/2021    EOSABS 0.00 (L) 10/21/2021    BASOSABS 0.00 10/21/2021       Recent Labs     10/21/21  0600 10/20/21  0732 10/19/21  0625   WBC 12.0* 12.2* 7.5   HGB 9.8* 9.8* 10.2*   HCT 29.6* 30.1* 31.3*   MCV 97.7 97.4 98.4    258 254       BMP:   Recent Labs     10/20/21  0732 10/21/21  0600 10/21/21  1940    135 139   K 4.6 4.8 4.7   CL 95* 93* 95*   CO2 30* 32* 32*   BUN 27* 27* 34*   CREATININE 0.8 1.0 1.2       MG:   Lab Results   Component Value Date    MG 1.9 10/16/2021     Ca/Phos:   Lab Results   Component Value Date    CALCIUM 9.9 10/21/2021    PHOS 3.9 08/26/2021     Amylase: No results found for: AMYLASE  Lipase:   Lab Results   Component Value Date    LIPASE 28 05/19/2011     LIVER PROFILE:   No results for input(s): AST, ALT, LIPASE, BILIDIR, BILITOT, ALKPHOS in the last 72 hours. Invalid input(s): AMYLASE,  ALB    PT/INR:   No results for input(s): PROTIME, INR in the last 72 hours. APTT:   No results for input(s): APTT in the last 72 hours. Cardiac Enzymes:  Lab Results   Component Value Date    CKTOTAL 51 11/13/2010    CKMB 0.5 11/13/2010    TROPONINI <0.01 02/15/2018       Hgb A1C:   Lab Results   Component Value Date    LABA1C 6.4 (H) 09/09/2021     No results found for: EAG  FAMILIA: No results found for: FAMILIA  ESR: No results found for: SEDRATE  CRP: No results found for: CRP  D Dimer: No results found for: DDIMER    Thyroid Studies:  Lab Results   Component Value Date    TSH 0.942 02/14/2018    M3CKMBS 4.8 02/14/2018           MICROBIOLOGY:  Pending       CXR:  Reviewed     CT Chest:reviewed     PE  Vitals:    10/21/21 1953   BP: (!) 129/100   Pulse: 71   Resp: 21   Temp: 97.9 °F (36.6 °C)   SpO2: 99%     General:  Awake, alert, oriented X 3. Well developed, well nourished, well groomed. No apparent distress. HEENT:  Normocephalic, atraumatic. Pupils equal, round, reactive to light. No scleral icterus. No conjunctival injection. Normal lips, teeth, and gums. No nasal discharge.   Neck:  Supple, FROM  Heart:  RRR, no murmurs, gallops, rubs, carotid upstroke normal, no carotid bruits  Lungs:wheeizng bilateral ,rhonchi   Abdomen:   Bowel sounds present, soft, nontender, no masses, no organomegaly, no peritoneal signs  Extremities:  No clubbing, cyanosis, or edema  Skin:  Warm and dry, no open lesions or rash  Neuro:  Cranial nerves 2-12 intact, no focal deficits  Vascular: Radial and pedal Pulses 2+  Breast: deferred  Rectal: deferred  Genitalia:  deferred    PROBLEM LIST:  Patient Active Problem List   Diagnosis    COPD (chronic obstructive pulmonary disease) ([address]    Essential hypertension    Adrenal adenoma    Class 2 obesity due to excess calories with serious comorbidity and body mass index (BMI) of 35.0 to 35.9 in adult    Anticoagulation management encounter    Typical atrial flutter ([address]    Anxiety    Status post catheter ablation of atrial flutter    Persistent atrial fibrillation ([address]    ETOH abuse    COPD with acute exacerbation ([address]    COPD exacerbation ([address]    Single subsegmental pulmonary embolism without acute cor pulmonale (HCC)               ASSESSMENT:  1- Small PE  2- Acute COPD exacerbation  3-A fib with possible acute  HFpEF  4) very severe COPD,doubt exacerbation   5.)obstructive sleep apnea  6)Afib /Flutter   7.)tobacco independent(quit 5 months ago)      PLAN:  Continue diuresis with Bumex IV  1m mg bid and increase as per renal   Continue Elquis,i doubt he fail ,PE small and can be monitored ,if get large PE then will consider switch Coumadin /hematology eval  Continue BD  On rocephin  Wean steroids next 1-2  Days to PO 30 mg and wean over week ,no need for IV steroids   On Duoneb   Albuterol as needed   On  Eliquis to take it twice daily  Continue Nebs   BiPAP /CPAP at night    IgE 52  Prcal N   lidoderm patch         BRADLEY DOMINGUEZ MD  Pulmonary/Critical care Medicine   [id number] [facility] and [address] [place] [address]

## 2021-10-22 NOTE — PROGRESS NOTES
Hospitalist Progress Note      SYNOPSIS: Patient admitted on 10/18/2021 for PE    63 y.o. male with past medical history of COPD atrial flutter hypertension and DM Patient present to the Ed due to right sided chest pain that radiates into his back . Patient was seen in ED on 10/16 and diagnosed with COPD exacerbation and pneumonia  Patient was offered admission but decided not to stay . He was discharged with [facility] and [address] [place] [address] . Patient reports worsening shortness of breath and weight gain . He reports a non productive cough He reports bilateral legt swelling bilateral leg swelling  He report no fever chills abdominal pain nausea diarrhea. Patient uses 4L nasal canula daily . In the ED  He was hemodynamically stable  He is saturating at 97% on 4 L nasal cannula. Ab data reveal sodium 143 creatinine 1.1 BUN 31 Glucoses 162  Trop 16 EKG revealed normal sinus rhythm 1st degree AV block  HGB 10.2  CXR revealed no acute precess. CTA revealed small  distal segmental  In right lower lobe  . Patient takes Eliquis daily . CTA also revealed RUL pneumonia     SUBJECTIVE:    Patient seen and examined  Records reviewed. The pt is taking his breathing treatment. He still has back pain but states percocet helps. Stable overnight. No other overnight issues reported. Temp (24hrs), Av.3 °F (36.3 °C), Min:97 °F (36.1 °C), Max:97.9 °F (36.6 °C)    DIET: ADULT DIET; Regular; No Added Salt (3-4 gm); 1000 ml  CODE: Full Code    Intake/Output Summary (Last 24 hours) at 10/22/2021 1052  Last data filed at 10/22/2021 0856  Gross per 24 hour   Intake 480 ml   Output 5225 ml   Net -4745 ml       OBJECTIVE:    BP (!) 138/90   Pulse 96   Temp 97 °F (36.1 °C) (Temporal)   Resp 20   Ht 5' 11\" (1.803 m)   Wt 266 lb 2 oz (120.7 kg)   SpO2 97%   BMI 37.12 kg/m²     General appearance: No apparent distress, appears stated age and cooperative. HEENT:  Conjunctivae/corneas clear. Neck: Supple. No jugular venous distention. Respiratory: Diminished. Some wheezing. Cardiovascular: Regular rate rhythm, normal S1-S2  Abdomen: Soft, nontender, nondistended  Musculoskeletal: No clubbing, cyanosis, 2+ bilateral lower extremity edema. Brisk capillary refill. Skin:  No rashes  on visible skin  Neurologic: awake, alert and following commands     ASSESSMENT and PLAN    Pulmonary embolism  Continue therapeutic Lovenox twice daily  Appreciate Pulmonology consult. Continue Eliquis and to consider Coumadin if larger PE. Unlikley Elilquis Failure. Right upper lobe pneumonia  Continue ceftriaxone and doxycycline Day#4  Legionella and strep strep negative    COPD exacerbation/Acute on chronic respiratory failure  Continue IV Solu-Medrol twice a day wean with improvement. Cont. duo nebs. On 4 L nasal cannula at baseline  Supplemental oxygen as needed    Chronic back pain with exacerbation  Stopped Norco.  Started Percocet. Pain improved today. Acute on chronic congestive heart failure with preserved ejection fraction  Appreciate Cardiology consult. Strict RASTA's  Nephrology consult appreciated. Pt started on Bumex drip. PARKER  Cont. BiPAP at night. DM2  Continue sliding scale insulin  Continue Lantus twice daily    Atrial fibrillationcontinue flecainide and Cardizem  Hyperlipidemiacontinue Lipitor  Depression and anxietycontinue Zoloft and BuSpar    Pt going home with rollator for end stage COPD when medically stable for discharge.        DISPOSITION:     Medications:  REVIEWED DAILY    Infusion Medications    bumetanide 0.1 mg/mL infusion 0.5 mg/hr (10/21/21 1650)    sodium chloride       Scheduled Medications    polyethylene glycol  17 g Oral Daily    lidocaine  1 patch TransDERmal Daily    sertraline  25 mg Oral Daily    Arformoterol Tartrate  15 mcg Nebulization BID    aspirin  81 mg Oral Daily    atorvastatin  20 mg Oral QPM    budesonide  0.5 mg Nebulization BID    dilTIAZem  240 mg Oral BID    flecainide  100 mg Oral BID    fluticasone  1 spray Each Nostril Daily    insulin glargine  20 Units SubCUTAneous BID    potassium chloride  20 mEq Oral Daily with breakfast    primidone  25 mg Oral TID    Roflumilast  500 mcg Oral Daily    insulin lispro  0-18 Units SubCUTAneous TID WC    insulin lispro  0-9 Units SubCUTAneous Nightly    ipratropium-albuterol  1 ampule Inhalation Q4H WA    enoxaparin  1 mg/kg SubCUTAneous BID    methylPREDNISolone  40 mg IntraVENous Q12H    doxycycline (VIBRAMYCIN) IV  100 mg IntraVENous Q12H    cefTRIAXone (ROCEPHIN) IV  1,000 mg IntraVENous Q24H    sodium chloride flush  5-40 mL IntraVENous 2 times per day     PRN Meds: morphine, oxyCODONE-acetaminophen, busPIRone, guaiFENesin-dextromethorphan, sodium chloride flush, sodium chloride, ondansetron **OR** ondansetron, acetaminophen **OR** acetaminophen, albuterol    Labs:     Recent Labs     10/20/21  0732 10/21/21  0600 10/22/21  0516   WBC 12.2* 12.0* 13.9*   HGB 9.8* 9.8* 10.6*   HCT 30.1* 29.6* 31.8*    256 291       Recent Labs     10/21/21  0600 10/21/21  1940 10/22/21  0516    139 138   K 4.8 4.7 3.9   CL 93* 95* 93*   CO2 32* 32* 34*   BUN 27* 34* 37*   CREATININE 1.0 1.2 1.1   CALCIUM 9.1 9.9 9.1   PHOS  --   --  4.0       No results for input(s): PROT, ALB, ALKPHOS, ALT, AST, BILITOT, AMYLASE, LIPASE in the last 72 hours. No results for input(s): INR in the last 72 hours. No results for input(s): Shruthi Kras in the last 72 hours.     Chronic labs:    Lab Results   Component Value Date    CHOL 205 (H) 04/08/2018    TRIG 80 04/08/2018    HDL 80 04/08/2018    LDLCALC 109 (H) 04/08/2018    TSH 0.942 02/14/2018    PSA 0.26 04/08/2018    INR 1.3 10/18/2021    LABA1C 6.4 (H) 09/09/2021       Radiology: REVIEWED DAILY    +++++++++++++++++++++++++++++++++++++++++++++++++  Raphael Bourgeois MD  [facility] - [address] [place], [place]  +++++++++++++++++++++++++++++++++++++++++++++++++  NOTE: This report was transcribed using voice recognition software. Every effort was made to ensure accuracy; however, inadvertent computerized transcription errors may be present.

## 2021-10-22 NOTE — PROGRESS NOTES
, edema  Skin: warm  Neuro: awake, alert, oriented x 3,    Objective data reviewed: labs, images, records, medication use, vitals and chart    Time/Communication  Greater than 50% of time spent, total 25 minutes in counseling and coordination of care at the bedside regarding goals of care. Thank you for allowing Palliative Medicine to participate in the care of Reyes Madera. Note: This report was completed using computerize voiced recognition software. Every effort has been made to ensure accuracy; however, inadvertent computerized transcription errors may be present. Fan Arce

## 2021-10-22 NOTE — PROGRESS NOTES
Department of Internal Medicine  Nephrology Progress Note    Events reviewed. SUBJECTIVE:  We are following MrCecilia Alvarado for volume management. He reports no complaints. PHYSICAL EXAM:      Vitals:    VITALS:  BP (!) 138/90   Pulse 96   Temp 97 °F (36.1 °C) (Temporal)   Resp 20   Ht 5' 11\" (1.803 m)   Wt 266 lb 2 oz (120.7 kg)   SpO2 97%   BMI 37.12 kg/m²   24HR INTAKE/OUTPUT:      Intake/Output Summary (Last 24 hours) at 10/22/2021 1426  Last data filed at 10/22/2021 1423  Gross per 24 hour   Intake 240 ml   Output 5750 ml   Net -5510 ml       Constitutional:  Alert and oriented, mild respiratory distress  HEENT:  Normocephalic, PERRL  Respiratory:  Diminished lung sounds, on 4L NC  Cardiovascular/Edema:  IRRR, S1,S2  Gastrointestinal:  Soft, obese, nontender, nondistended  Neurologic:  Nonfocal, ROUSE  Skin:  Warm, dry, no lesions  Other:  ++ edema BLE    DATA:    CBC:   Lab Results   Component Value Date    WBC 13.9 10/22/2021    RBC 3.40 10/22/2021    HGB 10.6 10/22/2021    HCT 31.8 10/22/2021    MCV 93.5 10/22/2021    MCH 31.2 10/22/2021    MCHC 33.3 10/22/2021    RDW 15.0 10/22/2021     10/22/2021    MPV 10.6 10/22/2021     CMP:    Lab Results   Component Value Date     10/22/2021    K 3.9 10/22/2021    CL 93 10/22/2021    CO2 34 10/22/2021    BUN 37 10/22/2021    CREATININE 1.1 10/22/2021    GFRAA >60 10/22/2021    LABGLOM >60 10/22/2021    GLUCOSE 260 10/22/2021    GLUCOSE 100 05/19/2011    PROT 6.9 10/18/2021    LABALBU 4.1 10/18/2021    LABALBU 4.6 05/19/2011    CALCIUM 9.1 10/22/2021    BILITOT <0.2 10/18/2021    ALKPHOS 76 10/18/2021    AST 21 10/18/2021    ALT 32 10/18/2021     Magnesium:    Lab Results   Component Value Date    MG 2.0 10/22/2021     Phosphorus:    Lab Results   Component Value Date    PHOS 4.0 10/22/2021     Radiology Review:      CXR 10/16/21   No acute process.       Question 10 mm nodule in the left upper lung.      CTA of the chest with IV contrast 10/16/21   There is no central pulmonary embolism or aortic dissection. Lamona Therese is a   small distal subsegmental pulmonary embolism in the right lower lobe.       Coronary artery calcification.       Minimal ground-glass infiltrates in the right upper lobe medially concerning   for pneumonia.       9 mm indeterminate pulmonary nodule in the right lower lobe.  Consider   surveillance according to [employer] guidelines. BRIEF SUMMARY OF INITIAL CONSULT:    Briefly, Mr. Kandice Kelly is a 63year old male with a PMH of HTN, HFpEF 70-75% with stage II DD, COPD, PARKER, atrial flutter s/p cardioversion and ablation on chronic anticoagulation on apixaban, obesity, who was admitted on October 18, 2021 after presenting to the ER with chest pain. Apparently, he was also seen in the ER two days prior to admission and was diagnosed with COPD exacerbation and pneumonia. He was sent home on omnicef and azithromycin. He came back due to worsening shortness of breath and weight gain. A CTA of the chest was obtained which revealed a small distal subsegmental pulmonary embolism in the right upper lobe. We are consulted for volume management. IMPRESSION/RECOMMENDATIONS:      Decompensated HFpEF 70-75% with stage II DD, pro->>335>>1,183, with excellent diuresis in response to Bumex drip, with a stable renal function. Normal pH (venous pH 2.23), with metabolic alkalosis (contraction alkalosis). We will continue to monitor bicarbonate level.      HTN, on losartan at home, BP currently controlled  Edematous state, secondary to #1, on Bumex drip  ---------------------------------------------  PARKER, wears CPAP at night  Atrial flutter, on diltiazem and apixaban  COPD exacerbation, on methylprednisolone  Right upper lobe pulmonary embolism, on apixaban  Pneumonia, on doxycycline and ceftriaxone  Normocytic anemia    Plan:    Continue Bumex drip at 0.5 mg/hr  Monitor renal function while achieving diuresis  Continue

## 2021-10-22 NOTE — PROGRESS NOTES
INPATIENT CARDIOLOGY FOLLOW-UP    Name: Deloris Douglass    Age: 63 y.o. Date of Admission: 10/18/2021  9:33 PM    Date of Service: 10/22/2021    Chief Complaint: Follow-up for acute on chronic HFpEF, chest pain    Interim History:  Currently with no chest pain or palpitations. Long-standing history of SOB (no respiratory distress at rest). +LE edema. SR with episodes of AF with RVR on telemetry this admission (currently AF, rate 115).     Review of Systems:   Cardiac: As per HPI  General: No fever, chills  Pulmonary: As per HPI  HEENT: No visual disturbances, difficult swallowing  GI: No nausea, vomiting  : No dysuria, hematuria  Endocrine: No thyroid disease or DM  Musculoskeletal: ROUSE x 4, no focal motor deficits  Skin: Intact, no rashes  Neuro: No headache, seizures  Psych: Currently with no depression, anxiety    Problem List:  Patient Active Problem List   Diagnosis    COPD (chronic obstructive pulmonary disease) ([address]    Essential hypertension    Adrenal adenoma    Class 2 obesity due to excess calories with serious comorbidity and body mass index (BMI) of 35.0 to 35.9 in adult    Anticoagulation management encounter    Typical atrial flutter ([address]    Anxiety    Status post catheter ablation of atrial flutter    Persistent atrial fibrillation ([address]    ETOH abuse    COPD with acute exacerbation ([address]    COPD exacerbation ([address]    Single subsegmental pulmonary embolism without acute cor pulmonale (HCC)       Allergies:  No Known Allergies    Current Medications:  Current Facility-Administered Medications   Medication Dose Route Frequency Provider Last Rate Last Admin    digoxin (LANOXIN) injection 125 mcg  125 mcg IntraVENous Q6H Reg Higgins MD   125 mcg at 10/22/21 1417    morphine 10 MG/5ML solution 2.6 mg  2.6 mg Oral Q2H PRN SERA Taylor - CNP   2.6 mg at 10/22/21 1228    polyethylene glycol (GLYCOLAX) packet 17 g  17 g Oral Daily SERA Taylor - CNP   17 g at 10/22/21 1155    bumetanide (BUMEX) 12.5 mg in sodium chloride 0.9 % 125 mL infusion  0.5 mg/hr IntraVENous Continuous [place], APRN - CNP 5 mL/hr at 10/21/21 1650 0.5 mg/hr at 10/21/21 1650    lidocaine 4 % external patch 1 patch  1 patch TransDERmal Daily Ryan Herman MD   1 patch at 10/22/21 1156    oxyCODONE-acetaminophen (PERCOCET) 5-325 MG per tablet 1 tablet  1 tablet Oral Q6H PRN Justin Buchanan MD   1 tablet at 10/22/21 0855    sertraline (ZOLOFT) tablet 25 mg  25 mg Oral Daily Justin Buchanan MD   25 mg at 10/21/21 2009    Arformoterol Tartrate (BROVANA) nebulizer solution 15 mcg  15 mcg Nebulization BID Palu Jefferson MD   15 mcg at 10/22/21 0831    aspirin chewable tablet 81 mg  81 mg Oral Daily Paul Jefferson MD   81 mg at 10/22/21 1157    atorvastatin (LIPITOR) tablet 20 mg  20 mg Oral QPM Paul Jefferson MD   20 mg at 10/21/21 1734    budesonide (PULMICORT) nebulizer suspension 500 mcg  0.5 mg Nebulization BID Paul Jefferson MD   500 mcg at 10/22/21 0831    busPIRone (BUSPAR) tablet 10 mg  10 mg Oral TID PRN Paul Jefferson MD   10 mg at 10/21/21 1432    dilTIAZem (CARDIZEM CD) extended release capsule 240 mg  240 mg Oral BID Paul Jefferson MD   240 mg at 10/22/21 0855    flecainide (TAMBOCOR) tablet 100 mg  100 mg Oral BID Paul Jefferson MD   100 mg at 10/22/21 0855    fluticasone (FLONASE) 50 MCG/ACT nasal spray 1 spray  1 spray Each Nostril Daily Paul Jefferson MD   1 spray at 10/22/21 1230    insulin glargine (LANTUS) injection vial 20 Units  20 Units SubCUTAneous BID Paul Jefferson MD   20 Units at 10/22/21 0855    potassium chloride (KLOR-CON M) extended release tablet 20 mEq  20 mEq Oral Daily with breakfast Paul Jefferson MD   20 mEq at 10/22/21 0854    primidone (MYSOLINE) tablet 25 mg  25 mg Oral TID Paul Jefferson MD   25 mg at 10/22/21 1416    Roflumilast (DALIRESP) tablet 500 mcg  500 mcg Oral Daily Paul Jefferson MD   500 mcg at 10/22/21 2954    insulin lispro (HUMALOG) injection vial 0-18 Units  0-18 Units SubCUTAneous TID WC Bakari Montelongo MD   3 Units at 10/22/21 1133    insulin lispro (HUMALOG) injection vial 0-9 Units  0-9 Units SubCUTAneous Nightly Bakari Montelongo MD   3 Units at 10/21/21 2043    ipratropium-albuterol (DUONEB) nebulizer solution 1 ampule  1 ampule Inhalation Q4H WA Bakari Montelongo MD   1 ampule at 10/22/21 1603    guaiFENesin-dextromethorphan (ROBITUSSIN DM) 100-10 MG/5ML syrup 5 mL  5 mL Oral Q4H PRN Bakari Montelongo MD        enoxaparin (LOVENOX) injection 120 mg  1 mg/kg SubCUTAneous BID Bakari Montelongo MD   120 mg at 10/22/21 0854    methylPREDNISolone sodium (SOLU-MEDROL) injection 40 mg  40 mg IntraVENous Q12H Bakari Montelongo MD   40 mg at 10/22/21 0529    doxycycline (VIBRAMYCIN) 100 mg in dextrose 5 % 100 mL IVPB  100 mg IntraVENous Q12H Bakari Montelongo MD   Stopped at 10/22/21 0727    cefTRIAXone (ROCEPHIN) 1,000 mg in sterile water 10 mL IV syringe  1,000 mg IntraVENous Q24H Bakari Montelongo MD   1,000 mg at 10/22/21 0529    sodium chloride flush 0.9 % injection 5-40 mL  5-40 mL IntraVENous 2 times per day Bakari Montelongo MD   10 mL at 10/22/21 1011    sodium chloride flush 0.9 % injection 5-40 mL  5-40 mL IntraVENous PRN Bakari Montelongo MD   10 mL at 10/22/21 1417    0.9 % sodium chloride infusion  25 mL IntraVENous PRN Bakari Montelongo MD        ondansetron (ZOFRAN-ODT) disintegrating tablet 4 mg  4 mg Oral Q8H PRN Bakari Montelongo MD        Or    [facility]etron [facility]) injection 4 mg  4 mg IntraVENous Q6H PRN Bakari Montelongo MD        acetaminophen (TYLENOL) tablet 650 mg  650 mg Oral Q6H PRN Bakari Montelongo MD        Or    acetaminophen (TYLENOL) suppository 650 mg  650 mg Rectal Q6H PRN Bakari Montelongo MD        albuterol (PROVENTIL) nebulizer solution 2.5 mg  2.5 mg Nebulization Q6H PRN Bakari Montelongo MD          bumetanide 0.1 mg/mL infusion 0.5 mg/hr (10/21/21 0349)    sodium chloride         Physical Exam:  BP (!) 138/96   Pulse 68   Temp 97.3 °F (36.3 °C) (Temporal)   Resp 20   Ht 5' 11\" (1.803 m)   Wt 266 lb 2 oz (120.7 kg)   SpO2 98%   BMI 37.12 kg/m²   Wt Readings from Last 3 Encounters:   10/22/21 266 lb 2 oz (120.7 kg)   10/16/21 261 lb (118.4 kg)   09/13/21 258 lb 6 oz (117.2 kg)     Appearance: Awake, alert, no acute respiratory distress  Skin: Intact, no rash  Head: Normocephalic, atraumatic  Eyes: EOMI, no conjunctival erythema  ENMT: No pharyngeal erythema, MMM, no rhinorrhea  Neck: Supple, no carotid bruits  Lungs: B/L wheezing, no rales  Cardiac: IRRR, no murmurs apparent  Abdomen: Soft, nontender, +bowel sounds  Extremities: Moves all extremities x 4, ++lower extremity edema  Neurologic: No focal motor deficits apparent, normal mood and affect    Intake/Output:    Intake/Output Summary (Last 24 hours) at 10/22/2021 1609  Last data filed at 10/22/2021 1423  Gross per 24 hour   Intake 240 ml   Output 5750 ml   Net -5510 ml     No intake/output data recorded. Laboratory Tests:  Recent Labs     10/21/21  0600 10/21/21  1940 10/22/21  0516    139 138   K 4.8 4.7 3.9   CL 93* 95* 93*   CO2 32* 32* 34*   BUN 27* 34* 37*   CREATININE 1.0 1.2 1.1   GLUCOSE 181* 184* 260*   CALCIUM 9.1 9.9 9.1     Lab Results   Component Value Date    MG 2.0 10/22/2021     Lab Results   Component Value Date    CKTOTAL 51 11/13/2010    CKMB 0.5 11/13/2010    TROPONINI <0.01 02/15/2018    TROPONINI <0.01 02/14/2018    TROPONINI <0.01 02/14/2018     No results for input(s): CKTOTAL, CKMB, CKMBINDEX, TROPHS in the last 72 hours.   Lab Results   Component Value Date    INR 1.3 10/18/2021    INR 1.2 02/04/2018    INR 0.9 11/13/2010    PROTIME 14.5 (H) 10/18/2021    PROTIME 13.8 (H) 02/04/2018    PROTIME 11.6 11/13/2010     Lab Results   Component Value Date    TSH 0.942 02/14/2018     Lab Results   Component Value Date    LABA1C 6.4 (H) 09/09/2021     No results found for: EAG  Lab Results   Component Value Date CHOL 205 (H) 04/08/2018    CHOL 162 01/28/2017     Lab Results   Component Value Date    TRIG 80 04/08/2018    TRIG 61 01/28/2017     Lab Results   Component Value Date    HDL 80 04/08/2018    HDL 50 01/28/2017     Lab Results   Component Value Date    LDLCALC 109 (H) 04/08/2018    LDLCALC 100 (H) 01/28/2017     Lab Results   Component Value Date    LABVLDL 16 04/08/2018    LABVLDL 12 01/28/2017     No results found for: CHOLHDLRATIO  Recent Labs     10/22/21  0516   PROBNP 1,183*       Cardiac Tests:  Telemetry reviewed (date: 10/22/2021): SR / episodes of AF, rate 115    Chest CTA 8/8/2021:  Impression  No evidence of pulmonary embolism or acute pulmonary abnormality. Prominent pericardial fat accounts for the abnormality seen on radiograph.     Cardiac catheterization 11/2010 ([facility]): LMT: normal. LAD: normal, gives off one large bifurcating diagonal as it courses to but ends at the apex. LCx: nondominant, gives off one large OM1, only trivial distal disease. RCA: Dominant, large, minimal disease distally.     Echocardiogram 11/2010 ([facility]): EF 55% with questionable RV dilation. Trivial to small pericardial effusion. Small echodense space near apex (? Localized effusion).  Trivial MR and TR.      W[place]ATT: 2/7/18 ([place])   Cardiac rhythm: atrial flutter   Low normal left ventricular ejection fraction.   Moderately dilated left atrium.   No evidence of a left atrial appendage thrombus.   No evidence of interatrial shunting on bubble study.   Borderline dilated right ventricle with normal right ventricular function.   Mild-moderate mitral regurgitation.     WYATT: 2/15/18 Atlee Cordial)   Low normal left ventricular systolic function.   Mild to moderate mitral regurgitation.     TTE (Dr. Honey Roberts, 8/12/2021)  Summary   Technically limited study.   Normal left ventricular size, wall thickness, wall motion, and systolic   function.   Estimated left ventricle ejection fraction 70+/-5 %.   There is doppler evidence of stage II diastolic dysfunction.   Mildly dilated right ventricle.   Right ventricle global systolic function is normal .   Normal sized left atrium.   No significant valvular abnormalities noted.     Lexiscan Stress Test 8/13/2021:  FINDINGS: The overall quality of the study was fair. Left ventricular cavity size was noted to be dilated on both the  stress and the resting images but there was no transient ischemic  dilatation after stress  Rotational analog analysis demonstrated abnormal patient motion and  there was marked increase in tracer uptake in the abdominal organs  with the liver overshadowing the bottom of the heart  A severe defect was present in the inferior wall extending into the  lateral apical wall(s) that was moderate to largesized by  quantification. The resting images showed no change   Gated SPECT left ventricular ejection fraction was calculated to be  66%, with normal myocardial contractility and wall motion. Impression  The myocardial perfusion imaging was probably normal with the large  fixed inferior/lateral apical wall defect being more consistent with  attenuation artifact and less likely the result of a myocardial  infarction especially with the normal contractility of the inferior  wall and the lateral apical wall. More importantly, there did not  appear to be any evidence of stress-induced ischemia on this study  Overall left ventricular systolic function was normal with no regional  wall motion abnormality  Low risk general pharmacologic stress test.    Coronary CTA: 9/13/21 (Dr. Meenu De Los Santos)  AGATSTON SCORE: Total coronary artery calcium score is 115.7, distributed as LM 0.0, LAD 60.7, LCx 0.0, RCA 54. 7.      Calcium score percentile is 87% based on age, gender and race.     CARDIAC VALVES: Mild mitral and aortic calcifications are noted.        FUNCTION: The calculated left ventricular ejection fraction is 73%, LVEDV is 226 mL, LVESV 61 mL.   .     CORONARY ANATOMY:     The coronary arteries arise in normal position. There is right coronary artery dominance.     CORONARY CT ANGIOGRAM:     Left main: The left main coronary artery bifurcates into the LAD and LCX. No definite angiographic evidence of significant plaque noted in the left main coronary artery.     Left anterior descending: The proximal LAD has about 1 to 24% calcified plaque. The distal LAD has about 25 to 49% soft plaque. The apical portion of the LAD was not seen due to motion. The proximal to mid diagonal 1 is patent. The distal diagonal 1 is now well seen. The proximal diagonal 2 has mild luminal irregularities. The mid to distal diagonal artery was not well seen.     Left circumflex: The left circumflex coronary artery is not well seen.        Right coronary artery: The RCA is a moderate size caliber vessel with about 1 to 24% calcified plaque. The mid to distal PDA and PLB were not well seen due to motion artifact         PERICARDIUM: No definite evidence of pericardial effusion     The aortic at the sinuses of Valsalva measures 3.6 x 3.5 x 3.9 cm     The sinotubular junction measures 3.1 x 3.2 cm  The mid ascending aorta measures 3.6 x 3.6 cm  The pulmonary artery measures 3.2 x 3.0 cm     4 pulmonary veins enter the left atrium (2 on the left and 2 on the right).     IMPRESSION:     Very difficult study due to technical difficulties, poor contrast opacification and motion.     1.  Mild nonobstructive coronary artery disease noted as mentioned above (about 25 to 49% distal LAD plaque). The apical and inferior apical portion of the LAD was not seen due to motion artifact. The left circumflex system were not well seen due to motion artifact.     2. Normal left ventricular systolic function with estimated left ventricular ejection fraction of 73%.     3. The total calcium score is 115 with calcium score percentile of 87% based on age gender and race.       CXR: 10/16/21  No acute process.       Question 10 mm nodule in the left upper lung.     CTA chest: 10/16/21  There is no central pulmonary embolism or aortic dissection. [place] is a   small distal subsegmental pulmonary embolism in the right lower lobe.       Coronary artery calcification.       Minimal ground-glass infiltrates in the right upper lobe medially concerning   for pneumonia.       9 mm indeterminate pulmonary nodule in the right lower lobe.  Consider   surveillance according to [place] guidelines. ASSESSMENT / PLAN:  1. Acute on chronic HFpEF  2. Acute on chronic respiratory failure. Known history of very severe COPD. Pulmonology following. 3. Pulmonary embolism (\"small distal subsegmental pulmonary embolism in the right lower lobe\" on 10/16/21 CTA chest)  4. Recent acute kidney injury -- improved, most recent Cr 1.4 --> 1.1 --> 1.2 --> 1.1 --> 1.0 --> 1.1 --> 0.8 --> 1.0  5. Paroxysmal atrial fibrillation/typical flutter status-post AF ablation in April 2018. On Eliquis PTA, cardizem, and flecainide therapy. Follows with Vanessa BALLARD. SR with episodes of AF this admission. 6. Hypertension -- sub-optimal control in the past, most recent 's-140's  7. Hyperlipidemia, on statin therapy. 8. DM -- Hgb A1c 6.4  9. Obstructive sleep apnea, compliant with CPAP. 10. Morbid obesity / BMI 39.2  11. History of alcohol abuse  12. Former tobacco abuse (2 PPD for many years, quit in 8/2017)  13. History of pericarditis in 11/2010  14.  Mild coronary artery disease by cardiac catheterization with atypical chest pain and mild troponin elevation not consistent with acute coronary syndrome with a recent nonischemic stress test and reportedly no significant obstructive CAD on CTA of the coronaries done today 9/13/21    - Multiple recent cardiac studies reviewed  - Monitor I/O's, renal function, and electrolytes closely with ongoing diuresis (currently bumex drip; reported I/O's net negative 7.3 L)  - Will order IV digoxin this AM  - Continue current medications otherwise (including anticoagulation; up-titrate anti-HTN regimen as needed)  - Treatment of PARKER  - Aggressive risk factor modifications  - Monitor telemetry  - Care per pulmonary and nephrology    Greater than 35 minutes was spent counseling the patient, reviewing the rationale for the above recommendations and reviewing the patient's current medication list, problem list and results of all previously ordered testing.     Suzy Bowen MD  [facility]) Cardiology

## 2021-10-22 NOTE — PROGRESS NOTES
Dr. Santosh Buchanan pt. Is requiring oxygen therapy can we please have a oxygen therapy order so we are covered?  Thanks,    Kaitlyn Bear, RCP

## 2021-10-22 NOTE — ACP (ADVANCE CARE PLANNING)
Advance Care Planning   Healthcare Decision Maker:    Discussed with pt and spouse at bedside, clarified changes, verified all information and hierarchy.

## 2021-10-22 NOTE — PROGRESS NOTES
EMR entered and reviewed for the purpose of chart review in the course of performing educational functions and responsibilities related to performance improvement. Palliative Medicine Social Work     Patient Name: Digna Adams  Age: 63 y.o.  Status: no    Next of Kin:wife  walter Garcia [phone]               Additional Support: adult children Tiffanie Notice and Essie Leaver    Minor Children: no    Advanced Directives: no, briefly discussed and plan to review further before pt leaves. Their initial discussion last evening was overwhelming and he wants to wait a while . Confirm Code Status: full    Current Goals of Care: live longer, improve or maintain function/ quality of life, remain at home and continue current management    Mental Health History: anxiety    Substance Abuse:occasional beer     Indications of Abuse/Neglect: no    Financial Concerns: yes, pt not working and does not have enough working credits to qualify for [employer]. Over asset limit for SSI. Wife work part time. LSW will review further assistance options and review with wife next visit. Living Situation: resides with wife, he has home o2,     Physical Care Needs Met: yes,     Emotional Needs Met: time spent exploring pts thoughts and feelings, providing support through active listening and validation of feelings. Wife states he gets anxious when she leaves him alone , mostly this occurs at home recently    Assessment: 64 yo  male, seen in room along with his wife. He is alert and oriented x 3,  he is being treated for end stage COPD. We discussed future completion of advance directives, as well financial  needs. LSW will investigate community resource and follow up next visit.

## 2021-10-22 NOTE — CARE COORDINATION
Chart reviewed, palliative has met with pt and wife; pulmonology most recent note, wean steroids next 1-2 days to PO 30 mg and wean over a week, pt remains on iv solu 40 mg q 12 hr (started 10/19); cardiology following increased iv bumex from 0.5 mg to 1 mg; nephrology following and changed bumex to gtt. Per bedside RN, pt's breath sounds remain tight. Met with pt and wife at bedside, plan remains home at discharge, requested rollator, order obtained, no preference of company, prefers for it to be delivered; this CM contacted [facility] DME however, they are on backorder, left VM for Mariia Cedeno with [employer] to check stock.

## 2021-10-22 NOTE — CARE COORDINATION
Messaged attending via perfect-serve with DME request for face to face documentation in progress note. Once received this CM will fax order, facesheet, and progress note to [facility] f) [phone]. Per [facility] they will ship out over night with the exception of weekends to pt's home.

## 2021-10-23 LAB
ANION GAP SERPL CALCULATED.3IONS-SCNC: 12 MMOL/L (ref 7–16)
ANION GAP SERPL CALCULATED.3IONS-SCNC: 16 MMOL/L (ref 7–16)
BUN BLDV-MCNC: 33 MG/DL (ref 6–20)
BUN BLDV-MCNC: 40 MG/DL (ref 6–20)
CALCIUM SERPL-MCNC: 9.3 MG/DL (ref 8.6–10.2)
CALCIUM SERPL-MCNC: 9.5 MG/DL (ref 8.6–10.2)
CHLORIDE BLD-SCNC: 88 MMOL/L (ref 98–107)
CHLORIDE BLD-SCNC: 91 MMOL/L (ref 98–107)
CO2: 36 MMOL/L (ref 22–29)
CO2: 38 MMOL/L (ref 22–29)
CREAT SERPL-MCNC: 1 MG/DL (ref 0.7–1.2)
CREAT SERPL-MCNC: 1 MG/DL (ref 0.7–1.2)
GFR AFRICAN AMERICAN: >60
GFR AFRICAN AMERICAN: >60
GFR NON-AFRICAN AMERICAN: >60 ML/MIN/1.73
GFR NON-AFRICAN AMERICAN: >60 ML/MIN/1.73
GLUCOSE BLD-MCNC: 169 MG/DL (ref 74–99)
GLUCOSE BLD-MCNC: 175 MG/DL (ref 74–99)
METER GLUCOSE: 119 MG/DL (ref 74–99)
METER GLUCOSE: 181 MG/DL (ref 74–99)
METER GLUCOSE: 227 MG/DL (ref 74–99)
METER GLUCOSE: 352 MG/DL (ref 74–99)
POTASSIUM SERPL-SCNC: 3.8 MMOL/L (ref 3.5–5)
POTASSIUM SERPL-SCNC: 4.1 MMOL/L (ref 3.5–5)
SODIUM BLD-SCNC: 140 MMOL/L (ref 132–146)
SODIUM BLD-SCNC: 141 MMOL/L (ref 132–146)

## 2021-10-23 PROCEDURE — 2060000000 HC ICU INTERMEDIATE R&B

## 2021-10-23 PROCEDURE — 6370000000 HC RX 637 (ALT 250 FOR IP): Performed by: FAMILY MEDICINE

## 2021-10-23 PROCEDURE — 6360000002 HC RX W HCPCS: Performed by: FAMILY MEDICINE

## 2021-10-23 PROCEDURE — 82962 GLUCOSE BLOOD TEST: CPT

## 2021-10-23 PROCEDURE — 6360000002 HC RX W HCPCS: Performed by: INTERNAL MEDICINE

## 2021-10-23 PROCEDURE — 2500000003 HC RX 250 WO HCPCS: Performed by: NURSE PRACTITIONER

## 2021-10-23 PROCEDURE — 94640 AIRWAY INHALATION TREATMENT: CPT

## 2021-10-23 PROCEDURE — 99232 SBSQ HOSP IP/OBS MODERATE 35: CPT | Performed by: INTERNAL MEDICINE

## 2021-10-23 PROCEDURE — 2500000003 HC RX 250 WO HCPCS: Performed by: FAMILY MEDICINE

## 2021-10-23 PROCEDURE — 2580000003 HC RX 258: Performed by: FAMILY MEDICINE

## 2021-10-23 PROCEDURE — 2580000003 HC RX 258: Performed by: NURSE PRACTITIONER

## 2021-10-23 PROCEDURE — 99232 SBSQ HOSP IP/OBS MODERATE 35: CPT | Performed by: NURSE PRACTITIONER

## 2021-10-23 PROCEDURE — 36415 COLL VENOUS BLD VENIPUNCTURE: CPT

## 2021-10-23 PROCEDURE — 6370000000 HC RX 637 (ALT 250 FOR IP): Performed by: INTERNAL MEDICINE

## 2021-10-23 PROCEDURE — 6370000000 HC RX 637 (ALT 250 FOR IP): Performed by: NURSE PRACTITIONER

## 2021-10-23 PROCEDURE — 94660 CPAP INITIATION&MGMT: CPT

## 2021-10-23 PROCEDURE — 80048 BASIC METABOLIC PNL TOTAL CA: CPT

## 2021-10-23 PROCEDURE — 99233 SBSQ HOSP IP/OBS HIGH 50: CPT | Performed by: INTERNAL MEDICINE

## 2021-10-23 RX ORDER — DIGOXIN 0.25 MG/ML
250 INJECTION INTRAMUSCULAR; INTRAVENOUS ONCE
Status: COMPLETED | OUTPATIENT
Start: 2021-10-23 | End: 2021-10-23

## 2021-10-23 RX ADMIN — ARFORMOTEROL TARTRATE 15 MCG: 15 SOLUTION RESPIRATORY (INHALATION) at 19:46

## 2021-10-23 RX ADMIN — DOXYCYCLINE 100 MG: 100 INJECTION, POWDER, LYOPHILIZED, FOR SOLUTION INTRAVENOUS at 05:26

## 2021-10-23 RX ADMIN — INSULIN GLARGINE 20 UNITS: 100 INJECTION, SOLUTION SUBCUTANEOUS at 10:34

## 2021-10-23 RX ADMIN — INSULIN LISPRO 15 UNITS: 100 INJECTION, SOLUTION INTRAVENOUS; SUBCUTANEOUS at 12:38

## 2021-10-23 RX ADMIN — WATER 1000 MG: 1 INJECTION INTRAMUSCULAR; INTRAVENOUS; SUBCUTANEOUS at 05:25

## 2021-10-23 RX ADMIN — OXYCODONE AND ACETAMINOPHEN 1 TABLET: 5; 325 TABLET ORAL at 23:08

## 2021-10-23 RX ADMIN — BUDESONIDE 500 MCG: 0.5 SUSPENSION RESPIRATORY (INHALATION) at 19:46

## 2021-10-23 RX ADMIN — DILTIAZEM HYDROCHLORIDE 240 MG: 120 CAPSULE, COATED, EXTENDED RELEASE ORAL at 21:17

## 2021-10-23 RX ADMIN — BUSPIRONE HYDROCHLORIDE 10 MG: 5 TABLET ORAL at 13:40

## 2021-10-23 RX ADMIN — DIGOXIN 250 MCG: 250 INJECTION, SOLUTION INTRAMUSCULAR; INTRAVENOUS; PARENTERAL at 15:24

## 2021-10-23 RX ADMIN — INSULIN LISPRO 3 UNITS: 100 INJECTION, SOLUTION INTRAVENOUS; SUBCUTANEOUS at 17:14

## 2021-10-23 RX ADMIN — IPRATROPIUM BROMIDE AND ALBUTEROL SULFATE 1 AMPULE: .5; 2.5 SOLUTION RESPIRATORY (INHALATION) at 08:35

## 2021-10-23 RX ADMIN — PRIMIDONE 25 MG: 50 TABLET ORAL at 13:42

## 2021-10-23 RX ADMIN — DOXYCYCLINE 100 MG: 100 INJECTION, POWDER, LYOPHILIZED, FOR SOLUTION INTRAVENOUS at 17:05

## 2021-10-23 RX ADMIN — BUMETANIDE 0.5 MG/HR: 0.25 INJECTION, SOLUTION INTRAMUSCULAR; INTRAVENOUS at 13:40

## 2021-10-23 RX ADMIN — POTASSIUM CHLORIDE 20 MEQ: 1500 TABLET, EXTENDED RELEASE ORAL at 10:33

## 2021-10-23 RX ADMIN — ASPIRIN 81 MG CHEWABLE TABLET 81 MG: 81 TABLET CHEWABLE at 10:33

## 2021-10-23 RX ADMIN — METHYLPREDNISOLONE SODIUM SUCCINATE 40 MG: 40 INJECTION, POWDER, FOR SOLUTION INTRAMUSCULAR; INTRAVENOUS at 17:05

## 2021-10-23 RX ADMIN — MORPHINE SULFATE 2.6 MG: 10 SOLUTION ORAL at 10:33

## 2021-10-23 RX ADMIN — FLUTICASONE PROPIONATE 1 SPRAY: 50 SPRAY, METERED NASAL at 10:52

## 2021-10-23 RX ADMIN — IPRATROPIUM BROMIDE AND ALBUTEROL SULFATE 1 AMPULE: .5; 2.5 SOLUTION RESPIRATORY (INHALATION) at 12:42

## 2021-10-23 RX ADMIN — DILTIAZEM HYDROCHLORIDE 240 MG: 120 CAPSULE, COATED, EXTENDED RELEASE ORAL at 10:33

## 2021-10-23 RX ADMIN — ROFLUMILAST 500 MCG: 500 TABLET ORAL at 10:34

## 2021-10-23 RX ADMIN — METHYLPREDNISOLONE SODIUM SUCCINATE 40 MG: 40 INJECTION, POWDER, FOR SOLUTION INTRAMUSCULAR; INTRAVENOUS at 05:25

## 2021-10-23 RX ADMIN — ENOXAPARIN SODIUM 120 MG: 150 INJECTION SUBCUTANEOUS at 21:17

## 2021-10-23 RX ADMIN — Medication 10 ML: at 10:53

## 2021-10-23 RX ADMIN — IPRATROPIUM BROMIDE AND ALBUTEROL SULFATE 1 AMPULE: .5; 2.5 SOLUTION RESPIRATORY (INHALATION) at 16:40

## 2021-10-23 RX ADMIN — FLECAINIDE ACETATE 100 MG: 100 TABLET ORAL at 21:17

## 2021-10-23 RX ADMIN — PRIMIDONE 25 MG: 50 TABLET ORAL at 21:17

## 2021-10-23 RX ADMIN — PRIMIDONE 25 MG: 50 TABLET ORAL at 10:34

## 2021-10-23 RX ADMIN — ATORVASTATIN CALCIUM 20 MG: 20 TABLET, FILM COATED ORAL at 17:05

## 2021-10-23 RX ADMIN — IPRATROPIUM BROMIDE AND ALBUTEROL SULFATE 1 AMPULE: .5; 2.5 SOLUTION RESPIRATORY (INHALATION) at 19:46

## 2021-10-23 RX ADMIN — INSULIN GLARGINE 20 UNITS: 100 INJECTION, SOLUTION SUBCUTANEOUS at 21:17

## 2021-10-23 RX ADMIN — BUDESONIDE 500 MCG: 0.5 SUSPENSION RESPIRATORY (INHALATION) at 08:35

## 2021-10-23 RX ADMIN — ARFORMOTEROL TARTRATE 15 MCG: 15 SOLUTION RESPIRATORY (INHALATION) at 08:35

## 2021-10-23 RX ADMIN — FLECAINIDE ACETATE 100 MG: 100 TABLET ORAL at 10:33

## 2021-10-23 RX ADMIN — Medication 10 ML: at 21:20

## 2021-10-23 RX ADMIN — SERTRALINE 25 MG: 50 TABLET, FILM COATED ORAL at 21:17

## 2021-10-23 RX ADMIN — BUSPIRONE HYDROCHLORIDE 10 MG: 5 TABLET ORAL at 21:16

## 2021-10-23 RX ADMIN — OXYCODONE AND ACETAMINOPHEN 1 TABLET: 5; 325 TABLET ORAL at 10:32

## 2021-10-23 RX ADMIN — ENOXAPARIN SODIUM 120 MG: 150 INJECTION SUBCUTANEOUS at 10:32

## 2021-10-23 RX ADMIN — INSULIN LISPRO 6 UNITS: 100 INJECTION, SOLUTION INTRAVENOUS; SUBCUTANEOUS at 06:04

## 2021-10-23 ASSESSMENT — PAIN DESCRIPTION - LOCATION
LOCATION: BACK
LOCATION: BACK

## 2021-10-23 ASSESSMENT — PAIN SCALES - GENERAL
PAINLEVEL_OUTOF10: 6
PAINLEVEL_OUTOF10: 2
PAINLEVEL_OUTOF10: 2
PAINLEVEL_OUTOF10: 0
PAINLEVEL_OUTOF10: 6
PAINLEVEL_OUTOF10: 7
PAINLEVEL_OUTOF10: 0

## 2021-10-23 ASSESSMENT — PAIN DESCRIPTION - FREQUENCY: FREQUENCY: CONTINUOUS

## 2021-10-23 ASSESSMENT — PAIN DESCRIPTION - PAIN TYPE
TYPE: CHRONIC PAIN
TYPE: CHRONIC PAIN

## 2021-10-23 ASSESSMENT — PAIN DESCRIPTION - PROGRESSION: CLINICAL_PROGRESSION: NOT CHANGED

## 2021-10-23 ASSESSMENT — PAIN DESCRIPTION - ORIENTATION: ORIENTATION: MID

## 2021-10-23 ASSESSMENT — PAIN DESCRIPTION - DESCRIPTORS: DESCRIPTORS: ACHING;CONSTANT

## 2021-10-23 ASSESSMENT — PAIN DESCRIPTION - ONSET: ONSET: ON-GOING

## 2021-10-23 NOTE — PROGRESS NOTES
Hospitalist Progress Note      SYNOPSIS: Patient admitted on 10/18/2021 for PE    63 y.o. male with past medical history of COPD atrial flutter hypertension and DM Patient present to the Ed due to right sided chest pain that radiates into his back . Patient was seen in ED on 10/16 and diagnosed with COPD exacerbation and pneumonia  Patient was offered admission but decided not to stay . He was discharged with [facility] and [address] [place] [address] . Patient reports worsening shortness of breath and weight gain . He reports a non productive cough He reports bilateral legt swelling bilateral leg swelling  He report no fever chills abdominal pain nausea diarrhea. Patient uses 4L nasal canula daily . In the ED  He was hemodynamically stable  He is saturating at 97% on 4 L nasal cannula. Ab data reveal sodium 143 creatinine 1.1 BUN 31 Glucoses 162  Trop 16 EKG revealed normal sinus rhythm 1st degree AV block  HGB 10.2  CXR revealed no acute precess. CTA revealed small  distal segmental  In right lower lobe  . Patient takes Eliquis daily . CTA also revealed RUL pneumonia     SUBJECTIVE:    Patient seen and examined  Records reviewed. The pt is sitting in bed has multiple complaints including anxiety. He does not want to decrease steroids at this time. Stable overnight. No other overnight issues reported. Temp (24hrs), Av.9 °F (36.1 °C), Min:96.2 °F (35.7 °C), Max:97.3 °F (36.3 °C)    DIET: ADULT DIET; Regular; 1000 ml  CODE: Full Code    Intake/Output Summary (Last 24 hours) at 10/23/2021 1224  Last data filed at 10/23/2021 1046  Gross per 24 hour   Intake    Output 5575 ml   Net -5575 ml       OBJECTIVE:    BP (!) 132/100   Pulse 102   Temp 96.2 °F (35.7 °C) (Axillary)   Resp 20   Ht 5' 11\" (1.803 m)   Wt 258 lb 11.2 oz (117.3 kg)   SpO2 100%   BMI 36.08 kg/m²     General appearance: No apparent distress, appears stated age and cooperative. HEENT:  Conjunctivae/corneas clear. Neck: Supple.  No jugular venous distention. Respiratory: Diminished. Some wheezing. Cardiovascular: Regular rate rhythm, normal S1-S2  Abdomen: Soft, nontender, nondistended  Musculoskeletal: No clubbing, cyanosis, 2+ bilateral lower extremity edema. Brisk capillary refill. Skin:  No rashes  on visible skin  Neurologic: awake, alert and following commands     ASSESSMENT and PLAN    Pulmonary embolism  Continue therapeutic Lovenox twice daily  Appreciate Pulmonology consult. Continue Eliquis and to consider Coumadin if larger PE. Unlikley Elilquis Failure. Likely transition to Eliquis tomorrow. Paroxysmal Atrial Fibrillation  Cont. Flecainide and Cardizem. Right upper lobe pneumonia  Continue ceftriaxone and doxycycline Day#5  Legionella and strep negative    COPD exacerbation/Acute on chronic respiratory failure  Continue IV Solu-Medrol twice a day. Pt refused weaning this morning. Cont. duo nebs. On 4 L nasal cannula at baseline  Supplemental oxygen as needed    Chronic back pain with exacerbation  Stopped Norco.  Started Percocet. Pain improved today. Acute on chronic congestive heart failure with preserved ejection fraction  Appreciate Cardiology consult. Pt given Digoxin as well. Strict RASTA's  Nephrology consult appreciated. Pt  on Bumex drip. With excellent response and renal function stable. PARKER  Cont. BiPAP at night.      DM2  Continue sliding scale insulin  Continue Lantus twice daily    Hyperlipidemiacontinue Lipitor  Depression and anxietycontinue Zoloft and BuSpar      DISPOSITION:     Medications:  REVIEWED DAILY    Infusion Medications    bumetanide 0.1 mg/mL infusion 0.5 mg/hr (10/22/21 8512)    sodium chloride       Scheduled Medications    polyethylene glycol  17 g Oral Daily    lidocaine  1 patch TransDERmal Daily    sertraline  25 mg Oral Daily    Arformoterol Tartrate  15 mcg Nebulization BID    aspirin  81 mg Oral Daily    atorvastatin  20 mg Oral QPM    budesonide  0.5 mg Nebulization BID  dilTIAZem  240 mg Oral BID    flecainide  100 mg Oral BID    fluticasone  1 spray Each Nostril Daily    insulin glargine  20 Units SubCUTAneous BID    potassium chloride  20 mEq Oral Daily with breakfast    primidone  25 mg Oral TID    Roflumilast  500 mcg Oral Daily    insulin lispro  0-18 Units SubCUTAneous TID WC    insulin lispro  0-9 Units SubCUTAneous Nightly    ipratropium-albuterol  1 ampule Inhalation Q4H WA    enoxaparin  1 mg/kg SubCUTAneous BID    methylPREDNISolone  40 mg IntraVENous Q12H    doxycycline (VIBRAMYCIN) IV  100 mg IntraVENous Q12H    cefTRIAXone (ROCEPHIN) IV  1,000 mg IntraVENous Q24H    sodium chloride flush  5-40 mL IntraVENous 2 times per day     PRN Meds: morphine, metoprolol, oxyCODONE-acetaminophen, busPIRone, guaiFENesin-dextromethorphan, sodium chloride flush, sodium chloride, ondansetron **OR** ondansetron, acetaminophen **OR** acetaminophen, albuterol    Labs:     Recent Labs     10/21/21  0600 10/22/21  0516   WBC 12.0* 13.9*   HGB 9.8* 10.6*   HCT 29.6* 31.8*    291       Recent Labs     10/22/21  0516 10/22/21  1957 10/23/21  0755    138 140   K 3.9 4.2 4.1   CL 93* 91* 88*   CO2 34* 35* 36*   BUN 37* 39* 33*   CREATININE 1.1 1.1 1.0   CALCIUM 9.1 9.6 9.5   PHOS 4.0  --   --        No results for input(s): PROT, ALB, ALKPHOS, ALT, AST, BILITOT, AMYLASE, LIPASE in the last 72 hours. No results for input(s): INR in the last 72 hours. No results for input(s): Adonica Hoose in the last 72 hours.     Chronic labs:    Lab Results   Component Value Date    CHOL 205 (H) 04/08/2018    TRIG 80 04/08/2018    HDL 80 04/08/2018    LDLCALC 109 (H) 04/08/2018    TSH 0.942 02/14/2018    PSA 0.26 04/08/2018    INR 1.3 10/18/2021    LABA1C 6.4 (H) 09/09/2021       Radiology: REVIEWED DAILY    +++++++++++++++++++++++++++++++++++++++++++++++++  Brad Gomez MD  [facility] Physician - [address] [place], [place]  +++++++++++++++++++++++++++++++++++++++++++++++++  NOTE: This report was transcribed using voice recognition software. Every effort was made to ensure accuracy; however, inadvertent computerized transcription errors may be present.

## 2021-10-23 NOTE — PROGRESS NOTES
Palliative Care Department  [phone]  Palliative Care Progress Note  Provider Candy Corbin, APRN - CNP      PATIENT: Lay Benedict  : 1960  MRN: [id number]  ADMISSION DATE: 10/18/2021  9:33 PM  Referring Provider: Raphael Bourgeois MD    Palliative Medicine was consulted on hospital day 4 for assistance with wants to discuss outpatient palliative     HPI:     Brad Alvarado is a 63 y.o. y/o male with a history of of end stage COPD atrial flutter hypertension and DM who presented to [facility] on 10/18/2021 with chest pain. Pt found to have a PE, along with COPD exacerbation and pneumonia     ASSESSMENT/PLAN:     Pertinent Hospital Diagnoses   · PE   COPD    PNA    Palliative Care Encounter for End stage COPD  - Discussed CODE STATUS, patient wanted more time to think about this. Dyspnea  - morphine 2.5 mg PO every 2 hours as needed     Constipation  -Continue GlycoLax daily    Chronic Back Pain  -  Percocet 5-325 mg every 6 hours as needed by attending    SUBJECTIVE:     Details of Conversation: Met with the patient at bedside with his wife present. Overall he states he is feeling better, breathing mildly better, he states he is use the morphine about 4 times is found this to be slightly beneficial. He also continues to have some pain which he is using Percocet for. We did discuss the outpatient palliative medicine service which be available to him following discharge. His wife are both concerned about the resources he may need at home, as his shortness of breath is severe with exertion making it hard for him to meet his needs at home. Did not wish to discuss CODE STATUS at great length, is open to further discussing this likely in the outpatient setting after discharge following stabilization of his acute medical issues.     Prognosis: Guarded    OBJECTIVE:     BP (!) 132/100   Pulse 102   Temp 96.2 °F (35.7 °C) (Axillary)   Resp 20   Ht 5' 11\" (1.803 m)   Wt 258 lb 11.2 oz (117.3 kg)   SpO2 97%   BMI 36.08 kg/m²     Physical Examination:  Gen: awake, alert  HEENT: normocephalic, atraumatic  Lungs: respirations easy and not labored,   Heart: regular rate and rhythm  Abdomen: obese  Extremities: moving all extremities , edema  Skin: warm  Neuro: awake, alert, oriented x 3,    Objective data reviewed: labs, images, records, medication use, vitals and chart    Time/Communication  Greater than 50% of time spent, total 25 minutes in counseling and coordination of care at the bedside regarding goals of care. Thank you for allowing Palliative Medicine to participate in the care of Jose Jones. Note: This report was completed using computerSynapse voiced recognition software. Every effort has been made to ensure accuracy; however, inadvertent computerized transcription errors may be present. Vanessa Joseph

## 2021-10-23 NOTE — PROGRESS NOTES
Department of Internal Medicine  Nephrology Progress Note    Events reviewed. SUBJECTIVE:  We are following Mr. Umesh Garcias for volume management. He reports no complaints. PHYSICAL EXAM:      Vitals:    VITALS:  /68   Pulse 116   Temp 96.9 °F (36.1 °C) (Temporal)   Resp 20   Ht 5' 11\" (1.803 m)   Wt 258 lb 11.2 oz (117.3 kg)   SpO2 100%   BMI 36.08 kg/m²   24HR INTAKE/OUTPUT:      Intake/Output Summary (Last 24 hours) at 10/23/2021 0948  Last data filed at 10/23/2021 0558  Gross per 24 hour   Intake    Output 5075 ml   Net -5075 ml       Constitutional:  Alert and oriented, mild respiratory distress  HEENT:  Normocephalic, PERRL  Respiratory:  Diminished lung sounds, on 4L NC  Cardiovascular/Edema:  IRRR, S1,S2  Gastrointestinal:  Soft, obese, nontender, nondistended  Neurologic:  Nonfocal, ROUSE  Skin:  Warm, dry, no lesions  Other:  ++ edema BLE    DATA:    CBC:   Lab Results   Component Value Date    WBC 13.9 10/22/2021    RBC 3.40 10/22/2021    HGB 10.6 10/22/2021    HCT 31.8 10/22/2021    MCV 93.5 10/22/2021    MCH 31.2 10/22/2021    MCHC 33.3 10/22/2021    RDW 15.0 10/22/2021     10/22/2021    MPV 10.6 10/22/2021     CMP:    Lab Results   Component Value Date     10/23/2021    K 4.1 10/23/2021    K 3.9 10/22/2021    CL 88 10/23/2021    CO2 36 10/23/2021    BUN 33 10/23/2021    CREATININE 1.0 10/23/2021    GFRAA >60 10/23/2021    LABGLOM >60 10/23/2021    GLUCOSE 169 10/23/2021    GLUCOSE 100 05/19/2011    PROT 6.9 10/18/2021    LABALBU 4.1 10/18/2021    LABALBU 4.6 05/19/2011    CALCIUM 9.5 10/23/2021    BILITOT <0.2 10/18/2021    ALKPHOS 76 10/18/2021    AST 21 10/18/2021    ALT 32 10/18/2021     Magnesium:    Lab Results   Component Value Date    MG 2.0 10/22/2021     Phosphorus:    Lab Results   Component Value Date    PHOS 4.0 10/22/2021     Radiology Review:      CXR 10/16/21   No acute process.       Question 10 mm nodule in the left upper lung.      CTA of the chest with IV contrast 10/16/21   There is no central pulmonary embolism or aortic dissection. Massiel Zamarripa is a   small distal subsegmental pulmonary embolism in the right lower lobe.       Coronary artery calcification.       Minimal ground-glass infiltrates in the right upper lobe medially concerning   for pneumonia.       9 mm indeterminate pulmonary nodule in the right lower lobe.  Consider   surveillance according to [facility] guidelines. BRIEF SUMMARY OF INITIAL CONSULT:    Briefly, Mr. Nirav Anna is a 63year old male with a PMH of HTN, HFpEF 70-75% with stage II DD, COPD, PARKER, atrial flutter s/p cardioversion and ablation on chronic anticoagulation on apixaban, obesity, who was admitted on October 18, 2021 after presenting to the ER with chest pain. Apparently, he was also seen in the ER two days prior to admission and was diagnosed with COPD exacerbation and pneumonia. He was sent home on omnicef and azithromycin. He came back due to worsening shortness of breath and weight gain. A CTA of the chest was obtained which revealed a small distal subsegmental pulmonary embolism in the right upper lobe. We are consulted for volume management. IMPRESSION/RECOMMENDATIONS:      Decompensated HFpEF 70-75% with stage II DD, pro->>335>>1,183, continues with excellent diuresis in response to Bumex drip, with a stable renal function. Normal pH (venous pH 6.25), with metabolic alkalosis (contraction alkalosis). We will continue to monitor bicarbonate level.      HTN, on losartan at home, BP currently controlled  Edematous state, secondary to #1, on Bumex drip  ---------------------------------------------  PARKER, wears CPAP at night  Atrial flutter, on diltiazem and apixaban  COPD exacerbation, on methylprednisolone  Right upper lobe pulmonary embolism, on apixaban  Pneumonia, on doxycycline and ceftriaxone  Normocytic anemia    Plan:    Continue Bumex drip at 0.5 mg/hr  Monitor bicarbonate levels  Continue to monitor renal function while achieving diuresis  Continue to monitor bicarbonate level, BMP 4 PM      Miladys Parr MD

## 2021-10-23 NOTE — PROGRESS NOTES
[facility]  Division of Pulmonary, Critical Care Medicine  Pulmonary [facility]           Pulmonary progress Note         CC :SOB ,  H/o A flutter /a fib poorly controlled     HPI :  Doing  Better,pain has improved  Less swelling in legs,diuresis well  Chest pain improved  No fever or chills  No chest pain       PHYSICAL EXAMINATION:     VITAL SIGNS:  BP (!) 142/97   Pulse 114   Temp 97.2 °F (36.2 °C) (Temporal)   Resp 20   Ht 5' 11\" (1.803 m)   Wt 266 lb 2 oz (120.7 kg)   SpO2 98%   BMI 37.12 kg/m²   Wt Readings from Last 3 Encounters:   10/22/21 266 lb 2 oz (120.7 kg)   10/16/21 261 lb (118.4 kg)   09/13/21 258 lb 6 oz (117.2 kg)     Temp Readings from Last 3 Encounters:   10/22/21 97.2 °F (36.2 °C) (Temporal)   10/16/21 98.4 °F (36.9 °C)   09/18/21 98.3 °F (36.8 °C) (Oral)     TMAX:  BP Readings from Last 3 Encounters:   10/22/21 (!) 142/97   10/17/21 (!) 168/87   10/11/21 (!) 143/98     Pulse Readings from Last 3 Encounters:   10/22/21 114   10/17/21 76   10/11/21 99           INTAKE/OUTPUTS:  I/O last 3 completed shifts:   In: 240 [P.O.:240]  Out: 6000 [Urine:6000]    Intake/Output Summary (Last 24 hours) at 10/22/2021 2307  Last data filed at 10/22/2021 2102  Gross per 24 hour   Intake 240 ml   Output 6000 ml   Net -5760 ml           LABS/IMAGING:    CBC:  Lab Results   Component Value Date    WBC 13.9 (H) 10/22/2021    HGB 10.6 (L) 10/22/2021    HCT 31.8 (L) 10/22/2021    MCV 93.5 10/22/2021     10/22/2021    LYMPHOPCT 7.0 (L) 10/22/2021    RBC 3.40 (L) 10/22/2021    MCH 31.2 10/22/2021    MCHC 33.3 10/22/2021    RDW 15.0 10/22/2021    NEUTOPHILPCT 78.9 10/22/2021    MONOPCT 9.6 10/22/2021    BASOPCT 0.9 10/22/2021    NEUTROABS 11.54 (H) 10/22/2021    LYMPHSABS 0.97 (L) 10/22/2021    MONOSABS 1.39 (H) 10/22/2021    EOSABS 0.00 (L) 10/22/2021    BASOSABS 0.00 10/22/2021       Recent Labs     10/22/21  0516 10/21/21  0600 10/20/21  0732   WBC 13.9* 12.0* 12.2*   HGB 10.6* 9.8* 9.8*   HCT 31.8* 29.6* 30.1*   MCV 93.5 97.7 97.4    256 258       BMP:   Recent Labs     10/21/21  1940 10/22/21  0516 10/22/21  1957    138 138   K 4.7 3.9 4.2   CL 95* 93* 91*   CO2 32* 34* 35*   PHOS  --  4.0  --    BUN 34* 37* 39*   CREATININE 1.2 1.1 1.1       MG:   Lab Results   Component Value Date    MG 2.0 10/22/2021     Ca/Phos:   Lab Results   Component Value Date    CALCIUM 9.6 10/22/2021    PHOS 4.0 10/22/2021     Amylase: No results found for: AMYLASE  Lipase:   Lab Results   Component Value Date    LIPASE 28 05/19/2011     LIVER PROFILE:   No results for input(s): AST, ALT, LIPASE, BILIDIR, BILITOT, ALKPHOS in the last 72 hours. Invalid input(s): AMYLASE,  ALB    PT/INR:   No results for input(s): PROTIME, INR in the last 72 hours. APTT:   No results for input(s): APTT in the last 72 hours. Cardiac Enzymes:  Lab Results   Component Value Date    CKTOTAL 51 11/13/2010    CKMB 0.5 11/13/2010    TROPONINI <0.01 02/15/2018       Hgb A1C:   Lab Results   Component Value Date    LABA1C 6.4 (H) 09/09/2021     No results found for: EAG  FAMILIA: No results found for: FAMILIA  ESR: No results found for: SEDRATE  CRP: No results found for: CRP  D Dimer: No results found for: DDIMER    Thyroid Studies:  Lab Results   Component Value Date    TSH 0.942 02/14/2018    [id number] 4.8 02/14/2018           MICROBIOLOGY:  Pending       CXR:  Reviewed     CT Chest:reviewed     PE  Vitals:    10/22/21 2100   BP: (!) 142/97   Pulse: 114   Resp: 20   Temp: 97.2 °F (36.2 °C)   SpO2: 98%     General:  Awake, alert, oriented X 3. Well developed, well nourished, well groomed. No apparent distress. HEENT:  Normocephalic, atraumatic. Pupils equal, round, reactive to light. No scleral icterus. No conjunctival injection. Normal lips, teeth, and gums. No nasal discharge.   Neck:  Supple, FROM  Heart:  RRR, no murmurs, gallops, rubs, carotid upstroke normal, no carotid bruits  Lungs:wheeizng bilateral ,rhonchi   Abdomen:   Bowel sounds present, soft, nontender, no masses, no organomegaly, no peritoneal signs  Extremities:  No clubbing, cyanosis, or edema  Skin:  Warm and dry, no open lesions or rash  Neuro:  Cranial nerves 2-12 intact, no focal deficits  Vascular: Radial and pedal Pulses 2+  Breast: deferred  Rectal: deferred  Genitalia:  deferred    PROBLEM LIST:  Patient Active Problem List   Diagnosis    COPD (chronic obstructive pulmonary disease) ([address]    Essential hypertension    Adrenal adenoma    Class 2 obesity due to excess calories with serious comorbidity and body mass index (BMI) of 35.0 to 35.9 in adult    Anticoagulation management encounter    Typical atrial flutter ([address]    Anxiety    Status post catheter ablation of atrial flutter    Persistent atrial fibrillation ([address]    ETOH abuse    COPD with acute exacerbation ([address]    COPD exacerbation ([address]    Single subsegmental pulmonary embolism without acute cor pulmonale (HCC)               ASSESSMENT:  1- Small PE  2- Acute COPD exacerbation  3-A fib with possible acute  HFpEF  4) very severe COPD,doubt exacerbation   5.)obstructive sleep apnea  6)Afib /Flutter   7.)tobacco independent(quit 5 months ago)      PLAN:  Continue diuresis with Bumex drip   On Lovenox ,can be back on elquis ,I doubt he fail elquis ,PE is very small and doubt his [ain related to small PE  Most likley MS related to cough and muscle pain more than PE as no infarction seen  Continue BD  On rocephin  Wean steroids next 1-2  Days to PO 30 mg and wean over week ,no need for IV steroids   On Duoneb   Albuterol as needed   On  Eliquis to take it twice daily  Continue Nebs   BiPAP /CPAP at night    Prcal N   lidoderm patch         BRADLEY DOMINGUEZ MD  Pulmonary/Critical care Medicine   [facility] and [address] [place] [address]

## 2021-10-23 NOTE — PROGRESS NOTES
INPATIENT CARDIOLOGY FOLLOW-UP    Name: Ashia Uribe    Age: 63 y.o. Date of Admission: 10/18/2021  9:33 PM    Date of Service: 10/23/2021    Chief Complaint: Follow-up for acute on chronic HFpEF, chest pain    Interim History:  Currently with no chest pain or palpitations. Long-standing history of SOB (no respiratory distress at rest). +LE edema. SR with episodes of AF with RVR on telemetry this admission (currently AF, rate 110).     Review of Systems:   Cardiac: As per HPI  General: No fever, chills  Pulmonary: As per HPI  HEENT: No visual disturbances, difficult swallowing  GI: No nausea, vomiting  : No dysuria, hematuria  Endocrine: No thyroid disease or DM  Musculoskeletal: ROUSE x 4, no focal motor deficits  Skin: Intact, no rashes  Neuro: No headache, seizures  Psych: Currently with no depression, anxiety    Problem List:  Patient Active Problem List   Diagnosis    [facility] disease) [facility]    Essential hypertension    Adrenal adenoma    Class 2 obesity due to excess calories with serious comorbidity and body mass index (BMI) of 35.0 to 35.9 in adult    Anticoagulation management encounter    Typical atrial flutter ([address]    Anxiety    Status post catheter ablation of atrial flutter    Persistent atrial fibrillation (HCC)    ETOH abuse    COPD with acute exacerbation ([facility]    COPD exacerbation ([facility]    Single subsegmental pulmonary embolism without acute cor pulmonale (HCC)       Allergies:  No Known Allergies    Current Medications:  Current Facility-Administered Medications   Medication Dose Route Frequency Provider Last Rate Last Admin    morphine 10 MG/5ML solution 2.6 mg  2.6 mg Oral Q2H PRN SERA Ellsworth CNP   2.6 mg at 10/23/21 1033    metoprolol (LOPRESSOR) injection 5 mg  5 mg IntraVENous Q6H PRN Patric Michael MD        polyethylene glycol (GLYCOLAX) packet 17 g  17 g Oral Daily SERA Ellsworth CNP   17 g at 10/22/21 7093    bumetanide (BUMEX) 12.5 mg in sodium chloride 0.9 % 125 mL infusion  0.5 mg/hr IntraVENous Continuous Delphia Basket, APRN - CNP 5 mL/hr at 10/23/21 1340 0.5 mg/hr at 10/23/21 1340    lidocaine 4 % external patch 1 patch  1 patch TransDERmal Daily Ryan Moreira MD   1 patch at 10/23/21 1034    oxyCODONE-acetaminophen (PERCOCET) 5-325 MG per tablet 1 tablet  1 tablet Oral Q6H PRN Jihan Lucio MD   1 tablet at 10/23/21 1032    sertraline (ZOLOFT) tablet 25 mg  25 mg Oral Daily Jihan Lucio MD   25 mg at 10/22/21 2123    Arformoterol Tartrate (BROVANA) nebulizer solution 15 mcg  15 mcg Nebulization BID Jorge Montano MD   15 mcg at 10/23/21 9124    aspirin chewable tablet 81 mg  81 mg Oral Daily Jorge Montano MD   81 mg at 10/23/21 1033    atorvastatin (LIPITOR) tablet 20 mg  20 mg Oral QPM Jorge Montano MD   20 mg at 10/22/21 1655    budesonide (PULMICORT) nebulizer suspension 500 mcg  0.5 mg Nebulization BID Jorge Montano MD   500 mcg at 10/23/21 0835    busPIRone (BUSPAR) tablet 10 mg  10 mg Oral TID PRN Jorge Montano MD   10 mg at 10/23/21 1340    dilTIAZem (CARDIZEM CD) extended release capsule 240 mg  240 mg Oral BID Jorge Montano MD   240 mg at 10/23/21 1033    flecainide (TAMBOCOR) tablet 100 mg  100 mg Oral BID Jorge Montano MD   100 mg at 10/23/21 1033    fluticasone (FLONASE) 50 MCG/ACT nasal spray 1 spray  1 spray Each Nostril Daily Jorge Montano MD   1 spray at 10/23/21 1052    insulin glargine (LANTUS) injection vial 20 Units  20 Units SubCUTAneous BID Jorge Montano MD   20 Units at 10/23/21 1034    potassium chloride (KLOR-CON M) extended release tablet 20 mEq  20 mEq Oral Daily with breakfast Jorge Montano MD   20 mEq at 10/23/21 1033    primidone (MYSOLINE) tablet 25 mg  25 mg Oral TID Jorge Montano MD   25 mg at 10/23/21 1342    Roflumilast (DALIRESP) tablet 500 mcg  500 mcg Oral Daily Jorge Montano MD   500 mcg at 10/23/21 1034    insulin lispro (HUMALOG) injection vial 0-18 Units  0-18 Units SubCUTAneous TID WC Benton Ornelas MD   15 Units at 10/23/21 1238    insulin lispro (HUMALOG) injection vial 0-9 Units  0-9 Units SubCUTAneous Nightly Benton Ornelas MD   3 Units at 10/22/21 2131    ipratropium-albuterol (DUONEB) nebulizer solution 1 ampule  1 ampule Inhalation Q4H WA Benton Ornelas MD   1 ampule at 10/23/21 1242    guaiFENesin-dextromethorphan (ROBITUSSIN DM) 100-10 MG/5ML syrup 5 mL  5 mL Oral Q4H PRN Benton Ornelas MD        enoxaparin (LOVENOX) injection 120 mg  1 mg/kg SubCUTAneous BID Benton Ornelas MD   120 mg at 10/23/21 1032    methylPREDNISolone sodium (SOLU-MEDROL) injection 40 mg  40 mg IntraVENous Q12H Benton Ornelas MD   40 mg at 10/23/21 0525    doxycycline (VIBRAMYCIN) 100 mg in dextrose 5 % 100 mL IVPB  100 mg IntraVENous Q12H Benton Ornelas MD   Stopped at 10/23/21 8485    cefTRIAXone (ROCEPHIN) 1,000 mg in sterile water 10 mL IV syringe  1,000 mg IntraVENous Q24H Benton Ornelas MD   1,000 mg at 10/23/21 0525    sodium chloride flush 0.9 % injection 5-40 mL  5-40 mL IntraVENous 2 times per day Benton Ornelas MD   10 mL at 10/23/21 1053    sodium chloride flush 0.9 % injection 5-40 mL  5-40 mL IntraVENous PRN Benton Ornelas MD   10 mL at 10/22/21 1417    0.9 % sodium chloride infusion  25 mL IntraVENous PRN Benton Ornelas MD        ondansetron (ZOFRAN-ODT) disintegrating tablet 4 mg  4 mg Oral Q8H PRN Benton Ornelas MD        Or    ondansetron [facility]) injection 4 mg  4 mg IntraVENous Q6H PRN Benton Ornelas MD        acetaminophen (TYLENOL) tablet 650 mg  650 mg Oral Q6H PRN Benton Ornelas MD        Or    acetaminophen (TYLENOL) suppository 650 mg  650 mg Rectal Q6H PRN Benton Ornelas MD        albuterol (PROVENTIL) nebulizer solution 2.5 mg  2.5 mg Nebulization Q6H PRN Benton Ornelas, MD          bumetanide 0.1 mg/mL infusion 0.5 mg/hr (10/23/21 1340)    sodium chloride         Physical Exam:  BP (!) 132/100   Pulse 102   Temp 96.2 °F (35.7 °C) (Axillary)   Resp 20   Ht 5' 11\" (1.803 m)   Wt 258 lb 11.2 oz (117.3 kg)   SpO2 97%   BMI 36.08 kg/m²   Wt Readings from Last 3 Encounters:   10/23/21 258 lb 11.2 oz (117.3 kg)   10/16/21 261 lb (118.4 kg)   09/13/21 258 lb 6 oz (117.2 kg)     Appearance: Awake, alert, no acute respiratory distress  Skin: Intact, no rash  Head: Normocephalic, atraumatic  Eyes: EOMI, no conjunctival erythema  ENMT: No pharyngeal erythema, MMM, no rhinorrhea  Neck: Supple, no carotid bruits  Lungs: B/L wheezing, no rales  Cardiac: IRRR, no murmurs apparent  Abdomen: Soft, nontender, +bowel sounds  Extremities: Moves all extremities x 4, ++lower extremity edema  Neurologic: No focal motor deficits apparent, normal mood and affect    Intake/Output:    Intake/Output Summary (Last 24 hours) at 10/23/2021 1401  Last data filed at 10/23/2021 1046  Gross per 24 hour   Intake    Output 5575 ml   Net -5575 ml     I/O this shift:  In: -   Out: 500 [Urine:500]    Laboratory Tests:  Recent Labs     10/22/21  0516 10/22/21  1957 10/23/21  0755    138 140   K 3.9 4.2 4.1   CL 93* 91* 88*   CO2 34* 35* 36*   BUN 37* 39* 33*   CREATININE 1.1 1.1 1.0   GLUCOSE 260* 135* 169*   CALCIUM 9.1 9.6 9.5     Lab Results   Component Value Date    MG 2.0 10/22/2021     Lab Results   Component Value Date    CKTOTAL 51 11/13/2010    CKMB 0.5 11/13/2010    TROPONINI <0.01 02/15/2018    TROPONINI <0.01 02/14/2018    TROPONINI <0.01 02/14/2018     No results for input(s): CKTOTAL, CKMB, CKMBINDEX, TROPHS in the last 72 hours.   Lab Results   Component Value Date    INR 1.3 10/18/2021    INR 1.2 02/04/2018    INR 0.9 11/13/2010    PROTIME 14.5 (H) 10/18/2021    PROTIME 13.8 (H) 02/04/2018    PROTIME 11.6 11/13/2010     Lab Results   Component Value Date    TSH 0.942 02/14/2018     Lab Results   Component Value Date    LABA1C 6.4 (H) 09/09/2021     No results found for: EAG  Lab Results   Component Value Date CHOL 205 (H) 04/08/2018    CHOL 162 01/28/2017     Lab Results   Component Value Date    TRIG 80 04/08/2018    TRIG 61 01/28/2017     Lab Results   Component Value Date    HDL 80 04/08/2018    HDL 50 01/28/2017     Lab Results   Component Value Date    LDLCALC 109 (H) 04/08/2018    LDLCALC 100 (H) 01/28/2017     Lab Results   Component Value Date    LABVLDL 16 04/08/2018    LABVLDL 12 01/28/2017     No results found for: CHOLHDLRATIO  Recent Labs     10/22/21  0516   PROBNP 1,183*       Cardiac Tests:  Telemetry reviewed (date: 10/23/2021): SR / episodes of AF, rate 115    Chest CTA 8/8/2021:  Impression  No evidence of pulmonary embolism or acute pulmonary abnormality. Prominent pericardial fat accounts for the abnormality seen on radiograph.     Cardiac catheterization 11/2010 ([facility]): LMT: normal. LAD: normal, gives off one large bifurcating diagonal as it courses to but ends at the apex. LCx: nondominant, gives off one large OM1, only trivial distal disease. RCA: Dominant, large, minimal disease distally.     Echocardiogram 11/2010 ([facility]): EF 55% with questionable RV dilation. Trivial to small pericardial effusion. Small echodense space near apex (? Localized effusion).  Trivial MR and TR.      WYATT: 2/7/18 ([place])   Cardiac rhythm: atrial flutter   Low normal left ventricular ejection fraction.   Moderately dilated left atrium.   No evidence of a left atrial appendage thrombus.   No evidence of interatrial shunting on bubble study.   Borderline dilated right ventricle with normal right ventricular function.   Mild-moderate mitral regurgitation.     WYATT: 2/15/18 Nilda Cortes)   Low normal left ventricular systolic function.   Mild to moderate mitral regurgitation.     TTE (Dr. Stefano Sanchez, 8/12/2021)  Summary   Technically limited study.   Normal left ventricular size, wall thickness, wall motion, and systolic   function.   Estimated left ventricle ejection fraction 70+/-5 %.   There is doppler evidence of stage II upper lung. CTA chest: 10/16/21  There is no central pulmonary embolism or aortic dissection. Jose Miguel is a   small distal subsegmental pulmonary embolism in the right lower lobe.       Coronary artery calcification.       Minimal ground-glass infiltrates in the right upper lobe medially concerning   for pneumonia.       9 mm indeterminate pulmonary nodule in the right lower lobe.  Consider   surveillance according to hetras guidelines. ASSESSMENT / PLAN:  1. Acute on chronic HFpEF  2. Acute on chronic respiratory failure. Known history of very severe COPD. Pulmonology following. 3. Pulmonary embolism (\"small distal subsegmental pulmonary embolism in the right lower lobe\" on 10/16/21 CTA chest)  4. Recent acute kidney injury -- improved, most recent Cr 1.4 --> 1.1 --> 1.2 --> 1.1 --> 1.0 --> 1.1 --> 0.8 --> 1.0 --> 1.1. --> 1.0  5. Paroxysmal atrial fibrillation/typical flutter status-post AF ablation in April 2018. On Eliquis PTA, cardizem, and flecainide therapy. Follows with [facility]. SR with episodes of AF this admission. 6. Hypertension -- sub-optimal control in the past, most recent 's-140's  7. Hyperlipidemia, on statin therapy. 8. DM -- Hgb A1c 6.4  9. Obstructive sleep apnea, compliant with CPAP. 10. Morbid obesity / BMI 39.2  11. History of alcohol abuse  12. Former tobacco abuse (2 PPD for many years, quit in 8/2017)  13. History of pericarditis in 11/2010  14.  Mild coronary artery disease by cardiac catheterization with atypical chest pain and mild troponin elevation not consistent with acute coronary syndrome with a recent nonischemic stress test and reportedly no significant obstructive CAD on CTA of the coronaries done today 9/13/21    - Multiple recent cardiac studies reviewed  - Monitor I/O's, renal function, and electrolytes closely with ongoing diuresis (currently bumex drip; reported I/O's net negative 12.2 L)  - Will order IV digoxin again today (10/23/21)  - Continue current medications otherwise (including anticoagulation; up-titrate anti-HTN regimen as needed)  - Treatment of PARKER  - Aggressive risk factor modifications  - Monitor telemetry  - Care per pulmonary and nephrology    Greater than 35 minutes was spent counseling the patient, reviewing the rationale for the above recommendations and reviewing the patient's current medication list, problem list and results of all previously ordered testing.     Macario Bosworth, MD  [facility]) Cardiology

## 2021-10-24 ENCOUNTER — APPOINTMENT (OUTPATIENT)
Dept: GENERAL RADIOLOGY | Age: 61
DRG: 134 | End: 2021-10-24
Payer: COMMERCIAL

## 2021-10-24 LAB
ANION GAP SERPL CALCULATED.3IONS-SCNC: 11 MMOL/L (ref 7–16)
ANION GAP SERPL CALCULATED.3IONS-SCNC: 14 MMOL/L (ref 7–16)
BUN BLDV-MCNC: 38 MG/DL (ref 6–20)
BUN BLDV-MCNC: 41 MG/DL (ref 6–20)
CALCIUM SERPL-MCNC: 8.9 MG/DL (ref 8.6–10.2)
CALCIUM SERPL-MCNC: 9.7 MG/DL (ref 8.6–10.2)
CHLORIDE BLD-SCNC: 87 MMOL/L (ref 98–107)
CHLORIDE BLD-SCNC: 90 MMOL/L (ref 98–107)
CO2: 35 MMOL/L (ref 22–29)
CO2: 37 MMOL/L (ref 22–29)
CREAT SERPL-MCNC: 0.9 MG/DL (ref 0.7–1.2)
CREAT SERPL-MCNC: 1.2 MG/DL (ref 0.7–1.2)
DIGOXIN LEVEL: 0.4 NG/ML (ref 0.8–2)
GFR AFRICAN AMERICAN: >60
GFR AFRICAN AMERICAN: >60
GFR NON-AFRICAN AMERICAN: >60 ML/MIN/1.73
GFR NON-AFRICAN AMERICAN: >60 ML/MIN/1.73
GLUCOSE BLD-MCNC: 186 MG/DL (ref 74–99)
GLUCOSE BLD-MCNC: 272 MG/DL (ref 74–99)
METER GLUCOSE: 148 MG/DL (ref 74–99)
METER GLUCOSE: 180 MG/DL (ref 74–99)
METER GLUCOSE: 186 MG/DL (ref 74–99)
METER GLUCOSE: 209 MG/DL (ref 74–99)
POTASSIUM SERPL-SCNC: 3.6 MMOL/L (ref 3.5–5)
POTASSIUM SERPL-SCNC: 4.2 MMOL/L (ref 3.5–5)
SODIUM BLD-SCNC: 136 MMOL/L (ref 132–146)
SODIUM BLD-SCNC: 138 MMOL/L (ref 132–146)

## 2021-10-24 PROCEDURE — 6370000000 HC RX 637 (ALT 250 FOR IP): Performed by: NURSE PRACTITIONER

## 2021-10-24 PROCEDURE — 6360000002 HC RX W HCPCS: Performed by: FAMILY MEDICINE

## 2021-10-24 PROCEDURE — 80162 ASSAY OF DIGOXIN TOTAL: CPT

## 2021-10-24 PROCEDURE — 2580000003 HC RX 258: Performed by: FAMILY MEDICINE

## 2021-10-24 PROCEDURE — 6370000000 HC RX 637 (ALT 250 FOR IP): Performed by: INTERNAL MEDICINE

## 2021-10-24 PROCEDURE — 99232 SBSQ HOSP IP/OBS MODERATE 35: CPT | Performed by: INTERNAL MEDICINE

## 2021-10-24 PROCEDURE — 6370000000 HC RX 637 (ALT 250 FOR IP): Performed by: FAMILY MEDICINE

## 2021-10-24 PROCEDURE — 2500000003 HC RX 250 WO HCPCS: Performed by: FAMILY MEDICINE

## 2021-10-24 PROCEDURE — 94660 CPAP INITIATION&MGMT: CPT

## 2021-10-24 PROCEDURE — 82962 GLUCOSE BLOOD TEST: CPT

## 2021-10-24 PROCEDURE — 80048 BASIC METABOLIC PNL TOTAL CA: CPT

## 2021-10-24 PROCEDURE — 36415 COLL VENOUS BLD VENIPUNCTURE: CPT

## 2021-10-24 PROCEDURE — 94640 AIRWAY INHALATION TREATMENT: CPT

## 2021-10-24 PROCEDURE — 2700000000 HC OXYGEN THERAPY PER DAY

## 2021-10-24 PROCEDURE — 2060000000 HC ICU INTERMEDIATE R&B

## 2021-10-24 PROCEDURE — 99233 SBSQ HOSP IP/OBS HIGH 50: CPT | Performed by: INTERNAL MEDICINE

## 2021-10-24 PROCEDURE — 71045 X-RAY EXAM CHEST 1 VIEW: CPT

## 2021-10-24 RX ORDER — DEXTROSE MONOHYDRATE 50 MG/ML
100 INJECTION, SOLUTION INTRAVENOUS PRN
Status: DISCONTINUED | OUTPATIENT
Start: 2021-10-24 | End: 2021-10-26 | Stop reason: HOSPADM

## 2021-10-24 RX ORDER — NICOTINE POLACRILEX 4 MG
15 LOZENGE BUCCAL PRN
Status: DISCONTINUED | OUTPATIENT
Start: 2021-10-24 | End: 2021-10-26 | Stop reason: HOSPADM

## 2021-10-24 RX ORDER — ACETAZOLAMIDE 250 MG/1
250 TABLET ORAL ONCE
Status: COMPLETED | OUTPATIENT
Start: 2021-10-24 | End: 2021-10-24

## 2021-10-24 RX ORDER — BUMETANIDE 1 MG/1
2 TABLET ORAL 2 TIMES DAILY
Status: DISCONTINUED | OUTPATIENT
Start: 2021-10-24 | End: 2021-10-26 | Stop reason: HOSPADM

## 2021-10-24 RX ORDER — DEXTROSE MONOHYDRATE 25 G/50ML
12.5 INJECTION, SOLUTION INTRAVENOUS PRN
Status: DISCONTINUED | OUTPATIENT
Start: 2021-10-24 | End: 2021-10-26 | Stop reason: HOSPADM

## 2021-10-24 RX ORDER — BUMETANIDE 1 MG/1
2 TABLET ORAL 2 TIMES DAILY
Status: DISCONTINUED | OUTPATIENT
Start: 2021-10-24 | End: 2021-10-24

## 2021-10-24 RX ADMIN — INSULIN LISPRO 3 UNITS: 100 INJECTION, SOLUTION INTRAVENOUS; SUBCUTANEOUS at 19:15

## 2021-10-24 RX ADMIN — IPRATROPIUM BROMIDE AND ALBUTEROL SULFATE 1 AMPULE: .5; 2.5 SOLUTION RESPIRATORY (INHALATION) at 08:04

## 2021-10-24 RX ADMIN — APIXABAN 5 MG: 5 TABLET, FILM COATED ORAL at 13:43

## 2021-10-24 RX ADMIN — OXYCODONE AND ACETAMINOPHEN 1 TABLET: 5; 325 TABLET ORAL at 23:17

## 2021-10-24 RX ADMIN — BUSPIRONE HYDROCHLORIDE 10 MG: 5 TABLET ORAL at 13:46

## 2021-10-24 RX ADMIN — BUDESONIDE 500 MCG: 0.5 SUSPENSION RESPIRATORY (INHALATION) at 08:04

## 2021-10-24 RX ADMIN — INSULIN LISPRO 6 UNITS: 100 INJECTION, SOLUTION INTRAVENOUS; SUBCUTANEOUS at 12:16

## 2021-10-24 RX ADMIN — INSULIN GLARGINE 20 UNITS: 100 INJECTION, SOLUTION SUBCUTANEOUS at 12:15

## 2021-10-24 RX ADMIN — BUMETANIDE 2 MG: 1 TABLET ORAL at 13:43

## 2021-10-24 RX ADMIN — IPRATROPIUM BROMIDE AND ALBUTEROL SULFATE 1 AMPULE: .5; 2.5 SOLUTION RESPIRATORY (INHALATION) at 15:59

## 2021-10-24 RX ADMIN — SERTRALINE 25 MG: 50 TABLET, FILM COATED ORAL at 20:59

## 2021-10-24 RX ADMIN — FLECAINIDE ACETATE 100 MG: 100 TABLET ORAL at 08:51

## 2021-10-24 RX ADMIN — DOXYCYCLINE 100 MG: 100 INJECTION, POWDER, LYOPHILIZED, FOR SOLUTION INTRAVENOUS at 05:24

## 2021-10-24 RX ADMIN — INSULIN LISPRO 2 UNITS: 100 INJECTION, SOLUTION INTRAVENOUS; SUBCUTANEOUS at 20:57

## 2021-10-24 RX ADMIN — BUSPIRONE HYDROCHLORIDE 10 MG: 5 TABLET ORAL at 20:59

## 2021-10-24 RX ADMIN — MORPHINE SULFATE 2.6 MG: 10 SOLUTION ORAL at 13:47

## 2021-10-24 RX ADMIN — Medication 10 ML: at 13:07

## 2021-10-24 RX ADMIN — Medication 10 ML: at 20:58

## 2021-10-24 RX ADMIN — INSULIN LISPRO 3 UNITS: 100 INJECTION, SOLUTION INTRAVENOUS; SUBCUTANEOUS at 06:09

## 2021-10-24 RX ADMIN — WATER 1000 MG: 1 INJECTION INTRAMUSCULAR; INTRAVENOUS; SUBCUTANEOUS at 05:24

## 2021-10-24 RX ADMIN — BUSPIRONE HYDROCHLORIDE 10 MG: 5 TABLET ORAL at 08:53

## 2021-10-24 RX ADMIN — INSULIN GLARGINE 20 UNITS: 100 INJECTION, SOLUTION SUBCUTANEOUS at 20:58

## 2021-10-24 RX ADMIN — FLECAINIDE ACETATE 100 MG: 100 TABLET ORAL at 21:00

## 2021-10-24 RX ADMIN — IPRATROPIUM BROMIDE AND ALBUTEROL SULFATE 1 AMPULE: .5; 2.5 SOLUTION RESPIRATORY (INHALATION) at 19:40

## 2021-10-24 RX ADMIN — ARFORMOTEROL TARTRATE 15 MCG: 15 SOLUTION RESPIRATORY (INHALATION) at 08:05

## 2021-10-24 RX ADMIN — FLUTICASONE PROPIONATE 1 SPRAY: 50 SPRAY, METERED NASAL at 12:17

## 2021-10-24 RX ADMIN — ACETAZOLAMIDE 250 MG: 250 TABLET ORAL at 11:43

## 2021-10-24 RX ADMIN — ENOXAPARIN SODIUM 120 MG: 150 INJECTION SUBCUTANEOUS at 08:51

## 2021-10-24 RX ADMIN — DILTIAZEM HYDROCHLORIDE 240 MG: 120 CAPSULE, COATED, EXTENDED RELEASE ORAL at 08:51

## 2021-10-24 RX ADMIN — ATORVASTATIN CALCIUM 20 MG: 20 TABLET, FILM COATED ORAL at 17:41

## 2021-10-24 RX ADMIN — IPRATROPIUM BROMIDE AND ALBUTEROL SULFATE 1 AMPULE: .5; 2.5 SOLUTION RESPIRATORY (INHALATION) at 12:30

## 2021-10-24 RX ADMIN — ROFLUMILAST 500 MCG: 500 TABLET ORAL at 08:51

## 2021-10-24 RX ADMIN — METHYLPREDNISOLONE SODIUM SUCCINATE 40 MG: 40 INJECTION, POWDER, FOR SOLUTION INTRAMUSCULAR; INTRAVENOUS at 05:24

## 2021-10-24 RX ADMIN — ASPIRIN 81 MG CHEWABLE TABLET 81 MG: 81 TABLET CHEWABLE at 08:51

## 2021-10-24 RX ADMIN — PRIMIDONE 25 MG: 50 TABLET ORAL at 13:43

## 2021-10-24 RX ADMIN — BUMETANIDE 2 MG: 1 TABLET ORAL at 21:00

## 2021-10-24 RX ADMIN — ARFORMOTEROL TARTRATE 15 MCG: 15 SOLUTION RESPIRATORY (INHALATION) at 19:33

## 2021-10-24 RX ADMIN — BUDESONIDE 500 MCG: 0.5 SUSPENSION RESPIRATORY (INHALATION) at 19:33

## 2021-10-24 RX ADMIN — DILTIAZEM HYDROCHLORIDE 240 MG: 120 CAPSULE, COATED, EXTENDED RELEASE ORAL at 20:58

## 2021-10-24 RX ADMIN — PRIMIDONE 25 MG: 50 TABLET ORAL at 20:58

## 2021-10-24 RX ADMIN — POTASSIUM CHLORIDE 20 MEQ: 1500 TABLET, EXTENDED RELEASE ORAL at 08:51

## 2021-10-24 RX ADMIN — OXYCODONE AND ACETAMINOPHEN 1 TABLET: 5; 325 TABLET ORAL at 08:53

## 2021-10-24 RX ADMIN — PRIMIDONE 25 MG: 50 TABLET ORAL at 08:51

## 2021-10-24 RX ADMIN — APIXABAN 5 MG: 5 TABLET, FILM COATED ORAL at 20:59

## 2021-10-24 ASSESSMENT — PAIN SCALES - GENERAL
PAINLEVEL_OUTOF10: 0
PAINLEVEL_OUTOF10: 7
PAINLEVEL_OUTOF10: 7
PAINLEVEL_OUTOF10: 0
PAINLEVEL_OUTOF10: 0
PAINLEVEL_OUTOF10: 7

## 2021-10-24 ASSESSMENT — PAIN DESCRIPTION - ORIENTATION
ORIENTATION: MID

## 2021-10-24 ASSESSMENT — PAIN DESCRIPTION - ONSET
ONSET: ON-GOING
ONSET: PROGRESSIVE

## 2021-10-24 ASSESSMENT — PAIN DESCRIPTION - LOCATION
LOCATION: BACK

## 2021-10-24 ASSESSMENT — PAIN DESCRIPTION - FREQUENCY
FREQUENCY: CONTINUOUS
FREQUENCY: CONTINUOUS

## 2021-10-24 ASSESSMENT — PAIN DESCRIPTION - PAIN TYPE
TYPE: CHRONIC PAIN

## 2021-10-24 ASSESSMENT — PAIN DESCRIPTION - PROGRESSION
CLINICAL_PROGRESSION: NOT CHANGED
CLINICAL_PROGRESSION: NOT CHANGED

## 2021-10-24 ASSESSMENT — PAIN SCALES - WONG BAKER
WONGBAKER_NUMERICALRESPONSE: 0

## 2021-10-24 ASSESSMENT — PAIN DESCRIPTION - DESCRIPTORS
DESCRIPTORS: ACHING;CONSTANT
DESCRIPTORS: ACHING;CONSTANT

## 2021-10-24 NOTE — PROGRESS NOTES
[facility]  Division of Pulmonary, Critical Care Medicine  Pulmonary [address] [facility]           Pulmonary progress Note         CC :SOB ,  H/o A flutter /a fib poorly controlled     HPI :  Doing  Better,pain has improved  Less swelling in legs,diuresis well  Chest pain improved  No fever or chills  No chest pain       PHYSICAL EXAMINATION:     VITAL SIGNS:  BP (!) 166/88   Pulse 95   Temp 96.1 °F (35.6 °C) (Temporal)   Resp 20   Ht 5' 11\" (1.803 m)   Wt 258 lb 11.2 oz (117.3 kg)   SpO2 97%   BMI 36.08 kg/m²   Wt Readings from Last 3 Encounters:   10/23/21 258 lb 11.2 oz (117.3 kg)   10/16/21 261 lb (118.4 kg)   09/13/21 258 lb 6 oz (117.2 kg)     Temp Readings from Last 3 Encounters:   10/23/21 96.1 °F (35.6 °C) (Temporal)   10/16/21 98.4 °F (36.9 °C)   09/18/21 98.3 °F (36.8 °C) (Oral)     TMAX:  BP Readings from Last 3 Encounters:   10/23/21 (!) 166/88   10/17/21 (!) 168/87   10/11/21 (!) 143/98     Pulse Readings from Last 3 Encounters:   10/23/21 95   10/17/21 76   10/11/21 99           INTAKE/OUTPUTS:  I/O last 3 completed shifts:   In: 120 [P.O.:120]  Out: 4850 [Urine:4850]    Intake/Output Summary (Last 24 hours) at 10/23/2021 2344  Last data filed at 10/23/2021 2128  Gross per 24 hour   Intake 120 ml   Output 4850 ml   Net -4730 ml           LABS/IMAGING:    CBC:  Lab Results   Component Value Date    WBC 13.9 (H) 10/22/2021    HGB 10.6 (L) 10/22/2021    HCT 31.8 (L) 10/22/2021    MCV 93.5 10/22/2021     10/22/2021    LYMPHOPCT 7.0 (L) 10/22/2021    RBC 3.40 (L) 10/22/2021    MCH 31.2 10/22/2021    MCHC 33.3 10/22/2021    RDW 15.0 10/22/2021    NEUTOPHILPCT 78.9 10/22/2021    MONOPCT 9.6 10/22/2021    BASOPCT 0.9 10/22/2021    NEUTROABS 11.54 (H) 10/22/2021    LYMPHSABS 0.97 (L) 10/22/2021    MONOSABS 1.39 (H) 10/22/2021    EOSABS 0.00 (L) 10/22/2021    BASOSABS 0.00 10/22/2021       Recent Labs     10/22/21  0516 10/21/21  0600 10/20/21  0732   WBC 13.9* 12.0* 12.2*   HGB 10.6* 9.8* 9.8*   HCT 31.8* 29.6* 30.1*   MCV 93.5 97.7 97.4    256 258       BMP:   Recent Labs     10/22/21  0516 10/22/21  0516 10/22/21  1957 10/23/21  0755 10/23/21  1612      < > 138 140 141   K 3.9  --  4.2 4.1 3.8   CL 93*   < > 91* 88* 91*   CO2 34*   < > 35* 36* 38*   PHOS 4.0  --   --   --   --    BUN 37*   < > 39* 33* 40*   CREATININE 1.1   < > 1.1 1.0 1.0    < > = values in this interval not displayed. MG:   Lab Results   Component Value Date    MG 2.0 10/22/2021     Ca/Phos:   Lab Results   Component Value Date    CALCIUM 9.3 10/23/2021    PHOS 4.0 10/22/2021     Amylase: No results found for: AMYLASE  Lipase:   Lab Results   Component Value Date    LIPASE 28 05/19/2011     LIVER PROFILE:   No results for input(s): AST, ALT, LIPASE, BILIDIR, BILITOT, ALKPHOS in the last 72 hours. Invalid input(s): AMYLASE,  ALB    PT/INR:   No results for input(s): PROTIME, INR in the last 72 hours. APTT:   No results for input(s): APTT in the last 72 hours. Cardiac Enzymes:  Lab Results   Component Value Date    CKTOTAL 51 11/13/2010    CKMB 0.5 11/13/2010    TROPONINI <0.01 02/15/2018       Hgb A1C:   Lab Results   Component Value Date    LABA1C 6.4 (H) 09/09/2021     No results found for: EAG  FAMILIA: No results found for: FAMILIA  ESR: No results found for: SEDRATE  CRP: No results found for: CRP  D Dimer: No results found for: DDIMER    Thyroid Studies:  Lab Results   Component Value Date    TSH 0.942 02/14/2018    [id number] 4.8 02/14/2018           MICROBIOLOGY:  Pending       CXR:  Reviewed     CT Chest:reviewed     PE  Vitals:    10/23/21 2300   BP: (!) 166/88   Pulse: 95   Resp: 20   Temp: 96.1 °F (35.6 °C)   SpO2: 97%     General:  Awake, alert, oriented X 3. Well developed, well nourished, well groomed. No apparent distress. HEENT:  Normocephalic, atraumatic. Pupils equal, round, reactive to light. No scleral icterus.   No conjunctival injection. Normal lips, teeth, and gums. No nasal discharge. Neck:  Supple, FROM  Heart:  RRR, no murmurs, gallops, rubs, carotid upstroke normal, no carotid bruits  Lungs:wheeizng bilateral ,rhonchi   Abdomen:   Bowel sounds present, soft, nontender, no masses, no organomegaly, no peritoneal signs  Extremities:  No clubbing, cyanosis, or edema  Skin:  Warm and dry, no open lesions or rash  Neuro:  Cranial nerves 2-12 intact, no focal deficits  Vascular: Radial and pedal Pulses 2+  Breast: deferred  Rectal: deferred  Genitalia:  deferred    PROBLEM LIST:  Patient Active Problem List   Diagnosis    COPD (chronic obstructive pulmonary disease) ([address]    Essential hypertension    Adrenal adenoma    Class 2 obesity due to excess calories with serious comorbidity and body mass index (BMI) of 35.0 to 35.9 in adult    Anticoagulation management encounter    Typical atrial flutter ([address]    Anxiety    Status post catheter ablation of atrial flutter    Persistent atrial fibrillation ([address]    ETOH abuse    COPD with acute exacerbation ([address]    COPD exacerbation ([address]    Single subsegmental pulmonary embolism without acute cor pulmonale (HCC)               ASSESSMENT:  1- Small PE  2- Acute COPD exacerbation  3-A fib with possible acute  HFpEF  4) very severe COPD,doubt exacerbation   5.)obstructive sleep apnea  6)Afib /Flutter   7.)tobacco independent(quit 5 months ago)      PLAN:  Continue diuresis with Bumex drip   Lovenox ,can go elquis   Most likley MS related to cough and muscle pain more than PE as no infarction seen  Continue BD  On rocephin  Wean steroids next 1-2  Days to PO 30 mg and wean over week ,no need for IV steroids   On Duoneb   Albuterol as needed   On  Eliquis to take it twice daily  Continue Nebs   BiPAP /CPAP at night    Prcal N   lidoderm patch         BRADLEY DOMINGUEZ MD  Pulmonary/Critical care Medicine   [facility] and [address] [place] [address]

## 2021-10-24 NOTE — PROGRESS NOTES
Department of Internal Medicine  Nephrology Progress Note    Events reviewed. SUBJECTIVE:  We are following Mr. Prosper Purcell for volume management. He reports no complaints. States he is breathing better today.      PHYSICAL EXAM:      Vitals:    VITALS:  BP (!) 166/88   Pulse 95   Temp 96.1 °F (35.6 °C) (Temporal)   Resp 18   Ht 5' 11\" (1.803 m)   Wt 256 lb 12.8 oz (116.5 kg)   SpO2 97%   BMI 35.82 kg/m²   24HR INTAKE/OUTPUT:      Intake/Output Summary (Last 24 hours) at 10/24/2021 0828  Last data filed at 10/24/2021 0537  Gross per 24 hour   Intake 120 ml   Output 4450 ml   Net -4330 ml       Constitutional:  Alert and oriented, mild respiratory distress  HEENT:  Normocephalic, PERRL  Respiratory:  Diminished lung sounds, on 4L NC  Cardiovascular/Edema:  IRRR, S1,S2  Gastrointestinal:  Soft, obese, nontender, nondistended  Neurologic:  Nonfocal, ROUSE  Skin:  Warm, dry, no lesions  Other:  ++ edema BLE    DATA:    CBC:   Lab Results   Component Value Date    WBC 13.9 10/22/2021    RBC 3.40 10/22/2021    HGB 10.6 10/22/2021    HCT 31.8 10/22/2021    MCV 93.5 10/22/2021    MCH 31.2 10/22/2021    MCHC 33.3 10/22/2021    RDW 15.0 10/22/2021     10/22/2021    MPV 10.6 10/22/2021     CMP:    Lab Results   Component Value Date     10/23/2021    K 3.8 10/23/2021    K 3.9 10/22/2021    CL 91 10/23/2021    CO2 38 10/23/2021    BUN 40 10/23/2021    CREATININE 1.0 10/23/2021    GFRAA >60 10/23/2021    LABGLOM >60 10/23/2021    GLUCOSE 175 10/23/2021    GLUCOSE 100 05/19/2011    PROT 6.9 10/18/2021    LABALBU 4.1 10/18/2021    LABALBU 4.6 05/19/2011    CALCIUM 9.3 10/23/2021    BILITOT <0.2 10/18/2021    ALKPHOS 76 10/18/2021    AST 21 10/18/2021    ALT 32 10/18/2021     Magnesium:    Lab Results   Component Value Date    MG 2.0 10/22/2021     Phosphorus:    Lab Results   Component Value Date    PHOS 4.0 10/22/2021     Radiology Review:      CXR 10/16/21   No acute process.       Question 10 mm nodule in the left upper lung. CTA of the chest with IV contrast 10/16/21   There is no central pulmonary embolism or aortic dissection. Gavin Hernadez is a   small distal subsegmental pulmonary embolism in the right lower lobe.       Coronary artery calcification.       Minimal ground-glass infiltrates in the right upper lobe medially concerning   for pneumonia.       9 mm indeterminate pulmonary nodule in the right lower lobe.  Consider   surveillance according to [employer] [employer] guidelines. BRIEF SUMMARY OF INITIAL CONSULT:    Briefly, Mr. Viry Cardoso is a 63year old male with a PMH of HTN, HFpEF 70-75% with stage II DD, COPD, PARKER, atrial flutter s/p cardioversion and ablation on chronic anticoagulation on apixaban, obesity, who was admitted on October 18, 2021 after presenting to the ER with chest pain. Apparently, he was also seen in the ER two days prior to admission and was diagnosed with COPD exacerbation and pneumonia. He was sent home on omnicef and azithromycin. He came back due to worsening shortness of breath and weight gain. A CTA of the chest was obtained which revealed a small distal subsegmental pulmonary embolism in the right upper lobe. We are consulted for volume management. IMPRESSION/RECOMMENDATIONS:      Decompensated HFpEF 70-75% with stage II DD, pro->>335>>1,183, continues with excellent diuresis in response to Bumex drip, with a stable renal function. We will stop Bumex drip and switch to oral Bumex. Normal pH (venous pH 0.73), with metabolic alkalosis (contraction alkalosis). Bicarbonate level continues to rise. We will give a dose of acetazolamide today.      HTN, on losartan at home, BP currently controlled  Edematous state, secondary to #1, improving.  ---------------------------------------------  PARKER, wears CPAP at night  Atrial flutter, on diltiazem and apixaban  COPD exacerbation, on methylprednisolone  Right upper lobe pulmonary embolism, on apixaban  Pneumonia, on doxycycline and ceftriaxone  Normocytic anemia    Plan:    Stop Bumex drip  Bumex 2 mg PO BID  CXR results reviewed  Acetazolamide 250 mg PO x1 dose  Continue KCl 20 mEq PO daily  Continue to monitor renal function while achieving diuresis  Continue to monitor bicarbonate level, BMP 4 PM      Lacey Burks MD

## 2021-10-24 NOTE — PROGRESS NOTES
INPATIENT CARDIOLOGY FOLLOW-UP    Name: Marley Salgado    Age: 63 y.o. Date of Admission: 10/18/2021  9:33 PM    Date of Service: 10/24/2021    Chief Complaint: Follow-up for acute on chronic HFpEF, chest pain    Interim History:  Currently with no chest pain or palpitations. Long-standing history of SOB (no respiratory distress at rest). +LE edema. I/O's net negative 16.5 L. SR with episodes of AF with RVR on telemetry this admission (currently SR with PAC's).     Review of Systems:   Cardiac: As per HPI  General: No fever, chills  Pulmonary: As per HPI  HEENT: No visual disturbances, difficult swallowing  GI: No nausea, vomiting  : No dysuria, hematuria  Endocrine: No thyroid disease or DM  Musculoskeletal: ROUSE x 4, no focal motor deficits  Skin: Intact, no rashes  Neuro: No headache, seizures  Psych: Currently with no depression, anxiety    Problem List:  Patient Active Problem List   Diagnosis    [facility] disease) [facility]    Essential hypertension    Adrenal adenoma    Class 2 obesity due to excess calories with serious comorbidity and body mass index (BMI) of 35.0 to 35.9 in adult    Anticoagulation management encounter    Typical atrial flutter ([facility]    Anxiety    Status post catheter ablation of atrial flutter    Persistent atrial fibrillation (HCC)    ETOH abuse    COPD with acute exacerbation ([facility]    COPD exacerbation ([facility]    Single subsegmental pulmonary embolism without acute cor pulmonale (HCC)       Allergies:  No Known Allergies    Current Medications:  Current Facility-Administered Medications   Medication Dose Route Frequency Provider Last Rate Last Admin    bumetanide (BUMEX) tablet 2 mg  2 mg Oral BID SERA Bey - LV Paez ON 10/25/2021] predniSONE (DELTASONE) tablet 30 mg  30 mg Oral Daily Molly Mason MD        apixaban (ELIQUIS) tablet 5 mg  5 mg Oral BID Molly Mason MD        glucose (GLUTOSE) 40 % oral gel 15 g MD Sailaja   20 Units at 10/24/21 1215    potassium chloride (KLOR-CON M) extended release tablet 20 mEq  20 mEq Oral Daily with breakfast Abelardo Aviles MD   20 mEq at 10/24/21 7261    primidone (MYSOLINE) tablet 25 mg  25 mg Oral TID Abelardo Aviles MD   25 mg at 10/24/21 0851    Roflumilast (DALIRESP) tablet 500 mcg  500 mcg Oral Daily Abelardo Aviles MD   500 mcg at 10/24/21 0851    insulin lispro (HUMALOG) injection vial 0-18 Units  0-18 Units SubCUTAneous TID WC Abelardo Aviles MD   6 Units at 10/24/21 1216    insulin lispro (HUMALOG) injection vial 0-9 Units  0-9 Units SubCUTAneous Nightly Abelardo Aviles MD   3 Units at 10/22/21 2131    ipratropium-albuterol (DUONEB) nebulizer solution 1 ampule  1 ampule Inhalation Q4H WA Abelardo Aviles MD   1 ampule at 10/24/21 1230    guaiFENesin-dextromethorphan (ROBITUSSIN DM) 100-10 MG/5ML syrup 5 mL  5 mL Oral Q4H PRN Abelardo Aviles MD        sodium chloride flush 0.9 % injection 5-40 mL  5-40 mL IntraVENous 2 times per day Abelardo Aviles MD   10 mL at 10/24/21 1307    sodium chloride flush 0.9 % injection 5-40 mL  5-40 mL IntraVENous PRN Abelardo Aviles MD   10 mL at 10/22/21 1417    0.9 % sodium chloride infusion  25 mL IntraVENous PRN Abelardo Aviles MD        ondansetron (ZOFRAN-ODT) disintegrating tablet 4 mg  4 mg Oral Q8H PRN Abelardo Aviles MD        Or    ondansetron [facility]) injection 4 mg  4 mg IntraVENous Q6H PRN Abelardo Aviles MD        acetaminophen (TYLENOL) tablet 650 mg  650 mg Oral Q6H PRN Abelardo Aviles MD        Or    acetaminophen (TYLENOL) suppository 650 mg  650 mg Rectal Q6H PRN Abelardo Aviles MD        albuterol (PROVENTIL) nebulizer solution 2.5 mg  2.5 mg Nebulization Q6H PRN Abelardo Aviles MD          dextrose      sodium chloride         Physical Exam:  BP (!) 160/77   Pulse 89   Temp 96 °F (35.6 °C) (Temporal)   Resp 18   Ht 5' 11\" (1.803 m)   Wt 256 lb 12.8 oz (116.5 kg)   SpO2 97%   BMI 35.82 kg/m²   Wt Readings from Last 3 Encounters:   10/24/21 256 lb 12.8 oz (116.5 kg)   10/16/21 261 lb (118.4 kg)   09/13/21 258 lb 6 oz (117.2 kg)     Appearance: Awake, alert, no acute respiratory distress  Skin: Intact, no rash  Head: Normocephalic, atraumatic  Eyes: EOMI, no conjunctival erythema  ENMT: No pharyngeal erythema, MMM, no rhinorrhea  Neck: Supple, no carotid bruits  Lungs: B/L wheezing, no rales  Cardiac: RRR, no murmurs apparent  Abdomen: Soft, nontender, +bowel sounds  Extremities: Moves all extremities x 4, ++lower extremity edema  Neurologic: No focal motor deficits apparent, normal mood and affect    Intake/Output:    Intake/Output Summary (Last 24 hours) at 10/24/2021 1340  Last data filed at 10/24/2021 0859  Gross per 24 hour   Intake 120 ml   Output 4650 ml   Net -4530 ml     I/O this shift:  In: -   Out: 700 [Urine:700]    Laboratory Tests:  Recent Labs     10/23/21  0755 10/23/21  1612 10/24/21  0844    141 136   K 4.1 3.8 3.6   CL 88* 91* 87*   CO2 36* 38* 35*   BUN 33* 40* 38*   CREATININE 1.0 1.0 0.9   GLUCOSE 169* 175* 272*   CALCIUM 9.5 9.3 8.9     Lab Results   Component Value Date    MG 2.0 10/22/2021     Lab Results   Component Value Date    CKTOTAL 51 11/13/2010    CKMB 0.5 11/13/2010    TROPONINI <0.01 02/15/2018    TROPONINI <0.01 02/14/2018    TROPONINI <0.01 02/14/2018     No results for input(s): CKTOTAL, CKMB, CKMBINDEX, TROPHS in the last 72 hours.   Lab Results   Component Value Date    INR 1.3 10/18/2021    INR 1.2 02/04/2018    INR 0.9 11/13/2010    PROTIME 14.5 (H) 10/18/2021    PROTIME 13.8 (H) 02/04/2018    PROTIME 11.6 11/13/2010     Lab Results   Component Value Date    TSH 0.942 02/14/2018     Lab Results   Component Value Date    LABA1C 6.4 (H) 09/09/2021     No results found for: EAG  Lab Results   Component Value Date    CHOL 205 (H) 04/08/2018    CHOL 162 01/28/2017     Lab Results   Component Value Date    TRIG 80 04/08/2018    TRIG 61 01/28/2017     Lab Results Component Value Date    HDL 80 04/08/2018    HDL 50 01/28/2017     Lab Results   Component Value Date    LDLCALC 109 (H) 04/08/2018    LDLCALC 100 (H) 01/28/2017     Lab Results   Component Value Date    LABVLDL 16 04/08/2018    LABVLDL 12 01/28/2017     No results found for: [facility]  Recent Labs     10/22/21  0516   PROBNP 1,183*     Lab Results   Component Value Date    DIGOXIN 0.4 (L) 10/24/2021       Cardiac Tests:  Telemetry reviewed (date: 10/24/2021): SR, rate 90's, PAC's (episodes of AF overnight)    Chest CTA 8/8/2021:  Impression  No evidence of pulmonary embolism or acute pulmonary abnormality. Prominent pericardial fat accounts for the abnormality seen on radiograph.     Cardiac catheterization 11/2010 ([facility]): LMT: normal. LAD: normal, gives off one large bifurcating diagonal as it courses to but ends at the apex. LCx: nondominant, gives off one large OM1, only trivial distal disease. RCA: Dominant, large, minimal disease distally.     Echocardiogram 11/2010 ([facility]): EF 55% with questionable RV dilation. Trivial to small pericardial effusion. Small echodense space near apex (? Localized effusion).  Trivial MR and TR.      WYATT: 2/7/18 ([place])   Cardiac rhythm: atrial flutter   Low normal left ventricular ejection fraction.   Moderately dilated left atrium.   No evidence of a left atrial appendage thrombus.   No evidence of interatrial shunting on bubble study.   Borderline dilated right ventricle with normal right ventricular function.   Mild-moderate mitral regurgitation.     WYATT: 2/15/18 Shannon Hardin)   Low normal left ventricular systolic function.   Mild to moderate mitral regurgitation.     TTE (Dr. Remy Ingram, 8/12/2021)  Summary   Technically limited study.   Normal left ventricular size, wall thickness, wall motion, and systolic   function.   Estimated left ventricle ejection fraction 70+/-5 %.   There is doppler evidence of stage II diastolic dysfunction.   Mildly dilated right ventricle.   Right ventricle global systolic function is normal .   Normal sized left atrium.   No significant valvular abnormalities noted.     Lexiscan Stress Test 8/13/2021:  FINDINGS: The overall quality of the study was fair. Left ventricular cavity size was noted to be dilated on both the  stress and the resting images but there was no transient ischemic  dilatation after stress  Rotational analog analysis demonstrated abnormal patient motion and  there was marked increase in tracer uptake in the abdominal organs  with the liver overshadowing the bottom of the heart  A severe defect was present in the inferior wall extending into the  lateral apical wall(s) that was moderate to largesized by  quantification. The resting images showed no change   Gated SPECT left ventricular ejection fraction was calculated to be  66%, with normal myocardial contractility and wall motion. Impression  The myocardial perfusion imaging was probably normal with the large  fixed inferior/lateral apical wall defect being more consistent with  attenuation artifact and less likely the result of a myocardial  infarction especially with the normal contractility of the inferior  wall and the lateral apical wall. More importantly, there did not  appear to be any evidence of stress-induced ischemia on this study  Overall left ventricular systolic function was normal with no regional  wall motion abnormality  Low risk general pharmacologic stress test.    Coronary CTA: 9/13/21 (Dr. Nicholas Esparza)  AGATSTON SCORE: Total coronary artery calcium score is 115.7, distributed as LM 0.0, LAD 60.7, LCx 0.0, RCA 54. 7.      Calcium score percentile is 87% based on age, gender and race.     CARDIAC VALVES: Mild mitral and aortic calcifications are noted.        FUNCTION: The calculated left ventricular ejection fraction is 73%, LVEDV is 226 mL, LVESV 61 mL. .     CORONARY ANATOMY:     The coronary arteries arise in normal position.  There is right coronary artery dominance.     CORONARY CT ANGIOGRAM:     Left main: The left main coronary artery bifurcates into the LAD and LCX. No definite angiographic evidence of significant plaque noted in the left main coronary artery.     Left anterior descending: The proximal LAD has about 1 to 24% calcified plaque. The distal LAD has about 25 to 49% soft plaque. The apical portion of the LAD was not seen due to motion. The proximal to mid diagonal 1 is patent. The distal diagonal 1 is now well seen. The proximal diagonal 2 has mild luminal irregularities. The mid to distal diagonal artery was not well seen.     Left circumflex: The left circumflex coronary artery is not well seen.        Right coronary artery: The RCA is a moderate size caliber vessel with about 1 to 24% calcified plaque. The mid to distal PDA and PLB were not well seen due to motion artifact         PERICARDIUM: No definite evidence of pericardial effusion     The aortic at the sinuses of Valsalva measures 3.6 x 3.5 x 3.9 cm     The sinotubular junction measures 3.1 x 3.2 cm  The mid ascending aorta measures 3.6 x 3.6 cm  The pulmonary artery measures 3.2 x 3.0 cm     4 pulmonary veins enter the left atrium (2 on the left and 2 on the right).     IMPRESSION:     Very difficult study due to technical difficulties, poor contrast opacification and motion.     1.  Mild nonobstructive coronary artery disease noted as mentioned above (about 25 to 49% distal LAD plaque). The apical and inferior apical portion of the LAD was not seen due to motion artifact. The left circumflex system were not well seen due to motion artifact.     2. Normal left ventricular systolic function with estimated left ventricular ejection fraction of 73%.     3. The total calcium score is 115 with calcium score percentile of 87% based on age gender and race. CXR: 10/16/21  No acute process.       Question 10 mm nodule in the left upper lung.      CTA chest: 10/16/21  There is no central pulmonary embolism or aortic dissection. Carlo Schneider is a   small distal subsegmental pulmonary embolism in the right lower lobe.       Coronary artery calcification.       Minimal ground-glass infiltrates in the right upper lobe medially concerning   for pneumonia.       9 mm indeterminate pulmonary nodule in the right lower lobe.  Consider   surveillance according to [employer] guidelines. ASSESSMENT / PLAN:  1. Acute on chronic HFpEF  2. Acute on chronic respiratory failure. Known history of very severe COPD. Pulmonology following. 3. Pulmonary embolism (\"small distal subsegmental pulmonary embolism in the right lower lobe\" on 10/16/21 CTA chest)  4. Recent acute kidney injury -- improved, most recent Cr 1.4 --> 1.1 --> 1.2 --> 1.1 --> 1.0 --> 1.1 --> 0.8 --> 1.0 --> 1.1. --> 1.0 --> 1.0 --> 0.9  5. Paroxysmal atrial fibrillation/typical flutter status-post AF ablation in April 2018. On Eliquis PTA, cardizem, and flecainide therapy. Follows with [facility] EP. SR with episodes of AF this admission. 6. Hypertension -- sub-optimal control in the past, most recent 's-140's  7. Hyperlipidemia, on statin therapy. 8. DM -- Hgb A1c 6.4  9. Obstructive sleep apnea, compliant with CPAP. 10. Morbid obesity / BMI 39.2  11. History of alcohol abuse  12. Former tobacco abuse (2 PPD for many years, quit in 8/2017)  13. History of pericarditis in 11/2010  14.  Mild coronary artery disease by cardiac catheterization with atypical chest pain and mild troponin elevation not consistent with acute coronary syndrome with a recent nonischemic stress test and reportedly no significant obstructive CAD on CTA of the coronaries done today 9/13/21    - Multiple recent cardiac studies reviewed  - Monitor I/O's, renal function, and electrolytes closely with ongoing diuresis (bumex drip stopped 10/24/21 AM; now on po bumex; reported I/O's net negative 16.5 L)  - S/p IV digoxin on 10/22/21 and 10/23/21  - Continue current

## 2021-10-24 NOTE — PROGRESS NOTES
Hospitalist Progress Note      SYNOPSIS: Patient admitted on 10/18/2021 for PE    63 y.o. male with past medical history of COPD atrial flutter hypertension and DM Patient present to the Ed due to right sided chest pain that radiates into his back . Patient was seen in ED on 10/16 and diagnosed with COPD exacerbation and pneumonia  Patient was offered admission but decided not to stay . He was discharged with [facility] and [address] [place] [address] . Patient reports worsening shortness of breath and weight gain . He reports a non productive cough He reports bilateral legt swelling bilateral leg swelling  He report no fever chills abdominal pain nausea diarrhea. Patient uses 4L nasal canula daily . In the ED  He was hemodynamically stable  He is saturating at 97% on 4 L nasal cannula. Ab data reveal sodium 143 creatinine 1.1 BUN 31 Glucoses 162  Trop 16 EKG revealed normal sinus rhythm 1st degree AV block  HGB 10.2  CXR revealed no acute precess. CTA revealed small  distal segmental  In right lower lobe  . Patient takes Eliquis daily . CTA also revealed RUL pneumonia     SUBJECTIVE:    Patient seen and examined  Records reviewed. The pt is sitting in bed has anxiety. He agrees to adjusting medications today. Stable overnight. No other overnight issues reported. Temp (24hrs), Av.3 °F (35.7 °C), Min:96 °F (35.6 °C), Max:96.8 °F (36 °C)    DIET: ADULT DIET; Regular; 1000 ml  CODE: Full Code    Intake/Output Summary (Last 24 hours) at 10/24/2021 1015  Last data filed at 10/24/2021 0859  Gross per 24 hour   Intake 120 ml   Output 5150 ml   Net -5030 ml       OBJECTIVE:    BP (!) 160/77   Pulse 89   Temp 96 °F (35.6 °C) (Temporal)   Resp 18   Ht 5' 11\" (1.803 m)   Wt 256 lb 12.8 oz (116.5 kg)   SpO2 99%   BMI 35.82 kg/m²     General appearance: No apparent distress, appears stated age and cooperative. HEENT:  Conjunctivae/corneas clear. Neck: Supple. No jugular venous distention. Respiratory: Diminished.  Some wheezing. Cardiovascular: Regular rate rhythm, normal S1-S2  Abdomen: Soft, nontender, nondistended  Musculoskeletal: No clubbing, cyanosis, 2+ bilateral lower extremity edema. Brisk capillary refill. Skin:  No rashes  on visible skin  Neurologic: awake, alert and following commands     ASSESSMENT and PLAN    Pulmonary embolism  Stopped therapeutic Lovenox twice daily. Resumed Eliquis  Appreciate Pulmonology consult. Continue Eliquis and to consider Coumadin if larger PE. Unlikley Elilquis Failure. Paroxysmal Atrial Fibrillation  Cont. Flecainide and Cardizem. Right upper lobe pneumonia  ceftriaxone and doxycycline Completed 6 days. Legionella and strep negative    COPD exacerbation/Acute on chronic respiratory failure  Discontinued IV Solu-Medrol twice a day. Prednisone started and to Wean. Cont. duo nebs. On 4 L nasal cannula at baseline  Supplemental oxygen as needed    Chronic back pain with exacerbation  Stopped Norco.  Started Percocet. Pain improved today. Acute on chronic congestive heart failure with preserved ejection fraction  Appreciate Cardiology consult. Pt given Digoxin as well. Strict RASTA's  Nephrology consult appreciated. Bumex drip stopped. On Bumex 2 mg bid     PARKER  Cont. BiPAP at night.      DM2  Continue sliding scale insulin  Continue Lantus twice daily    Hyperlipidemiacontinue Lipitor  Depression and anxietycontinue Zoloft and BuSpar      DISPOSITION:     Medications:  REVIEWED DAILY    Infusion Medications    sodium chloride       Scheduled Medications    acetaZOLAMIDE  250 mg Oral Once    bumetanide  2 mg Oral BID    [START ON 10/25/2021] predniSONE  30 mg Oral Daily    apixaban  5 mg Oral BID    polyethylene glycol  17 g Oral Daily    lidocaine  1 patch TransDERmal Daily    sertraline  25 mg Oral Daily    Arformoterol Tartrate  15 mcg Nebulization BID    aspirin  81 mg Oral Daily    atorvastatin  20 mg Oral QPM    budesonide  0.5 mg Nebulization BID    dilTIAZem  240 mg Oral BID    flecainide  100 mg Oral BID    fluticasone  1 spray Each Nostril Daily    insulin glargine  20 Units SubCUTAneous BID    potassium chloride  20 mEq Oral Daily with breakfast    primidone  25 mg Oral TID    Roflumilast  500 mcg Oral Daily    insulin lispro  0-18 Units SubCUTAneous TID WC    insulin lispro  0-9 Units SubCUTAneous Nightly    ipratropium-albuterol  1 ampule Inhalation Q4H WA    sodium chloride flush  5-40 mL IntraVENous 2 times per day     PRN Meds: morphine, metoprolol, oxyCODONE-acetaminophen, busPIRone, guaiFENesin-dextromethorphan, sodium chloride flush, sodium chloride, ondansetron **OR** ondansetron, acetaminophen **OR** acetaminophen, albuterol    Labs:     Recent Labs     10/22/21  0516   WBC 13.9*   HGB 10.6*   HCT 31.8*          Recent Labs     10/22/21  0516 10/22/21  1957 10/23/21  0755 10/23/21  1612 10/24/21  0844      < > 140 141 136   K 3.9   < > 4.1 3.8 3.6   CL 93*   < > 88* 91* 87*   CO2 34*   < > 36* 38* 35*   BUN 37*   < > 33* 40* 38*   CREATININE 1.1   < > 1.0 1.0 0.9   CALCIUM 9.1   < > 9.5 9.3 8.9   PHOS 4.0  --   --   --   --     < > = values in this interval not displayed. No results for input(s): PROT, ALB, ALKPHOS, ALT, AST, BILITOT, AMYLASE, LIPASE in the last 72 hours. No results for input(s): INR in the last 72 hours. No results for input(s): Cherre Gash in the last 72 hours.     Chronic labs:    Lab Results   Component Value Date    CHOL 205 (H) 04/08/2018    TRIG 80 04/08/2018    HDL 80 04/08/2018    LDLCALC 109 (H) 04/08/2018    TSH 0.942 02/14/2018    PSA 0.26 04/08/2018    INR 1.3 10/18/2021    LABA1C 6.4 (H) 09/09/2021       Radiology: REVIEWED DAILY    +++++++++++++++++++++++++++++++++++++++++++++++++  Deyanira Angulo MD  [employer] - [address] [place], [place]  +++++++++++++++++++++++++++++++++++++++++++++++++  NOTE: This report was transcribed using voice recognition software. Every effort was made to ensure accuracy; however, inadvertent computerized transcription errors may be present.

## 2021-10-25 LAB
ANION GAP SERPL CALCULATED.3IONS-SCNC: 9 MMOL/L (ref 7–16)
BASOPHILS ABSOLUTE: 0 E9/L (ref 0–0.2)
BASOPHILS RELATIVE PERCENT: 0.1 % (ref 0–2)
BLASTS RELATIVE PERCENT: 0.9 % (ref 0–0)
BUN BLDV-MCNC: 42 MG/DL (ref 6–20)
CALCIUM SERPL-MCNC: 9.3 MG/DL (ref 8.6–10.2)
CHLORIDE BLD-SCNC: 90 MMOL/L (ref 98–107)
CO2: 38 MMOL/L (ref 22–29)
CREAT SERPL-MCNC: 1.2 MG/DL (ref 0.7–1.2)
EOSINOPHILS ABSOLUTE: 0 E9/L (ref 0.05–0.5)
EOSINOPHILS RELATIVE PERCENT: 0 % (ref 0–6)
GFR AFRICAN AMERICAN: >60
GFR NON-AFRICAN AMERICAN: >60 ML/MIN/1.73
GLUCOSE BLD-MCNC: 126 MG/DL (ref 74–99)
HCT VFR BLD CALC: 33 % (ref 37–54)
HEMOGLOBIN: 10.7 G/DL (ref 12.5–16.5)
LYMPHOCYTES ABSOLUTE: 2.14 E9/L (ref 1.5–4)
LYMPHOCYTES RELATIVE PERCENT: 13.9 % (ref 20–42)
MCH RBC QN AUTO: 30.9 PG (ref 26–35)
MCHC RBC AUTO-ENTMCNC: 32.4 % (ref 32–34.5)
MCV RBC AUTO: 95.4 FL (ref 80–99.9)
METAMYELOCYTES RELATIVE PERCENT: 3.5 % (ref 0–1)
METER GLUCOSE: 111 MG/DL (ref 74–99)
METER GLUCOSE: 130 MG/DL (ref 74–99)
METER GLUCOSE: 161 MG/DL (ref 74–99)
METER GLUCOSE: 168 MG/DL (ref 74–99)
MONOCYTES ABSOLUTE: 0.76 E9/L (ref 0.1–0.95)
MONOCYTES RELATIVE PERCENT: 5.2 % (ref 2–12)
MYELOCYTE PERCENT: 4.3 % (ref 0–0)
NEUTROPHILS ABSOLUTE: 12.09 E9/L (ref 1.8–7.3)
NEUTROPHILS RELATIVE PERCENT: 71.3 % (ref 43–80)
NUCLEATED RED BLOOD CELLS: 0.9 /100 WBC
PDW BLD-RTO: 14.5 FL (ref 11.5–15)
PLATELET # BLD: 242 E9/L (ref 130–450)
PMV BLD AUTO: 10.8 FL (ref 7–12)
POTASSIUM SERPL-SCNC: 3.3 MMOL/L (ref 3.5–5)
PROMYELOCYTES PERCENT: 0.9 % (ref 0–0)
RBC # BLD: 3.46 E12/L (ref 3.8–5.8)
SODIUM BLD-SCNC: 137 MMOL/L (ref 132–146)
WBC # BLD: 15.3 E9/L (ref 4.5–11.5)

## 2021-10-25 PROCEDURE — 99232 SBSQ HOSP IP/OBS MODERATE 35: CPT | Performed by: NURSE PRACTITIONER

## 2021-10-25 PROCEDURE — 6360000002 HC RX W HCPCS: Performed by: FAMILY MEDICINE

## 2021-10-25 PROCEDURE — 36415 COLL VENOUS BLD VENIPUNCTURE: CPT

## 2021-10-25 PROCEDURE — 80048 BASIC METABOLIC PNL TOTAL CA: CPT

## 2021-10-25 PROCEDURE — 94640 AIRWAY INHALATION TREATMENT: CPT

## 2021-10-25 PROCEDURE — 94660 CPAP INITIATION&MGMT: CPT

## 2021-10-25 PROCEDURE — 6360000002 HC RX W HCPCS: Performed by: INTERNAL MEDICINE

## 2021-10-25 PROCEDURE — 82962 GLUCOSE BLOOD TEST: CPT

## 2021-10-25 PROCEDURE — 99233 SBSQ HOSP IP/OBS HIGH 50: CPT | Performed by: INTERNAL MEDICINE

## 2021-10-25 PROCEDURE — 99232 SBSQ HOSP IP/OBS MODERATE 35: CPT | Performed by: INTERNAL MEDICINE

## 2021-10-25 PROCEDURE — 85025 COMPLETE CBC W/AUTO DIFF WBC: CPT

## 2021-10-25 PROCEDURE — 6370000000 HC RX 637 (ALT 250 FOR IP): Performed by: INTERNAL MEDICINE

## 2021-10-25 PROCEDURE — 6370000000 HC RX 637 (ALT 250 FOR IP): Performed by: NURSE PRACTITIONER

## 2021-10-25 PROCEDURE — 6370000000 HC RX 637 (ALT 250 FOR IP): Performed by: FAMILY MEDICINE

## 2021-10-25 PROCEDURE — 2700000000 HC OXYGEN THERAPY PER DAY

## 2021-10-25 PROCEDURE — 2580000003 HC RX 258: Performed by: FAMILY MEDICINE

## 2021-10-25 PROCEDURE — 2060000000 HC ICU INTERMEDIATE R&B

## 2021-10-25 RX ORDER — POTASSIUM CHLORIDE 20 MEQ/1
20 TABLET, EXTENDED RELEASE ORAL 2 TIMES DAILY WITH MEALS
Status: DISCONTINUED | OUTPATIENT
Start: 2021-10-25 | End: 2021-10-26 | Stop reason: HOSPADM

## 2021-10-25 RX ORDER — DEXAMETHASONE 1 MG
2 TABLET ORAL EVERY 12 HOURS SCHEDULED
Status: DISCONTINUED | OUTPATIENT
Start: 2021-10-25 | End: 2021-10-26 | Stop reason: HOSPADM

## 2021-10-25 RX ORDER — ACETAZOLAMIDE 250 MG/1
250 TABLET ORAL DAILY
Status: COMPLETED | OUTPATIENT
Start: 2021-10-25 | End: 2021-10-25

## 2021-10-25 RX ORDER — MORPHINE SULFATE 10 MG/5ML
2.5 SOLUTION ORAL
Qty: 15 ML | Refills: 0 | Status: SHIPPED | OUTPATIENT
Start: 2021-10-25 | End: 2021-10-28

## 2021-10-25 RX ADMIN — ASPIRIN 81 MG CHEWABLE TABLET 81 MG: 81 TABLET CHEWABLE at 09:33

## 2021-10-25 RX ADMIN — IPRATROPIUM BROMIDE AND ALBUTEROL SULFATE 1 AMPULE: .5; 2.5 SOLUTION RESPIRATORY (INHALATION) at 12:33

## 2021-10-25 RX ADMIN — BUMETANIDE 2 MG: 1 TABLET ORAL at 09:33

## 2021-10-25 RX ADMIN — BUMETANIDE 2 MG: 1 TABLET ORAL at 21:34

## 2021-10-25 RX ADMIN — ARFORMOTEROL TARTRATE 15 MCG: 15 SOLUTION RESPIRATORY (INHALATION) at 08:04

## 2021-10-25 RX ADMIN — POTASSIUM CHLORIDE 20 MEQ: 1500 TABLET, EXTENDED RELEASE ORAL at 16:34

## 2021-10-25 RX ADMIN — Medication 10 ML: at 21:31

## 2021-10-25 RX ADMIN — DEXAMETHASONE 2 MG: 1 TABLET ORAL at 21:31

## 2021-10-25 RX ADMIN — INSULIN GLARGINE 20 UNITS: 100 INJECTION, SOLUTION SUBCUTANEOUS at 09:39

## 2021-10-25 RX ADMIN — POTASSIUM CHLORIDE 20 MEQ: 1500 TABLET, EXTENDED RELEASE ORAL at 09:34

## 2021-10-25 RX ADMIN — Medication 10 ML: at 09:35

## 2021-10-25 RX ADMIN — POTASSIUM BICARBONATE 20 MEQ: 782 TABLET, EFFERVESCENT ORAL at 13:13

## 2021-10-25 RX ADMIN — OXYCODONE AND ACETAMINOPHEN 1 TABLET: 5; 325 TABLET ORAL at 09:38

## 2021-10-25 RX ADMIN — SERTRALINE 25 MG: 50 TABLET, FILM COATED ORAL at 21:34

## 2021-10-25 RX ADMIN — IPRATROPIUM BROMIDE AND ALBUTEROL SULFATE 1 AMPULE: .5; 2.5 SOLUTION RESPIRATORY (INHALATION) at 16:47

## 2021-10-25 RX ADMIN — BUSPIRONE HYDROCHLORIDE 10 MG: 5 TABLET ORAL at 15:08

## 2021-10-25 RX ADMIN — OXYCODONE AND ACETAMINOPHEN 1 TABLET: 5; 325 TABLET ORAL at 21:35

## 2021-10-25 RX ADMIN — IPRATROPIUM BROMIDE AND ALBUTEROL SULFATE 1 AMPULE: .5; 2.5 SOLUTION RESPIRATORY (INHALATION) at 21:42

## 2021-10-25 RX ADMIN — ATORVASTATIN CALCIUM 20 MG: 20 TABLET, FILM COATED ORAL at 21:33

## 2021-10-25 RX ADMIN — BUSPIRONE HYDROCHLORIDE 10 MG: 5 TABLET ORAL at 09:33

## 2021-10-25 RX ADMIN — INSULIN GLARGINE 20 UNITS: 100 INJECTION, SOLUTION SUBCUTANEOUS at 21:35

## 2021-10-25 RX ADMIN — FLECAINIDE ACETATE 100 MG: 100 TABLET ORAL at 21:34

## 2021-10-25 RX ADMIN — APIXABAN 5 MG: 5 TABLET, FILM COATED ORAL at 21:34

## 2021-10-25 RX ADMIN — PRIMIDONE 25 MG: 50 TABLET ORAL at 09:34

## 2021-10-25 RX ADMIN — ROFLUMILAST 500 MCG: 500 TABLET ORAL at 09:34

## 2021-10-25 RX ADMIN — DEXAMETHASONE 2 MG: 1 TABLET ORAL at 13:12

## 2021-10-25 RX ADMIN — BUSPIRONE HYDROCHLORIDE 10 MG: 5 TABLET ORAL at 21:33

## 2021-10-25 RX ADMIN — FLECAINIDE ACETATE 100 MG: 100 TABLET ORAL at 09:33

## 2021-10-25 RX ADMIN — PREDNISONE 30 MG: 20 TABLET ORAL at 09:34

## 2021-10-25 RX ADMIN — APIXABAN 5 MG: 5 TABLET, FILM COATED ORAL at 09:34

## 2021-10-25 RX ADMIN — INSULIN LISPRO 1 UNITS: 100 INJECTION, SOLUTION INTRAVENOUS; SUBCUTANEOUS at 16:34

## 2021-10-25 RX ADMIN — DILTIAZEM HYDROCHLORIDE 240 MG: 120 CAPSULE, COATED, EXTENDED RELEASE ORAL at 21:33

## 2021-10-25 RX ADMIN — PRIMIDONE 25 MG: 50 TABLET ORAL at 21:32

## 2021-10-25 RX ADMIN — IPRATROPIUM BROMIDE AND ALBUTEROL SULFATE 1 AMPULE: .5; 2.5 SOLUTION RESPIRATORY (INHALATION) at 08:04

## 2021-10-25 RX ADMIN — DILTIAZEM HYDROCHLORIDE 240 MG: 120 CAPSULE, COATED, EXTENDED RELEASE ORAL at 09:34

## 2021-10-25 RX ADMIN — ACETAZOLAMIDE 250 MG: 250 TABLET ORAL at 16:34

## 2021-10-25 RX ADMIN — PRIMIDONE 25 MG: 50 TABLET ORAL at 13:13

## 2021-10-25 RX ADMIN — BUDESONIDE 500 MCG: 0.5 SUSPENSION RESPIRATORY (INHALATION) at 08:04

## 2021-10-25 RX ADMIN — INSULIN LISPRO 2 UNITS: 100 INJECTION, SOLUTION INTRAVENOUS; SUBCUTANEOUS at 21:35

## 2021-10-25 ASSESSMENT — PAIN DESCRIPTION - ONSET: ONSET: ON-GOING

## 2021-10-25 ASSESSMENT — PAIN DESCRIPTION - FREQUENCY
FREQUENCY: CONTINUOUS
FREQUENCY: CONTINUOUS

## 2021-10-25 ASSESSMENT — PAIN SCALES - GENERAL
PAINLEVEL_OUTOF10: 4
PAINLEVEL_OUTOF10: 5
PAINLEVEL_OUTOF10: 8
PAINLEVEL_OUTOF10: 4

## 2021-10-25 ASSESSMENT — PAIN DESCRIPTION - PAIN TYPE
TYPE: CHRONIC PAIN
TYPE: CHRONIC PAIN

## 2021-10-25 ASSESSMENT — PAIN DESCRIPTION - LOCATION
LOCATION: BACK
LOCATION: BACK

## 2021-10-25 ASSESSMENT — PAIN DESCRIPTION - ORIENTATION: ORIENTATION: MID

## 2021-10-25 ASSESSMENT — PAIN DESCRIPTION - DESCRIPTORS
DESCRIPTORS: ACHING;CONSTANT;DISCOMFORT
DESCRIPTORS: ACHING;CONSTANT

## 2021-10-25 ASSESSMENT — PAIN DESCRIPTION - PROGRESSION: CLINICAL_PROGRESSION: NOT CHANGED

## 2021-10-25 ASSESSMENT — PAIN SCALES - WONG BAKER: WONGBAKER_NUMERICALRESPONSE: 0

## 2021-10-25 NOTE — CARE COORDINATION
Discussed potential discharge with attending d/t lack of medical necessity vs potential EP consult suggested by cardiology. Per attending, would like to discharge 10/26. [facility] to be notified of discharge (p) [phone] so they may deliver rollator to home. Stan Rodas, to provide transportation and home going oxygen at discharge.

## 2021-10-25 NOTE — PROGRESS NOTES
Department of Internal Medicine  Nephrology Progress Note    Events reviewed. SUBJECTIVE:  We are following MrCecilia Cole for volume management. He reports no complaints. States he is breathing better today.      PHYSICAL EXAM:      Vitals:    VITALS:  /88   Pulse 114   Temp 97.5 °F (36.4 °C) (Temporal)   Resp 20   Ht 5' 11\" (1.803 m)   Wt 257 lb (116.6 kg)   SpO2 96%   BMI 35.84 kg/m²   24HR INTAKE/OUTPUT:      Intake/Output Summary (Last 24 hours) at 10/25/2021 1526  Last data filed at 10/25/2021 1508  Gross per 24 hour   Intake 800 ml   Output 3700 ml   Net -2900 ml       Constitutional:  Alert and oriented, mild respiratory distress  HEENT:  Normocephalic, PERRL  Respiratory:  Diminished lung sounds, on 4L NC  Cardiovascular/Edema:  IRRR, S1,S2  Gastrointestinal:  Soft, obese, nontender, nondistended  Neurologic:  Nonfocal, ROUSE  Skin:  Warm, dry, no lesions  Other:  ++ edema BLE    DATA:    CBC:   Lab Results   Component Value Date    WBC 15.3 10/25/2021    RBC 3.46 10/25/2021    HGB 10.7 10/25/2021    HCT 33.0 10/25/2021    MCV 95.4 10/25/2021    MCH 30.9 10/25/2021    MCHC 32.4 10/25/2021    RDW 14.5 10/25/2021     10/25/2021    MPV 10.8 10/25/2021     CMP:    Lab Results   Component Value Date     10/25/2021    K 3.3 10/25/2021    K 3.9 10/22/2021    CL 90 10/25/2021    CO2 38 10/25/2021    BUN 42 10/25/2021    CREATININE 1.2 10/25/2021    GFRAA >60 10/25/2021    LABGLOM >60 10/25/2021    GLUCOSE 126 10/25/2021    GLUCOSE 100 05/19/2011    PROT 6.9 10/18/2021    LABALBU 4.1 10/18/2021    LABALBU 4.6 05/19/2011    CALCIUM 9.3 10/25/2021    BILITOT <0.2 10/18/2021    ALKPHOS 76 10/18/2021    AST 21 10/18/2021    ALT 32 10/18/2021     Magnesium:    Lab Results   Component Value Date    MG 2.0 10/22/2021     Phosphorus:    Lab Results   Component Value Date    PHOS 4.0 10/22/2021     Radiology Review:      CXR 10/16/21   No acute process.       Question 10 mm nodule in the left upper lung. CTA of the chest with IV contrast 10/16/21   There is no central pulmonary embolism or aortic dissection. Monique Anaya is a   small distal subsegmental pulmonary embolism in the right lower lobe.       Coronary artery calcification.       Minimal ground-glass infiltrates in the right upper lobe medially concerning   for pneumonia.       9 mm indeterminate pulmonary nodule in the right lower lobe.  Consider   surveillance according to [employer] guidelines. BRIEF SUMMARY OF INITIAL CONSULT:    Briefly, Mr. Lino Reese is a 63year old male with a PMH of HTN, HFpEF 70-75% with stage II DD, COPD, PARKER, atrial flutter s/p cardioversion and ablation on chronic anticoagulation on apixaban, obesity, who was admitted on October 18, 2021 after presenting to the ER with chest pain. Apparently, he was also seen in the ER two days prior to admission and was diagnosed with COPD exacerbation and pneumonia. He was sent home on omnicef and azithromycin. He came back due to worsening shortness of breath and weight gain. A CTA of the chest was obtained which revealed a small distal subsegmental pulmonary embolism in the right upper lobe. We are consulted for volume management. IMPRESSION/RECOMMENDATIONS:      Decompensated HFpEF 70-75% with stage II DD, pro->>335>>1,183, continues with excellent diuresis in response to Bumex drip, with a stable renal function. We will stop Bumex drip and switch to oral Bumex. Normal pH (venous pH 3.26), with metabolic alkalosis (contraction alkalosis). Bicarbonate level continues to rise. We will give a dose of acetazolamide today.      HTN, on losartan at home, BP currently controlled  Edematous state, secondary to #1, improving.  ---------------------------------------------  PARKER, wears CPAP at night  Atrial flutter, on diltiazem and apixaban  COPD exacerbation, on methylprednisolone  Right upper lobe pulmonary embolism, on apixaban  Pneumonia, on doxycycline and ceftriaxone  Normocytic anemia    Plan:      Continue Bumex 2 mg PO BID  CXR results reviewed  Acetazolamide 250 mg PO x1 dose  Increase KCl 20 mEq PO bid  Continue to monitor renal function while achieving diuresis  Continue to monitor bicarbonate , BMP levels      Itzel Greene MD

## 2021-10-25 NOTE — PROGRESS NOTES
[facility] Cardiology Inpatient Progress Note    Patient is a 63 y.o. male of Jolanta Yeboah DO seen in hospital follow up. Chief complaint: Follow-up for acute on chronic HFpEF, chest pain    HPI: No CP or SOB.      Patient Active Problem List   Diagnosis    [facility]    Essential hypertension    Adrenal adenoma    Class 2 obesity due to excess calories with serious comorbidity and body mass index (BMI) of 35.0 to 35.9 in adult    Anticoagulation management encounter    Typical atrial flutter ([facility]    Anxiety    Status post catheter ablation of atrial flutter    Persistent atrial fibrillation ([facility]    ETOH abuse    [facility]    COPD exacerbation ([facility]    Single subsegmental pulmonary embolism without acute cor pulmonale (HCC)       No Known Allergies    Current Facility-Administered Medications   Medication Dose Route Frequency Provider Last Rate Last Admin    bumetanide (BUMEX) tablet 2 mg  2 mg Oral BID SERA Joiner CNP   2 mg at 10/24/21 2100    predniSONE (DELTASONE) tablet 30 mg  30 mg Oral Daily Saba Colon MD        apixaban (ELIQUIS) tablet 5 mg  5 mg Oral BID Saba Colon MD   5 mg at 10/24/21 2059    glucose (GLUTOSE) 40 % oral gel 15 g  15 g Oral PRN Melinda Saba MD        dextrose 50 % IV solution  12.5 g IntraVENous PRN Melinda Saba MD        glucagon (rDNA) injection 1 mg  1 mg IntraMUSCular PRN Melinda Saba MD        dextrose 5 % solution  100 mL/hr IntraVENous PRN Melinda Saba MD        morphine 10 MG/5ML solution 2.6 mg  2.6 mg Oral Q2H PRN Vinod Zhengu, APRN - CNP   2.6 mg at 10/24/21 1347    metoprolol (LOPRESSOR) injection 5 mg  5 mg IntraVENous Q6H PRN Daily Miller MD        polyethylene glycol [facility]) packet 17 g  17 g Oral Daily [place] Nava, APRN - CNP   17 g at 10/22/21 1155    lidocaine 4 % external patch 1 patch  1 patch TransDERmal Daily Ryan Danielle, MD   1 patch at 10/24/21 1144    oxyCODONE-acetaminophen (PERCOCET) 5-325 MG per tablet 1 tablet  1 tablet Oral Q6H PRN Beni Mahajan MD   1 tablet at 10/24/21 2317    sertraline (ZOLOFT) tablet 25 mg  25 mg Oral Daily Beni Mahajan MD   25 mg at 10/24/21 2059    Arformoterol Tartrate (BROVANA) nebulizer solution 15 mcg  15 mcg Nebulization BID Roxanne Shore MD   15 mcg at 10/25/21 0804    aspirin chewable tablet 81 mg  81 mg Oral Daily Roxanne Shore MD   81 mg at 10/24/21 0851    atorvastatin (LIPITOR) tablet 20 mg  20 mg Oral QPM Roxnane Shore MD   20 mg at 10/24/21 1741    budesonide (PULMICORT) nebulizer suspension 500 mcg  0.5 mg Nebulization BID Roxanne Shore MD   500 mcg at 10/25/21 0804    busPIRone (BUSPAR) tablet 10 mg  10 mg Oral TID PRN Roxanne Shore MD   10 mg at 10/24/21 2059    dilTIAZem (CARDIZEM CD) extended release capsule 240 mg  240 mg Oral BID Roxanne Shore MD   240 mg at 10/24/21 2058    flecainide (TAMBOCOR) tablet 100 mg  100 mg Oral BID Roxanne Shore MD   100 mg at 10/24/21 2100    fluticasone (FLONASE) 50 MCG/ACT nasal spray 1 spray  1 spray Each Nostril Daily Roxanne Shore MD   1 spray at 10/24/21 1217    insulin glargine (LANTUS) injection vial 20 Units  20 Units SubCUTAneous BID Roxanne Shore MD   20 Units at 10/24/21 2058    potassium chloride (KLOR-CON M) extended release tablet 20 mEq  20 mEq Oral Daily with breakfast Roxanne Shore MD   20 mEq at 10/24/21 0851    primidone (MYSOLINE) tablet 25 mg  25 mg Oral TID Roxanne Shore MD   25 mg at 10/24/21 2058    Roflumilast (DALIRESP) tablet 500 mcg  500 mcg Oral Daily Roxanne Shore MD   500 mcg at 10/24/21 0851    insulin lispro (HUMALOG) injection vial 0-18 Units  0-18 Units SubCUTAneous TID WC Roxanne hSore MD   3 Units at 10/24/21 1915    insulin lispro (HUMALOG) injection vial 0-9 Units  0-9 Units SubCUTAneous Nightly Roxanne Shore MD   2 Units at 10/24/21 2057    ipratropium-albuterol (DUONEB) nebulizer solution 1 ampule  1 ampule Inhalation Q4H WA Katlyn Brown MD   1 ampule at 10/25/21 0804    guaiFENesin-dextromethorphan (ROBITUSSIN DM) 100-10 MG/5ML syrup 5 mL  5 mL Oral Q4H PRN Katlyn Brown MD        sodium chloride flush 0.9 % injection 5-40 mL  5-40 mL IntraVENous 2 times per day Katlyn Brown MD   10 mL at 10/24/21 2058    sodium chloride flush 0.9 % injection 5-40 mL  5-40 mL IntraVENous PRN Katlyn Brown MD   10 mL at 10/22/21 1417    0.9 % sodium chloride infusion  25 mL IntraVENous PRN Katlyn Brown MD        ondansetron (ZOFRAN-ODT) disintegrating tablet 4 mg  4 mg Oral Q8H PRN Katlyn Brown MD        Or    [facility]ron [facility] injection 4 mg  4 mg IntraVENous Q6H PRN Katlyn Brown MD        acetaminophen (TYLENOL) tablet 650 mg  650 mg Oral Q6H PRN Katlyn Brown MD        Or   [address] [place] [address] acetaminophen (TYLENOL) suppository 650 mg  650 mg Rectal Q6H PRN Katlyn Brown MD        albuterol (PROVENTIL) nebulizer solution 2.5 mg  2.5 mg Nebulization Q6H PRN Katlyn Brown MD           Review of systems:   Heart: as above   Lungs: as above   Eyes: denies changes in vision or discharge. Ears: denies changes in hearing or pain. Nose: denies epistaxis or masses   Throat: denies sore throat or trouble swallowing. Neuro: denies numbness, tingling, tremors. Skin: denies rashes or itching. : denies hematuria, dysuria   GI: denies vomiting, diarrhea   Psych: denies mood changed, anxiety, depression. Physical Exam   /84   Pulse 73   Temp 96.8 °F (36 °C) (Temporal)   Resp 18   Ht 5' 11\" (1.803 m)   Wt 257 lb (116.6 kg)   SpO2 98%   BMI 35.84 kg/m²   Constitutional: Oriented to person, place, and time. No distress. Well developed. Head: Normocephalic and atraumatic. Neck: Neck supple. No hepatojugular reflux. No JVD present. Carotid bruit is not present. No tracheal deviation present. No thyromegaly present.    Cardiovascular: Normal rate, regular rhythm, normal heart sounds. intact distal pulses. No gallop and no friction rub. No murmur heard. Pulmonary: Breath sounds normal. No respiratory distress. No wheezes. No rales. Chest: Effort normal. No tenderness. Abdominal: Soft. Bowel sounds are normal. No distension or mass. No tenderness, rebound or guarding. Musculoskeletal: . No tenderness. No clubbing or cyanosis. Extremitites: Intact distal pulses. No edema  Neurological: Alert and oriented to person, place, and time. Skin: Skin is warm and dry. No rash noted. Not diaphoretic. No erythema. Psychiatric: Normal mood and affect. Behavior is normal.   Lymphadenopathy: No cervical adenopathy. No groin adenopathy. CBC:   Lab Results   Component Value Date    WBC 15.3 10/25/2021    RBC 3.46 10/25/2021    HGB 10.7 10/25/2021    HCT 33.0 10/25/2021    MCV 95.4 10/25/2021    MCH 30.9 10/25/2021    MCHC 32.4 10/25/2021    RDW 14.5 10/25/2021     10/25/2021    MPV 10.8 10/25/2021     BMP:   Lab Results   Component Value Date     10/25/2021    K 3.3 10/25/2021    K 3.9 10/22/2021    CL 90 10/25/2021    CO2 38 10/25/2021    BUN 42 10/25/2021    LABALBU 4.1 10/18/2021    LABALBU 4.6 05/19/2011    CREATININE 1.2 10/25/2021    CALCIUM 9.3 10/25/2021    GFRAA >60 10/25/2021    LABGLOM >60 10/25/2021     Magnesium:    Lab Results   Component Value Date    MG 2.0 10/22/2021     Cardiac Enzymes:   Lab Results   Component Value Date    CKTOTAL 51 11/13/2010    CKMB 0.5 11/13/2010    TROPHS 16 (H) 10/18/2021    TROPHS 18 (H) 10/16/2021    TROPHS 18 (H) 10/16/2021      PT/INR:    Lab Results   Component Value Date    PROTIME 14.5 10/18/2021    PROTIME 11.6 11/13/2010    INR 1.3 10/18/2021     TSH:    Lab Results   Component Value Date    TSH 0.942 02/14/2018     Rhythm Strip: normal sinus rhythm, PAC's noted. Chest CTA 8/8/2021:  Impression  No evidence of pulmonary embolism or acute pulmonary abnormality.   Prominent pericardial fat accounts for the abnormality seen on radiograph.     Cardiac catheterization 11/2010 ([facility]): LMT: normal. LAD: normal, gives off one large bifurcating diagonal as it courses to but ends at the apex. LCx: nondominant, gives off one large OM1, only trivial distal disease. RCA: Dominant, large, minimal disease distally.     Echocardiogram 11/2010 ([facility]): EF 55% with questionable RV dilation. Trivial to small pericardial effusion. Small echodense space near apex (? Localized effusion). Trivial MR and TR.      WYATT: 2/7/18 ([place])   Cardiac rhythm: atrial flutter   Low normal left ventricular ejection fraction.   Moderately dilated left atrium.   No evidence of a left atrial appendage thrombus.   No evidence of interatrial shunting on bubble study.   Borderline dilated right ventricle with normal right ventricular function.   Mild-moderate mitral regurgitation.     W[place]ATT: 2/15/18 [place])   Low normal left ventricular systolic function.   Mild to moderate mitral regurgitation.     TTE (Dr. Petra Pak, 8/12/2021)  Summary   Technically limited study.   Normal left ventricular size, wall thickness, wall motion, and systolic   function.   Estimated left ventricle ejection fraction 70+/-5 %.   There is doppler evidence of stage II diastolic dysfunction.   Mildly dilated right ventricle.   Right ventricle global systolic function is normal .   Normal sized left atrium.   No significant valvular abnormalities noted.     Lexiscan Stress Test 8/13/2021:  FINDINGS: The overall quality of the study was fair.   Left ventricular cavity size was noted to be dilated on both the stress and the resting images but there was no transient ischemic dilatation after stress  Rotational analog analysis demonstrated abnormal patient motion and there was marked increase in tracer uptake in the abdominal organs with the liver overshadowing the bottom of the heart  A severe defect was present in the inferior wall extending into the lateral apical wall(s) that was moderate to largesized by quantification. The resting images showed no change   Gated SPECT left ventricular ejection fraction was calculated to be 66%, with normal myocardial contractility and wall motion. Impression  The myocardial perfusion imaging was probably normal with the large fixed inferior/lateral apical wall defect being more consistent with attenuation artifact and less likely the result of a myocardial infarction especially with the normal contractility of the inferior wall and the lateral apical wall. More importantly, there did not  appear to be any evidence of stress-induced ischemia on this study   Overall left ventricular systolic function was normal with no regional wall motion abnormality  Low risk general pharmacologic stress test.    Coronary CTA: 9/13/21 (Dr. Humberto Babin)  AGATSTON SCORE: Total coronary artery calcium score is 115.7, distributed as LM 0.0, LAD 60.7, LCx 0.0, RCA 54.7. Calcium score percentile is 87% based on age, gender and race. CARDIAC VALVES: Mild mitral and aortic calcifications are noted. FUNCTION: The calculated left ventricular ejection fraction is 73%, LVEDV is 226 mL, LVESV 61 mL. CORONARY ANATOMY:  The coronary arteries arise in normal position. There is right coronary artery dominance. CORONARY CT ANGIOGRAM:  Left main: The left main coronary artery bifurcates into the LAD and LCX. No definite angiographic evidence of significant plaque noted in the left main coronary artery. Left anterior descending: The proximal LAD has about 1 to 24% calcified plaque. The distal LAD has about 25 to 49% soft plaque. The apical portion of the LAD was not seen due to motion. The proximal to mid diagonal 1 is patent. The distal diagonal 1 is now well seen. The proximal diagonal 2 has mild luminal irregularities. The mid to distal diagonal artery was not well seen. Left circumflex: The left circumflex coronary artery is not well seen.   Right coronary artery: The RCA is a moderate size caliber vessel with about 1 to 24% calcified plaque. The mid to distal PDA and PLB were not well seen due to motion artifact   PERICARDIUM: No definite evidence of pericardial effusion  The aortic at the sinuses of Valsalva measures 3.6 x 3.5 x 3.9 cm  The sinotubular junction measures 3.1 x 3.2 cm  The mid ascending aorta measures 3.6 x 3.6 cm  The pulmonary artery measures 3.2 x 3.0 cm  4 pulmonary veins enter the left atrium (2 on the left and 2 on the right). IMPRESSION:  Very difficult study due to technical difficulties, poor contrast opacification and motion. 1.  Mild nonobstructive coronary artery disease noted as mentioned above (about 25 to 49% distal LAD plaque). The apical and inferior apical portion of the LAD was not seen due to motion artifact. The left circumflex system were not well seen due to motion artifact. 2. Normal left ventricular systolic function with estimated left ventricular ejection fraction of 73%. 3.  The total calcium score is 115 with calcium score percentile of 87% based on age gender and race. CXR: 10/16/21  No acute process.       Question 10 mm nodule in the left upper lung. CTA chest: 10/16/21  There is no central pulmonary embolism or aortic dissection. Cottie Sayres is a   small distal subsegmental pulmonary embolism in the right lower lobe.       Coronary artery calcification.       Minimal ground-glass infiltrates in the right upper lobe medially concerning   for pneumonia.       9 mm indeterminate pulmonary nodule in the right lower lobe.  Consider   surveillance according to [employer] guidelines.      ASSESSMENT & PLAN:    Patient Active Problem List   Diagnosis    [facility]    Essential hypertension    Adrenal adenoma    Class 2 obesity due to excess calories with serious comorbidity and body mass index (BMI) of 35.0 to 35.9 in adult    Anticoagulation management encounter    Typical [facility] fl[facility]    Anxiety    Status post catheter ablation of atrial flutter    Persistent atrial fibrillation (HCC)    ETOH abuse    COPD with acute exacerbation ([facility]    COPD exacerbation (HCC)    Single subsegmental pulmonary embolism without acute cor pulmonale ([facility]     1. Acute on chronic HFpEF    Bumex. Monitor volume. 2. Acute on chronic respiratory failure:    Known history of very severe COPD. Pulmonology following. 3. PE:     \"Small distal subsegmental pulmonary embolism in the right lower lobe\" on 10/16/21 CTA chest)    Eliquis. 4. ORESTES: Follow labs. 5. PAF/flutter: Status-post AF ablation in April 2018. On Eliquis PTA, cardizem, and flecainide therapy. Follows with [address] EP. SR with episodes of AF this admission. 6. HTN: Observe. 7. Lipids: Statin. 8. DM    9. PARKER: CPAP. 10. History of alcohol abuse    11. Former tobacco abuse (2 PPD for many years, quit in 8/2017)    12. History of pericarditis in 11/2010    13. CAD: Recent nonischemic stress test and reportedly no significant obstructive CAD on CTA of the coronaries done today 9/13/21    Grecia Frye D.O.   Cardiologist  Cardiology, [facility]

## 2021-10-25 NOTE — PROGRESS NOTES
Hospitalist Progress Note      SYNOPSIS: Patient admitted on 10/18/2021 for PE    63 y.o. male with past medical history of COPD atrial flutter hypertension and DM Patient present to the Ed due to right sided chest pain that radiates into his back . Patient was seen in ED on 10/16 and diagnosed with COPD exacerbation and pneumonia  Patient was offered admission but decided not to stay . He was discharged with [facility] and [address] [place] [address] . Patient reports worsening shortness of breath and weight gain . He reports a non productive cough He reports bilateral legt swelling bilateral leg swelling  He report no fever chills abdominal pain nausea diarrhea. Patient uses 4L nasal canula daily . In the ED  He was hemodynamically stable  He is saturating at 97% on 4 L nasal cannula. Ab data reveal sodium 143 creatinine 1.1 BUN 31 Glucoses 162  Trop 16 EKG revealed normal sinus rhythm 1st degree AV block  HGB 10.2  CXR revealed no acute precess. CTA revealed small  distal segmental  In right lower lobe  . Patient takes Eliquis daily . CTA also revealed RUL pneumonia. Pt was started on IV steroids and later transitioned to Oral steroids. HE refused prednisone and has been started on Decadron. He was started on Lovenox and has been switched to Eliquis. He was also started on Bumex drip and now switched to oral Bumex. SUBJECTIVE:    Patient seen and examined  Records reviewed. The pt is sitting in bed has anxiety. He does not want to take prednisone. He states that it makes him gain fluid. Will start him on Decadron. Stable overnight. No other overnight issues reported. Temp (24hrs), Av.6 °F (35.9 °C), Min:96.1 °F (35.6 °C), Max:97.3 °F (36.3 °C)    DIET: ADULT DIET;  Regular; 1000 ml  CODE: Full Code    Intake/Output Summary (Last 24 hours) at 10/25/2021 1118  Last data filed at 10/25/2021 0937  Gross per 24 hour   Intake 360 ml   Output 2800 ml   Net -2440 ml       OBJECTIVE:    /86   Pulse 69   Temp 96.2 °F (35.7 °C) (Temporal)   Resp 19   Ht 5' 11\" (1.803 m)   Wt 257 lb (116.6 kg)   SpO2 98%   BMI 35.84 kg/m²     General appearance: No apparent distress, appears stated age and cooperative. HEENT:  Conjunctivae/corneas clear. Neck: Supple. No jugular venous distention. Respiratory: Diminished. Some wheezing. Cardiovascular: Regular rate rhythm, normal S1-S2  Abdomen: Soft, nontender, nondistended  Musculoskeletal: No clubbing, cyanosis, 2+ bilateral lower extremity edema. Brisk capillary refill. Skin:  No rashes  on visible skin  Neurologic: awake, alert and following commands     ASSESSMENT and PLAN    Pulmonary embolism  Stopped therapeutic Lovenox twice daily. Continue Eliquis  Appreciate Pulmonology consult. Continue Eliquis and to consider Coumadin if larger PE. Unlikley Elilquis Failure. Solumedrol was stopped and Prednisone was started but pt. Refused. Decadron started. Pt needs taper on discharge. Paroxysmal Atrial Fibrillation  Cont. Flecainide and Cardizem. Right upper lobe pneumonia  ceftriaxone and doxycycline Completed 6 days. Legionella and strep negative. Repeat CXR PNA improved. COPD exacerbation/Acute on chronic respiratory failure  Discontinued IV Solu-Medrol twice a day. Prednisone started and to Wean. Switched to Decadron. Cont. duo nebs. On 4 L nasal cannula at baseline  Supplemental oxygen as needed    Chronic back pain with exacerbation  Stopped Norco.  Started Percocet. Pain improved. Acute on chronic congestive heart failure with preserved ejection fraction  Appreciate Cardiology consult. Pt given Digoxin as well. Potential Consult per cardiology. Pt already established with EP. Strict RASTA's  Nephrology consult appreciated. Bumex drip stopped. On Bumex 2 mg bid     PARKER  Cont. BiPAP at night.      DM2  Continue sliding scale insulin  Continue Lantus twice daily    Hyperlipidemiacontinue Lipitor  Depression and anxietycontinue Zoloft and BuSpar      DISPOSITION: Pt's wife is available tomorrow AM for transportation for discharge. Medications:  REVIEWED DAILY    Infusion Medications    dextrose      sodium chloride       Scheduled Medications    dexamethasone  2 mg Oral 2 times per day    bumetanide  2 mg Oral BID    apixaban  5 mg Oral BID    polyethylene glycol  17 g Oral Daily    lidocaine  1 patch TransDERmal Daily    sertraline  25 mg Oral Daily    Arformoterol Tartrate  15 mcg Nebulization BID    aspirin  81 mg Oral Daily    atorvastatin  20 mg Oral QPM    budesonide  0.5 mg Nebulization BID    dilTIAZem  240 mg Oral BID    flecainide  100 mg Oral BID    fluticasone  1 spray Each Nostril Daily    insulin glargine  20 Units SubCUTAneous BID    potassium chloride  20 mEq Oral Daily with breakfast    primidone  25 mg Oral TID    Roflumilast  500 mcg Oral Daily    insulin lispro  0-18 Units SubCUTAneous TID WC    insulin lispro  0-9 Units SubCUTAneous Nightly    ipratropium-albuterol  1 ampule Inhalation Q4H WA    sodium chloride flush  5-40 mL IntraVENous 2 times per day     PRN Meds: glucose, dextrose, glucagon (rDNA), dextrose, morphine, metoprolol, oxyCODONE-acetaminophen, busPIRone, guaiFENesin-dextromethorphan, sodium chloride flush, sodium chloride, ondansetron **OR** ondansetron, acetaminophen **OR** acetaminophen, albuterol    Labs:     Recent Labs     10/25/21  0618   WBC 15.3*   HGB 10.7*   HCT 33.0*          Recent Labs     10/24/21  0844 10/24/21  1751 10/25/21  0618    138 137   K 3.6 4.2 3.3*   CL 87* 90* 90*   CO2 35* 37* 38*   BUN 38* 41* 42*   CREATININE 0.9 1.2 1.2   CALCIUM 8.9 9.7 9.3       No results for input(s): PROT, ALB, ALKPHOS, ALT, AST, BILITOT, AMYLASE, LIPASE in the last 72 hours. No results for input(s): INR in the last 72 hours. No results for input(s): Wayna Khat in the last 72 hours.     Chronic labs:    Lab Results   Component Value Date    CHOL 205 (H) 04/08/2018    TRIG 80 04/08/2018 HDL 80 04/08/2018    LDLCALC 109 (H) 04/08/2018    TSH 0.942 02/14/2018    PSA 0.26 04/08/2018    INR 1.3 10/18/2021    LABA1C 6.4 (H) 09/09/2021       Radiology: REVIEWED DAILY    +++++++++++++++++++++++++++++++++++++++++++++++++  Tiffanie Nails MD  [facility] Emergency Department Physician - [address] [place] [address] [facility]  +++++++++++++++++++++++++++++++++++++++++++++++++  NOTE: This report was transcribed using voice recognition software. Every effort was made to ensure accuracy; however, inadvertent computerized transcription errors may be present.

## 2021-10-25 NOTE — PROGRESS NOTES
Nutrition Assessment     Type and Reason for Visit: Initial, RD Nutrition Re-Screen/LOS    Nutrition Recommendations/Plan: Continue Current Diet     Nutrition Assessment:  Pt admitted w/ pulmonary embolism, PMH of COPD and DM. Pt w/ good intake since admission w/ >75% intake of most meals. BRANDI for accurate wt changes d/t fluid shifts. No nutrition intervention needed at this time, will continue to monitor while admitted. Nutrition Related Findings: Pt A/Ox4, abd WDL, +BS, -19.4L I/O, trace BUE and +2 BLE edema, hyperglycemia, pt on steroid      Current Nutrition Therapies:    ADULT DIET;  Regular; 1000 ml    Anthropometric Measures:  · Height: 5' 11\" (180.3 cm)  · Current Body Wt: 257 lb (116.6 kg) (10/25 standing scale)   · BMI: 35.9    Nutrition Diagnosis:   No nutrition diagnosis at this time    Nutrition Interventions:   Food and/or Nutrient Delivery:  Continue Current Diet  Nutrition Education/Counseling:  Education not indicated   Coordination of Nutrition Care:  Continue to monitor while inpatient    Goals:  Pt is to consume >75% of most meals       Nutrition Monitoring and Evaluation:   Behavioral-Environmental Outcomes:  None Identified   Food/Nutrient Intake Outcomes:  Food and Nutrient Intake  Physical Signs/Symptoms Outcomes:  Biochemical Data, GI Status, Fluid Status or Edema, Nutrition Focused Physical Findings, Skin, Weight     Discharge Planning:    No discharge needs at this time     Electronically signed by Izabel Luke RD, LD on 10/25/21 at 1:59 PM EDT    Contact: [phone]

## 2021-10-25 NOTE — PROGRESS NOTES
NATTYW met with pt and his wife to review resource limits under SSI. Pt does not qualify for SSD as he does not have enough work credits. He  has too many vehicles to qualify for SSI. He is aware that he could explore the option of selling these extra vehicles. LSW provided information on emergency food assistance. Additionally LSW advised they contact [facility] to explore any assistance. Pt may want to consider Hospice care in the future which could assist with helping him at home.

## 2021-10-25 NOTE — PROGRESS NOTES
[facility]  Division of Pulmonary, Critical Care Medicine  Pulmonary [address] [facility]           Pulmonary progress Note         CC :SOB ,  H/o A flutter /a fib poorly controlled     HPI :  Doing  Better,pain has improved  Less swelling in legs,diuresis well  Chest pain improved  No fever or chills  No chest pain   Chest pain improved     PHYSICAL EXAMINATION:     VITAL SIGNS:  /76   Pulse 100   Temp 97.3 °F (36.3 °C) (Temporal)   Resp 18   Ht 5' 11\" (1.803 m)   Wt 256 lb 12.8 oz (116.5 kg)   SpO2 99%   BMI 35.82 kg/m²   Wt Readings from Last 3 Encounters:   10/24/21 256 lb 12.8 oz (116.5 kg)   10/16/21 261 lb (118.4 kg)   09/13/21 258 lb 6 oz (117.2 kg)     Temp Readings from Last 3 Encounters:   10/24/21 97.3 °F (36.3 °C) (Temporal)   10/16/21 98.4 °F (36.9 °C)   09/18/21 98.3 °F (36.8 °C) (Oral)     TMAX:  BP Readings from Last 3 Encounters:   10/24/21 126/76   10/17/21 (!) 168/87   10/11/21 (!) 143/98     Pulse Readings from Last 3 Encounters:   10/24/21 100   10/17/21 76   10/11/21 99           INTAKE/OUTPUTS:  I/O last 3 completed shifts:   In: 120 [P.O.:120]  Out: 4650 [Urine:4650]    Intake/Output Summary (Last 24 hours) at 10/24/2021 2212  Last data filed at 10/24/2021 2108  Gross per 24 hour   Intake 0 ml   Output 4450 ml   Net -4450 ml           LABS/IMAGING:    CBC:  Lab Results   Component Value Date    WBC 13.9 (H) 10/22/2021    HGB 10.6 (L) 10/22/2021    HCT 31.8 (L) 10/22/2021    MCV 93.5 10/22/2021     10/22/2021    LYMPHOPCT 7.0 (L) 10/22/2021    RBC 3.40 (L) 10/22/2021    MCH 31.2 10/22/2021    MCHC 33.3 10/22/2021    RDW 15.0 10/22/2021    NEUTOPHILPCT 78.9 10/22/2021    MONOPCT 9.6 10/22/2021    BASOPCT 0.9 10/22/2021    NEUTROABS 11.54 (H) 10/22/2021    LYMPHSABS 0.97 (L) 10/22/2021    MONOSABS 1.39 (H) 10/22/2021    EOSABS 0.00 (L) 10/22/2021    BASOSABS 0.00 10/22/2021       Recent Labs     10/22/21  0516 10/21/21  0600 10/20/21  0732   WBC 13.9* 12.0* 12.2*   HGB 10.6* 9.8* 9.8*   HCT 31.8* 29.6* 30.1*   MCV 93.5 97.7 97.4    256 258       BMP:   Recent Labs     10/22/21  0516 10/22/21  1957 10/23/21  1612 10/24/21  0844 10/24/21  1751      < > 141 136 138   K 3.9   < > 3.8 3.6 4.2   CL 93*   < > 91* 87* 90*   CO2 34*   < > 38* 35* 37*   PHOS 4.0  --   --   --   --    BUN 37*   < > 40* 38* 41*   CREATININE 1.1   < > 1.0 0.9 1.2    < > = values in this interval not displayed. MG:   Lab Results   Component Value Date    MG 2.0 10/22/2021     Ca/Phos:   Lab Results   Component Value Date    CALCIUM 9.7 10/24/2021    PHOS 4.0 10/22/2021     Amylase: No results found for: AMYLASE  Lipase:   Lab Results   Component Value Date    LIPASE 28 05/19/2011     LIVER PROFILE:   No results for input(s): AST, ALT, LIPASE, BILIDIR, BILITOT, ALKPHOS in the last 72 hours. Invalid input(s): AMYLASE,  ALB    PT/INR:   No results for input(s): PROTIME, INR in the last 72 hours. APTT:   No results for input(s): APTT in the last 72 hours. Cardiac Enzymes:  Lab Results   Component Value Date    CKTOTAL 51 11/13/2010    CKMB 0.5 11/13/2010    TROPONINI <0.01 02/15/2018       Hgb A1C:   Lab Results   Component Value Date    LABA1C 6.4 (H) 09/09/2021     No results found for: EAG  FAMILIA: No results found for: FAMILIA  ESR: No results found for: SEDRATE  CRP: No results found for: CRP  D Dimer: No results found for: DDIMER    Thyroid Studies:  Lab Results   Component Value Date    TSH 0.942 02/14/2018    C7AYOAN 4.8 02/14/2018           MICROBIOLOGY:  Pending       CXR:  Reviewed     CT Chest:reviewed     PE  Vitals:    10/24/21 2045   BP: 126/76   Pulse: 100   Resp: 18   Temp: 97.3 °F (36.3 °C)   SpO2: 99%     General:  Awake, alert, oriented X 3. Well developed, well nourished, well groomed. No apparent distress. HEENT:  Normocephalic, atraumatic. Pupils equal, round, reactive to light. No scleral icterus.   No conjunctival injection. Normal lips, teeth, and gums. No nasal discharge. Neck:  Supple, FROM  Heart:  RRR, no murmurs, gallops, rubs, carotid upstroke normal, no carotid bruits  Lungs:wheeizng bilateral ,rhonchi   Abdomen:   Bowel sounds present, soft, nontender, no masses, no organomegaly, no peritoneal signs  Extremities:  No clubbing, cyanosis, or edema  Skin:  Warm and dry, no open lesions or rash  Neuro:  Cranial nerves 2-12 intact, no focal deficits  Vascular: Radial and pedal Pulses 2+  Breast: deferred  Rectal: deferred  Genitalia:  deferred    PROBLEM LIST:  Patient Active Problem List   Diagnosis    COPD (chronic obstructive pulmonary disease) ([address]    Essential hypertension    Adrenal adenoma    Class 2 obesity due to excess calories with serious comorbidity and body mass index (BMI) of 35.0 to 35.9 in adult    Anticoagulation management encounter    Typical atrial flutter ([address]    Anxiety    Status post catheter ablation of atrial flutter    Persistent atrial fibrillation ([address]    ETOH abuse    COPD with acute exacerbation ([address]    COPD exacerbation ([address]    Single subsegmental pulmonary embolism without acute cor pulmonale (HCC)               ASSESSMENT:  1- Small PE  2- Acute COPD exacerbation  3-A fib with possible acute  HFpEF  4) very severe COPD,doubt exacerbation   5.)obstructive sleep apnea  6)Afib /Flutter   7.)tobacco independent(quit 5 months ago)      PLAN:  Continue diuresis with Bumex drip   Lovenox ,can go elquis   Back/chest pain Most likley MS related to cough and muscle pain more than PE as no infarction seen  Continue BD  On Rocephin  Wean steroids next 1-2  Days to PO 30 mg and wean over week ,no need for IV steroids   On Duoneb   Albuterol as needed   On  Eliquis to take it twice daily  Continue Nebs   BiPAP /CPAP at night    Prcal N   lidoderm patch         BRADLEY DOMINGUEZ MD  Pulmonary/Critical care Medicine   [address] [facility] and [address] [place] [address]

## 2021-10-25 NOTE — PROGRESS NOTES
Temp 96.2 °F (35.7 °C) (Temporal)   Resp 19   Ht 5' 11\" (1.803 m)   Wt 257 lb (116.6 kg)   SpO2 98%   BMI 35.84 kg/m²     Physical Examination:  Gen: awake, alert  HEENT: normocephalic, atraumatic  Lungs: respirations easy and not labored,   Heart: regular rate and rhythm  Abdomen: obese  Extremities: moving all extremities , edema  Skin: warm  Neuro: awake, alert, oriented x 3,    Objective data reviewed: labs, images, records, medication use, vitals and chart    Time/Communication  Greater than 50% of time spent, total 25 minutes in counseling and coordination of care at the bedside regarding goals of care. Thank you for allowing Palliative Medicine to participate in the care of Becki Cotton. Note: This report was completed using computerize voiced recognition software. Every effort has been made to ensure accuracy; however, inadvertent computerized transcription errors may be present. Jackie Raymond

## 2021-10-26 VITALS
TEMPERATURE: 96.3 F | HEIGHT: 71 IN | SYSTOLIC BLOOD PRESSURE: 143 MMHG | WEIGHT: 256.8 LBS | RESPIRATION RATE: 16 BRPM | DIASTOLIC BLOOD PRESSURE: 74 MMHG | BODY MASS INDEX: 35.95 KG/M2 | OXYGEN SATURATION: 99 % | HEART RATE: 78 BPM

## 2021-10-26 LAB
ALBUMIN SERPL-MCNC: 3.8 G/DL (ref 3.5–5.2)
ALP BLD-CCNC: 64 U/L (ref 40–129)
ALT SERPL-CCNC: 28 U/L (ref 0–40)
ANION GAP SERPL CALCULATED.3IONS-SCNC: 13 MMOL/L (ref 7–16)
ANISOCYTOSIS: ABNORMAL
AST SERPL-CCNC: 15 U/L (ref 0–39)
BASOPHILS ABSOLUTE: 0 E9/L (ref 0–0.2)
BASOPHILS RELATIVE PERCENT: 0.1 % (ref 0–2)
BILIRUB SERPL-MCNC: 0.2 MG/DL (ref 0–1.2)
BUN BLDV-MCNC: 45 MG/DL (ref 6–20)
CALCIUM SERPL-MCNC: 9 MG/DL (ref 8.6–10.2)
CHLORIDE BLD-SCNC: 91 MMOL/L (ref 98–107)
CO2: 32 MMOL/L (ref 22–29)
CREAT SERPL-MCNC: 1.2 MG/DL (ref 0.7–1.2)
EOSINOPHILS ABSOLUTE: 0 E9/L (ref 0.05–0.5)
EOSINOPHILS RELATIVE PERCENT: 0 % (ref 0–6)
GFR AFRICAN AMERICAN: >60
GFR NON-AFRICAN AMERICAN: >60 ML/MIN/1.73
GLUCOSE BLD-MCNC: 272 MG/DL (ref 74–99)
HCT VFR BLD CALC: 32.3 % (ref 37–54)
HEMOGLOBIN: 10.8 G/DL (ref 12.5–16.5)
LYMPHOCYTES ABSOLUTE: 0.73 E9/L (ref 1.5–4)
LYMPHOCYTES RELATIVE PERCENT: 4.7 % (ref 20–42)
MCH RBC QN AUTO: 32.2 PG (ref 26–35)
MCHC RBC AUTO-ENTMCNC: 33.4 % (ref 32–34.5)
MCV RBC AUTO: 96.4 FL (ref 80–99.9)
METAMYELOCYTES RELATIVE PERCENT: 2.8 % (ref 0–1)
METER GLUCOSE: 137 MG/DL (ref 74–99)
METER GLUCOSE: 227 MG/DL (ref 74–99)
MONOCYTES ABSOLUTE: 0.58 E9/L (ref 0.1–0.95)
MONOCYTES RELATIVE PERCENT: 3.8 % (ref 2–12)
MYELOCYTE PERCENT: 5.7 % (ref 0–0)
NEUTROPHILS ABSOLUTE: 12.33 E9/L (ref 1.8–7.3)
NEUTROPHILS RELATIVE PERCENT: 76.4 % (ref 43–80)
PDW BLD-RTO: 14.8 FL (ref 11.5–15)
PLATELET # BLD: 240 E9/L (ref 130–450)
PMV BLD AUTO: 11 FL (ref 7–12)
POLYCHROMASIA: ABNORMAL
POTASSIUM SERPL-SCNC: 3.5 MMOL/L (ref 3.5–5)
PROMYELOCYTES PERCENT: 6.6 % (ref 0–0)
RBC # BLD: 3.35 E12/L (ref 3.8–5.8)
SODIUM BLD-SCNC: 136 MMOL/L (ref 132–146)
TOTAL PROTEIN: 5.2 G/DL (ref 6.4–8.3)
WBC # BLD: 14.5 E9/L (ref 4.5–11.5)

## 2021-10-26 PROCEDURE — 6370000000 HC RX 637 (ALT 250 FOR IP): Performed by: FAMILY MEDICINE

## 2021-10-26 PROCEDURE — 85025 COMPLETE CBC W/AUTO DIFF WBC: CPT

## 2021-10-26 PROCEDURE — 6370000000 HC RX 637 (ALT 250 FOR IP): Performed by: NURSE PRACTITIONER

## 2021-10-26 PROCEDURE — 82962 GLUCOSE BLOOD TEST: CPT

## 2021-10-26 PROCEDURE — 36415 COLL VENOUS BLD VENIPUNCTURE: CPT

## 2021-10-26 PROCEDURE — 6360000002 HC RX W HCPCS: Performed by: INTERNAL MEDICINE

## 2021-10-26 PROCEDURE — 2700000000 HC OXYGEN THERAPY PER DAY

## 2021-10-26 PROCEDURE — 6370000000 HC RX 637 (ALT 250 FOR IP): Performed by: INTERNAL MEDICINE

## 2021-10-26 PROCEDURE — 94660 CPAP INITIATION&MGMT: CPT

## 2021-10-26 PROCEDURE — 99233 SBSQ HOSP IP/OBS HIGH 50: CPT | Performed by: INTERNAL MEDICINE

## 2021-10-26 PROCEDURE — 94640 AIRWAY INHALATION TREATMENT: CPT

## 2021-10-26 PROCEDURE — 80053 COMPREHEN METABOLIC PANEL: CPT

## 2021-10-26 RX ORDER — DEXAMETHASONE 2 MG/1
TABLET ORAL
Qty: 15 TABLET | Refills: 0 | Status: SHIPPED | OUTPATIENT
Start: 2021-10-26 | End: 2021-11-05

## 2021-10-26 RX ORDER — OXYCODONE HYDROCHLORIDE AND ACETAMINOPHEN 5; 325 MG/1; MG/1
1 TABLET ORAL EVERY 6 HOURS PRN
Qty: 28 TABLET | Refills: 0 | Status: SHIPPED | OUTPATIENT
Start: 2021-10-26 | End: 2021-11-02

## 2021-10-26 RX ORDER — POTASSIUM CHLORIDE 20 MEQ/1
20 TABLET, EXTENDED RELEASE ORAL 2 TIMES DAILY WITH MEALS
Qty: 60 TABLET | Refills: 3 | Status: SHIPPED | OUTPATIENT
Start: 2021-10-26

## 2021-10-26 RX ORDER — METOLAZONE 2.5 MG/1
2.5 TABLET ORAL WEEKLY
Status: DISCONTINUED | OUTPATIENT
Start: 2021-10-26 | End: 2021-10-26 | Stop reason: HOSPADM

## 2021-10-26 RX ORDER — LIDOCAINE 4 G/G
1 PATCH TOPICAL DAILY
Qty: 15 PATCH | Refills: 0 | Status: SHIPPED | OUTPATIENT
Start: 2021-10-26 | End: 2021-11-10

## 2021-10-26 RX ORDER — PREDNISONE 10 MG/1
TABLET ORAL
Qty: 18 TABLET | Refills: 0 | Status: SHIPPED | OUTPATIENT
Start: 2021-10-26 | End: 2021-10-26 | Stop reason: HOSPADM

## 2021-10-26 RX ADMIN — APIXABAN 5 MG: 5 TABLET, FILM COATED ORAL at 09:43

## 2021-10-26 RX ADMIN — MORPHINE SULFATE 2.6 MG: 10 SOLUTION ORAL at 11:54

## 2021-10-26 RX ADMIN — ROFLUMILAST 500 MCG: 500 TABLET ORAL at 09:42

## 2021-10-26 RX ADMIN — FLECAINIDE ACETATE 100 MG: 100 TABLET ORAL at 09:42

## 2021-10-26 RX ADMIN — PRIMIDONE 25 MG: 50 TABLET ORAL at 09:41

## 2021-10-26 RX ADMIN — DILTIAZEM HYDROCHLORIDE 240 MG: 120 CAPSULE, COATED, EXTENDED RELEASE ORAL at 09:43

## 2021-10-26 RX ADMIN — IPRATROPIUM BROMIDE AND ALBUTEROL SULFATE 1 AMPULE: .5; 2.5 SOLUTION RESPIRATORY (INHALATION) at 08:06

## 2021-10-26 RX ADMIN — BUSPIRONE HYDROCHLORIDE 10 MG: 5 TABLET ORAL at 09:45

## 2021-10-26 RX ADMIN — ASPIRIN 81 MG CHEWABLE TABLET 81 MG: 81 TABLET CHEWABLE at 09:42

## 2021-10-26 RX ADMIN — POLYETHYLENE GLYCOL 3350 17 G: 17 POWDER, FOR SOLUTION ORAL at 09:43

## 2021-10-26 RX ADMIN — IPRATROPIUM BROMIDE AND ALBUTEROL SULFATE 1 AMPULE: .5; 2.5 SOLUTION RESPIRATORY (INHALATION) at 11:27

## 2021-10-26 RX ADMIN — BUMETANIDE 2 MG: 1 TABLET ORAL at 09:42

## 2021-10-26 RX ADMIN — POTASSIUM CHLORIDE 20 MEQ: 1500 TABLET, EXTENDED RELEASE ORAL at 09:42

## 2021-10-26 RX ADMIN — DEXAMETHASONE 2 MG: 1 TABLET ORAL at 09:41

## 2021-10-26 RX ADMIN — INSULIN LISPRO 6 UNITS: 100 INJECTION, SOLUTION INTRAVENOUS; SUBCUTANEOUS at 06:32

## 2021-10-26 RX ADMIN — INSULIN GLARGINE 20 UNITS: 100 INJECTION, SOLUTION SUBCUTANEOUS at 09:46

## 2021-10-26 ASSESSMENT — PAIN SCALES - GENERAL
PAINLEVEL_OUTOF10: 5
PAINLEVEL_OUTOF10: 0

## 2021-10-26 ASSESSMENT — PAIN SCALES - WONG BAKER: WONGBAKER_NUMERICALRESPONSE: 0

## 2021-10-26 NOTE — PROGRESS NOTES
[facility]   Department of Pharmacy   Pharmacist Transition of Care Services         Patient Demographics  Name:  Emelyn Hogan Record Number:  [id number]  Gender:  male   Age:  63 y.o. YOB: 1960    Readmission Risk: 26%       Pharmacist Review and Summary of Medications     Date of last reviewed/update: 10/26/21     Category Comments   New Medication Started   1. Morphine  2. Percocet  3. Lidocaine patch   Change in Outpatient Medication      Discontinued/On hold Outpatient Medication  1. Norco  2. Azithromycin  3. Cefdinir      Other          Documentation of Pharmacist Interventions and Follow-up Plan:     The following Pharmacist Transition of Care Services were completed:   [x]  Reviewed and summarized medication changes  [x]  Entire home medication list was reviewed for accuracy  [x]  Home medication list was updated or corrected    [x]  Reviewed discharge medication reconciliation  []  Discharge medication list was updated or corrected    []  Added information to AVS   []  Patient education was provided on new medications  []  Patient education was provided on medication changes  []  Reviewed the AVS with patient    Additional Interventions:  []  Inpatient prescriber was contacted and the following pharmacy recommendations        were accepted: **     [] Other interventions: **        Pharmacist: Desirae Nugent, PharmD  Pharmacy Resident  P: [id number]  Date:  10/26/2021 11:53 AM

## 2021-10-26 NOTE — PROGRESS NOTES
[facility] Cardiology Inpatient Progress Note    Patient is a 63 y.o. male of Maddie Figueroa DO seen in hospital follow up. Chief complaint: Follow-up for acute on chronic HFpEF, chest pain    HPI: No CP or SOB.      Patient Active Problem List   Diagnosis    [facility]) [facility]    Essential hypertension    Adrenal adenoma    Class 2 obesity due to excess calories with serious comorbidity and body mass index (BMI) of 35.0 to 35.9 in adult    Anticoagulation management encounter    Typical atrial flutter ([facility]    Anxiety    Status post catheter ablation of atrial flutter    Persistent atrial fibrillation ([facility]    ETOH abuse    [facility]    COPD exacerbation ([facility]    Single subsegmental pulmonary embolism without acute cor pulmonale ([facility]       No Known Allergies    Current Facility-Administered Medications   Medication Dose Route Frequency Provider Last Rate Last Admin    dexamethasone (DECADRON) tablet 2 mg  2 mg Oral 2 times per day Yifan Rivera MD   2 mg at 10/25/21 2131    potassium chloride (KLOR-CON M) extended release tablet 20 mEq  20 mEq Oral BID  Genevieve Turner MD   20 mEq at 10/25/21 1634    bumetanide (BUMEX) tablet 2 mg  2 mg Oral BID SERA Smith CNP   2 mg at 10/25/21 2134    apixaban (ELIQUIS) tablet 5 mg  5 mg Oral BID Yifan Rivera MD   5 mg at 10/25/21 2134    glucose (GLUTOSE) 40 % oral gel 15 g  15 g Oral PRN Tatyana Reyes MD        dextrose 50 % IV solution  12.5 g IntraVENous PRN Tatyana Reyes MD        glucagon (rDNA) injection 1 mg  1 mg IntraMUSCular PRN Tatyana Reyes MD        dextrose 5 % solution  100 mL/hr IntraVENous PRN Tatyana Reyes MD        morphine 10 MG/5ML solution 2.6 mg  2.6 mg Oral Q2H PRN Kendall SERA Goodwin CNP   2.6 mg at 10/24/21 1347    metoprolol (LOPRESSOR) injection 5 mg  5 mg IntraVENous Q6H PRN Elina Carias MD        polyethylene glycol [facility]) packet 17 g  17 g Oral Daily Marc SERA Sinha CNP   17 g at 10/22/21 1155    lidocaine 4 % external patch 1 patch  1 patch TransDERmal Daily Ryan Greenfield MD   1 patch at 10/24/21 1144    oxyCODONE-acetaminophen (PERCOCET) 5-325 MG per tablet 1 tablet  1 tablet Oral Q6H PRN Mahad Phan MD   1 tablet at 10/25/21 2135    sertraline (ZOLOFT) tablet 25 mg  25 mg Oral Daily Mahad Phan MD   25 mg at 10/25/21 2134    Arformoterol Tartrate (BROVANA) nebulizer solution 15 mcg  15 mcg Nebulization BID Nighat Chan MD   15 mcg at 10/25/21 0804    aspirin chewable tablet 81 mg  81 mg Oral Daily Nighat Chan MD   81 mg at 10/25/21 0933    atorvastatin (LIPITOR) tablet 20 mg  20 mg Oral QPM Nighat Chan MD   20 mg at 10/25/21 2133    budesonide (PULMICORT) nebulizer suspension 500 mcg  0.5 mg Nebulization BID Nighat Chan MD   500 mcg at 10/25/21 0804    busPIRone (BUSPAR) tablet 10 mg  10 mg Oral TID PRN Nighat Chan MD   10 mg at 10/25/21 2133    dilTIAZem (CARDIZEM CD) extended release capsule 240 mg  240 mg Oral BID Nighat Chan MD   240 mg at 10/25/21 2133    flecainide (TAMBOCOR) tablet 100 mg  100 mg Oral BID Nighat Chan MD   100 mg at 10/25/21 2134    fluticasone (FLONASE) 50 MCG/ACT nasal spray 1 spray  1 spray Each Nostril Daily Nighat Chan MD   1 spray at 10/24/21 1217    insulin glargine (LANTUS) injection vial 20 Units  20 Units SubCUTAneous BID Nighat Chan MD   20 Units at 10/25/21 2135    primidone (MYSOLINE) tablet 25 mg  25 mg Oral TID Nighat Chan MD   25 mg at 10/25/21 2132    Roflumilast (DALIRESP) tablet 500 mcg  500 mcg Oral Daily Nighat Chan MD   500 mcg at 10/25/21 0934    insulin lispro (HUMALOG) injection vial 0-18 Units  0-18 Units SubCUTAneous TID WC Nighat Chan MD   6 Units at 10/26/21 5964    insulin lispro (HUMALOG) injection vial 0-9 Units  0-9 Units SubCUTAneous Nightly Nighat Chan MD   2 Units at 10/25/21 4723    ipratropium-albuterol (DUONEB) nebulizer solution 1 ampule  1 ampule Inhalation Q4H WA Lonnie Ramirez MD   1 ampule at 10/26/21 0806    guaiFENesin-dextromethorphan (ROBITUSSIN DM) 100-10 MG/5ML syrup 5 mL  5 mL Oral Q4H PRN Lonnie Ramirez MD        sodium chloride flush 0.9 % injection 5-40 mL  5-40 mL IntraVENous 2 times per day Lonnie Ramirez MD   10 mL at 10/25/21 2131    sodium chloride flush 0.9 % injection 5-40 mL  5-40 mL IntraVENous PRN Lonnie Ramirez MD   10 mL at 10/22/21 1417    0.9 % sodium chloride infusion  25 mL IntraVENous PRN Lonnie Ramirez MD        ondansetron (ZOFRAN-ODT) disintegrating tablet 4 mg  4 mg Oral Q8H PRN Lonnie Ramirez MD        Or    ondansetron [facility]) injection 4 mg  4 mg IntraVENous Q6H PRN Lonnie Ramirez MD        acetaminophen (TYLENOL) tablet 650 mg  650 mg Oral Q6H PRN Lonnie Ramirez MD        Or   Elen Paige acetaminophen (TYLENOL) suppository 650 mg  650 mg Rectal Q6H PRN Lonnie Ramirez MD        albuterol (PROVENTIL) nebulizer solution 2.5 mg  2.5 mg Nebulization Q6H PRN Lonnie Ramirez MD           Review of systems:   Heart: as above   Lungs: as above   Eyes: denies changes in vision or discharge. Ears: denies changes in hearing or pain. Nose: denies epistaxis or masses   Throat: denies sore throat or trouble swallowing. Neuro: denies numbness, tingling, tremors. Skin: denies rashes or itching. : denies hematuria, dysuria   GI: denies vomiting, diarrhea   Psych: denies mood changed, anxiety, depression. Physical Exam   BP (!) 143/74   Pulse 78   Temp 96.3 °F (35.7 °C) (Temporal)   Resp 16   Ht 5' 11\" (1.803 m)   Wt 256 lb 12.8 oz (116.5 kg)   SpO2 99%   BMI 35.82 kg/m²   Constitutional: Oriented to person, place, and time. No distress. Well developed. Head: Normocephalic and atraumatic. Neck: Neck supple. No hepatojugular reflux. No JVD present. Carotid bruit is not present. No tracheal deviation present. No thyromegaly present. Cardiovascular: Normal rate, regular rhythm, normal heart sounds. intact distal pulses. No gallop and no friction rub. No murmur heard. Pulmonary: Breath sounds normal. No respiratory distress. No wheezes. No rales. Chest: Effort normal. No tenderness. Abdominal: Soft. Bowel sounds are normal. No distension or mass. No tenderness, rebound or guarding. Musculoskeletal: . No tenderness. No clubbing or cyanosis. Extremitites: Intact distal pulses. No edema  Neurological: Alert and oriented to person, place, and time. Skin: Skin is warm and dry. No rash noted. Not diaphoretic. No erythema. Psychiatric: Normal mood and affect. Behavior is normal.   Lymphadenopathy: No cervical adenopathy. No groin adenopathy. CBC:   Lab Results   Component Value Date    WBC 14.5 10/26/2021    RBC 3.35 10/26/2021    HGB 10.8 10/26/2021    HCT 32.3 10/26/2021    MCV 96.4 10/26/2021    MCH 32.2 10/26/2021    MCHC 33.4 10/26/2021    RDW 14.8 10/26/2021     10/26/2021    MPV 11.0 10/26/2021     BMP:   Lab Results   Component Value Date     10/26/2021    K 3.5 10/26/2021    K 3.9 10/22/2021    CL 91 10/26/2021    CO2 32 10/26/2021    BUN 45 10/26/2021    LABALBU 3.8 10/26/2021    LABALBU 4.6 05/19/2011    CREATININE 1.2 10/26/2021    CALCIUM 9.0 10/26/2021    GFRAA >60 10/26/2021    LABGLOM >60 10/26/2021     Magnesium:    Lab Results   Component Value Date    MG 2.0 10/22/2021     Cardiac Enzymes:   Lab Results   Component Value Date    CKTOTAL 51 11/13/2010    CKMB 0.5 11/13/2010    TROPHS 16 (H) 10/18/2021    TROPHS 18 (H) 10/16/2021    TROPHS 18 (H) 10/16/2021      PT/INR:    Lab Results   Component Value Date    PROTIME 14.5 10/18/2021    PROTIME 11.6 11/13/2010    INR 1.3 10/18/2021     TSH:    Lab Results   Component Value Date    TSH 0.942 02/14/2018     Rhythm Strip: normal sinus rhythm, PAC's noted.     Chest CTA 8/8/2021:  Impression  No evidence of pulmonary embolism or acute pulmonary abnormality. Prominent pericardial fat accounts for the abnormality seen on radiograph.     Cardiac catheter[facility] 11/2010 ([facility]): LMT: normal. LAD: normal, gives off one large bifurcating diagonal as it courses to but ends at the apex. LCx: nondominant, gives off one large OM1, only trivial distal disease. RCA: Dominant, large, minimal disease distally.     Echocardiogram 11/2010 ([facility]): EF 55% with questionable RV dilation. Trivial to small pericardial effusion. Small echodense space near apex (? Localized effusion). Trivial MR and TR.      WYATT: 2/7/18 ([place])   Cardiac rhythm: atrial flutter   Low normal left ventricular ejection fraction.   Moderately dilated left atrium.   No evidence of a left atrial appendage thrombus.   No evidence of interatrial shunting on bubble study.   Borderline dilated right ventricle with normal right ventricular function.   Mild-moderate mitral regurgitation.     WYATT: 2/15/18 Nilda Cortes)   Low normal left ventricular systolic function.   Mild to moderate mitral regurgitation.     TTE (Dr. Stefano Sanchez, 8/12/2021)  Summary   Technically limited study.   Normal left ventricular size, wall thickness, wall motion, and systolic   function.   Estimated left ventricle ejection fraction 70+/-5 %.   There is doppler evidence of stage II diastolic dysfunction.   Mildly dilated right ventricle.   Right ventricle global systolic function is normal .   Normal sized left atrium.   No significant valvular abnormalities noted.     Lexiscan Stress Test 8/13/2021:  FINDINGS: The overall quality of the study was fair.   Left ventricular cavity size was noted to be dilated on both the stress and the resting images but there was no transient ischemic dilatation after stress  Rotational analog analysis demonstrated abnormal patient motion and there was marked increase in tracer uptake in the abdominal organs with the liver overshadowing the bottom of the heart  A severe defect was present in the inferior wall extending into the lateral apical wall(s) that was moderate to largesized by quantification. The resting images showed no change   Gated SPECT left ventricular ejection fraction was calculated to be 66%, with normal myocardial contractility and wall motion. Impression  The myocardial perfusion imaging was probably normal with the large fixed inferior/lateral apical wall defect being more consistent with attenuation artifact and less likely the result of a myocardial infarction especially with the normal contractility of the inferior wall and the lateral apical wall. More importantly, there did not  appear to be any evidence of stress-induced ischemia on this study   Overall left ventricular systolic function was normal with no regional wall motion abnormality  Low risk general pharmacologic stress test.    Coronary CTA: 9/13/21 (Dr. Humberto Babin)  AGATSTON SCORE: Total coronary artery calcium score is 115.7, distributed as LM 0.0, LAD 60.7, LCx 0.0, RCA 54.7. Calcium score percentile is 87% based on age, gender and race. CARDIAC VALVES: Mild mitral and aortic calcifications are noted. FUNCTION: The calculated left ventricular ejection fraction is 73%, LVEDV is 226 mL, LVESV 61 mL. CORONARY ANATOMY:  The coronary arteries arise in normal position. There is right coronary artery dominance. CORONARY CT ANGIOGRAM:  Left main: The left main coronary artery bifurcates into the LAD and LCX. No definite angiographic evidence of significant plaque noted in the left main coronary artery. Left anterior descending: The proximal LAD has about 1 to 24% calcified plaque. The distal LAD has about 25 to 49% soft plaque. The apical portion of the LAD was not seen due to motion. The proximal to mid diagonal 1 is patent. The distal diagonal 1 is now well seen. The proximal diagonal 2 has mild luminal irregularities. The mid to distal diagonal artery was not well seen. Left circumflex:  The left circumflex coronary artery is not well seen. Right coronary artery: The RCA is a moderate size caliber vessel with about 1 to 24% calcified plaque. The mid to distal PDA and PLB were not well seen due to motion artifact   PERICARDIUM: No definite evidence of pericardial effusion  The aortic at the sinuses of Valsalva measures 3.6 x 3.5 x 3.9 cm  The sinotubular junction measures 3.1 x 3.2 cm  The mid ascending aorta measures 3.6 x 3.6 cm  The pulmonary artery measures 3.2 x 3.0 cm  4 pulmonary veins enter the left atrium (2 on the left and 2 on the right). IMPRESSION:  Very difficult study due to technical difficulties, poor contrast opacification and motion. 1.  Mild nonobstructive coronary artery disease noted as mentioned above (about 25 to 49% distal LAD plaque). The apical and inferior apical portion of the LAD was not seen due to motion artifact. The left circumflex system were not well seen due to motion artifact. 2. Normal left ventricular systolic function with estimated left ventricular ejection fraction of 73%. 3.  The total calcium score is 115 with calcium score percentile of 87% based on age gender and race. CXR: 10/16/21  No acute process.       Question 10 mm nodule in the left upper lung. CTA chest: 10/16/21  There is no central pulmonary embolism or aortic dissection. Freeda Flor is a   small distal subsegmental pulmonary embolism in the right lower lobe.       Coronary artery calcification.       Minimal ground-glass infiltrates in the right upper lobe medially concerning   for pneumonia.       9 mm indeterminate pulmonary nodule in the right lower lobe.  Consider   surveillance according to travelfox guidelines.      ASSESSMENT & PLAN:    Patient Active Problem List   Diagnosis    COPD [facility]ronic obstructive [facility] disease) ([facility]    Essential hypertension    Adrenal adenoma    Class 2 obesity due to excess calories with serious comorbidity and body mass index (BMI) of 35.0 to 35.9 in adult    Anticoagulation management encounter    Typical atrial flutter (HCC)    Anxiety    Status post catheter ablation of atrial flutter    Persistent atrial fibrillation (HCC)    ETOH abuse    COPD with acute exacerbation (HCC)    COPD exacerbation (HCC)    Single subsegmental pulmonary embolism without acute cor pulmonale (HCC)     1. Acute on chronic HFpEF    Bumex. Monitor volume. 2. Acute on chronic respiratory failure:    Known history of very severe COPD. Pulmonology following. 3. PE:     \"Small distal subsegmental pulmonary embolism in the right lower lobe\" on 10/16/21 CTA chest)    Eliquis. 4. ORESTES: Follow labs. 5. PAF/flutter: Status-post AF ablation in April 2018. On Eliquis PTA, cardizem, and flecainide therapy. Follows with Katerin SIERRA SR with episodes of AF this admission. 6. HTN: Observe. 7. Lipids: Statin. 8. DM    9. PARKER: CPAP. 10. History of alcohol abuse    11. Former tobacco abuse (2 PPD for many years, quit in 8/2017)    12. History of pericarditis in 11/2010    13. CAD: Recent nonischemic stress test and reportedly no significant obstructive CAD on CTA of the coronaries done today 9/13/21    Erna He D.O.   Cardiologist  Cardiology, [facility]

## 2021-10-26 NOTE — PROGRESS NOTES
[facility]  Division of Pulmonary, Critical Care Medicine  Pulmonary [address] [facility]           Pulmonary progress Note         CC :SOB ,  H/o A flutter /a fib poorly controlled     HPI :  Doing  Better,pain has improved  Less swelling in legs,diuresis well  No fever or chills  No chest pain   Resting in bed with no distress    PHYSICAL EXAMINATION:     VITAL SIGNS:  BP (!) 139/99   Pulse 92   Temp 96.5 °F (35.8 °C) (Temporal)   Resp 20   Ht 5' 11\" (1.803 m)   Wt 257 lb (116.6 kg)   SpO2 98%   BMI 35.84 kg/m²   Wt Readings from Last 3 Encounters:   10/25/21 257 lb (116.6 kg)   10/16/21 261 lb (118.4 kg)   09/13/21 258 lb 6 oz (117.2 kg)     Temp Readings from Last 3 Encounters:   10/25/21 96.5 °F (35.8 °C) (Temporal)   10/16/21 98.4 °F (36.9 °C)   09/18/21 98.3 °F (36.8 °C) (Oral)     TMAX:  BP Readings from Last 3 Encounters:   10/25/21 (!) 139/99   10/17/21 (!) 168/87   10/11/21 (!) 143/98     Pulse Readings from Last 3 Encounters:   10/25/21 92   10/17/21 76   10/11/21 99           INTAKE/OUTPUTS:  I/O last 3 completed shifts:   In: 600 [P.O.:600]  Out: 2800 [Urine:2800]    Intake/Output Summary (Last 24 hours) at 10/25/2021 2201  Last data filed at 10/25/2021 1904  Gross per 24 hour   Intake 1150 ml   Output 2600 ml   Net -1450 ml           LABS/IMAGING:    CBC:  Lab Results   Component Value Date    WBC 15.3 (H) 10/25/2021    HGB 10.7 (L) 10/25/2021    HCT 33.0 (L) 10/25/2021    MCV 95.4 10/25/2021     10/25/2021    LYMPHOPCT 13.9 (L) 10/25/2021    RBC 3.46 (L) 10/25/2021    MCH 30.9 10/25/2021    MCHC 32.4 10/25/2021    RDW 14.5 10/25/2021    NEUTOPHILPCT 71.3 10/25/2021    MONOPCT 5.2 10/25/2021    BASOPCT 0.1 10/25/2021    NEUTROABS 12.09 (H) 10/25/2021    LYMPHSABS 2.14 10/25/2021    MONOSABS 0.76 10/25/2021    EOSABS 0.00 (L) 10/25/2021    BASOSABS 0.00 10/25/2021       Recent Labs     10/25/21  0618 10/22/21  0516 10/21/21  0600 WBC 15.3* 13.9* 12.0*   HGB 10.7* 10.6* 9.8*   HCT 33.0* 31.8* 29.6*   MCV 95.4 93.5 97.7    291 256       BMP:   Recent Labs     10/24/21  0844 10/24/21  1751 10/25/21  0618    138 137   K 3.6 4.2 3.3*   CL 87* 90* 90*   CO2 35* 37* 38*   BUN 38* 41* 42*   CREATININE 0.9 1.2 1.2       MG:   Lab Results   Component Value Date    MG 2.0 10/22/2021     Ca/Phos:   Lab Results   Component Value Date    CALCIUM 9.3 10/25/2021    PHOS 4.0 10/22/2021     Amylase: No results found for: AMYLASE  Lipase:   Lab Results   Component Value Date    LIPASE 28 05/19/2011     LIVER PROFILE:   No results for input(s): AST, ALT, LIPASE, BILIDIR, BILITOT, ALKPHOS in the last 72 hours. Invalid input(s): AMYLASE,  ALB    PT/INR:   No results for input(s): PROTIME, INR in the last 72 hours. APTT:   No results for input(s): APTT in the last 72 hours. Cardiac Enzymes:  Lab Results   Component Value Date    CKTOTAL 51 11/13/2010    CKMB 0.5 11/13/2010    TROPONINI <0.01 02/15/2018       Hgb A1C:   Lab Results   Component Value Date    LABA1C 6.4 (H) 09/09/2021     No results found for: EAG  FAMILIA: No results found for: FAMILIA  ESR: No results found for: SEDRATE  CRP: No results found for: CRP  D Dimer: No results found for: DDIMER    Thyroid Studies:  Lab Results   Component Value Date    TSH 0.942 02/14/2018    F4YZCCM 4.8 02/14/2018           MICROBIOLOGY:  Pending       CXR:  Reviewed     CT Chest:reviewed     PE  Vitals:    10/25/21 2115   BP: (!) 139/99   Pulse: 92   Resp: 20   Temp: 96.5 °F (35.8 °C)   SpO2: 98%     General:  Awake, alert, oriented X 3. Well developed, well nourished, well groomed. No apparent distress. HEENT:  Normocephalic, atraumatic. Pupils equal, round, reactive to light. No scleral icterus. No conjunctival injection. Normal lips, teeth, and gums. No nasal discharge.   Neck:  Supple, FROM  Heart:  RRR, no murmurs, gallops, rubs, carotid upstroke normal, no carotid bruits  Lungs:wheeizng bilateral ,rhonchi   Abdomen:   Bowel sounds present, soft, nontender, no masses, no organomegaly, no peritoneal signs  Extremities:  No clubbing, cyanosis, or edema  Skin:  Warm and dry, no open lesions or rash  Neuro:  Cranial nerves 2-12 intact, no focal deficits  Vascular: Radial and pedal Pulses 2+  Breast: deferred  Rectal: deferred  Genitalia:  deferred    PROBLEM LIST:  Patient Active Problem List   Diagnosis    COPD (chronic obstructive pulmonary disease) ([address]    Essential hypertension    Adrenal adenoma    Class 2 obesity due to excess calories with serious comorbidity and body mass index (BMI) of 35.0 to 35.9 in adult    Anticoagulation management encounter    Typical atrial flutter ([address]    Anxiety    Status post catheter ablation of atrial flutter    Persistent atrial fibrillation ([address]    ETOH abuse    COPD with acute exacerbation ([address]    COPD exacerbation ([address]    Single subsegmental pulmonary embolism without acute cor pulmonale (HCC)               ASSESSMENT:  1- Small PE  2- Acute COPD exacerbation  3-A fib with possible acute  HFpEF  4) very severe COPD,doubt exacerbation   5.)obstructive sleep apnea  6)Afib /Flutter   7.)tobacco independent(quit 5 months ago)      PLAN:  Continue diuresis with Bumex drip ,change po as per renal   Lovenox ,on  elquis ,need to lose weight ,  Back/chest pain Most likley MS related to cough and muscle pain more than PE as no infarction seen  Continue BD    Wean steroids next 1-2  Days to PO 30 mg and wean over week ,no need for IV steroids   On Duoneb   Albuterol as needed   On  Eliquis to take it twice daily  Continue Nebs   BiPAP /CPAP at night    Prcal N   lidoderm patch   Of steroids ,will monitor  Budesinde      BRADLEY DOMINGUEZ MD  Pulmonary/Critical care Medicine   [facility] and [address] [place] [address]

## 2021-10-26 NOTE — PROGRESS NOTES
Department of Internal Medicine  Nephrology Progress Note    Events reviewed. SUBJECTIVE:  We are following Mr. Prosper Purcell for volume management. Reports feeling better, has no complaints.     PHYSICAL EXAM:      Vitals:    VITALS:  BP (!) 143/74   Pulse 78   Temp 96.3 °F (35.7 °C) (Temporal)   Resp 16   Ht 5' 11\" (1.803 m)   Wt 256 lb 12.8 oz (116.5 kg)   SpO2 99%   BMI 35.82 kg/m²   24HR INTAKE/OUTPUT:      Intake/Output Summary (Last 24 hours) at 10/26/2021 1200  Last data filed at 10/26/2021 0230  Gross per 24 hour   Intake 790 ml   Output 2300 ml   Net -1510 ml       Constitutional:  Alert and oriented, mild respiratory distress  HEENT:  Normocephalic, PERRL  Respiratory:  Diminished lung sounds, on 4L NC  Cardiovascular/Edema:  IRRR, S1,S2  Gastrointestinal:  Soft, obese, nontender, nondistended  Neurologic:  Nonfocal, ROUSE  Skin:  Warm, dry, no lesions  Other: Trace bilateral lower extremities edema     Scheduled Meds:   metOLazone  2.5 mg Oral Weekly    dexamethasone  2 mg Oral 2 times per day    potassium chloride  20 mEq Oral BID WC    bumetanide  2 mg Oral BID    apixaban  5 mg Oral BID    polyethylene glycol  17 g Oral Daily    lidocaine  1 patch TransDERmal Daily    sertraline  25 mg Oral Daily    Arformoterol Tartrate  15 mcg Nebulization BID    aspirin  81 mg Oral Daily    atorvastatin  20 mg Oral QPM    budesonide  0.5 mg Nebulization BID    dilTIAZem  240 mg Oral BID    flecainide  100 mg Oral BID    fluticasone  1 spray Each Nostril Daily    insulin glargine  20 Units SubCUTAneous BID    primidone  25 mg Oral TID    Roflumilast  500 mcg Oral Daily    insulin lispro  0-18 Units SubCUTAneous TID WC    insulin lispro  0-9 Units SubCUTAneous Nightly    ipratropium-albuterol  1 ampule Inhalation Q4H WA    sodium chloride flush  5-40 mL IntraVENous 2 times per day     Continuous Infusions:   dextrose      sodium chloride       PRN Meds:.glucose, dextrose, glucagon (rDNA), dextrose, morphine, metoprolol, oxyCODONE-acetaminophen, busPIRone, guaiFENesin-dextromethorphan, sodium chloride flush, sodium chloride, ondansetron **OR** ondansetron, acetaminophen **OR** acetaminophen, albuterol    DATA:    CBC:   Lab Results   Component Value Date    WBC 14.5 10/26/2021    RBC 3.35 10/26/2021    HGB 10.8 10/26/2021    HCT 32.3 10/26/2021    MCV 96.4 10/26/2021    MCH 32.2 10/26/2021    MCHC 33.4 10/26/2021    RDW 14.8 10/26/2021     10/26/2021    MPV 11.0 10/26/2021     CMP:    Lab Results   Component Value Date     10/26/2021    K 3.5 10/26/2021    K 3.9 10/22/2021    CL 91 10/26/2021    CO2 32 10/26/2021    BUN 45 10/26/2021    CREATININE 1.2 10/26/2021    GFRAA >60 10/26/2021    LABGLOM >60 10/26/2021    GLUCOSE 272 10/26/2021    GLUCOSE 100 05/19/2011    PROT 5.2 10/26/2021    LABALBU 3.8 10/26/2021    LABALBU 4.6 05/19/2011    CALCIUM 9.0 10/26/2021    BILITOT 0.2 10/26/2021    ALKPHOS 64 10/26/2021    AST 15 10/26/2021    ALT 28 10/26/2021     Magnesium:    Lab Results   Component Value Date    MG 2.0 10/22/2021     Phosphorus:    Lab Results   Component Value Date    PHOS 4.0 10/22/2021     Radiology Review:      CXR 10/16/21   No acute process.       Question 10 mm nodule in the left upper lung. CTA of the chest with IV contrast 10/16/21   There is no central pulmonary embolism or aortic dissection. Shayy Clement is a   small distal subsegmental pulmonary embolism in the right lower lobe.       Coronary artery calcification.       Minimal ground-glass infiltrates in the right upper lobe medially concerning   for pneumonia.       9 mm indeterminate pulmonary nodule in the right lower lobe.  Consider   surveillance according to [employer] guidelines.        BRIEF SUMMARY OF INITIAL CONSULT:    Briefly, Mr. Keri Heller is a 63year old male with a PMH of HTN, HFpEF 70-75% with stage II DD, COPD, PARKER, atrial flutter s/p cardioversion and ablation on chronic anticoagulation on apixaban, obesity, who was admitted on October 18, 2021 after presenting to the ER with chest pain. Apparently, he was also seen in the ER two days prior to admission and was diagnosed with COPD exacerbation and pneumonia. He was sent home on omnicef and azithromycin. He came back due to worsening shortness of breath and weight gain. A CTA of the chest was obtained which revealed a small distal subsegmental pulmonary embolism in the right upper lobe. We are consulted for volume management. Problems resolved:    Normal pH (venous pH 8.76), with metabolic alkalosis (contraction alkalosis). Bicarbonate level continues to rise. We will give a dose of acetazolamide today. IMPRESSION/RECOMMENDATIONS:      Decompensated HFpEF 70-75% with stage II DD, pro->>335>>1,183 continues to have adequate urine output on oral Bumex. Fluid balance since admission -21.1 L.     HTN, on diltiazem  ---------------------------------------------  PARKER, wears CPAP at night  Atrial flutter, on diltiazem and apixaban  COPD exacerbation, on methylprednisolone  Right upper lobe pulmonary embolism, on apixaban  Pneumonia, on doxycycline and ceftriaxone  Normocytic anemia    Plan:      Continue Bumex 2 mg PO BID  Add metolazone 2.5 mg weekly  Continue KCl 20 mEq PO bid  Okay to discharge from renal point of view  Follow-up with us in 2 to 3 weeks  BMP weekly x2 weeks

## 2021-10-26 NOTE — PROGRESS NOTES
CLINICAL PHARMACY: DISCHARGE MED RECONCILIATION/REVIEW    [facility]) Select Patient?: No  Total # of Interventions Recommended: 0   -   Total # Interventions Accepted: 0  Intervention Severity:   - Level 1 Intervention Present?: No   - Level 2 #: 0   - Level 3 #: 0   Time Spent (min): 15    Additional Documentation: see progress note

## 2021-10-26 NOTE — CARE COORDINATION
Called [facility] and spoke with Dominic Langley, they have DME order for rollator and they will have it delivered to the house by tomorrow. Ira Gan, MSW, LSW

## 2021-10-27 RX ORDER — METOLAZONE 2.5 MG/1
2.5 TABLET ORAL WEEKLY
Qty: 30 TABLET | Refills: 3 | Status: SHIPPED | OUTPATIENT
Start: 2021-10-27

## 2021-10-27 NOTE — PROGRESS NOTES
Spoke with Dr. Jg Borges regarding DrCecilia Rivas wanted metolzaone 2.5 weekly at discharge and was not ordered. Medicine ordered and wife Darline Rivas updated that medicine was sent to pharmacy. Pharmacy verified with wife.    Lito Graham RN

## 2021-11-08 ENCOUNTER — OFFICE VISIT (OUTPATIENT)
Dept: CARDIOLOGY CLINIC | Age: 61
End: 2021-11-08
Payer: COMMERCIAL

## 2021-11-08 VITALS
OXYGEN SATURATION: 98 % | BODY MASS INDEX: 34.86 KG/M2 | DIASTOLIC BLOOD PRESSURE: 84 MMHG | HEIGHT: 71 IN | WEIGHT: 249 LBS | RESPIRATION RATE: 28 BRPM | SYSTOLIC BLOOD PRESSURE: 138 MMHG | HEART RATE: 92 BPM

## 2021-11-08 DIAGNOSIS — I50.30 HEART FAILURE WITH PRESERVED EJECTION FRACTION, UNSPECIFIED HF CHRONICITY (HCC): Primary | ICD-10-CM

## 2021-11-08 PROCEDURE — 3017F COLORECTAL CA SCREEN DOC REV: CPT | Performed by: NURSE PRACTITIONER

## 2021-11-08 PROCEDURE — G8427 DOCREV CUR MEDS BY ELIG CLIN: HCPCS | Performed by: NURSE PRACTITIONER

## 2021-11-08 PROCEDURE — G8417 CALC BMI ABV UP PARAM F/U: HCPCS | Performed by: NURSE PRACTITIONER

## 2021-11-08 PROCEDURE — 1036F TOBACCO NON-USER: CPT | Performed by: NURSE PRACTITIONER

## 2021-11-08 PROCEDURE — G8484 FLU IMMUNIZE NO ADMIN: HCPCS | Performed by: NURSE PRACTITIONER

## 2021-11-08 PROCEDURE — 1111F DSCHRG MED/CURRENT MED MERGE: CPT | Performed by: NURSE PRACTITIONER

## 2021-11-08 PROCEDURE — 93000 ELECTROCARDIOGRAM COMPLETE: CPT | Performed by: INTERNAL MEDICINE

## 2021-11-08 PROCEDURE — 99214 OFFICE O/P EST MOD 30 MIN: CPT | Performed by: NURSE PRACTITIONER

## 2021-11-08 NOTE — PROGRESS NOTES
[facility]  Office Visit         Reason for Visit: Heart failure    Primary Cardiologist:         History of Present Illness:     Mr. Rowan Curtis is a 63year old male with a PMHx of chronic HFpEF, severe COPD, PE, CKD, PAF/flutter, HTN, HLD, T2DM, PARKER, hx of etoh abuse, tobacco abuse. He recently presented to the ED with complaints of back and right sided chest pain that has been ongoing since diagnosis of PE. He also reported shortness of breath and lower extremity edema. He was admitted to the hospital and cardiology evaluated for \"chest pain\" that was pleuritic in nature. He was IV diuresed with subjective improvement and discharged to home. He presents today in post acute heart failure hospitalization follow up. Since discharge from the hospital he has been compliant with all of his cardiac medications. He has good urinary response to oral diuretic with clear yellow urine. He has noted improvement in lower extremity edema with a 7 lb weight loss since discharge from the hospital. He does continue to have chronic shortness of breath and complaints of ongoing right sided pleuritic pain. He is due to follow up with pulmonology for ongoing complaints. He has chronic dyspnea with exertion, shortness of breath, or decline in overall functional capacity. He denies orthopnea, PND, nocturnal cough or hemoptysis. He denies abdominal distention or bloating, early satiety, anorexia/change in appetite, unintentional weight loss. He does lower extremity edema. He denies exertional lightheadedness. He denies palpitations, syncope or near syncope. Review of systems is negative for chest pain, pressure, discomfort. When ambulating on an incline, He does not leg claudication. History is negative for neurological symptoms including transient loss of vision, asymmetric weakness, aphasia, dysphasia, numbness, tingling.        Patient Active Problem List    Diagnosis Date Noted    Single subsegmental pulmonary embolism without acute cor pulmonale (HCC) 10/18/2021    [facility] 09/09/2021    COPD with acute exacerbation (HCC) 08/08/2021    Persistent atrial fibrillation ([facility] 08/03/2018    ETOH abuse 08/03/2018    Status post catheter ablation of atrial flutter 04/03/2018     CTI- typical CCW flutter, TICT ~140ms post 4/3/2018      Typical atrial flutter ([facility] 02/14/2018    Anxiety 02/14/2018    Anticoagulation management encounter     COPD (chronic obstructive pulmonary disease) ([facility] 02/04/2018    Essential hypertension 02/04/2018    Adrenal adenoma 02/04/2018    Class 2 obesity due to excess calories with serious comorbidity and body mass index (BMI) of 35.0 to 35.9 in adult 02/04/2018          Chest CTA 8/8/2021:  Impression  No evidence of pulmonary embolism or acute pulmonary abnormality. Prominent pericardial fat accounts for the abnormality seen on radiograph.     Cardiac catheterization 11/2010 ([facility]): LMT: normal. LAD: normal, gives off one large bifurcating diagonal as it courses to but ends at the apex. LCx: nondominant, gives off one large OM1, only trivial distal disease. RCA: Dominant, large, minimal disease distally.     Echocardiogram 11/2010 ([facility]): EF 55% with questionable RV dilation. Trivial to small pericardial effusion. Small echodense space near apex (? Localized effusion).  Trivial MR and TR.      WYATT: 2/7/18 ([place])   Cardiac rhythm: atrial flutter   Low normal left ventricular ejection fraction.   Moderately dilated left atrium.   No evidence of a left atrial appendage thrombus.   No evidence of interatrial shunting on bubble study.   Borderline dilated right ventricle with normal right ventricular function.   Mild-moderate mitral regurgitation.     WYATT: 2/15/18 Nain Langford)   Low normal left ventricular systolic function.   Mild to moderate mitral regurgitation.     TTE (Dr. Rosalinda Durham, 8/12/2021)  Summary   Technically limited study.   Normal left ventricular size, wall thickness, wall motion, and systolic   function.   Estimated left ventricle ejection fraction 70+/-5 %.   There is doppler evidence of stage II diastolic dysfunction.   Mildly dilated right ventricle.   Right ventricle global systolic function is normal .   Normal sized left atrium.   No significant valvular abnormalities noted.     Lexiscan Stress Test 8/13/2021:  FINDINGS: The overall quality of the study was fair. Left ventricular cavity size was noted to be dilated on both the  stress and the resting images but there was no transient ischemic  dilatation after stress  Rotational analog analysis demonstrated abnormal patient motion and  there was marked increase in tracer uptake in the abdominal organs  with the liver overshadowing the bottom of the heart  A severe defect was present in the inferior wall extending into the  lateral apical wall(s) that was moderate to largesized by  quantification. The resting images showed no change   Gated SPECT left ventricular ejection fraction was calculated to be  66%, with normal myocardial contractility and wall motion. Impression  The myocardial perfusion imaging was probably normal with the large  fixed inferior/lateral apical wall defect being more consistent with  attenuation artifact and less likely the result of a myocardial  infarction especially with the normal contractility of the inferior  wall and the lateral apical wall. More importantly, there did not  appear to be any evidence of stress-induced ischemia on this study  Overall left ventricular systolic function was normal with no regional  wall motion abnormality  Low risk general pharmacologic stress test.     Coronary CTA: 9/13/21 (Dr. Jas Avelar)  AGATSTON SCORE: Total coronary artery calcium score is 115.7, distributed as LM 0.0, LAD 60.7, LCx 0.0, RCA 54.7. Calcium score percentile is 87% based on age, gender and race. CARDIAC VALVES: Mild mitral and aortic calcifications are noted.   FUNCTION: The calculated left ventricular ejection fraction is 73%, LVEDV is 226 mL, LVESV 61 mL. CORONARY ANATOMY:  The coronary arteries arise in normal position. There is right coronary artery dominance. CORONARY CT ANGIOGRAM:  Left main: The left main coronary artery bifurcates into the LAD and LCX.  No definite angiographic evidence of significant plaque noted in the left main coronary artery. Left anterior descending: The proximal LAD has about 1 to 24% calcified plaque.  The distal LAD has about 25 to 49% soft plaque.  The apical portion of the LAD was not seen due to motion.  The proximal to mid diagonal 1 is patent.  The distal diagonal 1 is now well seen.  The proximal diagonal 2 has mild luminal irregularities.  The mid to distal diagonal artery was not well seen. Left circumflex: The left circumflex coronary artery is not well seen. Right coronary artery: The RCA is a moderate size caliber vessel with about 1 to 24% calcified plaque. The mid to distal PDA and PLB were not well seen due to motion artifact   PERICARDIUM: No definite evidence of pericardial effusion  The aortic at the sinuses of Valsalva measures 3.6 x 3.5 x 3.9 cm  The sinotubular junction measures 3.1 x 3.2 cm  The mid ascending aorta measures 3.6 x 3.6 cm  The pulmonary artery measures 3.2 x 3.0 cm  4 pulmonary veins enter the left atrium (2 on the left and 2 on the right). IMPRESSION:  Very difficult study due to technical difficulties, poor contrast opacification and motion. 1.  Mild nonobstructive coronary artery disease noted as mentioned above (about 25 to 49% distal LAD plaque).  The apical and inferior apical portion of the LAD was not seen due to motion artifact.  The left circumflex system were not well seen due to motion artifact. 2. Normal left ventricular systolic function with estimated left ventricular ejection fraction of 73%. 3.  The total calcium score is 115 with calcium score percentile of 87% based on age gender and race.      Past Medical History:   Diagnosis Date    [facility] ([facility]     COPD (chronic obstructive pulmonary disease) ([facility]     Hypertension        Past Surgical History:   Procedure Laterality Date    ATRIAL ABLATION SURGERY  04/03/2018    BACK SURGERY      CARDIOVERSION  02/07/2018    Dr. Nishant Francis- 80 J converted to SB    TRANSESOPHAGEAL ECHOCARDIOGRAM  02/07/2018    Dr. Nishant Francis- No thrombus       No Known Allergies      Outpatient Medications Marked as Taking for the 11/8/21 encounter (Office Visit) with SERA Vega - CNP   Medication Sig Dispense Refill    metOLazone (ZAROXOLYN) 2.5 MG tablet Take 1 tablet by mouth once a week 30 tablet 3    potassium chloride (KLOR-CON M) 20 MEQ extended release tablet Take 1 tablet by mouth 2 times daily (with meals) 60 tablet 3    primidone (MYSOLINE) 50 MG tablet Take 25 mg by mouth nightly       sertraline (ZOLOFT) 25 MG tablet Take 25 mg by mouth daily      insulin glargine (LANTUS) 100 UNIT/ML injection vial Inject 15 Units into the skin 2 times daily (Patient taking differently: Inject 20 Units into the skin 2 times daily ) 10 mL 3    busPIRone (BUSPAR) 10 MG tablet Take 1 tablet by mouth 3 times daily as needed (ANXIETY) 60 tablet 0    albuterol (PROVENTIL) (2.5 MG/3ML) 0.083% nebulizer solution Take 3 mLs by nebulization every 6 hours as needed for Wheezing 120 each 3    ipratropium (ATROVENT) 0.02 % nebulizer solution Take 2.5 mLs by nebulization 4 times daily 2.5 mL 3    Arformoterol Tartrate (BROVANA) 15 MCG/2ML NEBU Take 2 mLs by nebulization 2 times daily 120 mL 3    Roflumilast (DALIRESP) 500 MCG tablet Take 1 tablet by mouth daily 30 tablet 1    bumetanide (BUMEX) 2 MG tablet Take 1 tablet by mouth 2 times daily 30 tablet 3    Aspirin 81 MG CAPS Take 81 mg by mouth daily       Budeson-Glycopyrrol-Formoterol (BREZTRI AEROSPHERE) 160-9-4.8 MCG/ACT AERO Inhale 2 puffs into the lungs 2 times daily 3 Inhaler 0    apixaban (ELIQUIS) 5 MG TABS tablet Take 1 tablet by mouth 2 times daily 30 tablet 5    flecainide (TAMBOCOR) 100 MG tablet TAKE 1 TABLET BY MOUTH TWICE DAILY 60 tablet 5    diltiazem (CARDIZEM CD) 240 MG extended release capsule Take 1 capsule by mouth 2 times daily 60 capsule 11    atorvastatin (LIPITOR) 20 MG tablet Take 20 mg by mouth every evening       albuterol sulfate  (90 Base) MCG/ACT inhaler Inhale 2 puffs into the lungs every 6 hours as needed for Wheezing      ipratropium-albuterol (DUONEB) 0.5-2.5 (3) MG/3ML SOLN nebulizer solution Inhale 1 vial into the lungs every 4 hours         Review of Systems:   Cardiac: As per HPI  General: No fever, chills, rigors  Pulmonary: As per HPI  HEENT: No visual disturbances, difficult swallowing  GI: No nausea, vomiting, abdominal pain  : No dysuria or hematuria  Endocrine: No thyroid disease or diabetes  Musculoskeletal: ROUSE x 4, no focal motor deficits  Skin: Intact, no rashes  Neuro/Psych: No headache or seizures          Weights: Wt Readings from Last 3 Encounters:   11/08/21 249 lb (112.9 kg)   10/26/21 256 lb 12.8 oz (116.5 kg)   10/16/21 261 lb (118.4 kg)             Physical Examination:     /84   Pulse 92   Resp 28   Ht 5' 11\" (1.803 m)   Wt 249 lb (112.9 kg)   SpO2 98%   BMI 34.73 kg/m²     CONSTITUTIONAL: Alert and oriented times 3, no acute distress and cooperative to examination with proper mood and affect. SKIN: Skin color, texture, turgor normal. No rashes or lesions. LYMPH: no cervical nodes, no inguinal nodes  HEENT: Head is normocephalic, atraumatic. EOMI, PERRLA. NECK: Supple, symmetrical, trachea midline, no adenopathy, thyroid symmetric, not enlarged and no tenderness, skin normal.  CHEST/LUNGS: chest symmetric with normal A/P diameter, normal respiratory rate and rhythm, lungs diminished with scattered end expiratory wheeze. No rales or rhonchi. No accessory muscle use.  Scars None   CARDIOVASCULAR: Heart sounds are normal.  Regular rate and rhythm without murmur, gallop or rub. Normal S1 and S2. . Carotid and femoral pulses 2+/4 and equal bilaterally. ABDOMEN: Obese. No and Laparoscopic scar(s) present. Normal bowel sounds. No bruits. soft, nondistended, no masses or organomegaly. no evidence of hernia. Percussion: Normal without hepatosplenomegally. Tenderness: absent. RECTAL: deferred, not clinically indicated  NEUROLOGIC: There are no focalizing motor or sensory deficits. CN II-XII are grossly intact. Laura Raddle EXTREMITIES: no cyanosis, no clubbing. Trace bilateral lower extremity edema. Warm and well perfused. All the following diagnostics were personally reviewed and interpreted by me. LAB DATA:     10/26/2021 04:46   Sodium 136   Potassium 3.5   Chloride 91 (L)   CO2 32 (H)   BUN 45 (H)   Creatinine 1.2   Anion Gap 13   GFR Non- >60   GFR African American >60   Glucose 272 (H)   Calcium 9.0   Total Protein 5.2 (L)   Meter Glucose    Albumin 3.8   Alk Phos 64   ALT 28   AST 15   Bilirubin 0.2   WBC 14.5 (H)   RBC 3.35 (L)   Hemoglobin Quant 10.8 (L)   Hematocrit 32.3 (L)   MCV 96.4   MCH 32.2   MCHC 33.4   MPV 11.0   RDW 14.8   Platelet Count 463       IMAGING:    CTA Pulmonary (10/16/2021)  Impression:  There is no central pulmonary embolism or aortic dissection. Robin Paige is a small distal subsegmental pulmonary embolism in the right lower lobe. Coronary artery calcification. Minimal ground-glass infiltrates in the right upper lobe medially concerning for pneumonia. 9 mm indeterminate pulmonary nodule in the right lower lobe.  Consider surveillance according to [employer] guidelines. CARDIAC TESTING:    Cardiac catheterization 11/2010 ([facility]):   LMT: normal. LAD: normal, gives off one large bifurcating diagonal as it courses to but ends at the apex. LCx: nondominant, gives off one large OM1, only trivial distal disease. RCA: Dominant, large, minimal disease distally.     NM Stress (8/13/2021)  The resting images showed no change   Gated SPECT left ventricular ejection fraction was calculated to be 66%, with normal myocardial contractility and wall motion. Impression: The myocardial perfusion imaging was probably normal with the large fixed inferior/lateral apical wall defect being more consistent with attenuation artifact and less likely the result of a myocardial infarction especially with the normal contractility of the inferior wall and the lateral apical wall. More importantly, there did not appear to be any evidence of stress-induced ischemia on this study  Overall left ventricular systolic function was normal with no regional wall motion abnormality  Low risk general pharmacologic stress test.     TTE (8/12/2021)   Summary:   Technically limited study. Normal left ventricular size, wall thickness, wall motion, and systolic function. Estimated left ventricle ejection fraction 70+/-5 %. There is doppler evidence of stage II diastolic dysfunction. Mildly dilated right ventricle. Right ventricle global systolic function is normal .   Normal sized left atrium. No significant valvular abnormalities noted. EKG  Sinus Rhythm   Nonspecific QRS widening. Nonspecific ST depression       ASSESSMENT:  1. Chronic HFpEF  2. ACC stage C / NYHA class IIIb  3. Near euvolemic however difficult examination given patient multifactoral symptoms  4. Mild CAD by [facility] (11/2010), nonischemic stress test (8/2021) and reportedly no significant obstructive CAD on CTA of the coronaries (9/13/21). 5. Pleuritic chest / lung discomfort   6. Chronic hypoxic respiratory failure with severe COPD - on supplemental O2  7. Paroxysmal atrial fibrillation/typical flutter status-post AF ablation in April 2018.  On Eliquis PTA, cardizem, and flecainide therapy.  Follows with [facility]  8. HTN  9. Pulmonary embolism (\"small distal subsegmental pulmonary embolism in the right lower lobe\" on 10/16/21 CTA chest)  10. CKD  11.  T2DM  HLD - on statin therapy   12. PARKER - compliant with CPAP  13. Morbid obesity   14. Hx of etoh abuse   15. Former tobacco abuse (2 PPD for many years, quit in 8/2017)      PLAN:  1. Continue current cardiac medications    2. Referral to CHF clinic at [address] [place] [address] [address] [id number] in 1 week    3. Follow up with Dr. Jurgen Braeux in 1 month    4. Weigh yourself daily    -Stay Hydrated    -Diet should sodium restricted to 2 grams    -Again watch your daily weight trends and if you gain water weight please follow below instructions.    -If you gain 3-5 pounds in 2-3 days OR notice that you are retaining fluid in anyway just like you did before then take an extra dose of your water pill (bumetanide/Bumex) every day until you lose the weight or feel better.    -If you notice that you have taken more than 2 extra doses in 1 week then please call and let us know. -If at any time you feel that you are retaining fluid, your medications are not working, or you feel ill in anyway, then please call us for either same day appointment or the next day, and for instructions. Our goal is to keep you out of the emergency room and the hospital and we have ways to do it. You just need to call us in a timely manner.     -If you become sick for other reasons, and notice that you are not urinating as much, the urine is very dark, you have significant diarrhea or vomiting, then please DO NOT take your water pill and CALL US immediately.     Irvin ZAMORA-CNP  [facility]

## 2021-11-08 NOTE — PATIENT INSTRUCTIONS
1. Continue current cardiac medications    2. Referral to CHF clinic at [address] [place] [id number] in 1 week    3. Follow up with Dr. Fernanda Garnica in 1 month    4. Weigh yourself daily    -Stay Hydrated    -Diet should sodium restricted to 2 grams    -Again watch your daily weight trends and if you gain water weight please follow below instructions.    -If you gain 3-5 pounds in 2-3 days OR notice that you are retaining fluid in anyway just like you did before then take an extra dose of your water pill (bumetanide/Bumex) every day until you lose the weight or feel better.    -If you notice that you have taken more than 2 extra doses in 1 week then please call and let us know. -If at any time you feel that you are retaining fluid, your medications are not working, or you feel ill in anyway, then please call us for either same day appointment or the next day, and for instructions. Our goal is to keep you out of the emergency room and the hospital and we have ways to do it. You just need to call us in a timely manner.     -If you become sick for other reasons, and notice that you are not urinating as much, the urine is very dark, you have significant diarrhea or vomiting, then please DO NOT take your water pill and CALL US immediately.

## 2021-11-15 ENCOUNTER — TELEPHONE (OUTPATIENT)
Dept: OTHER | Age: 61
End: 2021-11-15

## 2021-11-15 NOTE — TELEPHONE ENCOUNTER
Pt's wife called into clinic to cancel new consult appt scheduled for today at 8:30. Per wife pt does not wish to establish at [facility] CHF clinic at this time. He will call back to schedule if he wishes to do so in the future.    Brian Zuluaga RN

## 2021-11-16 ENCOUNTER — VIRTUAL VISIT (OUTPATIENT)
Dept: PULMONOLOGY | Age: 61
End: 2021-11-16
Payer: COMMERCIAL

## 2021-11-16 DIAGNOSIS — Z51.81 ANTICOAGULATION MANAGEMENT ENCOUNTER: ICD-10-CM

## 2021-11-16 DIAGNOSIS — Z79.01 ANTICOAGULATION MANAGEMENT ENCOUNTER: ICD-10-CM

## 2021-11-16 PROCEDURE — 1111F DSCHRG MED/CURRENT MED MERGE: CPT | Performed by: INTERNAL MEDICINE

## 2021-11-16 PROCEDURE — 99214 OFFICE O/P EST MOD 30 MIN: CPT | Performed by: INTERNAL MEDICINE

## 2021-11-16 RX ORDER — OMEPRAZOLE 20 MG/1
40 CAPSULE, DELAYED RELEASE ORAL
Qty: 30 CAPSULE | Refills: 0 | Status: SHIPPED
Start: 2021-11-16 | End: 2021-11-16

## 2021-11-16 NOTE — PROGRESS NOTES
[facility]  Division of Pulmonary, [facility]           Pulmonary Clinic consult       Patient: Lay Benedict  MRN: [id number]  : 1960    This is Virtual visit Video     CC :SOB ,follow up after hospital admission   H/o A flutter     HPI :  Lay Benedict is a 66y.o. year old smoking at age 15 and increased gradually to 2 pack daily, he quit a few months ago came into the hospital with shortness of breath, associated with cough, productive of yellow sputum along with chest tightness with no fever or chills or numbness or chest pain     The patient is known to have COPD and he is O2 dependent throat he used to liters at home     The patient also developed A. fib and he was started on Cardizem drip, he was advised to have cardioversion, and pulmonary was asked to see the patient     And also was tested positive for influenza A      Patient had CTA which shows no lung lesion questionable hilar adenopathy  Patient  already in Eliquis 5 mg twice daily. He was given TamiFlu Tamiflu 75 b.i.d.     CAT scan did not show any evidence of lung nodule or masses      Patient came for follow-up visit today he has been feeling well, he feel that his breathing has become much easier, he also has less cough and shortness of breath, he can walk more and he is not requiring any oxygen    During his last admission he had cardioversion by cardiology for a flutter, and he was discharged home in Cardizem 240 mg daily in addition to his anticoagulation    He is also done with tapering dose of steroids    Today visit: 2020   SOB has been fair   Denies any CP   On fluticasone nasal spray 50 mcg    On 4 L   Had COVID lynn    No Hemoptysis   Worsening SOB   He used BiPAP  seems comfortable with it but sometimes he does not feel that it's giving him   Also take his Eliquis once daily due to insurance issue    PHYSICAL Eliquis to take it twice daily  Continue Nebs   using his BiPAP   Activity has been low ,lost appetite and he not able to do well ,in 4 l   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

## 2021-11-16 NOTE — PROGRESS NOTES
TeleMedicine Video Visit    Tana Ahumada, was evaluated through a synchronous (real-time) audio-video encounter. The patient (or guardian if applicable) is aware that this is a billable service. Verbal consent to proceed has been obtained within the past 12 months. The visit was conducted pursuant to the emergency declaration under the [facility], [address] [place] [address] and the [employer]. Patient identification was verified, and a caregiver was present when appropriate. The patient was located in a state where the provider was credentialed to provide care. Patient identification was verified at the start of the visit, including the patient's telephone number and physical location. I discussed with the patient the nature of our telehealth visits, that:     Due to the nature of an audio- video modality, the only components of a physical exam that could be done are the elements supported by direct observation. I would evaluate the patient and recommend diagnostics and treatments based on my assessment. If it was felt that the patient should be evaluated in clinic or an emergency room setting, then they would be directed there. Our sessions are not being recorded and that personal health information is protected. Our team would provide follow up care in person if/when the patient needs it. Patient's location: home address in [address] [place] [address] Dr  Physician  location other address in [facility] other people involved in call  Wife      On this date 11/16/2021 I have spent 15 minutes reviewing previous notes, test results and face to face (virtual) with the patient discussing the diagnosis and importance of compliance with the treatment plan as well as documenting on the day of the visit. This visit was completed virtually using Doxy. me     Hospital follow up via video with wife on phone today.  Pt havign good response from diuretics. Using his O2 at 4 liters NC with O2 sat in low 90%. Pt and wife very aware of heart issues causing some issues. Pt having weight loss and is down approx 8-10 lbs since hospital. Reports dry heaves, no vomiting. May have some issues with stomach. Omeprazole to be ordered. Pt to follow up and let office know if dry heaves continue and we will follow up. Advised to increase O2 flow to 5 Liters to see if O2 improves. Discussed follow up in office in the next 3 mos; appt card to be mailed. Instructed pt to follow up with ER if any issues with O2 remaining in the 90's and if dry heaving does not improve.

## 2021-11-17 ENCOUNTER — TELEPHONE (OUTPATIENT)
Dept: PALLATIVE CARE | Age: 61
End: 2021-11-17

## 2021-11-17 NOTE — TELEPHONE ENCOUNTER
Returned call to pt wife Nathaniel Mensah who called inquiring about having Palliative med for her . Pt was seen by PM during his recent hospital stay, wife was inquiring about a home visit for pt with PM. Unfortunately [facility] does not seen pts outside of the [facility] area and pt currently resides in [place], [place] in [place]. Discussed with pt wife pt current status, she reports he is not eating much and he is sleeping off and on much of the day. When eating pt is having difficulty with food and swallowing and ends up \"gagging\" much of the time.  discussed with Nathaniel Mensah the possible need for hospice care at this time as pt has refused to attend recent doctor appts to CHF clinic and pulmonologist, pt stated \"he just can't, he is too tired\". Spoke with wife about signs of need for hospice. Nathaniel Mensah is on board of understanding what pt needs but reports that pt refuses to go back to the hospital if needed, refuses to sign a DNR, states he does not want to be intubated and does not want to have a discussion about hospice care at this time. Pt refused to sign a DNR while in the hospital as well, but states \"he does not want to be on a ventilator\". Wife is frustrated at this time because she is unsure what to do to help him, pt had a negative experience with a family member in hospice and hospice house and says Oswald Flores is not going to die the same way\" per wife report. Helped wife identify some hospice agencies in their immediate area and  will also try to identify some palliative groups in the [facility] that could possibly service pt needs. Provided support and empathic listening to pt wife during conversation, reiterated again wife trying to have a conversation with pt regarding hospice care, wife states Oswald Flores gets very angry and says he is too young to die. \". Wife provided an email address to have some information regarding hospice and palliative medicine sent to her by .

## 2021-11-23 NOTE — DISCHARGE SUMMARY
Hospital Medicine Discharge Summary    Patient ID: Saundra Muniz      Patient's PCP: Davy Hewitt DO    Admit Date: 10/18/2021     Discharge Date: 10/26/2021      Admitting Physician: Jaciel Street MD     Discharge Physician: Magy Flood MD     Discharge Diagnoses: Active Hospital Problems    Diagnosis Date Noted    Single subsegmental pulmonary embolism without acute cor pulmonale (HCC) [I26.93] 10/18/2021       The patient was seen and examined on day of discharge and this discharge summary is in conjunction with any daily progress note from day of discharge. Hospital Course:      63 y. o. male with past medical history of COPD atrial flutter hypertension and DM Patient present to the Ed due to right sided chest pain that radiates into his back . Patient was seen in ED on 10/16 and diagnosed with COPD exacerbation and pneumonia  Patient was offered admission but decided not to stay . He was discharged with [facility] and [address] [place] [address] . Patient reports worsening shortness of breath and weight gain . He reports a non productive cough He reports bilateral legt swelling bilateral leg swelling  He report no fever chills abdominal pain nausea diarrhea. Patient uses 4L nasal canula daily . In the ED [facility] was hemodynamically stable  He is saturating at 97% on 4 L nasal cannula. Ab data reveal sodium 143 creatinine 1.1 BUN 31 Glucoses 162  Trop 16 EKG revealed normal sinus rhythm 1st degree AV block  HGB 10.2  CXR revealed no acute precess. CTA revealed small  distal segmental  In right lower lobe  . Patient takes Eliquis daily . CTA also revealed RUL pneumonia. Pt was started on IV steroids and later transitioned to Oral steroids. HE refused prednisone and has been started on Decadron. He was started on Lovenox and has been switched to Eliquis. He was also started on Bumex drip and now switched to oral Bumex.   Discharged in stable condition to home w [facility] on 10/26/21           Exam:     BP (!) 143/74 Pulse 78   Temp 96.3 °F (35.7 °C) (Temporal)   Resp 16   Ht 5' 11\" (1.803 m)   Wt 256 lb 12.8 oz (116.5 kg)   SpO2 99%   BMI 35.82 kg/m²     General appearance: No apparent distress, appears stated age and cooperative. HEENT:  Conjunctivae/corneas clear. Neck: Supple. No jugular venous distention. Respiratory: Diminished. Some wheezing. Cardiovascular: Regular rate rhythm, normal S1-S2  Abdomen: Soft, nontender, nondistended  Musculoskeletal: No clubbing, cyanosis, 2+ bilateral lower extremity edema. Brisk capillary refill. Skin:  No rashes  on visible skin  Neurologic: awake, alert and following commands        Consults:     IP CONSULT TO INTERNAL MEDICINE  IP CONSULT TO INTERNAL MEDICINE  IP CONSULT TO INTERNAL MEDICINE  IP CONSULT TO PULMONOLOGY  IP CONSULT TO PALLIATIVE CARE  IP CONSULT TO CARDIOLOGY  IP CONSULT TO NEPHROLOGY    Significant Diagnostic Studies:     XR CHEST PORTABLE   Final Result   1. No pneumonia or pleural effusion. 2. Emphysematous changes. Disposition:  Home w [facility]      Discharge Instructions/Follow-up:  Keep scheduled follow up appointments. Take medications as prescribed. Code Status:  Prior     Activity: activity as tolerated    Diet: cardiac diet    Labs:  For convenience and continuity at follow-up the following most recent labs are provided:      CBC:    Lab Results   Component Value Date    WBC 14.5 10/26/2021    HGB 10.8 10/26/2021    HCT 32.3 10/26/2021     10/26/2021       Renal:    Lab Results   Component Value Date     11/09/2021    K 3.3 11/09/2021    K 3.9 10/22/2021    CL 88 11/09/2021    CO2 32 11/09/2021    BUN 20 11/09/2021    CREATININE 1.1 11/09/2021    CALCIUM 9.7 11/09/2021    PHOS 4.0 10/22/2021       Discharge Medications:     Discharge Medication List as of 10/26/2021 11:38 AM           Details   lidocaine 4 % external patch Place 1 patch onto the skin daily for 15 days, TransDERmal, DAILY Starting Tue 10/26/2021, Until Wed 11/10/2021, For 15 days, Disp-15 patch, R-0, Normal      oxyCODONE-acetaminophen (PERCOCET) 5-325 MG per tablet Take 1 tablet by mouth every 6 hours as needed for Pain for up to 7 days. , Disp-28 tablet, R-0Print      dexamethasone (DECADRON) 2 MG tablet Take 1 tablet by mouth every 12 hours for 5 days, THEN 0.5 tablets every 12 hours for 5 days. , Disp-15 tablet, R-0Normal      morphine 10 MG/5ML solution Take 1.3 mLs by mouth every 2 hours as needed (dyspnea) for up to 3 days. , Disp-15 mL, R-0Normal              Details   potassium chloride (KLOR-CON M) 20 MEQ extended release tablet Take 1 tablet by mouth 2 times daily (with meals), Disp-60 tablet, R-3Normal              Details   primidone (MYSOLINE) 50 MG tablet Take 25 mg by mouth nightly Historical Med      sertraline (ZOLOFT) 25 MG tablet Take 25 mg by mouth dailyHistorical Med      insulin glargine (LANTUS) 100 UNIT/ML injection vial Inject 15 Units into the skin 2 times daily, Disp-10 mL, R-3Normal      busPIRone (BUSPAR) 10 MG tablet Take 1 tablet by mouth 3 times daily as needed (ANXIETY), Disp-60 tablet, R-0Normal      albuterol (PROVENTIL) (2.5 MG/3ML) 0.083% nebulizer solution Take 3 mLs by nebulization every 6 hours as needed for Wheezing, Disp-120 each, R-3Normal      ipratropium (ATROVENT) 0.02 % nebulizer solution Take 2.5 mLs by nebulization 4 times daily, Disp-2.5 mL, R-3Normal      Arformoterol Tartrate (BROVANA) 15 MCG/2ML NEBU Take 2 mLs by nebulization 2 times daily, Disp-120 mL, R-3Normal      budesonide (PULMICORT) 0.5 MG/2ML nebulizer suspension Take 2 mLs by nebulization 2 times daily, Disp-60 each, R-3Normal      guaiFENesin 400 MG tablet Take 1 tablet by mouth 3 times daily, Disp-56 tablet, R-0Normal      Roflumilast (DALIRESP) 500 MCG tablet Take 1 tablet by mouth daily, Disp-30 tablet, R-1Normal      bumetanide (BUMEX) 2 MG tablet Take 1 tablet by mouth 2 times daily, Disp-30 tablet, R-3Normal      Aspirin 81 MG CAPS Take 81 mg by mouth dailyHistorical Med      Budeson-Glycopyrrol-Formoterol (BREZTRI AEROSPHERE) 160-9-4.8 MCG/ACT AERO Inhale 2 puffs into the lungs 2 times daily, Disp-3 Inhaler, R-0Sample      apixaban (ELIQUIS) 5 MG TABS tablet Take 1 tablet by mouth 2 times daily, Disp-30 tablet, R-5Normal      flecainide (TAMBOCOR) 100 MG tablet TAKE 1 TABLET BY MOUTH TWICE DAILY, Disp-60 tablet, R-5This prescription was filled on 1/15/2019. Any refills authorized will be placed on file. Normal      diltiazem (CARDIZEM CD) 240 MG extended release capsule Take 1 capsule by mouth 2 times daily, Disp-60 capsule, R-11Normal      atorvastatin (LIPITOR) 20 MG tablet Take 20 mg by mouth every evening Historical Med      albuterol sulfate  (90 Base) MCG/ACT inhaler Inhale 2 puffs into the lungs every 6 hours as needed for WheezingHistorical Med      ipratropium-albuterol (DUONEB) 0.5-2.5 (3) MG/3ML SOLN nebulizer solution Inhale 1 vial into the lungs every 4 hoursHistorical Med             Time Spent on discharge is more than 1 hour in the examination, evaluation, counseling and review of medications and discharge plan.       Signed:    Magy Flood MD   11/23/2021

## 2021-12-27 ENCOUNTER — TELEPHONE (OUTPATIENT)
Dept: OTHER | Age: 61
End: 2021-12-27

## 2022-01-19 ENCOUNTER — OFFICE VISIT (OUTPATIENT)
Dept: CARDIOLOGY CLINIC | Age: 62
End: 2022-01-19
Payer: COMMERCIAL

## 2022-01-19 VITALS
HEART RATE: 66 BPM | WEIGHT: 263.4 LBS | HEIGHT: 71 IN | SYSTOLIC BLOOD PRESSURE: 126 MMHG | DIASTOLIC BLOOD PRESSURE: 62 MMHG | BODY MASS INDEX: 36.88 KG/M2 | RESPIRATION RATE: 16 BRPM

## 2022-01-19 DIAGNOSIS — I10 ESSENTIAL HYPERTENSION: ICD-10-CM

## 2022-01-19 DIAGNOSIS — G47.33 OSA (OBSTRUCTIVE SLEEP APNEA): ICD-10-CM

## 2022-01-19 DIAGNOSIS — I48.19 PERSISTENT ATRIAL FIBRILLATION (HCC): ICD-10-CM

## 2022-01-19 DIAGNOSIS — I50.32 CHRONIC HEART FAILURE WITH PRESERVED EJECTION FRACTION (HFPEF) (HCC): Primary | ICD-10-CM

## 2022-01-19 DIAGNOSIS — R07.2 PRECORDIAL PAIN: ICD-10-CM

## 2022-01-19 PROCEDURE — G8484 FLU IMMUNIZE NO ADMIN: HCPCS | Performed by: INTERNAL MEDICINE

## 2022-01-19 PROCEDURE — G8417 CALC BMI ABV UP PARAM F/U: HCPCS | Performed by: INTERNAL MEDICINE

## 2022-01-19 PROCEDURE — 99214 OFFICE O/P EST MOD 30 MIN: CPT | Performed by: INTERNAL MEDICINE

## 2022-01-19 PROCEDURE — 1036F TOBACCO NON-USER: CPT | Performed by: INTERNAL MEDICINE

## 2022-01-19 PROCEDURE — 3017F COLORECTAL CA SCREEN DOC REV: CPT | Performed by: INTERNAL MEDICINE

## 2022-01-19 PROCEDURE — 93000 ELECTROCARDIOGRAM COMPLETE: CPT | Performed by: INTERNAL MEDICINE

## 2022-01-19 PROCEDURE — G8427 DOCREV CUR MEDS BY ELIG CLIN: HCPCS | Performed by: INTERNAL MEDICINE

## 2022-01-19 RX ORDER — FLUTICASONE PROPIONATE 50 MCG
SPRAY, SUSPENSION (ML) NASAL
COMMUNITY
Start: 2021-12-29

## 2022-01-19 RX ORDER — HYDROXYZINE HYDROCHLORIDE 10 MG/1
TABLET, FILM COATED ORAL
Status: ON HOLD | COMMUNITY
Start: 2021-12-10 | End: 2022-04-29 | Stop reason: HOSPADM

## 2022-01-19 RX ORDER — CYCLOBENZAPRINE HCL 5 MG
TABLET ORAL
COMMUNITY
Start: 2021-11-15

## 2022-01-19 RX ORDER — LOSARTAN POTASSIUM 50 MG/1
TABLET ORAL
COMMUNITY
Start: 2022-01-05

## 2022-01-19 NOTE — PROGRESS NOTES
OUTPATIENT CARDIOLOGY FOLLOW-UP    Name: Manuel Garvin    Age: 63 y.o. Date of Service: 1/19/2022    Chief Complaint: Follow-up for acute on chronic HFpEF, chest pain    Interim History:  Currently with no chest pain or palpitations. Long-standing history of SOB (no respiratory distress at rest). +LE edema (improved recently). ++diuresis during recent hospitalizations. He reports a definite clinical improvement over the past ~ 3 weeks. SR with episodes of AF with RVR on telemetry during recent hospitalization --> SR on EKG today. BP today 126/62.     Review of Systems:   Cardiac: As per HPI  General: No fever, chills  Pulmonary: As per HPI  HEENT: No visual disturbances, difficult swallowing  GI: No nausea, vomiting  : No dysuria, hematuria  Endocrine: No thyroid disease or DM  Musculoskeletal: ROUSE x 4, no focal motor deficits  Skin: Intact, no rashes  Neuro: No headache, seizures  Psych: Currently with no depression, anxiety    Problem List:  Patient Active Problem List   Diagnosis    [facility]) [facility]    Essential hypertension    Adrenal adenoma    Class 2 obesity due to excess calories with serious comorbidity and body mass index (BMI) of 35.0 to 35.9 in adult    Anticoagulation management encounter    Typical atrial flutter ([address]    Anxiety    Status post catheter ablation of atrial flutter    Persistent atrial fibrillation (HCC)    ETOH abuse    COPD with acute exacerbation ([facility]    COPD exacerbation ([facility]    Single subsegmental pulmonary embolism without acute cor pulmonale (HCC)       Allergies:  No Known Allergies    Current Medications:  Current Outpatient Medications   Medication Sig Dispense Refill    cyclobenzaprine (FLEXERIL) 5 MG tablet TAKE 1 TABLET BY MOUTH THREE TIMES DAILY AS NEEDED      fluticasone (FLONASE) 50 MCG/ACT nasal spray SPRAY 1 SPRAY IN EACH NOSTRIL DAILY      hydrOXYzine (ATARAX) 10 MG tablet TAKE 1 TABLET BY MOUTH THREE TIMES DAILY AS NEEDED FOR PANIC      losartan (COZAAR) 50 MG tablet TAKE 1 TABLET BY MOUTH ONCE DAILY      metOLazone (ZAROXOLYN) 2.5 MG tablet Take 1 tablet by mouth once a week 30 tablet 3    potassium chloride (KLOR-CON M) 20 MEQ extended release tablet Take 1 tablet by mouth 2 times daily (with meals) 60 tablet 3    primidone (MYSOLINE) 50 MG tablet Take 25 mg by mouth nightly       sertraline (ZOLOFT) 50 MG tablet Take 50 mg by mouth daily       insulin glargine (LANTUS) 100 UNIT/ML injection vial Inject 15 Units into the skin 2 times daily (Patient taking differently: Inject 20 Units into the skin 2 times daily ) 10 mL 3    busPIRone (BUSPAR) 10 MG tablet Take 1 tablet by mouth 3 times daily as needed (ANXIETY) 60 tablet 0    albuterol (PROVENTIL) (2.5 MG/3ML) 0.083% nebulizer solution Take 3 mLs by nebulization every 6 hours as needed for Wheezing 120 each 3    ipratropium (ATROVENT) 0.02 % nebulizer solution Take 2.5 mLs by nebulization 4 times daily 2.5 mL 3    Arformoterol Tartrate (BROVANA) 15 MCG/2ML NEBU Take 2 mLs by nebulization 2 times daily 120 mL 3    budesonide (PULMICORT) 0.5 MG/2ML nebulizer suspension Take 2 mLs by nebulization 2 times daily 60 each 3    Roflumilast (DALIRESP) 500 MCG tablet Take 1 tablet by mouth daily 30 tablet 1    bumetanide (BUMEX) 2 MG tablet Take 1 tablet by mouth 2 times daily 30 tablet 3    Aspirin 81 MG CAPS Take 81 mg by mouth daily       Budeson-Glycopyrrol-Formoterol (BREZTRI AEROSPHERE) 160-9-4.8 MCG/ACT AERO Inhale 2 puffs into the lungs 2 times daily 3 Inhaler 0    apixaban (ELIQUIS) 5 MG TABS tablet Take 1 tablet by mouth 2 times daily 30 tablet 5    flecainide (TAMBOCOR) 100 MG tablet TAKE 1 TABLET BY MOUTH TWICE DAILY 60 tablet 5    diltiazem (CARDIZEM CD) 240 MG extended release capsule Take 1 capsule by mouth 2 times daily 60 capsule 11    atorvastatin (LIPITOR) 20 MG tablet Take 20 mg by mouth every evening       albuterol sulfate HFA 108 (90 Base) MCG/ACT inhaler Inhale 2 puffs into the lungs every 6 hours as needed for Wheezing      ipratropium-albuterol (DUONEB) 0.5-2.5 (3) MG/3ML SOLN nebulizer solution Inhale 1 vial into the lungs every 4 hours      guaiFENesin 400 MG tablet Take 1 tablet by mouth 3 times daily (Patient not taking: Reported on 11/8/2021) 56 tablet 0     No current facility-administered medications for this visit. Physical Exam:  /62   Pulse 66   Resp 16   Ht 5' 11\" (1.803 m)   Wt 263 lb 6.4 oz (119.5 kg)   BMI 36.74 kg/m²   Wt Readings from Last 3 Encounters:   01/19/22 263 lb 6.4 oz (119.5 kg)   11/08/21 249 lb (112.9 kg)   10/26/21 256 lb 12.8 oz (116.5 kg)     Appearance: Awake, alert, no acute respiratory distress  Skin: Intact, no rash  Head: Normocephalic, atraumatic  Eyes: EOMI, no conjunctival erythema  ENMT: No pharyngeal erythema, MMM, no rhinorrhea  Neck: Supple, no carotid bruits  Lungs: B/L wheezing, no rales  Cardiac: RRR, no murmurs apparent  Abdomen: Soft, nontender, +bowel sounds  Extremities: Moves all extremities x 4, +lower extremity edema (improved)  Neurologic: No focal motor deficits apparent, normal mood and affect    Intake/Output:  No intake or output data in the 24 hours ending 01/19/22 1334  I/O this shift:  In: -   Out: 700 [Urine:700]    Laboratory Tests:  Lab Results   Component Value Date    CREATININE 1.1 11/23/2021    BUN 16 11/23/2021     11/23/2021    K 3.4 (L) 11/23/2021    CL 91 (L) 11/23/2021    CO2 36 (H) 11/23/2021     Lab Results   Component Value Date    MG 2.0 10/22/2021     Lab Results   Component Value Date    CKTOTAL 51 11/13/2010    CKMB 0.5 11/13/2010    TROPONINI <0.01 02/15/2018    TROPONINI <0.01 02/14/2018    TROPONINI <0.01 02/14/2018     No results for input(s): CKTOTAL, CKMB, CKMBINDEX, TROPHS in the last 72 hours.   Lab Results   Component Value Date    INR 1.3 10/18/2021    INR 1.2 02/04/2018    INR 0.9 11/13/2010    PROTIME 14.5 (H) 10/18/2021    PROTIME 13.8 (H) 02/04/2018    PROTIME 11.6 11/13/2010     Lab Results   Component Value Date    TSH 0.942 02/14/2018     Lab Results   Component Value Date    LABA1C 6.4 (H) 09/09/2021     No results found for: EAG  Lab Results   Component Value Date    CHOL 205 (H) 04/08/2018    CHOL 162 01/28/2017     Lab Results   Component Value Date    TRIG 80 04/08/2018    TRIG 61 01/28/2017     Lab Results   Component Value Date    HDL 80 04/08/2018    HDL 50 01/28/2017     Lab Results   Component Value Date    LDLCALC 109 (H) 04/08/2018    LDLCALC 100 (H) 01/28/2017     Lab Results   Component Value Date    LABVLDL 16 04/08/2018    LABVLDL 12 01/28/2017     No results found for: CHOLHDLRATIO  No results for input(s): PROBNP in the last 72 hours. Lab Results   Component Value Date    DIGOXIN 0.4 (L) 10/24/2021       Cardiac Tests:  EKG: SR, rate 66, 1st degree AV block, IRBBB    Chest CTA 8/8/2021:  Impression  No evidence of pulmonary embolism or acute pulmonary abnormality. Prominent pericardial fat accounts for the abnormality seen on radiograph.     Cardiac catheterization 11/2010 ([facility]): LMT: normal. LAD: normal, gives off one large bifurcating diagonal as it courses to but ends at the apex. LCx: nondominant, gives off one large OM1, only trivial distal disease. RCA: Dominant, large, minimal disease distally.     Echocardiogram 11/2010 ([facility]): EF 55% with questionable RV dilation. Trivial to small pericardial effusion. Small echodense space near apex (? Localized effusion).  Trivial MR and TR.      [facility]   Cardiac rhythm: atrial flutter   Low normal left ventricular ejection fraction.   Moderately dilated left atrium.   No evidence of a left atrial appendage thrombus.   No evidence of interatrial shunting on bubble study.   Borderline dilated right ventricle with normal right ventricular function.   Mild-moderate mitral regurgitation.     WYATT: 2/15/18 [facility])   Low normal left ventricular systolic function.   Mild to moderate mitral regurgitation.     TTE (Dr. Joe Cooper, 8/12/2021)  Summary   Technically limited study.   Normal left ventricular size, wall thickness, wall motion, and systolic   function.   Estimated left ventricle ejection fraction 70+/-5 %.   There is doppler evidence of stage II diastolic dysfunction.   Mildly dilated right ventricle.   Right ventricle global systolic function is normal .   Normal sized left atrium.   No significant valvular abnormalities noted.     Lexiscan Stress Test 8/13/2021:  FINDINGS: The overall quality of the study was fair. Left ventricular cavity size was noted to be dilated on both the  stress and the resting images but there was no transient ischemic  dilatation after stress  Rotational analog analysis demonstrated abnormal patient motion and  there was marked increase in tracer uptake in the abdominal organs  with the liver overshadowing the bottom of the heart  A severe defect was present in the inferior wall extending into the  lateral apical wall(s) that was moderate to largesized by  quantification. The resting images showed no change   Gated SPECT left ventricular ejection fraction was calculated to be  66%, with normal myocardial contractility and wall motion. Impression  The myocardial perfusion imaging was probably normal with the large  fixed inferior/lateral apical wall defect being more consistent with  attenuation artifact and less likely the result of a myocardial  infarction especially with the normal contractility of the inferior  wall and the lateral apical wall.  More importantly, there did not  appear to be any evidence of stress-induced ischemia on this study  Overall left ventricular systolic function was normal with no regional  wall motion abnormality  Low risk general pharmacologic stress test.    Coronary CTA: 9/13/21 (Dr. Kevin Miranda)  AGATSTON SCORE: Total coronary artery calcium score is 115.7, distributed as LM 0.0, LAD 60.7, LCx 0.0, RCA 54. 7.      Calcium score percentile is 87% based on age, gender and race.     CARDIAC VALVES: Mild mitral and aortic calcifications are noted.        FUNCTION: The calculated left ventricular ejection fraction is 73%, LVEDV is 226 mL, LVESV 61 mL. .     CORONARY ANATOMY:     The coronary arteries arise in normal position. There is right coronary artery dominance.     CORONARY CT ANGIOGRAM:     Left main: The left main coronary artery bifurcates into the LAD and LCX. No definite angiographic evidence of significant plaque noted in the left main coronary artery.     Left anterior descending: The proximal LAD has about 1 to 24% calcified plaque. The distal LAD has about 25 to 49% soft plaque. The apical portion of the LAD was not seen due to motion. The proximal to mid diagonal 1 is patent. The distal diagonal 1 is now well seen. The proximal diagonal 2 has mild luminal irregularities. The mid to distal diagonal artery was not well seen.     Left circumflex: The left circumflex coronary artery is not well seen.        Right coronary artery: The RCA is a moderate size caliber vessel with about 1 to 24% calcified plaque. The mid to distal PDA and PLB were not well seen due to motion artifact         PERICARDIUM: No definite evidence of pericardial effusion     The aortic at the sinuses of Valsalva measures 3.6 x 3.5 x 3.9 cm     The sinotubular junction measures 3.1 x 3.2 cm  The mid ascending aorta measures 3.6 x 3.6 cm  The pulmonary artery measures 3.2 x 3.0 cm     4 pulmonary veins enter the left atrium (2 on the left and 2 on the right).     IMPRESSION:     Very difficult study due to technical difficulties, poor contrast opacification and motion.     1.  Mild nonobstructive coronary artery disease noted as mentioned above (about 25 to 49% distal LAD plaque). The apical and inferior apical portion of the LAD was not seen due to motion artifact.   The left circumflex system were not well seen due to motion artifact.     2. Normal left ventricular systolic function with estimated left ventricular ejection fraction of 73%.     3. The total calcium score is 115 with calcium score percentile of 87% based on age gender and race. CXR: 10/16/21  No acute process.       Question 10 mm nodule in the left upper lung. CTA chest: 10/16/21  There is no central pulmonary embolism or aortic dissection. Holger Mention is a   small distal subsegmental pulmonary embolism in the right lower lobe.       Coronary artery calcification.       Minimal ground-glass infiltrates in the right upper lobe medially concerning   for pneumonia.       9 mm indeterminate pulmonary nodule in the right lower lobe.  Consider   surveillance according to [place] guidelines. ASSESSMENT / PLAN:  1. Acute on chronic HFpEF -- clinically improved  2. Acute on chronic respiratory failure. Known history of very severe COPD. Pulmonology following. Clinically improved. 3. Pulmonary embolism (\"small distal subsegmental pulmonary embolism in the right lower lobe\" on 10/16/21 CTA chest)  4. Recent acute kidney injury -- improved, most recent Cr 1.4 --> 1.1 --> 1.2 --> 1.1 --> 1.0 --> 1.1 --> 0.8 --> 1.0 --> 1.1. --> 1.0 --> 1.0 --> 0.9 --> 1.1  5. Paroxysmal atrial fibrillation/typical flutter status-post AF ablation in April 2018. On Eliquis, cardizem, and flecainide therapy. Follows with Gianna BALLARD. SR on EKG today. 6. Hypertension -- sub-optimal control in the past, most recent 's-140's (BP today 126/62)  7. Hyperlipidemia, on statin therapy. 8. DM -- Hgb A1c 6.4  9. Obstructive sleep apnea, compliant with CPAP. 10. Morbid obesity / BMI 39.2 --> 36.7  11. History of alcohol abuse  12. Former tobacco abuse (2 PPD for many years, quit in 8/2017)  13. History of pericarditis in 11/2010  14.  Mild coronary artery disease by cardiac catheterization with atypical chest pain and mild troponin elevation not consistent with acute coronary syndrome with a recent nonischemic stress test and reportedly no significant obstructive CAD on CTA of the coronaries done today 9/13/21    - Multiple recent cardiac studies reviewed  - Monitor renal function and electrolytes closely on po bumex (++diuresis during recent hospitalization on bumex drip)  - Continue current medications otherwise (including anticoagulation; up-titrate anti-HTN regimen as needed)  - Discussed option of EP consult pending clinical course (in and out of AF during 10/2021 hospitalization; s/p IV digoxin on 10/22/21 and 10/23/21, on cardizem/flecainide, and eliquis; known to Dr. Smart Courser)  - Treatment of PARKER  - Aggressive risk factor modifications  - Care per pulmonary and nephrology  - The above was discussed with the patient and his wife today    Greater than 30 minutes was spent counseling the patient, reviewing the rationale for the above recommendations and reviewing the patient's current medication list, problem list and results of all previously ordered testing.     Dimitri Amanda MD  [facility]) Cardiology

## 2022-03-18 ENCOUNTER — TELEPHONE (OUTPATIENT)
Dept: PULMONOLOGY | Age: 62
End: 2022-03-18

## 2022-03-18 ENCOUNTER — OFFICE VISIT (OUTPATIENT)
Dept: PULMONOLOGY | Age: 62
End: 2022-03-18
Payer: COMMERCIAL

## 2022-03-18 VITALS
RESPIRATION RATE: 18 BRPM | HEIGHT: 71 IN | SYSTOLIC BLOOD PRESSURE: 118 MMHG | OXYGEN SATURATION: 92 % | HEART RATE: 74 BPM | DIASTOLIC BLOOD PRESSURE: 57 MMHG | WEIGHT: 262 LBS | BODY MASS INDEX: 36.68 KG/M2

## 2022-03-18 DIAGNOSIS — J44.9 CHRONIC OBSTRUCTIVE PULMONARY DISEASE, UNSPECIFIED COPD TYPE (HCC): Primary | ICD-10-CM

## 2022-03-18 PROCEDURE — G8484 FLU IMMUNIZE NO ADMIN: HCPCS | Performed by: INTERNAL MEDICINE

## 2022-03-18 PROCEDURE — 1036F TOBACCO NON-USER: CPT | Performed by: INTERNAL MEDICINE

## 2022-03-18 PROCEDURE — 3017F COLORECTAL CA SCREEN DOC REV: CPT | Performed by: INTERNAL MEDICINE

## 2022-03-18 PROCEDURE — 99214 OFFICE O/P EST MOD 30 MIN: CPT | Performed by: INTERNAL MEDICINE

## 2022-03-18 PROCEDURE — G8428 CUR MEDS NOT DOCUMENT: HCPCS | Performed by: INTERNAL MEDICINE

## 2022-03-18 PROCEDURE — 99213 OFFICE O/P EST LOW 20 MIN: CPT | Performed by: INTERNAL MEDICINE

## 2022-03-18 PROCEDURE — G8417 CALC BMI ABV UP PARAM F/U: HCPCS | Performed by: INTERNAL MEDICINE

## 2022-03-18 PROCEDURE — 3023F SPIROM DOC REV: CPT | Performed by: INTERNAL MEDICINE

## 2022-03-18 RX ORDER — BLOOD SUGAR DIAGNOSTIC
STRIP MISCELLANEOUS
COMMUNITY
Start: 2022-02-01

## 2022-03-18 RX ORDER — POTASSIUM CHLORIDE 1500 MG/1
TABLET, FILM COATED, EXTENDED RELEASE ORAL
COMMUNITY
Start: 2022-02-28

## 2022-03-18 RX ORDER — LANCETS 30 GAUGE
EACH MISCELLANEOUS
COMMUNITY
Start: 2022-02-20

## 2022-03-18 RX ORDER — SYRINGE AND NEEDLE,INSULIN,1ML 31 GX5/16"
SYRINGE, EMPTY DISPOSABLE MISCELLANEOUS
COMMUNITY
Start: 2022-03-03

## 2022-03-18 NOTE — TELEPHONE ENCOUNTER
Mailed a letter to patient informing him that his CT Chest is scheduled for 4-4-22 at 9:30 am at [address].  He must arrive by 9:00 am. There is no prep for this test

## 2022-03-18 NOTE — PROGRESS NOTES
3 mos follow up in office today. Pt feels he is doing good. O2 continuous and pt compliant with use. Using Breztri inhaler daily and Daliresp added last time pt was in hospital and feels it has really helped. Wife with pt at visit. Plan for referral to Pain management for back pain eval, Pulmonary Rehab in [place] and follow up CT in April 2022. Office to send referrals and arrange CT appt. Letter for appt to be mailed. Follow up in office in 6 mos and prn. Dr. Moraima Arana to follow since pt is known to her. Samples fo Breztri given per orders.

## 2022-03-18 NOTE — PROGRESS NOTES
(!) 118/57   Pulse 74   Resp 18   Ht 5' 11\" (1.803 m)   Wt 262 lb (118.8 kg)   SpO2 92%   BMI 36.54 kg/m²   Wt Readings from Last 3 Encounters:   03/18/22 262 lb (118.8 kg)   01/19/22 263 lb 6.4 oz (119.5 kg)   11/08/21 249 lb (112.9 kg)     Temp Readings from Last 3 Encounters:   10/26/21 96.3 °F (35.7 °C) (Temporal)   10/16/21 98.4 °F (36.9 °C)   09/18/21 98.3 °F (36.8 °C) (Oral)     TMAX:  BP Readings from Last 3 Encounters:   03/18/22 (!) 118/57   01/19/22 126/62   11/08/21 138/84     Pulse Readings from Last 3 Encounters:   03/18/22 74   01/19/22 66   11/08/21 92           INTAKE/OUTPUTS:  No intake/output data recorded. No intake or output data in the 24 hours ending 03/18/22 0948    This is virtual visit    CVS S1,S2  Chest rhonchi   Abdomen soft   CNS no joint deformity   Ext + 1 edema       MICROBIOLOGY:        CXR:  Reviewed     CT Chest:reviewed     PROBLEM LIST:  Patient Active Problem List   Diagnosis    COPD (chronic obstructive pulmonary disease) ([address]    Essential hypertension    Adrenal adenoma    Class 2 obesity due to excess calories with serious comorbidity and body mass index (BMI) of 35.0 to 35.9 in adult    Anticoagulation management encounter    Typical atrial flutter ([address]    Anxiety    Status post catheter ablation of atrial flutter    Persistent atrial fibrillation ([address]    ETOH abuse    COPD with acute exacerbation ([address]    COPD exacerbation ([address]    Single subsegmental pulmonary embolism without acute cor pulmonale (HCC)               ASSESSMENT:  1.) Acute exacerbation of COPD  2) very severe COPD  3.)obstructive sleep apnea  4. )Afib /Flutter   5.)tobacco independent(quit 5 months ago)  6 Lung nodule . will get follow up CT scan   Data:  FVC 3.63 which is 73 of predicted    FEV1 1.52 which is 39 of predicted    FEV1/FVC is 42  No Bronchodilator response    PLAN:  Continue Bumex  Has possible lung nodule in CT chest will need follow up CT make sure resolution as it was not there in CT in August   His PFT shows very severe COPD with FEV1 39% of predicted   Preztri,2 puff bid   On Duoneb    On  Eliquis to take it twice daily  using his BiPAP   Activity has been low ,lost appetite and he not able to do well ,in 4 l   IgE 52  eosinophilic 197 ,if continue to have SOB will consider IL5 ,aince he is feeling better ,will hold   Follow with palliative care and shoulder and mucase pain ,advise to follow with PCP and may consider pain management  clinic   Pulmonary rehab  Advise to increase o2 to 5 L ,and sat should be above [address] Kelvin Shah MD  Pulmonary/Critical care Medicine   [facility] [place] [facility] and [facility]

## 2022-03-24 ENCOUNTER — TELEPHONE (OUTPATIENT)
Dept: PULMONOLOGY | Age: 62
End: 2022-03-24

## 2022-03-28 ENCOUNTER — HOSPITAL ENCOUNTER (OUTPATIENT)
Dept: CARDIAC REHAB | Age: 62
Setting detail: THERAPIES SERIES
Discharge: HOME | End: 2022-03-28
Payer: COMMERCIAL

## 2022-03-28 VITALS
SYSTOLIC BLOOD PRESSURE: 138 MMHG | WEIGHT: 251 LBS | RESPIRATION RATE: 22 BRPM | DIASTOLIC BLOOD PRESSURE: 76 MMHG | HEART RATE: 71 BPM | BODY MASS INDEX: 35.14 KG/M2 | HEIGHT: 71 IN | OXYGEN SATURATION: 97 %

## 2022-03-28 ASSESSMENT — PATIENT HEALTH QUESTIONNAIRE - PHQ9
SUM OF ALL RESPONSES TO PHQ QUESTIONS 1-9: 11
SUM OF ALL RESPONSES TO PHQ9 QUESTIONS 1 & 2: 5
9. THOUGHTS THAT YOU WOULD BE BETTER OFF DEAD, OR OF HURTING YOURSELF: 0
8. MOVING OR SPEAKING SO SLOWLY THAT OTHER PEOPLE COULD HAVE NOTICED. OR THE OPPOSITE, BEING SO FIGETY OR RESTLESS THAT YOU HAVE BEEN MOVING AROUND A LOT MORE THAN USUAL: 0
6. FEELING BAD ABOUT YOURSELF - OR THAT YOU ARE A FAILURE OR HAVE LET YOURSELF OR YOUR FAMILY DOWN: 1
4. FEELING TIRED OR HAVING LITTLE ENERGY: 3
SUM OF ALL RESPONSES TO PHQ QUESTIONS 1-9: 11
2. FEELING DOWN, DEPRESSED OR HOPELESS: 3
SUM OF ALL RESPONSES TO PHQ QUESTIONS 1-9: 11
SUM OF ALL RESPONSES TO PHQ QUESTIONS 1-9: 11
10. IF YOU CHECKED OFF ANY PROBLEMS, HOW DIFFICULT HAVE THESE PROBLEMS MADE IT FOR YOU TO DO YOUR WORK, TAKE CARE OF THINGS AT HOME, OR GET ALONG WITH OTHER PEOPLE: 1
1. LITTLE INTEREST OR PLEASURE IN DOING THINGS: 2
7. TROUBLE CONCENTRATING ON THINGS, SUCH AS READING THE NEWSPAPER OR WATCHING TELEVISION: 0
3. TROUBLE FALLING OR STAYING ASLEEP: 2
5. POOR APPETITE OR OVEREATING: 0

## 2022-03-31 ENCOUNTER — HOSPITAL ENCOUNTER (OUTPATIENT)
Dept: CARDIAC REHAB | Age: 62
Setting detail: THERAPIES SERIES
Discharge: HOME | End: 2022-03-31
Payer: COMMERCIAL

## 2022-03-31 PROCEDURE — 94626 PHY/QHP OP PULM RHB W/MNTR: CPT

## 2022-04-04 ENCOUNTER — HOSPITAL ENCOUNTER (OUTPATIENT)
Dept: CT IMAGING | Age: 62
Discharge: HOME | End: 2022-04-04
Payer: COMMERCIAL

## 2022-04-04 DIAGNOSIS — J44.9 CHRONIC OBSTRUCTIVE PULMONARY DISEASE, UNSPECIFIED COPD TYPE (HCC): ICD-10-CM

## 2022-04-04 PROCEDURE — 71250 CT THORAX DX C-: CPT

## 2022-04-05 ENCOUNTER — HOSPITAL ENCOUNTER (OUTPATIENT)
Dept: CARDIAC REHAB | Age: 62
Setting detail: THERAPIES SERIES
Discharge: HOME | End: 2022-04-05
Payer: COMMERCIAL

## 2022-04-05 PROCEDURE — 94626 PHY/QHP OP PULM RHB W/MNTR: CPT

## 2022-04-07 ENCOUNTER — HOSPITAL ENCOUNTER (OUTPATIENT)
Dept: CARDIAC REHAB | Age: 62
Setting detail: THERAPIES SERIES
Discharge: HOME | End: 2022-04-07
Payer: COMMERCIAL

## 2022-04-07 PROCEDURE — 94626 PHY/QHP OP PULM RHB W/MNTR: CPT

## 2022-04-12 ENCOUNTER — HOSPITAL ENCOUNTER (OUTPATIENT)
Dept: CARDIAC REHAB | Age: 62
Setting detail: THERAPIES SERIES
Discharge: HOME | End: 2022-04-12
Payer: COMMERCIAL

## 2022-04-12 PROCEDURE — 94626 PHY/QHP OP PULM RHB W/MNTR: CPT

## 2022-04-14 ENCOUNTER — HOSPITAL ENCOUNTER (OUTPATIENT)
Dept: CARDIAC REHAB | Age: 62
Setting detail: THERAPIES SERIES
End: 2022-04-14
Payer: COMMERCIAL

## 2022-04-19 ENCOUNTER — HOSPITAL ENCOUNTER (OUTPATIENT)
Dept: CARDIAC REHAB | Age: 62
Setting detail: THERAPIES SERIES
End: 2022-04-19
Payer: COMMERCIAL

## 2022-04-21 ENCOUNTER — HOSPITAL ENCOUNTER (OUTPATIENT)
Dept: CARDIAC REHAB | Age: 62
Setting detail: THERAPIES SERIES
Discharge: HOME | End: 2022-04-21
Payer: COMMERCIAL

## 2022-04-21 VITALS — WEIGHT: 256.9 LBS | BODY MASS INDEX: 35.83 KG/M2

## 2022-04-21 PROCEDURE — 94626 PHY/QHP OP PULM RHB W/MNTR: CPT

## 2022-04-21 ASSESSMENT — PATIENT HEALTH QUESTIONNAIRE - PHQ9
SUM OF ALL RESPONSES TO PHQ QUESTIONS 1-9: 5
9. THOUGHTS THAT YOU WOULD BE BETTER OFF DEAD, OR OF HURTING YOURSELF: 0
7. TROUBLE CONCENTRATING ON THINGS, SUCH AS READING THE NEWSPAPER OR WATCHING TELEVISION: 0
6. FEELING BAD ABOUT YOURSELF - OR THAT YOU ARE A FAILURE OR HAVE LET YOURSELF OR YOUR FAMILY DOWN: 3
SUM OF ALL RESPONSES TO PHQ QUESTIONS 1-9: 5
5. POOR APPETITE OR OVEREATING: 0
8. MOVING OR SPEAKING SO SLOWLY THAT OTHER PEOPLE COULD HAVE NOTICED. OR THE OPPOSITE, BEING SO FIGETY OR RESTLESS THAT YOU HAVE BEEN MOVING AROUND A LOT MORE THAN USUAL: 0
10. IF YOU CHECKED OFF ANY PROBLEMS, HOW DIFFICULT HAVE THESE PROBLEMS MADE IT FOR YOU TO DO YOUR WORK, TAKE CARE OF THINGS AT HOME, OR GET ALONG WITH OTHER PEOPLE: 0
3. TROUBLE FALLING OR STAYING ASLEEP: 0
2. FEELING DOWN, DEPRESSED OR HOPELESS: 0
SUM OF ALL RESPONSES TO PHQ QUESTIONS 1-9: 5
1. LITTLE INTEREST OR PLEASURE IN DOING THINGS: 0
4. FEELING TIRED OR HAVING LITTLE ENERGY: 2
SUM OF ALL RESPONSES TO PHQ QUESTIONS 1-9: 5
SUM OF ALL RESPONSES TO PHQ9 QUESTIONS 1 & 2: 0

## 2022-04-26 ENCOUNTER — HOSPITAL ENCOUNTER (OUTPATIENT)
Dept: CARDIAC REHAB | Age: 62
Setting detail: THERAPIES SERIES
Discharge: HOME | End: 2022-04-26
Payer: COMMERCIAL

## 2022-04-26 PROCEDURE — 94626 PHY/QHP OP PULM RHB W/MNTR: CPT

## 2022-04-27 ENCOUNTER — TELEPHONE (OUTPATIENT)
Dept: CARDIAC REHAB | Age: 62
End: 2022-04-27

## 2022-04-27 NOTE — TELEPHONE ENCOUNTER
4/27/2022 1557 Nutrition:Left voice Electronically signed by Marcos Thurston RD, LD on 4/27/22 at 3:59 PM EDT   message on this date for Dulcehugh Sandra, daughter, regarding patient appointment confirmation on 4/28/2022 at 1:00 pm. Will remain available.

## 2022-04-28 ENCOUNTER — APPOINTMENT (OUTPATIENT)
Dept: GENERAL RADIOLOGY | Age: 62
DRG: 140 | End: 2022-04-28
Payer: COMMERCIAL

## 2022-04-28 ENCOUNTER — APPOINTMENT (OUTPATIENT)
Dept: CT IMAGING | Age: 62
DRG: 140 | End: 2022-04-28
Payer: COMMERCIAL

## 2022-04-28 ENCOUNTER — HOSPITAL ENCOUNTER (INPATIENT)
Age: 62
LOS: 1 days | Discharge: HOME | DRG: 140 | End: 2022-04-30
Attending: EMERGENCY MEDICINE | Admitting: INTERNAL MEDICINE
Payer: COMMERCIAL

## 2022-04-28 DIAGNOSIS — J44.1 COPD EXACERBATION (HCC): Primary | ICD-10-CM

## 2022-04-28 DIAGNOSIS — M54.6 RIGHT-SIDED THORACIC BACK PAIN, UNSPECIFIED CHRONICITY: ICD-10-CM

## 2022-04-28 DIAGNOSIS — J96.21 ACUTE ON CHRONIC RESPIRATORY FAILURE WITH HYPOXIA (HCC): ICD-10-CM

## 2022-04-28 DIAGNOSIS — J44.9 END STAGE COPD (HCC): ICD-10-CM

## 2022-04-28 LAB
ALBUMIN SERPL-MCNC: 4.5 G/DL (ref 3.5–5.2)
ALP BLD-CCNC: 116 U/L (ref 40–129)
ALT SERPL-CCNC: 10 U/L (ref 0–40)
ANION GAP SERPL CALCULATED.3IONS-SCNC: 10 MMOL/L (ref 7–16)
AST SERPL-CCNC: 16 U/L (ref 0–39)
B.E.: 2.9 MMOL/L (ref -3–3)
BASOPHILS ABSOLUTE: 0.04 E9/L (ref 0–0.2)
BASOPHILS RELATIVE PERCENT: 0.5 % (ref 0–2)
BILIRUB SERPL-MCNC: <0.2 MG/DL (ref 0–1.2)
BUN BLDV-MCNC: 16 MG/DL (ref 6–23)
CALCIUM SERPL-MCNC: 9.5 MG/DL (ref 8.6–10.2)
CHLORIDE BLD-SCNC: 98 MMOL/L (ref 98–107)
CO2: 32 MMOL/L (ref 22–29)
COHB: 0.2 % (ref 0–1.5)
CREAT SERPL-MCNC: 1.1 MG/DL (ref 0.7–1.2)
CRITICAL: ABNORMAL
DATE ANALYZED: ABNORMAL
DATE OF COLLECTION: ABNORMAL
EOSINOPHILS ABSOLUTE: 0.37 E9/L (ref 0.05–0.5)
EOSINOPHILS RELATIVE PERCENT: 4.2 % (ref 0–6)
GFR AFRICAN AMERICAN: >60
GFR NON-AFRICAN AMERICAN: >60 ML/MIN/1.73
GLUCOSE BLD-MCNC: 78 MG/DL (ref 74–99)
HCO3: 28.3 MMOL/L (ref 22–26)
HCT VFR BLD CALC: 36.4 % (ref 37–54)
HEMOGLOBIN: 11.5 G/DL (ref 12.5–16.5)
HHB: 3.9 % (ref 0–5)
IMMATURE GRANULOCYTES #: 0.04 E9/L
IMMATURE GRANULOCYTES %: 0.5 % (ref 0–5)
INFLUENZA A: NOT DETECTED
INFLUENZA B: NOT DETECTED
LAB: ABNORMAL
LYMPHOCYTES ABSOLUTE: 1.21 E9/L (ref 1.5–4)
LYMPHOCYTES RELATIVE PERCENT: 13.7 % (ref 20–42)
Lab: ABNORMAL
MAGNESIUM: 2 MG/DL (ref 1.6–2.6)
MCH RBC QN AUTO: 27.6 PG (ref 26–35)
MCHC RBC AUTO-ENTMCNC: 31.6 % (ref 32–34.5)
MCV RBC AUTO: 87.3 FL (ref 80–99.9)
METHB: 0.2 % (ref 0–1.5)
MODE: ABNORMAL
MONOCYTES ABSOLUTE: 0.97 E9/L (ref 0.1–0.95)
MONOCYTES RELATIVE PERCENT: 11 % (ref 2–12)
NEUTROPHILS ABSOLUTE: 6.21 E9/L (ref 1.8–7.3)
NEUTROPHILS RELATIVE PERCENT: 70.1 % (ref 43–80)
O2 CONTENT: 16.6 ML/DL
O2 SATURATION: 96.1 % (ref 92–98.5)
O2HB: 95.7 % (ref 94–97)
OPERATOR ID: 7292
PATIENT TEMP: 37 C
PCO2: 47 MMHG (ref 35–45)
PDW BLD-RTO: 14.6 FL (ref 11.5–15)
PH BLOOD GAS: 7.4 (ref 7.35–7.45)
PLATELET # BLD: 225 E9/L (ref 130–450)
PMV BLD AUTO: 11.9 FL (ref 7–12)
PO2: 84.7 MMHG (ref 75–100)
POTASSIUM SERPL-SCNC: 3.6 MMOL/L (ref 3.5–5)
PRO-BNP: 363 PG/ML (ref 0–125)
RBC # BLD: 4.17 E12/L (ref 3.8–5.8)
SARS-COV-2 RNA, RT PCR: NOT DETECTED
SODIUM BLD-SCNC: 140 MMOL/L (ref 132–146)
SOURCE, BLOOD GAS: ABNORMAL
THB: 12.3 G/DL (ref 11.5–16.5)
TIME ANALYZED: 2004
TOTAL PROTEIN: 7.2 G/DL (ref 6.4–8.3)
TROPONIN, HIGH SENSITIVITY: 30 NG/L (ref 0–11)
TROPONIN, HIGH SENSITIVITY: 32 NG/L (ref 0–11)
WBC # BLD: 8.8 E9/L (ref 4.5–11.5)

## 2022-04-28 PROCEDURE — 93005 ELECTROCARDIOGRAM TRACING: CPT | Performed by: NURSE PRACTITIONER

## 2022-04-28 PROCEDURE — 83735 ASSAY OF MAGNESIUM: CPT

## 2022-04-28 PROCEDURE — 2580000003 HC RX 258: Performed by: RADIOLOGY

## 2022-04-28 PROCEDURE — 96374 THER/PROPH/DIAG INJ IV PUSH: CPT

## 2022-04-28 PROCEDURE — 80053 COMPREHEN METABOLIC PANEL: CPT

## 2022-04-28 PROCEDURE — 96375 TX/PRO/DX INJ NEW DRUG ADDON: CPT

## 2022-04-28 PROCEDURE — 87636 SARSCOV2 & INF A&B AMP PRB: CPT

## 2022-04-28 PROCEDURE — 6360000002 HC RX W HCPCS: Performed by: STUDENT IN AN ORGANIZED HEALTH CARE EDUCATION/TRAINING PROGRAM

## 2022-04-28 PROCEDURE — 99285 EMERGENCY DEPT VISIT HI MDM: CPT

## 2022-04-28 PROCEDURE — 71045 X-RAY EXAM CHEST 1 VIEW: CPT

## 2022-04-28 PROCEDURE — 71275 CT ANGIOGRAPHY CHEST: CPT

## 2022-04-28 PROCEDURE — 6360000004 HC RX CONTRAST MEDICATION: Performed by: RADIOLOGY

## 2022-04-28 PROCEDURE — 83880 ASSAY OF NATRIURETIC PEPTIDE: CPT

## 2022-04-28 PROCEDURE — 94640 AIRWAY INHALATION TREATMENT: CPT

## 2022-04-28 PROCEDURE — 85025 COMPLETE CBC W/AUTO DIFF WBC: CPT

## 2022-04-28 PROCEDURE — 84484 ASSAY OF TROPONIN QUANT: CPT

## 2022-04-28 PROCEDURE — 82805 BLOOD GASES W/O2 SATURATION: CPT

## 2022-04-28 PROCEDURE — 6360000002 HC RX W HCPCS: Performed by: EMERGENCY MEDICINE

## 2022-04-28 PROCEDURE — 6370000000 HC RX 637 (ALT 250 FOR IP): Performed by: STUDENT IN AN ORGANIZED HEALTH CARE EDUCATION/TRAINING PROGRAM

## 2022-04-28 RX ORDER — METHYLPREDNISOLONE SODIUM SUCCINATE 125 MG/2ML
60 INJECTION, POWDER, LYOPHILIZED, FOR SOLUTION INTRAMUSCULAR; INTRAVENOUS ONCE
Status: COMPLETED | OUTPATIENT
Start: 2022-04-28 | End: 2022-04-28

## 2022-04-28 RX ORDER — SODIUM CHLORIDE 0.9 % (FLUSH) 0.9 %
10 SYRINGE (ML) INJECTION PRN
Status: DISCONTINUED | OUTPATIENT
Start: 2022-04-28 | End: 2022-04-29

## 2022-04-28 RX ORDER — OXYCODONE HYDROCHLORIDE AND ACETAMINOPHEN 5; 325 MG/1; MG/1
1 TABLET ORAL ONCE
Status: COMPLETED | OUTPATIENT
Start: 2022-04-29 | End: 2022-04-29

## 2022-04-28 RX ORDER — IPRATROPIUM BROMIDE AND ALBUTEROL SULFATE 2.5; .5 MG/3ML; MG/3ML
1 SOLUTION RESPIRATORY (INHALATION)
Status: COMPLETED | OUTPATIENT
Start: 2022-04-28 | End: 2022-04-28

## 2022-04-28 RX ORDER — DIPHENHYDRAMINE HYDROCHLORIDE 50 MG/ML
25 INJECTION INTRAMUSCULAR; INTRAVENOUS ONCE
Status: COMPLETED | OUTPATIENT
Start: 2022-04-28 | End: 2022-04-28

## 2022-04-28 RX ADMIN — DIPHENHYDRAMINE HYDROCHLORIDE 25 MG: 50 INJECTION, SOLUTION INTRAMUSCULAR; INTRAVENOUS at 20:53

## 2022-04-28 RX ADMIN — METHYLPREDNISOLONE SODIUM SUCCINATE 60 MG: 125 INJECTION, POWDER, FOR SOLUTION INTRAMUSCULAR; INTRAVENOUS at 19:11

## 2022-04-28 RX ADMIN — IPRATROPIUM BROMIDE AND ALBUTEROL SULFATE 1 AMPULE: .5; 3 SOLUTION RESPIRATORY (INHALATION) at 19:45

## 2022-04-28 RX ADMIN — IOPAMIDOL 75 ML: 755 INJECTION, SOLUTION INTRAVENOUS at 22:24

## 2022-04-28 RX ADMIN — Medication 10 ML: at 22:24

## 2022-04-28 RX ADMIN — IPRATROPIUM BROMIDE AND ALBUTEROL SULFATE 1 AMPULE: .5; 3 SOLUTION RESPIRATORY (INHALATION) at 20:12

## 2022-04-28 RX ADMIN — IPRATROPIUM BROMIDE AND ALBUTEROL SULFATE 1 AMPULE: .5; 3 SOLUTION RESPIRATORY (INHALATION) at 20:00

## 2022-04-28 ASSESSMENT — ENCOUNTER SYMPTOMS
TROUBLE SWALLOWING: 0
NAUSEA: 0
SHORTNESS OF BREATH: 1
RHINORRHEA: 0
ABDOMINAL PAIN: 0
COUGH: 1
VOICE CHANGE: 0
VOMITING: 0
CONSTIPATION: 0
DIARRHEA: 0

## 2022-04-28 NOTE — ED NOTES
Department of Emergency Medicine  FIRST PROVIDER TRIAGE NOTE             Independent MLP           4/28/22  6:39 PM EDT    Date of Encounter: 4/28/22   MRN: [id number]      HPI: Jamel Faulkner is a 63 y.o. male who presents to the ED for Shortness of Breath (hx of COPD, reports his breathing worsened today and he had to increase 02 from 4 to 6L)    Patient with history significant for COPD, at baseline on O2 4 L, he did increase it to 6 L, reports worsening shortness of breath over the last several hours. On assessment he is notably dyspneic, not able to speak full sentences. Patient with lungs decreased throughout, very poor air exchange. 8 symptoms worsened over the last several hours. ROS: Negative for back pain or fever. PE: Gen Appearance/Constitutional: alert  HEENT: NC/NT. PERRLA,  Airway patent. Initial Plan of Care: All treatment areas with department are currently occupied. Plan to order/Initiate the following while awaiting opening in ED: labs, EKG and imaging studies.   Initiate Treatment-Testing, Proceed toTreatment Area When Bed Available for ED Attending/MLP to Continue Care    Electronically signed by SERA Waddell CNP   DD: 4/28/22         SERA Waddell CNP  04/28/22 14 [facility], APRN - CNP  04/28/22 5536

## 2022-04-29 LAB
ANION GAP SERPL CALCULATED.3IONS-SCNC: 14 MMOL/L (ref 7–16)
BASOPHILS ABSOLUTE: 0 E9/L (ref 0–0.2)
BASOPHILS RELATIVE PERCENT: 0.2 % (ref 0–2)
BUN BLDV-MCNC: 19 MG/DL (ref 6–23)
CALCIUM SERPL-MCNC: 9.3 MG/DL (ref 8.6–10.2)
CHLORIDE BLD-SCNC: 98 MMOL/L (ref 98–107)
CO2: 28 MMOL/L (ref 22–29)
CREAT SERPL-MCNC: 1.2 MG/DL (ref 0.7–1.2)
EKG ATRIAL RATE: 103 BPM
EKG P AXIS: 85 DEGREES
EKG P-R INTERVAL: 214 MS
EKG Q-T INTERVAL: 378 MS
EKG QRS DURATION: 114 MS
EKG QTC CALCULATION (BAZETT): 495 MS
EKG R AXIS: 55 DEGREES
EKG T AXIS: 83 DEGREES
EKG VENTRICULAR RATE: 103 BPM
EOSINOPHILS ABSOLUTE: 0 E9/L (ref 0.05–0.5)
EOSINOPHILS RELATIVE PERCENT: 0 % (ref 0–6)
GFR AFRICAN AMERICAN: >60
GFR NON-AFRICAN AMERICAN: >60 ML/MIN/1.73
GLUCOSE BLD-MCNC: 220 MG/DL (ref 74–99)
HCT VFR BLD CALC: 35.9 % (ref 37–54)
HEMOGLOBIN: 11.3 G/DL (ref 12.5–16.5)
LYMPHOCYTES ABSOLUTE: 0.66 E9/L (ref 1.5–4)
LYMPHOCYTES RELATIVE PERCENT: 7.8 % (ref 20–42)
MCH RBC QN AUTO: 28 PG (ref 26–35)
MCHC RBC AUTO-ENTMCNC: 31.5 % (ref 32–34.5)
MCV RBC AUTO: 89.1 FL (ref 80–99.9)
METAMYELOCYTES RELATIVE PERCENT: 0.9 % (ref 0–1)
METER GLUCOSE: 171 MG/DL (ref 74–99)
METER GLUCOSE: 179 MG/DL (ref 74–99)
MONOCYTES ABSOLUTE: 0 E9/L (ref 0.1–0.95)
MONOCYTES RELATIVE PERCENT: 1.3 % (ref 2–12)
NEUTROPHILS ABSOLUTE: 7.54 E9/L (ref 1.8–7.3)
NEUTROPHILS RELATIVE PERCENT: 91.3 % (ref 43–80)
PDW BLD-RTO: 14.6 FL (ref 11.5–15)
PLATELET # BLD: 244 E9/L (ref 130–450)
PMV BLD AUTO: 12.3 FL (ref 7–12)
POTASSIUM REFLEX MAGNESIUM: 4.2 MMOL/L (ref 3.5–5)
RBC # BLD: 4.03 E12/L (ref 3.8–5.8)
SODIUM BLD-SCNC: 140 MMOL/L (ref 132–146)
WBC # BLD: 8.2 E9/L (ref 4.5–11.5)

## 2022-04-29 PROCEDURE — 80048 BASIC METABOLIC PNL TOTAL CA: CPT

## 2022-04-29 PROCEDURE — S5553 INSULIN LONG ACTING 5 U: HCPCS | Performed by: FAMILY MEDICINE

## 2022-04-29 PROCEDURE — 6370000000 HC RX 637 (ALT 250 FOR IP): Performed by: FAMILY MEDICINE

## 2022-04-29 PROCEDURE — 85025 COMPLETE CBC W/AUTO DIFF WBC: CPT

## 2022-04-29 PROCEDURE — 2580000003 HC RX 258: Performed by: FAMILY MEDICINE

## 2022-04-29 PROCEDURE — 94660 CPAP INITIATION&MGMT: CPT

## 2022-04-29 PROCEDURE — 96376 TX/PRO/DX INJ SAME DRUG ADON: CPT

## 2022-04-29 PROCEDURE — 6370000000 HC RX 637 (ALT 250 FOR IP): Performed by: INTERNAL MEDICINE

## 2022-04-29 PROCEDURE — G0378 HOSPITAL OBSERVATION PER HR: HCPCS

## 2022-04-29 PROCEDURE — 94640 AIRWAY INHALATION TREATMENT: CPT

## 2022-04-29 PROCEDURE — 6360000002 HC RX W HCPCS: Performed by: FAMILY MEDICINE

## 2022-04-29 PROCEDURE — 2700000000 HC OXYGEN THERAPY PER DAY

## 2022-04-29 PROCEDURE — 6370000000 HC RX 637 (ALT 250 FOR IP): Performed by: STUDENT IN AN ORGANIZED HEALTH CARE EDUCATION/TRAINING PROGRAM

## 2022-04-29 PROCEDURE — 36415 COLL VENOUS BLD VENIPUNCTURE: CPT

## 2022-04-29 PROCEDURE — 82962 GLUCOSE BLOOD TEST: CPT

## 2022-04-29 PROCEDURE — 2140000000 HC CCU INTERMEDIATE R&B

## 2022-04-29 RX ORDER — OXYCODONE HYDROCHLORIDE AND ACETAMINOPHEN 5; 325 MG/1; MG/1
1 TABLET ORAL EVERY 4 HOURS PRN
Status: DISCONTINUED | OUTPATIENT
Start: 2022-04-29 | End: 2022-04-30 | Stop reason: HOSPADM

## 2022-04-29 RX ORDER — POLYETHYLENE GLYCOL 3350 17 G/17G
17 POWDER, FOR SOLUTION ORAL DAILY PRN
Status: DISCONTINUED | OUTPATIENT
Start: 2022-04-29 | End: 2022-04-30 | Stop reason: HOSPADM

## 2022-04-29 RX ORDER — DILTIAZEM HYDROCHLORIDE 120 MG/1
240 CAPSULE, COATED, EXTENDED RELEASE ORAL 2 TIMES DAILY
Status: DISCONTINUED | OUTPATIENT
Start: 2022-04-29 | End: 2022-04-30 | Stop reason: HOSPADM

## 2022-04-29 RX ORDER — PRIMIDONE 50 MG/1
25 TABLET ORAL NIGHTLY
Status: DISCONTINUED | OUTPATIENT
Start: 2022-04-29 | End: 2022-04-30 | Stop reason: HOSPADM

## 2022-04-29 RX ORDER — SODIUM CHLORIDE 9 MG/ML
INJECTION, SOLUTION INTRAVENOUS PRN
Status: DISCONTINUED | OUTPATIENT
Start: 2022-04-29 | End: 2022-04-30 | Stop reason: HOSPADM

## 2022-04-29 RX ORDER — OXYCODONE HYDROCHLORIDE AND ACETAMINOPHEN 5; 325 MG/1; MG/1
1 TABLET ORAL EVERY 8 HOURS PRN
Qty: 9 TABLET | Refills: 0 | Status: SHIPPED | OUTPATIENT
Start: 2022-04-29 | End: 2022-05-02

## 2022-04-29 RX ORDER — ATORVASTATIN CALCIUM 20 MG/1
20 TABLET, FILM COATED ORAL EVERY EVENING
Status: DISCONTINUED | OUTPATIENT
Start: 2022-04-29 | End: 2022-04-30 | Stop reason: HOSPADM

## 2022-04-29 RX ORDER — SODIUM CHLORIDE 0.9 % (FLUSH) 0.9 %
10 SYRINGE (ML) INJECTION EVERY 12 HOURS SCHEDULED
Status: DISCONTINUED | OUTPATIENT
Start: 2022-04-29 | End: 2022-04-30 | Stop reason: HOSPADM

## 2022-04-29 RX ORDER — LOSARTAN POTASSIUM 50 MG/1
50 TABLET ORAL DAILY
Status: DISCONTINUED | OUTPATIENT
Start: 2022-04-29 | End: 2022-04-30 | Stop reason: HOSPADM

## 2022-04-29 RX ORDER — MORPHINE SULFATE 10 MG/5ML
2.5 SOLUTION ORAL EVERY 4 HOURS PRN
Qty: 15 ML | Refills: 0 | Status: SHIPPED | OUTPATIENT
Start: 2022-04-29 | End: 2022-04-30

## 2022-04-29 RX ORDER — ACETAMINOPHEN 650 MG/1
650 SUPPOSITORY RECTAL EVERY 6 HOURS PRN
Status: DISCONTINUED | OUTPATIENT
Start: 2022-04-29 | End: 2022-04-30 | Stop reason: HOSPADM

## 2022-04-29 RX ORDER — FLECAINIDE ACETATE 100 MG/1
100 TABLET ORAL 2 TIMES DAILY
Status: DISCONTINUED | OUTPATIENT
Start: 2022-04-29 | End: 2022-04-30 | Stop reason: HOSPADM

## 2022-04-29 RX ORDER — ARFORMOTEROL TARTRATE 15 UG/2ML
15 SOLUTION RESPIRATORY (INHALATION) 2 TIMES DAILY
Status: DISCONTINUED | OUTPATIENT
Start: 2022-04-29 | End: 2022-04-30 | Stop reason: HOSPADM

## 2022-04-29 RX ORDER — BUMETANIDE 1 MG/1
2 TABLET ORAL 2 TIMES DAILY
Status: DISCONTINUED | OUTPATIENT
Start: 2022-04-29 | End: 2022-04-30 | Stop reason: HOSPADM

## 2022-04-29 RX ORDER — METOLAZONE 2.5 MG/1
2.5 TABLET ORAL WEEKLY
Status: DISCONTINUED | OUTPATIENT
Start: 2022-05-05 | End: 2022-04-30 | Stop reason: HOSPADM

## 2022-04-29 RX ORDER — DOXYCYCLINE HYCLATE 100 MG/1
100 CAPSULE ORAL EVERY 12 HOURS SCHEDULED
Status: DISCONTINUED | OUTPATIENT
Start: 2022-04-29 | End: 2022-04-30 | Stop reason: HOSPADM

## 2022-04-29 RX ORDER — MORPHINE SULFATE 10 MG/5ML
2.5 SOLUTION ORAL
Status: DISCONTINUED | OUTPATIENT
Start: 2022-04-29 | End: 2022-04-30 | Stop reason: HOSPADM

## 2022-04-29 RX ORDER — INSULIN GLARGINE-YFGN 100 [IU]/ML
15 INJECTION, SOLUTION SUBCUTANEOUS 2 TIMES DAILY
Status: DISCONTINUED | OUTPATIENT
Start: 2022-04-29 | End: 2022-04-30 | Stop reason: HOSPADM

## 2022-04-29 RX ORDER — SODIUM CHLORIDE 0.9 % (FLUSH) 0.9 %
10 SYRINGE (ML) INJECTION PRN
Status: DISCONTINUED | OUTPATIENT
Start: 2022-04-29 | End: 2022-04-30 | Stop reason: HOSPADM

## 2022-04-29 RX ORDER — PREDNISONE 10 MG/1
TABLET ORAL
Qty: 10 TABLET | Refills: 0 | Status: SHIPPED | OUTPATIENT
Start: 2022-04-29 | End: 2022-05-03

## 2022-04-29 RX ORDER — METHYLPREDNISOLONE SODIUM SUCCINATE 40 MG/ML
40 INJECTION, POWDER, LYOPHILIZED, FOR SOLUTION INTRAMUSCULAR; INTRAVENOUS EVERY 6 HOURS
Status: DISCONTINUED | OUTPATIENT
Start: 2022-04-29 | End: 2022-04-30 | Stop reason: HOSPADM

## 2022-04-29 RX ORDER — ONDANSETRON 2 MG/ML
4 INJECTION INTRAMUSCULAR; INTRAVENOUS EVERY 6 HOURS PRN
Status: DISCONTINUED | OUTPATIENT
Start: 2022-04-29 | End: 2022-04-30 | Stop reason: HOSPADM

## 2022-04-29 RX ORDER — PROMETHAZINE HYDROCHLORIDE 25 MG/1
12.5 TABLET ORAL EVERY 6 HOURS PRN
Status: DISCONTINUED | OUTPATIENT
Start: 2022-04-29 | End: 2022-04-30 | Stop reason: HOSPADM

## 2022-04-29 RX ORDER — IPRATROPIUM BROMIDE AND ALBUTEROL SULFATE 2.5; .5 MG/3ML; MG/3ML
1 SOLUTION RESPIRATORY (INHALATION)
Status: DISCONTINUED | OUTPATIENT
Start: 2022-04-29 | End: 2022-04-30 | Stop reason: HOSPADM

## 2022-04-29 RX ORDER — BUSPIRONE HYDROCHLORIDE 10 MG/1
10 TABLET ORAL 3 TIMES DAILY PRN
Status: DISCONTINUED | OUTPATIENT
Start: 2022-04-29 | End: 2022-04-30 | Stop reason: HOSPADM

## 2022-04-29 RX ORDER — ASPIRIN 81 MG/1
81 TABLET ORAL DAILY
Status: DISCONTINUED | OUTPATIENT
Start: 2022-04-29 | End: 2022-04-30 | Stop reason: HOSPADM

## 2022-04-29 RX ORDER — ACETAMINOPHEN 325 MG/1
650 TABLET ORAL EVERY 6 HOURS PRN
Status: DISCONTINUED | OUTPATIENT
Start: 2022-04-29 | End: 2022-04-30 | Stop reason: HOSPADM

## 2022-04-29 RX ORDER — BUDESONIDE 0.5 MG/2ML
0.5 INHALANT ORAL 2 TIMES DAILY
Status: DISCONTINUED | OUTPATIENT
Start: 2022-04-29 | End: 2022-04-30 | Stop reason: HOSPADM

## 2022-04-29 RX ORDER — ALBUTEROL SULFATE 2.5 MG/3ML
2.5 SOLUTION RESPIRATORY (INHALATION) EVERY 4 HOURS PRN
Status: DISCONTINUED | OUTPATIENT
Start: 2022-04-29 | End: 2022-04-30 | Stop reason: HOSPADM

## 2022-04-29 RX ORDER — MORPHINE SULFATE 10 MG/5ML
2.5 SOLUTION ORAL EVERY 4 HOURS PRN
Qty: 15 ML | Refills: 0 | Status: CANCELLED | OUTPATIENT
Start: 2022-04-29 | End: 2022-05-02

## 2022-04-29 RX ORDER — BUDESONIDE 0.5 MG/2ML
0.5 INHALANT ORAL 2 TIMES DAILY
Status: DISCONTINUED | OUTPATIENT
Start: 2022-04-29 | End: 2022-04-29 | Stop reason: SDUPTHER

## 2022-04-29 RX ADMIN — SODIUM CHLORIDE, PRESERVATIVE FREE 10 ML: 5 INJECTION INTRAVENOUS at 09:12

## 2022-04-29 RX ADMIN — FLECAINIDE ACETATE 100 MG: 100 TABLET ORAL at 21:28

## 2022-04-29 RX ADMIN — METHYLPREDNISOLONE SODIUM SUCCINATE 40 MG: 40 INJECTION, POWDER, FOR SOLUTION INTRAMUSCULAR; INTRAVENOUS at 15:34

## 2022-04-29 RX ADMIN — APIXABAN 5 MG: 5 TABLET, FILM COATED ORAL at 02:27

## 2022-04-29 RX ADMIN — ASPIRIN 81 MG: 81 TABLET, COATED ORAL at 09:09

## 2022-04-29 RX ADMIN — DILTIAZEM HYDROCHLORIDE 240 MG: 120 CAPSULE, COATED, EXTENDED RELEASE ORAL at 21:28

## 2022-04-29 RX ADMIN — PRIMIDONE 25 MG: 50 TABLET ORAL at 22:17

## 2022-04-29 RX ADMIN — DILTIAZEM HYDROCHLORIDE 240 MG: 120 CAPSULE, COATED, EXTENDED RELEASE ORAL at 09:09

## 2022-04-29 RX ADMIN — APIXABAN 5 MG: 5 TABLET, FILM COATED ORAL at 21:28

## 2022-04-29 RX ADMIN — MORPHINE SULFATE 2.6 MG: 10 SOLUTION ORAL at 11:42

## 2022-04-29 RX ADMIN — ARFORMOTEROL TARTRATE 15 MCG: 15 SOLUTION RESPIRATORY (INHALATION) at 20:23

## 2022-04-29 RX ADMIN — ALBUTEROL SULFATE 2.5 MG: 2.5 SOLUTION RESPIRATORY (INHALATION) at 03:18

## 2022-04-29 RX ADMIN — FLECAINIDE ACETATE 100 MG: 100 TABLET ORAL at 09:11

## 2022-04-29 RX ADMIN — BUMETANIDE 2 MG: 1 TABLET ORAL at 21:28

## 2022-04-29 RX ADMIN — OXYCODONE AND ACETAMINOPHEN 1 TABLET: 5; 325 TABLET ORAL at 21:21

## 2022-04-29 RX ADMIN — DILTIAZEM HYDROCHLORIDE 240 MG: 120 CAPSULE, COATED, EXTENDED RELEASE ORAL at 02:27

## 2022-04-29 RX ADMIN — INSULIN GLARGINE-YFGN 15 UNITS: 100 INJECTION, SOLUTION SUBCUTANEOUS at 21:29

## 2022-04-29 RX ADMIN — INSULIN GLARGINE-YFGN 15 UNITS: 100 INJECTION, SOLUTION SUBCUTANEOUS at 02:28

## 2022-04-29 RX ADMIN — OXYCODONE AND ACETAMINOPHEN 1 TABLET: 5; 325 TABLET ORAL at 11:29

## 2022-04-29 RX ADMIN — BUDESONIDE 500 MCG: 0.5 SUSPENSION RESPIRATORY (INHALATION) at 10:02

## 2022-04-29 RX ADMIN — ARFORMOTEROL TARTRATE 15 MCG: 15 SOLUTION RESPIRATORY (INHALATION) at 10:01

## 2022-04-29 RX ADMIN — FLECAINIDE ACETATE 100 MG: 100 TABLET ORAL at 05:01

## 2022-04-29 RX ADMIN — METHYLPREDNISOLONE SODIUM SUCCINATE 40 MG: 40 INJECTION, POWDER, FOR SOLUTION INTRAMUSCULAR; INTRAVENOUS at 02:28

## 2022-04-29 RX ADMIN — LOSARTAN POTASSIUM 50 MG: 50 TABLET, FILM COATED ORAL at 09:10

## 2022-04-29 RX ADMIN — BUDESONIDE 500 MCG: 0.5 SUSPENSION RESPIRATORY (INHALATION) at 20:24

## 2022-04-29 RX ADMIN — IPRATROPIUM BROMIDE AND ALBUTEROL SULFATE 1 AMPULE: .5; 2.5 SOLUTION RESPIRATORY (INHALATION) at 10:00

## 2022-04-29 RX ADMIN — BUMETANIDE 2 MG: 1 TABLET ORAL at 13:34

## 2022-04-29 RX ADMIN — IPRATROPIUM BROMIDE AND ALBUTEROL SULFATE 1 AMPULE: .5; 2.5 SOLUTION RESPIRATORY (INHALATION) at 16:06

## 2022-04-29 RX ADMIN — INSULIN GLARGINE-YFGN 15 UNITS: 100 INJECTION, SOLUTION SUBCUTANEOUS at 09:08

## 2022-04-29 RX ADMIN — METHYLPREDNISOLONE SODIUM SUCCINATE 40 MG: 40 INJECTION, POWDER, FOR SOLUTION INTRAMUSCULAR; INTRAVENOUS at 21:28

## 2022-04-29 RX ADMIN — BUSPIRONE HYDROCHLORIDE 10 MG: 10 TABLET ORAL at 09:11

## 2022-04-29 RX ADMIN — IPRATROPIUM BROMIDE AND ALBUTEROL SULFATE 1 AMPULE: .5; 2.5 SOLUTION RESPIRATORY (INHALATION) at 20:24

## 2022-04-29 RX ADMIN — APIXABAN 5 MG: 5 TABLET, FILM COATED ORAL at 09:16

## 2022-04-29 RX ADMIN — ROFLUMILAST 500 MCG: 500 TABLET ORAL at 09:11

## 2022-04-29 RX ADMIN — ATORVASTATIN CALCIUM 20 MG: 20 TABLET, FILM COATED ORAL at 19:18

## 2022-04-29 RX ADMIN — SERTRALINE 50 MG: 50 TABLET, FILM COATED ORAL at 09:09

## 2022-04-29 RX ADMIN — METHYLPREDNISOLONE SODIUM SUCCINATE 40 MG: 40 INJECTION, POWDER, FOR SOLUTION INTRAMUSCULAR; INTRAVENOUS at 09:12

## 2022-04-29 RX ADMIN — BUSPIRONE HYDROCHLORIDE 10 MG: 10 TABLET ORAL at 21:32

## 2022-04-29 RX ADMIN — ATORVASTATIN CALCIUM 20 MG: 20 TABLET, FILM COATED ORAL at 21:29

## 2022-04-29 RX ADMIN — OXYCODONE AND ACETAMINOPHEN 1 TABLET: 5; 325 TABLET ORAL at 01:20

## 2022-04-29 RX ADMIN — BUMETANIDE 2 MG: 1 TABLET ORAL at 02:27

## 2022-04-29 RX ADMIN — IPRATROPIUM BROMIDE AND ALBUTEROL SULFATE 1 AMPULE: .5; 2.5 SOLUTION RESPIRATORY (INHALATION) at 12:13

## 2022-04-29 RX ADMIN — DOXYCYCLINE HYCLATE 100 MG: 100 CAPSULE ORAL at 21:28

## 2022-04-29 ASSESSMENT — PAIN SCALES - GENERAL
PAINLEVEL_OUTOF10: 8
PAINLEVEL_OUTOF10: 8
PAINLEVEL_OUTOF10: 10
PAINLEVEL_OUTOF10: 7

## 2022-04-29 ASSESSMENT — PAIN DESCRIPTION - LOCATION: LOCATION: BACK

## 2022-04-29 ASSESSMENT — PAIN DESCRIPTION - DESCRIPTORS: DESCRIPTORS: ACHING

## 2022-04-29 NOTE — PROGRESS NOTES
Brief progress note    Admission H&P done this morning reviewed. Agree with the plan. Initially patient wanted to be discharged this morning, however, later agreed to stay overnight.    Will continue current plan

## 2022-04-29 NOTE — PROGRESS NOTES
Pt refuses to wear nose guard for bipap. The skin is clean and dry with no marks. Pt asked me in the presence of is RN to turn all the alarms off of the machine. We both explained to him we can not do that because of patient safety. He stated to us that he knows how and would do it himself. All alarms are properly set. Pt settings are 15/8 with 40%, O2 sat is 99%. Pt resting without any complaints.

## 2022-04-29 NOTE — PROGRESS NOTES
CLINICAL PHARMACY NOTE: MEDS TO BEDS    Total # of Prescriptions Filled: 2   The following medications were delivered to the patient:  · [id number]  · Prednisone 10mg    Additional Documentation:    Delivered to Liz Nascimento RN

## 2022-04-29 NOTE — CARE COORDINATION
Care coordination. Discharge order noted. Patient admitted with COPD exacerbation. Met at bedside to assess for needs. No needs. Wife will transport home when off work. She will bring o2. Patient is at his home baseline of 4L. PCP is Dr. Nino Mcdonald and he uses [facility] on "[address] . No needs identified.

## 2022-04-29 NOTE — ED PROVIDER NOTES
ATTENDING PROVIDER ATTESTATION:     Ynes Cooley presented to the emergency department for evaluation of Shortness of Breath (hx of COPD, reports his breathing worsened today and he had to increase 02 from 4 to 6L)   and was initially evaluated by the Medical Resident. See Original ED Note for H&P and ED course above. I have reviewed and discussed the case, including pertinent history (medical, surgical, family and social) and exam findings with the Medical Resident assigned to Yens Cooley. I have personally performed and/or participated in the history, exam, medical decision making, and procedures and agree with all pertinent clinical information. I, Dr. Shawna Rivera MD, am the primary provider of this record. I have reviewed my findings and recommendations with the assigned Medical Resident, Ynes Cooley and members of family present at the time of disposition. My findings/plan: The primary encounter diagnosis was COPD exacerbation ([address] Diagnoses of Acute on chronic respiratory failure with hypoxia ([address] End stage COPD ([address] and Right-sided thoracic back pain, unspecified chronicity were also pertinent to this visit. Current Discharge Medication List      START taking these medications    Details   doxycycline hyclate (VIBRAMYCIN) 100 MG capsule Take 1 capsule by mouth every 12 hours for 6 days  Qty: 12 capsule, Refills: 0      morphine 10 MG/5ML solution Take 1.3 mLs by mouth every 6 hours as needed (SOB) for up to 3 days. Qty: 15 mL, Refills: 0    Comments: Reduce doses taken as pain becomes manageable  Associated Diagnoses: End stage COPD ([address]      oxyCODONE-acetaminophen (PERCOCET) 5-325 MG per tablet Take 1 tablet by mouth every 8 hours as needed for Pain for up to 3 days.   Qty: 9 tablet, Refills: 0    Comments: Reduce doses taken as pain becomes manageable  Associated Diagnoses: Right-sided thoracic back pain, unspecified chronicity      predniSONE (DELTASONE) 10 MG tablet Take 4 tablets by mouth daily for 1 day, THEN 3 tablets daily for 1 day, THEN 2 tablets daily for 1 day, THEN 1 tablet daily for 1 day. Qty: 10 tablet, Refills: 0           Quin Garces MD    HPI:  Patient is a 61-year-old male with a history of COPD on 4 L nasal cannula constant, hypertension, A. fib and PE on Eliquis who presents to the emergency department with complaint of shortness of breath above baseline. Patient states that he has had increasing shortness of breath over the past couple days with having to increase his oxygen supplementation from 4 L to 6 L nasal cannula. Patient also endorses increased sputum and increased cough over baseline. Patient denies fevers or chills, abdominal pain, chest pain. Patient denies any trauma. Patient denies any urinary or GI symptoms. Patient has been using his puffers and nebulizer. Patient is known to Dr. Syed Yates for pulmonology. Patient has a history of chronic back pain, is followed by palliative medicine. He states he is on no analgesics at home. Symptoms began a few days ago, moderate in severity, constant, exacerbated by ambulation, better with rest, associated symptoms of cough. Patient presented awake and alert, tripod, increased work of breathing, mild retractions. Patient with conversational dyspnea. Patient denies syncope, headache, neuro deficits. Review of Systems   Constitutional: Negative for chills and fever. HENT: Negative for congestion, rhinorrhea, trouble swallowing and voice change. Eyes: Negative for visual disturbance. Respiratory: Positive for cough and shortness of breath. Cardiovascular: Negative for chest pain and palpitations. Gastrointestinal: Negative for abdominal pain, constipation, diarrhea, nausea and vomiting. Endocrine: Negative for polyuria. Genitourinary: Negative for dysuria and urgency. Musculoskeletal: Negative for arthralgias and myalgias.    Skin: Negative for rash and wound. Allergic/Immunologic: Negative for immunocompromised state. Neurological: Negative for dizziness, syncope, weakness, light-headedness, numbness and headaches. Psychiatric/Behavioral: Negative for confusion. The patient is not nervous/anxious. Physical Exam  Vitals and nursing note reviewed. Constitutional:       General: He is not in acute distress. Appearance: He is ill-appearing. He is not toxic-appearing. HENT:      Head: Normocephalic and atraumatic. Mouth/Throat:      Mouth: Mucous membranes are moist.      Pharynx: Oropharynx is clear. Eyes:      Pupils: Pupils are equal, round, and reactive to light. Cardiovascular:      Rate and Rhythm: Regular rhythm. Tachycardia present. Pulses: Normal pulses. Radial pulses are 2+ on the right side and 2+ on the left side. Heart sounds: Normal heart sounds. Pulmonary:      Effort: Retractions present. Breath sounds: Decreased air movement present. Decreased breath sounds present. No wheezing, rhonchi or rales. Abdominal:      Palpations: Abdomen is soft. Tenderness: There is no abdominal tenderness. There is no guarding. Musculoskeletal:         General: Normal range of motion. Cervical back: Normal range of motion. Skin:     General: Skin is warm and dry. Capillary Refill: Capillary refill takes less than 2 seconds. Neurological:      General: No focal deficit present. Mental Status: He is alert and oriented to person, place, and time. GCS: GCS eye subscore is 4. GCS verbal subscore is 5. GCS motor subscore is 6. MDM  Number of Diagnoses or Management Options  Acute on chronic respiratory failure with hypoxia (HCC)  COPD exacerbation (HCC)  Diagnosis management comments: Patient is a 51-year-old male with a history of COPD on oxygen who presents to the emergency department with complaint of shortness of breath, cough, sputum production above baseline.   Patient is also needed to turn up his oxygen to 6 L/min for comfort. Patient presented awake, alert, following all commands, tripod position, moderate distress, speaking in 3-4 word sentences. Patient with noted conversational dyspnea. Vital signs were noted, patient is tachycardic, no hypotension. Physical exam shows decreased breath sounds, not moving a tremendous amount of air. No wheezing or rhonchi appreciated. Patient is on Eliquis, however he states that he did have a PE while on Eliquis. Decision was made to scan the chest, and no PE was evident. Patient was given duo nebs and steroids for COPD exacerbation. During ED monitoring patient states that he improved, oxygen was able to be decreased from 6-4, that is patient's baseline. His SPO2 remained at 95%, and patient states that he is normally at 97 to 98% on his 4 L. Patient did still feel mildly short of breath. Patient is followed by pulmonology. Labs are not consistent with fluid overload or heart failure. Troponins are mildly elevated, serial with a delta of 2. ABG does not show mismatch or hypercapnia. For patient's increased oxygen demand on arrival, COPD exacerbation likely patient will be admitted for continued dyspnea. Plan was discussed with patient he is agreeable. Family member also agreeable. Patient was more in the emergency department, hospitalist consulted and accepted the patient. Patient without further change, no decompensation. Patient was transferred to bed when floor available. Amount and/or Complexity of Data Reviewed  Clinical lab tests: ordered and reviewed  Tests in the radiology section of CPT®: ordered and reviewed  Decide to obtain previous medical records or to obtain history from someone other than the patient: yes       ED Course as of 04/28/22 2349   u Apr 28, 2022 1945 EKG: This EKG is signed and interpreted by me.     Rate: 103  Rhythm: Sinus  Interpretation: Sinus tachycardia, first-degree heart block, incomplete right bundle francisco block, normal QTC, no acute ST or T wave changes  Comparison: changes compared to previous EKG   [JA]   2045 Patient is a 72-year-old male with history of COPD on chronic oxygen, 4 L at baseline, follows with Dr. Corrine John, presenting with shortness of breath. Patient also states he was coughing up green and yellow earlier but that has been improving. He said no documented fevers, syncope, worsening leg edema, or calf tenderness. He does have a history of prior PE, he is currently on Eliquis. States he did have a PE that felt on Eliquis in the past.  He is unsure if it was provoked. He states he been compliant with his medications. He is also having back pain. He is concerned that he has another clot. No abdominal pain, emesis, or diarrhea. He was given breathing treatments just prior to my arrival and states he feels better. He is tachypneic on examination but speaking in full sentences. He has markedly diminished breath sounds. Suspect COPD exacerbation but will obtain CTA chest to be safe. Plan for admission. [JA]      ED Course User Index  [JA] Conard Oppenheim, MD      ED Course as of 04/28/22 2349   Thu Apr 28, 2022 1945 EKG: This EKG is signed and interpreted by me. Rate: 103  Rhythm: Sinus  Interpretation: Sinus tachycardia, first-degree heart block, incomplete right bundle francisco block, normal QTC, no acute ST or T wave changes  Comparison: changes compared to previous EKG   [JA]   2045 Patient is a 72-year-old male with history of COPD on chronic oxygen, 4 L at baseline, follows with Dr. Corrine John, presenting with shortness of breath. Patient also states he was coughing up green and yellow earlier but that has been improving. He said no documented fevers, syncope, worsening leg edema, or calf tenderness. He does have a history of prior PE, he is currently on Eliquis.   States he did have a PE that felt on Eliquis in the past.  He is unsure if it was provoked. He states he been compliant with his medications. He is also having back pain. He is concerned that he has another clot. No abdominal pain, emesis, or diarrhea. He was given breathing treatments just prior to my arrival and states he feels better. He is tachypneic on examination but speaking in full sentences. He has markedly diminished breath sounds. Suspect COPD exacerbation but will obtain CTA chest to be safe. Plan for admission. [JA]      ED Course User Index  [BYRON] Alfred Pinto MD       --------------------------------------------- PAST HISTORY ---------------------------------------------  Past Medical History:  has a past medical history of Atrial flutter ([facility] [address] COPD (chronic obstructive pulmonary disease) ([facility] [address] and Hypertension. Past Surgical History:  has a past surgical history that includes back surgery; Cardioversion (02/07/2018); transesophageal echocardiogram (02/07/2018); and Atrial ablation surgery (04/03/2018). Social History:  reports that he quit smoking about 4 years ago. His smoking use included cigarettes. He has a 60.00 pack-year smoking history. He has never used smokeless tobacco. He reports previous alcohol use of about 3.0 - 4.0 standard drinks of alcohol per week. He reports that he does not use drugs. Family History: family history includes Lung Cancer in his father; Other in his mother; Pacemaker in his sister. The patients home medications have been reviewed. Allergies: Patient has no known allergies.     -------------------------------------------------- RESULTS -------------------------------------------------    LABS:  Results for orders placed or performed during the hospital encounter of 04/28/22   COVID-19 & Influenza Combo    Specimen: Nasopharyngeal Swab   Result Value Ref Range    SARS-CoV-2 RNA, RT PCR NOT DETECTED NOT DETECTED    INFLUENZA A NOT DETECTED NOT DETECTED    INFLUENZA B NOT DETECTED NOT DETECTED   Troponin   Result Value Ref Range    Troponin, High Sensitivity 32 (H) 0 - 11 ng/L   CBC with Auto Differential   Result Value Ref Range    WBC 8.8 4.5 - 11.5 E9/L    RBC 4.17 3.80 - 5.80 E12/L    Hemoglobin 11.5 (L) 12.5 - 16.5 g/dL    Hematocrit 36.4 (L) 37.0 - 54.0 %    MCV 87.3 80.0 - 99.9 fL    MCH 27.6 26.0 - 35.0 pg    MCHC 31.6 (L) 32.0 - 34.5 %    RDW 14.6 11.5 - 15.0 fL    Platelets 576 989 - 748 E9/L    MPV 11.9 7.0 - 12.0 fL    Neutrophils % 70.1 43.0 - 80.0 %    Immature Granulocytes % 0.5 0.0 - 5.0 %    Lymphocytes % 13.7 (L) 20.0 - 42.0 %    Monocytes % 11.0 2.0 - 12.0 %    Eosinophils % 4.2 0.0 - 6.0 %    Basophils % 0.5 0.0 - 2.0 %    Neutrophils Absolute 6.21 1.80 - 7.30 E9/L    Immature Granulocytes # 0.04 E9/L    Lymphocytes Absolute 1.21 (L) 1.50 - 4.00 E9/L    Monocytes Absolute 0.97 (H) 0.10 - 0.95 E9/L    Eosinophils Absolute 0.37 0.05 - 0.50 E9/L    Basophils Absolute 0.04 0.00 - 0.20 E9/L   Comprehensive Metabolic Panel   Result Value Ref Range    Sodium 140 132 - 146 mmol/L    Potassium 3.6 3.5 - 5.0 mmol/L    Chloride 98 98 - 107 mmol/L    CO2 32 (H) 22 - 29 mmol/L    Anion Gap 10 7 - 16 mmol/L    Glucose 78 74 - 99 mg/dL    BUN 16 6 - 23 mg/dL    CREATININE 1.1 0.7 - 1.2 mg/dL    GFR Non-African American >60 >=60 mL/min/1.73    GFR African American >60     Calcium 9.5 8.6 - 10.2 mg/dL    Total Protein 7.2 6.4 - 8.3 g/dL    Albumin 4.5 3.5 - 5.2 g/dL    Total Bilirubin <0.2 0.0 - 1.2 mg/dL    Alkaline Phosphatase 116 40 - 129 U/L    ALT 10 0 - 40 U/L    AST 16 0 - 39 U/L   Brain Natriuretic Peptide   Result Value Ref Range    Pro- (H) 0 - 125 pg/mL   Magnesium   Result Value Ref Range    Magnesium 2.0 1.6 - 2.6 mg/dL   Blood Gas, Arterial   Result Value Ref Range    Date Analyzed [id number]     Time Analyzed 2004     Source: Blood Arterial     pH, Blood Gas 7.398 7.350 - 7.450    PCO2 47.0 (H) 35.0 - 45.0 mmHg    PO2 84.7 75.0 - 100.0 mmHg    HCO3 28.3 (H) 22.0 - 26.0 mmol/L    B.E. 2.9 -3.0 - 3.0 mmol/L    O2 Sat 96.1 92.0 - 98.5 %    O2Hb 95.7 94.0 - 97.0 %    COHb 0.2 0.0 - 1.5 %    MetHb 0.2 0.0 - 1.5 %    O2 Content 16.6 mL/dL    HHb 3.9 0.0 - 5.0 %    tHb (est) 12.3 11.5 - 16.5 g/dL    Mode NC- 6 L     Date Of Collection      Time Collected      Pt Temp 37.0 C     ID [id number]     Lab [id number]     Critical(s) Notified . No Critical Values    Troponin   Result Value Ref Range    Troponin, High Sensitivity 30 (H) 0 - 11 ng/L   EKG 12 Lead   Result Value Ref Range    Ventricular Rate 103 BPM    Atrial Rate 103 BPM    P-R Interval 214 ms    QRS Duration 114 ms    Q-T Interval 378 ms    QTc Calculation (Bazett) 495 ms    P Axis 85 degrees    R Axis 55 degrees    T Axis 83 degrees       RADIOLOGY:  CTA PULMONARY W CONTRAST   Final Result   No identified pulmonary embolism. Improving tree-in-bud nodularity of the right lower lobe with interval   development of trace tree-in-bud nodularity in the medial left lower lobe. These findings are likely infectious/inflammatory but follow-up in 3-6 months   would be useful to ensure resolution. No other significant change. RECOMMENDATIONS:   Unavailable         XR CHEST PORTABLE   Final Result   No acute process. ------------------------- NURSING NOTES AND VITALS REVIEWED ---------------------------  Date / Time Roomed:  4/28/2022  6:40 PM  ED Bed Assignment:  24/24    The nursing notes within the ED encounter and vital signs as below have been reviewed.      Patient Vitals for the past 24 hrs:   BP Temp Temp src Pulse Resp SpO2   04/28/22 2230 (!) 129/96 -- -- 114 20 96 %   04/28/22 2012 -- -- -- -- 17 99 %   04/28/22 1838 (!) 147/93 -- -- 106 24 97 %   04/28/22 1827 -- 98 °F (36.7 °C) Oral 93 -- 92 %       Oxygen Saturation Interpretation: Abnormal and Improved after treatment    ------------------------------------------ PROGRESS NOTES ------------------------------------------  Re-evaluation(s):  Patients symptoms are improving  Repeat physical examination is improved    Counseling:  I have spoken with the patient and discussed todays results, in addition to providing specific details for the plan of care and counseling regarding the diagnosis and prognosis. Their questions are answered at this time and they are agreeable with the plan of admission.    --------------------------------- ADDITIONAL PROVIDER NOTES ---------------------------------  Consultations:  Spoke with . Discussed case. They will admit the patient. This patient's ED course included: a personal history and physicial examination, multiple bedside re-evaluations, IV medications, cardiac monitoring and continuous pulse oximetry    This patient has remained hemodynamically stable during their ED course. Diagnosis:  1. COPD exacerbation ([address]    2. Acute on chronic respiratory failure with hypoxia (HCC)      Disposition:  Patient's disposition: Admit to telemetry  Patient's condition is stable. 4/28/22, 11:49 PM EDT.     This note is prepared by Juan Turner MD -PGY- Gayle Seip, MD  Resident  04/29/22 1047       Jamarcus Pearl MD  04/30/22 6029

## 2022-04-29 NOTE — DISCHARGE SUMMARY
Hospitalist Discharge Summary    Patient ID: Latonia Kim   Patient : 1960  Patient's PCP: Rubio Cerna DO    Admit Date: 2022   Admitting Physician: Francisco Gardiner MD    Discharge Date:  2022  Discharge Physician: Francisco Gardiner MD   Discharge Condition: Stable  Discharge Disposition: [facility] course in brief:  (Please refer to daily progress notes for a comprehensive review of the hospitalization by requesting medical records)    Patient, 82-year-old male with past medical history of very severe/end-stage COPD with FEV1 39% of predicted, chronic hypoxemic respiratory failure on supplemental oxygen via nasal cannula at 4 L/min continuously, history of PE, history of atrial fibrillation/flutter was admitted on 2022 for evaluation of worsening shortness of breath. Diagnosed with acute COPD exacerbation. Patient also reports chronic back pain secondary to chronic cough secondary to COPD. Given his diagnosis of end-stage COPD, he was evaluated by palliative care in the past and was prescribed morphine p.o./Percocet as needed. Patient reports he has been trying to follow-up with palliative care as outpatient. Patient wants to be discharged home today. Patient reports he is back at his baseline respiratory status. Advise close follow-up with PCP/pulmonology/palliative care. Patient verbalized understanding. Patient is currently being discharged in stable condition.     Discharge diagnosis    Dyspnea secondary to acute exacerbation of COPD  End-stage COPD  Chronic hypoxemic respiratory failure  History of atrial fibrillation/flutter-rate controlled  History of pulmonary embolism  Nicotine dependence, quit about 6 months ago    Consults:   IP CONSULT TO INTERNAL MEDICINE      Discharge Instructions / Follow up:    Continued appropriate risk factor modification of blood pressure, diabetes and serum lipids will remain essential to reducing risk of future atherosclerotic development    Activity: activity as tolerated    Physical exam    General-no acute distress. Able to talk in complete sentences  Respiratory-bilateral air entry reduced. No obvious wheezing or crackles. Pursed lip breathing  CVS-S1-S2, RRR  Abdomen-soft, bowel sounds present, nontender, nondistended  CNS-AAOx3, no gross focal neurological deficits evident at this time. Significant labs:  CBC:   Recent Labs     04/28/22 1849 04/29/22  0436   WBC 8.8 8.2   RBC 4.17 4.03   HGB 11.5* 11.3*   HCT 36.4* 35.9*   MCV 87.3 89.1   RDW 14.6 14.6    244     BMP:   Recent Labs     04/28/22 1849 04/29/22 0436    140   K 3.6 4.2   CL 98 98   CO2 32* 28   BUN 16 19   CREATININE 1.1 1.2   MG 2.0  --      LFT:  Recent Labs     04/28/22 1849   PROT 7.2   ALKPHOS 116   ALT 10   AST 16   BILITOT <0.2     PT/INR: No results for input(s): INR, APTT in the last 72 hours. BNP: No results for input(s): BNP in the last 72 hours. Hgb A1C:   Lab Results   Component Value Date    LABA1C 6.4 (H) 09/09/2021     Folate and B12: No results found for: Tricia Screws, No results found for: FOLATE  Thyroid Studies:   Lab Results   Component Value Date    TSH 0.942 02/14/2018    R8APEHA 4.8 02/14/2018       Urinalysis:    Lab Results   Component Value Date    NITRU Negative 10/16/2021    BLOODU Negative 10/16/2021    SPECGRAV 1.025 10/16/2021    GLUCOSEU Negative 10/16/2021    GLUCOSEU NEGATIVE 05/19/2011       Imaging:  CT CHEST WO CONTRAST    Result Date: 4/4/2022  EXAMINATION: CT OF THE CHEST WITHOUT CONTRAST 4/4/2022 9:12 am TECHNIQUE: CT of the chest was performed without the administration of intravenous contrast. Multiplanar reformatted images are provided for review. Dose modulation, iterative reconstruction, and/or weight based adjustment of the mA/kV was utilized to reduce the radiation dose to as low as reasonably achievable. COMPARISON: CT dated 10/16/2021.  HISTORY: ORDERING SYSTEM PROVIDED HISTORY: Chronic obstructive pulmonary disease, unspecified COPD type ([facility] [address] TECHNOLOGIST PROVIDED HISTORY: enb protocol Reason for exam:->lung nodule FINDINGS: Mediastinum: Thyroid is homogeneous in attenuation. No bulky mediastinal adenopathy. Central airways are patent. Esophagus is normal course and caliber. Patent nonaneurysmal thoracic aorta. Cardiac size within normal limits without pericardial effusion. Lungs/pleura: Scattered tree-in-bud opacifications right lower lobe of infectious or inflammatory etiology with resolved previously noted right lower lobe pulmonary nodule in the interim. Minimal scarring and atelectasis in the mid lungs. No dominant nodule or mass lesion. No pleural effusion or pleural process. Upper Abdomen: Visualized portions of the upper abdomen unremarkable. Soft Tissues/Bones: No acute osseous or soft tissue findings. No aggressive osseous lesion. Apparent resolution of previously noted right lower lobe pulmonary nodule no longer evident however development of tree-in-bud opacifications in the superior aspect right lower lobe consistent of post infectious or post inflammatory etiology. No consolidative component or effusion. XR CHEST PORTABLE    Result Date: 4/28/2022  EXAMINATION: ONE XRAY VIEW OF THE CHEST 4/28/2022 7:04 pm COMPARISON: 10/24/2021 HISTORY: ORDERING SYSTEM PROVIDED HISTORY: sob TECHNOLOGIST PROVIDED HISTORY: Reason for exam:->sob What reading provider will be dictating this exam?->CRC FINDINGS: The lungs are without acute focal process. There is no effusion or pneumothorax. The cardiomediastinal silhouette is without acute process. The osseous structures are without acute process. No acute process. CTA PULMONARY W CONTRAST    Result Date: 4/28/2022  EXAMINATION: CTA OF THE CHEST 4/28/2022 10:23 pm TECHNIQUE: CTA of the chest was performed after the administration of intravenous contrast.  Multiplanar reformatted images are provided for review.   MIP images are provided for review. Dose modulation, iterative reconstruction, and/or weight based adjustment of the mA/kV was utilized to reduce the radiation dose to as low as reasonably achievable. COMPARISON: Portable chest performed earlier today at 1900 hours and CT dated 04/04/2022 HISTORY: ORDERING SYSTEM PROVIDED HISTORY: shortness of breath, back pain, eval for PE TECHNOLOGIST PROVIDED HISTORY: Reason for exam:->shortness of breath, back pain, eval for PE Decision Support Exception - unselect if not a suspected or confirmed emergency medical condition->Emergency Medical Condition (MA) What reading provider will be dictating this exam?->CRC FINDINGS: Pulmonary Arteries: Evaluation is partially degraded by patient motion artifact. Allowing for this, no identified pulmonary embolism. Mediastinum: No aortic dissection. Scattered vascular calcifications. Heart size is normal.  No pericardial effusion. Lower paratracheal lymph nodes are near the upper limits of normal. Lungs/pleura: No pneumothorax. No pleural effusion. Some images are degraded by patient motion. Previously described tree-in-bud nodularity in the right lower lobe appears less conspicuous. A tiny area of tree-in-bud nodularity in the posteromedial left lower lobe is new. Stable areas of scarring and or atelectasis. Upper Abdomen: Low-attenuation focus in the upper pole of the left kidney is unchanged likely representing a cyst.  Thickening of the adrenal glands, likely due to hyperplasia. Left adrenal adenoma and possible small right adrenal adenoma appear unchanged. Soft Tissues/Bones: Degenerative changes are scattered in the spine. Moderate compression deformity of the T5 vertebral body is unchanged from previous and thought to be at least subacute or possibly chronic. No identified pulmonary embolism. Improving tree-in-bud nodularity of the right lower lobe with interval development of trace tree-in-bud nodularity in the medial left lower lobe.  These findings are likely infectious/inflammatory but follow-up in 3-6 months would be useful to ensure resolution. No other significant change. RECOMMENDATIONS: Unavailable       Discharge Medications:      Medication List      START taking these medications    morphine 10 MG/5ML solution  Take 1.3 mLs by mouth every 4 hours as needed (SOB) for up to 3 days. oxyCODONE-acetaminophen 5-325 MG per tablet  Commonly known as: PERCOCET  Take 1 tablet by mouth every 8 hours as needed for Pain for up to 3 days. predniSONE 10 MG tablet  Commonly known as: DELTASONE  Take 4 tablets by mouth daily for 1 day, THEN 3 tablets daily for 1 day, THEN 2 tablets daily for 1 day, THEN 1 tablet daily for 1 day.   Start taking on: April 29, 2022        CHANGE how you take these medications    insulin glargine 100 UNIT/ML injection vial  Commonly known as: LANTUS  Inject 15 Units into the skin 2 times daily  What changed: how much to take        CONTINUE taking these medications    * albuterol (2.5 MG/3ML) 0.083% nebulizer solution  Commonly known as: PROVENTIL  Take 3 mLs by nebulization every 6 hours as needed for Wheezing     * albuterol sulfate  (90 Base) MCG/ACT inhaler     apixaban 5 MG Tabs tablet  Commonly known as: Eliquis  Take 1 tablet by mouth 2 times daily     Arformoterol Tartrate 15 MCG/2ML Nebu  Commonly known as: BROVANA  Take 2 mLs by nebulization 2 times daily     Aspirin 81 MG Caps     atorvastatin 20 MG tablet  Commonly known as: LIPITOR     Breztri Aerosphere 160-9-4.8 MCG/ACT Aero  Generic drug: Budeson-Glycopyrrol-Formoterol  Inhale 2 puffs into the lungs 2 times daily     budesonide 0.5 MG/2ML nebulizer suspension  Commonly known as: PULMICORT  Take 2 mLs by nebulization 2 times daily     bumetanide 2 MG tablet  Commonly known as: BUMEX  Take 1 tablet by mouth 2 times daily     busPIRone 10 MG tablet  Commonly known as: BUSPAR  Take 1 tablet by mouth 3 times daily as needed (ANXIETY)     cyclobenzaprine 5 MG tablet  Commonly known as: FLEXERIL     dilTIAZem 240 MG extended release capsule  Commonly known as: CARDIZEM CD  Take 1 capsule by mouth 2 times daily     flecainide 100 MG tablet  Commonly known as: TAMBOCOR  TAKE 1 TABLET BY MOUTH TWICE DAILY     fluticasone 50 MCG/ACT nasal spray  Commonly known as: FLONASE     guaiFENesin 400 MG tablet  Take 1 tablet by mouth 3 times daily     ipratropium 0.02 % nebulizer solution  Commonly known as: ATROVENT  Take 2.5 mLs by nebulization 4 times daily     ipratropium-albuterol 0.5-2.5 (3) MG/3ML Soln nebulizer solution  Commonly known as: DUONEB     losartan 50 MG tablet  Commonly known as: COZAAR     metOLazone 2.5 MG tablet  Commonly known as: ZAROXOLYN  Take 1 tablet by mouth once a week     OneTouch Delica Plus [id number] Misc     OneTouch Ultra strip  Generic drug: blood glucose test strips     potassium chloride 20 MEQ extended release tablet  Commonly known as: KLOR-CON M  Take 1 tablet by mouth 2 times daily (with meals)     potassium chloride 20 MEQ Tbcr extended release tablet  Commonly known as: KLOR-CON M     primidone 50 MG tablet  Commonly known as: MYSOLINE     Roflumilast 500 MCG tablet  Commonly known as: DALIRESP  Take 1 tablet by mouth daily     sertraline 50 MG tablet  Commonly known as: ZOLOFT     UltiCare Insulin Syringe 31G X 5/16\" 0.3 ML Misc  Generic drug: Insulin Syringe-Needle U-100         * This list has 2 medication(s) that are the same as other medications prescribed for you. Read the directions carefully, and ask your doctor or other care provider to review them with you.             STOP taking these medications    hydrOXYzine 10 MG tablet  Commonly known as: ATARAX           Where to Get Your Medications      These medications were sent to [facility] [address], [address] [address]    Phone: [phone]   · morphine 10 MG/5ML solution  · oxyCODONE-acetaminophen 5-325 MG per tablet  · predniSONE 10 MG tablet         Time Spent on discharge is more than 35 minutes in the examination, evaluation, counseling and review of medications and discharge plan.    +++++++++++++++++++++++++++++++++++++++++++++++++  MD DUANE Nicole/ Franck Kaur , [facility]  +++++++++++++++++++++++++++++++++++++++++++++++++  NOTE: This report was transcribed using voice recognition software. Every effort was made to ensure accuracy; however, inadvertent computerized transcription errors may be present.

## 2022-04-29 NOTE — H&P
Hospitalist History & Physical      PCP: Malachi Nielsen DO    Date of Service: Pt seen/examined on 4/29/2022     Chief Complaint:  had concerns including Shortness of Breath (hx of COPD, reports his breathing worsened today and he had to increase 02 from 4 to 6L). History Of Present Illness:    Mr. James Taylor, a 63y.o. year old male  who  has a past medical history of Atrial flutter ([address] COPD (chronic obstructive pulmonary disease) ([address] and Hypertension.        Patient is a 63-year-old male with a history of COPD on 4 L nasal cannula constant, hypertension, A. fib and PE on Eliquis who presents to the emergency department with complaint of shortness of breath above baseline. Patient states that he has had increasing shortness of breath over the past couple days with having to increase his oxygen supplementation from 4 L to 6 L nasal cannula. Patient also endorses increased sputum and increased cough over baseline. Patient denies fevers or chills, abdominal pain, chest pain. Patient denies any trauma. Patient denies any urinary or GI symptoms. Patient has been using his puffers and nebulizer. Patient is known to Dr. Judy Medrano for pulmonology. Patient has a history of chronic back pain, is followed by palliative medicine. He states he is on no analgesics at home. Vital signs show the patient be tachycardic with a rate of 114. He is afebrile. Laboratory studies unremarkable. CT chest unremarkable. Patient was given duo nebs and methylprednisolone. Symptoms improved somewhat but still not back to baseline. Medicine consulted for admission.       Past Medical History:   Diagnosis Date    Atrial flutter ([address]     COPD (chronic obstructive pulmonary disease) ([address]     Hypertension        Past Surgical History:   Procedure Laterality Date    ATRIAL ABLATION SURGERY  04/03/2018    BACK SURGERY      CARDIOVERSION  02/07/2018    Dr. Johnnie Robertson- 80 J converted to SB    TRANSESOPHAGEAL ECHOCARDIOGRAM  02/07/2018    Dr. Hunter Kelly- No thrombus       Prior to Admission medications    Medication Sig Start Date End Date Taking?  Authorizing Provider   ONETOUCH ULTRA strip USE TO TEST TWICE DAILY AS NEEDED FOR SYMPTOMS OF IRREGULAR BLOOD GLUCOSE. 2/1/22   Historical Provider, MD   [address] X 5/16\" 0.3 ML MISC USE 1 SYRINGE TWICE DAILY AS DIRECTED 3/3/22   Historical Provider, MD   Lancets ([address]) [address] [facility] USE TWICE DAILY AS DIRECTED 2/20/22   Historical Provider, MD   potassium chloride (KLOR-CON M) 20 MEQ TBCR extended release tablet TAKE 1 AND 1/2 TABLETS BY MOUTH IN THE MORNING AND 1 BY MOUTH IN THE EVENING 2/28/22   Historical Provider, MD   cyclobenzaprine (FLEXERIL) 5 MG tablet TAKE 1 TABLET BY MOUTH THREE TIMES DAILY AS NEEDED 11/15/21   Historical Provider, MD   fluticasone (FLONASE) 50 MCG/ACT nasal spray SPRAY 1 SPRAY IN EACH NOSTRIL DAILY 12/29/21   Historical Provider, MD   hydrOXYzine (ATARAX) 10 MG tablet TAKE 1 TABLET BY MOUTH THREE TIMES DAILY AS NEEDED FOR PANIC 12/10/21   Historical Provider, MD   losartan (COZAAR) 50 MG tablet TAKE 1 TABLET BY MOUTH ONCE DAILY 1/5/22   Historical Provider, MD   metOLazone (ZAROXOLYN) 2.5 MG tablet Take 1 tablet by mouth once a week 10/27/21   Ju Feliz MD   potassium chloride (KLOR-CON M) 20 MEQ extended release tablet Take 1 tablet by mouth 2 times daily (with meals) 10/26/21   Jerica Monk MD   primidone (MYSOLINE) 50 MG tablet Take 25 mg by mouth nightly     Historical Provider, MD   sertraline (ZOLOFT) 50 MG tablet Take 50 mg by mouth daily     Historical Provider, MD   insulin glargine (LANTUS) 100 UNIT/ML injection vial Inject 15 Units into the skin 2 times daily  Patient taking differently: Inject 20 Units into the skin 2 times daily  9/18/21   Sandeep Gutierrez MD   busPIRone (BUSPAR) 10 MG tablet Take 1 tablet by mouth 3 times daily as needed (ANXIETY) 9/18/21   Sandeep Gutierrez MD   albuterol (PROVENTIL) (2.5 MG/3ML) 0.083% nebulizer solution Take 3 mLs by nebulization every 6 hours as needed for Wheezing 9/18/21   Candace Mccullough MD   ipratropium (ATROVENT) 0.02 % nebulizer solution Take 2.5 mLs by nebulization 4 times daily 9/18/21   Candace Mccullough MD   Arformoterol Tartrate (BROVANA) 15 MCG/2ML NEBU Take 2 mLs by nebulization 2 times daily  Patient not taking: Reported on 3/18/2022 9/18/21   Candace Mccullough MD   budesonide (PULMICORT) 0.5 MG/2ML nebulizer suspension Take 2 mLs by nebulization 2 times daily  Patient not taking: Reported on 3/18/2022 9/18/21   Candace Mccullough MD   guaiFENesin 400 MG tablet Take 1 tablet by mouth 3 times daily  Patient not taking: Reported on 11/8/2021 9/18/21   Candace Mccullough MD   Roflumilast (DALIRESP) 500 MCG tablet Take 1 tablet by mouth daily 9/19/21   Candace Mccullough MD   bumetanide (BUMEX) 2 MG tablet Take 1 tablet by mouth 2 times daily 8/30/21   Kristofer Astorga MD   Aspirin 81 MG CAPS Take 81 mg by mouth daily     Historical Provider, MD Vo-Glycopyrrol-Formoterol (BREZTRI AEROSPHERE) 160-9-4.8 MCG/ACT AERO Inhale 2 puffs into the lungs 2 times daily 5/17/21   Odalys Salas MD   apixaban (ELIQUIS) 5 MG TABS tablet Take 1 tablet by mouth 2 times daily 11/30/20   Odalys Salas MD   flecainide (TAMBOCOR) 100 MG tablet TAKE 1 TABLET BY MOUTH TWICE DAILY 1/15/19   Idelia People,    diltiazem (CARDIZEM CD) 240 MG extended release capsule Take 1 capsule by mouth 2 times daily 9/7/18   Cherri Spence MD   atorvastatin (LIPITOR) 20 MG tablet Take 20 mg by mouth every evening     Historical Provider, MD   albuterol sulfate  (90 Base) MCG/ACT inhaler Inhale 2 puffs into the lungs every 6 hours as needed for Wheezing    Historical Provider, MD   ipratropium-albuterol (DUONEB) 0.5-2.5 (3) MG/3ML SOLN nebulizer solution Inhale 1 vial into the lungs every 4 hours    Historical Provider, MD         Allergies:  Patient has no known allergies.     Social History:    TOBACCO:   reports that he quit smoking about 4 years ago. His smoking use included cigarettes. He has a 60.00 pack-year smoking history. He has never used smokeless tobacco.  ETOH:   reports previous alcohol use of about 3.0 - 4.0 standard drinks of alcohol per week. Family History:    Reviewed in detail and negative for DM, CAD, Cancer, CVA. Positive as follows\"      Problem Relation Age of Onset    Other Mother         ALS    Lung Cancer Father     Pacemaker Sister        REVIEW OF SYSTEMS:   Pertinent positives as noted in the HPI. All other systems reviewed and negative. PHYSICAL EXAM:  BP (!) 129/96   Pulse 114   Temp 98 °F (36.7 °C) (Oral)   Resp 20   SpO2 96%   General appearance: No apparent distress, appears stated age and cooperative. HEENT: Normal cephalic, atraumatic without obvious deformity. Pupils equal, round, and reactive to light. Extra ocular muscles intact. Conjunctivae/corneas clear. Neck: Supple, with full range of motion. No jugular venous distention. Trachea midline. Respiratory: Diminished  Cardiovascular: Tachycardic  Abdomen: Soft, nontender, nondistended  Musculoskeletal: No clubbing, cyanosis, edema of bilateral lower extremities. Brisk capillary refill. Skin: Normal skin color. No rashes or lesions. Neurologic:  Neurovascularly intact without any focal sensory/motor deficits. Cranial nerves: II-XII intact, grossly non-focal.  Psychiatric: Alert and oriented, thought content appropriate, normal insight    Reviewed EKG and CXR personally      CBC:   Recent Labs     04/28/22 1849   WBC 8.8   RBC 4.17   HGB 11.5*   HCT 36.4*   MCV 87.3   RDW 14.6        BMP:   Recent Labs     04/28/22 1849      K 3.6   CL 98   CO2 32*   BUN 16   CREATININE 1.1   MG 2.0     LFT:  Recent Labs     04/28/22 1849   PROT 7.2   ALKPHOS 116   ALT 10   AST 16   BILITOT <0.2     CE:  No results for input(s): Deborah De Oliveira in the last 72 hours.   PT/INR: No results for input(s): INR, APTT in the last 72 hours. BNP: No results for input(s): BNP in the last 72 hours. ESR: No results found for: SEDRATE  CRP: No results found for: CRP  D Dimer: No results found for: DDIMER   Folate and B12: No results found for: EICKRSQI40, No results found for: FOLATE  Lactic Acid: No results found for: LACTA  Thyroid Studies:   Lab Results   Component Value Date    TSH 0.942 02/14/2018    J7MTTAV 4.8 02/14/2018       Oupatient labs:  Lab Results   Component Value Date    CHOL 205 (H) 04/08/2018    TRIG 80 04/08/2018    HDL 80 04/08/2018    LDLCALC 109 (H) 04/08/2018    TSH 0.942 02/14/2018    PSA 0.26 04/08/2018    INR 1.3 10/18/2021    LABA1C 6.4 (H) 09/09/2021       Urinalysis:    Lab Results   Component Value Date    NITRU Negative 10/16/2021    BLOODU Negative 10/16/2021    SPECGRAV 1.025 10/16/2021    GLUCOSEU Negative 10/16/2021    GLUCOSEU NEGATIVE 05/19/2011       Imaging:  CT CHEST WO CONTRAST    Result Date: 4/4/2022  EXAMINATION: CT OF THE CHEST WITHOUT CONTRAST 4/4/2022 9:12 am TECHNIQUE: CT of the chest was performed without the administration of intravenous contrast. Multiplanar reformatted images are provided for review. Dose modulation, iterative reconstruction, and/or weight based adjustment of the mA/kV was utilized to reduce the radiation dose to as low as reasonably achievable. COMPARISON: CT dated 10/16/2021. HISTORY: ORDERING SYSTEM PROVIDED HISTORY: Chronic obstructive pulmonary disease, unspecified COPD type ([facility] [address] TECHNOLOGIST PROVIDED HISTORY: enb protocol Reason for exam:->lung nodule FINDINGS: Mediastinum: Thyroid is homogeneous in attenuation. No bulky mediastinal adenopathy. Central airways are patent. Esophagus is normal course and caliber. Patent nonaneurysmal thoracic aorta. Cardiac size within normal limits without pericardial effusion.  Lungs/pleura: Scattered tree-in-bud opacifications right lower lobe of infectious or inflammatory etiology with resolved previously noted right lower lobe pulmonary nodule in the interim. Minimal scarring and atelectasis in the mid lungs. No dominant nodule or mass lesion. No pleural effusion or pleural process. Upper Abdomen: Visualized portions of the upper abdomen unremarkable. Soft Tissues/Bones: No acute osseous or soft tissue findings. No aggressive osseous lesion. Apparent resolution of previously noted right lower lobe pulmonary nodule no longer evident however development of tree-in-bud opacifications in the superior aspect right lower lobe consistent of post infectious or post inflammatory etiology. No consolidative component or effusion. XR CHEST PORTABLE    Result Date: 4/28/2022  EXAMINATION: ONE XRAY VIEW OF THE CHEST 4/28/2022 7:04 pm COMPARISON: 10/24/2021 HISTORY: ORDERING SYSTEM PROVIDED HISTORY: sob TECHNOLOGIST PROVIDED HISTORY: Reason for exam:->sob What reading provider will be dictating this exam?->CRC FINDINGS: The lungs are without acute focal process. There is no effusion or pneumothorax. The cardiomediastinal silhouette is without acute process. The osseous structures are without acute process. No acute process. CTA PULMONARY W CONTRAST    Result Date: 4/28/2022  EXAMINATION: CTA OF THE CHEST 4/28/2022 10:23 pm TECHNIQUE: CTA of the chest was performed after the administration of intravenous contrast.  Multiplanar reformatted images are provided for review. MIP images are provided for review. Dose modulation, iterative reconstruction, and/or weight based adjustment of the mA/kV was utilized to reduce the radiation dose to as low as reasonably achievable.  COMPARISON: Portable chest performed earlier today at 1900 hours and CT dated 04/04/2022 HISTORY: ORDERING SYSTEM PROVIDED HISTORY: shortness of breath, back pain, eval for PE TECHNOLOGIST PROVIDED HISTORY: Reason for exam:->shortness of breath, back pain, eval for PE Decision Support Exception - unselect if not a suspected or confirmed emergency medical condition->Emergency Medical Condition (MA) What reading provider will be dictating this exam?->CRC FINDINGS: Pulmonary Arteries: Evaluation is partially degraded by patient motion artifact. Allowing for this, no identified pulmonary embolism. Mediastinum: No aortic dissection. Scattered vascular calcifications. Heart size is normal.  No pericardial effusion. Lower paratracheal lymph nodes are near the upper limits of normal. Lungs/pleura: No pneumothorax. No pleural effusion. Some images are degraded by patient motion. Previously described tree-in-bud nodularity in the right lower lobe appears less conspicuous. A tiny area of tree-in-bud nodularity in the posteromedial left lower lobe is new. Stable areas of scarring and or atelectasis. Upper Abdomen: Low-attenuation focus in the upper pole of the left kidney is unchanged likely representing a cyst.  Thickening of the adrenal glands, likely due to hyperplasia. Left adrenal adenoma and possible small right adrenal adenoma appear unchanged. Soft Tissues/Bones: Degenerative changes are scattered in the spine. Moderate compression deformity of the T5 vertebral body is unchanged from previous and thought to be at least subacute or possibly chronic. No identified pulmonary embolism. Improving tree-in-bud nodularity of the right lower lobe with interval development of trace tree-in-bud nodularity in the medial left lower lobe. These findings are likely infectious/inflammatory but follow-up in 3-6 months would be useful to ensure resolution. No other significant change.  RECOMMENDATIONS: Unavailable       ASSESSMENT:  -COPD exacerbation  -Acute on chronic respiratory failure  -Diabetes mellitus  -Hypertension  -Hyperlipidemia  -History of atrial flutter      PLAN:  -Admit to medicine  -DuoNebs every 4 hours  -Albuterol as needed  -Pulmicort twice daily  -Methylprednisolone 40 mg every 6 hours  -Telemetry  -Continue home medications        Diet: No diet orders on file  Code Status: Prior  Surrogate decision maker confirmed with patient:   Extended Emergency Contact Information  Primary Emergency Contact: Amanda Preciado  Address: [address]  [facility] [employer] [address] [facility] Phone: [phone]  Relation: Spouse  Secondary Emergency Contact: Komal Bolivar, [address] [facility] Phone: [phone]  Relation: Child    DVT Prophylaxis: []Lovenox []Heparin []PCD [] [address] []Encouraged ambulation  Disposition: []Med/Surg [] Intermediate [] ICU/CCU  Admit status: [] Observation [] Inpatient     +++++++++++++++++++++++++++++++++++++++++++++++++  David Lui DO  +++++++++++++++++++++++++++++++++++++++++++++++++  NOTE: This report was transcribed using voice recognition software. Every effort was made to ensure accuracy; however, inadvertent computerized transcription errors may be present.

## 2022-04-30 VITALS
WEIGHT: 257.63 LBS | RESPIRATION RATE: 24 BRPM | HEIGHT: 71 IN | OXYGEN SATURATION: 93 % | HEART RATE: 68 BPM | SYSTOLIC BLOOD PRESSURE: 125 MMHG | BODY MASS INDEX: 36.07 KG/M2 | DIASTOLIC BLOOD PRESSURE: 85 MMHG | TEMPERATURE: 97.2 F

## 2022-04-30 LAB
METER GLUCOSE: 165 MG/DL (ref 74–99)
METER GLUCOSE: 206 MG/DL (ref 74–99)
REASON FOR REJECTION: NORMAL
REJECTED TEST: NORMAL

## 2022-04-30 PROCEDURE — 6360000002 HC RX W HCPCS: Performed by: FAMILY MEDICINE

## 2022-04-30 PROCEDURE — 2700000000 HC OXYGEN THERAPY PER DAY

## 2022-04-30 PROCEDURE — 6370000000 HC RX 637 (ALT 250 FOR IP): Performed by: FAMILY MEDICINE

## 2022-04-30 PROCEDURE — G0378 HOSPITAL OBSERVATION PER HR: HCPCS

## 2022-04-30 PROCEDURE — S5553 INSULIN LONG ACTING 5 U: HCPCS | Performed by: FAMILY MEDICINE

## 2022-04-30 PROCEDURE — 94660 CPAP INITIATION&MGMT: CPT

## 2022-04-30 PROCEDURE — 82962 GLUCOSE BLOOD TEST: CPT

## 2022-04-30 PROCEDURE — 96376 TX/PRO/DX INJ SAME DRUG ADON: CPT

## 2022-04-30 PROCEDURE — 2580000003 HC RX 258: Performed by: FAMILY MEDICINE

## 2022-04-30 PROCEDURE — 94640 AIRWAY INHALATION TREATMENT: CPT

## 2022-04-30 PROCEDURE — 6370000000 HC RX 637 (ALT 250 FOR IP): Performed by: INTERNAL MEDICINE

## 2022-04-30 RX ORDER — MORPHINE SULFATE 10 MG/5ML
2.5 SOLUTION ORAL EVERY 6 HOURS PRN
Qty: 15 ML | Refills: 0 | Status: SHIPPED | OUTPATIENT
Start: 2022-04-30 | End: 2022-05-03

## 2022-04-30 RX ORDER — DOXYCYCLINE HYCLATE 100 MG/1
100 CAPSULE ORAL EVERY 12 HOURS SCHEDULED
Qty: 12 CAPSULE | Refills: 0 | Status: SHIPPED | OUTPATIENT
Start: 2022-04-30 | End: 2022-05-06

## 2022-04-30 RX ADMIN — IPRATROPIUM BROMIDE AND ALBUTEROL SULFATE 1 AMPULE: .5; 2.5 SOLUTION RESPIRATORY (INHALATION) at 09:45

## 2022-04-30 RX ADMIN — INSULIN GLARGINE-YFGN 15 UNITS: 100 INJECTION, SOLUTION SUBCUTANEOUS at 09:00

## 2022-04-30 RX ADMIN — DILTIAZEM HYDROCHLORIDE 240 MG: 120 CAPSULE, COATED, EXTENDED RELEASE ORAL at 08:57

## 2022-04-30 RX ADMIN — BUMETANIDE 2 MG: 1 TABLET ORAL at 08:58

## 2022-04-30 RX ADMIN — FLECAINIDE ACETATE 100 MG: 100 TABLET ORAL at 08:58

## 2022-04-30 RX ADMIN — ARFORMOTEROL TARTRATE 15 MCG: 15 SOLUTION RESPIRATORY (INHALATION) at 09:45

## 2022-04-30 RX ADMIN — OXYCODONE AND ACETAMINOPHEN 1 TABLET: 5; 325 TABLET ORAL at 10:40

## 2022-04-30 RX ADMIN — LOSARTAN POTASSIUM 50 MG: 50 TABLET, FILM COATED ORAL at 08:58

## 2022-04-30 RX ADMIN — ASPIRIN 81 MG: 81 TABLET, COATED ORAL at 08:57

## 2022-04-30 RX ADMIN — SODIUM CHLORIDE, PRESERVATIVE FREE 10 ML: 5 INJECTION INTRAVENOUS at 02:33

## 2022-04-30 RX ADMIN — DOXYCYCLINE HYCLATE 100 MG: 100 CAPSULE ORAL at 08:58

## 2022-04-30 RX ADMIN — ROFLUMILAST 500 MCG: 500 TABLET ORAL at 08:58

## 2022-04-30 RX ADMIN — BUDESONIDE 500 MCG: 0.5 SUSPENSION RESPIRATORY (INHALATION) at 09:44

## 2022-04-30 RX ADMIN — APIXABAN 5 MG: 5 TABLET, FILM COATED ORAL at 08:58

## 2022-04-30 RX ADMIN — METHYLPREDNISOLONE SODIUM SUCCINATE 40 MG: 40 INJECTION, POWDER, FOR SOLUTION INTRAMUSCULAR; INTRAVENOUS at 02:31

## 2022-04-30 RX ADMIN — IPRATROPIUM BROMIDE AND ALBUTEROL SULFATE 1 AMPULE: .5; 2.5 SOLUTION RESPIRATORY (INHALATION) at 11:48

## 2022-04-30 RX ADMIN — METHYLPREDNISOLONE SODIUM SUCCINATE 40 MG: 40 INJECTION, POWDER, FOR SOLUTION INTRAMUSCULAR; INTRAVENOUS at 08:58

## 2022-04-30 RX ADMIN — SERTRALINE 50 MG: 50 TABLET, FILM COATED ORAL at 08:57

## 2022-04-30 RX ADMIN — MORPHINE SULFATE 2.6 MG: 10 SOLUTION ORAL at 08:59

## 2022-04-30 RX ADMIN — SODIUM CHLORIDE, PRESERVATIVE FREE 10 ML: 5 INJECTION INTRAVENOUS at 09:10

## 2022-04-30 ASSESSMENT — PAIN SCALES - GENERAL
PAINLEVEL_OUTOF10: 4
PAINLEVEL_OUTOF10: 4

## 2022-04-30 ASSESSMENT — PAIN DESCRIPTION - LOCATION
LOCATION: BACK
LOCATION: BACK

## 2022-04-30 ASSESSMENT — PAIN DESCRIPTION - DESCRIPTORS
DESCRIPTORS: ACHING;DISCOMFORT
DESCRIPTORS: ACHING;DISCOMFORT

## 2022-04-30 NOTE — PROGRESS NOTES
Discharge instructions provided to the patient and his wife. IV removed and no issues at the time of d/c.

## 2022-04-30 NOTE — PROGRESS NOTES
Hospitalist Progress Note      SYNOPSIS: Patient admitted on 2022 for COPD exacerbation [facility])      SUBJECTIVE:    Patient seen and examined at bedside. Feels much better. Respiratory status at baseline. Records reviewed. Stable overnight. No other overnight issues reported. Temp (24hrs), Av.6 °F (36.4 °C), Min:97.2 °F (36.2 °C), Max:97.9 °F (36.6 °C)    DIET: ADULT DIET; Regular  CODE: Full Code    Intake/Output Summary (Last 24 hours) at 2022 1409  Last data filed at 2022 1018  Gross per 24 hour   Intake 840 ml   Output --   Net 840 ml       OBJECTIVE:    /85   Pulse 68   Temp 97.2 °F (36.2 °C) (Temporal)   Resp 24   Ht 5' 11\" (1.803 m)   Wt 257 lb 10.1 oz (116.9 kg)   SpO2 93%   BMI 35.93 kg/m²     General appearance: No apparent distress, appears stated age and cooperative. Respiratory: Bilateral air entry fair. No obvious wheezing cardiovascular: Regular rate rhythm, normal S1-S2  Abdomen: Soft, nontender, nondistended  Neurologic: awake, alert and following commands       Assessment and plan    Patient, 72-year-old male with past medical history of very severe/end-stage COPD with FEV1 39% of predicted, chronic hypoxemic respiratory failure on supplemental oxygen via nasal cannula at 4 L/min continuously, history of PE, history of atrial fibrillation/flutter was admitted on 2022 for evaluation of worsening shortness of breath. Diagnosed with acute COPD exacerbation. Patient also reports chronic back pain secondary to chronic cough secondary to COPD. Given his diagnosis of end-stage COPD, he was evaluated by palliative care in the past and was prescribed morphine p.o./Percocet as needed. Patient reports he has been trying to follow-up with palliative care as outpatient. Patient wants to be discharged home today. Patient reports he is back at his baseline respiratory status.       Assessment     Dyspnea secondary to acute exacerbation of COPD  End-stage COPD  Chronic hypoxemic respiratory failure  History of atrial fibrillation/flutter-rate controlled  Acute on chronic back pain secondary to coughing  History of pulmonary embolism  Nicotine dependence, quit about 6 months ago    Plan    Continue p.o. prednisone/bronchodilators  Continue DuoNeb  Continue p.o. doxycycline x7 days  Patient strongly advised to follow-up with PCP/pulmonary/palliative care as outpatient  Continue Percocet/morphine as needed  Patient stable for discharge today. Refer to discharge summary on 4/29/2022.     Medications:  REVIEWED DAILY    Infusion Medications    sodium chloride       Scheduled Medications    apixaban  5 mg Oral BID    aspirin  81 mg Oral Daily    atorvastatin  20 mg Oral QPM    bumetanide  2 mg Oral BID    dilTIAZem  240 mg Oral BID    flecainide  100 mg Oral BID    insulin glargine-yfgn  15 Units SubCUTAneous BID    losartan  50 mg Oral Daily    [START ON 5/5/2022] metOLazone  2.5 mg Oral Weekly    primidone  25 mg Oral Nightly    Roflumilast  500 mcg Oral Daily    sertraline  50 mg Oral Daily    sodium chloride flush  10 mL IntraVENous 2 times per day    ipratropium-albuterol  1 ampule Inhalation Q4H WA    methylPREDNISolone  40 mg IntraVENous Q6H    budesonide  0.5 mg Nebulization BID    And    Arformoterol Tartrate  15 mcg Nebulization BID    doxycycline hyclate  100 mg Oral 2 times per day     PRN Meds: busPIRone, sodium chloride flush, sodium chloride, promethazine **OR** ondansetron, polyethylene glycol, acetaminophen **OR** acetaminophen, albuterol, morphine, oxyCODONE-acetaminophen, benzocaine-menthol    Labs:     Recent Labs     04/28/22  1849 04/29/22  0436   WBC 8.8 8.2   HGB 11.5* 11.3*   HCT 36.4* 35.9*    244       Recent Labs     04/28/22  1849 04/29/22  0436    140   K 3.6 4.2   CL 98 98   CO2 32* 28   BUN 16 19   CREATININE 1.1 1.2   CALCIUM 9.5 9.3       Recent Labs     04/28/22 1849   PROT 7.2   ALKPHOS 116   ALT 10   AST 16   BILITOT <0.2       No results for input(s): INR in the last 72 hours. No results for input(s): Suad Prerna in the last 72 hours. Chronic labs:    Lab Results   Component Value Date    CHOL 205 (H) 04/08/2018    TRIG 80 04/08/2018    HDL 80 04/08/2018    LDLCALC 109 (H) 04/08/2018    TSH 0.942 02/14/2018    PSA 0.26 04/08/2018    INR 1.3 10/18/2021    LABA1C 6.4 (H) 09/09/2021       Radiology: REVIEWED DAILY    +++++++++++++++++++++++++++++++++++++++++++++++++  Demario Patel MD  [facility] - [address] [place] [address]  +++++++++++++++++++++++++++++++++++++++++++++++++  NOTE: This report was transcribed using voice recognition software. Every effort was made to ensure accuracy; however, inadvertent computerized transcription errors may be present.

## 2022-04-30 NOTE — CARE COORDINATION
Social Work Discharge Planning:  Discharge order noted. SW spoke with charge nurse patient has no needs.   Electronically signed by ESTEBAN Sauceda on 4/30/2022 at 9:18 AM

## 2022-05-05 ENCOUNTER — TELEPHONE (OUTPATIENT)
Dept: PULMONOLOGY | Age: 62
End: 2022-05-05

## 2022-05-05 NOTE — TELEPHONE ENCOUNTER
Pt contacted office and requested samples of Breztri as he is unable to get the prescription at his pharmacy. 2 samples of Breztri sent to pt per request. No additional questions or concerns voiced at this time.

## 2022-05-06 ENCOUNTER — TELEPHONE (OUTPATIENT)
Dept: PAIN MANAGEMENT | Age: 62
End: 2022-05-06

## 2022-05-06 NOTE — TELEPHONE ENCOUNTER
Hospice Nurse visited Iliana Ronak today and wanted to know where his referral was at.   Instructed that I would check with Yevgeniy Capellan our referral specialist.

## 2022-05-19 ENCOUNTER — OFFICE VISIT (OUTPATIENT)
Dept: PAIN MANAGEMENT | Age: 62
End: 2022-05-19
Payer: COMMERCIAL

## 2022-05-19 VITALS
TEMPERATURE: 97.5 F | BODY MASS INDEX: 35.42 KG/M2 | RESPIRATION RATE: 20 BRPM | OXYGEN SATURATION: 95 % | DIASTOLIC BLOOD PRESSURE: 82 MMHG | WEIGHT: 253 LBS | SYSTOLIC BLOOD PRESSURE: 126 MMHG | HEIGHT: 71 IN | HEART RATE: 110 BPM

## 2022-05-19 DIAGNOSIS — M54.6 PAIN IN THORACIC SPINE: Primary | ICD-10-CM

## 2022-05-19 DIAGNOSIS — F17.200 SMOKING ADDICTION: ICD-10-CM

## 2022-05-19 DIAGNOSIS — M47.814 THORACIC SPONDYLOSIS: ICD-10-CM

## 2022-05-19 DIAGNOSIS — G89.4 CHRONIC PAIN SYNDROME: ICD-10-CM

## 2022-05-19 DIAGNOSIS — M47.894 THORACIC FACET JOINT SYNDROME: ICD-10-CM

## 2022-05-19 DIAGNOSIS — E66.9 OBESITY (BMI 30-39.9): ICD-10-CM

## 2022-05-19 DIAGNOSIS — M51.9 THORACIC DISC DISEASE: ICD-10-CM

## 2022-05-19 PROCEDURE — 99204 OFFICE O/P NEW MOD 45 MIN: CPT | Performed by: PAIN MEDICINE

## 2022-05-19 PROCEDURE — 3017F COLORECTAL CA SCREEN DOC REV: CPT | Performed by: PAIN MEDICINE

## 2022-05-19 PROCEDURE — G8427 DOCREV CUR MEDS BY ELIG CLIN: HCPCS | Performed by: PAIN MEDICINE

## 2022-05-19 PROCEDURE — 1111F DSCHRG MED/CURRENT MED MERGE: CPT | Performed by: PAIN MEDICINE

## 2022-05-19 PROCEDURE — 1036F TOBACCO NON-USER: CPT | Performed by: PAIN MEDICINE

## 2022-05-19 PROCEDURE — 99204 OFFICE O/P NEW MOD 45 MIN: CPT

## 2022-05-19 PROCEDURE — G8417 CALC BMI ABV UP PARAM F/U: HCPCS | Performed by: PAIN MEDICINE

## 2022-05-19 RX ORDER — LIDOCAINE 36 MG/1
PATCH TOPICAL
Qty: 6 PATCH | Refills: 0 | COMMUNITY
Start: 2022-05-19

## 2022-05-19 NOTE — PROGRESS NOTES
[address]        [address] [facility], [address] [facility]      [phone]          Consult Note      Patient:  KEN Badillo 1960    Date of Service:  22    Requesting Physician:  Brina So DO    Reason for Consult:      Patient presents with complaints of mid back pain that started a long time ago and has been progressively getting worse. Pain is described as sharp. Pain is aggravated by coughing/sneezing. Pain is relieved laying down. HISTORY OF PRESENT ILLNESS:      Pain does not radiate. He  does not have numbness, tingling and does not have bladder or bowel dysfunction. He has been on anticoagulation medications to include eliquis. The patient  has not been on herbal supplements. The patient is diabetic. Imaging:  CT chest:  Apparent resolution of previously noted right lower lobe pulmonary nodule no   longer evident however development of tree-in-bud opacifications in the   superior aspect right lower lobe consistent of post infectious or post   inflammatory etiology.  No consolidative component or effusion. Previous treatments: medications. .      Opioid Agreement:  Renewal date:N/A    Past Medical History:   Diagnosis Date    [facility]     COPD [facility]chronic obstructive pulmonary disease) [facility]     Hypertension      Past Surgical History:   Procedure Laterality Date    ATRIAL ABLATION SURGERY  2018    BACK SURGERY      CARDIOVERSION  2018    Dr. Charline Bailon- 80 J converted to SB    TRANSESOPHAGEAL ECHOCARDIOGRAM  2018    Dr. Charline Bailon- No thrombus     Prior to Admission medications    Medication Sig Start Date End Date Taking?  Authorizing Provider   ONETOUCH ULTRA strip USE TO TEST TWICE DAILY AS NEEDED FOR SYMPTOMS OF IRREGULAR BLOOD GLUCOSE. 22  Yes Historical Provider, MD HARRISTICARE INSULIN SYRINGE 31G X 5/16\" 0.3 ML MISC USE 1 SYRINGE TWICE DAILY AS DIRECTED 3/3/22  Yes Historical Provider, MD Lares (Odilia Govea) [address] [facility] USE TWICE DAILY AS DIRECTED 2/20/22  Yes Historical Provider, MD   potassium chloride (KLOR-CON M) 20 MEQ TBCR extended release tablet TAKE 1 AND 1/2 TABLETS BY MOUTH IN THE MORNING AND 1 BY MOUTH IN THE EVENING 2/28/22  Yes Historical Provider, MD   cyclobenzaprine (FLEXERIL) 5 MG tablet TAKE 1 TABLET BY MOUTH THREE TIMES DAILY AS NEEDED 11/15/21  Yes Historical Provider, MD   fluticasone (FLONASE) 50 MCG/ACT nasal spray SPRAY 1 SPRAY IN EACH NOSTRIL DAILY 12/29/21  Yes Historical Provider, MD   losartan (COZAAR) 50 MG tablet TAKE 1 TABLET BY MOUTH ONCE DAILY 1/5/22  Yes Historical Provider, MD   metOLazone (ZAROXOLYN) 2.5 MG tablet Take 1 tablet by mouth once a week 10/27/21  Yes Nadir Phillips MD   potassium chloride (KLOR-CON M) 20 MEQ extended release tablet Take 1 tablet by mouth 2 times daily (with meals) 10/26/21  Yes Jerica Monk MD   primidone (MYSOLINE) 50 MG tablet Take 25 mg by mouth nightly    Yes Historical Provider, MD   sertraline (ZOLOFT) 50 MG tablet Take 50 mg by mouth daily    Yes Historical Provider, MD   insulin glargine (LANTUS) 100 UNIT/ML injection vial Inject 15 Units into the skin 2 times daily  Patient taking differently: Inject 20 Units into the skin 2 times daily  9/18/21  Yes Karen Olsen MD   busPIRone (BUSPAR) 10 MG tablet Take 1 tablet by mouth 3 times daily as needed (ANXIETY) 9/18/21  Yes Karen Olsen MD   albuterol (PROVENTIL) (2.5 MG/3ML) 0.083% nebulizer solution Take 3 mLs by nebulization every 6 hours as needed for Wheezing 9/18/21  Yes Karen Olsen MD   ipratropium (ATROVENT) 0.02 % nebulizer solution Take 2.5 mLs by nebulization 4 times daily 9/18/21  Yes Karen Olsen MD   Arformoterol Tartrate (BROVANA) 15 MCG/2ML NEBU Take 2 mLs by nebulization 2 times daily 9/18/21  Yes Karen Olsen MD   budesonide (PULMICORT) 0.5 MG/2ML nebulizer suspension Take 2 mLs by nebulization 2 times daily 9/18/21  Yes Bubba Arizmendi Thor Flores MD   guaiFENesin 400 MG tablet Take 1 tablet by mouth 3 times daily 21  Yes Saravanan Alvarado MD   Roflumilast (DALIRESP) 500 MCG tablet Take 1 tablet by mouth daily 21  Yes Saravanan Alvarado MD   bumetanide (BUMEX) 2 MG tablet Take 1 tablet by mouth 2 times daily 21  Yes Yashira Durham MD   Aspirin 81 MG CAPS Take 81 mg by mouth daily    Yes Historical Provider, MD   Budeson-Glycopyrrol-Formoterol (BREZTRI AEROSPHERE) 160-9-4.8 MCG/ACT AERO Inhale 2 puffs into the lungs 2 times daily 21  Yes Cheyanne Roa MD   apixaban (ELIQUIS) 5 MG TABS tablet Take 1 tablet by mouth 2 times daily 20  Yes Cheyanne Roa MD   flecainide (TAMBOCOR) 100 MG tablet TAKE 1 TABLET BY MOUTH TWICE DAILY 1/15/19  Yes Sonny Delcid DO   diltiazem (CARDIZEM CD) 240 MG extended release capsule Take 1 capsule by mouth 2 times daily 18  Yes Jimy Sherman MD   atorvastatin (LIPITOR) 20 MG tablet Take 20 mg by mouth every evening    Yes Historical Provider, MD   albuterol sulfate  (90 Base) MCG/ACT inhaler Inhale 2 puffs into the lungs every 6 hours as needed for Wheezing   Yes Historical Provider, MD   ipratropium-albuterol ([place]) 0.5-2.5 (3) MG/3ML SOLN nebulizer solution Inhale 1 vial into the lungs every 4 hours   Yes Historical Provider, MD     No Known Allergies    Social History     Socioeconomic History    Marital status:      Spouse name: Not on file    Number of children: Not on file    Years of education: Not on file    Highest education level: Not on file   Occupational History    Not on file   Tobacco Use    Smoking status: Former Smoker     Packs/day: 2.00     Years: 30.00     Pack years: 60.00     Types: Cigarettes     Quit date: 2017     Years since quittin.7    Smokeless tobacco: Never Used   Vaping Use    Vaping Use: Never used   Substance and Sexual Activity    Alcohol use: Not Currently     Alcohol/week: 3.0 - 4.0 standard drinks     Types: 3 - 4 Cans of beer per week    Drug use: No    Sexual activity: Yes     Partners: Female   Other Topics Concern    Not on file   Social History Narrative    Not on file     Social Determinants of Health     Financial Resource Strain:     Difficulty of Paying Living Expenses: Not on file   Food Insecurity:     Worried About Running Out of Food in the Last Year: Not on file    Abbie of Food in the Last Year: Not on file   Transportation Needs:     Lack of Transportation (Medical): Not on file    Lack of Transportation (Non-Medical): Not on file   Physical Activity:     Days of Exercise per Week: Not on file    Minutes of Exercise per Session: Not on file   Stress:     Feeling of Stress : Not on file   Social Connections:     Frequency of Communication with Friends and Family: Not on file    Frequency of Social Gatherings with Friends and Family: Not on file    Attends [employer] Services: Not on file    Active Member of [address] [place] [address] [employer] or Organizations: Not on file    Attends Club or Organization Meetings: Not on file    Marital Status: Not on file   Intimate Partner Violence:     Fear of Current or Ex-Partner: Not on file    Emotionally Abused: Not on file    Physically Abused: Not on file    Sexually Abused: Not on file   Housing Stability:     Unable to Pay for Housing in the Last Year: Not on file    Number of [place] in the Last Year: Not on file    Unstable Housing in the Last Year: Not on file     Family History   Problem Relation Age of Onset    Other Mother         ALS    Lung Cancer Father     Pacemaker Sister      REVIEW OF SYSTEMS:     Patient specifically denies fever/chills, chest pain, shortness of breath, new bowel or bladder complaints. All other review of systems was negative.     PHYSICAL EXAMINATION:      /82   Pulse 110   Temp 97.5 °F (36.4 °C) (Infrared)   Resp 20   Ht 5' 11\" (1.803 m)   Wt 253 lb (114.8 kg)   SpO2 95% Comment: 4 litlers  BMI 35.29 kg/m²     General:      General appearance:   elderly, pleasant and well-hydrated. , in moderate discomfort and A & O x3  Build:Overweight    HEENT:    Head:normocephalic and atraumatic  Sclera: icterus absent,     Lungs:    Breathing:Breathing Pattern: labored on O2    Abdomen:    Shape:obese, non-distended and normal  Tenderness:none    Thoracic spine:    Spine inspection:normal   pationPal:tenderness paravertebral muscles, midline tenderness, facet loading, right, positive and tenderness. T7-8  Range of motion:abnormal moderately flexion, extension rotation bilateral and is  painful. Lumbar spine:    Spine inspection:surgical incision scar   CVA tenderness:No   Palpation:tenderness paravertebral muscles. Range of motion:abnormal moderately Lateral bending, flexion, extension rotation bilateral and is  painful. Musculoskeletal:    Trigger points in Paraveteral:absent bilaterally  SI joint tenderness:negative right, negative left              SOFI test:not done right, not done             left  Piriformis tenderness:negative right, negative left  Trochanteric bursa tenderness:negative right, negative left  SLR:negative right, negative left, sitting     Extremities:    Tremors:None bilaterally upper and lower  Range of motion: pain with internal rotation of hips negative. Intact:Yes  Edema:Normal    Neurological:    Sensory:normal to light touch bilateral lower extremities. Motor:                             Right Quadriceps5/5          Left Quadriceps5/5           Right Gastrocnemius5/5    Left Gastrocnemius5/5  Right Ant Tibialis5/5  Left Ant Tibialis5/5  Reflexes:    Right Quadriceps reflex2+  Left Quadriceps reflex2+  Right Achilles reflex2+  Left Achilles reflex2+  Gait:antalgic    Dermatology:    Skin:no unusual rashes and no skin lesions    Impression:  Mid back pain with no radicular symptoms and h/o emphysema and COPD. Patient lives in [place]. Plan:  Reviewed imaging studies.   Will schedule patient for thoracic spine Xray.  Patient given samples for Lidocaine patches. Patient is asking for liquid Morphine or Percocet. Advised patient that I won't be able to start opioids from first office visit. Urine screen today. OARRS report reviewed 05/2022  Patient encouraged to stay active and to lose weight  Treatment plan discussed with the patient including medications and procedure side effects. Patient encouraged to quit smoking. We discussed with the patient that combining opioids, benzodiazepines, alcohol, illicit drugs or sleep aids increases the risk of respiratory depression including death. We discussed that these medications may cause drowsiness, sedation or dizziness and have counseled the patient not to drive or operate machinery. We have discussed that these medications will be prescribed only by one provider. We have discussed with the patient about age related risk factors and have thoroughly discussed the importance of taking these medications as prescribed. The patient verbalizes understanding. ccrsusan Mars M.D.

## 2022-05-19 NOTE — PROGRESS NOTES
Do you currently have any of the following:    Fever: no  Headache:  No  Cough: Yes  Shortness of breath: Yes  Exposed to anyone with these symptoms: No                                                                                                                Nestora Sacks presents to the [facility] on 5/19/2022. Nishant Elizalde is complaining of pain mid back pain. The pain is intermittent. The pain is described as sharp. Pain is rated on his best day at a 5, on his worst day at a 9, and on average at a 7 on the VAS scale. He took his last dose of Percocet and flexeril Nishant Elizalde does not have issues with constipation. Any procedures since your last visit: No,   . He is not on NSAIDS and  is  on anticoagulation medications to include ASA and Eliquis and is managed by Adry Scruggs DO  . Pacemaker or defibrillator: No Physician      Medication Contract and Consent for Opioid Use Documents Filed      No documents found                   /82   Pulse 110   Temp 97.5 °F (36.4 °C) (Infrared)   Resp 20   Ht 5' 11\" (1.803 m)   Wt 253 lb (114.8 kg)   SpO2 95% Comment: 4 litlers  BMI 35.29 kg/m²      No LMP for male patient.

## 2022-05-26 ENCOUNTER — HOSPITAL ENCOUNTER (OUTPATIENT)
Dept: CARDIAC REHAB | Age: 62
Setting detail: THERAPIES SERIES
Discharge: HOME | End: 2022-05-26

## 2022-09-27 ENCOUNTER — OFFICE VISIT (OUTPATIENT)
Dept: PULMONOLOGY | Age: 62
End: 2022-09-27
Payer: COMMERCIAL

## 2022-09-27 VITALS
BODY MASS INDEX: 36.4 KG/M2 | TEMPERATURE: 97.8 F | SYSTOLIC BLOOD PRESSURE: 141 MMHG | HEART RATE: 99 BPM | RESPIRATION RATE: 18 BRPM | WEIGHT: 260 LBS | OXYGEN SATURATION: 97 % | HEIGHT: 71 IN | DIASTOLIC BLOOD PRESSURE: 82 MMHG

## 2022-09-27 DIAGNOSIS — J44.9 CHRONIC OBSTRUCTIVE PULMONARY DISEASE, UNSPECIFIED COPD TYPE (HCC): Primary | ICD-10-CM

## 2022-09-27 DIAGNOSIS — J44.9 STAGE 3 SEVERE COPD BY GOLD CLASSIFICATION (HCC): ICD-10-CM

## 2022-09-27 LAB
EXPIRATORY TIME-POST: 9.83 SEC
EXPIRATORY TIME: 8.17 SEC
FEF 25-75% %CHNG: -8
FEF 25-75% %PRED-POST: 13 %
FEF 25-75% %PRED-PRE: 14 L/SEC
FEF 25-75% PRED: 3.02 L/SEC
FEF 25-75%-POST: 0.41 L/SEC
FEF 25-75%-PRE: 0.45 L/SEC
FEV1 %PRED-POST: 27 %
FEV1 %PRED-PRE: 30 %
FEV1 PRED: 3.68 L
FEV1-POST: 1 L
FEV1-PRE: 1.13 L
FEV1/FVC %PRED-POST: 45 %
FEV1/FVC %PRED-PRE: 53 %
FEV1/FVC PRED: 77 %
FEV1/FVC-POST: 35 %
FEV1/FVC-PRE: 41 %
FVC %PRED-POST: 59 L
FVC %PRED-PRE: 57 %
FVC PRED: 4.78 L
FVC-POST: 2.86 L
FVC-PRE: 2.75 L
PEF %PRED-POST: 36 %
PEF %PRED-PRE: 30 L/SEC
PEF PRED: 9.4 L/SEC
PEF%CHNG: 18
PEF-POST: 3.39 L/SEC
PEF-PRE: 2.85 L/SEC

## 2022-09-27 PROCEDURE — 99213 OFFICE O/P EST LOW 20 MIN: CPT | Performed by: INTERNAL MEDICINE

## 2022-09-27 PROCEDURE — 94060 EVALUATION OF WHEEZING: CPT | Performed by: INTERNAL MEDICINE

## 2022-09-27 PROCEDURE — 3023F SPIROM DOC REV: CPT | Performed by: INTERNAL MEDICINE

## 2022-09-27 PROCEDURE — G8417 CALC BMI ABV UP PARAM F/U: HCPCS | Performed by: INTERNAL MEDICINE

## 2022-09-27 PROCEDURE — 94726 PLETHYSMOGRAPHY LUNG VOLUMES: CPT | Performed by: INTERNAL MEDICINE

## 2022-09-27 PROCEDURE — 3078F DIAST BP <80 MM HG: CPT | Performed by: INTERNAL MEDICINE

## 2022-09-27 PROCEDURE — 3017F COLORECTAL CA SCREEN DOC REV: CPT | Performed by: INTERNAL MEDICINE

## 2022-09-27 PROCEDURE — 1036F TOBACCO NON-USER: CPT | Performed by: INTERNAL MEDICINE

## 2022-09-27 PROCEDURE — G8428 CUR MEDS NOT DOCUMENT: HCPCS | Performed by: INTERNAL MEDICINE

## 2022-09-27 PROCEDURE — 3074F SYST BP LT 130 MM HG: CPT | Performed by: INTERNAL MEDICINE

## 2022-09-27 ASSESSMENT — PULMONARY FUNCTION TESTS
FEV1_PERCENT_PREDICTED_POST: 27
FEV1_POST: 1.00
FEV1/FVC_PERCENT_PREDICTED_POST: 45
FEV1_PREDICTED: 3.68
FEV1_PERCENT_PREDICTED_PRE: 30
FVC_PERCENT_PREDICTED_POST: 59
FVC_PRE: 2.75
FEV1_PRE: 1.13
FVC_PREDICTED: 4.78
FEV1/FVC_PERCENT_PREDICTED_PRE: 53
FEV1/FVC_PREDICTED: 77
FVC_PERCENT_PREDICTED_PRE: 57
FEV1/FVC_POST: 35
FVC_POST: 2.86
FEV1/FVC_PRE: 41

## 2022-09-27 NOTE — PROGRESS NOTES
Patient to follow up with physician in 6 months or on an as needed basis. Patient given samples of Breztri during office visit under the direction of Dr. India Rubio.

## 2022-09-27 NOTE — PATIENT INSTRUCTIONS
[facility]  [address] [facility], [address]  Office: [phone]      Your were seen in the office today for COPD      We  did not make changes to your medications today. Testing ordered today was none      Vaccines recommended none      Follow up in 6 months or as needed              Please do not hesitate to call the office with any questions.

## 2022-10-02 NOTE — PROGRESS NOTES
[facility]     HISTORY OF PRESENT ILLNESS:    Alexy Eason is a 63y.o. year old male here for evaluation of COPD. He has been seen by me in the hospital. Previously seen by Dr. Palmira Pickard in the office. He does have a history of severe COPD with multiple exacerbations, atrial flutter, acute on chronic hypoxemic respiratory failure. He remains on daliresp, albuterol, pulmicort, and duonebs. He is using his duonebs 4-5x a day. He has recently been enrolled in hospice and is taking morphine at least daily and prn as needed due to air hunger. The lower extremity edema has significantly decreased, he is still taking diuretics. Remains on 4L NC. We did also discuss his symptoms while on the phone with his wife who states that at times he does have chest tightness. Spirometry reviewed with patient and his wife which does show significant decline compared to his former pfts. ALLERGIES:  No Known Allergies    PAST MEDICAL HISTORY:       Diagnosis Date    Atrial flutter (HCC)     COPD (chronic obstructive pulmonary disease) ([facility])     Hypertension        MEDICATIONS:   Current Outpatient Medications   Medication Sig Dispense Refill    Lidocaine (ZTLIDO) 1.8 % PTCH 12 hours on 12 hours off 6 patch 0    ONETOUCH ULTRA strip USE TO TEST TWICE DAILY AS NEEDED FOR SYMPTOMS OF IRREGULAR BLOOD GLUCOSE.       ULTICARE INSULIN SYRINGE 31G X 5/16\" 0.3 ML MISC USE 1 SYRINGE TWICE DAILY AS DIRECTED      Lancets (ONETOUCH DELICA PLUS [id number]) MISC USE TWICE DAILY AS DIRECTED      potassium chloride (KLOR-CON M) 20 MEQ TBCR extended release tablet TAKE 1 AND 1/2 TABLETS BY MOUTH IN THE MORNING AND 1 BY MOUTH IN THE EVENING      cyclobenzaprine (FLEXERIL) 5 MG tablet TAKE 1 TABLET BY MOUTH THREE TIMES DAILY AS NEEDED      fluticasone (FLONASE) 50 MCG/ACT nasal spray SPRAY 1 SPRAY IN EACH NOSTRIL DAILY      losartan (COZAAR) 50 MG tablet TAKE 1 TABLET BY MOUTH ONCE DAILY metOLazone (ZAROXOLYN) 2.5 MG tablet Take 1 tablet by mouth once a week 30 tablet 3    potassium chloride (KLOR-CON M) 20 MEQ extended release tablet Take 1 tablet by mouth 2 times daily (with meals) 60 tablet 3    primidone (MYSOLINE) 50 MG tablet Take 25 mg by mouth nightly       sertraline (ZOLOFT) 50 MG tablet Take 50 mg by mouth daily       insulin glargine (LANTUS) 100 UNIT/ML injection vial Inject 15 Units into the skin 2 times daily (Patient taking differently: Inject 20 Units into the skin 2 times daily ) 10 mL 3    busPIRone (BUSPAR) 10 MG tablet Take 1 tablet by mouth 3 times daily as needed (ANXIETY) 60 tablet 0    albuterol (PROVENTIL) (2.5 MG/3ML) 0.083% nebulizer solution Take 3 mLs by nebulization every 6 hours as needed for Wheezing 120 each 3    ipratropium (ATROVENT) 0.02 % nebulizer solution Take 2.5 mLs by nebulization 4 times daily 2.5 mL 3    Arformoterol Tartrate (BROVANA) 15 MCG/2ML NEBU Take 2 mLs by nebulization 2 times daily 120 mL 3    budesonide (PULMICORT) 0.5 MG/2ML nebulizer suspension Take 2 mLs by nebulization 2 times daily 60 each 3    guaiFENesin 400 MG tablet Take 1 tablet by mouth 3 times daily 56 tablet 0    Roflumilast (DALIRESP) 500 MCG tablet Take 1 tablet by mouth daily 30 tablet 1    bumetanide (BUMEX) 2 MG tablet Take 1 tablet by mouth 2 times daily 30 tablet 3    Aspirin 81 MG CAPS Take 81 mg by mouth daily       Budeson-Glycopyrrol-Formoterol (BREZTRI AEROSPHERE) 160-9-4.8 MCG/ACT AERO Inhale 2 puffs into the lungs 2 times daily 3 Inhaler 0    apixaban (ELIQUIS) 5 MG TABS tablet Take 1 tablet by mouth 2 times daily 30 tablet 5    flecainide (TAMBOCOR) 100 MG tablet TAKE 1 TABLET BY MOUTH TWICE DAILY 60 tablet 5    diltiazem (CARDIZEM CD) 240 MG extended release capsule Take 1 capsule by mouth 2 times daily 60 capsule 11    atorvastatin (LIPITOR) 20 MG tablet Take 20 mg by mouth every evening       albuterol sulfate  (90 Base) MCG/ACT inhaler Inhale 2 puffs into the lungs every 6 hours as needed for Wheezing      ipratropium-albuterol (DUONEB) 0.5-2.5 (3) MG/3ML SOLN nebulizer solution Inhale 1 vial into the lungs every 4 hours       No current facility-administered medications for this visit. SOCIAL AND OCCUPATIONAL HEALTH:  The patient is a former smoker who. He denies any known occupational exposures. SURGICAL HISTORY:   Past Surgical History:   Procedure Laterality Date    ATRIAL ABLATION SURGERY  04/03/2018    BACK SURGERY      CAPSULOTOMY, HAND  02/07/2018    Dr. Ana Tan- 80 J converted to SB    TRANSESOPHAGEAL ECHOCARDIOGRAM  02/07/2018    Dr. Ana Tan- No thrombus       FAMILY HISTORY: No family history of cancer, blood clots     REVIEW OF SYSTEMS:  Constitutional: No fevers, chills, unintentional weight loss  Skin: No rashes or lesions  EENT: No change in vision, change in hearing, change in taste, change in smell  Cardiovascular: Denies chest pain, chest pressure, palpitations  Respiration: Positive for dyspnea on exertion. Gastrointestinal: Denies nausea, vomiting, diarrhea  Musculoskeletal: Denies joint or muscle pain  Neurological: Denies headaches. Positive for intermittent dizziness at times. Psychological: Denies anxiety or depression  Endocrine: Denies polyuria polydipsia  Hematopoietic/lymphatic: Denies easy bruising        PHYSICAL EXAMINATION:  Constitutional: Well-nourished, well-developed. No acute distress  EENT: PERRL, EOMI, no oropharyngeal erythema. No palpable adenopathy  Neck: Trachea and thyroid midline  Respiratory: No use of accessory muscles overall clear to auscultation bilaterally. Cardiovascular: Regular rate and rhythm, no murmurs rubs or gallops  Pulses: Equal bilaterally  Abdomen: Soft nontender bowel sounds present  Lymphatic: No palpable adenopathy  Musculoskeletal: Gait steady  Extremities: No clubbing, cyanosis, edema.   Skin: No rashes or lesions  Neurological/Psychiatric: Neurologically intact, no focal deficits. Affect appropriate    DATA: Spirometry demonstrates an FVC of 2.75L 57% predicted. FEV1 1.13 L 30% predicted. FEV1/FVc 41. There is no significant bronchodilator response. MVV is 48, or 33% predicted. SVC is 3.34L or 69% of predicted. Flow volume loop with scooping of expiratory limb. Overall consistent with severe COPD. IMPRESSION:       Severe COPD  Chronic hypoxemic respiratory failure  Patient is enrolled in hospice. HFpEF  CAD  Former tobacco use             PLAN:      Continue daliresp, current nebulizer therapy  Continue 4L NC   Recommend flu vaccine when available. Continued follow up with hospice services. I hope this updates you on my evaluation and clinical thinking. Thank you for allowing me to participate in his care.      Sincerely,        Enrico Shah.  Office: [phone]  Fax: [phone]

## 2023-01-17 NOTE — PROGRESS NOTES
[facility]  Division of Pulmonary, Critical Care Medicine  Pulmonary [address] [facility]           Pulmonary Clinic consult       CC :SOB ,wheeizng   H/o A flutter     HPI :  Patient has been doing  better   Still with difficulty breathing   Tightness as well     PHYSICAL EXAMINATION:     VITAL SIGNS:  /74   Pulse 73   Temp 97.4 °F (36.3 °C) (Temporal)   Resp 22   Ht 5' 11\" (1.803 m)   Wt 275 lb 3.2 oz (124.8 kg)   SpO2 98%   BMI 38.38 kg/m²   Wt Readings from Last 3 Encounters:   08/12/21 275 lb 3.2 oz (124.8 kg)   01/22/20 277 lb (125.6 kg)   01/02/20 271 lb (122.9 kg)     Temp Readings from Last 3 Encounters:   08/12/21 97.4 °F (36.3 °C) (Temporal)   08/09/19 97.7 °F (36.5 °C)   04/09/19 98.5 °F (36.9 °C) (Oral)     TMAX:  BP Readings from Last 3 Encounters:   08/12/21 131/74   01/22/20 132/77   01/02/20 124/82     Pulse Readings from Last 3 Encounters:   08/12/21 73   01/22/20 109   01/02/20 83           INTAKE/OUTPUTS:  I/O last 3 completed shifts:   In: 660 [P.O.:660]  Out: 2025 [Urine:2025]    Intake/Output Summary (Last 24 hours) at 8/12/2021 1239  Last data filed at 8/12/2021 1128  Gross per 24 hour   Intake 780 ml   Output 2825 ml   Net -2045 ml       This is virtual visit      LABS/IMAGING:    CBC:  Lab Results   Component Value Date    WBC 13.9 (H) 08/09/2021    HGB 12.1 (L) 08/09/2021    HCT 38.2 08/09/2021    MCV 99.7 08/09/2021     08/09/2021    LYMPHOPCT 9.6 (L) 08/09/2021    RBC 3.83 08/09/2021    MCH 31.6 08/09/2021    MCHC 31.7 (L) 08/09/2021    RDW 12.4 08/09/2021    NEUTOPHILPCT 83.0 (H) 08/09/2021    MONOPCT 5.6 08/09/2021    BASOPCT 0.1 08/09/2021    NEUTROABS 11.54 (H) 08/09/2021    LYMPHSABS 1.33 (L) 08/09/2021    MONOSABS 0.78 08/09/2021    EOSABS 0.00 (L) 08/09/2021    BASOSABS 0.02 08/09/2021       Recent Labs     08/09/21  0817 08/07/21  2117   WBC 13.9* 9.6   HGB 12.1* 12.8   HCT 38.2 38.7   MCV 99.7 95.6  229       BMP:   No results for input(s): NA, K, CL, CO2, PHOS, BUN, CREATININE in the last 72 hours. Invalid input(s): CA    MG:   Lab Results   Component Value Date    MG 2.2 02/14/2018     Ca/Phos:   Lab Results   Component Value Date    CALCIUM 9.9 08/09/2021     Amylase: No results found for: AMYLASE  Lipase:   Lab Results   Component Value Date    LIPASE 28 05/19/2011     LIVER PROFILE: No results for input(s): AST, ALT, LIPASE, BILIDIR, BILITOT, ALKPHOS in the last 72 hours. Invalid input(s): AMYLASE,  ALB    PT/INR:   No results for input(s): PROTIME, INR in the last 72 hours. APTT: No results for input(s): APTT in the last 72 hours. Cardiac Enzymes:  Lab Results   Component Value Date    CKTOTAL 51 11/13/2010    CKMB 0.5 11/13/2010    TROPONINI <0.01 02/15/2018       Hgb A1C: No results found for: LABA1C  No results found for: EAG  FAMILIA: No results found for: FAMILIA  ESR: No results found for: SEDRATE  CRP: No results found for: CRP  D Dimer: No results found for: DDIMER    Thyroid Studies:  Lab Results   Component Value Date    TSH 0.942 02/14/2018    B6WJGME 4.8 02/14/2018           MICROBIOLOGY:  Pending       CXR:  Reviewed     CT Chest:reviewed     PE  Vitals:    08/12/21 1101   BP:    Pulse:    Resp: 22   Temp:    SpO2: 98%     General:  Awake, alert, oriented X 3. Well developed, well nourished, well groomed. No apparent distress. HEENT:  Normocephalic, atraumatic. Pupils equal, round, reactive to light. No scleral icterus. No conjunctival injection. Normal lips, teeth, and gums. No nasal discharge. Neck:  Supple, FROM  Heart:  RRR, no murmurs, gallops, rubs, carotid upstroke normal, no carotid bruits  Lungs:wheeizng bilateral ,rhonchi   Abdomen:   Bowel sounds present, soft, nontender, no masses, no organomegaly, no peritoneal signs  Extremities:  No clubbing, cyanosis, or edema  Skin:  Warm and dry, no open lesions or rash  Neuro:  Cranial nerves 2-12 intact, no focal deficits  Vascular: Radial and pedal Pulses 2+  Breast: deferred  Rectal: deferred  Genitalia:  deferred    PROBLEM LIST:  Patient Active Problem List   Diagnosis    [facility]ch[facility] obstructive [facility] disease) [facility]    Essential hypertension    Adrenal adenoma    Class 2 obesity due to excess calories with serious comorbidity and body mass index (BMI) of 35.0 to 35.9 in adult    Anticoagulation management encounter    Typical atrial flutter ([facility] [facility]    Anxiety    Status post catheter ablation of atrial flutter    Persistent atrial fibrillation ([facility] [address] [facility]    ETOH abuse    COPD [facility] acute exacerbation ([facility])               ASSESSMENT:  1.) Acute   exacerbation of COPD  2) very severe COPD  3.)obstructive sleep apnea  4. )Afib /Flutter   5.)tobacco independent(quit 5 months ago)    Data:  FVC 3.63 which is 73 of predicted    FEV1 1.52 which is 39 of predicted    FEV1/FVC is 42  No Bronchodilator response    PLAN:    Keep  IV steroids   On Lasix 40 mg ,PO may change IV   On Duoneb   Albuterol as needed   On  Eliquis to take it twice daily  Continue Nebs   BiPAP     IgE 52  eosinophilic 172 ,if continue to have SOB will consider IL5 as OP  Need 2 d echo   Agree with cardiac work up and stress test  Will reach step daughter tomorrow        Perlita Delgado MD  Pulmonary/Critical care Medicine   [facility] and [address] [place] [address] Medical Necessity Statement: Based on my medical judgement, Mohs surgery is the most appropriate treatment for this cancer compared to other treatments.

## 2023-06-28 NOTE — PROGRESS NOTES
[facility]  Division of Pulmonary, Critical Care Medicine  Pulmonary [address] [facility]           Pulmonary Clinic consult       CC :SOB ,wheeizng   H/o A flutter     HPI :  Still with chest congestion   Patient has been doing  better   Still with difficulty breathing   Tightness as well   Still with chest congestion    PHYSICAL EXAMINATION:     VITAL SIGNS:  /64   Pulse 70   Temp 97.9 °F (36.6 °C) (Temporal)   Resp 18   Ht 5' 11\" (1.803 m)   Wt 288 lb 5.8 oz (130.8 kg)   SpO2 100%   BMI 40.22 kg/m²   Wt Readings from Last 3 Encounters:   08/14/21 288 lb 5.8 oz (130.8 kg)   01/22/20 277 lb (125.6 kg)   01/02/20 271 lb (122.9 kg)     Temp Readings from Last 3 Encounters:   08/14/21 97.9 °F (36.6 °C) (Temporal)   08/09/19 97.7 °F (36.5 °C)   04/09/19 98.5 °F (36.9 °C) (Oral)     TMAX:  BP Readings from Last 3 Encounters:   08/14/21 122/64   01/22/20 132/77   01/02/20 124/82     Pulse Readings from Last 3 Encounters:   08/14/21 70   01/22/20 109   01/02/20 83           INTAKE/OUTPUTS:  I/O last 3 completed shifts:   In: 1200 [P.O.:1200]  Out: 3300 [Urine:3300]    Intake/Output Summary (Last 24 hours) at 8/14/2021 2348  Last data filed at 8/14/2021 2324  Gross per 24 hour   Intake 1200 ml   Output 2400 ml   Net -1200 ml       This is virtual visit      LABS/IMAGING:    CBC:  Lab Results   Component Value Date    WBC 13.9 (H) 08/09/2021    HGB 12.1 (L) 08/09/2021    HCT 38.2 08/09/2021    MCV 99.7 08/09/2021     08/09/2021    LYMPHOPCT 9.6 (L) 08/09/2021    RBC 3.83 08/09/2021    MCH 31.6 08/09/2021    MCHC 31.7 (L) 08/09/2021    RDW 12.4 08/09/2021    NEUTOPHILPCT 83.0 (H) 08/09/2021    MONOPCT 5.6 08/09/2021    BASOPCT 0.1 08/09/2021    NEUTROABS 11.54 (H) 08/09/2021    LYMPHSABS 1.33 (L) 08/09/2021    MONOSABS 0.78 08/09/2021    EOSABS 0.00 (L) 08/09/2021    BASOSABS 0.02 08/09/2021       Recent Labs     08/09/21  0817 08/07/21  2117   WBC 13.9* 9.6   HGB 12.1* 12.8   HCT 38.2 38.7   MCV 99.7 95.6    229       BMP:   No results for input(s): NA, K, CL, CO2, PHOS, BUN, CREATININE in the last 72 hours. Invalid input(s): CA    MG:   Lab Results   Component Value Date    MG 2.2 02/14/2018     Ca/Phos:   Lab Results   Component Value Date    CALCIUM 9.9 08/09/2021     Amylase: No results found for: AMYLASE  Lipase:   Lab Results   Component Value Date    LIPASE 28 05/19/2011     LIVER PROFILE: No results for input(s): AST, ALT, LIPASE, BILIDIR, BILITOT, ALKPHOS in the last 72 hours. Invalid input(s): AMYLASE,  ALB    PT/INR:   No results for input(s): PROTIME, INR in the last 72 hours. APTT: No results for input(s): APTT in the last 72 hours. Cardiac Enzymes:  Lab Results   Component Value Date    CKTOTAL 51 11/13/2010    CKMB 0.5 11/13/2010    TROPONINI <0.01 02/15/2018       Hgb A1C: No results found for: LABA1C  No results found for: EAG  FAMILIA: No results found for: FAMILIA  ESR: No results found for: SEDRATE  CRP: No results found for: CRP  D Dimer: No results found for: DDIMER    Thyroid Studies:  Lab Results   Component Value Date    TSH 0.942 02/14/2018    [id number] 4.8 02/14/2018           MICROBIOLOGY:  Pending       CXR:  Reviewed     CT Chest:reviewed     PE  Vitals:    08/14/21 2330   BP: 122/64   Pulse: 70   Resp: 18   Temp: 97.9 °F (36.6 °C)   SpO2: 100%     General:  Awake, alert, oriented X 3. Well developed, well nourished, well groomed. No apparent distress. HEENT:  Normocephalic, atraumatic. Pupils equal, round, reactive to light. No scleral icterus. No conjunctival injection. Normal lips, teeth, and gums. No nasal discharge. Neck:  Supple, FROM  Heart:  RRR, no murmurs, gallops, rubs, carotid upstroke normal, no carotid bruits  Lungs:wheeizng bilateral ,rhonchi   Abdomen:   Bowel sounds present, soft, nontender, no masses, no organomegaly, no peritoneal signs  Extremities:  No clubbing, cyanosis, or edema  Skin:  Warm and dry, no open lesions or rash  Neuro:  Cranial nerves 2-12 intact, no focal deficits  Vascular: Radial and pedal Pulses 2+  Breast: deferred  Rectal: deferred  Genitalia:  deferred    PROBLEM LIST:  Patient Active Problem List   Diagnosis    [facility]chronic obstructive [facility] disease) [facility] [address]    Essential hypertension    Adrenal adenoma    Class 2 obesity due to excess calories with serious comorbidity and body mass index (BMI) of 35.0 to 35.9 in adult    Anticoagulation management encounter    Typical atrial flutter ([facility] [address]    Anxiety    Status post catheter ablation of atrial flutter    Persistent atrial fibrillation ([facility] [address]    ETOH abuse    COPD with acute exacerbation ([facility])               ASSESSMENT:  1.) Acute   exacerbation of COPD  2) very severe COPD  3.)obstructive sleep apnea  4. )Afib /Flutter   5.)tobacco independent(quit 5 months ago)    Data:  FVC 3.63 which is 73 of predicted    FEV1 1.52 which is 39 of predicted    FEV1/FVC is 42  No Bronchodilator response    PLAN:  He is on 4 L which base line and RR 18   Continue  IV steroids   On Lasix 20 mg IV q 8   Hypertonic saline   Micinex 400 mg q 6    On Duoneb   Albuterol as needed   On  Eliquis to take it twice daily  Continue Nebs   BiPAP     IgE 52  eosinophilic 420 ,if continue to have SOB will consider IL5 as OP  2 d echo /stress as per Primary ,if needed cardiology consult    May consider bronch if continue not able to mirna secretions out       Απόλλωνος MD Ryan  Pulmonary/Critical care Medicine   [facility] and [address] [place] [address] Nasal Turnover Hinge Flap Text: The defect edges were debeveled with a #15 scalpel blade.  Given the size, depth, location of the defect and the defect being full thickness a nasal turnover hinge flap was deemed most appropriate.  Using a sterile surgical marker, an appropriate hinge flap was drawn incorporating the defect. The area thus outlined was incised with a #15 scalpel blade. The flap was designed to recreate the nasal mucosal lining and the alar rim. The skin margins were undermined to an appropriate distance in all directions utilizing iris scissors.